# Patient Record
Sex: FEMALE | Race: BLACK OR AFRICAN AMERICAN | NOT HISPANIC OR LATINO | Employment: OTHER | URBAN - METROPOLITAN AREA
[De-identification: names, ages, dates, MRNs, and addresses within clinical notes are randomized per-mention and may not be internally consistent; named-entity substitution may affect disease eponyms.]

---

## 2017-01-10 ENCOUNTER — HOSPITAL ENCOUNTER (EMERGENCY)
Facility: OTHER | Age: 47
Discharge: HOME | End: 2017-01-10
Attending: EMERGENCY MEDICINE
Payer: MEDICAID

## 2017-01-10 VITALS
HEART RATE: 80 BPM | BODY MASS INDEX: 48.82 KG/M2 | HEIGHT: 65 IN | DIASTOLIC BLOOD PRESSURE: 65 MMHG | TEMPERATURE: 98 F | SYSTOLIC BLOOD PRESSURE: 108 MMHG | OXYGEN SATURATION: 100 % | WEIGHT: 293 LBS | RESPIRATION RATE: 18 BRPM

## 2017-01-10 DIAGNOSIS — J02.9 PHARYNGITIS, UNSPECIFIED ETIOLOGY: Primary | ICD-10-CM

## 2017-01-10 LAB
CTP QC/QA: YES
POCT GLUCOSE: 90 MG/DL (ref 70–110)
S PYO RRNA THROAT QL PROBE: NEGATIVE

## 2017-01-10 PROCEDURE — 99283 EMERGENCY DEPT VISIT LOW MDM: CPT | Mod: 25

## 2017-01-10 PROCEDURE — 82947 ASSAY GLUCOSE BLOOD QUANT: CPT

## 2017-01-10 PROCEDURE — 87880 STREP A ASSAY W/OPTIC: CPT

## 2017-01-10 PROCEDURE — 63600175 PHARM REV CODE 636 W HCPCS: Performed by: EMERGENCY MEDICINE

## 2017-01-10 PROCEDURE — 96372 THER/PROPH/DIAG INJ SC/IM: CPT

## 2017-01-10 RX ORDER — DEXAMETHASONE SODIUM PHOSPHATE 4 MG/ML
4 INJECTION, SOLUTION INTRA-ARTICULAR; INTRALESIONAL; INTRAMUSCULAR; INTRAVENOUS; SOFT TISSUE
Status: COMPLETED | OUTPATIENT
Start: 2017-01-10 | End: 2017-01-10

## 2017-01-10 RX ADMIN — DEXAMETHASONE SODIUM PHOSPHATE 4 MG: 4 INJECTION, SOLUTION INTRAMUSCULAR; INTRAVENOUS at 04:01

## 2017-01-10 NOTE — ED PROVIDER NOTES
Encounter Date: 1/10/2017       History     Chief Complaint   Patient presents with    Sore Throat     sore throat x 2 day, no fever/cough/congestion reported.      Review of patient's allergies indicates:  No Known Allergies  The history is provided by the patient.    46-year-old who complains of a sore throat for 2 days.  She says the pain worsens when she swallows.  She does not take anything for the symptoms.  She also has pain to her left ear.  No fever.  No other cold symptoms.  Patient was seen and treated here in December for an upper respiratory tract infection.  She said that she completed the antibiotics.  She denies chest pain or shortness of breath.  No cough  Past Medical History   Diagnosis Date    Anticoagulant long-term use     Asthma     Asthma     CHF (congestive heart failure)     CHF (congestive heart failure)     H/O tubal ligation     Hypertension     Obesity     Type 2 diabetes mellitus with hyperglycemia     Type 2 diabetes mellitus with polyneuropathy      No past medical history pertinent negatives.  Past Surgical History   Procedure Laterality Date    Gallbladder surgery      Iabp  4/2016    Colonoscopy N/A 5/2/2016     Procedure: COLONOSCOPY;  Surgeon: Eugene Soto MD;  Location: [address];  Service: Endoscopy;  Laterality: N/A;    Tubal ligation       History reviewed. No pertinent family history.  Social History   Substance Use Topics    Smoking status: Never Smoker    Smokeless tobacco: None    Alcohol use No     Review of Systems   Constitutional: Negative for fever.   HENT: Positive for sore throat. Negative for congestion.    Respiratory: Negative for cough.    Cardiovascular: Negative for chest pain.   Gastrointestinal: Negative for vomiting.   Genitourinary: Negative for dysuria.   Neurological: Negative for headaches.       Physical Exam   Initial Vitals   BP Pulse Resp Temp SpO2   01/10/17 0336 01/10/17 0336 01/10/17 0336 01/10/17 0336 01/10/17  0336   110/70 82 18 98.1 °F (36.7 °C) 100 %     Physical Exam    Nursing note and vitals reviewed.  Constitutional: She appears well-developed and well-nourished.   HENT:   Head: Normocephalic and atraumatic.   Mouth/Throat: No oropharyngeal exudate.   Mild erythematous posterior pharynx, normal tympanic membranes bilaterally   Eyes: Conjunctivae and EOM are normal. Pupils are equal, round, and reactive to light.   Neck: Normal range of motion. Neck supple.   Cardiovascular: Normal rate and regular rhythm.   Pulmonary/Chest: Breath sounds normal.   Abdominal: Soft. There is no tenderness. There is no rebound and no guarding.   Musculoskeletal: Normal range of motion.   Neurological: She is alert and oriented to person, place, and time. She has normal strength.   Skin: Skin is warm and dry.   Psychiatric: She has a normal mood and affect.         ED Course   Procedures  Labs Reviewed   POCT RAPID STREP A   POCT GLUCOSE MONITORING CONTINUOUS             Medical Decision Making:   Initial Assessment:   46-year-old who complains of a sore throat for 2 days.  Patient has mild erythema to the posterior pharynx but no exudate or evidence of an abscess.  ED Management:  Strep test will be done as well as an Accu-Chek.  Accu-Chek was 90.  Strep test was negative.  I do not  Feel that  the patient needs antibiotics at this point.  She was treated with 4 mg of Decadron.  I do feel comfortable doing this since the patient's Accu-Chek was normal.  She was instructed that her blood sugar may go up over the next couple of days.                   ED Course     Clinical Impression:   The encounter diagnosis was Pharyngitis, unspecified etiology.          Ai Monahan MD  01/10/17 0428

## 2017-01-10 NOTE — ED AVS SNAPSHOT
[facility] EMERGENCY DEPARTMENT  [address] [facility] [address]               Laure Ross   1/10/2017  3:33 AM   ED    Description:  Female : 1970   Department:  [facility] Emergency Department           Your Care was Coordinated By:     Provider Role From To    Ai Monahan MD Attending Provider 01/10/17 0336 --      Reason for Visit     Sore Throat           Diagnoses this Visit        Comments    Pharyngitis, unspecified etiology    -  Primary       ED Disposition     None           To Do List           Follow-up Information     Follow up with Ayan Olmstead MD. Schedule an appointment as soon as possible for a visit in 1 day.    Specialty:  Cardiology    Contact information:    [address] [place] [address]  [phone]          Follow up with [facility] Emergency Department.    Specialty:  Emergency Medicine    Why:  If symptoms worsen    Contact information:    [address] [facility] [address]  [phone]      [facility] On Call     [facility] On Call Nurse [facility] Line -  Assistance  Registered nurses in the [facility] Center provide clinical advisement, health education, appointment booking, and other advisory services.  Call for this free service at [phone].             Medications           Message regarding Medications     Verify the changes and/or additions to your medication regime listed below are the same as discussed with your clinician today.  If any of these changes or additions are incorrect, please notify your healthcare provider.        These medications were administered today        Dose Freq    dexamethasone injection 4 mg 4 mg ED 1 Time    Sig: Inject 1 mL (4 mg total) into the muscle ED 1 Time.    Class: Normal    Route: Intramuscular           Verify that the below list of medications is an accurate representation of the medications you are currently taking.  If none reported, the list may be blank. If incorrect, please  "contact your healthcare provider. Carry this list with you in case of emergency.           Current Medications     amiodarone (PACERONE) 200 MG Tab TAKE TWO TABLETS BY MOUTH EVERY DAY.    amiodarone (PACERONE) 200 MG Tab Take 2 tablets (400 mg total) by mouth once daily.    BD ULTRA-FINE ESSENCE PEN NEEDLES 32 gauge x 5/32" Ndle Takes 5 times  day    bisoprolol (ZEBETA) 5 MG tablet Take 0.5 tablets (2.5 mg total) by mouth once daily.    blood sugar diagnostic Strp 240 strips by Misc.(Non-Drug; Combo Route) route 4 (four) times daily with meals and nightly.    BREO ELLIPTA 100-25 mcg/dose diskus inhaler 1 puff once daily.     cetirizine (ZYRTEC) 5 MG chewable tablet Take 1 tablet (5 mg total) by mouth once daily.    dexamethasone injection 4 mg Inject 1 mL (4 mg total) into the muscle ED 1 Time.    digoxin (LANOXIN) 125 mcg tablet TAKE 1 TABLET BY MOUTH ONCE DAILY    furosemide (LASIX) 40 MG tablet Take 2 tablets (80 mg total) by mouth 2 (two) times daily.    insulin aspart (NOVOLOG) 100 unit/mL injection Inject into the skin 3 (three) times daily before meals.    insulin glargine (LANTUS SOLOSTAR) 100 unit/mL (3 mL) InPn pen Inject 36 Units into the skin every evening.    lancets (ACCU-CHEK MULTICLIX LANCET) Misc 1 each by Misc.(Non-Drug; Combo Route) route 4 (four) times daily with meals and nightly.    lancets Misc Checks 4 times a day    levalbuterol (XOPENEX HFA) 45 mcg/actuation inhaler Inhale 2 puffs into the lungs every 4 (four) hours as needed for Wheezing.    levalbuterol (XOPENEX) 1.25 mg/0.5 mL nebulizer solution Take 0.5 mLs (1.25 mg total) by nebulization every 4 (four) hours as needed for Wheezing.    lisinopril (PRINIVIL,ZESTRIL) 5 MG tablet Take 1 tablet (5 mg total) by mouth 2 (two) times daily.    magnesium oxide (MAG-OX) 400 mg tablet Take 1 tablet (400 mg total) by mouth 2 (two) times daily.    montelukast (SINGULAIR) 10 mg tablet 10 mg once daily.     omeprazole (PRILOSEC) 40 MG capsule Take 1 " "capsule (40 mg total) by mouth every morning.    potassium chloride (MICRO-K) 10 MEQ CpSR TAKE 2 CAPSULES BY MOUTH ONCE DAILY    potassium chloride (MICRO-K) 10 MEQ CpSR Take 2 capsules (20 mEq total) by mouth once daily.    SPIRIVA WITH HANDIHALER 18 mcg inhalation capsule Inhale 18 mcg into the lungs.     spironolactone (ALDACTONE) 25 MG tablet Take 1 tablet (25 mg total) by mouth once daily.    TRUEPLUS LANCETS 30 gauge Misc     TRUEPLUS LANCETS 30 gauge Misc USE 2 HOURS AFTER MEALS AND AT BEDTIME    vitamin D 1000 units Tab Take 2 tablets (2,000 Units total) by mouth once daily.    warfarin (COUMADIN) 7.5 MG tablet Take 1 tablet (7.5 mg total) by mouth Daily. Take 7.5 mg on Tues, Thurs, Sat. Take 3.75 mg all other days           Clinical Reference Information           Your Vitals Were     BP Pulse Temp Resp Height Weight    110/70 (BP Location: Left arm) 82 98.1 °F (36.7 °C) (Oral) 18 5' 5" (1.651 m) 216.9 kg (478 lb 2.8 oz)    Last Period SpO2 BMI          01/01/2017 100% 79.57 kg/m2        Allergies as of 1/10/2017     No Known Allergies      Immunizations Administered on Date of Encounter - 1/10/2017     None      ED Micro, Lab, POCT     Start Ordered       Status Ordering Provider    01/10/17 0351 01/10/17 0351  POCT glucose  Once      Final result     01/10/17 0348 01/10/17 0347  POCT rapid strep A  Once      Final result     01/10/17 0339 01/10/17 0338  POCT glucose  Once      Acknowledged       ED Imaging Orders     None        Discharge Instructions         Self-Care for Sore Throats  Sore throats occur for many reasons, such as colds, allergies, and infections caused by viruses or bacteria. In any case, your throat becomes red and sore. Your goal for self-care is to reduce your discomfort while giving your throat a chance to heal.    Moisten and Soothe Your Throat  · Try a sip of water first thing after waking up.  · Keep your throat moist by drinking 6 or more glasses of clear liquids every " day.  · Run a cool-air humidifier in your room overnight.  · Avoid cigarette smoke.   · Suck on throat lozenges, cough drops, hard candy, ice chips, or frozen fruit-juice bars. Use the sugar-free versions if your diet or medical condition require them.  Gargle to Ease Irritation  Gargling every hour or 2 can ease irritation. Try gargling with 1 of these solutions:  · 1/4 teaspoon of salt in 1/2 cup of warm water  · An over-the-counter anesthetic gargle  Use Medication for More Relief  Over-the-counter medication can reduce sore throat symptoms. Ask your pharmacist if you have questions about which medication to use:  · Ease pain with anesthetic sprays. Aspirin or an aspirin substitute also helps. Remember, never give aspirin to anyone 18 or younger, or if you are already taking blood thinners.   · For sore throats caused by allergies, try antihistamines to block the allergic reaction.  · Remember: unless a sore throat is caused by a bacterial infection, antibiotics wont help you.  Prevent Future Sore Throats  · Stop smoking or reduce contact with secondhand smoke. Smoke irritates the tender throat lining.  · Limit contact with pets and with allergy-causing substances, such as pollen and mold.  · When youre around someone with a sore throat or cold, wash your hands frequently to keep viruses or bacteria from spreading.  · Dont strain your vocal cords.  Call Your Health Care Provider  Contact your doctor if you have:  · A temperature over 101°F (38.3°C)  · White spots on the throat  · Great difficulty swallowing  · Trouble breathing  · A skin rash  · Recent exposure to someone else with strep bacteria  · Severe hoarseness and swollen glands in the neck or jaw   © 8849-0156 "[employer]. [address] [place] [address], [place] [address]. All rights reserved. This information is not intended as a substitute for professional medical care. Always follow your healthcare professional's instructions.          Your  Scheduled Appointments     Jan 12, 2017 10:45 AM CST   Anticoagulation with Wen Nolen, Aristides   [facility] ([facility])    [address]  [place] [address] [phone]            Jan 17, 2017  8:45 AM CST   Established Patient Visit with LOR High [facility]arlene - Podiatry ([facility] )    [address]  [place] [address] [phone]            Mar 22, 2017  7:10 AM CDT   Non-Fasting Lab with LAB, APPOINTMENT [place] [facility] ([facility])    [address] [facility] [address] [phone]            Mar 29, 2017 10:30 AM CDT   Established Patient with THANIA Giffrod, KIRSTENP   [facility] - Endocrinology ([facility] )    [address] [facility] [facility] [address] [phone]               [facility] complies with applicable Federal civil rights laws and does not discriminate on the basis of race, color, national origin, age, disability, or sex.        Language Assistance Services     ATTENTION: Language assistance services are available, free of charge. Please call [phone].      ATENCIÓN: Si habla español, tiene a dougherty disposición servicios gratuitos de asistencia lingüística. Llame al [phone].     CHÚ Ý: N?u b?n nói Ti?ng Vi?t, có các d?ch v? h? tr? ngôn ng? mi?n phí dành cho b?n. G?i s? [phone].

## 2017-01-10 NOTE — DISCHARGE INSTRUCTIONS
Self-Care for Sore Throats  Sore throats occur for many reasons, such as colds, allergies, and infections caused by viruses or bacteria. In any case, your throat becomes red and sore. Your goal for self-care is to reduce your discomfort while giving your throat a chance to heal.    Moisten and Soothe Your Throat  · Try a sip of water first thing after waking up.  · Keep your throat moist by drinking 6 or more glasses of clear liquids every day.  · Run a cool-air humidifier in your room overnight.  · Avoid cigarette smoke.   · Suck on throat lozenges, cough drops, hard candy, ice chips, or frozen fruit-juice bars. Use the sugar-free versions if your diet or medical condition require them.  Gargle to Ease Irritation  Gargling every hour or 2 can ease irritation. Try gargling with 1 of these solutions:  · 1/4 teaspoon of salt in 1/2 cup of warm water  · An over-the-counter anesthetic gargle  Use Medication for More Relief  Over-the-counter medication can reduce sore throat symptoms. Ask your pharmacist if you have questions about which medication to use:  · Ease pain with anesthetic sprays. Aspirin or an aspirin substitute also helps. Remember, never give aspirin to anyone 18 or younger, or if you are already taking blood thinners.   · For sore throats caused by allergies, try antihistamines to block the allergic reaction.  · Remember: unless a sore throat is caused by a bacterial infection, antibiotics wont help you.  Prevent Future Sore Throats  · Stop smoking or reduce contact with secondhand smoke. Smoke irritates the tender throat lining.  · Limit contact with pets and with allergy-causing substances, such as pollen and mold.  · When youre around someone with a sore throat or cold, wash your hands frequently to keep viruses or bacteria from spreading.  · Dont strain your vocal cords.  Call Your Health Care Provider  Contact your doctor if you have:  · A temperature over 101°F (38.3°C)  · White spots on the  throat  · Great difficulty swallowing  · Trouble breathing  · A skin rash  · Recent exposure to someone else with strep bacteria  · Severe hoarseness and swollen glands in the neck or jaw   © 0630-5026 [employer]. [address] [place] [address], [place] [address]. All rights reserved. This information is not intended as a substitute for professional medical care. Always follow your healthcare professional's instructions.

## 2017-01-12 ENCOUNTER — ANTI-COAG VISIT (OUTPATIENT)
Dept: CARDIOLOGY | Facility: CLINIC | Age: 47
End: 2017-01-12
Payer: MEDICAID

## 2017-01-12 DIAGNOSIS — Z79.01 CHRONIC ANTICOAGULATION: Primary | ICD-10-CM

## 2017-01-12 DIAGNOSIS — I48.0 PAROXYSMAL ATRIAL FIBRILLATION: ICD-10-CM

## 2017-01-12 DIAGNOSIS — I42.0 CARDIOMYOPATHY, DILATED, NONISCHEMIC: ICD-10-CM

## 2017-01-12 LAB
CTP QC/QA: NORMAL
INR PPP: 2.8 (ref 2–3)

## 2017-01-12 PROCEDURE — 85610 PROTHROMBIN TIME: CPT | Mod: PBBFAC,PO

## 2017-01-12 PROCEDURE — 99211 OFF/OP EST MAY X REQ PHY/QHP: CPT | Mod: S$PBB,25,,

## 2017-01-12 RX ORDER — FUROSEMIDE 40 MG/1
TABLET ORAL
Qty: 120 TABLET | Refills: 3 | Status: SHIPPED | OUTPATIENT
Start: 2017-01-12 | End: 2017-05-29 | Stop reason: SDUPTHER

## 2017-01-12 NOTE — PROGRESS NOTES
INR good and on upper end despite missed dose 2 days ago. Pt reports no boosts drinks last week then started 1 daily on 1/7 or 1/8. She previously was drinking 2/day. We will decrease dose based on INR today and diet change. Repeat INR in 2 weeks.

## 2017-01-17 ENCOUNTER — OFFICE VISIT (OUTPATIENT)
Dept: PODIATRY | Facility: CLINIC | Age: 47
End: 2017-01-17
Payer: MEDICAID

## 2017-01-17 ENCOUNTER — TELEPHONE (OUTPATIENT)
Dept: PODIATRY | Facility: CLINIC | Age: 47
End: 2017-01-17

## 2017-01-17 VITALS
DIASTOLIC BLOOD PRESSURE: 65 MMHG | WEIGHT: 221.31 LBS | HEIGHT: 65 IN | BODY MASS INDEX: 36.87 KG/M2 | HEART RATE: 73 BPM | SYSTOLIC BLOOD PRESSURE: 99 MMHG

## 2017-01-17 DIAGNOSIS — M62.469 GASTROCNEMIUS EQUINUS, UNSPECIFIED LATERALITY: ICD-10-CM

## 2017-01-17 DIAGNOSIS — Z79.01 LONG TERM (CURRENT) USE OF ANTICOAGULANTS: ICD-10-CM

## 2017-01-17 DIAGNOSIS — L84 PRE-ULCERATIVE CALLUSES: ICD-10-CM

## 2017-01-17 DIAGNOSIS — Z79.4 TYPE 2 DIABETES MELLITUS WITH DIABETIC POLYNEUROPATHY, WITH LONG-TERM CURRENT USE OF INSULIN: Primary | ICD-10-CM

## 2017-01-17 DIAGNOSIS — E11.42 TYPE 2 DIABETES MELLITUS WITH DIABETIC POLYNEUROPATHY, WITH LONG-TERM CURRENT USE OF INSULIN: Primary | ICD-10-CM

## 2017-01-17 PROCEDURE — 99213 OFFICE O/P EST LOW 20 MIN: CPT | Mod: PBBFAC | Performed by: PODIATRIST

## 2017-01-17 PROCEDURE — 99213 OFFICE O/P EST LOW 20 MIN: CPT | Mod: S$PBB,,, | Performed by: PODIATRIST

## 2017-01-17 PROCEDURE — 99999 PR PBB SHADOW E&M-EST. PATIENT-LVL III: CPT | Mod: PBBFAC,,, | Performed by: PODIATRIST

## 2017-01-17 NOTE — PROGRESS NOTES
Subjective:      Patient ID: Laure Ross is a 46 y.o. female.    Chief Complaint: Diabetes Mellitus (PCP Dr. Olmstead); Diabetic Foot Exam; and Routine Foot Care    Laure is a 46 y.o. female who presents to the clinic for evaluation and treatment of high risk feet. Laure has a past medical history of Anticoagulant long-term use; Asthma; Asthma; CHF (congestive heart failure); CHF (congestive heart failure); H/O tubal ligation; Hypertension; Obesity; Type 2 diabetes mellitus with hyperglycemia; and Type 2 diabetes mellitus with polyneuropathy. The patient's chief complaint is large plantar calluses. History of forefoot ulcer several years ago treated at [facility]. Denies recent wound or infection. Notes decreased pain and severity of calluses with new orthotics. This patient has documented high risk feet requiring routine maintenance secondary to peripheral neuropathy.    PCP: Ayan Olmstead MD    Date Last Seen by PCP: 11/30/16, endocgeorge    Current shoe gear:  Affected Foot: Flip-flops     Unaffected Foot: Flip-flops    Hemoglobin A1C   Date Value Ref Range Status   11/23/2016 6.1 4.5 - 6.2 % Final     Comment:     According to ADA guidelines, hemoglobin A1C <7.0% represents  optimal control in non-pregnant diabetic patients.  Different  metrics may apply to specific populations.   Standards of Medical Care in Diabetes - 2016.  For the purpose of screening for the presence of diabetes:  <5.7%     Consistent with the absence of diabetes  5.7-6.4%  Consistent with increasing risk for diabetes   (prediabetes)  >or=6.5%  Consistent with diabetes  Currently no consensus exists for use of hemoglobin A1C  for diagnosis of diabetes for children.     08/23/2016 7.2 (H) 4.5 - 6.2 % Final     Comment:     According to ADA guidelines, hemoglobin A1C <7.0% represents  optimal control in non-pregnant diabetic patients.  Different  metrics may apply to specific populations.   Standards of Medical Care in Diabetes -  2016.  For the purpose of screening for the presence of diabetes:  <5.7%     Consistent with the absence of diabetes  5.7-6.4%  Consistent with increasing risk for diabetes   (prediabetes)  >or=6.5%  Consistent with diabetes  Currently no consensus exists for use of hemoglobin A1C  for diagnosis of diabetes for children.     04/28/2016 9.8 (H) 4.5 - 6.2 % Final       Review of Systems   Constitution: Negative for chills, fever and malaise/fatigue.   Cardiovascular: Negative for chest pain, leg swelling, orthopnea and palpitations.   Respiratory: Negative for cough, shortness of breath and wheezing.    Skin: Positive for color change, dry skin and nail changes. Negative for itching, poor wound healing and rash.   Musculoskeletal: Negative for arthritis, gout, joint pain, joint swelling, muscle weakness and myalgias.   Neurological: Positive for numbness, paresthesias and sensory change. Negative for disturbances in coordination, dizziness, focal weakness and tremors.           Objective:      Physical Exam   Cardiovascular:   Dorsalis pedis and posterior tibial pulses are diminished Bilaterally. Toes are cool to touch. Feet are warm proximally.There is decreased digital hair. Skin is atrophic, slightly hyperpigmented, and mildly edematous       Musculoskeletal:   Musculoskeletal:  Muscle strength is 5/5 in all groups bilaterally.  No pain with ankle, STJ or MTPJ ROM, bilat. Ankle dorsiflexion is limited with knees extended and flexed, bilat.     Neurological:   [place] 5.07 monofilamant testing is diminished both feet. Sharp/dull sensation diminished Bilaterally. Light touch absent Bilaterally.       Skin: Lesion noted.   Pre-ulcerative lesion at plantar 2nd MTPJ, bilat. Dermal bruising and callus. No erythema, fluctuance or drainage.    Toenails 1-5 bilaterally are elongated by 2-3 mm, thickened by 2-3 mm, discolored/yellowed, dystrophic, brittle with subungual debris. No incurvation. Mild xerosis noted,  bilat.                   Assessment:       Encounter Diagnoses   Name Primary?    Type 2 diabetes mellitus with diabetic polyneuropathy, with long-term current use of insulin Yes    Pre-ulcerative calluses     Gastrocnemius equinus, unspecified laterality     Long term (current) use of anticoagulants          Plan:       Laure was seen today for diabetes mellitus, diabetic foot exam and routine foot care.    Diagnoses and all orders for this visit:    Type 2 diabetes mellitus with diabetic polyneuropathy, with long-term current use of insulin    Pre-ulcerative calluses    Gastrocnemius equinus, unspecified laterality    Long term (current) use of anticoagulants      I counseled the patient on her conditions, their implications and medical management.        - Shoe inspection. Diabetic Foot Education. Patient reminded of the importance of good nutrition and blood sugar control to help prevent podiatric complications of diabetes. Patient instructed on proper foot hygeine. We discussed wearing proper shoe gear, daily foot inspections, never walking without protective shoe gear, never putting sharp instruments to feet, routine podiatric nail visits every 2-3 months.      - With patient's permission, nails were aggressively reduced and debrided x 10 to their soft tissue attachment mechanically and with electric , removing all offending nail and debris. Patient relates relief following the procedure. She will continue to monitor the areas daily, inspect her feet, wear protective shoe gear when ambulatory, moisturizer to maintain skin integrity and follow in this office in approximately 2-3 months, sooner p.r.n.    - After cleansing the  area w/ alcohol prep pad the above mentioned hyperkeratosis/pre ulcer lesions was trimmed utilizing No 15 scapel, to a smooth base with out incident. Patient tolerated this  well and reported comfort to the area of plantar 2nd MTP joint, bilat. No drainage or abscess noted.

## 2017-01-17 NOTE — TELEPHONE ENCOUNTER
----- Message from Aziza Bolaños sent at 1/17/2017  8:41 AM CST -----  Contact: self@ home   Pt will be 15 min late to her appt.

## 2017-01-17 NOTE — LETTER
January 17, 2017      Dandre Jules MD  [address] [facility] [address]           [facility]  [address]  [facility] [address]  Phone: [phone]          Patient: Laure Ross   MR Number: [id number]   YOB: 1970   Date of Visit: 1/17/2017       Dear Dr. Dandre Jules:    Thank you for referring Laure Ross to me for evaluation. Attached you will find relevant portions of my assessment and plan of care.    If you have questions, please do not hesitate to call me. I look forward to following Laure Ross along with you.    Sincerely,    Freddie Hampton, DPWILMAN    Enclosure  CC:  No Recipients    If you would like to receive this communication electronically, please contact [email or web address] or [phone] to request more information on [employer] access.    For providers and/or their staff who would like to refer a patient to [facility], please contact us through our one-stop-shop provider referral line, [facility], at [phone].    If you feel you have received this communication in error or would no longer like to receive these types of communications, please e-mail [email or web address]

## 2017-01-17 NOTE — TELEPHONE ENCOUNTER
I called Funmilayo to discuss patient stated she will be late also made 1 attempt to call patient her voicemail said its not set up yet and can not receive messages . Patient will have to reschedule or wait until there is a availability some time today or this evening i was advised.

## 2017-01-17 NOTE — MR AVS SNAPSHOT
[facility]  [address] [facility] [address]  Phone: [phone]                  Laure Ross   2017 8:45 AM   Office Visit    Description:  Female : 1970   Provider:  Freddie Hampton DPM   Department:  [facility]           Reason for Visit     Diabetes Mellitus     Diabetic Foot Exam     Routine Foot Care           Diagnoses this Visit        Comments    Type 2 diabetes mellitus with diabetic polyneuropathy, with long-term current use of insulin    -  Primary     Pre-ulcerative calluses         Gastrocnemius equinus, unspecified laterality         Long term (current) use of anticoagulants                To Do List           Future Appointments        Provider Department Dept Phone    2017 10:45 AM Wen Nolen, PharmD [facility] - Coumadin [phone]    3/22/2017 7:10 AM LAB, APPOINTMENT [place] [facility] [phone]    3/29/2017 10:30 AM Loretta Trevino APRN, FNP [facility] - Endocrinology [phone]    2017 10:00 AM Freddie Hampton DPM [facility] [phone]      Goals (5 Years of Data)     None      [facility] On [facility]     [facility] Nurse Care Line -  Assistance  Registered nurses in the [facility] provide clinical advisement, health education, appointment booking, and other advisory services.  Call for this free service at [phone].             Medications           Message regarding Medications     Verify the changes and/or additions to your medication regime listed below are the same as discussed with your clinician today.  If any of these changes or additions are incorrect, please notify your healthcare provider.             Verify that the below list of medications is an accurate representation of the medications you are currently taking.  If none reported, the list may be blank. If incorrect, please contact your healthcare provider. Carry this list with you in case of emergency.  "          Current Medications     amiodarone (PACERONE) 200 MG Tab TAKE TWO TABLETS BY MOUTH EVERY DAY.    amiodarone (PACERONE) 200 MG Tab Take 2 tablets (400 mg total) by mouth once daily.    BD ULTRA-FINE ESSENCE PEN NEEDLES 32 gauge x 5/32" Ndle Takes 5 times  day    bisoprolol (ZEBETA) 5 MG tablet Take 0.5 tablets (2.5 mg total) by mouth once daily.    blood sugar diagnostic Strp 240 strips by Misc.(Non-Drug; Combo Route) route 4 (four) times daily with meals and nightly.    BREO ELLIPTA 100-25 mcg/dose diskus inhaler 1 puff once daily.     cetirizine (ZYRTEC) 5 MG chewable tablet Take 1 tablet (5 mg total) by mouth once daily.    digoxin (LANOXIN) 125 mcg tablet TAKE 1 TABLET BY MOUTH ONCE DAILY    furosemide (LASIX) 40 MG tablet TAKE 2 TABLETS BY MOUTH TWO TIMES A DAY .    insulin aspart (NOVOLOG) 100 unit/mL injection Inject into the skin 3 (three) times daily before meals.    insulin glargine (LANTUS SOLOSTAR) 100 unit/mL (3 mL) InPn pen Inject 36 Units into the skin every evening.    lancets (ACCU-CHEK MULTICLIX LANCET) Misc 1 each by Misc.(Non-Drug; Combo Route) route 4 (four) times daily with meals and nightly.    lancets Misc Checks 4 times a day    levalbuterol (XOPENEX HFA) 45 mcg/actuation inhaler Inhale 2 puffs into the lungs every 4 (four) hours as needed for Wheezing.    levalbuterol (XOPENEX) 1.25 mg/0.5 mL nebulizer solution Take 0.5 mLs (1.25 mg total) by nebulization every 4 (four) hours as needed for Wheezing.    lisinopril (PRINIVIL,ZESTRIL) 5 MG tablet Take 1 tablet (5 mg total) by mouth 2 (two) times daily.    magnesium oxide (MAG-OX) 400 mg tablet Take 1 tablet (400 mg total) by mouth 2 (two) times daily.    montelukast (SINGULAIR) 10 mg tablet 10 mg once daily.     omeprazole (PRILOSEC) 40 MG capsule Take 1 capsule (40 mg total) by mouth every morning.    potassium chloride (MICRO-K) 10 MEQ CpSR TAKE 2 CAPSULES BY MOUTH ONCE DAILY    potassium chloride (MICRO-K) 10 MEQ CpSR Take 2 capsules " "(20 mEq total) by mouth once daily.    SPIRIVA WITH HANDIHALER 18 mcg inhalation capsule Inhale 18 mcg into the lungs.     spironolactone (ALDACTONE) 25 MG tablet Take 1 tablet (25 mg total) by mouth once daily.    TRUEPLUS LANCETS 30 gauge Misc     TRUEPLUS LANCETS 30 gauge Misc USE 2 HOURS AFTER MEALS AND AT BEDTIME    vitamin D 1000 units Tab Take 2 tablets (2,000 Units total) by mouth once daily.    warfarin (COUMADIN) 7.5 MG tablet Take 1 tablet (7.5 mg total) by mouth Daily. Take 7.5 mg on Tues, Thurs, Sat. Take 3.75 mg all other days           Clinical Reference Information           Vital Signs - Last Recorded  Most recent update: 1/17/2017  9:40 AM by Brit Nam MA    BP Pulse Ht Wt LMP BMI    99/65 73 5' 5" (1.651 m) 100.4 kg (221 lb 4.8 oz) 01/01/2017 36.83 kg/m2      Blood Pressure          Most Recent Value    BP  99/65      Allergies as of 1/17/2017     No Known Allergies      Immunizations Administered on Date of Encounter - 1/17/2017     None      Maintenance Dialysis History     Patient has no recorded history of maintenance dialysis.      "

## 2017-01-26 ENCOUNTER — ANTI-COAG VISIT (OUTPATIENT)
Dept: CARDIOLOGY | Facility: CLINIC | Age: 47
End: 2017-01-26
Payer: MEDICAID

## 2017-01-26 DIAGNOSIS — Z79.01 CHRONIC ANTICOAGULATION: Primary | ICD-10-CM

## 2017-01-26 DIAGNOSIS — I48.0 PAROXYSMAL ATRIAL FIBRILLATION: ICD-10-CM

## 2017-01-26 LAB
CTP QC/QA: YES
INR PPP: 3.3 (ref 2–3)

## 2017-01-26 PROCEDURE — 99211 OFF/OP EST MAY X REQ PHY/QHP: CPT | Mod: S$PBB,25,,

## 2017-01-26 PROCEDURE — 85610 PROTHROMBIN TIME: CPT | Mod: PBBFAC,PO

## 2017-01-26 NOTE — PROGRESS NOTES
INR a little high today. Pt took a bisoprolol yesterday. She reports it caused her asthma to flare and had a bad night. She is not going to continue it. She also missed a dose in the past week. No other changes. We will decrease dose and repeat INR next week. Pt advised ok to eat a little greens today.

## 2017-02-02 ENCOUNTER — ANTI-COAG VISIT (OUTPATIENT)
Dept: CARDIOLOGY | Facility: CLINIC | Age: 47
End: 2017-02-02
Payer: MEDICAID

## 2017-02-02 DIAGNOSIS — I48.0 PAROXYSMAL ATRIAL FIBRILLATION: ICD-10-CM

## 2017-02-02 DIAGNOSIS — Z79.01 CHRONIC ANTICOAGULATION: Primary | ICD-10-CM

## 2017-02-02 LAB — INR PPP: 1.9 (ref 2–3)

## 2017-02-02 PROCEDURE — 99211 OFF/OP EST MAY X REQ PHY/QHP: CPT | Mod: S$PBB,25,,

## 2017-02-02 PROCEDURE — 85610 PROTHROMBIN TIME: CPT | Mod: PBBFAC,PO

## 2017-02-02 NOTE — PROGRESS NOTES
INR a tad low. Pt denies missed doses or high vit K foods. She denies any health changes. She lost her dosing card from last week and went back to her old dose. Pt advised to call CC for instructions when losing instructions. We will continue on her reported dose. Repeat INR 2/13.

## 2017-02-04 ENCOUNTER — NURSE TRIAGE (OUTPATIENT)
Dept: ADMINISTRATIVE | Facility: CLINIC | Age: 47
End: 2017-02-04

## 2017-02-04 NOTE — TELEPHONE ENCOUNTER
"    Reason for Disposition   [1] SEVERE pain AND [2] not improved 2 hours after pain medicine    Answer Assessment - Initial Assessment Questions  1. ONSET: "When did the pain start?"      C/o left top shoulder pain this am , denies radiation to jaw or chest  2. LOCATION: "Where is the pain located?"      This am   3. PAIN: "How bad is the pain?" (Scale 1-10; or mild, moderate, severe)    - MILD (1-3): doesn't interfere with normal activities    - MODERATE (4-7): interferes with normal activities (e.g., work or school) or awakens from sleep    - SEVERE (8-10): excruciating pain, unable to do any normal activities, unable to move arm at all due to pain      20, unable to move arm.   4. WORK OR EXERCISE: "Has there been any recent work or exercise that involved this part of the body?"      No   5. CAUSE: "What do you think is causing the shoulder pain?"      Unsure - laid on it wrong   6. OTHER SYMPTOMS: "Do you have any other symptoms?" (e.g., neck pain, swelling, rash, fever, numbness, weakness)      Hx bursitis no other sx    Protocols used: ST SHOULDER PAIN-A-AH    No CP, no SOB. Took tramadol 50mg. rec to take tylenol and tramadol at least 4 ours apart. Pt states that she is currently in [place] and is three hours from hospital. Will be in town later this evening and will go to ED then if still in pain. Call back with questions.    "

## 2017-02-05 ENCOUNTER — HOSPITAL ENCOUNTER (EMERGENCY)
Facility: OTHER | Age: 47
Discharge: HOME | End: 2017-02-05
Attending: EMERGENCY MEDICINE
Payer: MEDICAID

## 2017-02-05 VITALS
SYSTOLIC BLOOD PRESSURE: 119 MMHG | DIASTOLIC BLOOD PRESSURE: 69 MMHG | OXYGEN SATURATION: 99 % | TEMPERATURE: 97 F | RESPIRATION RATE: 16 BRPM | HEIGHT: 60 IN | WEIGHT: 219 LBS | BODY MASS INDEX: 43 KG/M2 | HEART RATE: 72 BPM

## 2017-02-05 DIAGNOSIS — M62.838 TRAPEZIUS MUSCLE SPASM: Primary | ICD-10-CM

## 2017-02-05 PROCEDURE — 96372 THER/PROPH/DIAG INJ SC/IM: CPT

## 2017-02-05 PROCEDURE — 99283 EMERGENCY DEPT VISIT LOW MDM: CPT | Mod: 25

## 2017-02-05 PROCEDURE — 63600175 PHARM REV CODE 636 W HCPCS: Performed by: EMERGENCY MEDICINE

## 2017-02-05 RX ORDER — PREDNISONE 20 MG/1
40 TABLET ORAL DAILY
Qty: 10 TABLET | Refills: 0 | Status: SHIPPED | OUTPATIENT
Start: 2017-02-05 | End: 2017-02-10

## 2017-02-05 RX ORDER — METHOCARBAMOL 500 MG/1
1000 TABLET, FILM COATED ORAL 3 TIMES DAILY
Qty: 18 TABLET | Refills: 0 | Status: SHIPPED | OUTPATIENT
Start: 2017-02-05 | End: 2017-02-08

## 2017-02-05 RX ORDER — DEXAMETHASONE SODIUM PHOSPHATE 4 MG/ML
8 INJECTION, SOLUTION INTRA-ARTICULAR; INTRALESIONAL; INTRAMUSCULAR; INTRAVENOUS; SOFT TISSUE
Status: COMPLETED | OUTPATIENT
Start: 2017-02-05 | End: 2017-02-05

## 2017-02-05 RX ADMIN — DEXAMETHASONE SODIUM PHOSPHATE 8 MG: 4 INJECTION, SOLUTION INTRAMUSCULAR; INTRAVENOUS at 05:02

## 2017-02-05 NOTE — ED AVS SNAPSHOT
[facility] EMERGENCY DEPARTMENT  [address] [facility] [address]               Laure Ross   2017  5:08 AM   ED    Description:  Female : 1970   Department:  [facility] Department           Your Care was Coordinated By:     Provider Role From To    Alberto Burt MD Attending Provider 17 0514 --      Reason for Visit     Shoulder Pain           Diagnoses this Visit        Comments    Trapezius muscle spasm    -  Primary       ED Disposition     ED Disposition Condition Comment    Discharge  Patient discharged to home in stable condition.              To Do List           Follow-up Information     Follow up with Ayan Olmstead MD In 2 days.    Specialty:  Cardiology    Why:  for re-evaluation of today's complaint, and ongoing care    Contact information:    [address] [place] [id number] [phone]         These Medications        Disp Refills Start End    methocarbamol (ROBAXIN) 500 MG Tab 18 tablet 0 2017    Take 2 tablets (1,000 mg total) by mouth 3 (three) times daily. - Oral    Pharmacy: [facility] [address] [place] [address] [address] [facility] Ph #: [phone]       predniSONE (DELTASONE) 20 MG tablet 10 tablet 0 2017 2/10/2017    Take 2 tablets (40 mg total) by mouth once daily. - Oral    Pharmacy: [facility] [address] [place] [address] [address] [facility] Ph #: [phone]         [facility]     [facility] On Call Nurse Care Line -  Assistance  Registered nurses in the [facility] provide clinical advisement, health education, appointment booking, and other advisory services.  Call for this free service at [phone].             Medications           Message regarding Medications     Verify the changes and/or additions to your medication regime listed below are the same as discussed with your clinician today.  If any of these changes or additions are incorrect, please notify your  "healthcare provider.        START taking these NEW medications        Refills    methocarbamol (ROBAXIN) 500 MG Tab 0    Sig: Take 2 tablets (1,000 mg total) by mouth 3 (three) times daily.    Class: Normal    Route: Oral    predniSONE (DELTASONE) 20 MG tablet 0    Sig: Take 2 tablets (40 mg total) by mouth once daily.    Class: Normal    Route: Oral      These medications were administered today        Dose Freq    dexamethasone injection 8 mg 8 mg ED 1 Time    Sig: Inject 2 mLs (8 mg total) into the muscle ED 1 Time.    Class: Normal    Route: Intramuscular           Verify that the below list of medications is an accurate representation of the medications you are currently taking.  If none reported, the list may be blank. If incorrect, please contact your healthcare provider. Carry this list with you in case of emergency.           Current Medications     amiodarone (PACERONE) 200 MG Tab TAKE TWO TABLETS BY MOUTH EVERY DAY.    amiodarone (PACERONE) 200 MG Tab Take 2 tablets (400 mg total) by mouth once daily.    BD ULTRA-FINE ESSENCE PEN NEEDLES 32 gauge x 5/32" Ndle Takes 5 times  day    bisoprolol (ZEBETA) 5 MG tablet Take 0.5 tablets (2.5 mg total) by mouth once daily.    blood sugar diagnostic Strp 240 strips by Misc.(Non-Drug; Combo Route) route 4 (four) times daily with meals and nightly.    BREO ELLIPTA 100-25 mcg/dose diskus inhaler 1 puff once daily.     cetirizine (ZYRTEC) 5 MG chewable tablet Take 1 tablet (5 mg total) by mouth once daily.    digoxin (LANOXIN) 125 mcg tablet TAKE 1 TABLET BY MOUTH ONCE DAILY    furosemide (LASIX) 40 MG tablet TAKE 2 TABLETS BY MOUTH TWO TIMES A DAY .    insulin aspart (NOVOLOG) 100 unit/mL injection Inject into the skin 3 (three) times daily before meals.    insulin glargine (LANTUS SOLOSTAR) 100 unit/mL (3 mL) InPn pen Inject 36 Units into the skin every evening.    lancets (ACCU-CHEK MULTICLIX LANCET) Misc 1 each by Misc.(Non-Drug; Combo Route) route 4 (four) times " daily with meals and nightly.    lancets Misc Checks 4 times a day    levalbuterol (XOPENEX HFA) 45 mcg/actuation inhaler Inhale 2 puffs into the lungs every 4 (four) hours as needed for Wheezing.    levalbuterol (XOPENEX) 1.25 mg/0.5 mL nebulizer solution Take 0.5 mLs (1.25 mg total) by nebulization every 4 (four) hours as needed for Wheezing.    lisinopril (PRINIVIL,ZESTRIL) 5 MG tablet Take 1 tablet (5 mg total) by mouth 2 (two) times daily.    magnesium oxide (MAG-OX) 400 mg tablet Take 1 tablet (400 mg total) by mouth 2 (two) times daily.    methocarbamol (ROBAXIN) 500 MG Tab Take 2 tablets (1,000 mg total) by mouth 3 (three) times daily.    montelukast (SINGULAIR) 10 mg tablet 10 mg once daily.     omeprazole (PRILOSEC) 40 MG capsule Take 1 capsule (40 mg total) by mouth every morning.    potassium chloride (MICRO-K) 10 MEQ CpSR TAKE 2 CAPSULES BY MOUTH ONCE DAILY    potassium chloride (MICRO-K) 10 MEQ CpSR Take 2 capsules (20 mEq total) by mouth once daily.    predniSONE (DELTASONE) 20 MG tablet Take 2 tablets (40 mg total) by mouth once daily.    SPIRIVA WITH HANDIHALER 18 mcg inhalation capsule Inhale 18 mcg into the lungs.     spironolactone (ALDACTONE) 25 MG tablet Take 1 tablet (25 mg total) by mouth once daily.    TRUEPLUS LANCETS 30 gauge Misc     TRUEPLUS LANCETS 30 gauge Misc USE 2 HOURS AFTER MEALS AND AT BEDTIME    vitamin D 1000 units Tab Take 2 tablets (2,000 Units total) by mouth once daily.    warfarin (COUMADIN) 7.5 MG tablet Take 1 tablet (7.5 mg total) by mouth Daily. Take 7.5 mg on Tues, Thurs, Sat. Take 3.75 mg all other days           Clinical Reference Information           Your Vitals Were     BP Pulse Temp Resp Height Weight    107/68 78 97.2 °F (36.2 °C) (Temporal) 16 5' (1.524 m) 99.3 kg (219 lb)    Last Period SpO2 BMI          01/01/2017 99% 42.77 kg/m2        Allergies as of 2/5/2017     No Known Allergies      Immunizations Administered on Date of Encounter - 2/5/2017     None       ED Micro, Lab, POCT     None      ED Imaging Orders     None        Discharge Instructions         Muscle Spasm  A muscle spasm (also called a cramp) is an involuntary muscle contraction. The muscle tightens quickly and strongly. A hard lump may form in the muscle. Muscle spasms are very painful. Read on to learn more about muscle spasms and how to treat and prevent them.    What causes muscles to spasm?  Often, the cause of a muscle spasm is not known. Muscle spasm is due to irritation of muscle fibers. Some things can make a muscle spasm more likely. These include:  · Injury  · Heavy exercise  · Overtired muscles  · A muscle held in one position for a long time  · Dehydration  · Low levels of certain minerals in the body  · Taking certain medications, such as diuretics or water pills  · Certain medical conditions, such as kidney failure or diabetes  · Being pregnant  Stopping a muscle spasm  Muscle spasms often come and go quickly. When a muscle goes into spasm, very gently stretch and massage the muscle. This may help calm the muscle fibers. Then rest the muscle.  Preventing muscle spasms  Although there is little or no evidence that staying hydrated, taking certain vitamins or minerals or stretching works to prevent cramps, these measures may help and have other benefits. Talk to your health care provider about steps to take to avoid muscle spasms. These may include:  · Drinking enough fluids to avoid dehydration, especially when you exercise.  · Taking vitamin or mineral supplements.  · Getting regular exercise.  · Stretching regularly, especially before exercise.  · Limit caffeine and smoking.  · Taking a prescription muscle relaxant.  When to call your doctor  Call your doctor if you have any of the following:  · Severe cramping  · Cramping that lasts a long time, does not go away with stretching, or keeps coming back  · Pain, tingling, or weakness in the arms or legs  · Pain that wakes you up at night    Date Last Reviewed: 9/1/2015  © 3322-7785 [employer] [address] [place] [address], [place] [address]. All rights reserved. This information is not intended as a substitute for professional medical care. Always follow your healthcare professional's instructions.          Your Scheduled Appointments     Feb 13, 2017 10:00 AM CST   Non-Fasting Lab with LAB, APPOINT[place] [facility]    [address] [facility] [address]   [phone]            Feb 13, 2017 11:30 AM CST   Established Patient Visit with Dandre Jules MD   [facility]    [address]  [place] [address]   [phone]            Mar 22, 2017  7:10 AM CDT   Non-Fasting Lab with LAB, APPOINT[place] [facility]    [address] [facility] [address]   [phone]            Mar 29, 2017 10:30 AM CDT   Established Patient with Loretta Trevino, APRN, FNP   [facility] - Endocrinology ([facility] )    [address] [facility] [address]   [phone]            Apr 25, 2017 10:00 AM CDT   Established Patient Visit with Freddie Hampton DPM   [facility] - Podiatry ([facility] )    [address]  [place] [address]   [phone]              [facility] Sign-Up     Activating your [facility] account is as easy as 1-2-3!     1) Visit [email or web address], select Sign Up Now, enter this activation code and your date of birth, then select Next.  [email or web address]  Expires: 3/10/2017  9:16 AM      2) Create a username and password to use when you visit [facility] in the future and select a security question in case you lose your password and select Next.    3) Enter your e-mail address and click Sign Up!    Additional Information  If you have questions, please e-mail [email or web address] or call [phone] to talk to our [facility] staff. Remember, [email or web address] is NOT  to be used for urgent needs. For medical emergencies, dial 911.          [facility] Emergency Department complies with applicable Federal civil rights laws and does not discriminate on the basis of race, color, national origin, age, disability, or sex.        Language Assistance Services     ATTENTION: Language assistance services are available, free of charge. Please call [phone].      ATENCIÓN: Si habla español, tiene a dougherty disposición servicios gratuitos de asistencia lingüística. Llame al [phone].     CHÚ Ý: N?u b?n nói Ti?ng Vi?t, có các d?ch v? h? tr? ngôn ng? mi?n phí dành cho b?n. G?i s? [phone].

## 2017-02-05 NOTE — ED PROVIDER NOTES
Encounter Date: 2/5/2017       History     Chief Complaint   Patient presents with    Shoulder Pain     shoulder pain to left shoulder since yesterday, denies trauma     Review of patient's allergies indicates:  No Known Allergies  Patient is a 46 y.o. female presenting with the following complaint: arm injury.   Arm Injury    The incident occurred two days ago. The incident occurred at home. The injury mechanism is unknown. The context of the injury is unknown (noticed upon waking two days ago). There is an injury to the left shoulder. There have been no prior injuries to these areas. She is left-handed. Her tetanus status is UTD. Pertinent negatives include no chest pain, no numbness, no bowel incontinence, no nausea, no vomiting, no bladder incontinence, no headaches, no neck pain, no pain when bearing weight, no focal weakness, no tingling, no weakness and no cough.     Past Medical History   Diagnosis Date    Anticoagulant long-term use     Asthma     Asthma     CHF (congestive heart failure)     CHF (congestive heart failure)     H/O tubal ligation     Hypertension     Obesity     Type 2 diabetes mellitus with hyperglycemia     Type 2 diabetes mellitus with polyneuropathy      No past medical history pertinent negatives.  Past Surgical History   Procedure Laterality Date    Gallbladder surgery      Iabp  4/2016    Colonoscopy N/A 5/2/2016     Procedure: COLONOSCOPY;  Surgeon: Eugene Soto MD;  Location: [address]);  Service: Endoscopy;  Laterality: N/A;    Tubal ligation       No family history on file.  Social History   Substance Use Topics    Smoking status: Never Smoker    Smokeless tobacco: Not on file    Alcohol use No     Review of Systems   Constitutional: Negative for chills and fever.   HENT: Negative.    Eyes: Negative.    Respiratory: Negative for cough, shortness of breath and stridor.    Cardiovascular: Negative for chest pain and palpitations.   Gastrointestinal:  Negative for bowel incontinence, nausea and vomiting.   Genitourinary: Negative for bladder incontinence, dysuria and hematuria.   Musculoskeletal: Positive for myalgias. Negative for back pain, neck pain and neck stiffness.   Skin: Negative.    Neurological: Negative for dizziness, tingling, focal weakness, weakness, numbness and headaches.   Psychiatric/Behavioral: Negative.    All other systems reviewed and are negative.      Physical Exam   Initial Vitals   BP Pulse Resp Temp SpO2   02/05/17 0508 02/05/17 0508 02/05/17 0508 02/05/17 0508 02/05/17 0508   107/68 78 16 97.2 °F (36.2 °C) 99 %     Physical Exam    Nursing note and vitals reviewed.  Constitutional: She appears well-developed and well-nourished. She is not diaphoretic. No distress.   HENT:   Head: Normocephalic and atraumatic.   Mouth/Throat: Oropharynx is clear and moist. No oropharyngeal exudate.   Eyes: Conjunctivae and EOM are normal. Pupils are equal, round, and reactive to light.   Neck: Normal range of motion. Neck supple.   Cardiovascular: Normal rate, regular rhythm and intact distal pulses.   No murmur heard.  Pulmonary/Chest: Breath sounds normal. No stridor. No respiratory distress.   Abdominal: Soft. Bowel sounds are normal. There is no tenderness.   Musculoskeletal: Normal range of motion. She exhibits no edema.        Left shoulder: She exhibits tenderness and spasm. She exhibits normal range of motion, no swelling, no effusion, normal pulse and normal strength.        Cervical back: Normal.        Thoracic back: Normal.        Lumbar back: Normal.        Arms:  Neurological: She is alert and oriented to person, place, and time. She has normal strength. No cranial nerve deficit.   Skin: Skin is warm and dry. No erythema. No pallor.   Psychiatric: She has a normal mood and affect.         ED Course   Procedures  Labs Reviewed - No data to display  EKG Readings: (Independently Interpreted)   Initial Reading: No STEMI. Previous EKG:  "Compared with most recent EKG Previous EKG Date: 12/6/2016. Heart Rate: 75. Axis: Left Axis Deviation. Clinical Impression: with LBBB Other Impression: unchanged from previous                            ED Course     Imaging Reviewed    Imaging Results     None          Medications given in ED    Medications   dexamethasone injection 8 mg (8 mg Intramuscular Given 2/5/17 0537)       Discharge Medications     Medication List with Changes/Refills   New Medications    METHOCARBAMOL (ROBAXIN) 500 MG TAB    Take 2 tablets (1,000 mg total) by mouth 3 (three) times daily.    PREDNISONE (DELTASONE) 20 MG TABLET    Take 2 tablets (40 mg total) by mouth once daily.   Current Medications    AMIODARONE (PACERONE) 200 MG TAB    TAKE TWO TABLETS BY MOUTH EVERY DAY.    AMIODARONE (PACERONE) 200 MG TAB    Take 2 tablets (400 mg total) by mouth once daily.    BD ULTRA-FINE ESSENCE PEN NEEDLES 32 GAUGE X 5/32" NDLE    Takes 5 times  day    BISOPROLOL (ZEBETA) 5 MG TABLET    Take 0.5 tablets (2.5 mg total) by mouth once daily.    BLOOD SUGAR DIAGNOSTIC STRP    240 strips by Misc.(Non-Drug; Combo Route) route 4 (four) times daily with meals and nightly.    BREO ELLIPTA 100-25 MCG/DOSE DISKUS INHALER    1 puff once daily.     CETIRIZINE (ZYRTEC) 5 MG CHEWABLE TABLET    Take 1 tablet (5 mg total) by mouth once daily.    DIGOXIN (LANOXIN) 125 MCG TABLET    TAKE 1 TABLET BY MOUTH ONCE DAILY    FUROSEMIDE (LASIX) 40 MG TABLET    TAKE 2 TABLETS BY MOUTH TWO TIMES A DAY .    INSULIN ASPART (NOVOLOG) 100 UNIT/ML INJECTION    Inject into the skin 3 (three) times daily before meals.    INSULIN GLARGINE (LANTUS SOLOSTAR) 100 UNIT/ML (3 ML) INPN PEN    Inject 36 Units into the skin every evening.    LANCETS (ACCU-CHEK MULTICLIX LANCET) MISC    1 each by Misc.(Non-Drug; Combo Route) route 4 (four) times daily with meals and nightly.    LANCETS MISC    Checks 4 times a day    LEVALBUTEROL (XOPENEX HFA) 45 MCG/ACTUATION INHALER    Inhale 2 puffs into " the lungs every 4 (four) hours as needed for Wheezing.    LEVALBUTEROL (XOPENEX) 1.25 MG/0.5 ML NEBULIZER SOLUTION    Take 0.5 mLs (1.25 mg total) by nebulization every 4 (four) hours as needed for Wheezing.    LISINOPRIL (PRINIVIL,ZESTRIL) 5 MG TABLET    Take 1 tablet (5 mg total) by mouth 2 (two) times daily.    MAGNESIUM OXIDE (MAG-OX) 400 MG TABLET    Take 1 tablet (400 mg total) by mouth 2 (two) times daily.    MONTELUKAST (SINGULAIR) 10 MG TABLET    10 mg once daily.     OMEPRAZOLE (PRILOSEC) 40 MG CAPSULE    Take 1 capsule (40 mg total) by mouth every morning.    POTASSIUM CHLORIDE (MICRO-K) 10 MEQ CPSR    TAKE 2 CAPSULES BY MOUTH ONCE DAILY    POTASSIUM CHLORIDE (MICRO-K) 10 MEQ CPSR    Take 2 capsules (20 mEq total) by mouth once daily.    SPIRIVA WITH HANDIHALER 18 MCG INHALATION CAPSULE    Inhale 18 mcg into the lungs.     SPIRONOLACTONE (ALDACTONE) 25 MG TABLET    Take 1 tablet (25 mg total) by mouth once daily.    TRUEPLUS LANCETS 30 GAUGE MISC        TRUEPLUS LANCETS 30 GAUGE MISC    USE 2 HOURS AFTER MEALS AND AT BEDTIME    VITAMIN D 1000 UNITS TAB    Take 2 tablets (2,000 Units total) by mouth once daily.    WARFARIN (COUMADIN) 7.5 MG TABLET    Take 1 tablet (7.5 mg total) by mouth Daily. Take 7.5 mg on Tues, Thurs, Sat. Take 3.75 mg all other days             Patient discharged to home in stable condition with instructions to:   1. Please take all meds as prescribed.  2. Follow-up with your primary care doctor   3. Return precautions discussed and patient and/or family/caretaker understands to return to the emergency room for any concerns including worsening of your current symptoms, fever, chills, night sweats, worsening pain, chest pain, shortness of breath, nausea, vomiting, diarrhea, bleeding, headache, difficulty talking, visual disturbances, weakness, numbness or any other acute concerns    Clinical Impression:   The encounter diagnosis was Trapezius muscle spasm.          Alberto Burt,  MD  02/05/17 0600

## 2017-02-05 NOTE — DISCHARGE INSTRUCTIONS
Muscle Spasm  A muscle spasm (also called a cramp) is an involuntary muscle contraction. The muscle tightens quickly and strongly. A hard lump may form in the muscle. Muscle spasms are very painful. Read on to learn more about muscle spasms and how to treat and prevent them.    What causes muscles to spasm?  Often, the cause of a muscle spasm is not known. Muscle spasm is due to irritation of muscle fibers. Some things can make a muscle spasm more likely. These include:  · Injury  · Heavy exercise  · Overtired muscles  · A muscle held in one position for a long time  · Dehydration  · Low levels of certain minerals in the body  · Taking certain medications, such as diuretics or water pills  · Certain medical conditions, such as kidney failure or diabetes  · Being pregnant  Stopping a muscle spasm  Muscle spasms often come and go quickly. When a muscle goes into spasm, very gently stretch and massage the muscle. This may help calm the muscle fibers. Then rest the muscle.  Preventing muscle spasms  Although there is little or no evidence that staying hydrated, taking certain vitamins or minerals or stretching works to prevent cramps, these measures may help and have other benefits. Talk to your health care provider about steps to take to avoid muscle spasms. These may include:  · Drinking enough fluids to avoid dehydration, especially when you exercise.  · Taking vitamin or mineral supplements.  · Getting regular exercise.  · Stretching regularly, especially before exercise.  · Limit caffeine and smoking.  · Taking a prescription muscle relaxant.  When to call your doctor  Call your doctor if you have any of the following:  · Severe cramping  · Cramping that lasts a long time, does not go away with stretching, or keeps coming back  · Pain, tingling, or weakness in the arms or legs  · Pain that wakes you up at night   Date Last Reviewed: 9/1/2015  © 3969-8440 [employer]. [address] [place] [address], [facility]  [place] [address]. All rights reserved. This information is not intended as a substitute for professional medical care. Always follow your healthcare professional's instructions.

## 2017-02-05 NOTE — ED NOTES
Pt reports pain to shoulder, denies trauma, pt able to lift left arm to a 90 degree angle, weak bilat.

## 2017-02-08 RX ORDER — LANOLIN ALCOHOL/MO/W.PET/CERES
400 CREAM (GRAM) TOPICAL 2 TIMES DAILY
Qty: 60 TABLET | Refills: 6 | Status: SHIPPED | OUTPATIENT
Start: 2017-02-08 | End: 2017-10-13 | Stop reason: SDUPTHER

## 2017-02-13 ENCOUNTER — ANTI-COAG VISIT (OUTPATIENT)
Dept: CARDIOLOGY | Facility: CLINIC | Age: 47
End: 2017-02-13

## 2017-02-13 ENCOUNTER — LAB VISIT (OUTPATIENT)
Dept: LAB | Facility: HOSPITAL | Age: 47
End: 2017-02-13
Attending: INTERNAL MEDICINE
Payer: MEDICAID

## 2017-02-13 ENCOUNTER — OFFICE VISIT (OUTPATIENT)
Dept: TRANSPLANT | Facility: CLINIC | Age: 47
End: 2017-02-13
Payer: MEDICAID

## 2017-02-13 VITALS
HEIGHT: 65 IN | SYSTOLIC BLOOD PRESSURE: 117 MMHG | HEART RATE: 82 BPM | BODY MASS INDEX: 37.61 KG/M2 | WEIGHT: 225.75 LBS | DIASTOLIC BLOOD PRESSURE: 73 MMHG

## 2017-02-13 DIAGNOSIS — Z79.01 CHRONIC ANTICOAGULATION: ICD-10-CM

## 2017-02-13 DIAGNOSIS — I48.0 PAROXYSMAL ATRIAL FIBRILLATION: ICD-10-CM

## 2017-02-13 DIAGNOSIS — Z79.01 LONG-TERM (CURRENT) USE OF ANTICOAGULANTS: ICD-10-CM

## 2017-02-13 DIAGNOSIS — I50.22 CHRONIC SYSTOLIC CONGESTIVE HEART FAILURE: Primary | ICD-10-CM

## 2017-02-13 DIAGNOSIS — I11.0 HYPERTENSIVE HEART DISEASE WITH HEART FAILURE: ICD-10-CM

## 2017-02-13 DIAGNOSIS — I50.22 CHRONIC SYSTOLIC CONGESTIVE HEART FAILURE: ICD-10-CM

## 2017-02-13 DIAGNOSIS — G47.33 OSA (OBSTRUCTIVE SLEEP APNEA): ICD-10-CM

## 2017-02-13 DIAGNOSIS — E11.42 TYPE 2 DIABETES MELLITUS WITH DIABETIC POLYNEUROPATHY, WITH LONG-TERM CURRENT USE OF INSULIN: Chronic | ICD-10-CM

## 2017-02-13 DIAGNOSIS — I42.0 DILATED CARDIOMYOPATHY: ICD-10-CM

## 2017-02-13 DIAGNOSIS — I50.9 ACUTE DECOMPENSATED HEART FAILURE: ICD-10-CM

## 2017-02-13 DIAGNOSIS — E66.09 NON MORBID OBESITY DUE TO EXCESS CALORIES: ICD-10-CM

## 2017-02-13 DIAGNOSIS — Z79.4 TYPE 2 DIABETES MELLITUS WITH DIABETIC POLYNEUROPATHY, WITH LONG-TERM CURRENT USE OF INSULIN: Chronic | ICD-10-CM

## 2017-02-13 LAB
ALBUMIN SERPL BCP-MCNC: 3.4 G/DL
ALP SERPL-CCNC: 69 U/L
ALT SERPL W/O P-5'-P-CCNC: 22 U/L
ANION GAP SERPL CALC-SCNC: 9 MMOL/L
AST SERPL-CCNC: 18 U/L
BILIRUB SERPL-MCNC: 0.4 MG/DL
BNP SERPL-MCNC: 146 PG/ML
BUN SERPL-MCNC: 17 MG/DL
CALCIUM SERPL-MCNC: 8.7 MG/DL
CHLORIDE SERPL-SCNC: 104 MMOL/L
CO2 SERPL-SCNC: 27 MMOL/L
CREAT SERPL-MCNC: 1.2 MG/DL
EST. GFR  (AFRICAN AMERICAN): >60 ML/MIN/1.73 M^2
EST. GFR  (NON AFRICAN AMERICAN): 53.9 ML/MIN/1.73 M^2
GLUCOSE SERPL-MCNC: 114 MG/DL
INR PPP: 2
POTASSIUM SERPL-SCNC: 3.9 MMOL/L
PROT SERPL-MCNC: 6.9 G/DL
PROTHROMBIN TIME: 19.9 SEC
SODIUM SERPL-SCNC: 140 MMOL/L

## 2017-02-13 PROCEDURE — 99212 OFFICE O/P EST SF 10 MIN: CPT | Mod: PBBFAC | Performed by: INTERNAL MEDICINE

## 2017-02-13 PROCEDURE — 99214 OFFICE O/P EST MOD 30 MIN: CPT | Mod: S$PBB,,, | Performed by: INTERNAL MEDICINE

## 2017-02-13 PROCEDURE — 99999 PR PBB SHADOW E&M-EST. PATIENT-LVL II: CPT | Mod: PBBFAC,,, | Performed by: INTERNAL MEDICINE

## 2017-02-13 NOTE — MR AVS SNAPSHOT
[facility]  [address]  [facility] [address]  Phone: [phone]                  Laure Ross   2017 11:30 AM   Office Visit    Description:  Female : 1970   Provider:  Dandre Jules MD   Department:  [facility]           Reason for Visit     Congestive Heart Failure           Diagnoses this Visit        Comments    Chronic systolic congestive heart failure    -  Primary     Dilated cardiomyopathy         Chronic anticoagulation         Non morbid obesity due to excess calories         CLARENCE (obstructive sleep apnea)         Paroxysmal atrial fibrillation         Hypertensive heart disease with heart failure         Type 2 diabetes mellitus with diabetic polyneuropathy, with long-term current use of insulin                To Do List           Future Appointments        Provider Department Dept Phone    2017 11:30 AM Dandre Jules MD [facility] [phone]    3/22/2017 7:10 AM LAB, APPOINTMENT [place] [facility] [phone]    3/29/2017 10:30 AM THANIA Gifford, FNP [facility] - Endocrinology [phone]    2017 10:00 AM Freddie Hampton DPM [facility] [phone]      Goals (5 Years of Data)     None      Follow-Up and Disposition     Return in about 6 months (around 2017).    Follow-up and Disposition History      [facility]     [facility] -  Assistance  Registered nurses in the [facility] provide clinical advisement, health education, appointment booking, and other advisory services.  Call for this free service at [phone].             Medications           Message regarding Medications     Verify the changes and/or additions to your medication regime listed below are the same as discussed with your clinician today.  If any of these changes or additions are incorrect, please notify your healthcare provider.        STOP taking these  "medications     BREO ELLIPTA 100-25 mcg/dose diskus inhaler 1 puff once daily.     bisoprolol (ZEBETA) 5 MG tablet Take 0.5 tablets (2.5 mg total) by mouth once daily.           Verify that the below list of medications is an accurate representation of the medications you are currently taking.  If none reported, the list may be blank. If incorrect, please contact your healthcare provider. Carry this list with you in case of emergency.           Current Medications     amiodarone (PACERONE) 200 MG Tab TAKE TWO TABLETS BY MOUTH EVERY DAY.    BD ULTRA-FINE ESSENCE PEN NEEDLES 32 gauge x 5/32" Ndle Takes 5 times  day    blood sugar diagnostic Strp 240 strips by Misc.(Non-Drug; Combo Route) route 4 (four) times daily with meals and nightly.    cetirizine (ZYRTEC) 5 MG chewable tablet Take 1 tablet (5 mg total) by mouth once daily.    digoxin (LANOXIN) 125 mcg tablet TAKE 1 TABLET BY MOUTH ONCE DAILY    furosemide (LASIX) 40 MG tablet TAKE 2 TABLETS BY MOUTH TWO TIMES A DAY .    insulin aspart (NOVOLOG) 100 unit/mL injection Inject into the skin 3 (three) times daily before meals.    insulin glargine (LANTUS SOLOSTAR) 100 unit/mL (3 mL) InPn pen Inject 36 Units into the skin every evening.    lancets (ACCU-CHEK MULTICLIX LANCET) Misc 1 each by Misc.(Non-Drug; Combo Route) route 4 (four) times daily with meals and nightly.    lancets Misc Checks 4 times a day    levalbuterol (XOPENEX HFA) 45 mcg/actuation inhaler Inhale 2 puffs into the lungs every 4 (four) hours as needed for Wheezing.    levalbuterol (XOPENEX) 1.25 mg/0.5 mL nebulizer solution Take 0.5 mLs (1.25 mg total) by nebulization every 4 (four) hours as needed for Wheezing.    lisinopril (PRINIVIL,ZESTRIL) 5 MG tablet Take 1 tablet (5 mg total) by mouth 2 (two) times daily.    magnesium oxide (MAG-OX) 400 mg tablet Take 1 tablet (400 mg total) by mouth 2 (two) times daily.    montelukast (SINGULAIR) 10 mg tablet 10 mg once daily.     omeprazole (PRILOSEC) 40 MG " "capsule Take 1 capsule (40 mg total) by mouth every morning.    potassium chloride (MICRO-K) 10 MEQ CpSR Take 2 capsules (20 mEq total) by mouth once daily.    SPIRIVA WITH HANDIHALER 18 mcg inhalation capsule Inhale 18 mcg into the lungs.     spironolactone (ALDACTONE) 25 MG tablet Take 1 tablet (25 mg total) by mouth once daily.    TRUEPLUS LANCETS 30 gauge Misc     TRUEPLUS LANCETS 30 gauge Misc USE 2 HOURS AFTER MEALS AND AT BEDTIME    vitamin D 1000 units Tab Take 2 tablets (2,000 Units total) by mouth once daily.    warfarin (COUMADIN) 7.5 MG tablet Take 1 tablet (7.5 mg total) by mouth Daily. Take 7.5 mg on Tues, Thurs, Sat. Take 3.75 mg all other days           Clinical Reference Information           Your Vitals Were     BP Pulse Height Weight BMI    117/73 82 5' 5" (1.651 m) 102.4 kg (225 lb 12 oz) 37.57 kg/m2      Blood Pressure          Most Recent Value    BP  117/73      Allergies as of 2/13/2017     No Known Allergies      Immunizations Administered on Date of Encounter - 2/13/2017     None      Orders Placed During Today's Visit     Future Labs/Procedures Expected by Expires    Comprehensive metabolic panel  As directed 2/13/2018    Recurring Lab Work Interval Expires    Brain natriuretic peptide  Every 4 Weeks until 2/13/2018 2/13/2018      Maintenance Dialysis History     Patient has no recorded history of maintenance dialysis.      [facility] Sign-Up     Activating your [facility] account is as easy as 1-2-3!     1) Visit [email or web address], select Sign Up Now, enter this activation code and your date of birth, then select Next.  [email or web address]  Expires: 3/10/2017  9:16 AM      2) Create a username and password to use when you visit [facility] in the future and select a security question in case you lose your password and select Next.    3) Enter your e-mail address and click Sign Up!    Additional Information  If you have questions, please e-mail [email or web address] or call [phone] to talk to " our [facility] staff. Remember, [facility] is NOT to be used for urgent needs. For medical emergencies, dial 911.         Language Assistance Services     ATTENTION: Language assistance services are available, free of charge. Please call [phone].      ATENCIÓN: Si habla phyllis, tiene a dougherty disposición servicios gratuitos de asistencia lingüística. Llame al [phone].     CHÚ Ý: N?u b?n nói Ti?ng Vi?t, có các d?ch v? h? tr? ngôn ng? mi?n phí dành cho b?n. G?i s? [phone].         [facility] complies with applicable Federal civil rights laws and does not discriminate on the basis of race, color, national origin, age, disability, or sex.

## 2017-02-13 NOTE — PROGRESS NOTES
Subjective:     HPI:  Ms. Ross is a very pleasant 46 yo F with a history of NICM (EF 10-20%), paroxysmal atrial fibrillation, HTN, DM, asthma, HLD and CLARENCE who originally presented as a transfer from [facility] for ADHF and AF with RVR. Despite diuresis, she decompensated further with worsening hemodynamics, progressive AL and hepatic congestion, requiring inotropes and eventually an IABP, which was in from 4/22/16 to 4/25/16. She was aggressively diuresed with significant clinical improvement. A PICC line was placed in anticipation for home  but after further diuresis and afterload reduction, a repeat echo showed improved LV and RV size and function, so  was weaned from 5 to 2.5 mcg/jg/min and a RHC was obtained with a CI of 2.8 on  2.5.  was stopped on 5/6/16 which she clinically tolerated, feeling well with normal BP and normal/stable Cr and Tbili. For AF, she was loaded with IV Amiodarone and transitioned to PO. She underwent BRITTANEY which was negative for EDUARDO thrombus followed by DCCV which was initially successful but she had recurrent short paroxysms of AF which were rate controlled. She continues to do well. NYHA class II symptoms. No PND or orthopnea. Very active. Labs today reviewed.     Past Medical History   Diagnosis Date    Anticoagulant long-term use     Asthma     Asthma     CHF (congestive heart failure)     CHF (congestive heart failure)     H/O tubal ligation     Hypertension     Obesity     Type 2 diabetes mellitus with hyperglycemia     Type 2 diabetes mellitus with polyneuropathy      Past Surgical History   Procedure Laterality Date    Gallbladder surgery      Iabp  4/2016    Colonoscopy N/A 5/2/2016     Procedure: COLONOSCOPY;  Surgeon: Eugene Soto MD;  Location: [facility] ([address] [place] [address]);  Service: Endoscopy;  Laterality: N/A;    Tubal ligation       OB History     No data available        Review of Systems   Constitution: Negative. Negative for  "chills, decreased appetite, diaphoresis, fever, weakness, malaise/fatigue, night sweats, weight gain and weight loss.   Eyes: Negative.    Cardiovascular: Negative for chest pain, claudication, cyanosis, dyspnea on exertion, irregular heartbeat, leg swelling, near-syncope, orthopnea, palpitations, paroxysmal nocturnal dyspnea and syncope.   Respiratory: Negative for cough, hemoptysis and shortness of breath.    Endocrine: Negative.    Hematologic/Lymphatic: Negative.    Skin: Negative for color change, dry skin and nail changes.   Musculoskeletal: Negative.    Gastrointestinal: Negative.    Genitourinary: Negative.        Objective:   Blood pressure 117/73, pulse 82, height 5' 5" (1.651 m), weight 102.4 kg (225 lb 12 oz).body mass index is 37.57 kg/(m^2).     Physical Exam   Constitutional: She is oriented to person, place, and time. Vital signs are normal. She appears well-developed and well-nourished.   HENT:   Head: Normocephalic.   Eyes: Pupils are equal, round, and reactive to light.   Neck: Neck supple. No JVD present.   Cardiovascular: Normal rate, regular rhythm, normal heart sounds and intact distal pulses.  PMI is displaced.  Exam reveals no gallop, no S3 and no friction rub.    No murmur heard.  Pulmonary/Chest: Effort normal and breath sounds normal. No respiratory distress. She has no wheezes. She has no rales.   Abdominal: Soft. Bowel sounds are normal. She exhibits no distension. There is no tenderness. There is no rebound.   Musculoskeletal: She exhibits no edema.   Neurological: She is alert and oriented to person, place, and time.   Nursing note and vitals reviewed.      Labs:    Chemistry        Component Value Date/Time     11/23/2016 0736    K 3.7 11/23/2016 0736     11/23/2016 0736    CO2 26 11/23/2016 0736    BUN 24 (H) 11/23/2016 0736    CREATININE 1.2 11/23/2016 0736    GLU 79 11/23/2016 0736        Component Value Date/Time    CALCIUM 9.0 11/23/2016 0736    ALKPHOS 59 11/23/2016 " 0736    AST 20 11/23/2016 0736    ALT 22 11/23/2016 0736    BILITOT 0.6 11/23/2016 0736          Magnesium   Date Value Ref Range Status   06/07/2016 2.0 1.6 - 2.6 mg/dL Final     Lab Results   Component Value Date    WBC 7.62 06/07/2016    HGB 10.0 (L) 06/07/2016    HCT 34.6 (L) 06/07/2016     (H) 06/07/2016     Lab Results   Component Value Date    INR 2.0 (H) 02/13/2017    INR 1.9 02/02/2017    INR 3.3 01/26/2017     BNP   Date Value Ref Range Status   09/28/2016 230 (H) 0 - 99 pg/mL Final     Comment:     Values of less than 100 pg/ml are consistent with non-CHF populations.   07/26/2016 346 (H) 0 - 99 pg/mL Final     Comment:     Values of less than 100 pg/ml are consistent with non-CHF populations.   06/27/2016 190 (H) 0 - 99 pg/mL Final     Comment:     Values of less than 100 pg/ml are consistent with non-CHF populations.     LD   Date Value Ref Range Status   04/14/2016 827 (H) 110 - 260 U/L Final     Comment:     Results are increased in hemolyzed samples.     No results found for this or any previous visit.  No results found for this or any previous visit.    Assessment:      1. Chronic systolic congestive heart failure    2. Dilated cardiomyopathy    3. Chronic anticoagulation    4. Non morbid obesity due to excess calories    5. CLARENCE (obstructive sleep apnea)    6. Paroxysmal atrial fibrillation    7. Hypertensive heart disease with heart failure    8. Type 2 diabetes mellitus with diabetic polyneuropathy, with long-term current use of insulin        Plan:   Clinically continues to do well. Euvolemic on exam. Off inotropes.  So far she remains stable on Lisinopril and Aldactone.  Owing to her asthma Did not tolerate Bisoprolol.   Recommend 2 gram sodium restriction and 1500cc fluid restriction.  Encourage physical activity with graded exercise program.  Requested patient to weigh themselves daily, and to notify us if their weight increases by more than 3 lbs in 1 day or 5 lbs in 1 week.        Listed for transplant: Denied due to PA pressures.      RTC in 6 months with labs  with melinda Jules MD

## 2017-02-13 NOTE — LETTER
February 13, 2017        Ayan Olmstead  [address] [place] [address]  Phone: [phone]  Fax: [phone]             [facility] [address] [place] [address]  Phone: [phone]   Patient: Laure Ross   MR Number: [id number]   YOB: 1970   Date of Visit: 2/13/2017       Dear Dr. Ayan Olmstead    Thank you for referring Laure Ross to me for evaluation. Attached you will find relevant portions of my assessment and plan of care.    If you have questions, please do not hesitate to call me. I look forward to following Laure Ross along with you.    Sincerely,    Dandre Jules MD    Enclosure    If you would like to receive this communication electronically, please contact [email or web address] or [phone] to request [employer] [facility] access.    [employer] is a tool which provides read-only access to select patient information with whom you have a relationship. Its easy to use and provides real time access to review your patients record including encounter summaries, notes, results, and demographic information.    If you feel you have received this communication in error or would no longer like to receive these types of communications, please e-mail [email or web address]

## 2017-02-16 DIAGNOSIS — I42.0 CARDIOMYOPATHY, DILATED: ICD-10-CM

## 2017-02-16 DIAGNOSIS — Z95.810 ICD (IMPLANTABLE CARDIOVERTER-DEFIBRILLATOR) IN PLACE: Primary | ICD-10-CM

## 2017-02-27 ENCOUNTER — TELEPHONE (OUTPATIENT)
Dept: NEPHROLOGY | Facility: CLINIC | Age: 47
End: 2017-02-27

## 2017-02-27 NOTE — TELEPHONE ENCOUNTER
----- Message from Beba Do sent at 2/27/2017  3:33 PM CST -----  Contact: pt  NP  appt  3/17         Pt will be here  3/7       Was in hospital 2016    For heart  -    While in hosp was dialyzed     When released said kidneys OK          Pt would like them checked   -     Urinating    But feels like not as much sometimes -  But on restricted fluids              Having problems with swelling       Does he want any labs ??    Please herminio 3/7      Call [phone]    thanks      Pt already have labs

## 2017-03-02 ENCOUNTER — TELEPHONE (OUTPATIENT)
Dept: ELECTROPHYSIOLOGY | Facility: CLINIC | Age: 47
End: 2017-03-02

## 2017-03-02 NOTE — TELEPHONE ENCOUNTER
Spoke with pt about incorrectly scheduled appointment with Dr. Munoz. Pt will come in on 3/7/17 to complete her ICD check and will see Dr. Jolly later on in the month.

## 2017-03-07 ENCOUNTER — CLINICAL SUPPORT (OUTPATIENT)
Dept: ELECTROPHYSIOLOGY | Facility: CLINIC | Age: 47
End: 2017-03-07
Payer: MEDICAID

## 2017-03-07 ENCOUNTER — ANTI-COAG VISIT (OUTPATIENT)
Dept: CARDIOLOGY | Facility: CLINIC | Age: 47
End: 2017-03-07
Payer: MEDICAID

## 2017-03-07 DIAGNOSIS — I48.0 PAROXYSMAL ATRIAL FIBRILLATION: ICD-10-CM

## 2017-03-07 DIAGNOSIS — Z79.01 CHRONIC ANTICOAGULATION: Primary | ICD-10-CM

## 2017-03-07 DIAGNOSIS — I50.22 CHRONIC SYSTOLIC CONGESTIVE HEART FAILURE: ICD-10-CM

## 2017-03-07 LAB — INR PPP: 2.8 (ref 2–3)

## 2017-03-07 PROCEDURE — 85610 PROTHROMBIN TIME: CPT | Mod: PBBFAC | Performed by: PHARMACIST

## 2017-03-07 PROCEDURE — 93283 PRGRMG EVAL IMPLANTABLE DFB: CPT | Mod: PBBFAC | Performed by: INTERNAL MEDICINE

## 2017-03-07 PROCEDURE — 99211 OFF/OP EST MAY X REQ PHY/QHP: CPT | Mod: PBBFAC,25 | Performed by: PHARMACIST

## 2017-03-07 PROCEDURE — 99999 PR PBB SHADOW E&M-EST. PATIENT-LVL I: CPT | Mod: PBBFAC,,, | Performed by: PHARMACIST

## 2017-03-16 ENCOUNTER — HOSPITAL ENCOUNTER (OUTPATIENT)
Dept: PULMONOLOGY | Facility: CLINIC | Age: 47
Discharge: HOME | End: 2017-03-16
Payer: MEDICAID

## 2017-03-16 ENCOUNTER — TELEPHONE (OUTPATIENT)
Dept: ELECTROPHYSIOLOGY | Facility: CLINIC | Age: 47
End: 2017-03-16

## 2017-03-16 VITALS — BODY MASS INDEX: 36.07 KG/M2 | HEIGHT: 66 IN | WEIGHT: 224.44 LBS

## 2017-03-16 DIAGNOSIS — Z94.9 TRANSPLANT: ICD-10-CM

## 2017-03-16 DIAGNOSIS — Z94.9 TRANSPLANT: Primary | ICD-10-CM

## 2017-03-16 PROCEDURE — 94620 PR PULMONARY STRESS TESTING,SIMPLE: CPT | Mod: PBBFAC | Performed by: INTERNAL MEDICINE

## 2017-03-16 PROCEDURE — 94620 PR PULMONARY STRESS TESTING,SIMPLE: CPT | Mod: 26,S$PBB,, | Performed by: INTERNAL MEDICINE

## 2017-03-17 ENCOUNTER — HOSPITAL ENCOUNTER (OUTPATIENT)
Dept: PULMONOLOGY | Facility: CLINIC | Age: 47
Discharge: HOME | End: 2017-03-17
Payer: MEDICAID

## 2017-03-17 ENCOUNTER — OFFICE VISIT (OUTPATIENT)
Dept: NEPHROLOGY | Facility: CLINIC | Age: 47
End: 2017-03-17
Payer: MEDICAID

## 2017-03-17 ENCOUNTER — TELEPHONE (OUTPATIENT)
Dept: TRANSPLANT | Facility: CLINIC | Age: 47
End: 2017-03-17

## 2017-03-17 ENCOUNTER — OFFICE VISIT (OUTPATIENT)
Dept: PULMONOLOGY | Facility: CLINIC | Age: 47
End: 2017-03-17
Payer: MEDICAID

## 2017-03-17 VITALS
SYSTOLIC BLOOD PRESSURE: 96 MMHG | HEIGHT: 66 IN | OXYGEN SATURATION: 95 % | HEART RATE: 84 BPM | WEIGHT: 224.19 LBS | DIASTOLIC BLOOD PRESSURE: 64 MMHG | BODY MASS INDEX: 36.03 KG/M2

## 2017-03-17 VITALS
OXYGEN SATURATION: 98 % | DIASTOLIC BLOOD PRESSURE: 80 MMHG | HEIGHT: 66 IN | BODY MASS INDEX: 35.93 KG/M2 | SYSTOLIC BLOOD PRESSURE: 110 MMHG | WEIGHT: 223.56 LBS | HEART RATE: 80 BPM

## 2017-03-17 DIAGNOSIS — J45.909 UNCOMPLICATED ASTHMA, UNSPECIFIED ASTHMA SEVERITY: ICD-10-CM

## 2017-03-17 DIAGNOSIS — I11.0 HYPERTENSIVE HEART DISEASE WITH HEART FAILURE: ICD-10-CM

## 2017-03-17 DIAGNOSIS — I50.43 ACUTE ON CHRONIC COMBINED SYSTOLIC AND DIASTOLIC HEART FAILURE: ICD-10-CM

## 2017-03-17 DIAGNOSIS — G47.33 OSA (OBSTRUCTIVE SLEEP APNEA): Primary | ICD-10-CM

## 2017-03-17 DIAGNOSIS — E55.9 VITAMIN D DEFICIENCY DISEASE: ICD-10-CM

## 2017-03-17 DIAGNOSIS — N18.30 CKD (CHRONIC KIDNEY DISEASE), STAGE III: Primary | ICD-10-CM

## 2017-03-17 DIAGNOSIS — I48.0 PAROXYSMAL ATRIAL FIBRILLATION: ICD-10-CM

## 2017-03-17 DIAGNOSIS — E66.09 NON MORBID OBESITY DUE TO EXCESS CALORIES: ICD-10-CM

## 2017-03-17 DIAGNOSIS — J31.0 CHRONIC RHINITIS: ICD-10-CM

## 2017-03-17 DIAGNOSIS — Z79.4 TYPE 2 DIABETES MELLITUS WITH DIABETIC POLYNEUROPATHY, WITH LONG-TERM CURRENT USE OF INSULIN: Chronic | ICD-10-CM

## 2017-03-17 DIAGNOSIS — E11.42 TYPE 2 DIABETES MELLITUS WITH DIABETIC POLYNEUROPATHY, WITH LONG-TERM CURRENT USE OF INSULIN: Chronic | ICD-10-CM

## 2017-03-17 DIAGNOSIS — K21.9 LARYNGOPHARYNGEAL REFLUX: ICD-10-CM

## 2017-03-17 DIAGNOSIS — J45.909 UNCOMPLICATED ASTHMA, UNSPECIFIED ASTHMA SEVERITY: Primary | ICD-10-CM

## 2017-03-17 PROCEDURE — 99214 OFFICE O/P EST MOD 30 MIN: CPT | Mod: S$PBB,,, | Performed by: INTERNAL MEDICINE

## 2017-03-17 PROCEDURE — 99999 PR PBB SHADOW E&M-EST. PATIENT-LVL IV: CPT | Mod: PBBFAC,,, | Performed by: INTERNAL MEDICINE

## 2017-03-17 PROCEDURE — 99204 OFFICE O/P NEW MOD 45 MIN: CPT | Mod: 25,S$PBB,, | Performed by: INTERNAL MEDICINE

## 2017-03-17 RX ORDER — CETIRIZINE HYDROCHLORIDE 10 MG/1
10 TABLET ORAL DAILY
Refills: 0 | COMMUNITY
Start: 2017-03-17 | End: 2017-11-17 | Stop reason: SDUPTHER

## 2017-03-17 RX ORDER — FLUTICASONE FUROATE AND VILANTEROL 100; 25 UG/1; UG/1
1 POWDER RESPIRATORY (INHALATION) DAILY
Qty: 60 EACH | Refills: 12 | Status: SHIPPED | OUTPATIENT
Start: 2017-03-17 | End: 2017-08-14

## 2017-03-17 NOTE — MR AVS SNAPSHOT
[facility]  [address]  [facility] [address]  Phone: [phone]  Fax: [phone]                  Laure Ross   3/17/2017 10:30 AM   Office Visit    Description:  Female : 1970   Provider:  Michael Garcia MD   Department:  [facility]           Diagnoses this Visit        Comments    CKD (chronic kidney disease), stage III    -  Primary     Vitamin D deficiency disease         Hypertensive heart disease with heart failure         Type 2 diabetes mellitus with diabetic polyneuropathy, with long-term current use of insulin         Non morbid obesity due to excess calories                To Do List           Future Appointments        Provider Department Dept Phone    3/20/2017 10:45 AM EKG, APPT [facility] [phone]    3/20/2017 11:40 AM Navi Jolly MD [facility] - Arrhythmia [phone]    3/29/2017 10:00 AM Dolly Manley, PharmD [facility] Coumadin [phone]    3/29/2017 10:30 AM Loretta Trevino APRN, FNP [facility] - Endocrinology [phone]    2017 10:00 AM Freddie Hampton DPM [facility] Podiatry [phone]      Goals (5 Years of Data)     None      [facility]     [facility] On Call Nurse Care Line -  Assistance  Registered nurses in the [facility] Center provide clinical advisement, health education, appointment booking, and other advisory services.  Call for this free service at [phone].             Medications           Message regarding Medications     Verify the changes and/or additions to your medication regime listed below are the same as discussed with your clinician today.  If any of these changes or additions are incorrect, please notify your healthcare provider.             Verify that the below list of medications is an accurate representation of the medications you are currently taking.  If none reported, the list may be blank. If incorrect, please contact your healthcare provider. Carry this list with you  "in case of emergency.           Current Medications     amiodarone (PACERONE) 200 MG Tab TAKE TWO TABLETS BY MOUTH EVERY DAY.    BD ULTRA-FINE ESSENCE PEN NEEDLES 32 gauge x 5/32" Ndle Takes 5 times  day    blood sugar diagnostic Strp 240 strips by Misc.(Non-Drug; Combo Route) route 4 (four) times daily with meals and nightly.    cetirizine (ZYRTEC) 10 MG tablet Take 1 tablet (10 mg total) by mouth once daily.    cetirizine (ZYRTEC) 5 MG chewable tablet Take 1 tablet (5 mg total) by mouth once daily.    digoxin (LANOXIN) 125 mcg tablet TAKE 1 TABLET BY MOUTH ONCE DAILY    fluticasone-vilanterol (BREO) 100-25 mcg/dose diskus inhaler Inhale 1 puff into the lungs once daily. Controller    furosemide (LASIX) 40 MG tablet TAKE 2 TABLETS BY MOUTH TWO TIMES A DAY .    insulin aspart (NOVOLOG) 100 unit/mL injection Inject into the skin 3 (three) times daily before meals.    insulin glargine (LANTUS SOLOSTAR) 100 unit/mL (3 mL) InPn pen Inject 36 Units into the skin every evening.    lancets (ACCU-CHEK MULTICLIX LANCET) Misc 1 each by Misc.(Non-Drug; Combo Route) route 4 (four) times daily with meals and nightly.    lancets Misc Checks 4 times a day    levalbuterol (XOPENEX HFA) 45 mcg/actuation inhaler Inhale 2 puffs into the lungs every 4 (four) hours as needed for Wheezing.    levalbuterol (XOPENEX) 1.25 mg/0.5 mL nebulizer solution Take 0.5 mLs (1.25 mg total) by nebulization every 4 (four) hours as needed for Wheezing.    lisinopril (PRINIVIL,ZESTRIL) 5 MG tablet Take 1 tablet (5 mg total) by mouth 2 (two) times daily.    magnesium oxide (MAG-OX) 400 mg tablet Take 1 tablet (400 mg total) by mouth 2 (two) times daily.    montelukast (SINGULAIR) 10 mg tablet 10 mg once daily.     omeprazole (PRILOSEC) 40 MG capsule Take 1 capsule (40 mg total) by mouth every morning.    potassium chloride (MICRO-K) 10 MEQ CpSR Take 2 capsules (20 mEq total) by mouth once daily.    SPIRIVA WITH HANDIHALER 18 mcg inhalation capsule Inhale 18 " "mcg into the lungs.     spironolactone (ALDACTONE) 25 MG tablet Take 1 tablet (25 mg total) by mouth once daily.    TRUEPLUS LANCETS 30 gauge Misc     TRUEPLUS LANCETS 30 gauge Misc USE 2 HOURS AFTER MEALS AND AT BEDTIME    vitamin D 1000 units Tab Take 2 tablets (2,000 Units total) by mouth once daily.    warfarin (COUMADIN) 7.5 MG tablet Take 1 tablet (7.5 mg total) by mouth Daily. Take 7.5 mg on Tues, Thurs, Sat. Take 3.75 mg all other days           Clinical Reference Information           Your Vitals Were     BP Pulse Height Weight SpO2 BMI    110/80 80 5' 6" (1.676 m) 101.4 kg (223 lb 8.7 oz) 98% 36.08 kg/m2      Blood Pressure          Most Recent Value    BP  110/80      Allergies as of 3/17/2017     No Known Allergies      Immunizations Administered on Date of Encounter - 3/17/2017     None      Orders Placed During Today's Visit     Future Labs/Procedures Expected by Expires    CBC auto differential  3/17/2017 5/16/2018    CBC auto differential  3/17/2017 5/16/2018    Immunofixation electrophoresis  3/17/2017 5/16/2018    Immunoglobulin free LT chains blood  3/17/2017 5/16/2018    Magnesium  3/17/2017 5/16/2018    Protein / creatinine ratio, urine  3/17/2017 3/17/2018    PTH, intact  3/17/2017 5/16/2018    Renal function panel  3/17/2017 5/16/2018    Renal function panel  3/17/2017 5/16/2018    Uric acid  3/17/2017 5/16/2018    Urinalysis  3/17/2017 3/17/2018      Maintenance Dialysis History     Patient has no recorded history of maintenance dialysis.      [facility] Sign-Up     Activating your [facility] account is as easy as 1-2-3!     1) Visit [email or web address], select Sign Up Now, enter this activation code and your date of birth, then select Next.  [email or web address]  Expires: 5/1/2017 11:11 AM      2) Create a username and password to use when you visit [facility] in the future and select a security question in case you lose your password and select Next.    3) Enter your e-mail address and click Sign " Up!    Additional Information  If you have questions, please e-mail [email or web address] or call [phone] to talk to our [facility] staff. Remember, [facility] is NOT to be used for urgent needs. For medical emergencies, dial 911.         Instructions    Please see me back in my clinic in 3 month with blood work.          Language Assistance Services     ATTENTION: Language assistance services are available, free of charge. Please call [phone].      ATENCIÓN: Si habla phyllis, tiene a dougherty disposición servicios gratuitos de asistencia lingüística. Llame al [phone].     [facility] Ý: N?u b?n nói Ti?ng Vi?t, có các d?ch v? h? tr? ngôn ng? mi?n phí dành cho b?n. G?i s? [phone].         [facility] complies with applicable Federal civil rights laws and does not discriminate on the basis of race, color, national origin, age, disability, or sex.

## 2017-03-17 NOTE — PROCEDURES
Laure Ross is a 46 y.o.  female patient, who presents for a 6 minute walk test ordered by Day Martinez MD.  The diagnosis is Asthma; Congestive Heart Failure.  The patient's BMI is 36.3 kg/m2.  Predicted distance (lower limit of normal) is 377.48 meters.      Test Results:    The test was completed without stopping.  The total time walked was 360 seconds.  During walking, the patient reported:  No complaints. The patient used no assistive devices during testing.     03/16/2017---------Distance: 388.62 meters (1275 feet)     O2 Sat % Supplemental Oxygen Heart Rate Blood Pressure Payal Scale   Pre-exercise  (Resting) 97 % Room Air 91 bpm 128/74 mmHg 0   During Exercise 98 % Room Air 110 bpm 133/74 mmHg 0   Post-exercise  (Recovery) 98 % Room Air  100 bpm       Recovery Time: 98 seconds    Performing nurse/tech: YEISON Garcia.      PREVIOUS STUDY:   The patient has not had a previous study.      CLINICAL INTERPRETATION:  Six minute walk distance is 388.62 meters (1275 feet) with no dyspnea.  During exercise, there was no desaturation while breathing room air.  Both blood pressure and heart rate remained stable with walking.  The patient did not report non-pulmonary symptoms during exercise.  No previous study performed.  Based upon age and body mass index, exercise capacity is less than predicted.

## 2017-03-17 NOTE — LETTER
March 17, 2017      Dandre Jules MD  [address] [facility] [address]           [facility]  [address]  [place] [address]  Phone: [phone]          Patient: Laure Ross   MR Number: [id number]   YOB: 1970   Date of Visit: 3/17/2017       Dear Dr. Dandre Jules:    Thank you for referring Laure Ross to me for evaluation. Attached you will find relevant portions of my assessment and plan of care.    If you have questions, please do not hesitate to call me. I look forward to following Laure Ross along with you.    Sincerely,    Jack Martinez MD    Enclosure  CC:  No Recipients    If you would like to receive this communication electronically, please contact [email or web address] or [phone] to request more information on [employer] [facility] access.    For providers and/or their staff who would like to refer a patient to [facility], please contact us through our one-stop-shop provider referral line, [facility] , at [phone].    If you feel you have received this communication in error or would no longer like to receive these types of communications, please e-mail [email or web address]

## 2017-03-17 NOTE — PROGRESS NOTES
Subjective:       Patient ID: Laure Ross is a 46 y.o. female.    Chief Complaint: Asthma    HPI     Laure Ross 46 y.o. female    has a past medical history of Anticoagulant long-term use; Asthma; Asthma; CHF (congestive heart failure); CHF (congestive heart failure); H/O tubal ligation; Hypertension; Obesity; Type 2 diabetes mellitus with hyperglycemia; and Type 2 diabetes mellitus with polyneuropathy.    has a past surgical history that includes Gallbladder surgery; iabp (4/2016); Colonoscopy (N/A, 5/2/2016); and Tubal ligation.   reports that she has never smoked. She does not have any smokeless tobacco history on file. She reports that she does not drink alcohol or use illicit drugs.  Referred by: Dr. Dandre Jules  Who had concerns including Asthma.  The patient's last visit with me was on Visit date not found.      Patient has asthma- since childhood- hospitalized as a child, last once prior to age 19.   Hospital schooling- spent so much time   Spiriva, singulair, xopenex   - takes xopenex 3x per week  Prior breo- insurance issues  Proventil, advair not improvement    + wheezing,   Issues at night - 1x per week  Has adilia with cpap  No significant mucus, No chest tightness  spiriva improvement  No fever chills, ns, wt changes, nausea, vomiting, diarrhea, constipation, chest pain, tightness, pressure  Son with asthma as a child  Granddaughter with asthma  Exercise- no formal  abx- none recently  Review of Systems   All other systems reviewed and are negative.      Objective:      Physical Exam   Constitutional: She is oriented to person, place, and time. She appears well-developed and well-nourished. She appears not cachectic. No distress. She is obese.   HENT:   Head: Normocephalic.   Nose: Nose normal. No mucosal edema.   Mouth/Throat: Normal dentition. No oropharyngeal exudate.   Neck: Normal range of motion. Neck supple. No tracheal deviation present.   Cardiovascular: Normal rate, regular  "rhythm, normal heart sounds and intact distal pulses.  Exam reveals no gallop and no friction rub.    No murmur heard.  Pulmonary/Chest: Normal expansion, symmetric chest wall expansion, effort normal and breath sounds normal. No stridor.   Abdominal: Soft. Bowel sounds are normal.   Musculoskeletal: Normal range of motion. She exhibits no edema, tenderness or deformity.   Lymphadenopathy: No supraclavicular adenopathy is present.     She has no cervical adenopathy.   Neurological: She is alert and oriented to person, place, and time. No cranial nerve deficit. Gait normal.   Skin: Skin is warm and dry. No rash noted. She is not diaphoretic. No cyanosis or erythema. No pallor. Nails show no clubbing.   Psychiatric: She has a normal mood and affect. Her behavior is normal. Judgment and thought content normal.     Personal Diagnostic Review    No flowsheet data found.      Assessment:       1. CLARENCE (obstructive sleep apnea)    2. Acute on chronic combined systolic and diastolic heart failure    3. Paroxysmal atrial fibrillation    4. Laryngopharyngeal reflux    5. Chronic rhinitis        Outpatient Encounter Prescriptions as of 3/17/2017   Medication Sig Dispense Refill    amiodarone (PACERONE) 200 MG Tab TAKE TWO TABLETS BY MOUTH EVERY DAY. 60 tablet 3    BD ULTRA-FINE ESSENCE PEN NEEDLES 32 gauge x 5/32" Ndle Takes 5 times  day 200 each 6    blood sugar diagnostic Strp 240 strips by Misc.(Non-Drug; Combo Route) route 4 (four) times daily with meals and nightly. 240 strip 11    cetirizine (ZYRTEC) 5 MG chewable tablet Take 1 tablet (5 mg total) by mouth once daily. 30 tablet 1    digoxin (LANOXIN) 125 mcg tablet TAKE 1 TABLET BY MOUTH ONCE DAILY 30 tablet 5    furosemide (LASIX) 40 MG tablet TAKE 2 TABLETS BY MOUTH TWO TIMES A DAY . 120 tablet 3    insulin aspart (NOVOLOG) 100 unit/mL injection Inject into the skin 3 (three) times daily before meals.      insulin glargine (LANTUS SOLOSTAR) 100 unit/mL (3 mL) InPn " pen Inject 36 Units into the skin every evening. 1 Box 6    lancets (ACCU-CHEK MULTICLIX LANCET) Misc 1 each by Misc.(Non-Drug; Combo Route) route 4 (four) times daily with meals and nightly. 240 each 011    lancets Misc Checks 4 times a day 150 each 11    levalbuterol (XOPENEX HFA) 45 mcg/actuation inhaler Inhale 2 puffs into the lungs every 4 (four) hours as needed for Wheezing. 1 Inhaler 6    levalbuterol (XOPENEX) 1.25 mg/0.5 mL nebulizer solution Take 0.5 mLs (1.25 mg total) by nebulization every 4 (four) hours as needed for Wheezing. 30 each 0    lisinopril (PRINIVIL,ZESTRIL) 5 MG tablet Take 1 tablet (5 mg total) by mouth 2 (two) times daily. 60 tablet 8    magnesium oxide (MAG-OX) 400 mg tablet Take 1 tablet (400 mg total) by mouth 2 (two) times daily. 60 tablet 6    montelukast (SINGULAIR) 10 mg tablet 10 mg once daily.       omeprazole (PRILOSEC) 40 MG capsule Take 1 capsule (40 mg total) by mouth every morning. 30 capsule 6    potassium chloride (MICRO-K) 10 MEQ CpSR Take 2 capsules (20 mEq total) by mouth once daily. 60 capsule 3    SPIRIVA WITH HANDIHALER 18 mcg inhalation capsule Inhale 18 mcg into the lungs.       spironolactone (ALDACTONE) 25 MG tablet Take 1 tablet (25 mg total) by mouth once daily. 30 tablet 11    TRUEPLUS LANCETS 30 gauge Misc       TRUEPLUS LANCETS 30 gauge Misc USE 2 HOURS AFTER MEALS AND AT BEDTIME 100 each 0    vitamin D 1000 units Tab Take 2 tablets (2,000 Units total) by mouth once daily.      warfarin (COUMADIN) 7.5 MG tablet Take 1 tablet (7.5 mg total) by mouth Daily. Take 7.5 mg on Tues, Thurs, Sat. Take 3.75 mg all other days 30 tablet 11     No facility-administered encounter medications on file as of 3/17/2017.      No orders of the defined types were placed in this encounter.    Plan:            Laure was seen today for asthma.    Diagnoses and all orders for this visit:    CLARENCE (obstructive sleep apnea)  -     Spirometry without Bronchodilator;  Future    Acute on chronic combined systolic and diastolic heart failure  -     Spirometry without Bronchodilator; Future    Paroxysmal atrial fibrillation  -     Spirometry without Bronchodilator; Future    Laryngopharyngeal reflux  -     Spirometry without Bronchodilator; Future    Chronic rhinitis  -     Spirometry without Bronchodilator; Future    Other orders  -     fluticasone-vilanterol (BREO) 100-25 mcg/dose diskus inhaler; Inhale 1 puff into the lungs once daily. Controller  -     cetirizine (ZYRTEC) 10 MG tablet; Take 1 tablet (10 mg total) by mouth once daily.       I personally reviewed the      1. PFT 2016- no obstruction (mild at worst), mild restriction, mild diffusion impairment  2. 6MWD pending  3. CXR   4. CXR report   5. CT chest   6. CT chest report     Assessment:  Pfts done during lvad assessment-will repeat spirometry today  asthma    Plan:  Continue breo, zyrtec, prn albuterol  Call patient with spirometry results    No Follow-up on file.    There are no Patient Instructions on file for this visit.    Immunization History   Administered Date(s) Administered    Pneumococcal Conjugate - 13 Valent 05/03/2016    Tdap 05/03/2016

## 2017-03-17 NOTE — PROGRESS NOTES
Subjective:       Patient ID: Laure Ross is a 46 y.o. Black or  female who presents for new evaluation of CKD  HPI   A 46 yo F with a history of NICM (EF 20%), paroxysmal atrial fibrillation, HTN, DM, asthma, HLD and CLARENCE. The patient had AL 4/2016 requiring RRT in on outside hospital due to hyperkalemia. Etiology at that time was thought to be most likely multifactorial due to ATN on top of cardiorenal pathophysiology. She now comes to be evaluated for possible CKD.  The patient has no family history of kidney disease, no history of kidney stones. The patient does not freuqently use NSAIDS or herbal supplements.     Review of Systems   Constitutional: Negative for activity change, appetite change, chills, fatigue, fever and unexpected weight change.   HENT: Negative.  Negative for nosebleeds.    Eyes: Negative.    Respiratory: Positive for shortness of breath (but just past the 6 min walk). Negative for cough and chest tightness.    Cardiovascular: Negative.  Negative for chest pain and leg swelling.   Gastrointestinal: Negative for abdominal pain, anal bleeding, diarrhea, nausea and vomiting.   Genitourinary: Negative for decreased urine volume, difficulty urinating, dysuria, flank pain, frequency, hematuria, pelvic pain, urgency and vaginal bleeding.   Musculoskeletal: Negative.  Negative for joint swelling and myalgias.   Skin: Negative.  Negative for rash.   Neurological: Negative.    Psychiatric/Behavioral: Negative.    All other systems reviewed and are negative.      Objective:      Physical Exam   Constitutional: She is oriented to person, place, and time. She appears well-developed and well-nourished. No distress.   HENT:   Head: Normocephalic and atraumatic.   Right Ear: External ear normal.   Left Ear: External ear normal.   Nose: Nose normal.   Mouth/Throat: Oropharynx is clear and moist.   Eyes: Conjunctivae and EOM are normal. Pupils are equal, round, and reactive to light.    Neck: Normal range of motion. Neck supple. No thyromegaly present.   Cardiovascular: Normal rate, regular rhythm and intact distal pulses.  Gallop: third heart sound.    Murmur (distant) heard.  Pulmonary/Chest: Effort normal and breath sounds normal. No respiratory distress. She has no wheezes. She has no rales. She exhibits no tenderness.   Abdominal: Soft. Normal appearance and bowel sounds are normal. She exhibits no distension. There is no tenderness. There is no rebound and no guarding.   Musculoskeletal: Normal range of motion. She exhibits no edema or tenderness.   Neurological: She is alert and oriented to person, place, and time. She has normal reflexes.   Skin: Skin is warm, dry and intact. She is not diaphoretic.   Psychiatric: She has a normal mood and affect. Her behavior is normal. Judgment and thought content normal.   Nursing note and vitals reviewed.      Assessment:       1. CKD (chronic kidney disease), stage III    2. Vitamin D deficiency disease    3. Hypertensive heart disease with heart failure    4. Type 2 diabetes mellitus with diabetic polyneuropathy, with long-term current use of insulin    5. Non morbid obesity due to excess calories        Plan:       1. CKD3: Likely multifactorial from long standing HTN and DM as well of episodes of AL in the past from cardiorenal pathophysisology. We will send the patient for SPEP/EMMANUEL, UA and protein/creatinine ratio and uric acid level.  Her ultrasound in the past demonstrated sign for chronic kidney disease.   - on lowdose ACE   - not on a statin?     Lab Results   Component Value Date    CREATININE 1.2 02/13/2017     Protein Creatinine Ratios: will repeat.    Prot/Creat Ratio, Ur   Date Value Ref Range Status   04/15/2016 0.43 (H) 0.00 - 0.20 Final     ·   ·   Acid-Base:   Lab Results   Component Value Date     02/13/2017    K 3.9 02/13/2017    CO2 27 02/13/2017     2. HTN: Blood pressures are on the low side (low EF)    3. Renal  osteodystrophy: will check PTH   Lab Results   Component Value Date    CALCIUM 8.7 02/13/2017    PHOS 3.4 06/04/2016       4. Anemia: will re-check  Lab Results   Component Value Date    HGB 10.0 (L) 06/07/2016        5. DM:  Last HbA1C: well controlled with insulin.   Lab Results   Component Value Date    HGBA1C 6.1 11/23/2016       6. Lipid management: not within target.   Lab Results   Component Value Date    LDLCALC 122.6 04/27/2016        Follow up in 3 month with labs.

## 2017-03-18 NOTE — TELEPHONE ENCOUNTER
3/17/17 1640  Left a message on recorder for [facility] that Dr. Dandre Jules is out of the office today to address the Clearance for Medical Clearance dental procedure. He will be back in the office on Monday to address.   ---- Message from Carlos Aranda MA sent at 3/17/2017  1:00 PM CDT -----  Contact: [facility]( [facility])  The forms in question were put in your box this morning.  ----- Message -----     From: Marlen Rivera     Sent: 3/17/2017  11:15 AM       To: [facility]    [facility] called, states she has refaxed over a clearance form for pt. Ph [phone]. Jovanny cartagena

## 2017-03-20 ENCOUNTER — HOSPITAL ENCOUNTER (OUTPATIENT)
Dept: PULMONOLOGY | Facility: CLINIC | Age: 47
Discharge: HOME | End: 2017-03-20
Payer: MEDICAID

## 2017-03-20 ENCOUNTER — HOSPITAL ENCOUNTER (OUTPATIENT)
Dept: CARDIOLOGY | Facility: CLINIC | Age: 47
Discharge: HOME | End: 2017-03-20
Payer: MEDICAID

## 2017-03-20 ENCOUNTER — OFFICE VISIT (OUTPATIENT)
Dept: ELECTROPHYSIOLOGY | Facility: CLINIC | Age: 47
End: 2017-03-20
Payer: MEDICAID

## 2017-03-20 ENCOUNTER — TELEPHONE (OUTPATIENT)
Dept: TRANSPLANT | Facility: CLINIC | Age: 47
End: 2017-03-20

## 2017-03-20 VITALS
DIASTOLIC BLOOD PRESSURE: 69 MMHG | HEART RATE: 78 BPM | BODY MASS INDEX: 36.48 KG/M2 | SYSTOLIC BLOOD PRESSURE: 109 MMHG | WEIGHT: 227 LBS | HEIGHT: 66 IN

## 2017-03-20 DIAGNOSIS — G47.33 OSA (OBSTRUCTIVE SLEEP APNEA): ICD-10-CM

## 2017-03-20 DIAGNOSIS — Z79.899 HIGH RISK MEDICATIONS (NOT ANTICOAGULANTS) LONG-TERM USE: ICD-10-CM

## 2017-03-20 DIAGNOSIS — Z79.4 TYPE 2 DIABETES MELLITUS WITH DIABETIC POLYNEUROPATHY, WITH LONG-TERM CURRENT USE OF INSULIN: Chronic | ICD-10-CM

## 2017-03-20 DIAGNOSIS — I50.43 ACUTE ON CHRONIC COMBINED SYSTOLIC AND DIASTOLIC HEART FAILURE: ICD-10-CM

## 2017-03-20 DIAGNOSIS — I48.0 PAROXYSMAL ATRIAL FIBRILLATION: ICD-10-CM

## 2017-03-20 DIAGNOSIS — E11.42 TYPE 2 DIABETES MELLITUS WITH DIABETIC POLYNEUROPATHY, WITH LONG-TERM CURRENT USE OF INSULIN: Chronic | ICD-10-CM

## 2017-03-20 DIAGNOSIS — I44.7 LBBB (LEFT BUNDLE BRANCH BLOCK): ICD-10-CM

## 2017-03-20 DIAGNOSIS — I42.0 CARDIOMYOPATHY, DILATED: ICD-10-CM

## 2017-03-20 DIAGNOSIS — I42.8 CARDIOMYOPATHY, NONISCHEMIC: Primary | ICD-10-CM

## 2017-03-20 DIAGNOSIS — Z79.01 CHRONIC ANTICOAGULATION: ICD-10-CM

## 2017-03-20 DIAGNOSIS — I42.0 COCM (CONGESTIVE CARDIOMYOPATHY): ICD-10-CM

## 2017-03-20 LAB
PRE FEV1 FVC: 70
PRE FEV1: 1.69
PRE FVC: 2.42
PREDICTED FEV1 FVC: 83
PREDICTED FEV1: 2.68
PREDICTED FVC: 3.24

## 2017-03-20 PROCEDURE — 94727 GAS DIL/WSHOT DETER LNG VOL: CPT | Mod: 26,S$PBB,, | Performed by: INTERNAL MEDICINE

## 2017-03-20 PROCEDURE — 99999 PR PBB SHADOW E&M-EST. PATIENT-LVL III: CPT | Mod: PBBFAC,,, | Performed by: INTERNAL MEDICINE

## 2017-03-20 PROCEDURE — 94729 DIFFUSING CAPACITY: CPT | Mod: 26,S$PBB,, | Performed by: INTERNAL MEDICINE

## 2017-03-20 PROCEDURE — 93010 ELECTROCARDIOGRAM REPORT: CPT | Mod: S$PBB,,, | Performed by: INTERNAL MEDICINE

## 2017-03-20 PROCEDURE — 99205 OFFICE O/P NEW HI 60 MIN: CPT | Mod: S$PBB,,, | Performed by: INTERNAL MEDICINE

## 2017-03-20 PROCEDURE — 94060 EVALUATION OF WHEEZING: CPT | Mod: 26,S$PBB,, | Performed by: INTERNAL MEDICINE

## 2017-03-20 NOTE — PROGRESS NOTES
Subjective:    Patient ID:  Laure Ross is a 46 y.o. female who presents for evaluation of Atrial Fibrillation      HPI   46 y.o. F  NICM 10-20%. R sided dual-chamber ICD.  pHTN  PAF, on amio since BRITTANEY/DCCV 4/16  HTN DM HL CLARENCE asthma    ADHF 2/16, requiring IABP. Had home  for a while; now off.   OHT noncandidate per Dr Jules.  Continues to have NYHA II sx, but much better since hospital d/c.    5/16 20%, PASP 63  LFTs recently OK    My interpretation of today's ECG is NSR with 1st deg AVB, 78 bpm. LBBB.    Review of Systems   Constitution: Negative. Negative for weakness and malaise/fatigue.   HENT: Negative.  Negative for ear pain and tinnitus.    Eyes: Negative for blurred vision.   Cardiovascular: Negative.  Negative for chest pain, dyspnea on exertion, near-syncope, palpitations and syncope.   Respiratory: Negative.  Negative for shortness of breath.    Endocrine: Negative.  Negative for polyuria.   Hematologic/Lymphatic: Does not bruise/bleed easily.   Skin: Negative.  Negative for rash.   Musculoskeletal: Negative.  Negative for joint pain and muscle weakness.   Gastrointestinal: Negative.  Negative for abdominal pain and change in bowel habit.   Genitourinary: Negative for frequency.   Neurological: Negative.  Negative for dizziness.   Psychiatric/Behavioral: Negative.  Negative for depression. The patient is not nervous/anxious.    Allergic/Immunologic: Negative for environmental allergies.        Objective:    Physical Exam   Constitutional: She is oriented to person, place, and time. Vital signs are normal. She appears well-developed and well-nourished. She is active and cooperative.   HENT:   Head: Normocephalic and atraumatic.   Eyes: Conjunctivae and EOM are normal.   Neck: Normal range of motion. Carotid bruit is not present. No tracheal deviation and no edema present. No thyroid mass and no thyromegaly present.   Cardiovascular: Normal rate, regular rhythm, intact distal pulses and normal  pulses.   No extrasystoles are present. PMI is not displaced.  Exam reveals no gallop and no friction rub.    Murmur heard.  High-pitched blowing holosystolic murmur is present with a grade of 2/6  at the apex  Pulmonary/Chest: Effort normal and breath sounds normal. No respiratory distress. She has no wheezes. She has no rales.   Abdominal: Soft. Normal appearance. She exhibits no distension. There is no hepatosplenomegaly.   Musculoskeletal: Normal range of motion.   Neurological: She is alert and oriented to person, place, and time. Coordination normal.   Skin: Skin is warm and dry. No rash noted.   Psychiatric: She has a normal mood and affect. Her speech is normal and behavior is normal. Thought content normal. Cognition and memory are normal.   Nursing note and vitals reviewed.        Assessment:       1. Cardiomyopathy, nonischemic    2. COCM (congestive cardiomyopathy)    3. LBBB (left bundle branch block)    4. Paroxysmal atrial fibrillation    5. Acute on chronic combined systolic and diastolic heart failure    6. CLARENEC (obstructive sleep apnea)    7. Type 2 diabetes mellitus with diabetic polyneuropathy, with long-term current use of insulin    8. Chronic anticoagulation    9. High risk medications (not anticoagulants) long-term use         Plan:       Given NICM with LBBB and low LVEF, makes criteria for biV upgrade.  I spent about a half hour discussing the risks and benefits of ICD/CRT-D placement and testing. Our discussion of risks included (but was not limited to) the possibility of infection, death, stroke, MI, pneumothorax, embolism, cardiac perforation, cardiac tamponade, renal injury, and bleeding.  I discussed with patient risks, indications, benefits, and alternatives of the planned procedure. All questions were answered. Patient understands and wishes to proceed.    AF:  Maintaining NSR at present. Continue amio for now.  Will consider changing AAD in future, given potential longterm tox of  brett

## 2017-03-20 NOTE — MR AVS SNAPSHOT
[facility] - Arrhythmia  [address]  [facility] [address]  Phone: [phone]  Fax: [phone]                  Laure Ross   3/20/2017 11:40 AM   Office Visit    Description:  Female : 1970   Provider:  Navi Jolly MD   [facility]  [facility] - [facility]           Reason for Visit     Atrial Fibrillation           Diagnoses this Visit        Comments    Cardiomyopathy, nonischemic    -  Primary     COCM (congestive cardiomyopathy)         LBBB (left bundle branch block)         Paroxysmal atrial fibrillation         Acute on chronic combined systolic and diastolic heart failure         CLARENCE (obstructive sleep apnea)         Type 2 diabetes mellitus with diabetic polyneuropathy, with long-term current use of insulin         Chronic anticoagulation         High risk medications (not anticoagulants) long-term use                To Do List           Future Appointments        Provider Department Dept Phone    3/20/2017 12:35 PM LAB, APPOINTMENT [place] [facility] [phone]    3/20/2017 1:30 PM PULMONARY FUNCTION [facility] Pulmonary Lab [phone]    3/29/2017 10:00 AM Dolly Manley, PharmD [facility] Coumadin [phone]    3/29/2017 10:30 AM THANIA Gifford, FNP [facility] - Endocrinology [phone]    2017 10:00 AM Freddie Hampton DPM [facility] - Podiatry [phone]      Goals (5 Years of Data)     None      Follow-Up and Disposition     Return in about 1 year (around 3/20/2018).    Follow-up and Disposition History      [facility]     [facility] -  Assistance  Registered nurses in the [facility] provide clinical advisement, health education, appointment booking, and other advisory services.  Call for this free service at [phone].             Medications           Message regarding Medications     Verify the changes and/or additions to your medication regime listed below are the same as  "discussed with your clinician today.  If any of these changes or additions are incorrect, please notify your healthcare provider.             Verify that the below list of medications is an accurate representation of the medications you are currently taking.  If none reported, the list may be blank. If incorrect, please contact your healthcare provider. Carry this list with you in case of emergency.           Current Medications     amiodarone (PACERONE) 200 MG Tab TAKE TWO TABLETS BY MOUTH EVERY DAY.    BD ULTRA-FINE ESSENCE PEN NEEDLES 32 gauge x 5/32" Ndle Takes 5 times  day    blood sugar diagnostic Strp 240 strips by Misc.(Non-Drug; Combo Route) route 4 (four) times daily with meals and nightly.    cetirizine (ZYRTEC) 10 MG tablet Take 1 tablet (10 mg total) by mouth once daily.    cetirizine (ZYRTEC) 5 MG chewable tablet Take 1 tablet (5 mg total) by mouth once daily.    digoxin (LANOXIN) 125 mcg tablet TAKE 1 TABLET BY MOUTH ONCE DAILY    fluticasone-vilanterol (BREO) 100-25 mcg/dose diskus inhaler Inhale 1 puff into the lungs once daily. Controller    furosemide (LASIX) 40 MG tablet TAKE 2 TABLETS BY MOUTH TWO TIMES A DAY .    insulin aspart (NOVOLOG) 100 unit/mL injection Inject into the skin 3 (three) times daily before meals.    insulin glargine (LANTUS SOLOSTAR) 100 unit/mL (3 mL) InPn pen Inject 36 Units into the skin every evening.    lancets (ACCU-CHEK MULTICLIX LANCET) Misc 1 each by Misc.(Non-Drug; Combo Route) route 4 (four) times daily with meals and nightly.    lancets Misc Checks 4 times a day    levalbuterol (XOPENEX HFA) 45 mcg/actuation inhaler Inhale 2 puffs into the lungs every 4 (four) hours as needed for Wheezing.    levalbuterol (XOPENEX) 1.25 mg/0.5 mL nebulizer solution Take 0.5 mLs (1.25 mg total) by nebulization every 4 (four) hours as needed for Wheezing.    lisinopril (PRINIVIL,ZESTRIL) 5 MG tablet Take 1 tablet (5 mg total) by mouth 2 (two) times daily.    magnesium oxide (MAG-OX) " "400 mg tablet Take 1 tablet (400 mg total) by mouth 2 (two) times daily.    montelukast (SINGULAIR) 10 mg tablet 10 mg once daily.     omeprazole (PRILOSEC) 40 MG capsule Take 1 capsule (40 mg total) by mouth every morning.    potassium chloride (MICRO-K) 10 MEQ CpSR Take 2 capsules (20 mEq total) by mouth once daily.    SPIRIVA WITH HANDIHALER 18 mcg inhalation capsule Inhale 18 mcg into the lungs.     spironolactone (ALDACTONE) 25 MG tablet Take 1 tablet (25 mg total) by mouth once daily.    TRUEPLUS LANCETS 30 gauge Misc     TRUEPLUS LANCETS 30 gauge Misc USE 2 HOURS AFTER MEALS AND AT BEDTIME    vitamin D 1000 units Tab Take 2 tablets (2,000 Units total) by mouth once daily.    warfarin (COUMADIN) 7.5 MG tablet Take 1 tablet (7.5 mg total) by mouth Daily. Take 7.5 mg on Tues, Thurs, Sat. Take 3.75 mg all other days           Clinical Reference Information           Your Vitals Were     BP Pulse Height Weight BMI    109/69 78 5' 6" (1.676 m) 103 kg (227 lb) 36.64 kg/m2      Blood Pressure          Most Recent Value    BP  109/69      Allergies as of 3/20/2017     No Known Allergies      Immunizations Administered on Date of Encounter - 3/20/2017     None      Orders Placed During Today's Visit     Future Labs/Procedures Expected by Expires    TSH  3/20/2017 5/19/2018      Maintenance Dialysis History     Patient has no recorded history of maintenance dialysis.      [facility] Sign-Up     Activating your [facility] account is as easy as 1-2-3!     1) Visit [email or web address], select Sign Up Now, enter this activation code and your date of birth, then select Next.  [email or web address]  Expires: 5/1/2017 11:11 AM      2) Create a username and password to use when you visit [facility] in the future and select a security question in case you lose your password and select Next.    3) Enter your e-mail address and click Sign Up!    Additional Information  If you have questions, please e-mail [email or web address] or call " [phone] to talk to our [facility] staff. Remember, [facility] is NOT to be used for urgent needs. For medical emergencies, dial 911.         Language Assistance Services     ATTENTION: Language assistance services are available, free of charge. Please call [phone].      ATENCIÓN: Si habla christinaañol, tiene a dougherty disposición servicios gratuitos de asistencia lingüística. Llame al [phone].     CHRIS Ý: N?u b?n nói Ti?ng Vi?t, có các d?ch v? h? tr? ngôn ng? mi?n phí dành cho b?n. G?i s? [phone].         [facility] complies with applicable Federal civil rights laws and does not discriminate on the basis of race, color, national origin, age, disability, or sex.

## 2017-03-20 NOTE — PROGRESS NOTES
The pt will be scheduling the extraction of multiple teeth in the near future.  She has already been approved in the past to hold coumadin without lovenox, per Dr. Jules, due to her low CHADS 2 score = 3 (HTN, CHF, DM).  Therefore, I am providing the same clearance for this procedure.  We will provide detailed procedure instructions once a date is known.  Clearance form faxed back to [facility]; P: [phone]; F: [phone].

## 2017-03-20 NOTE — PROGRESS NOTES
AS of 3/20/17 pts said she will be getting an (Anti-Biotic) we were told by (Heart Failure/Karina) that it will be (Clindamycin 150mg). Pt is not sure yet she will call us to inform.

## 2017-03-21 ENCOUNTER — TELEPHONE (OUTPATIENT)
Dept: PULMONOLOGY | Facility: CLINIC | Age: 47
End: 2017-03-21

## 2017-03-21 RX ORDER — BUDESONIDE AND FORMOTEROL FUMARATE DIHYDRATE 160; 4.5 UG/1; UG/1
2 AEROSOL RESPIRATORY (INHALATION) EVERY 12 HOURS
Qty: 10.2 G | Refills: 12 | Status: SHIPPED | OUTPATIENT
Start: 2017-03-21 | End: 2018-03-28

## 2017-03-29 ENCOUNTER — OFFICE VISIT (OUTPATIENT)
Dept: ENDOCRINOLOGY | Facility: CLINIC | Age: 47
End: 2017-03-29
Payer: MEDICAID

## 2017-03-29 ENCOUNTER — ANTI-COAG VISIT (OUTPATIENT)
Dept: CARDIOLOGY | Facility: CLINIC | Age: 47
End: 2017-03-29
Payer: MEDICAID

## 2017-03-29 VITALS
DIASTOLIC BLOOD PRESSURE: 60 MMHG | WEIGHT: 225 LBS | HEART RATE: 64 BPM | BODY MASS INDEX: 39.87 KG/M2 | SYSTOLIC BLOOD PRESSURE: 102 MMHG | HEIGHT: 63 IN

## 2017-03-29 DIAGNOSIS — Z79.4 TYPE 2 DIABETES MELLITUS WITH DIABETIC POLYNEUROPATHY, WITH LONG-TERM CURRENT USE OF INSULIN: Primary | Chronic | ICD-10-CM

## 2017-03-29 DIAGNOSIS — I50.43 ACUTE ON CHRONIC COMBINED SYSTOLIC AND DIASTOLIC HEART FAILURE: ICD-10-CM

## 2017-03-29 DIAGNOSIS — N18.30 CKD (CHRONIC KIDNEY DISEASE), STAGE III: ICD-10-CM

## 2017-03-29 DIAGNOSIS — I48.0 PAROXYSMAL ATRIAL FIBRILLATION: ICD-10-CM

## 2017-03-29 DIAGNOSIS — I42.0 COCM (CONGESTIVE CARDIOMYOPATHY): ICD-10-CM

## 2017-03-29 DIAGNOSIS — G47.33 OSA (OBSTRUCTIVE SLEEP APNEA): ICD-10-CM

## 2017-03-29 DIAGNOSIS — G56.03 CARPAL TUNNEL SYNDROME, BILATERAL: ICD-10-CM

## 2017-03-29 DIAGNOSIS — E11.42 TYPE 2 DIABETES MELLITUS WITH DIABETIC POLYNEUROPATHY, WITH LONG-TERM CURRENT USE OF INSULIN: Primary | Chronic | ICD-10-CM

## 2017-03-29 DIAGNOSIS — Z79.01 CHRONIC ANTICOAGULATION: Primary | ICD-10-CM

## 2017-03-29 DIAGNOSIS — E66.9 OBESITY (BMI 30-39.9): ICD-10-CM

## 2017-03-29 LAB — INR PPP: 2.5 (ref 2–3)

## 2017-03-29 PROCEDURE — 99999 PR PBB SHADOW E&M-EST. PATIENT-LVL III: CPT | Mod: PBBFAC,,, | Performed by: NURSE PRACTITIONER

## 2017-03-29 PROCEDURE — 99999 PR PBB SHADOW E&M-EST. PATIENT-LVL I: CPT | Mod: PBBFAC,,,

## 2017-03-29 PROCEDURE — 99213 OFFICE O/P EST LOW 20 MIN: CPT | Mod: PBBFAC | Performed by: NURSE PRACTITIONER

## 2017-03-29 PROCEDURE — 99214 OFFICE O/P EST MOD 30 MIN: CPT | Mod: S$PBB,,, | Performed by: NURSE PRACTITIONER

## 2017-03-29 RX ORDER — INSULIN GLARGINE 100 [IU]/ML
34 INJECTION, SOLUTION SUBCUTANEOUS 2 TIMES DAILY
Qty: 1 BOX | Refills: 6
Start: 2017-03-29 | End: 2017-03-29 | Stop reason: SDUPTHER

## 2017-03-29 RX ORDER — PEN NEEDLE, DIABETIC 31 GX5/16"
NEEDLE, DISPOSABLE MISCELLANEOUS
Qty: 200 EACH | Refills: 11 | Status: SHIPPED | OUTPATIENT
Start: 2017-03-29 | End: 2019-08-12 | Stop reason: SDUPTHER

## 2017-03-29 RX ORDER — INSULIN GLARGINE 100 [IU]/ML
34 INJECTION, SOLUTION SUBCUTANEOUS 2 TIMES DAILY
Qty: 2 BOX | Refills: 11 | Status: SHIPPED | OUTPATIENT
Start: 2017-03-29 | End: 2018-01-04

## 2017-03-29 RX ORDER — INSULIN ASPART 100 [IU]/ML
INJECTION, SOLUTION INTRAVENOUS; SUBCUTANEOUS
Qty: 2 BOX | Refills: 11 | Status: SHIPPED | OUTPATIENT
Start: 2017-03-29 | End: 2017-06-27 | Stop reason: SDUPTHER

## 2017-03-29 RX ORDER — LANCETS
EACH MISCELLANEOUS
Qty: 150 EACH | Refills: 11 | Status: SHIPPED | OUTPATIENT
Start: 2017-03-29 | End: 2018-06-04 | Stop reason: SDUPTHER

## 2017-03-29 NOTE — PROGRESS NOTES
"CC: There were no encounter diagnoses.      HPI: Mrs. Laure Ross is a 46 y.o. Black or  female who was diagnosed with Type 2 DM.   Pt was last seen by Dr. Driver in August 2016.  Pt is being seen by me for the second time.  a1c remains at goal.   Lab Results   Component Value Date    HGBA1C 6.1 03/17/2017         Pt is from outside of [place], (from [place], [place]), lives in [place] now.    BG readings are checked 4x-5x/day and readings are:  fbg 83-120s, 253 (muscle spasm/steroid injection)  Pre lunch , closer to 110s  Pre dinner     Hypoglycemia: No, seldom, lowest 80s (glucose tabs)    Skipped/missed glycemic medications: No    Prandial injections taken with meals: No    Physical Activity: No    Skipping meals and/or snacking: The patient eats a regular, healthy diet. semi    CURRENT DIABETIC MEDS: lantus 34 units twice daily, novolog 16 units ac w/ mod dose correction scale 150-200 +2, etc.   Uses Vials or Pens: pens   Type of Glucose Meter: true metrix    Last Eye Exam: May 2016, oct 2016 (eye infection, treated w/ gtts (ab), appt next Wednesday   Last Podiatry Exam: 1/2017    REVIEW OF SYSTEMS  General: no weakness, + fatigue, or +weight changes #15 from dec 2016.   Eyes: no blurry vision, eye pain, or discharge.   Cardiac: no chest pain or palpitations.   Respiratory: denies cough or dyspnea.   GI: no abdominal pain, nausea, diarrhea, or + constipation (mag citrate prn).   Skin: no rashes, wounds, or reactions at insulin injection sites.   Neuro: + numbness, tingling, or dizziness. +carpal tunnel  Endocrine: no polyuria, polydipsia, polyphagia, heat or cold intolerance.     Vital Signs  /60  Pulse 64  Ht 5' 3" (1.6 m)  Wt 102.1 kg (225 lb)  BMI 39.86 kg/m2    Hemoglobin A1C   Date Value Ref Range Status   03/17/2017 6.1 4.5 - 6.2 % Final     Comment:     According to ADA guidelines, hemoglobin A1C <7.0% represents  optimal control in non-pregnant diabetic patients.  " Different  metrics may apply to specific populations.   Standards of Medical Care in Diabetes - 2016.  For the purpose of screening for the presence of diabetes:  <5.7%     Consistent with the absence of diabetes  5.7-6.4%  Consistent with increasing risk for diabetes   (prediabetes)  >or=6.5%  Consistent with diabetes  Currently no consensus exists for use of hemoglobin A1C  for diagnosis of diabetes for children.     11/23/2016 6.1 4.5 - 6.2 % Final     Comment:     According to ADA guidelines, hemoglobin A1C <7.0% represents  optimal control in non-pregnant diabetic patients.  Different  metrics may apply to specific populations.   Standards of Medical Care in Diabetes - 2016.  For the purpose of screening for the presence of diabetes:  <5.7%     Consistent with the absence of diabetes  5.7-6.4%  Consistent with increasing risk for diabetes   (prediabetes)  >or=6.5%  Consistent with diabetes  Currently no consensus exists for use of hemoglobin A1C  for diagnosis of diabetes for children.     08/23/2016 7.2 (H) 4.5 - 6.2 % Final     Comment:     According to ADA guidelines, hemoglobin A1C <7.0% represents  optimal control in non-pregnant diabetic patients.  Different  metrics may apply to specific populations.   Standards of Medical Care in Diabetes - 2016.  For the purpose of screening for the presence of diabetes:  <5.7%     Consistent with the absence of diabetes  5.7-6.4%  Consistent with increasing risk for diabetes   (prediabetes)  >or=6.5%  Consistent with diabetes  Currently no consensus exists for use of hemoglobin A1C  for diagnosis of diabetes for children.            Chemistry        Component Value Date/Time     03/20/2017 1245     03/20/2017 1245    K 4.0 03/20/2017 1245    K 4.0 03/20/2017 1245     03/20/2017 1245     03/20/2017 1245    CO2 30 (H) 03/20/2017 1245    CO2 30 (H) 03/20/2017 1245    BUN 22 (H) 03/20/2017 1245    BUN 22 (H) 03/20/2017 1245    CREATININE 1.3  03/20/2017 1245    CREATININE 1.3 03/20/2017 1245    GLU 56 (L) 03/20/2017 1245    GLU 56 (L) 03/20/2017 1245        Component Value Date/Time    CALCIUM 9.2 03/20/2017 1245    CALCIUM 9.2 03/20/2017 1245    ALKPHOS 69 02/13/2017 1023    AST 18 02/13/2017 1023    ALT 22 02/13/2017 1023    BILITOT 0.4 02/13/2017 1023          Lab Results   Component Value Date    CHOL 202 (H) 04/27/2016     Lab Results   Component Value Date    HDL 39 (L) 04/27/2016     Lab Results   Component Value Date    LDLCALC 122.6 04/27/2016     Lab Results   Component Value Date    TRIG 202 (H) 04/27/2016     Lab Results   Component Value Date    CHOLHDL 19.3 (L) 04/27/2016       Lab Results   Component Value Date    TSH 1.697 03/20/2017       No results found for: MICALBCREAT    Vit D, 25-Hydroxy   Date Value Ref Range Status   11/23/2016 32 30 - 96 ng/mL Final     Comment:     Vitamin D deficiency.........<10 ng/mL                              Vitamin D insufficiency......10-29 ng/mL       Vitamin D sufficiency........> or equal to 30 ng/mL  Vitamin D toxicity............>100 ng/mL         PHYSICAL EXAMINATION  Constitutional: Appears well, no distress  Neck: Supple, trachea midline.   Respiratory: no wheezes, decreased at bases, even and unlabored.  Cardiovascular: RRR; no carotid bruits or murmurs.   Lymph: DP pulses  2+ bilaterally; no edema.   Skin: warm and dry; no injection site reactions, no acanthosis nigracans observed.  Neuro:patient alert and cooperative, normal affect.    Diabetes Foot Exam:   Done on 11/1/16    Assessment/Plan    1. Type 2 diabetes mellitus with diabetic polyneuropathy, with long-term current use of insulin  Defer to [place] or [facility] [place] [facility] or [facility]  a1c at goal.  Continue regimen     2. CKD (chronic kidney disease), stage III  Avoid hypoglycemia and insulin stacking, stable   3. Acute on chronic combined systolic and diastolic heart failure  F/u w/ cards   4. Obesity (BMI 30-39.9)   Body mass index is 39.86 kg/(m^2). may increase insulin resistance. Encourage exercise 3-4 times a day at least 30 mins a day.   5. COCM (congestive cardiomyopathy)  See above, f/u with cards    6. Paroxysmal atrial fibrillation  Continue med, stable   7. CLARENCE (obstructive sleep apnea)  Continue cpap, stable    8. Carpal tunnel syndrome F/u [facility]      Current DM Educational Needs:  1. Educational Need Identified: nutrition/carbs   2. Type of therapy and dose: insulin   3. Last DM Education: 2016     FOLLOW UP  Return in about 3 months (around 6/29/2017).     No orders of the defined types were placed in this encounter.

## 2017-03-29 NOTE — PROGRESS NOTES
INR remains therapeutic.  Dental work not yet scheduled, she needs to find a dentist.  She reports likely pacemaker procedure in May but date not yet scheduled.  She may take clindamycin; would not expect DDI.  She reports medication change of steroid injection in shoulder about 1 week ago which should not affect INR. She reports a diet change of running out of Boost for a few days, she started back 3/26.  Continue stable warfarin dose and repeat INR next month.  Patient advised to contact clinic with any changes, questions, or concerns.

## 2017-03-29 NOTE — PATIENT INSTRUCTIONS
Snacks can be an important part of a balanced, healthy meal plan. They allow you to eat more frequently, feeling full and satisfied throughout the day. Also, they allow you to spread carbohydrates evenly, which may stabilize blood sugars.  Plus, snacks are enjoyable!     The amount of carbohydrate needed at snacks varies. Generally, about 15-30 grams of carbohydrate per snack is recommended.  Below you will find some tasty treats.       0-5 gm carb   Crystal Light   Vitamin Water Zero   Herbal tea, unsweetened   2 tsp peanut butter on celery   1./2 cup sugar-free jell-o   1 sugar-free popsicle   ¼ cup blueberries   8oz Blue Pat unsweetened almond milk   5 baby carrots & celery sticks, cucumbers, bell peppers dipped in ¼ cup salsa, 2Tbsp light ranch dressing or 2Tbsp plain Greek yogurt   10 Goldfish crackers   ½ oz low-fat cheese or string cheese   1 closed handful of nuts, unsalted   1 Tbsp of sunflower seeds, unsalted   1 cup Smart Pop popcorn   1 whole grain brown rice cake        15 gm carb   1 small piece of fruit or ½ banana or 1/2 cup lite canned fruit   3 kim cracker squares   3 cups Smart Pop popcorn, top spray butter, Brandt lite salt or cinnamon and Truvia   5 Vanilla Wafers   ½ cup low fat, no added sugar ice cream or frozen yogurt (Blue bell, Blue Bunny, Weight Watchers, Skinny Cow)   ½ turkey, ham, or chicken sandwich   ½ c fruit with ½ c Cottage cheese   4-6 unsalted wheat crackers with 1 oz low fat cheese or 1 tbsp peanut butter    30-45 goldfish crackers (depending on flavor)    7-8 Latter day mini brown rice cakes (caramel, apple cinnamon, chocolate)    12 Latter day mini brown rice cakes (cheddar, bbq, ranch)    1/3 cup hummus dip with raw veg   1/2 whole wheat ana, 1Tbsp hummus   Mini Pizza (1/2 whole wheat English muffin, low-fat  cheese, tomato sauce)   100 calorie snack pack (Oreo, Chips Ahoy, Ritz Mix, Baked Cheetos)   4-6 oz. light or Greek Style yogurt  (Ezequiel Esquivel, Dharmesh, [facility])   ½ cup sugar-free pudding     6 in. wheat tortilla or ana oven toasted chips (topped with spray butter flavoring, cinnamon, Truvia OR spray butter, garlic powder, chili powder)    18 BBQ Popchips (available at [employer], [employer] Foods, Fresh Market)                   Diabetes Support Group Meetings    Date Topics   February 9 Health Promotion/Nutrition   March 9 Taking Care of Your Kidneys   April 13 Taking Care of Your Feet   May 11 Ease Your Mind with Diabetes   June 8 Hurricane and Emergency Preparedness   July 13 Family & Caregiver Support for Diabetes   *August 17  Taking Care of Your Eyes   September 14 Devices & Technology   October 12 Recipes & Treats   November 9 Getting Pumped Up for Diabetes   December 14 Year-End Close Out            Meetings are held in the [facility] (A) of the [facility] located at [address] [place] [address]. Please call [phone] for additional information.    Free service, offered every 2nd Thursday of every month, except in August! Family members and/or friends are welcome as well!  Support group is for patients with type 1 or type 2 diabetes.    From 3:30p to 4:30p

## 2017-04-10 RX ORDER — POTASSIUM CHLORIDE 750 MG/1
CAPSULE, EXTENDED RELEASE ORAL
Qty: 60 CAPSULE | Refills: 3 | Status: SHIPPED | OUTPATIENT
Start: 2017-04-10 | End: 2017-08-14 | Stop reason: SDUPTHER

## 2017-04-11 ENCOUNTER — TELEPHONE (OUTPATIENT)
Dept: ELECTROPHYSIOLOGY | Facility: CLINIC | Age: 47
End: 2017-04-11

## 2017-04-11 DIAGNOSIS — I44.7 LBBB (LEFT BUNDLE BRANCH BLOCK): Primary | ICD-10-CM

## 2017-04-11 NOTE — TELEPHONE ENCOUNTER
Post-Procedure Patient Discharge Instructions  Pacemaker/Defibrillator  Wound Care   If you are discharged with a standard dressing over the incision, you may remove the dressing after 24 hours.    If you are discharged with an AQUACEL dressing, you should keep it on until your follow-up appointment in 1-2 weeks.   If there are Steri-strips (strips of tape) over your incision, leave them on until your follow-up appointment. They may begin to fall off on their own, which is normal. If there is Dermabond (clear glue) over your incision, do not scrub it off. It acts as a barrier and will eventually disappear.   You will be discharged with 5 days of oral antibiotics. Please take the full prescription until it is gone.   Do not get the incision wet for 48 hours following the procedure. You may sponge bath during this period, working around the incision. After 48 hours, you may shower, but you should still try to keep this area as dry as possible, and avoid direct water contact to the incision (allow the water to hit back of your shoulder rather than directly on the incision). Gently pat the incision dry if it does get wet.   You may take regular showers after 2 weeks, unless otherwise indicated at your follow-up visit.   Do not submerge the incision in a tub, pool, or body of water for 6 weeks.   Avoid using deodorants, powders, creams, lotions, etc. on your incision for 6 weeks.   If you notice unusual swelling, redness, drainage, have more device site pain, chest pain, shortness of breath, or have a fever greater than 100 degrees, call our device clinic immediately: [phone] or [phone] during normal office hours. You may call [phone] after-hours or on weekends and ask for the electrophysiologist on call.  Activity    If you only had a battery/generator change performed, there are no postoperative activity restrictions.    If this is your first device or if you had new wires added to your  existing device, then you will be discharged in a sling which you should wear continuously for 48 hours. After that, the sling should remain off during the day but should be worn at night for another 2-4 weeks (depending on how active a sleeper you are).   Do not raise your device-side arm above your shoulder for 6 weeks. Do not lift more than 5-10 lbs with your device-side arm for 6 weeks.    If you were driving prior to the procedure, you may resume driving after your first follow-up appointment (1-2 weeks). If you have a history of passing out or a history of certain arrhythmias, there may be driving restrictions unrelated to the procedure. Please clarify with your physician if this is the case.   No heavy activity with the affected arm for 6 weeks (eg. tennis, golf, bowling, aerobics, mowing the lawn, etc.).   Avoid rough contact at the device site for 6 weeks.   You may participate in sexual activity unless otherwise instructed.   You may return to work within 3-5 days unless told otherwise, provided you adhere to the above activity restrictions.  Long-Term Instructions   Keep your pacemaker or defibrillator identification card with you at all times.   If you have a defibrillator and you get shocked by the device: If you receive one shock and you feel ok, you may call [phone] or [phone] during normal office hours. You may call [phone] after-hours. If you receive one shock and you do not feel well, call Emergency Medical Services. They will take you to the nearest emergency room.   If you have a defibrillator and you get more than one shock from the device or multiple shocks in a short period of time: Call Emergency Medical Services. They will take you to the nearest emergency room.   Appliances: You may operate any electrical device in your home, including microwaves.   Security Systems: Electromagnetic security systems can be located in the workplace, courthouses, or other  high-security areas. Exposure to this type of security system has been shown to cause interference in some cases. Interference may be related to the duration of exposure and/or the distance between the device and the security device. You should be aware of the location of security systems, move through them at a normal pace, and avoid leaning or standing too close.   Metal Detectors at Airports: Metal detectors at airports can potentially interfere with pacemakers or defibrillators, although this is unlikely. Metal detectors will likely be triggered by your device and therefore at places such as airports  it will be important for you to carry your identification card for the pacemaker/defibrillator. Airport personnel will likely prefer to do a manual search.   Cellular Phones: It is unlikely that using a cellular phone will interfere with your device. It should be used with the hand opposite to the side where your device was implanted. The phone should not be carried in the shirt pocket on the same side as the device.   Specific Work Conditions: Patients who work near high-voltage lines, transmitting towers, large motors, welding equipment, or powerful magnets should discuss their specific situation with their physician. In general, remain at least two feet from external electrical equipment, verify that the equipment is properly grounded, and wear insulated gloves when using electrical devices. Leave the immediate location if lightheadedness or other symptoms develop.   MRI: Some pacemakers and defibrillators are safe in MRI scanners, while others are not. Please consult with your physician to see if you have an MRI-compatible device.   Surgery: Should you require surgery in the future, some electrosurgical devices can interfere with your device function. You should discuss this with your surgeon before any operation.   Radiation Therapy: If you ever require radiation therapy in the future, care must be taken  to avoid irradiating the device.  Long-Term Follow-Up   Your device has the ability to transmit device information from home to the doctors office using a home monitoring system.   This remote system takes the place of a doctors visit. Your device will be checked from home every 3-6 months. Every 6-12 months, you will be asked to come into the office for an in-office check.   Your device should last in the range of 6-12 years. This depends on many factors including how often it paces the heart.   When the battery is low, a generator change will be performed. This is a same-day procedure with no post-op activity restrictions, unless one of the pacemaker or defibrillator leads needs to be replaced at that time, or a new lead is added to your existing system.

## 2017-04-11 NOTE — TELEPHONE ENCOUNTER
IMPLANTABLE DEVICE EDUCATION CHECKLIST    5/01/17 @ 1030 AM - Lab Work   PRE - PROCEDURE LABS HAVE BEEN ORDERED FOR YOU @ [facility]  BE SURE TO ARRIVE AT YOUR SCHEDULED TIME FOR THIS LAB WORK!  (YOU DO NOT HAVE TO FAST FOR THIS LABWORK!!!!)    5/04/17 @ 6 AM - ICD Upgrade   REPORT TO CARDIOLOGY WAITING ROOM ON 3RD FLOOR OF HOSPITAL (DO NOT REPORT TO CLINIC)  Directions for Reporting to Cardiology Waiting Area in the Hospital  If you park in the Parking Garage:  Take elevators to the 2nd floor  Walk up ramp and turn right by Gold Elevators  Take elevator to the 3rd floor  Upon exiting the elevator, turn away from the clinic areas  Walk long amaya around to front of hospital to area with windows overlooking [facility]  Check in at Reception Desk  OR  If family is dropping you off:  Have them drop you off at the front of the Hospital  (Near the ER, where all the flags are hung).  Take the E elevators to the 3rd floor.  Check in at the Reception Desk in the waiting room.    WASH YOUR CHEST WITH HIBICLENS OR AN ANTIBACTERIAL SOAP (SUCH AS DIAL) ON THE NIGHT BEFORE AND THE MORNING OF YOUR PROCEDURE.    DO NOT EAT OR DRINK ANYTHING AFTER: 12 MN ON THE NIGHT BEFORE YOUR PROCEDURE    MEDICATIONS  HOLD all insulins the morning of your procedure - novolog (Aspart) AND glargine (Lantus).   TAKE half a dose of insulin glargine (Lantus) the night before your procedure - 17 units.   HOLD your fluid pills the morning of your procedure - furosemide (Lasix) AND spironolactone (Aldactone).   YOU MAY TAKE OTHER USUAL MORNING MEDICATIONS WITH A SIP OF WATER    YOU WILL BE SPENDING THE NIGHT AFTER YOUR PROCEDURE  YOU WILL NEED SOMEONE TO DRIVE YOU HOME THE DAY AFTER YOUR PROCEDURE    YOUR PAIN DURING YOUR PROCEDURE WILL BE MANAGED BY THE ANESTHESIA TEAM    THE ABOVE INSTRUCTIONS WERE GIVEN TO THE PATIENT VERBALLY AND THEY VERBALIZED UNDERSTANDING. THEY DO NOT REQUIRE ANY SPECIAL NEEDS AND DO NOT HAVE ANY LEARNING  BARRIERS.     Any need to reschedule or cancel procedures, or any questions regarding your procedures should be addressed directly with the Arrhythmia Department Nurses at the following phone number: [phone]

## 2017-04-12 NOTE — PROGRESS NOTES
"Per RICARDO Chi "ICD Bi-V upgrade on 5/4 at 8 Am with Dr. Jolly. Please keep INR between 2.0-2.5. "  "

## 2017-04-25 ENCOUNTER — ANTI-COAG VISIT (OUTPATIENT)
Dept: CARDIOLOGY | Facility: CLINIC | Age: 47
End: 2017-04-25
Payer: MEDICAID

## 2017-04-25 ENCOUNTER — OFFICE VISIT (OUTPATIENT)
Dept: PODIATRY | Facility: CLINIC | Age: 47
End: 2017-04-25
Payer: MEDICAID

## 2017-04-25 VITALS
BODY MASS INDEX: 40.16 KG/M2 | DIASTOLIC BLOOD PRESSURE: 68 MMHG | HEART RATE: 72 BPM | SYSTOLIC BLOOD PRESSURE: 102 MMHG | WEIGHT: 226.63 LBS | HEIGHT: 63 IN

## 2017-04-25 DIAGNOSIS — B35.3 TINEA PEDIS OF BOTH FEET: ICD-10-CM

## 2017-04-25 DIAGNOSIS — E11.42 TYPE 2 DIABETES MELLITUS WITH DIABETIC POLYNEUROPATHY, WITH LONG-TERM CURRENT USE OF INSULIN: Primary | ICD-10-CM

## 2017-04-25 DIAGNOSIS — L84 PRE-ULCERATIVE CALLUSES: ICD-10-CM

## 2017-04-25 DIAGNOSIS — I48.0 PAROXYSMAL ATRIAL FIBRILLATION: ICD-10-CM

## 2017-04-25 DIAGNOSIS — Z79.4 TYPE 2 DIABETES MELLITUS WITH DIABETIC POLYNEUROPATHY, WITH LONG-TERM CURRENT USE OF INSULIN: Primary | ICD-10-CM

## 2017-04-25 DIAGNOSIS — Z79.01 CHRONIC ANTICOAGULATION: Primary | ICD-10-CM

## 2017-04-25 DIAGNOSIS — M62.469 GASTROCNEMIUS EQUINUS, UNSPECIFIED LATERALITY: ICD-10-CM

## 2017-04-25 DIAGNOSIS — M25.376 INSTABILITY OF FOOT JOINT, UNSPECIFIED LATERALITY: ICD-10-CM

## 2017-04-25 LAB — INR PPP: 4.7 (ref 2–3)

## 2017-04-25 PROCEDURE — 99999 PR PBB SHADOW E&M-EST. PATIENT-LVL III: CPT | Mod: PBBFAC,,, | Performed by: PODIATRIST

## 2017-04-25 PROCEDURE — 99211 OFF/OP EST MAY X REQ PHY/QHP: CPT | Mod: PBBFAC,27

## 2017-04-25 PROCEDURE — 85610 PROTHROMBIN TIME: CPT | Mod: PBBFAC

## 2017-04-25 PROCEDURE — 11721 DEBRIDE NAIL 6 OR MORE: CPT | Mod: S$PBB,59,, | Performed by: PODIATRIST

## 2017-04-25 PROCEDURE — 99999 PR PBB SHADOW E&M-EST. PATIENT-LVL I: CPT | Mod: PBBFAC,,,

## 2017-04-25 PROCEDURE — 99212 OFFICE O/P EST SF 10 MIN: CPT | Mod: S$PBB,25,, | Performed by: PODIATRIST

## 2017-04-25 PROCEDURE — 11056 PARNG/CUTG B9 HYPRKR LES 2-4: CPT | Mod: S$PBB,,, | Performed by: PODIATRIST

## 2017-04-25 RX ORDER — KETOCONAZOLE 20 MG/G
CREAM TOPICAL DAILY
Qty: 30 G | Refills: 1 | Status: SHIPPED | OUTPATIENT
Start: 2017-04-25 | End: 2018-10-01 | Stop reason: SDUPTHER

## 2017-04-25 NOTE — PROGRESS NOTES
INR high. Pt confirmed dose and compliance. She reports diet change from Boost drinks daily to Boost every other day. She also reports she feels like she is getting a cold. She reports a recent death in family and increased stress. No other changes. No s/sx of bleeding. We will hold a dose today and decrease for now. Preop labs are scheduled Monday 5/1 and ICD 5/4. We will need to target INR of 2.0-2.5 for the procedure. F/u with INR on Monday. Pt initially seen by student, Yves. I have reviewed the student's initial findings and agree with their assessment.  I have personally spoken with and assessed the patient in clinic to devise care plan.

## 2017-04-25 NOTE — PROGRESS NOTES
Subjective:      Patient ID: Laure Ross is a 46 y.o. female.    Chief Complaint: Diabetes Mellitus (PCP Dr. Olmstead); Diabetic Foot Exam; and Routine Foot Care    Laure is a 46 y.o. female who presents to the clinic for evaluation and treatment of high risk feet. Laure has a past medical history of Anticoagulant long-term use; Asthma; Asthma; Cardiomyopathy; CHF (congestive heart failure); CHF (congestive heart failure); H/O tubal ligation; Hypertension; Obesity; Type 2 diabetes mellitus with hyperglycemia; and Type 2 diabetes mellitus with polyneuropathy. The patient's chief complaint is large plantar calluses. History of forefoot ulcer several years ago treated at [facility]. Denies recent wound or infection. Notes decreased pain and severity of calluses with new orthotics. This patient has documented high risk feet requiring routine maintenance secondary to peripheral neuropathy.    PCP: Ayan Olmstead MD    Date Last Seen by PCP: 3/29/17, irina    Current shoe gear:  Affected Foot: Flip-flops     Unaffected Foot: Flip-flops    Hemoglobin A1C   Date Value Ref Range Status   03/17/2017 6.1 4.5 - 6.2 % Final     Comment:     According to ADA guidelines, hemoglobin A1C <7.0% represents  optimal control in non-pregnant diabetic patients.  Different  metrics may apply to specific populations.   Standards of Medical Care in Diabetes - 2016.  For the purpose of screening for the presence of diabetes:  <5.7%     Consistent with the absence of diabetes  5.7-6.4%  Consistent with increasing risk for diabetes   (prediabetes)  >or=6.5%  Consistent with diabetes  Currently no consensus exists for use of hemoglobin A1C  for diagnosis of diabetes for children.     11/23/2016 6.1 4.5 - 6.2 % Final     Comment:     According to ADA guidelines, hemoglobin A1C <7.0% represents  optimal control in non-pregnant diabetic patients.  Different  metrics may apply to specific populations.   Standards of Medical Care in  Diabetes - 2016.  For the purpose of screening for the presence of diabetes:  <5.7%     Consistent with the absence of diabetes  5.7-6.4%  Consistent with increasing risk for diabetes   (prediabetes)  >or=6.5%  Consistent with diabetes  Currently no consensus exists for use of hemoglobin A1C  for diagnosis of diabetes for children.     08/23/2016 7.2 (H) 4.5 - 6.2 % Final     Comment:     According to ADA guidelines, hemoglobin A1C <7.0% represents  optimal control in non-pregnant diabetic patients.  Different  metrics may apply to specific populations.   Standards of Medical Care in Diabetes - 2016.  For the purpose of screening for the presence of diabetes:  <5.7%     Consistent with the absence of diabetes  5.7-6.4%  Consistent with increasing risk for diabetes   (prediabetes)  >or=6.5%  Consistent with diabetes  Currently no consensus exists for use of hemoglobin A1C  for diagnosis of diabetes for children.         Review of Systems   Constitution: Negative for chills, fever and malaise/fatigue.   Cardiovascular: Negative for chest pain, leg swelling, orthopnea and palpitations.   Respiratory: Negative for cough, shortness of breath and wheezing.    Skin: Positive for color change, dry skin and nail changes. Negative for itching, poor wound healing and rash.   Musculoskeletal: Negative for arthritis, gout, joint pain, joint swelling, muscle weakness and myalgias.   Neurological: Positive for numbness, paresthesias and sensory change. Negative for disturbances in coordination, dizziness, focal weakness and tremors.           Objective:      Physical Exam   Cardiovascular:   Dorsalis pedis and posterior tibial pulses are diminished Bilaterally. Toes are cool to touch. Feet are warm proximally.There is decreased digital hair. Skin is atrophic, slightly hyperpigmented, and mildly edematous       Musculoskeletal:   Musculoskeletal:  Muscle strength is 5/5 in all groups bilaterally.  No pain with ankle, STJ or MTPJ  ROM, bilat. Ankle dorsiflexion is limited with knees extended and flexed, bilat.     Neurological:   Sheldon-Anya 5.07 monofilamant testing is diminished both feet. Sharp/dull sensation diminished Bilaterally. Light touch absent Bilaterally.       Skin: Lesion noted.   Pre-ulcerative lesion at plantar 2nd MTPJ, bilat. Dermal bruising and callus. No erythema, fluctuance or drainage.    Toenails 1-5 bilaterally are elongated by 2-3 mm, thickened by 2-3 mm, discolored/yellowed, dystrophic, brittle with subungual debris. No incurvation. Mild xerosis noted, bilat.                   Assessment:       Encounter Diagnoses   Name Primary?    Type 2 diabetes mellitus with diabetic polyneuropathy, with long-term current use of insulin Yes    Pre-ulcerative calluses     Gastrocnemius equinus, unspecified laterality     Instability of foot joint, unspecified laterality     Tinea pedis of both feet          Plan:       Laure was seen today for diabetes mellitus, diabetic foot exam and routine foot care.    Diagnoses and all orders for this visit:    Type 2 diabetes mellitus with diabetic polyneuropathy, with long-term current use of insulin    Pre-ulcerative calluses    Gastrocnemius equinus, unspecified laterality    Instability of foot joint, unspecified laterality    Tinea pedis of both feet  -     ketoconazole (NIZORAL) 2 % cream; Apply topically once daily.      I counseled the patient on her conditions, their implications and medical management.        - Shoe inspection. Diabetic Foot Education. Patient reminded of the importance of good nutrition and blood sugar control to help prevent podiatric complications of diabetes. Patient instructed on proper foot hygeine. We discussed wearing proper shoe gear, daily foot inspections, never walking without protective shoe gear, never putting sharp instruments to feet, routine podiatric nail visits every 2-3 months.      - With patient's permission, nails were aggressively  reduced and debrided x 10 to their soft tissue attachment mechanically and with electric , removing all offending nail and debris. Patient relates relief following the procedure. She will continue to monitor the areas daily, inspect her feet, wear protective shoe gear when ambulatory, moisturizer to maintain skin integrity and follow in this office in approximately 2-3 months, sooner p.r.n.    - After cleansing the  area w/ alcohol prep pad the above mentioned hyperkeratosis/pre ulcer lesions was trimmed utilizing No 15 scapel, to a smooth base with out incident. Patient tolerated this  well and reported comfort to the area of plantar 2nd MTP joint, bilat. No drainage or abscess noted.

## 2017-04-25 NOTE — PATIENT INSTRUCTIONS
Athletes Foot    Athletes foot (tinea pedis) is caused by a fungal infection in the skin. It affects the skin between the toes, causing cracks in the skin called fissures. It can also affect the bottom of the foot where it causes dry white scales and peeling of the skin. This infection is more likely to occur when the foot is in hot, sweaty socks and shoes for long periods of time. You may feel itching and burning between your toes. This infection is treated with skin creams or medicine taken by mouth.  Home care  The following are general care guidelines:  · It is important to keep the feet dry. Use absorbent cotton socks and change them if they become sweaty. Or wear an open-toe shoe or sandal. Wash the feet at least once a day with soap and water.  · Apply the antifungal cream as prescribed. Some antifungal creams are available without a prescription.  · It may take a week before the rash starts to improve. It can take about 3 to 4 weeks to completely clear. Continue the medicine until the rash is all gone.  · Use over-the-counter antifungal powders or sprays on your feet after exposure to high-risk environments, such as public showers, gyms, and locker rooms. This can help prevent future infections. Wearing appropriate shoes in these situations can help.  Prevention  The following tips may help prevent athletes foot:  · Don't share shoes or socks with someone who has athlete's foot.  · Don't walk barefoot in places where a fungal infection can spread quickly such as locker rooms, showers, and swimming pools.  · Change socks regularly.  · Alternate shoes to assist in drying.  Follow-up care  Follow up with your healthcare provider as recommended if the rash does not improve after 10 days of treatment, or if the rash continues to spread.  When to seek medical care  Get medical attention right away if any of the following occur:  · Fever of 100.4°F (38°C) or higher, or as directed  · Increasing redness or  swelling of the foot  · Infection comes back soon after treatment  · Pus draining from cracks in the skin  Date Last Reviewed: 8/1/2016  © 9496-1597 [employer]. [address] [place] [address], [place] [address]. All rights reserved. This information is not intended as a substitute for professional medical care. Always follow your healthcare professional's instructions.

## 2017-04-25 NOTE — LETTER
April 25, 2017      Dandre Jules MD  [address] [facility] [address]           [facility]  [address] [facility]  [facility] [address]  Phone: [phone]          Patient: Laure Ross   MR Number: [id number]   YOB: 1970   Date of Visit: 4/25/2017       Dear Dr. Dandre Jules:    Thank you for referring Laure Ross to me for evaluation. Attached you will find relevant portions of my assessment and plan of care.    If you have questions, please do not hesitate to call me. I look forward to following Laure Ross along with you.    Sincerely,    Freddie Hampton, DPWILMAN    Enclosure  CC:  No Recipients    If you would like to receive this communication electronically, please contact [email or web address] or [phone] to request more information on [employer] Link access.    For providers and/or their staff who would like to refer a patient to [facility], please contact us through our one-stop-shop provider referral line, [facility], at [phone].    If you feel you have received this communication in error or would no longer like to receive these types of communications, please e-mail [email or web address]

## 2017-05-01 ENCOUNTER — LAB VISIT (OUTPATIENT)
Dept: LAB | Facility: HOSPITAL | Age: 47
End: 2017-05-01
Attending: INTERNAL MEDICINE
Payer: MEDICAID

## 2017-05-01 ENCOUNTER — ANTI-COAG VISIT (OUTPATIENT)
Dept: CARDIOLOGY | Facility: CLINIC | Age: 47
End: 2017-05-01

## 2017-05-01 DIAGNOSIS — Z79.01 CHRONIC ANTICOAGULATION: ICD-10-CM

## 2017-05-01 DIAGNOSIS — I44.7 LBBB (LEFT BUNDLE BRANCH BLOCK): ICD-10-CM

## 2017-05-01 DIAGNOSIS — Z79.01 LONG-TERM (CURRENT) USE OF ANTICOAGULANTS: ICD-10-CM

## 2017-05-01 DIAGNOSIS — I48.0 PAROXYSMAL ATRIAL FIBRILLATION: ICD-10-CM

## 2017-05-01 DIAGNOSIS — I50.9 ACUTE DECOMPENSATED HEART FAILURE: ICD-10-CM

## 2017-05-01 LAB
ANION GAP SERPL CALC-SCNC: 7 MMOL/L
APTT BLDCRRT: 27.9 SEC
BASOPHILS # BLD AUTO: 0.04 K/UL
BASOPHILS NFR BLD: 0.5 %
BUN SERPL-MCNC: 21 MG/DL
CALCIUM SERPL-MCNC: 9.7 MG/DL
CHLORIDE SERPL-SCNC: 103 MMOL/L
CO2 SERPL-SCNC: 30 MMOL/L
CREAT SERPL-MCNC: 1.4 MG/DL
DIFFERENTIAL METHOD: ABNORMAL
EOSINOPHIL # BLD AUTO: 0.2 K/UL
EOSINOPHIL NFR BLD: 2.6 %
ERYTHROCYTE [DISTWIDTH] IN BLOOD BY AUTOMATED COUNT: 15.4 %
EST. GFR  (AFRICAN AMERICAN): 51.6 ML/MIN/1.73 M^2
EST. GFR  (NON AFRICAN AMERICAN): 44.8 ML/MIN/1.73 M^2
GLUCOSE SERPL-MCNC: 126 MG/DL
HCT VFR BLD AUTO: 36.7 %
HGB BLD-MCNC: 12 G/DL
INR PPP: 2.2
LYMPHOCYTES # BLD AUTO: 2.2 K/UL
LYMPHOCYTES NFR BLD: 25.7 %
MCH RBC QN AUTO: 26.5 PG
MCHC RBC AUTO-ENTMCNC: 32.7 %
MCV RBC AUTO: 81 FL
MONOCYTES # BLD AUTO: 0.7 K/UL
MONOCYTES NFR BLD: 8.7 %
NEUTROPHILS # BLD AUTO: 5.3 K/UL
NEUTROPHILS NFR BLD: 62 %
PLATELET # BLD AUTO: 330 K/UL
PMV BLD AUTO: 9.1 FL
POTASSIUM SERPL-SCNC: 4.1 MMOL/L
PROTHROMBIN TIME: 21.6 SEC
RBC # BLD AUTO: 4.53 M/UL
SODIUM SERPL-SCNC: 140 MMOL/L
WBC # BLD AUTO: 8.46 K/UL

## 2017-05-01 PROCEDURE — 36415 COLL VENOUS BLD VENIPUNCTURE: CPT

## 2017-05-01 PROCEDURE — 85025 COMPLETE CBC W/AUTO DIFF WBC: CPT

## 2017-05-01 PROCEDURE — 85730 THROMBOPLASTIN TIME PARTIAL: CPT

## 2017-05-01 PROCEDURE — 80048 BASIC METABOLIC PNL TOTAL CA: CPT

## 2017-05-01 PROCEDURE — 85610 PROTHROMBIN TIME: CPT

## 2017-05-04 ENCOUNTER — ANTI-COAG VISIT (OUTPATIENT)
Dept: CARDIOLOGY | Facility: CLINIC | Age: 47
End: 2017-05-04

## 2017-05-04 ENCOUNTER — SURGERY (OUTPATIENT)
Age: 47
End: 2017-05-04

## 2017-05-04 ENCOUNTER — ANESTHESIA (OUTPATIENT)
Dept: MEDSURG UNIT | Facility: HOSPITAL | Age: 47
End: 2017-05-04
Payer: MEDICAID

## 2017-05-04 ENCOUNTER — ANESTHESIA EVENT (OUTPATIENT)
Dept: MEDSURG UNIT | Facility: HOSPITAL | Age: 47
End: 2017-05-04
Payer: MEDICAID

## 2017-05-04 ENCOUNTER — HOSPITAL ENCOUNTER (OUTPATIENT)
Facility: HOSPITAL | Age: 47
Discharge: HOME | End: 2017-05-05
Attending: INTERNAL MEDICINE | Admitting: INTERNAL MEDICINE
Payer: MEDICAID

## 2017-05-04 DIAGNOSIS — I44.7 LEFT BUNDLE-BRANCH BLOCK: ICD-10-CM

## 2017-05-04 DIAGNOSIS — I48.0 PAROXYSMAL ATRIAL FIBRILLATION: ICD-10-CM

## 2017-05-04 DIAGNOSIS — Z79.01 CHRONIC ANTICOAGULATION: ICD-10-CM

## 2017-05-04 DIAGNOSIS — I50.31 ACUTE DIASTOLIC CHF (CONGESTIVE HEART FAILURE), NYHA CLASS 3: ICD-10-CM

## 2017-05-04 DIAGNOSIS — I42.8 NICM (NONISCHEMIC CARDIOMYOPATHY): Primary | ICD-10-CM

## 2017-05-04 LAB
INR PPP: 1.7
POCT GLUCOSE: 105 MG/DL (ref 70–110)
POCT GLUCOSE: 121 MG/DL (ref 70–110)
POCT GLUCOSE: 123 MG/DL (ref 70–110)
POCT GLUCOSE: 151 MG/DL (ref 70–110)
POCT GLUCOSE: 165 MG/DL (ref 70–110)
PROTHROMBIN TIME: 17.3 SEC

## 2017-05-04 PROCEDURE — D9220A PRA ANESTHESIA: Mod: ANES,,, | Performed by: ANESTHESIOLOGY

## 2017-05-04 PROCEDURE — 85610 PROTHROMBIN TIME: CPT

## 2017-05-04 PROCEDURE — C1883 ADAPT/EXT, PACING/NEURO LEAD: HCPCS

## 2017-05-04 PROCEDURE — 33225 L VENTRIC PACING LEAD ADD-ON: CPT | Mod: ,,, | Performed by: INTERNAL MEDICINE

## 2017-05-04 PROCEDURE — 63600175 PHARM REV CODE 636 W HCPCS: Performed by: NURSE ANESTHETIST, CERTIFIED REGISTERED

## 2017-05-04 PROCEDURE — 63600175 PHARM REV CODE 636 W HCPCS: Performed by: NURSE PRACTITIONER

## 2017-05-04 PROCEDURE — 25000003 PHARM REV CODE 250: Performed by: NURSE ANESTHETIST, CERTIFIED REGISTERED

## 2017-05-04 PROCEDURE — 25000003 PHARM REV CODE 250

## 2017-05-04 PROCEDURE — 37000009 HC ANESTHESIA EA ADD 15 MINS: Performed by: INTERNAL MEDICINE

## 2017-05-04 PROCEDURE — 63600175 PHARM REV CODE 636 W HCPCS: Performed by: INTERNAL MEDICINE

## 2017-05-04 PROCEDURE — 25000003 PHARM REV CODE 250: Performed by: INTERNAL MEDICINE

## 2017-05-04 PROCEDURE — 37000008 HC ANESTHESIA 1ST 15 MINUTES: Performed by: INTERNAL MEDICINE

## 2017-05-04 PROCEDURE — C1892 INTRO/SHEATH,FIXED,PEEL-AWAY: HCPCS

## 2017-05-04 PROCEDURE — 82962 GLUCOSE BLOOD TEST: CPT

## 2017-05-04 PROCEDURE — 99220 PR INITIAL OBSERVATION CARE,LEVL III: CPT | Mod: 57,,, | Performed by: INTERNAL MEDICINE

## 2017-05-04 PROCEDURE — 36415 COLL VENOUS BLD VENIPUNCTURE: CPT

## 2017-05-04 PROCEDURE — 93005 ELECTROCARDIOGRAM TRACING: CPT | Mod: 59

## 2017-05-04 PROCEDURE — 93010 ELECTROCARDIOGRAM REPORT: CPT | Mod: ,,, | Performed by: INTERNAL MEDICINE

## 2017-05-04 PROCEDURE — D9220A PRA ANESTHESIA: Mod: CRNA,,, | Performed by: NURSE ANESTHETIST, CERTIFIED REGISTERED

## 2017-05-04 PROCEDURE — 93010 ELECTROCARDIOGRAM REPORT: CPT | Mod: 76,,, | Performed by: INTERNAL MEDICINE

## 2017-05-04 PROCEDURE — 33264 RMVL & RPLCMT DFB GEN MLT LD: CPT | Mod: 22,,, | Performed by: INTERNAL MEDICINE

## 2017-05-04 PROCEDURE — 63600175 PHARM REV CODE 636 W HCPCS

## 2017-05-04 PROCEDURE — 25000242 PHARM REV CODE 250 ALT 637 W/ HCPCS: Performed by: INTERNAL MEDICINE

## 2017-05-04 PROCEDURE — 25500020 PHARM REV CODE 255

## 2017-05-04 RX ORDER — CEPHALEXIN 500 MG/1
500 CAPSULE ORAL EVERY 6 HOURS
Status: DISCONTINUED | OUTPATIENT
Start: 2017-05-05 | End: 2017-05-04

## 2017-05-04 RX ORDER — LANOLIN ALCOHOL/MO/W.PET/CERES
400 CREAM (GRAM) TOPICAL 2 TIMES DAILY
Status: DISCONTINUED | OUTPATIENT
Start: 2017-05-04 | End: 2017-05-05 | Stop reason: HOSPADM

## 2017-05-04 RX ORDER — MIDAZOLAM HYDROCHLORIDE 1 MG/ML
INJECTION, SOLUTION INTRAMUSCULAR; INTRAVENOUS
Status: DISCONTINUED | OUTPATIENT
Start: 2017-05-04 | End: 2017-05-04

## 2017-05-04 RX ORDER — CEPHALEXIN 500 MG/1
500 CAPSULE ORAL EVERY 12 HOURS
Qty: 10 CAPSULE | Refills: 0 | Status: SHIPPED | OUTPATIENT
Start: 2017-05-04 | End: 2017-05-09

## 2017-05-04 RX ORDER — SPIRONOLACTONE 25 MG/1
25 TABLET ORAL DAILY
Status: DISCONTINUED | OUTPATIENT
Start: 2017-05-04 | End: 2017-05-05 | Stop reason: HOSPADM

## 2017-05-04 RX ORDER — DEXMEDETOMIDINE HYDROCHLORIDE 100 UG/ML
INJECTION, SOLUTION INTRAVENOUS
Status: DISCONTINUED | OUTPATIENT
Start: 2017-05-04 | End: 2017-05-04

## 2017-05-04 RX ORDER — FUROSEMIDE 80 MG/1
80 TABLET ORAL 2 TIMES DAILY
Status: DISCONTINUED | OUTPATIENT
Start: 2017-05-04 | End: 2017-05-05 | Stop reason: HOSPADM

## 2017-05-04 RX ORDER — LEVALBUTEROL 1.25 MG/.5ML
1 SOLUTION, CONCENTRATE RESPIRATORY (INHALATION) EVERY 4 HOURS PRN
Status: DISCONTINUED | OUTPATIENT
Start: 2017-05-04 | End: 2017-05-05 | Stop reason: HOSPADM

## 2017-05-04 RX ORDER — TIOTROPIUM BROMIDE 18 UG/1
18 CAPSULE ORAL; RESPIRATORY (INHALATION) DAILY
Status: DISCONTINUED | OUTPATIENT
Start: 2017-05-05 | End: 2017-05-05 | Stop reason: HOSPADM

## 2017-05-04 RX ORDER — DIGOXIN 125 MCG
0.12 TABLET ORAL DAILY
Status: DISCONTINUED | OUTPATIENT
Start: 2017-05-05 | End: 2017-05-05 | Stop reason: HOSPADM

## 2017-05-04 RX ORDER — CEFAZOLIN SODIUM 2 G/50ML
2 SOLUTION INTRAVENOUS
Status: COMPLETED | OUTPATIENT
Start: 2017-05-04 | End: 2017-05-05

## 2017-05-04 RX ORDER — POTASSIUM CHLORIDE 750 MG/1
20 CAPSULE, EXTENDED RELEASE ORAL DAILY
Status: DISCONTINUED | OUTPATIENT
Start: 2017-05-04 | End: 2017-05-05 | Stop reason: HOSPADM

## 2017-05-04 RX ORDER — FENTANYL CITRATE 50 UG/ML
INJECTION, SOLUTION INTRAMUSCULAR; INTRAVENOUS
Status: DISCONTINUED | OUTPATIENT
Start: 2017-05-04 | End: 2017-05-04

## 2017-05-04 RX ORDER — CETIRIZINE HYDROCHLORIDE 10 MG/1
10 TABLET ORAL DAILY
Status: DISCONTINUED | OUTPATIENT
Start: 2017-05-04 | End: 2017-05-05 | Stop reason: HOSPADM

## 2017-05-04 RX ORDER — SODIUM CHLORIDE 9 MG/ML
INJECTION, SOLUTION INTRAVENOUS CONTINUOUS
Status: DISCONTINUED | OUTPATIENT
Start: 2017-05-04 | End: 2017-05-04

## 2017-05-04 RX ORDER — ACETAMINOPHEN 325 MG/1
650 TABLET ORAL EVERY 4 HOURS PRN
Status: DISCONTINUED | OUTPATIENT
Start: 2017-05-04 | End: 2017-05-05 | Stop reason: HOSPADM

## 2017-05-04 RX ORDER — CHOLECALCIFEROL (VITAMIN D3) 25 MCG
2000 TABLET ORAL DAILY
Status: DISCONTINUED | OUTPATIENT
Start: 2017-05-04 | End: 2017-05-05 | Stop reason: HOSPADM

## 2017-05-04 RX ORDER — LISINOPRIL 5 MG/1
5 TABLET ORAL 2 TIMES DAILY
Status: DISCONTINUED | OUTPATIENT
Start: 2017-05-04 | End: 2017-05-05 | Stop reason: HOSPADM

## 2017-05-04 RX ORDER — FLUTICASONE FUROATE AND VILANTEROL 100; 25 UG/1; UG/1
1 POWDER RESPIRATORY (INHALATION) DAILY
Status: DISCONTINUED | OUTPATIENT
Start: 2017-05-04 | End: 2017-05-04

## 2017-05-04 RX ORDER — ONDANSETRON 2 MG/ML
INJECTION INTRAMUSCULAR; INTRAVENOUS
Status: DISCONTINUED | OUTPATIENT
Start: 2017-05-04 | End: 2017-05-04

## 2017-05-04 RX ORDER — GLYCOPYRROLATE 0.2 MG/ML
INJECTION INTRAMUSCULAR; INTRAVENOUS
Status: DISCONTINUED | OUTPATIENT
Start: 2017-05-04 | End: 2017-05-04

## 2017-05-04 RX ORDER — CEPHALEXIN 500 MG/1
500 CAPSULE ORAL EVERY 12 HOURS
Status: DISCONTINUED | OUTPATIENT
Start: 2017-05-05 | End: 2017-05-05 | Stop reason: HOSPADM

## 2017-05-04 RX ORDER — AMIODARONE HYDROCHLORIDE 200 MG/1
400 TABLET ORAL DAILY
Status: DISCONTINUED | OUTPATIENT
Start: 2017-05-05 | End: 2017-05-05 | Stop reason: HOSPADM

## 2017-05-04 RX ORDER — WARFARIN 7.5 MG/1
7.5 TABLET ORAL DAILY
Status: DISCONTINUED | OUTPATIENT
Start: 2017-05-04 | End: 2017-05-05 | Stop reason: HOSPADM

## 2017-05-04 RX ORDER — CEPHALEXIN 500 MG/1
500 CAPSULE ORAL EVERY 12 HOURS
Status: DISCONTINUED | OUTPATIENT
Start: 2017-05-04 | End: 2017-05-04

## 2017-05-04 RX ORDER — KETAMINE HYDROCHLORIDE 100 MG/ML
INJECTION, SOLUTION INTRAMUSCULAR; INTRAVENOUS
Status: DISCONTINUED | OUTPATIENT
Start: 2017-05-04 | End: 2017-05-04

## 2017-05-04 RX ORDER — CEFAZOLIN SODIUM 2 G/50ML
2 SOLUTION INTRAVENOUS
Status: COMPLETED | OUTPATIENT
Start: 2017-05-04 | End: 2017-05-04

## 2017-05-04 RX ORDER — MONTELUKAST SODIUM 10 MG/1
10 TABLET ORAL DAILY
Status: DISCONTINUED | OUTPATIENT
Start: 2017-05-04 | End: 2017-05-05 | Stop reason: HOSPADM

## 2017-05-04 RX ORDER — PANTOPRAZOLE SODIUM 40 MG/1
40 TABLET, DELAYED RELEASE ORAL DAILY
Status: DISCONTINUED | OUTPATIENT
Start: 2017-05-04 | End: 2017-05-05 | Stop reason: HOSPADM

## 2017-05-04 RX ORDER — FLUTICASONE FUROATE AND VILANTEROL 100; 25 UG/1; UG/1
1 POWDER RESPIRATORY (INHALATION) DAILY
Status: DISCONTINUED | OUTPATIENT
Start: 2017-05-04 | End: 2017-05-05 | Stop reason: HOSPADM

## 2017-05-04 RX ADMIN — GLYCOPYRROLATE 0.2 MG: 0.2 INJECTION, SOLUTION INTRAMUSCULAR; INTRAVENOUS at 08:05

## 2017-05-04 RX ADMIN — Medication 400 MG: at 08:05

## 2017-05-04 RX ADMIN — MIDAZOLAM HYDROCHLORIDE 2 MG: 1 INJECTION INTRAMUSCULAR; INTRAVENOUS at 07:05

## 2017-05-04 RX ADMIN — DEXMEDETOMIDINE HYDROCHLORIDE 0.5 MCG/KG/HR: 100 INJECTION, SOLUTION, CONCENTRATE INTRAVENOUS at 07:05

## 2017-05-04 RX ADMIN — ACETAMINOPHEN 650 MG: 325 TABLET ORAL at 10:05

## 2017-05-04 RX ADMIN — FENTANYL CITRATE 25 MCG: 50 INJECTION, SOLUTION INTRAMUSCULAR; INTRAVENOUS at 09:05

## 2017-05-04 RX ADMIN — MIDAZOLAM HYDROCHLORIDE 1 MG: 1 INJECTION INTRAMUSCULAR; INTRAVENOUS at 07:05

## 2017-05-04 RX ADMIN — FENTANYL CITRATE 25 MCG: 50 INJECTION, SOLUTION INTRAMUSCULAR; INTRAVENOUS at 10:05

## 2017-05-04 RX ADMIN — MIDAZOLAM HYDROCHLORIDE 1 MG: 1 INJECTION INTRAMUSCULAR; INTRAVENOUS at 08:05

## 2017-05-04 RX ADMIN — SPIRONOLACTONE 25 MG: 25 TABLET, FILM COATED ORAL at 04:05

## 2017-05-04 RX ADMIN — ACETAMINOPHEN 650 MG: 325 TABLET ORAL at 04:05

## 2017-05-04 RX ADMIN — FLUTICASONE FUROATE AND VILANTEROL TRIFENATATE 1 PUFF: 100; 25 POWDER RESPIRATORY (INHALATION) at 08:05

## 2017-05-04 RX ADMIN — CEFAZOLIN SODIUM 2 G: 2 SOLUTION INTRAVENOUS at 06:05

## 2017-05-04 RX ADMIN — KETAMINE HYDROCHLORIDE 10 MG: 100 INJECTION, SOLUTION, CONCENTRATE INTRAMUSCULAR; INTRAVENOUS at 10:05

## 2017-05-04 RX ADMIN — SODIUM CHLORIDE, SODIUM GLUCONATE, SODIUM ACETATE, POTASSIUM CHLORIDE, MAGNESIUM CHLORIDE, SODIUM PHOSPHATE, DIBASIC, AND POTASSIUM PHOSPHATE: .53; .5; .37; .037; .03; .012; .00082 INJECTION, SOLUTION INTRAVENOUS at 07:05

## 2017-05-04 RX ADMIN — KETAMINE HYDROCHLORIDE 10 MG: 100 INJECTION, SOLUTION, CONCENTRATE INTRAMUSCULAR; INTRAVENOUS at 08:05

## 2017-05-04 RX ADMIN — FENTANYL CITRATE 25 MCG: 50 INJECTION, SOLUTION INTRAMUSCULAR; INTRAVENOUS at 11:05

## 2017-05-04 RX ADMIN — KETAMINE HYDROCHLORIDE 10 MG: 100 INJECTION, SOLUTION, CONCENTRATE INTRAMUSCULAR; INTRAVENOUS at 09:05

## 2017-05-04 RX ADMIN — CETIRIZINE HYDROCHLORIDE 10 MG: 10 TABLET ORAL at 04:05

## 2017-05-04 RX ADMIN — CEFAZOLIN SODIUM 2 G: 2 SOLUTION INTRAVENOUS at 07:05

## 2017-05-04 RX ADMIN — KETAMINE HYDROCHLORIDE 20 MG: 100 INJECTION, SOLUTION, CONCENTRATE INTRAMUSCULAR; INTRAVENOUS at 08:05

## 2017-05-04 RX ADMIN — LISINOPRIL 5 MG: 5 TABLET ORAL at 08:05

## 2017-05-04 RX ADMIN — DEXMEDETOMIDINE HYDROCHLORIDE 102 MCG: 100 INJECTION, SOLUTION, CONCENTRATE INTRAVENOUS at 07:05

## 2017-05-04 RX ADMIN — ONDANSETRON 4 MG: 2 INJECTION INTRAMUSCULAR; INTRAVENOUS at 07:05

## 2017-05-04 RX ADMIN — FUROSEMIDE 80 MG: 80 TABLET ORAL at 05:05

## 2017-05-04 RX ADMIN — WARFARIN SODIUM 7.5 MG: 7.5 TABLET ORAL at 04:05

## 2017-05-04 RX ADMIN — FENTANYL CITRATE 25 MCG: 50 INJECTION, SOLUTION INTRAMUSCULAR; INTRAVENOUS at 08:05

## 2017-05-04 NOTE — ANESTHESIA POSTPROCEDURE EVALUATION
"Anesthesia Post Evaluation    Patient: Laure Ross    Procedure(s) Performed: Procedure(s) (LRB):  INSERTION-ICD-BIVENTRICULAR (Right)    Final Anesthesia Type: general  Patient location during evaluation: PACU  Patient participation: Yes- Able to Participate  Level of consciousness: awake and alert  Post-procedure vital signs: reviewed and stable  Pain management: adequate  Airway patency: patent  PONV status at discharge: No PONV  Anesthetic complications: no      Cardiovascular status: blood pressure returned to baseline  Respiratory status: unassisted, spontaneous ventilation and room air  Hydration status: euvolemic  Follow-up not needed.        Visit Vitals    /68    Pulse 60    Temp 36.4 °C (97.5 °F) (Oral)    Resp 17    Ht 5' 3" (1.6 m)    Wt 102.1 kg (225 lb)    SpO2 96%    Breastfeeding No    BMI 39.86 kg/m2       Pain/Jannet Score: Pain Assessment Performed: Yes (5/4/2017 11:19 AM)  Presence of Pain: denies (5/4/2017 11:19 AM)  Jannet Score: 9 (5/4/2017 11:19 AM)      "

## 2017-05-04 NOTE — PLAN OF CARE
Problem: Patient Care Overview  Goal: Plan of Care Review  Outcome: Ongoing (interventions implemented as appropriate)  Pt transferred from recovery via stretcher.  Vss. Oriented pt and family to rm and unit.  Post op orders and assessment initiated.  r cw pressure dressing remains cdi.  Ice pack applied to site with sling/swaddle.  Pt reports no distress or discomfort.  Will continue to monitor.  Call bell within reach

## 2017-05-04 NOTE — ANESTHESIA RELEASE NOTE
"Anesthesia Release from PACU Note    Patient: Laure Ross    Procedure(s) Performed: Procedure(s) (LRB):  INSERTION-ICD-BIVENTRICULAR (Right)    Anesthesia type: general    Post pain: Adequate analgesia    Post assessment: no apparent anesthetic complications and tolerated procedure well    Last Vitals:   Visit Vitals    /68    Pulse 60    Temp 36.4 °C (97.5 °F) (Oral)    Resp 17    Ht 5' 3" (1.6 m)    Wt 102.1 kg (225 lb)    SpO2 96%    Breastfeeding No    BMI 39.86 kg/m2       Post vital signs: stable    Level of consciousness: awake    Nausea/Vomiting: no nausea/no vomiting    Complications: none    Airway Patency: patent    Respiratory: unassisted    Cardiovascular: stable and blood pressure at baseline    Hydration: euvolemic  "

## 2017-05-04 NOTE — IP AVS SNAPSHOT
[facility]  [address]  [facility] [address]  Phone: [phone]           Patient Discharge Instructions   Our goal is to set you up for success. This packet includes information on your condition, medications, and your home care.  It will help you care for yourself to prevent having to return to the hospital.     Please ask your nurse if you have any questions.      There are many details to remember when preparing to leave the hospital. Here is what you will need to do:    1. Take your medicine. If you are prescribed medications, review your Medication List on the following pages. You may have new medications to  at the pharmacy and others that you'll need to stop taking. Review the instructions for how and when to take your medications. Talk with your doctor or nurses if you are unsure of what to do.     2. Go to your follow-up appointments. Specific follow-up information is listed in the following pages. Your may be contacted by a nurse or clinical provider about future appointments. Be sure we have all of the phone numbers to reach you. Please contact your provider's office if you are unable to make an appointment.     3. Watch for warning signs. Your doctor or nurse will give you detailed warning signs to watch for and when to call for assistance. These instructions may also include educational information about your condition. If you experience any of warning signs to your health, call your doctor.           [facility]  Unless otherwise directed by your provider, please   contact [facility], our nurse care line   that is available for 24/7 assistance.     [phone] (toll-free)     Registered nurses in the [facility]   provide: appointment scheduling, clinical advisement, health education, and other advisory services.                  ** Verify the list of medication(s) below is accurate and up to date. Carry this with you in case of  "emergency. If your medications have changed, please notify your healthcare provider.             Medication List      START taking these medications        Additional Info                      cephALEXin 500 MG capsule   Commonly known as:  KEFLEX   Quantity:  10 capsule   Refills:  0   Dose:  500 mg   Indications:  Surgical prophylaxis    Instructions:  Take 1 capsule (500 mg total) by mouth every 12 (twelve) hours.     Begin Date    AM    Noon    PM    Bedtime         CONTINUE taking these medications        Additional Info                      amiodarone 200 MG Tab   Commonly known as:  PACERONE   Quantity:  60 tablet   Refills:  3    Instructions:  TAKE TWO TABLETS BY MOUTH EVERY DAY.     Begin Date    AM    Noon    PM    Bedtime       BD ULTRA-FINE ESSENCE PEN NEEDLES 32 gauge x 5/32" Ndle   Quantity:  200 each   Refills:  11   Generic drug:  pen needle, diabetic    Instructions:  Takes 5 times  day     Begin Date    AM    Noon    PM    Bedtime       blood sugar diagnostic Strp   Quantity:  150 each   Refills:  11    Instructions:  Uses 4 times a day, true metrix meter.     Begin Date    AM    Noon    PM    Bedtime       budesonide-formoterol 160-4.5 mcg 160-4.5 mcg/actuation Hfaa   Commonly known as:  SYMBICORT   Quantity:  10.2 g   Refills:  12   Dose:  2 puff    Instructions:  Inhale 2 puffs into the lungs every 12 (twelve) hours.     Begin Date    AM    Noon    PM    Bedtime       cetirizine 10 MG tablet   Commonly known as:  ZYRTEC   Refills:  0   Dose:  10 mg    Last time this was given:  10 mg on 5/4/2017  4:40 PM   Instructions:  Take 1 tablet (10 mg total) by mouth once daily.     Begin Date    AM    Noon    PM    Bedtime       digoxin 125 mcg tablet   Commonly known as:  LANOXIN   Quantity:  30 tablet   Refills:  5    Instructions:  TAKE 1 TABLET BY MOUTH ONCE DAILY     Begin Date    AM    Noon    PM    Bedtime       fluticasone-vilanterol 100-25 mcg/dose diskus inhaler   Commonly known as:  BREO "   Quantity:  60 each   Refills:  12   Dose:  1 puff    Last time this was given:  1 puff on 5/4/2017  8:27 PM   Instructions:  Inhale 1 puff into the lungs once daily. Controller     Begin Date    AM    Noon    PM    Bedtime       furosemide 40 MG tablet   Commonly known as:  LASIX   Quantity:  120 tablet   Refills:  3    Last time this was given:  80 mg on 5/4/2017  5:07 PM   Instructions:  TAKE 2 TABLETS BY MOUTH TWO TIMES A DAY .     Begin Date    AM    Noon    PM    Bedtime       insulin aspart 100 unit/mL Inpn pen   Commonly known as:  NOVOLOG FLEXPEN   Quantity:  2 Box   Refills:  11    Instructions:  Inject 16 units w/ meals plus scale 150-200 +2, 201-250 +4, 251-300 +6, 301-350 +8.     Begin Date    AM    Noon    PM    Bedtime       insulin glargine 100 unit/mL (3 mL) Inpn pen   Commonly known as:  LANTUS SOLOSTAR   Quantity:  2 Box   Refills:  11   Dose:  34 Units    Instructions:  Inject 34 Units into the skin 2 (two) times daily.     Begin Date    AM    Noon    PM    Bedtime       ketoconazole 2 % cream   Commonly known as:  NIZORAL   Quantity:  30 g   Refills:  1    Instructions:  Apply topically once daily.     Begin Date    AM    Noon    PM    Bedtime       lancets Misc   Quantity:  150 each   Refills:  11    Instructions:  Uses true metrix meter, 4 times a day.     Begin Date    AM    Noon    PM    Bedtime       levalbuterol 1.25 mg/0.5 mL nebulizer solution   Commonly known as:  XOPENEX   Quantity:  30 each   Refills:  0   Dose:  1 ampule    Instructions:  Take 0.5 mLs (1.25 mg total) by nebulization every 4 (four) hours as needed for Wheezing.     Begin Date    AM    Noon    PM    Bedtime       levalbuterol 45 mcg/actuation inhaler   Commonly known as:  XOPENEX HFA   Quantity:  1 Inhaler   Refills:  6   Dose:  2 puff    Instructions:  Inhale 2 puffs into the lungs every 4 (four) hours as needed for Wheezing.     Begin Date    AM    Noon    PM    Bedtime       lisinopril 5 MG tablet   Commonly known  as:  PRINIVIL,ZESTRIL   Quantity:  60 tablet   Refills:  8   Dose:  5 mg    Last time this was given:  5 mg on 5/4/2017  8:30 PM   Instructions:  Take 1 tablet (5 mg total) by mouth 2 (two) times daily.     Begin Date    AM    Noon    PM    Bedtime       magnesium oxide 400 mg tablet   Commonly known as:  MAG-OX   Quantity:  60 tablet   Refills:  6   Dose:  400 mg    Last time this was given:  400 mg on 5/4/2017  8:30 PM   Instructions:  Take 1 tablet (400 mg total) by mouth 2 (two) times daily.     Begin Date    AM    Noon    PM    Bedtime       montelukast 10 mg tablet   Commonly known as:  SINGULAIR   Refills:  0   Dose:  10 mg    Instructions:  10 mg once daily.     Begin Date    AM    Noon    PM    Bedtime       omeprazole 40 MG capsule   Commonly known as:  PRILOSEC   Quantity:  30 capsule   Refills:  6   Dose:  40 mg    Instructions:  Take 1 capsule (40 mg total) by mouth every morning.     Begin Date    AM    Noon    PM    Bedtime       potassium chloride 10 MEQ Cpsr   Commonly known as:  MICRO-K   Quantity:  60 capsule   Refills:  3    Instructions:  TAKE 2 CAPSULES (20MEQ TOTAL) BY MOUTH ONCE DAILY     Begin Date    AM    Noon    PM    Bedtime       SPIRIVA WITH HANDIHALER 18 mcg inhalation capsule   Refills:  0   Dose:  18 mcg   Generic drug:  tiotropium    Instructions:  Inhale 18 mcg into the lungs.     Begin Date    AM    Noon    PM    Bedtime       spironolactone 25 MG tablet   Commonly known as:  ALDACTONE   Quantity:  30 tablet   Refills:  11   Dose:  25 mg    Last time this was given:  25 mg on 5/4/2017  4:40 PM   Instructions:  Take 1 tablet (25 mg total) by mouth once daily.     Begin Date    AM    Noon    PM    Bedtime       vitamin D 1000 units Tab   Refills:  0   Dose:  2000 Units    Instructions:  Take 2 tablets (2,000 Units total) by mouth once daily.     Begin Date    AM    Noon    PM    Bedtime       warfarin 7.5 MG tablet   Commonly known as:  COUMADIN   Quantity:  30 tablet   Refills:   11   Dose:  7.5 mg    Last time this was given:  7.5 mg on 5/4/2017  4:40 PM   Instructions:  Take 1 tablet (7.5 mg total) by mouth Daily. Take 7.5 mg on Tues, Thurs, Sat. Take 3.75 mg all other days     Begin Date    AM    Noon    PM    Bedtime            Where to Get Your Medications      These medications were sent to [facility] - [place], [place] - [address], [place] [address]     Phone:  [phone]     cephALEXin 500 MG capsule                  Please bring to all follow up appointments:    1. A copy of your discharge instructions.  2. All medicines you are currently taking in their original bottles.  3. Identification and insurance card.    Please arrive 15 minutes ahead of scheduled appointment time.    Please call 24 hours in advance if you must reschedule your appointment and/or time.        Your Scheduled Appointments     May 15, 2017  9:00 AM CDT   Wound Check with PACEMAKER, ICD   [facility]    [address] [place] [address] [phone]            Jun 05, 2017  9:30 AM CDT   Established Patient Visit with MD Jimmy Muniz - Nephrology [facility]    [facility]  [place] [address] [phone]            Aug 01, 2017 10:30 AM CDT   Established Patient Visit with LOR High - Podiatry ([facility] )    [facility]  [place] [address] [phone]              Follow-up Information     Follow up with Jimmy Londono Arrhythmia In 1 week.    Specialty:  Cardiology    Why:  For wound re-check    Contact information:    [address] [place] [address] [phone]    Additional information:    [facility] [place] - 3rd Floor        Follow up with REMEDIOS Rose In 3 months.    Specialty:  Cardiology    Contact information:    [address] [place] [id number] [phone]       "    Discharge Instructions     Future Orders    Call MD for:  difficulty breathing or increased cough     Call MD for:  increased confusion or weakness     Call MD for:  persistent dizziness, light-headedness, or visual disturbances     Call MD for:  persistent nausea and vomiting or diarrhea     Call MD for:  redness, tenderness, or signs of infection (pain, swelling, redness, odor or green/yellow discharge around incision site)     Call MD for:  severe persistent headache     Call MD for:  severe uncontrolled pain     Call MD for:  temperature >100.4     Call MD for:  worsening rash     Call MD for:     Scheduling Instructions:    Any concerning medical symptoms    Diet general     Questions:    Total calories:      Fat restriction, if any:      Protein restriction, if any:      Na restriction, if any:      Fluid restriction:      Additional restrictions:  Cardiac (Low Na/Chol)    Diabetic 1800    Leave dressing on - Keep it clean, dry, and intact until clinic visit     Other restrictions (specify):     Scheduling Instructions:    No lifting more than 5-10 pound in 6 weeks.   Please refer to discharge instructions via written handout        Primary Diagnosis     Your primary diagnosis was:  Heart Muscle Disorder      Admission Information     Date & Time Provider Department [facility]    5/4/2017  6:18 AM Navi Jolly MD [facility] [id number]      Care Providers     Provider Role Specialty Primary office phone    Navi Jolly MD Attending Provider Cardiology [phone]    Navi Jolly MD Surgeon  Cardiology [phone]      Your Vitals Were     BP Pulse Temp Resp Height Weight    119/66 (BP Location: Left arm, Patient Position: Lying, BP Method: Automatic) 69 97.6 °F (36.4 °C) (Oral) 20 5' 3" (1.6 m) 102.1 kg (225 lb)    SpO2 BMI             99% 39.86 kg/m2         Recent Lab Values        4/14/2016 4/21/2016 4/28/2016 8/23/2016 11/23/2016 3/17/2017            3:29 PM 12:10 PM  2:40 AM " 11:20 AM  7:36 AM  9:28 AM      A1C 10.3 (H) 10.1 (H) 9.8 (H) 7.2 (H) 6.1 6.1      Comment for A1C at 11:20 AM on 8/23/2016:  According to ADA guidelines, hemoglobin A1C <7.0% represents  optimal control in non-pregnant diabetic patients.  Different  metrics may apply to specific populations.   Standards of Medical Care in Diabetes - 2016.  For the purpose of screening for the presence of diabetes:  <5.7%     Consistent with the absence of diabetes  5.7-6.4%  Consistent with increasing risk for diabetes   (prediabetes)  >or=6.5%  Consistent with diabetes  Currently no consensus exists for use of hemoglobin A1C  for diagnosis of diabetes for children.      Comment for A1C at  7:36 AM on 11/23/2016:  According to ADA guidelines, hemoglobin A1C <7.0% represents  optimal control in non-pregnant diabetic patients.  Different  metrics may apply to specific populations.   Standards of Medical Care in Diabetes - 2016.  For the purpose of screening for the presence of diabetes:  <5.7%     Consistent with the absence of diabetes  5.7-6.4%  Consistent with increasing risk for diabetes   (prediabetes)  >or=6.5%  Consistent with diabetes  Currently no consensus exists for use of hemoglobin A1C  for diagnosis of diabetes for children.      Comment for A1C at  9:28 AM on 3/17/2017:  According to ADA guidelines, hemoglobin A1C <7.0% represents  optimal control in non-pregnant diabetic patients.  Different  metrics may apply to specific populations.   Standards of Medical Care in Diabetes - 2016.  For the purpose of screening for the presence of diabetes:  <5.7%     Consistent with the absence of diabetes  5.7-6.4%  Consistent with increasing risk for diabetes   (prediabetes)  >or=6.5%  Consistent with diabetes  Currently no consensus exists for use of hemoglobin A1C  for diagnosis of diabetes for children.        Allergies as of 5/5/2017     No Known Allergies      Advance Directives     An advance directive is a document which, in  the event you are no longer able to make decisions for yourself, tells your healthcare team what kind of treatment you do or do not want to receive, or who you would like to make those decisions for you.  If you do not currently have an advance directive, [facility] encourages you to create one.  For more information call:  [phone]), [phone] ([phone]),  or log on to [email or web address].        Language Assistance Services     ATTENTION: Language assistance services are available, free of charge. Please call [phone].      ATENCIÓN: Si habla español, tiene a dougherty disposición servicios gratuitos de asistencia lingüística. Llame al [phone].     CHÚ Ý: N?u b?n nói Ti?ng Vi?t, có các d?ch v? h? tr? ngôn ng? mi?n phí dành cho b?n. G?i s? [phone].        Heart Failure Education       Heart Failure: Being Active  You have a condition called heart failure. Being active doesnt mean that you have to wear yourself out. Even a little movement each day helps to strengthen your heart. If you cant get out to exercise, you can do simple stretching and strengthening exercises at home. These are good ways to keep you well-conditioned and prevent you and your heart from becoming excessively weak.    Ideas to get you started  · Add a little movement to things you do now. Walk to mail letters. Park your car at the far end of the parking lot and walk to the store. Walk up a flight of stairs instead of taking the elevator.  · Choose activities you enjoy. You might walk, swim, or ride an exercise bike. Things like gardening and washing the car count, too. Other possibilities include: washing dishes, walking the dog, walking around the mall, and doing aerobic activities with friends.  · Join a group exercise program at a [facility] or [facility], a senior center, or a community center. Or look into a hospital cardiac rehabilitation program. Ask your doctor if you qualify.  Tips to keep you going  · Get  up and get dressed each day. Go to a coffee shop and read a newspaper or go somewhere that you'll be in the presence of other active people. Youll feel more like being active.  · Make a plan. Choose one or more activities that you enjoy and that you can easily do. Then plan to do at least one each day. You might write your plan on a calendar.  · Go with a friend or a group if you like company. This can help you feel supported and stay motivated, too.  · Plan social events that you enjoy. This will keep you mentally engaged as well as physically motivated to do things you find pleasure in.  For your safety  · Talk with your healthcare provider before starting an exercise program.  · Exercise indoors when its too hot or too cold outside, or when the air quality is poor. Try walking at a shopping mall.  · Wear socks and sturdy shoes to maintain your balance and prevent falls.  · Start slowly. Do a few minutes several times a day at first. Increase your time and speed little by little.  · Stop and rest whenever you feel tired or get short of breath.  · Dont push yourself on days when you dont feel well.  Date Last Reviewed: 3/20/2016  © 4812-4958 [employer]. [address] [place] [address], [place] [address]. All rights reserved. This information is not intended as a substitute for professional medical care. Always follow your healthcare professional's instructions.              Heart Failure: Evaluating Your Heart  You have a condition called heart failure. To evaluate your condition, your doctor will examine you, ask questions, and do some tests. Along with looking for signs of heart failure, the doctor looks for any other health problems that may have led to heart failure. The results of your evaluation will help your doctor form a treatment plan.  Health history and physical exam  Your visit will start with a health history. Tell the doctor about any symptoms youve noticed and about all medicines you  take. Then youll have a physical exam. This includes listening to your heartbeat and breathing. Youll also be checked for swelling (edema) in your legs and neck. When you have fluid buildup or fluid in the lungs, it may be called congestive heart failure.  Diagnosing heart failure     During an echocardiogram, sound waves bounce off the heart. These are converted into a picture on the screen.   The following may be done to help your doctor form a diagnosis:  · X-rays show the size and shape of your heart. These pictures can also show fluid in your lungs.  · An electrocardiogram (ECG or EKG) shows the pattern of your heartbeat. Small pads (electrodes) are placed on your chest, arms, and legs. Wires connect the pads to the ECG machine, which records your hearts electrical signals. This can give the doctor information about heart function.  · An echocardiogram uses ultrasound waves to show the structure and movement of your heart muscle. This shows how well the heart pumps. It also shows the thickness of the heart walls, and if the heart is enlarged. It is one of the most useful, non-invasive tests as it provides information about the heart's general function. This helps your doctor make treatment decisions.  · Lab tests evaluate small amounts of blood or urine for signs of problems. A BNP lab test can help diagnose and evaluate heart failure. BNP stands for B-type natriuretic peptide. The ventricles secrete more BNP when heart failure worsens. Lab tests can also provide information about metabolic dysfunction or heart dysfunction.  Your treatment plan  Based on the results of your evaluation and tests, your doctor will develop a treatment plan. This plan is designed to relieve some of your heart failure symptoms and help make you more comfortable. Your treatment plan may include:  · Medicine to help your heart work better and improve your quality of life  · Changes in what you eat and drink to help prevent fluid  from backing up in your body  · Daily monitoring of your weight and heart failure symptoms to see how well your treatment plan is working  · Exercise to help you stay healthy  · Help with quitting smoking  · Emotional and psychological support to help adjust to the changes  · Referrals to other specialists to make sure you are being treated comprehensively  Date Last Reviewed: 3/21/2016  © 8686-1064 [employer]. [address] [place] [address], [place] [address]. All rights reserved. This information is not intended as a substitute for professional medical care. Always follow your healthcare professional's instructions.              Heart Failure: Making Changes to Your Diet  You have a condition called heart failure. When you have heart failure, excess fluid is more likely to build up in your body because your heart isn't working well. This makes the heart work harder to pump blood. Fluid buildup causes symptoms such as shortness of breath and swelling (edema). This is often referred to as congestive heart failure or CHF. Controlling the amount of salt (sodium) you eat may help stop fluid from building up. Your doctor may also tell you to reduce the amount of fluid you drink.  Reading food labels    Your healthcare provider will tell you how much sodium you can eat each day. Read food labels to keep track. Keep in mind that certain foods are high in salt. These include canned, frozen, and processed foods. Check the amount of sodium in each serving. Watch out for high-sodium ingredients. These include MSG (monosodium glutamate), baking soda, and sodium phosphate.   Eating less salt  Give yourself time to get used to eating less salt. It may take a little while. Here are some tips to help:  · Take the saltshaker off the table. Replace it with salt-free herb mixes and spices.  · Eat fresh or plain frozen vegetables. These have much less salt than canned vegetables.  · Choose low-sodium snacks like sodium-free  pretzels, crackers, or air-popped popcorn.  · Dont add salt to your food when youre cooking. Instead, season your foods with pepper, lemon, garlic, or onion.  · When you eat out, ask that your food be cooked without added salt.  · Avoid eating fried foods as these often have a great deal of salt.  If youre told to limit fluids  You may need to limit how much fluid you have to help prevent swelling. This includes anything that is liquid at room temperature, such as ice cream and soup. If your doctor tells you to limit fluid, try these tips:  · Measure drinks in a measuring cup before you drink them. This will help you meet daily goals.  · Chill drinks to make them more refreshing.  · Suck on frozen lemon wedges to quench thirst.  · Only drink when youre thirsty.  · Chew sugarless gum or suck on hard candy to keep your mouth moist.  · Weigh yourself daily to know if your body's fluid content is rising.  My sodium goal  Your healthcare provider may give you a sodium goal to meet each day. This includes sodium found in food as well as salt that you add. My goal is to eat no more than ___________ mg of sodium per day.     When to call your doctor  Call your doctor right away if you have any symptoms of worsening heart failure. These can include:  · Sudden weight gain  · Increased swelling of your legs or ankles  · Trouble breathing when youre resting or at night  · Increase in the number of pillows you have to sleep on  · Chest pain, pressure, discomfort, or pain in the jaw, neck, or back   Date Last Reviewed: 3/21/2016  © 3924-4501 [employer]. [address] [place] [address], [place] [address]. All rights reserved. This information is not intended as a substitute for professional medical care. Always follow your healthcare professional's instructions.              Heart Failure: Medicines to Help Your Heart    You have a condition called heart failure (also known as congestive heart failure, or CHF). Your  doctor will likely prescribe medicines for heart failure and any underlying health problems you have. Most heart failure patients take one or more types of medicinen. Your healthcare provider will work to find the combination of medicines that works best for you.  Heart failure medicines  Here are the most common heart failure medicines:  · ACE inhibitors lower blood pressure and decrease strain on the heart. This makes it easier for the heart to pump. Angiotensin receptor blockers have similar effects. These are prescribed for some patients instead of ACE inhibitors.  · Beta-blockers relieve stress on the heart. They also improve symptoms. They may also improve the heart's pumping action over time.  · Diuretics (also called water pills) help rid your body of excess water. This can help rid your body of swelling (edema). Having less fluid to pump means your heart doesnt have to work as hard. Some diuretics make your body lose a mineral called potassium. Your doctor will tell you if you need to take supplements or eat more foods high in potassium.  · Digoxin helps your heart pump with more strength. This helps your heart pump more blood with each beat. So, more oxygen-rich blood travels to the rest of the body.  · Aldosterone antagonists help alter hormones and decrease strain on the heart.  · Hydralazine and nitrates are two separate medicines used together to treat heart failure. They may come in one combination pill. They lower blood pressure and decrease how hard the heart has to pump.  Medicines for related conditions  Controlling other heart problems helps keep heart failure under control, too. Depending on other heart problems you have, medicines may be prescribed to:  · Lower blood pressure (antihypertensives).  · Lower cholesterol levels (statins).  · Prevent blood clots (anticoagulants or aspirin).  · Keep the heartbeat steady (antiarrhythmics).  Date Last Reviewed: 3/5/2016  © 0063-8579 [employer]. [address] [place] [address]. All rights reserved. This information is not intended as a substitute for professional medical care. Always follow your healthcare professional's instructions.              Heart Failure: Procedures That May Help    The heart is a muscle that pumps oxygen-rich blood to all parts of the body. When you have heart failure, the heart is not able to pump as well as it should. Blood and fluid may back up into the lungs (congestive heart failure), and some parts of the body dont get enough oxygen-rich blood to work normally. These problems lead to the symptoms of heart failure.     Certain procedures may help the heart pump better in some cases of heart failure. Some procedures are done to treat health problems that may have caused the heart failure such as coronary artery disease or heart rhythm problems. For more serious heart failure, other options are available.  Treating artery and valve problems  If you have coronary artery disease or valve disease, procedures may be done to improve blood flow. This helps the heart pump better, which can improve heart failure symptoms. First, your doctor may do a cardiac catheterization to help detect clogged blood vessels or valve damage. During this procedure, a  thin tube (catheter) in inserted into a blood vessel and guided to the heart. There a dye is injected and a special type of X-ray (angiogram) is taken of the blood vessels. Procedures to open a blocked artery or fix damaged valves can also be done using catheterization.  · Angioplasty uses a balloon-tipped instrument at the end of the catheter. The balloon is inflated to widen the narrowed artery. In many cases, a stent is expanded to further support the narrowed artery. A stent is a metal mesh tube.  · Valve surgery repairs or replacement of faulty valves can also be done during catheterization so blood can flow properly through the chambers of the heart.  Bypass  surgery is another option to help treat blocked arteries. It uses a healthy blood vessel from elsewhere in the body. The healthy blood vessel is attached above and below the blocked area so that blood can flow around the blocked artery.  Treating heart rhythm problems  A device may be placed in the chest to help a weak heart maintain a healthy, heartbeat so the heart can pump more effectively:  · Pacemaker. A pacemaker is an implanted device that regulates your heartbeat electronically. It monitors your heart's rhythm and generates a painless electric impulse that helps the heart beat in a regular rhythm. A pacemaker is programmed to meet your specific heart rhythm needs.  · Biventricular pacing/cardiac resynchronization therapy. A type of pacemaker that paces both pumping chambers of the heart at the same time to coordinate contractions and to improve the heart's function. Some people with heart failure are candidates for this therapy.  · Implantable cardioverter defibrillator. A device similar to a pacemaker that senses when the heart is beating too fast and delivers an electrical shock to convert the fast rhythm to a normal rhythm. This can be a life saving device.  In severe cases  In more serious cases of heart failure when other treatments no longer work, other options may include:  · Ventricular assist devices (VADs). These are mechanical devices used to take over the pumping function for one or both of the heart's ventricles, or pumping chambers. A VAD may be necessary when heart failure progresses to the point that medicines and other treatments no longer help. In some cases, a VAD may be used as a bridge to transplant.  · Heart transplant. This is replacing the diseased heart with a healthy one from a donor. This is an option for a few people who are very sick. A heart transplant is very serious and not an option for all patients. Your doctor can tell you more.  Date Last Reviewed: 3/20/2016  © 8798-9938  [employer]. [address] [place] [address], [place] [address]. All rights reserved. This information is not intended as a substitute for professional medical care. Always follow your healthcare professional's instructions.              Heart Failure: Tracking Your Weight  You have a condition called heart failure. When you have heart failure, a sudden weight gain or a steady rise in weight is a warning sign that your body is retaining too much water and salt. This could mean your heart failure is getting worse. If left untreated, it can cause problems for your lungs and result in shortness of breath. Weighing yourself each day is the best way to know if youre retaining water. If your weight goes up quickly, call your doctor. You will be given instructions on how to get rid of the excess water. You will likely need medicines and to avoid salt. This will help your heart work better.  Call your doctor if you gain more than 2 pounds in 1 day, more than 5 pounds in 1 week, or whatever weight gain you were told to report by your doctor. This is often a sign of worsening heart failure and needs to be evaluated and treated. Your doctor will tell you what to do next.   Tips for weighing yourself    · Weigh yourself at the same time each morning, wearing the same clothes. Weigh yourself after urinating and before eating.  · Use the same scale each day. Make sure the numbers are easy to read. Put the scale on a flat, hard surface -- not on a rug or carpet.  · Do not stop weighing yourself. If you forget one day, weigh again the next morning.  How to use your weight chart  · Keep your weight chart near the scale. Write your weight on the chart as soon as you get off the scale.  · Fill in the month and the start date on the chart. Then write down your weight each day. Your chart will look like this:    · If you miss a day, leave the space blank. Weigh yourself the next day and write your weight in the next space.  · Take  your weight chart with you when you go to see your doctor.  Date Last Reviewed: 3/20/2016  © 7991-3132 [employer]. [address] [place] [address], [place] [address]. All rights reserved. This information is not intended as a substitute for professional medical care. Always follow your healthcare professional's instructions.              Heart Failure: Warning Signs of a Flare-Up  You have a condition called heart failure. Once you have heart failure, flare-ups can happen. Below are signs that can mean your heart failure is getting worse. If you notice any of these warning signs, call your healthcare provider.  Swelling    · Your feet, ankles, or lower legs get puffier.  · You notice skin changes on your lower legs.  · Your shoes feel too tight.  · Your clothes are tighter in the waist.  · You have trouble getting rings on or off your fingers.  Shortness of breath  · You have to breathe harder even when youre doing your normal activities or when youre resting.  · You are short of breath walking up stairs or even short distances.  · You wake up at night short of breath or coughing.  · You need to use more pillows or sit up to sleep.  · You wake up tired or restless.  Other warning signs  · You feel weaker, dizzy, or more tired.  · You have chest pain or changes in your heartbeat.  · You have a cough that wont go away.  · You cant remember things or dont feel like eating.  Tracking your weight  Gaining weight is often the first warning sign that heart failure is getting worse. Gaining even a few pounds can be a sign that your body is retaining excess water and salt. Weighing yourself each day in the morning after you urinate and before you eat, is the best way to know if you're retaining water. Get a scale that is easy to read and make sure you wear the same clothes and use the same scale every time you weigh. Your healthcare provider will show you how to track your weight. Call your doctor if you gain more  than 2 pounds in 1 day, 5 pounds in 1 week, or whatever weight gain you were told to report by your doctor. This is often a sign of worsening heart failure and needs to be evaluated and treated before it compromises your breathing. Your doctor will tell you what to do next.    Date Last Reviewed: 3/15/2016  © 2557-5317 [employer]. [address] [place] [address], [place] [address]. All rights reserved. This information is not intended as a substitute for professional medical care. Always follow your healthcare professional's instructions.              Coumadin Discharge Instructions                         Chronic Kindey Disease Education             Diabetes Discharge Instructions                                   [facility] Sign-Up     Activating your [facility] account is as easy as 1-2-3!     1) Visit [email or web address], select Sign Up Now, enter this activation code and your date of birth, then select Next.  [email or web address]  Expires: 6/19/2017  8:03 AM      2) Create a username and password to use when you visit [facility] in the future and select a security question in case you lose your password and select Next.    3) Enter your e-mail address and click Sign Up!    Additional Information  If you have questions, please e-mail [email or web address] or call [phone] to talk to our [facility] staff. Remember, [facility] is NOT to be used for urgent needs. For medical emergencies, dial 911.          [facility] complies with applicable Federal civil rights laws and does not discriminate on the basis of race, color, national origin, age, disability, or sex.

## 2017-05-04 NOTE — TRANSFER OF CARE
"Anesthesia Transfer of Care Note    Patient: Laure Ross    Procedure(s) Performed: Procedure(s) (LRB):  INSERTION-ICD-BIVENTRICULAR (Right)    Patient location: Other: cath lab PACU    Anesthesia Type: general    Transport from OR: Transported from OR on room air with adequate spontaneous ventilation    Post pain: adequate analgesia    Post assessment: no apparent anesthetic complications and tolerated procedure well    Post vital signs: stable    Level of consciousness: awake, alert and oriented    Nausea/Vomiting: no nausea/vomiting    Complications: none    Transfer of care protocol was followed      Last vitals:   Visit Vitals    /76    Pulse (!) 59    Temp 36.4 °C (97.5 °F) (Oral)    Resp 16    Ht 5' 3" (1.6 m)    Wt 102.1 kg (225 lb)    SpO2 100%    Breastfeeding No    BMI 39.86 kg/m2     "

## 2017-05-04 NOTE — H&P
"      Electrophysiology H&P  Attending Physician: Navi Jolly MD  Procedure: INSERTION-ICD-BIVENTRICULAR (Right)    HPI:   46 y.o. woman with history of NICM (EF 10-20%) s/p R sided ICD, pHTN, pAF on amiodarone and warfarin, HTN, DM, HLD, CLARENCE, and asthma who presents to the cardiac short stay unit for upgrade to BiV CRT-D. She reports that she feels well and has no specific complaints.     ROS:    Constitution: negative for - fever, chills, weight loss or weight gain  HENT: negative for - sore throat, rhinorrhea, or headache  Eyes: negative for - blurred or double vision  Cardiovascular: no chest pain or dyspnea on exertion  Pulmonary: no cough, shortness of breath, or wheezing  Gastrointestinal: negative for - abdominal pain, nausea, vomiting, or diarrhea  : negative for - dysuria  Neurological: negative for - focal weakness or sensory changes  PMH:     Past Medical History:   Diagnosis Date    Anticoagulant long-term use     Asthma     Asthma     Cardiomyopathy     CHF (congestive heart failure)     CHF (congestive heart failure)     H/O tubal ligation     Hypertension     Obesity     Type 2 diabetes mellitus with hyperglycemia     Type 2 diabetes mellitus with polyneuropathy      Past Surgical History:   Procedure Laterality Date    COLONOSCOPY N/A 5/2/2016    Procedure: COLONOSCOPY;  Surgeon: Eugene Soto MD;  Location: [address]);  Service: Endoscopy;  Laterality: N/A;    GALLBLADDER SURGERY      iabp  4/2016    TUBAL LIGATION       Allergies:   Review of patient's allergies indicates:  No Known Allergies  Medications:     No current facility-administered medications on file prior to encounter.      Current Outpatient Prescriptions on File Prior to Encounter   Medication Sig Dispense Refill    amiodarone (PACERONE) 200 MG Tab TAKE TWO TABLETS BY MOUTH EVERY DAY. 60 tablet 3    BD ULTRA-FINE ESSENCE PEN NEEDLES 32 gauge x 5/32" Ndle Takes 5 times  day 200 each 11    blood sugar " diagnostic Strp Uses 4 times a day, true metrix meter. 150 each 11    budesonide-formoterol 160-4.5 mcg (SYMBICORT) 160-4.5 mcg/actuation HFAA Inhale 2 puffs into the lungs every 12 (twelve) hours. 10.2 g 12    cetirizine (ZYRTEC) 10 MG tablet Take 1 tablet (10 mg total) by mouth once daily.  0    digoxin (LANOXIN) 125 mcg tablet TAKE 1 TABLET BY MOUTH ONCE DAILY 30 tablet 5    fluticasone-vilanterol (BREO) 100-25 mcg/dose diskus inhaler Inhale 1 puff into the lungs once daily. Controller 60 each 12    furosemide (LASIX) 40 MG tablet TAKE 2 TABLETS BY MOUTH TWO TIMES A DAY . 120 tablet 3    insulin aspart (NOVOLOG FLEXPEN) 100 unit/mL InPn pen Inject 16 units w/ meals plus scale 150-200 +2, 201-250 +4, 251-300 +6, 301-350 +8. 2 Box 11    insulin glargine (LANTUS SOLOSTAR) 100 unit/mL (3 mL) InPn pen Inject 34 Units into the skin 2 (two) times daily. 2 Box 11    lancets Misc Uses true metrix meter, 4 times a day. 150 each 11    levalbuterol (XOPENEX HFA) 45 mcg/actuation inhaler Inhale 2 puffs into the lungs every 4 (four) hours as needed for Wheezing. 1 Inhaler 6    levalbuterol (XOPENEX) 1.25 mg/0.5 mL nebulizer solution Take 0.5 mLs (1.25 mg total) by nebulization every 4 (four) hours as needed for Wheezing. 30 each 0    lisinopril (PRINIVIL,ZESTRIL) 5 MG tablet Take 1 tablet (5 mg total) by mouth 2 (two) times daily. 60 tablet 8    magnesium oxide (MAG-OX) 400 mg tablet Take 1 tablet (400 mg total) by mouth 2 (two) times daily. 60 tablet 6    montelukast (SINGULAIR) 10 mg tablet 10 mg once daily.       omeprazole (PRILOSEC) 40 MG capsule Take 1 capsule (40 mg total) by mouth every morning. 30 capsule 6    potassium chloride (MICRO-K) 10 MEQ CpSR TAKE 2 CAPSULES (20MEQ TOTAL) BY MOUTH ONCE DAILY 60 capsule 3    SPIRIVA WITH HANDIHALER 18 mcg inhalation capsule Inhale 18 mcg into the lungs.       spironolactone (ALDACTONE) 25 MG tablet Take 1 tablet (25 mg total) by mouth once daily. 30 tablet 11     vitamin D 1000 units Tab Take 2 tablets (2,000 Units total) by mouth once daily.      warfarin (COUMADIN) 7.5 MG tablet Take 1 tablet (7.5 mg total) by mouth Daily. Take 7.5 mg on Tues, Thurs, Sat. Take 3.75 mg all other days 30 tablet 11       Inpatient Medications   Continuous Infusions:  Scheduled Meds:  PRN Meds:     Social History:     Social History   Substance Use Topics    Smoking status: Never Smoker    Smokeless tobacco: Not on file    Alcohol use No     Family History:   No family history on file.  Physical Exam:     Vitals:  Vitals pending I/O's:  No intake or output data in the 24 hours ending 05/04/17 0635     Constitutional: NAD, conversant  HEENT: Sclera anicteric, PERRLA, EOMI  Neck: No JVD, no carotid bruits  CV: RRR, no murmur, normal S1/S2  Pulm: CTAB, no wheezes, rales, or ronchi  GI: Abdomen soft, NTND, +BS  Extremities: No LE edema, warm and well perfused  Skin: No ecchymosis, erythema, or ulcers  Psych: AOx3, appropriate affect  Neuro: CNII-XII intact, no focal deficits    Labs:       Recent Labs  Lab 05/01/17  1040      K 4.1      CO2 30*   BUN 21*   CREATININE 1.4   *   ANIONGAP 7*     No results for input(s): TROPONINI, BNP in the last 168 hours.   Recent Labs  Lab 05/01/17  1040   WBC 8.46   HGB 12.0   HCT 36.7*        No results for input(s): AST, ALT, ALKPHOS, BILITOT, ALBUMIN in the last 168 hours.     Imaging:     EF   Date Value Ref Range Status   05/03/2016 20 (A) 55 - 65    04/15/2016 10 (A) 55 - 65          EKG: pending      Assessment:   46 y.o. woman with history of NICM (EF 10-20%) s/p R sided ICD, pHTN, pAF on amiodarone and warfarin, HTN, DM, HLD, CLARENCE, and asthma who presents to the cardiac short stay unit for upgrade to BiV CRT-D.    Plan:   - We discussed the alternatives, benefits and risks of the procedure including pain, infection, bleeding, injury to lung causing pneumothorax requiring tube placement, injury to heart valves, puncture of  the heart leading to pericardial effusion or tamponade requiring tube drainage, heart attack, stroke and death.  - will proceed with upgrade to BiV CRT-D      Signed:  Chino Baker MD  Cardiology Fellow, PGY-6  Pager: [phone]  5/4/2017 6:35 AM

## 2017-05-04 NOTE — ANESTHESIA PREPROCEDURE EVALUATION
05/04/2017  Laure Ross is a 46 y.o., female.    Anesthesia Evaluation    I have reviewed the Patient Summary Reports.    I have reviewed the Nursing Notes.   I have reviewed the Medications.     Review of Systems  Anesthesia Hx:  History of prior surgery of interest to airway management or planning: Denies Family Hx of Anesthesia complications.   Denies Personal Hx of Anesthesia complications.   Hematology/Oncology:  Hematology Normal   Oncology Normal     EENT/Dental:EENT/Dental Normal   Cardiovascular:   Hypertension Dysrhythmias atrial fibrillation CHF pulm htn, ef 20%   Pulmonary:   Asthma Sleep Apnea    Renal/:   Chronic Renal Disease    Hepatic/GI:   GERD    Endocrine:   Diabetes        Physical Exam  General:  Well nourished    Airway/Jaw/Neck:  Airway Findings: Mouth Opening: Normal Tongue: Normal  Mallampati: I  TM Distance: Normal, at least 6 cm  Jaw/Neck Findings:  Neck ROM: Normal ROM      Dental:  Dental Findings: In tact   Chest/Lungs:  Chest/Lungs Findings: Clear to auscultation, Normal Respiratory Rate     Heart/Vascular:  Heart Findings: Rate: Normal  Rhythm: Irregularly Irregular        Mental Status:  Mental Status Findings:  Cooperative, Alert and Oriented         Anesthesia Plan  Type of Anesthesia, risks & benefits discussed:  Anesthesia Type:  general, MAC  Patient's Preference:   Intra-op Monitoring Plan:   Intra-op Monitoring Plan Comments:   Post Op Pain Control Plan:   Post Op Pain Control Plan Comments:   Induction:   IV  Beta Blocker:  Patient is not currently on a Beta-Blocker (No further documentation required).       Informed Consent: Patient understands risks and agrees with Anesthesia plan.  Questions answered. Anesthesia consent signed with patient.  ASA Score: 4     Day of Surgery Review of History & Physical:    H&P update referred to the provider.         Ready  For Surgery From Anesthesia Perspective.

## 2017-05-04 NOTE — NURSING TRANSFER
Nursing Transfer Note      5/4/2017     Transfer To: 315    Transfer via stretcher    Transfer with cardiac monitoring    Transported by RN    Medicines sent: no    Chart send with patient: Yes    Notified: daughter    Patient reassessed at: 5/4/17@1521hrs

## 2017-05-05 VITALS
HEIGHT: 63 IN | DIASTOLIC BLOOD PRESSURE: 66 MMHG | TEMPERATURE: 98 F | HEART RATE: 69 BPM | BODY MASS INDEX: 39.87 KG/M2 | OXYGEN SATURATION: 99 % | WEIGHT: 225 LBS | SYSTOLIC BLOOD PRESSURE: 119 MMHG | RESPIRATION RATE: 20 BRPM

## 2017-05-05 LAB — POCT GLUCOSE: 108 MG/DL (ref 70–110)

## 2017-05-05 PROCEDURE — 63600175 PHARM REV CODE 636 W HCPCS: Performed by: INTERNAL MEDICINE

## 2017-05-05 PROCEDURE — 82962 GLUCOSE BLOOD TEST: CPT

## 2017-05-05 RX ADMIN — CEFAZOLIN SODIUM 2 G: 2 SOLUTION INTRAVENOUS at 03:05

## 2017-05-05 NOTE — PLAN OF CARE
Instructed patient and daughter on discharge instructions.  Post device discharge instructions provided and reviewed.  Aquacel dressing to remain until follow up visit in a week for a wound check.  Site should remain dry for 48 hours.  When showering is allowed, do not allow shower to beam directly onto incision.  Have water beam to back side.  Do not scrub the area.  Pat dry.  Sling should remain on for 48 hours and nightly for 2-4 weeks if patient is an active sleeper.  Right arm should not be raised above shoulder height, outward, or back.  A prescription of oral antibiotics was sent to preferred pharmacy and to be taken for 5 days.  Call phone numbers provided if the dressing becomes discolored or have any concerning symptoms.  Pt and daughter verbalized understanding.  Pt will leave unit via wheel chair when son arrives for .

## 2017-05-05 NOTE — DISCHARGE SUMMARY
[facility]  Cardiology  Discharge Summary      Patient Name: Laure Ross  MRN: [id number]  Admission Date: 5/4/2017  Hospital Length of Stay: 0 days  Discharge Date and Time:  05/05/2017 8:08 AM  Attending Physician: Navi Jolly MD  Discharging Provider: He Wang NP  Primary Care Physician: Ayan Olmstead MD    HPI:46 y.o. woman with history of NICM (EF 10-20%) s/p R sided ICD, pHTN, pAF on amiodarone and warfarin, HTN, DM, HLD, CLARENCE, and asthma admitted for outpatient  upgrade to BiV CRT-D. Pt is on lisinopril, patient  states she is not on a beta blocker due to recurrent asthma flares.     Procedure(s) (LRB):  INSERTION-ICD-BIVENTRICULAR (Right)      Hospital Course:  Pt underwent BIV upgrade  (see procedure note for details) , tolerated procedure, no acute complications noted. Right  pectoral site with aquacel dressing CDI, no bleeding or hematoma noted. Pt with ambulating with no issues.   Pt to continue antibiotics keflex for 5 days.   Pt with aquacel dressing to remain intact until wound clinic visit. Pt to follow up with device clinic in 1 week, and 3 months with DAVIN Guaman   Discharge plans/instructions discussed with patient and daughter  who verbalized understanding and agreement of plans of care.     Consults: anesthesia       Final Active Diagnoses:    Diagnosis Date Noted POA    PRINCIPAL PROBLEM:  NICM (nonischemic cardiomyopathy) [I42.8] 05/04/2017 Yes    Acute diastolic CHF (congestive heart failure), NYHA class 3 [I50.31] 05/04/2017 Yes      Problems Resolved During this Admission:    Diagnosis Date Noted Date Resolved POA       Discharged Condition: good    Follow Up:  Follow-up Information     Follow up with Jimmy Moore - Arrhythmia In 1 week.    Specialty:  Cardiology    Why:  For wound re-check    Contact information:    [address]  [facility] [address] [phone]    Additional information:    Cl[facility] [place] - 3rd Floor        Follow up  with REMEDIOS Rose In 3 months.    Specialty:  Cardiology    Contact information:    [address]  [facility] [address]  [phone]          Patient Instructions:     Diet general   Order Specific Question Answer Comments   Additional restrictions: Cardiac (Low Na/Chol)    Additional restrictions: Diabetic 1800      Other restrictions (specify):   Scheduling Instructions: No lifting more than 5-10 pound in 6 weeks.   Please refer to discharge instructions via written handout     Call MD for:  temperature >100.4     Call MD for:  persistent nausea and vomiting or diarrhea     Call MD for:  severe uncontrolled pain     Call MD for:  redness, tenderness, or signs of infection (pain, swelling, redness, odor or green/yellow discharge around incision site)     Call MD for:  difficulty breathing or increased cough     Call MD for:  severe persistent headache     Call MD for:  worsening rash     Call MD for:  persistent dizziness, light-headedness, or visual disturbances     Call MD for:  increased confusion or weakness     Call MD for:   Scheduling Instructions: Any concerning medical symptoms     Leave dressing on - Keep it clean, dry, and intact until clinic visit       Medications:  Reconciled Home Medications:   Current Discharge Medication List      START taking these medications    Details   cephALEXin (KEFLEX) 500 MG capsule Take 1 capsule (500 mg total) by mouth every 12 (twelve) hours.  Qty: 10 capsule, Refills: 0         CONTINUE these medications which have NOT CHANGED    Details   amiodarone (PACERONE) 200 MG Tab TAKE TWO TABLETS BY MOUTH EVERY DAY.  Qty: 60 tablet, Refills: 3      cetirizine (ZYRTEC) 10 MG tablet Take 1 tablet (10 mg total) by mouth once daily.  Refills: 0      digoxin (LANOXIN) 125 mcg tablet TAKE 1 TABLET BY MOUTH ONCE DAILY  Qty: 30 tablet, Refills: 5      fluticasone-vilanterol (BREO) 100-25 mcg/dose diskus inhaler Inhale 1 puff into the lungs once daily. Controller  Qty:  60 each, Refills: 12      furosemide (LASIX) 40 MG tablet TAKE 2 TABLETS BY MOUTH TWO TIMES A DAY .  Qty: 120 tablet, Refills: 3    Associated Diagnoses: Cardiomyopathy, dilated, nonischemic      insulin aspart (NOVOLOG FLEXPEN) 100 unit/mL InPn pen Inject 16 units w/ meals plus scale 150-200 +2, 201-250 +4, 251-300 +6, 301-350 +8.  Qty: 2 Box, Refills: 11      insulin glargine (LANTUS SOLOSTAR) 100 unit/mL (3 mL) InPn pen Inject 34 Units into the skin 2 (two) times daily.  Qty: 2 Box, Refills: 11    Associated Diagnoses: Type II or unspecified type diabetes mellitus without mention of complication, uncontrolled      levalbuterol (XOPENEX HFA) 45 mcg/actuation inhaler Inhale 2 puffs into the lungs every 4 (four) hours as needed for Wheezing.  Qty: 1 Inhaler, Refills: 6      levalbuterol (XOPENEX) 1.25 mg/0.5 mL nebulizer solution Take 0.5 mLs (1.25 mg total) by nebulization every 4 (four) hours as needed for Wheezing.  Qty: 30 each, Refills: 0      lisinopril (PRINIVIL,ZESTRIL) 5 MG tablet Take 1 tablet (5 mg total) by mouth 2 (two) times daily.  Qty: 60 tablet, Refills: 8    Associated Diagnoses: Acute on chronic congestive heart failure, unspecified congestive heart failure type      magnesium oxide (MAG-OX) 400 mg tablet Take 1 tablet (400 mg total) by mouth 2 (two) times daily.  Qty: 60 tablet, Refills: 6      montelukast (SINGULAIR) 10 mg tablet 10 mg once daily.       omeprazole (PRILOSEC) 40 MG capsule Take 1 capsule (40 mg total) by mouth every morning.  Qty: 30 capsule, Refills: 6    Associated Diagnoses: Gastroesophageal reflux disease, esophagitis presence not specified      potassium chloride (MICRO-K) 10 MEQ CpSR TAKE 2 CAPSULES (20MEQ TOTAL) BY MOUTH ONCE DAILY  Qty: 60 capsule, Refills: 3      SPIRIVA WITH HANDIHALER 18 mcg inhalation capsule Inhale 18 mcg into the lungs.       spironolactone (ALDACTONE) 25 MG tablet Take 1 tablet (25 mg total) by mouth once daily.  Qty: 30 tablet, Refills: 11     "  vitamin D 1000 units Tab Take 2 tablets (2,000 Units total) by mouth once daily.      warfarin (COUMADIN) 7.5 MG tablet Take 1 tablet (7.5 mg total) by mouth Daily. Take 7.5 mg on Tues, Thurs, Sat. Take 3.75 mg all other days  Qty: 30 tablet, Refills: 11      BD ULTRA-FINE ESSENCE PEN NEEDLES 32 gauge x 5/32" Ndle Takes 5 times  day  Qty: 200 each, Refills: 11      blood sugar diagnostic Strp Uses 4 times a day, true metrix meter.  Qty: 150 each, Refills: 11      budesonide-formoterol 160-4.5 mcg (SYMBICORT) 160-4.5 mcg/actuation HFAA Inhale 2 puffs into the lungs every 12 (twelve) hours.  Qty: 10.2 g, Refills: 12      ketoconazole (NIZORAL) 2 % cream Apply topically once daily.  Qty: 30 g, Refills: 1    Associated Diagnoses: Tinea pedis of both feet      lancets Misc Uses true metrix meter, 4 times a day.  Qty: 150 each, Refills: 11               THAIS Aparicio-BC  Cardiology Electrophysiology  NP   [facility]    Attending: MD Shanae Mcelroy         "

## 2017-05-05 NOTE — PROGRESS NOTES
Patient discharged with keflex x 5 days; do not expect DDI.  Patient underwent Bi-V ICD upgrade.  Warfarin dose updated in calendar.

## 2017-05-06 PROCEDURE — 99283 EMERGENCY DEPT VISIT LOW MDM: CPT | Mod: 25

## 2017-05-06 PROCEDURE — 20610 DRAIN/INJ JOINT/BURSA W/O US: CPT | Mod: RT

## 2017-05-06 PROCEDURE — 20610 DRAIN/INJ JOINT/BURSA W/O US: CPT | Mod: ,,, | Performed by: EMERGENCY MEDICINE

## 2017-05-06 PROCEDURE — 99283 EMERGENCY DEPT VISIT LOW MDM: CPT | Mod: 25,,, | Performed by: EMERGENCY MEDICINE

## 2017-05-07 ENCOUNTER — HOSPITAL ENCOUNTER (EMERGENCY)
Facility: HOSPITAL | Age: 47
Discharge: HOME | End: 2017-05-07
Attending: EMERGENCY MEDICINE
Payer: MEDICAID

## 2017-05-07 VITALS
OXYGEN SATURATION: 98 % | SYSTOLIC BLOOD PRESSURE: 117 MMHG | WEIGHT: 225 LBS | DIASTOLIC BLOOD PRESSURE: 67 MMHG | TEMPERATURE: 98 F | BODY MASS INDEX: 39.87 KG/M2 | HEIGHT: 63 IN | RESPIRATION RATE: 17 BRPM | HEART RATE: 68 BPM

## 2017-05-07 DIAGNOSIS — M75.01 ADHESIVE CAPSULITIS OF RIGHT SHOULDER: Primary | ICD-10-CM

## 2017-05-07 PROCEDURE — 63600175 PHARM REV CODE 636 W HCPCS: Performed by: EMERGENCY MEDICINE

## 2017-05-07 PROCEDURE — 20610 DRAIN/INJ JOINT/BURSA W/O US: CPT | Mod: RT

## 2017-05-07 PROCEDURE — 25000003 PHARM REV CODE 250: Performed by: EMERGENCY MEDICINE

## 2017-05-07 RX ORDER — BUPIVACAINE HYDROCHLORIDE 2.5 MG/ML
5 INJECTION, SOLUTION EPIDURAL; INFILTRATION; INTRACAUDAL
Status: COMPLETED | OUTPATIENT
Start: 2017-05-07 | End: 2017-05-07

## 2017-05-07 RX ORDER — HYDROCODONE BITARTRATE AND ACETAMINOPHEN 5; 325 MG/1; MG/1
1 TABLET ORAL EVERY 6 HOURS PRN
Qty: 18 TABLET | Refills: 0 | Status: SHIPPED | OUTPATIENT
Start: 2017-05-07 | End: 2017-07-24 | Stop reason: SDUPTHER

## 2017-05-07 RX ORDER — TRIAMCINOLONE ACETONIDE 40 MG/ML
40 INJECTION, SUSPENSION INTRA-ARTICULAR; INTRAMUSCULAR
Status: COMPLETED | OUTPATIENT
Start: 2017-05-07 | End: 2017-05-07

## 2017-05-07 RX ADMIN — TRIAMCINOLONE ACETONIDE 40 MG: 40 INJECTION, SUSPENSION INTRA-ARTICULAR; INTRAMUSCULAR at 02:05

## 2017-05-07 RX ADMIN — BUPIVACAINE HYDROCHLORIDE 12.5 MG: 2.5 INJECTION, SOLUTION EPIDURAL; INFILTRATION; INTRACAUDAL; PERINEURAL at 02:05

## 2017-05-07 NOTE — ED AVS SNAPSHOT
[facility]  [address] [facility] [address]               Laure Ross   2017  1:24 AM   ED    Description:  Female : 1970   Department:  [facility]           Your Care was Coordinated By:     Provider Role From To    Freddie Plasencia MD Attending Provider 17 0133 --      Reason for Visit     Post-op Problem           Diagnoses this Visit        Comments    Adhesive capsulitis of right shoulder    -  Primary       ED Disposition     None           To Do List           Follow-up Information     Follow up with Jimmy Moore - Orthopedics. Call in 1 day.    Specialty:  Orthopedics    Contact information:    [facility] [address] [phone]    Additional information:    [facility] - [facility]th Floor       These Medications        Disp Refills Start End    hydrocodone-acetaminophen 5-325mg (NORCO) 5-325 mg per tablet 18 tablet 0 2017     Take 1 tablet by mouth every 6 (six) hours as needed for Pain. - Oral    Pharmacy: [facility] - [place], [place] - [address] Ph #: [phone]         [facility]     [facility] Care Line -  Assistance  Unless otherwise directed by your provider, please contact [facility], our nurse care line that is available for  assistance.     Registered nurses in the [facility] provide: appointment scheduling, clinical advisement, health education, and other advisory services.  Call: [phone] (toll free)               Medications           Message regarding Medications     Verify the changes and/or additions to your medication regime listed below are the same as discussed with your clinician today.  If any of these changes or additions are incorrect, please notify your healthcare provider.        START taking these NEW medications        Refills    hydrocodone-acetaminophen 5-325mg (NORCO) 5-325 mg per  "tablet 0    Sig: Take 1 tablet by mouth every 6 (six) hours as needed for Pain.    Class: Print    Route: Oral      These medications were administered today        Dose Freq    triamcinolone acetonide injection 40 mg 40 mg ED 1 Time    Sig: Inject 1 mL (40 mg total) into the muscle ED 1 Time.    Class: Normal    Route: Intramuscular    bupivacaine (PF) 0.25% (2.5 mg/ml) injection 12.5 mg 5 mL ED 1 Time    Sig: Inject 5 mLs (12.5 mg total) into the skin ED 1 Time.    Class: Normal    Route: Subcutaneous           Verify that the below list of medications is an accurate representation of the medications you are currently taking.  If none reported, the list may be blank. If incorrect, please contact your healthcare provider. Carry this list with you in case of emergency.           Current Medications     amiodarone (PACERONE) 200 MG Tab TAKE TWO TABLETS BY MOUTH EVERY DAY.    BD ULTRA-FINE ESSENCE PEN NEEDLES 32 gauge x 5/32" Ndle Takes 5 times  day    blood sugar diagnostic Strp Uses 4 times a day, true metrix meter.    budesonide-formoterol 160-4.5 mcg (SYMBICORT) 160-4.5 mcg/actuation HFAA Inhale 2 puffs into the lungs every 12 (twelve) hours.    bupivacaine (PF) 0.25% (2.5 mg/ml) injection 12.5 mg Inject 5 mLs (12.5 mg total) into the skin ED 1 Time.    cephALEXin (KEFLEX) 500 MG capsule Take 1 capsule (500 mg total) by mouth every 12 (twelve) hours.    cetirizine (ZYRTEC) 10 MG tablet Take 1 tablet (10 mg total) by mouth once daily.    digoxin (LANOXIN) 125 mcg tablet TAKE 1 TABLET BY MOUTH ONCE DAILY    fluticasone-vilanterol (BREO) 100-25 mcg/dose diskus inhaler Inhale 1 puff into the lungs once daily. Controller    furosemide (LASIX) 40 MG tablet TAKE 2 TABLETS BY MOUTH TWO TIMES A DAY .    hydrocodone-acetaminophen 5-325mg (NORCO) 5-325 mg per tablet Take 1 tablet by mouth every 6 (six) hours as needed for Pain.    insulin aspart (NOVOLOG FLEXPEN) 100 unit/mL InPn pen Inject 16 units w/ meals plus scale 150-200 " "+2, 201-250 +4, 251-300 +6, 301-350 +8.    insulin glargine (LANTUS SOLOSTAR) 100 unit/mL (3 mL) InPn pen Inject 34 Units into the skin 2 (two) times daily.    ketoconazole (NIZORAL) 2 % cream Apply topically once daily.    lancets Misc Uses true metrix meter, 4 times a day.    levalbuterol (XOPENEX HFA) 45 mcg/actuation inhaler Inhale 2 puffs into the lungs every 4 (four) hours as needed for Wheezing.    levalbuterol (XOPENEX) 1.25 mg/0.5 mL nebulizer solution Take 0.5 mLs (1.25 mg total) by nebulization every 4 (four) hours as needed for Wheezing.    lisinopril (PRINIVIL,ZESTRIL) 5 MG tablet Take 1 tablet (5 mg total) by mouth 2 (two) times daily.    magnesium oxide (MAG-OX) 400 mg tablet Take 1 tablet (400 mg total) by mouth 2 (two) times daily.    montelukast (SINGULAIR) 10 mg tablet 10 mg once daily.     omeprazole (PRILOSEC) 40 MG capsule Take 1 capsule (40 mg total) by mouth every morning.    potassium chloride (MICRO-K) 10 MEQ CpSR TAKE 2 CAPSULES (20MEQ TOTAL) BY MOUTH ONCE DAILY    SPIRIVA WITH HANDIHALER 18 mcg inhalation capsule Inhale 18 mcg into the lungs.     spironolactone (ALDACTONE) 25 MG tablet Take 1 tablet (25 mg total) by mouth once daily.    vitamin D 1000 units Tab Take 2 tablets (2,000 Units total) by mouth once daily.    warfarin (COUMADIN) 7.5 MG tablet Take 1 tablet (7.5 mg total) by mouth Daily. Take 7.5 mg on Tues, Thurs, Sat. Take 3.75 mg all other days           Clinical Reference Information           Your Vitals Were     BP Pulse Temp Resp Height Weight    117/67 70 97.8 °F (36.6 °C) (Oral) 18 5' 3" (1.6 m) 102.1 kg (225 lb)    SpO2 BMI             98% 39.86 kg/m2         Allergies as of 5/7/2017     No Known Allergies      Immunizations Administered on Date of Encounter - 5/7/2017     None      ED Micro, Lab, POCT     None      ED Imaging Orders     None        Discharge Instructions         Frozen Shoulder (Adhesive Capsulitis)    Frozen shoulder is when pain and stiffness make it " difficult to move your shoulder normally. This condition is also called adhesive capsulitis.  The shoulder is a ball-and-socket joint. The ball on the upper arm bone fits into a socket on the shoulder blade. The joint is covered by strong connective tissue called the shoulder capsule.  If the shoulder capsule becomes inflamed and swollen, movement is painful. Bands of scar tissue form on the joints surface. This limits your shoulder's range of motion.  In most cases, only one shoulder at a time is affected. Some people may get frozen shoulder in the other shoulder, at a later time.  Frozen shoulder develops slowly in 3 stages. Each stage can last a few months or longer:  1. Painful stage. Pain occurs when raising your arm up or to the side, or when reaching behind your back. Your range of motion decreases. The pain may be worse at night and keep you from sleeping.  2. Frozen stage. Shoulder may be less painful, but it is stiffer. Range of motion is very limited.  3. Thawing stage. Range of motion starts to improve with treatment.  Experts dont know what causes frozen shoulder. It may occur after an injury such as a fracture. Or it may happen if the shoulder is immobile for a long time, such as after surgery. It may also be an autoimmune response. Risk factors include being over age 40, or having diabetes, heart disease, lung disease, or an overactive thyroid.  The diagnosis is made by physical exam and an X-ray of the shoulder joint. Sometimes an MRI is done to look for other causes.  Mild cases may be treated with a home exercise program and anti-inflammatory medicines. More severe cases require physical therapy. In some cases a steroid is shot, or injected, into the shoulder area. In hard to treat cases, forced movement of the shoulder is done while you are asleep under general anesthesia. This will break up scar tissue and increase your range of motion. In rare cases, surgery may be needed to remove the scar  tissue. Over time, most people with frozen shoulder get back nearly all range of motion without pain. Recovery may take a few months up to 1 year. You may still feel some stiffness.  Home care  · If physical therapy was prescribed, keep up with any home exercise program you were given.  · Keep using the affected shoulder and arm as much as possible.  · You may use over-the-counter pain medicine to control pain, unless another pain medicine was prescribed. Anti-inflammatory pain medicines may work better than acetaminophen. If you have chronic liver or kidney disease or ever had a stomach ulcer or GI bleeding, talk with your healthcare provider before using these medicines.  Follow-up care  Follow up with your healthcare provider, or as advised. Or follow up sooner if pain increases or your shoulder motion decreases.  If X-rays or an MRI were done, you will be told of any new findings that may affect your care.  When to seek medical advice  Call your healthcare provider right away if any of these occur:  · Your shoulder is red or swollen  · Your arm feels weak or numb  · Your shoulder movement decreases  · Your shoulder pain increases even after using pain medicines  Date Last Reviewed: 11/24/2015  © 4907-7419 [employer]. [address] [place] [address]. All rights reserved. This information is not intended as a substitute for professional medical care. Always follow your healthcare professional's instructions.          Your Scheduled Appointments     May 11, 2017 11:00 AM CDT   Non-Fasting Lab with LAB, APPOINTMENT [place] [facility] ([facility])    [address] [facility] [address]   [phone]            May 15, 2017  9:00 AM CDT   Wound Check with PACEMAKER, ICD   [facility] - Arrhythmia ([facility] )    [address]  [place] [address]   [phone]            Jun 05, 2017  9:30 AM CDT   Established  Patient Visit with MD Jimmy Muniz - Nephrology ([facility] )    [address]  [facility] [id number] [phone]            Aug 01, 2017 10:30 AM CDT   Established Patient Visit with LOR High - Podiatry ([facility] )    [address]  [facility] [address] [phone]              [facility] Sign-Up     Activating your [facility] account is as easy as 1-2-3!     1) Visit [email or web address], select Sign Up Now, enter this activation code and your date of birth, then select Next.  [email or web address]  Expires: 6/19/2017  8:03 AM      2) Create a username and password to use when you visit [facility] in the future and select a security question in case you lose your password and select Next.    3) Enter your e-mail address and click Sign Up!    Additional Information  If you have questions, please e-mail [email or web address] or call [phone] to talk to our [facility] staff. Remember, [facility] is NOT to be used for urgent needs. For medical emergencies, dial 911.          [facility] complies with applicable Federal civil rights laws and does not discriminate on the basis of race, color, national origin, age, disability, or sex.        Language Assistance Services     ATTENTION: Language assistance services are available, free of charge. Please call [phone].      ATENCIÓN: Si habla español, tiene a dougherty disposición servicios gratuitos de asistencia lingüística. Llame al [phone].     CHÚ Ý: N?u b?n nói Ti?ng Vi?t, có các d?ch v? h? tr? ngôn ng? mi?n phí dành cho b?n. G?i s? [phone].

## 2017-05-07 NOTE — ED TRIAGE NOTES
"Laure Ross, a 46 y.o. female presents to the ED complaining of "frozen shoulder" and a lot of pain to right shoulder. Pt had a defibulator placed Thursday. Pt denies chest pain, SOB, n/v/d, fever      Chief Complaint   Patient presents with    Post-op Problem     Pt had surgery on defibulator on thursday. Pt is having pain around surgery site. Pt denies any other symptoms.      Review of patient's allergies indicates:  No Known Allergies  Past Medical History:   Diagnosis Date    Anticoagulant long-term use     Asthma     Asthma     Cardiomyopathy     CHF (congestive heart failure)     CHF (congestive heart failure)     H/O tubal ligation     Hypertension     Obesity     Type 2 diabetes mellitus with hyperglycemia     Type 2 diabetes mellitus with polyneuropathy      "

## 2017-05-07 NOTE — ED NOTES
Patient identifiers verified and correct for Laure Ross.    LOC: The patient is awake, alert and aware of environment with an appropriate affect, the patient is oriented x 3 and speaking appropriately.  APPEARANCE: Patient resting comfortably and in no acute distress, patient is clean and well groomed, patient's clothing is properly fastened.  SKIN: The skin is warm and dry, color consistent with ethnicity, patient has normal skin turgor and moist mucus membranes, skin intact, no breakdown or bruising noted.  MUSCULOSKELETAL: Patient moving all extremities spontaneously with limited mobility in the right shoulder, no obvious swelling or deformities noted.  RESPIRATORY: Airway is open and patent, respirations are spontaneous, patient has a normal effort and rate, no accessory muscle use noted, bilateral breath sounds even and clear.  CARDIAC: Patient has a normal rate and regular rhythm, no periphreal edema noted, capillary refill < 3 seconds.  ABDOMEN: Soft and non tender to palpation, no distention noted, normoactive bowel sounds present in all four quadrants.  NEUROLOGIC: Pupils equal bilaterally, eyes open spontaneously, behavior appropriate to situation, follows commands, facial expression symmetrical, bilateral hand grasp equal and even, purposeful motor response noted, normal sensation in all extremities when touched with a finger.

## 2017-05-07 NOTE — DISCHARGE INSTRUCTIONS
Frozen Shoulder (Adhesive Capsulitis)    Frozen shoulder is when pain and stiffness make it difficult to move your shoulder normally. This condition is also called adhesive capsulitis.  The shoulder is a ball-and-socket joint. The ball on the upper arm bone fits into a socket on the shoulder blade. The joint is covered by strong connective tissue called the shoulder capsule.  If the shoulder capsule becomes inflamed and swollen, movement is painful. Bands of scar tissue form on the joints surface. This limits your shoulder's range of motion.  In most cases, only one shoulder at a time is affected. Some people may get frozen shoulder in the other shoulder, at a later time.  Frozen shoulder develops slowly in 3 stages. Each stage can last a few months or longer:  1. Painful stage. Pain occurs when raising your arm up or to the side, or when reaching behind your back. Your range of motion decreases. The pain may be worse at night and keep you from sleeping.  2. Frozen stage. Shoulder may be less painful, but it is stiffer. Range of motion is very limited.  3. Thawing stage. Range of motion starts to improve with treatment.  Experts dont know what causes frozen shoulder. It may occur after an injury such as a fracture. Or it may happen if the shoulder is immobile for a long time, such as after surgery. It may also be an autoimmune response. Risk factors include being over age 40, or having diabetes, heart disease, lung disease, or an overactive thyroid.  The diagnosis is made by physical exam and an X-ray of the shoulder joint. Sometimes an MRI is done to look for other causes.  Mild cases may be treated with a home exercise program and anti-inflammatory medicines. More severe cases require physical therapy. In some cases a steroid is shot, or injected, into the shoulder area. In hard to treat cases, forced movement of the shoulder is done while you are asleep under general anesthesia. This will break up scar tissue  and increase your range of motion. In rare cases, surgery may be needed to remove the scar tissue. Over time, most people with frozen shoulder get back nearly all range of motion without pain. Recovery may take a few months up to 1 year. You may still feel some stiffness.  Home care  · If physical therapy was prescribed, keep up with any home exercise program you were given.  · Keep using the affected shoulder and arm as much as possible.  · You may use over-the-counter pain medicine to control pain, unless another pain medicine was prescribed. Anti-inflammatory pain medicines may work better than acetaminophen. If you have chronic liver or kidney disease or ever had a stomach ulcer or GI bleeding, talk with your healthcare provider before using these medicines.  Follow-up care  Follow up with your healthcare provider, or as advised. Or follow up sooner if pain increases or your shoulder motion decreases.  If X-rays or an MRI were done, you will be told of any new findings that may affect your care.  When to seek medical advice  Call your healthcare provider right away if any of these occur:  · Your shoulder is red or swollen  · Your arm feels weak or numb  · Your shoulder movement decreases  · Your shoulder pain increases even after using pain medicines  Date Last Reviewed: 11/24/2015  © 0759-5007 [employer]. [address] [place] [address], [place] [address]. All rights reserved. This information is not intended as a substitute for professional medical care. Always follow your healthcare professional's instructions.

## 2017-05-08 ENCOUNTER — TELEPHONE (OUTPATIENT)
Dept: TRANSPLANT | Facility: CLINIC | Age: 47
End: 2017-05-08

## 2017-05-08 ENCOUNTER — TELEPHONE (OUTPATIENT)
Dept: ELECTROPHYSIOLOGY | Facility: CLINIC | Age: 47
End: 2017-05-08

## 2017-05-08 NOTE — TELEPHONE ENCOUNTER
"Telephone message received from Kamila COURTNEY in Transplant clinic in relation to right shoulder pain.  Patient had a Right sided CRT-D ICD upgrade done on 5/4/17.  Patient stated she kept her arm in the sling for at least 36 hours as instructed (per pt).  Patient went to the ED on 5/7/17 for what she reported as "frozen shoulder" and severe pain.  As per patient's EMR she was given an injection in Right shoulder of bupivacaine 12.5 mg subcutaneous as well as a prescription for Norco 5 - 325 # 18, take one tablet q 6 hours as needed, for adhesive capsulitis.  Patient also stated she can not move her right arm at all so her family members have been assisting with rotating her arm and shoulder.   Patient informed she should NOT nor should she allow anyone else perform any type of PT with this arm.  Patient stated she is aware of the arm restrictions given to her by Dr. Jolly and other medical staff following the procedure.  Patient then stated she had been calling ALL day to find an Orthopedic doctor that can see her but was unsuccessful due to they do NOT accept her insurance, so she scheduled an appointment with a Primary care doctor.  Informed the patient she should not allow anyone to manipulate her shoulder due to the risk of lead dislodgement.  Patient then c/o the Norco 5 mg is not helping her at all.  Confirmed with the patient there was NO swelling and/or discoloration in the shoulder and/or right arm.  Patient instructed to contact the [facility] should this change.  Patient verbalized understanding to all of the above.    "

## 2017-05-08 NOTE — TELEPHONE ENCOUNTER
Patient called w/request to speak with Dr. Jules for referral to Ortho.  She advised that she had defibrillator implant on Thurs and still cannot move arm.  Of note, patient was seen in ED and had injection.  Discussed with Kayce Saba RN in EP.  She advised that she will review with Dr. Jolly.

## 2017-05-10 ENCOUNTER — TELEPHONE (OUTPATIENT)
Dept: NEPHROLOGY | Facility: CLINIC | Age: 47
End: 2017-05-10

## 2017-05-10 ENCOUNTER — TELEPHONE (OUTPATIENT)
Dept: ELECTROPHYSIOLOGY | Facility: CLINIC | Age: 47
End: 2017-05-10

## 2017-05-10 NOTE — TELEPHONE ENCOUNTER
----- Message from Kayce Saba RN sent at 5/10/2017  3:47 PM CDT -----  Contact: pt   Please call pt  ----- Message -----     From: Olimpia Koenig     Sent: 5/10/2017   8:37 AM       To: Kayce Saba RN    PT called because she is having a lot of swelling around the pacemaker.  No other symptoms.  She has a wound check Monday but wants to speak with you today.  She can be reached @ [phone].  Thanks!!

## 2017-05-10 NOTE — TELEPHONE ENCOUNTER
Returned patient's call on this afternoon. Patient stated she thinks there may be some swelling at the ICD implant site.  Not warm to the touch, no fever, no open skin and is without any pain.  Patient then stated she has never looked at the implant site since it was put in to know whether or not it is different from when she was discharged from the hospital.  Instructed patient to look at her implant site on tomorrow now that she is aware of how it looks and contact the [facility] on tomorrow.  Patient verbalized understanding.

## 2017-05-10 NOTE — TELEPHONE ENCOUNTER
----- Message from Beba Do sent at 5/10/2017  8:44 AM CDT -----  Contact: pt  Urgent Appt   Requested  For today  -  Abdomen swelling  But she is urinating      -  Had surgery thurs 5/4   - defibrillator implanted       Thurs and Friday last week -   Wasn't urinating that much  -    She is swollen upper chest where the defibrillator was implanted     Please call [phone]    Thanks   (Betty at [facility])    spoke to pt and informed her to get in touch with the doctor who implanted the defibrillator so they can evaluate her for this matter, pt verbalized understanding and stated that she had put in a call to them already

## 2017-05-11 ENCOUNTER — LAB VISIT (OUTPATIENT)
Dept: LAB | Facility: HOSPITAL | Age: 47
End: 2017-05-11
Payer: MEDICAID

## 2017-05-11 ENCOUNTER — ANTI-COAG VISIT (OUTPATIENT)
Dept: CARDIOLOGY | Facility: CLINIC | Age: 47
End: 2017-05-11

## 2017-05-11 DIAGNOSIS — I50.9 ACUTE DECOMPENSATED HEART FAILURE: ICD-10-CM

## 2017-05-11 DIAGNOSIS — Z79.01 CHRONIC ANTICOAGULATION: ICD-10-CM

## 2017-05-11 DIAGNOSIS — I48.0 PAROXYSMAL ATRIAL FIBRILLATION: ICD-10-CM

## 2017-05-11 DIAGNOSIS — Z79.01 LONG-TERM (CURRENT) USE OF ANTICOAGULANTS: ICD-10-CM

## 2017-05-11 LAB
INR PPP: 2
PROTHROMBIN TIME: 19.7 SEC

## 2017-05-11 PROCEDURE — 36415 COLL VENOUS BLD VENIPUNCTURE: CPT

## 2017-05-11 PROCEDURE — 85610 PROTHROMBIN TIME: CPT

## 2017-05-11 NOTE — ED PROVIDER NOTES
Encounter Date: 5/6/2017       History     Chief Complaint   Patient presents with    Post-op Problem     Pt had surgery on defibulator on thursday. Pt is having pain around surgery site. Pt denies any other symptoms.      Review of patient's allergies indicates:  No Known Allergies  HPI   Laure Ross is a 46 y.o. female who has a past medical history of Anticoagulant long-term use; Asthma; Asthma; Cardiomyopathy; CHF (congestive heart failure); CHF (congestive heart failure); H/O tubal ligation; Hypertension; Obesity; Type 2 diabetes mellitus with hyperglycemia; and Type 2 diabetes mellitus with polyneuropathy who presents to the Emergency Department with left shoulder pain.  Symptoms began a few days ago after having a defibrillator placed to the right chest.  As opposed to triage note, patient does not complain of any significant pain to the surgical site, nor does she complain of any fever, chills, drainage.  Pain is worsened at the shoulder secondary to movement and relieved by still. She denies numbness.   Pt has a past surgical history that includes Gallbladder surgery; iabp (4/2016); Colonoscopy (N/A, 5/2/2016); and Tubal ligation.        Past Medical History:   Diagnosis Date    Anticoagulant long-term use     Asthma     Asthma     Cardiomyopathy     CHF (congestive heart failure)     CHF (congestive heart failure)     H/O tubal ligation     Hypertension     Obesity     Type 2 diabetes mellitus with hyperglycemia     Type 2 diabetes mellitus with polyneuropathy      Past Surgical History:   Procedure Laterality Date    COLONOSCOPY N/A 5/2/2016    Procedure: COLONOSCOPY;  Surgeon: Eugene Soto MD;  Location: [address];  Service: Endoscopy;  Laterality: N/A;    GALLBLADDER SURGERY      iabp  4/2016    TUBAL LIGATION       No family history on file.  Social History   Substance Use Topics    Smoking status: Never Smoker    Smokeless tobacco: None    Alcohol use No      Review of Systems   Constitutional: Positive for activity change. Negative for fever.   HENT: Negative for sore throat.    Respiratory: Negative for shortness of breath.    Cardiovascular: Positive for chest pain (from surgery, localized to right upper chest).   Gastrointestinal: Negative for nausea.   Genitourinary: Negative for dysuria.   Musculoskeletal: Negative for back pain.   Skin: Positive for wound. Negative for rash.   Neurological: Negative for weakness.   Hematological: Does not bruise/bleed easily.   Psychiatric/Behavioral: The patient is not nervous/anxious.        Physical Exam   Initial Vitals   BP Pulse Resp Temp SpO2   05/06/17 2358 05/06/17 2358 05/06/17 2358 05/06/17 2358 05/06/17 2358   145/68 75 20 97.8 °F (36.6 °C) 99 %     Physical Exam    Nursing note and vitals reviewed.  Constitutional: She appears well-developed and well-nourished. She is not diaphoretic. No distress.   HENT:   Head: Normocephalic and atraumatic.   Mouth/Throat: Oropharynx is clear and moist.   Eyes: Conjunctivae are normal. Pupils are equal, round, and reactive to light.   Neck: Neck supple.   Cardiovascular: Normal rate, regular rhythm and intact distal pulses.   No murmur heard.  Pulmonary/Chest: Breath sounds normal. No respiratory distress. She has no wheezes. She has no rhonchi. She has no rales.   Musculoskeletal: She exhibits tenderness (anterior right shoulder). She exhibits no edema.   Decreased range of motion to the right shoulder secondary to pain, notably to flexion and ABduction to 90 degrees.   Limited external and internal rotation secondary to pain.   No pain with forearm supination.   No AC joint TTP.   Neurological: She is alert and oriented to person, place, and time.   Decreased strength in the right shoulder secondary to pain.  No limitation in right elbow, wrist, hand.  Right AIN/PIN/ulnar nerves intact.  No sensory deficit to the right upper extremity.   Skin: Skin is warm and dry. No rash  noted.   Psychiatric: She has a normal mood and affect.         ED Course   Arthrocentesis  Date/Time: 5/7/2017 2:10 AM  Performed by: FREDDIE PLASENCIA  Authorized by: FREDDIE PLASENCIA   Pre-op diagnosis: right shoulder pain  Post-op diagnosis: same  Consent Done: Yes  Consent: Verbal consent obtained.  Risks and benefits: risks, benefits and alternatives were discussed  Consent given by: patient  Patient understanding: patient states understanding of the procedure being performed  Indications: pain   Body area: shoulder  Joint: right shoulder  Local anesthesia used: no    Anesthesia:  Local anesthesia used: no  Patient sedated: no  Needle size: 22 G  Approach: anterior  Aspirate amount: 0 mL  Triamcinolone amount: 40 mg  Bupivacaine 0.25% amount: 4 mL  Patient tolerance: Patient tolerated the procedure well with no immediate complications  Complications: No  Specimens: No  Implants: No        Labs Reviewed - No data to display                       Attending Attestation:             Attending ED Notes:   This is a 46-year-old female presents status post pacemaker placement in the right upper chest now complaining of right shoulder pain.  The surgical site appears normal without evidence of acute infection.  Pain is localized the shoulder mainly with movement and no pain while at rest.  I believe the patient may have adhesive capsulitis versus  rotator cuff injury versus AC impingement.  Patient improved after local injection with Kenalog and bupivacaine.  Recommend follow-up with orthopedist within 1 week or return for any concerns.            ED Course     Clinical Impression:   The encounter diagnosis was Adhesive capsulitis of right shoulder.    Disposition:   Disposition: Discharged  Condition: Stable       Freddie Plasencia MD  05/10/17 1837

## 2017-05-15 ENCOUNTER — CLINICAL SUPPORT (OUTPATIENT)
Dept: ELECTROPHYSIOLOGY | Facility: CLINIC | Age: 47
End: 2017-05-15
Payer: MEDICAID

## 2017-05-15 DIAGNOSIS — I44.7 LBBB (LEFT BUNDLE BRANCH BLOCK): ICD-10-CM

## 2017-05-15 PROCEDURE — 93284 PRGRMG EVAL IMPLANTABLE DFB: CPT | Mod: PBBFAC | Performed by: INTERNAL MEDICINE

## 2017-05-17 RX ORDER — TIOTROPIUM BROMIDE 18 UG/1
18 CAPSULE ORAL; RESPIRATORY (INHALATION) 2 TIMES DAILY
Qty: 90 CAPSULE | Refills: 3 | Status: SHIPPED | OUTPATIENT
Start: 2017-05-17 | End: 2018-03-12 | Stop reason: SDUPTHER

## 2017-05-17 RX ORDER — SPIRONOLACTONE 25 MG/1
25 TABLET ORAL DAILY
Qty: 30 TABLET | Refills: 11 | Status: SHIPPED | OUTPATIENT
Start: 2017-05-17 | End: 2017-05-18 | Stop reason: SDUPTHER

## 2017-05-17 RX ORDER — MONTELUKAST SODIUM 10 MG/1
10 TABLET ORAL DAILY
Qty: 30 TABLET | Refills: 5 | Status: SHIPPED | OUTPATIENT
Start: 2017-05-17 | End: 2017-10-02 | Stop reason: SDUPTHER

## 2017-05-17 NOTE — TELEPHONE ENCOUNTER
Patient notified that requested medication refills were authorized by Dr. Jules.  Understanding verbalized.

## 2017-05-18 ENCOUNTER — ANTI-COAG VISIT (OUTPATIENT)
Dept: CARDIOLOGY | Facility: CLINIC | Age: 47
End: 2017-05-18
Payer: MEDICAID

## 2017-05-18 DIAGNOSIS — I48.0 PAROXYSMAL ATRIAL FIBRILLATION: ICD-10-CM

## 2017-05-18 DIAGNOSIS — Z79.01 CHRONIC ANTICOAGULATION: Primary | ICD-10-CM

## 2017-05-18 LAB — INR PPP: 1.7 (ref 2–3)

## 2017-05-18 PROCEDURE — 99211 OFF/OP EST MAY X REQ PHY/QHP: CPT | Mod: S$PBB,25,,

## 2017-05-18 PROCEDURE — 85610 PROTHROMBIN TIME: CPT | Mod: PBBFAC,PO

## 2017-05-18 RX ORDER — SPIRONOLACTONE 25 MG/1
25 TABLET ORAL DAILY
Qty: 90 TABLET | Refills: 3 | Status: SHIPPED | OUTPATIENT
Start: 2017-05-18 | End: 2017-05-22 | Stop reason: SDUPTHER

## 2017-05-18 RX ORDER — WARFARIN 7.5 MG/1
3.75-7.5 TABLET ORAL DAILY
Qty: 30 TABLET | Refills: 11 | Status: SHIPPED | OUTPATIENT
Start: 2017-05-18 | End: 2018-05-21

## 2017-05-18 NOTE — PROGRESS NOTES
INR low due to missed dose. No other changes. We will boost dose and resume current dose plan. Repeat INR 5/29

## 2017-05-22 RX ORDER — SPIRONOLACTONE 25 MG/1
25 TABLET ORAL DAILY
Qty: 90 TABLET | Refills: 3 | Status: SHIPPED | OUTPATIENT
Start: 2017-05-22 | End: 2018-06-09

## 2017-05-24 DIAGNOSIS — I50.9 ACUTE ON CHRONIC CONGESTIVE HEART FAILURE, UNSPECIFIED CONGESTIVE HEART FAILURE TYPE: ICD-10-CM

## 2017-05-24 RX ORDER — LISINOPRIL 5 MG/1
5 TABLET ORAL 2 TIMES DAILY
Qty: 60 TABLET | Refills: 8 | Status: SHIPPED | OUTPATIENT
Start: 2017-05-24 | End: 2018-04-20 | Stop reason: SDUPTHER

## 2017-05-29 ENCOUNTER — ANTI-COAG VISIT (OUTPATIENT)
Dept: CARDIOLOGY | Facility: CLINIC | Age: 47
End: 2017-05-29

## 2017-05-29 ENCOUNTER — LAB VISIT (OUTPATIENT)
Dept: LAB | Facility: HOSPITAL | Age: 47
End: 2017-05-29
Payer: MEDICAID

## 2017-05-29 DIAGNOSIS — I48.0 PAROXYSMAL ATRIAL FIBRILLATION: ICD-10-CM

## 2017-05-29 DIAGNOSIS — Z79.01 LONG-TERM (CURRENT) USE OF ANTICOAGULANTS: ICD-10-CM

## 2017-05-29 DIAGNOSIS — I42.0 CARDIOMYOPATHY, DILATED, NONISCHEMIC: ICD-10-CM

## 2017-05-29 DIAGNOSIS — I50.9 ACUTE DECOMPENSATED HEART FAILURE: ICD-10-CM

## 2017-05-29 DIAGNOSIS — Z79.01 CHRONIC ANTICOAGULATION: ICD-10-CM

## 2017-05-29 LAB
INR PPP: 1.4
PROTHROMBIN TIME: 13.9 SEC

## 2017-05-29 RX ORDER — FUROSEMIDE 40 MG/1
TABLET ORAL
Qty: 120 TABLET | Refills: 3 | Status: SHIPPED | OUTPATIENT
Start: 2017-05-29 | End: 2017-10-21 | Stop reason: SDUPTHER

## 2017-05-30 NOTE — PROGRESS NOTES
Confirmed 11.25mg 5/18 and correct dose   Reports 1 missed dose 2 to 3 days ago    Reports diarrhea 2 days ago x1 day.   No new meds or diet change reported

## 2017-06-05 ENCOUNTER — OFFICE VISIT (OUTPATIENT)
Dept: NEPHROLOGY | Facility: CLINIC | Age: 47
End: 2017-06-05
Payer: MEDICAID

## 2017-06-05 VITALS
WEIGHT: 226.19 LBS | HEIGHT: 63 IN | BODY MASS INDEX: 40.08 KG/M2 | SYSTOLIC BLOOD PRESSURE: 130 MMHG | OXYGEN SATURATION: 97 % | HEART RATE: 81 BPM | DIASTOLIC BLOOD PRESSURE: 88 MMHG

## 2017-06-05 DIAGNOSIS — K21.9 GASTROESOPHAGEAL REFLUX DISEASE, ESOPHAGITIS PRESENCE NOT SPECIFIED: ICD-10-CM

## 2017-06-05 DIAGNOSIS — N18.30 CKD (CHRONIC KIDNEY DISEASE), STAGE III: Primary | ICD-10-CM

## 2017-06-05 DIAGNOSIS — I42.0 COCM (CONGESTIVE CARDIOMYOPATHY): ICD-10-CM

## 2017-06-05 DIAGNOSIS — I11.0 HYPERTENSIVE HEART DISEASE WITH HEART FAILURE: ICD-10-CM

## 2017-06-05 PROCEDURE — 99214 OFFICE O/P EST MOD 30 MIN: CPT | Mod: PBBFAC | Performed by: INTERNAL MEDICINE

## 2017-06-05 PROCEDURE — 99999 PR PBB SHADOW E&M-EST. PATIENT-LVL IV: CPT | Mod: PBBFAC,,, | Performed by: INTERNAL MEDICINE

## 2017-06-05 PROCEDURE — 99213 OFFICE O/P EST LOW 20 MIN: CPT | Mod: S$PBB,,, | Performed by: INTERNAL MEDICINE

## 2017-06-05 NOTE — PROGRESS NOTES
"Subjective:       Patient ID: Laure Ross is a 46 y.o. Black or  female who presents for a follow up evaluation of CKD  HPI   A 44 yo F with a history of NICM (EF 20%), paroxysmal atrial fibrillation, HTN, DM, asthma, HLD and CLARENCE. AICD replacement on 5/4/17. The patient had AL 4/2016 requiring RRT in on outside hospital due to hyperkalemia. Etiology at that time was thought to be most likely multifactorial due to ATN on top of cardiorenal pathophysiology. She now comes to be evaluated for possible CKD. Her most recent creatinine is mildly elevated at 1.4 mg/dl  The patient has no family history of kidney disease, no history of kidney stones. The patient does not freuqently use NSAIDS or herbal supplements.   Complains about a "runny nose" , no fever or chills.    Review of Systems   Constitutional: Negative for activity change, appetite change, chills, fatigue, fever and unexpected weight change.   HENT: Negative.  Negative for nosebleeds.    Eyes: Negative.    Respiratory: Positive for shortness of breath (improved). Negative for cough and chest tightness.    Cardiovascular: Negative.  Negative for chest pain and leg swelling.   Gastrointestinal: Negative for abdominal pain, anal bleeding, diarrhea, nausea and vomiting.   Genitourinary: Negative for decreased urine volume, difficulty urinating, dysuria, flank pain, frequency, hematuria, pelvic pain, urgency and vaginal bleeding.   Musculoskeletal: Negative.  Negative for joint swelling and myalgias.   Skin: Negative.  Negative for rash.   Neurological: Negative.    Psychiatric/Behavioral: Negative.    All other systems reviewed and are negative.      Objective:      Physical Exam   Constitutional: She is oriented to person, place, and time. She appears well-developed and well-nourished. No distress.   HENT:   Head: Normocephalic and atraumatic.   Right Ear: External ear normal.   Left Ear: External ear normal.   Nose: Nose normal. "   Mouth/Throat: Oropharynx is clear and moist.   Eyes: Conjunctivae and EOM are normal. Pupils are equal, round, and reactive to light.   Neck: Normal range of motion. Neck supple. No thyromegaly present.   Cardiovascular: Normal rate, regular rhythm and intact distal pulses.  Gallop: third heart sound.    Murmur (distant) heard.  Pulmonary/Chest: Effort normal and breath sounds normal. No respiratory distress. She has no wheezes. She has no rales. She exhibits no tenderness.   Abdominal: Soft. Normal appearance and bowel sounds are normal. She exhibits no distension. There is no tenderness. There is no rebound and no guarding.   Musculoskeletal: Normal range of motion. She exhibits no edema or tenderness.   Neurological: She is alert and oriented to person, place, and time. She has normal reflexes.   Skin: Skin is warm, dry and intact. She is not diaphoretic.   Psychiatric: She has a normal mood and affect. Her behavior is normal. Judgment and thought content normal.   Nursing note and vitals reviewed.      Assessment:       1. CKD (chronic kidney disease), stage III    2. COCM (congestive cardiomyopathy)    3. Hypertensive heart disease with heart failure        Plan:       1. CKD3: Likely multifactorial from long standing HTN and DM as well of episodes of AL in the past from cardiorenal pathophysisology. Her most recent creatinine was mildly increased We will send the patient for SPEP/EMMANUEL, UA and protein/creatinine ratio and uric acid level.  - continue to monitor  - the patient is still taking her omeprazole - will change to ranitidine - follow up with GI    Her ultrasound in the past demonstrated sign for chronic kidney disease.   - on lowdose ACE   - not on a statin?     Lab Results   Component Value Date    CREATININE 1.4 05/01/2017     Protein Creatinine Ratios: will repeat.    Prot/Creat Ratio, Ur   Date Value Ref Range Status   03/17/2017 0.18 0.00 - 0.20 Final   04/15/2016 0.43 (H) 0.00 - 0.20 Final      ·   ·   Acid-Base:   Lab Results   Component Value Date     05/01/2017    K 4.1 05/01/2017    CO2 30 (H) 05/01/2017     2. HTN: Blood pressures are on the low side (low EF)    3. Renal osteodystrophy: will check PTH   Lab Results   Component Value Date    .0 (H) 03/20/2017    CALCIUM 9.7 05/01/2017    PHOS 2.5 (L) 03/20/2017    PHOS 2.5 (L) 03/20/2017       4. Anemia: within target  Lab Results   Component Value Date    HGB 12.0 05/01/2017        5. DM:  Last HbA1C: well controlled with insulin.   Lab Results   Component Value Date    HGBA1C 6.1 03/17/2017       6. Lipid management: not within target.   Lab Results   Component Value Date    LDLCALC 122.6 04/27/2016        Follow up in 3 month with labs.

## 2017-06-08 ENCOUNTER — ANTI-COAG VISIT (OUTPATIENT)
Dept: CARDIOLOGY | Facility: CLINIC | Age: 47
End: 2017-06-08
Payer: MEDICAID

## 2017-06-08 DIAGNOSIS — Z79.01 CHRONIC ANTICOAGULATION: ICD-10-CM

## 2017-06-08 DIAGNOSIS — Z79.01 LONG TERM (CURRENT) USE OF ANTICOAGULANTS: Primary | ICD-10-CM

## 2017-06-08 DIAGNOSIS — I48.0 PAROXYSMAL ATRIAL FIBRILLATION: ICD-10-CM

## 2017-06-08 LAB — INR PPP: 2.9 (ref 2–3)

## 2017-06-08 PROCEDURE — 99211 OFF/OP EST MAY X REQ PHY/QHP: CPT | Mod: PBBFAC

## 2017-06-08 PROCEDURE — 99999 PR PBB SHADOW E&M-EST. PATIENT-LVL I: CPT | Mod: PBBFAC,,,

## 2017-06-08 PROCEDURE — 85610 PROTHROMBIN TIME: CPT | Mod: PBBFAC

## 2017-06-08 NOTE — PROGRESS NOTES
Patient came today for a quick follow-up appointment from her sub-therapeutic INR 5/29. Her INR improved nicely. She denies any bleeding or bruising complications today. She will continue this dose until follow-up in one week. This is her first INR within range and I want to ensure it continues. She mentioned starting antibiotics today however I do not see any changes in medications regarding antibiotics on her medication list. I advised her to contact us with the names of the antibiotics or if any changes or problems occur.

## 2017-06-14 DIAGNOSIS — K21.9 GASTROESOPHAGEAL REFLUX DISEASE, ESOPHAGITIS PRESENCE NOT SPECIFIED: ICD-10-CM

## 2017-06-14 RX ORDER — OMEPRAZOLE 40 MG/1
CAPSULE, DELAYED RELEASE ORAL
Qty: 30 CAPSULE | Refills: 6 | Status: SHIPPED | OUTPATIENT
Start: 2017-06-14 | End: 2018-01-10 | Stop reason: SDUPTHER

## 2017-06-15 ENCOUNTER — ANTI-COAG VISIT (OUTPATIENT)
Dept: CARDIOLOGY | Facility: CLINIC | Age: 47
End: 2017-06-15
Payer: MEDICAID

## 2017-06-15 DIAGNOSIS — Z79.01 LONG TERM (CURRENT) USE OF ANTICOAGULANTS: Primary | ICD-10-CM

## 2017-06-15 DIAGNOSIS — Z79.01 CHRONIC ANTICOAGULATION: ICD-10-CM

## 2017-06-15 DIAGNOSIS — I48.0 PAROXYSMAL ATRIAL FIBRILLATION: ICD-10-CM

## 2017-06-15 LAB — INR PPP: 1.8 (ref 2–3)

## 2017-06-15 PROCEDURE — 99999 PR PBB SHADOW E&M-EST. PATIENT-LVL I: CPT | Mod: PBBFAC,,,

## 2017-06-15 PROCEDURE — 99211 OFF/OP EST MAY X REQ PHY/QHP: CPT | Mod: PBBFAC

## 2017-06-15 PROCEDURE — 85610 PROTHROMBIN TIME: CPT | Mod: PBBFAC

## 2017-06-15 RX ORDER — DIGOXIN 125 MCG
0.12 TABLET ORAL DAILY
Qty: 30 TABLET | Refills: 5 | Status: SHIPPED | OUTPATIENT
Start: 2017-06-15 | End: 2017-12-29 | Stop reason: SDUPTHER

## 2017-06-15 NOTE — PROGRESS NOTES
Patient is on clindamycin for the past week. She increased her vitamin K foods causing her sub-therapeutic INR. She will take a boosted dose today and resume this dose until follow-up. If her INR remains low without any changes I would consider increasing her weekly dose. I advised her to contact us with any changes or problems.

## 2017-06-27 ENCOUNTER — ANTI-COAG VISIT (OUTPATIENT)
Dept: CARDIOLOGY | Facility: CLINIC | Age: 47
End: 2017-06-27
Payer: MEDICAID

## 2017-06-27 DIAGNOSIS — Z79.01 CHRONIC ANTICOAGULATION: Primary | ICD-10-CM

## 2017-06-27 DIAGNOSIS — I48.0 PAROXYSMAL ATRIAL FIBRILLATION: ICD-10-CM

## 2017-06-27 LAB — INR PPP: 3 (ref 2–3)

## 2017-06-27 PROCEDURE — 99999 PR PBB SHADOW E&M-EST. PATIENT-LVL I: CPT | Mod: PBBFAC,,,

## 2017-06-27 PROCEDURE — 85610 PROTHROMBIN TIME: CPT | Mod: PBBFAC

## 2017-06-27 PROCEDURE — 99211 OFF/OP EST MAY X REQ PHY/QHP: CPT | Mod: PBBFAC

## 2017-06-27 RX ORDER — INSULIN ASPART 100 [IU]/ML
INJECTION, SOLUTION INTRAVENOUS; SUBCUTANEOUS
Qty: 30 ML | Refills: 6 | Status: SHIPPED | OUTPATIENT
Start: 2017-06-27 | End: 2018-10-05 | Stop reason: SDUPTHER

## 2017-06-27 NOTE — PROGRESS NOTES
INR on upper end of range. Patient hasn't had any Boost drinks in the past week. Patient counseled that she has to be consistent with Boost drinks. She normally has them 3-4 times per week. She will resume them by this weekend (7/1). Patient advised to decrease dose if unable to resume them sooner than the weekend. Patient advised that she can also have glucerna or Ensure or a store brand if its cheaper. No other changes. No signs or symptoms of bleeding. Reevaluate in 2 weeks.

## 2017-07-03 ENCOUNTER — TELEPHONE (OUTPATIENT)
Dept: ELECTROPHYSIOLOGY | Facility: CLINIC | Age: 47
End: 2017-07-03

## 2017-07-03 NOTE — TELEPHONE ENCOUNTER
"I called and spoke to Mrs. Ross today.  Patient aware tht  wont be here 8-9-17  'Booked  Out".  I told patient to keep  Device  Appointment and we rescheduled  appointment with an ekg for 9-6-17. Patient verbalized understanding and confirmed appointment.  "

## 2017-07-06 ENCOUNTER — TELEPHONE (OUTPATIENT)
Dept: ENDOCRINOLOGY | Facility: CLINIC | Age: 47
End: 2017-07-06

## 2017-07-06 ENCOUNTER — HOSPITAL ENCOUNTER (EMERGENCY)
Facility: HOSPITAL | Age: 47
Discharge: HOME | End: 2017-07-06
Attending: FAMILY MEDICINE | Admitting: EMERGENCY MEDICINE
Payer: MEDICAID

## 2017-07-06 VITALS
RESPIRATION RATE: 20 BRPM | WEIGHT: 227 LBS | BODY MASS INDEX: 40.22 KG/M2 | HEIGHT: 63 IN | TEMPERATURE: 99 F | SYSTOLIC BLOOD PRESSURE: 120 MMHG | OXYGEN SATURATION: 100 % | DIASTOLIC BLOOD PRESSURE: 87 MMHG | HEART RATE: 85 BPM

## 2017-07-06 DIAGNOSIS — R53.83 FATIGUE: Primary | ICD-10-CM

## 2017-07-06 DIAGNOSIS — R06.00 DYSPNEA: ICD-10-CM

## 2017-07-06 DIAGNOSIS — N18.30 CKD (CHRONIC KIDNEY DISEASE), STAGE III: ICD-10-CM

## 2017-07-06 DIAGNOSIS — I42.8 NICM (NONISCHEMIC CARDIOMYOPATHY): ICD-10-CM

## 2017-07-06 LAB
ALBUMIN SERPL BCP-MCNC: 3.5 G/DL
ALP SERPL-CCNC: 68 U/L
ALT SERPL W/O P-5'-P-CCNC: 23 U/L
ANION GAP SERPL CALC-SCNC: 10 MMOL/L
AST SERPL-CCNC: 20 U/L
BASOPHILS # BLD AUTO: 0.03 K/UL
BASOPHILS NFR BLD: 0.3 %
BILIRUB SERPL-MCNC: 0.5 MG/DL
BNP SERPL-MCNC: 118 PG/ML
BUN SERPL-MCNC: 23 MG/DL
CALCIUM SERPL-MCNC: 9 MG/DL
CHLORIDE SERPL-SCNC: 103 MMOL/L
CO2 SERPL-SCNC: 27 MMOL/L
CREAT SERPL-MCNC: 1.5 MG/DL
DIFFERENTIAL METHOD: ABNORMAL
EOSINOPHIL # BLD AUTO: 0.1 K/UL
EOSINOPHIL NFR BLD: 1.1 %
ERYTHROCYTE [DISTWIDTH] IN BLOOD BY AUTOMATED COUNT: 15.5 %
EST. GFR  (AFRICAN AMERICAN): 47.5 ML/MIN/1.73 M^2
EST. GFR  (NON AFRICAN AMERICAN): 41.2 ML/MIN/1.73 M^2
GLUCOSE SERPL-MCNC: 93 MG/DL
HCT VFR BLD AUTO: 37.4 %
HGB BLD-MCNC: 12.4 G/DL
LYMPHOCYTES # BLD AUTO: 2.2 K/UL
LYMPHOCYTES NFR BLD: 23.4 %
MCH RBC QN AUTO: 27.2 PG
MCHC RBC AUTO-ENTMCNC: 33.2 %
MCV RBC AUTO: 82 FL
MONOCYTES # BLD AUTO: 0.9 K/UL
MONOCYTES NFR BLD: 9.9 %
NEUTROPHILS # BLD AUTO: 6 K/UL
NEUTROPHILS NFR BLD: 65 %
PLATELET # BLD AUTO: 290 K/UL
PMV BLD AUTO: 9.4 FL
POTASSIUM SERPL-SCNC: 4.1 MMOL/L
PROT SERPL-MCNC: 7 G/DL
RBC # BLD AUTO: 4.56 M/UL
SODIUM SERPL-SCNC: 140 MMOL/L
TROPONIN I SERPL DL<=0.01 NG/ML-MCNC: 0.03 NG/ML
WBC # BLD AUTO: 9.17 K/UL

## 2017-07-06 PROCEDURE — 93010 ELECTROCARDIOGRAM REPORT: CPT | Mod: ,,, | Performed by: INTERNAL MEDICINE

## 2017-07-06 PROCEDURE — 96374 THER/PROPH/DIAG INJ IV PUSH: CPT

## 2017-07-06 PROCEDURE — 99285 EMERGENCY DEPT VISIT HI MDM: CPT | Mod: ,,, | Performed by: EMERGENCY MEDICINE

## 2017-07-06 PROCEDURE — 85025 COMPLETE CBC W/AUTO DIFF WBC: CPT

## 2017-07-06 PROCEDURE — 99284 EMERGENCY DEPT VISIT MOD MDM: CPT | Mod: 25

## 2017-07-06 PROCEDURE — 63600175 PHARM REV CODE 636 W HCPCS: Performed by: FAMILY MEDICINE

## 2017-07-06 PROCEDURE — 84484 ASSAY OF TROPONIN QUANT: CPT

## 2017-07-06 PROCEDURE — 93005 ELECTROCARDIOGRAM TRACING: CPT

## 2017-07-06 PROCEDURE — 83880 ASSAY OF NATRIURETIC PEPTIDE: CPT

## 2017-07-06 PROCEDURE — 80053 COMPREHEN METABOLIC PANEL: CPT

## 2017-07-06 RX ORDER — FUROSEMIDE 10 MG/ML
40 INJECTION INTRAMUSCULAR; INTRAVENOUS
Status: COMPLETED | OUTPATIENT
Start: 2017-07-06 | End: 2017-07-06

## 2017-07-06 RX ADMIN — FUROSEMIDE 40 MG: 10 INJECTION, SOLUTION INTRAVENOUS at 09:07

## 2017-07-06 NOTE — TELEPHONE ENCOUNTER
Called a pt genie alcantar notified that she need to consult her PCP for Ventolin that can switch or no. Pt verbally understand.

## 2017-07-06 NOTE — TELEPHONE ENCOUNTER
----- Message from Inna Wylie sent at 7/6/2017  9:37 AM CDT -----  Contact: Pharmacy : ext [phone]  Pharmacy called and stated that the Xopenex is not covered by the pts insurance and is asking to change it to Ventolin.  Pharmacy can be reached at ext. [phone].  Please call.    Thanks

## 2017-07-07 NOTE — ED PROVIDER NOTES
"Encounter Date: 7/6/2017       History     Chief Complaint   Patient presents with    Fatigue     Feels tired and feels like she has fluid in her chest.     47yo female stating that she is "worried I got fluid on my lungs again".  States she thinks this because since being changed to generic omeprazole last week, she occasionally feels the need to belch.  No chest pain.  No edema.  No orthopnea.  She has been compliant with her meds and diet.  She cannot clarify specifically why she feels she has fluid on her lungs, only stating "well, I had it before so I figured I should make sure I don't have it again".            Review of patient's allergies indicates:  No Known Allergies  Past Medical History:   Diagnosis Date    Anticoagulant long-term use     Arthritis     Asthma     Asthma     Cardiomyopathy     CHF (congestive heart failure)     CHF (congestive heart failure)     GERD (gastroesophageal reflux disease)     H/O tubal ligation     Hypertension     Obesity     Renal disorder     Type 2 diabetes mellitus with hyperglycemia     Type 2 diabetes mellitus with polyneuropathy      Past Surgical History:   Procedure Laterality Date    CARDIAC DEFIBRILLATOR PLACEMENT      CHOLECYSTECTOMY      COLONOSCOPY N/A 5/2/2016    Procedure: COLONOSCOPY;  Surgeon: Eugene Soto MD;  Location: [address];  Service: Endoscopy;  Laterality: N/A;    GALLBLADDER SURGERY      iabp  4/2016    TUBAL LIGATION       History reviewed. No pertinent family history.  Social History   Substance Use Topics    Smoking status: Never Smoker    Smokeless tobacco: Never Used    Alcohol use No     Review of Systems   Constitutional: Negative for fever.   HENT: Negative for sore throat.    Respiratory: Negative for cough and shortness of breath.    Cardiovascular: Negative for chest pain.   Gastrointestinal: Negative for abdominal pain, diarrhea, nausea and vomiting.   Genitourinary: Negative for dysuria. "   Musculoskeletal: Negative for back pain and neck pain.   Skin: Negative for rash.   Neurological: Negative for syncope.       Physical Exam     Initial Vitals [07/06/17 1942]   BP Pulse Resp Temp SpO2   112/60 90 18 98.6 °F (37 °C) 96 %      MAP       77.33         Physical Exam    Constitutional: She appears well-developed and well-nourished. She is not diaphoretic. No distress.   HENT:   Head: Normocephalic and atraumatic.   Right Ear: External ear normal.   Left Ear: External ear normal.   Mouth/Throat: Oropharynx is clear and moist.   Eyes: Conjunctivae are normal. Pupils are equal, round, and reactive to light. Right eye exhibits no discharge. Left eye exhibits no discharge. No scleral icterus.   Neck: Normal range of motion. Neck supple. No JVD present.   Cardiovascular: Normal rate, regular rhythm and intact distal pulses. Exam reveals no gallop.    Pulmonary/Chest: Breath sounds normal. No stridor. No respiratory distress. She has no wheezes. She has no rhonchi. She has no rales.   Abdominal: Soft. Bowel sounds are normal. She exhibits no distension. There is no tenderness. There is no rebound.   Musculoskeletal: Normal range of motion. She exhibits no edema or tenderness.   Neurological: She is alert and oriented to person, place, and time.   Skin: Skin is warm and dry. No rash noted. No erythema.   Psychiatric: She has a normal mood and affect.         ED Course   Procedures  Labs Reviewed   CBC W/ AUTO DIFFERENTIAL - Abnormal; Notable for the following:        Result Value    RDW 15.5 (*)     All other components within normal limits   COMPREHENSIVE METABOLIC PANEL - Abnormal; Notable for the following:     BUN, Bld 23 (*)     Creatinine 1.5 (*)     eGFR if  47.5 (*)     eGFR if non  41.2 (*)     All other components within normal limits   B-TYPE NATRIURETIC PEPTIDE - Abnormal; Notable for the following:      (*)     All other components within normal limits    TROPONIN I     EKG Readings: (Independently Interpreted)   Paced rhythm       X-Rays:   Independently Interpreted Readings:   Other Readings:  Cxr:  No acute infiltrate, consolidation or effusion.      Medical Decision Making:   Initial Assessment:   Intermittent belching since change in her long-time ppi.  No cp/sob.  No change in edema.  Pt in no distress on exam.  Low suspicion for acute decompensated chf, acs, pe, ptx, pna, or other serious cause of sxs.  Screening studies ordered from intake show no acute findings and her bnp is lower than it has been in the past.  Reassured that she does not appear to be in chf at this time.  She is asymptomatic at the time of my eval.  She expresses relief to know this, as well as a desire to go home.  Advised to continue all meds as prescribed and follow up closely in clinic for any recurrent/worsening sxs.                     ED Course     Clinical Impression:   The primary encounter diagnosis was Fatigue. Diagnoses of Dyspnea, CKD (chronic kidney disease), stage III, and NICM (nonischemic cardiomyopathy) were also pertinent to this visit.                           Pasha Go MD  07/07/17 [id number]

## 2017-07-07 NOTE — ED PROVIDER NOTES
Encounter Date: 7/6/2017    SCRIBE #1 NOTE: I, Jagdish Salazar, am scribing for, and in the presence of, Dr. Crandall.       History     Chief Complaint   Patient presents with    Fatigue     Feels tired and feels like she has fluid in her chest.     Time seen by provider: 8:55 PM    This is a 46 y.o. female with history of asthma, CHF, DM2, HTN, GERD who presents with complaint of fatigue. She states that her chest feels full of fluid with associated nonproductive cough  She reports history of similar symptoms.  She reports some abdominal distension. She denies fever. She states taking omeprazole and Zantec yesterday but is not feeling any better. She states that yesterday she weighed herself in the morning to be 237 lbs and again 2 days  237 yesterday and 231 2 days ago.           Review of patient's allergies indicates:  No Known Allergies  Past Medical History:   Diagnosis Date    Anticoagulant long-term use     Arthritis     Asthma     Asthma     Cardiomyopathy     CHF (congestive heart failure)     CHF (congestive heart failure)     GERD (gastroesophageal reflux disease)     H/O tubal ligation     Hypertension     Obesity     Renal disorder     Type 2 diabetes mellitus with hyperglycemia     Type 2 diabetes mellitus with polyneuropathy      Past Surgical History:   Procedure Laterality Date    CARDIAC DEFIBRILLATOR PLACEMENT      CHOLECYSTECTOMY      COLONOSCOPY N/A 5/2/2016    Procedure: COLONOSCOPY;  Surgeon: Eugene Soto MD;  Location: [address]);  Service: Endoscopy;  Laterality: N/A;    GALLBLADDER SURGERY      iabp  4/2016    TUBAL LIGATION       History reviewed. No pertinent family history.  Social History   Substance Use Topics    Smoking status: Never Smoker    Smokeless tobacco: Never Used    Alcohol use No     Review of Systems    Physical Exam     Initial Vitals [07/06/17 1942]   BP Pulse Resp Temp SpO2   112/60 90 18 98.6 °F (37 °C) 96 %      MAP       77.33          Physical Exam    ED Course   Procedures  Labs Reviewed - No data to display                     Scribe Attestation:   Scribe #1: I performed the above scribed service and the documentation accurately describes the services I performed. I attest to the accuracy of the note.    Attending Attestation:           Physician Attestation for Scribe:  Physician Attestation Statement for Scribe #1: I, Dr. Crandall, reviewed documentation, as scribed by Jagdish Salazar in my presence, and it is both accurate and complete.                 ED Course     Clinical Impression:   The encounter diagnosis was Fatigue.

## 2017-07-07 NOTE — PROVIDER PROGRESS NOTES - EMERGENCY DEPT.
Encounter Date: 7/6/2017    ED Physician Progress Notes       SCRIBE NOTE: I, Jagdish Salazar, am scribing for, and in the presence of,  Dr. Crnadall.  Physician Statement: I, Dr. Crandall, personally performed the services described in this documentation as scribed by Jagdish Salazar in my presence, and it is both accurate and complete.     Physician Note:   Time seen by provider: 9:02 PM    This is a 46 y.o. female with multiple medical issues including hypertensive cardiomyopathy. She feels that she has gained over 4 lbs over the last 48 hours. She feels like she is starting to retain fluid. She denies fever chills. She has a nonproductive cough. No vomiting or diarrhea. She recently discontinued Omeprazole in favor of Zantac at her nephrologist's recommendation; however, the  Zantac has not been effective and as of tonight she had resumed the Omeprazole on her own. Her heart sounds are normal. I did not hear a gallop. Nevertheless, we will get appropriate cardiac labs, chest x-ray, EKG, and defer to main ED pod for disposition.   I initially evaluated this patient and ordered workup while in intake. The patient will receive a full evaluation in an ED pod when space is available. Results will be followed by the ED pod team.

## 2017-07-07 NOTE — ED TRIAGE NOTES
"Pt presents to the ED  C/o chest fullness ("water- build up"). Pt denies CP/SOA/N/V/D/chills/fever. Pt denies utilizing more pillows at night, sleeping supine, and is able to walk the same distance without increased WOB.     Pt states that "I just feel fatigued".     Pt c/o pain to her R side that began approx x2 days ago and pt rates pain as a 6/10 that is sharp intermittent pain.   "

## 2017-07-11 ENCOUNTER — ANTI-COAG VISIT (OUTPATIENT)
Dept: CARDIOLOGY | Facility: CLINIC | Age: 47
End: 2017-07-11
Payer: MEDICAID

## 2017-07-11 DIAGNOSIS — I48.0 PAROXYSMAL ATRIAL FIBRILLATION: ICD-10-CM

## 2017-07-11 DIAGNOSIS — Z79.01 CHRONIC ANTICOAGULATION: Primary | ICD-10-CM

## 2017-07-11 LAB — INR PPP: 2.2 (ref 2–3)

## 2017-07-11 PROCEDURE — 99999 PR PBB SHADOW E&M-EST. PATIENT-LVL I: CPT | Mod: PBBFAC,,,

## 2017-07-11 PROCEDURE — 85610 PROTHROMBIN TIME: CPT | Mod: PBBFAC

## 2017-07-11 PROCEDURE — 99211 OFF/OP EST MAY X REQ PHY/QHP: CPT | Mod: PBBFAC

## 2017-07-11 NOTE — PROGRESS NOTES
INR good. Patient confirmed resuming store brand Boost drinks about 4-5 days per week. No other changes. Maintain current dose and repeat INR in 3 weeks. Pt initially seen by student, Yumiko. I have reviewed the student's initial findings and agree with their assessment.  I have personally spoken with and assessed the patient in clinic to devise care plan.

## 2017-07-17 ENCOUNTER — TELEPHONE (OUTPATIENT)
Dept: PODIATRY | Facility: CLINIC | Age: 47
End: 2017-07-17

## 2017-07-17 NOTE — TELEPHONE ENCOUNTER
"Attempted to contact patient re request for appt today.  No answer and "voice mailbox has not been set up."  "

## 2017-07-20 RX ORDER — AMIODARONE HYDROCHLORIDE 200 MG/1
400 TABLET ORAL DAILY
Qty: 60 TABLET | Refills: 2 | Status: SHIPPED | OUTPATIENT
Start: 2017-07-20 | End: 2017-11-18 | Stop reason: SDUPTHER

## 2017-07-24 ENCOUNTER — TELEPHONE (OUTPATIENT)
Dept: PODIATRY | Facility: CLINIC | Age: 47
End: 2017-07-24

## 2017-07-24 ENCOUNTER — HOSPITAL ENCOUNTER (EMERGENCY)
Facility: OTHER | Age: 47
Discharge: HOME | End: 2017-07-24
Attending: EMERGENCY MEDICINE
Payer: MEDICAID

## 2017-07-24 VITALS
SYSTOLIC BLOOD PRESSURE: 142 MMHG | DIASTOLIC BLOOD PRESSURE: 87 MMHG | HEART RATE: 74 BPM | TEMPERATURE: 98 F | WEIGHT: 230 LBS | BODY MASS INDEX: 40.75 KG/M2 | OXYGEN SATURATION: 100 % | RESPIRATION RATE: 18 BRPM | HEIGHT: 63 IN

## 2017-07-24 DIAGNOSIS — R52 PAIN: ICD-10-CM

## 2017-07-24 DIAGNOSIS — N39.0 URINARY TRACT INFECTION WITHOUT HEMATURIA, SITE UNSPECIFIED: Primary | ICD-10-CM

## 2017-07-24 LAB
BILIRUBIN, POC UA: NEGATIVE
BLOOD, POC UA: ABNORMAL
CLARITY, POC UA: ABNORMAL
COLOR, POC UA: YELLOW
GLUCOSE, POC UA: NEGATIVE
KETONES, POC UA: NEGATIVE
LEUKOCYTE EST, POC UA: ABNORMAL
NITRITE, POC UA: POSITIVE
PH UR STRIP: 7 [PH]
PROTEIN, POC UA: NEGATIVE
SPECIFIC GRAVITY, POC UA: 1.01
UROBILINOGEN, POC UA: 1 E.U./DL

## 2017-07-24 PROCEDURE — 81003 URINALYSIS AUTO W/O SCOPE: CPT

## 2017-07-24 PROCEDURE — 99284 EMERGENCY DEPT VISIT MOD MDM: CPT

## 2017-07-24 PROCEDURE — 87086 URINE CULTURE/COLONY COUNT: CPT

## 2017-07-24 PROCEDURE — 87077 CULTURE AEROBIC IDENTIFY: CPT

## 2017-07-24 PROCEDURE — 87186 SC STD MICRODIL/AGAR DIL: CPT

## 2017-07-24 PROCEDURE — 87088 URINE BACTERIA CULTURE: CPT

## 2017-07-24 RX ORDER — HYDROCODONE BITARTRATE AND ACETAMINOPHEN 5; 325 MG/1; MG/1
1 TABLET ORAL EVERY 4 HOURS PRN
Qty: 18 TABLET | Refills: 0 | Status: SHIPPED | OUTPATIENT
Start: 2017-07-24 | End: 2017-09-06

## 2017-07-24 RX ORDER — NITROFURANTOIN 25; 75 MG/1; MG/1
100 CAPSULE ORAL 2 TIMES DAILY
Qty: 14 CAPSULE | Refills: 0 | Status: SHIPPED | OUTPATIENT
Start: 2017-07-24 | End: 2017-07-31

## 2017-07-24 NOTE — TELEPHONE ENCOUNTER
----- Message from Komal Green sent at 7/24/2017 12:52 PM CDT -----  Contact: [email or web address], [phone] or [phone]  Pt called in stating that a sore on her right foot was draining and she is not sure why. She said that she was contacted by a nurse last week, but did not get a chance to speak with her. Please return call as soon as possible to advise    Thank you

## 2017-07-25 NOTE — TELEPHONE ENCOUNTER
Called pt made aware  Next open day is 8/1/2017 with dr. Hampton we have no sooner appt  She can try lapalco  Or she can try [place]

## 2017-07-25 NOTE — ED PROVIDER NOTES
Encounter Date: 7/24/2017       History     Chief Complaint   Patient presents with    Flank Pain     right flank pain worse with movement x a few days, denies any problems urinating, also reports diabetic nerve pain in left hand and calus to bottom of right foot     This patient presents with a myriad of chronic medical complaints.  Her main complaint is right flank or costophrenic angle pain.  Been going on for months.  She denies any urinary symptomatology the state that she has an intermittent cough.  Additionally the patient has known diabetic neuropathy and is out of her pain medication for that condition.  Additionally the patient has a callus on the bottom of her foot that she like me to take care of.      The history is provided by the patient.   General Illness    The current episode started several weeks ago. Episode frequency: See above. Progression since onset: See above. The pain is at a severity of 4/10. Nothing relieves the symptoms. Nothing aggravates the symptoms. Associated symptoms include muscle aches and cough. Pertinent negatives include no fever, no abdominal pain (right CVA pain), no shortness of breath, no URI and no wheezing.     Review of patient's allergies indicates:  No Known Allergies  Past Medical History:   Diagnosis Date    Anticoagulant long-term use     Arthritis     Asthma     Asthma     Cardiomyopathy     CHF (congestive heart failure)     CHF (congestive heart failure)     GERD (gastroesophageal reflux disease)     H/O tubal ligation     Hypertension     Obesity     Renal disorder     Type 2 diabetes mellitus with hyperglycemia     Type 2 diabetes mellitus with polyneuropathy      Past Surgical History:   Procedure Laterality Date    CARDIAC DEFIBRILLATOR PLACEMENT      CHOLECYSTECTOMY      COLONOSCOPY N/A 5/2/2016    Procedure: COLONOSCOPY;  Surgeon: Eugene Soto MD;  Location: [facility] ([address] [place] [address]);  Service: Endoscopy;  Laterality: N/A;    GALLBLADDER  SURGERY      iabp  4/2016    TUBAL LIGATION       History reviewed. No pertinent family history.  Social History   Substance Use Topics    Smoking status: Never Smoker    Smokeless tobacco: Never Used    Alcohol use No     Review of Systems   Constitutional: Negative.  Negative for fever.   HENT: Negative.    Eyes: Negative.    Respiratory: Positive for cough. Negative for shortness of breath and wheezing.    Cardiovascular: Negative.    Gastrointestinal: Negative.  Negative for abdominal pain (right CVA pain).   Endocrine: Negative.    Genitourinary: Negative.    Musculoskeletal: Negative.    Skin: Negative.         Plantar wart   Allergic/Immunologic: Negative.    Neurological: Positive for numbness (known diabetic neuropathy to the lef).   Hematological: Negative.    Psychiatric/Behavioral: Negative.    All other systems reviewed and are negative.      Physical Exam     Initial Vitals [07/24/17 1839]   BP Pulse Resp Temp SpO2   (!) 140/85 73 18 98 °F (36.7 °C) 100 %      MAP       103.33         Physical Exam    Nursing note and vitals reviewed.  Constitutional: Vital signs are normal. She appears well-developed. She is active and cooperative.   HENT:   Head: Normocephalic and atraumatic.   Eyes: Conjunctivae, EOM and lids are normal. Pupils are equal, round, and reactive to light.   Neck: Trachea normal and full passive range of motion without pain. Neck supple. No thyroid mass present.   Cardiovascular: Normal rate, regular rhythm, S1 normal, S2 normal, normal heart sounds, intact distal pulses and normal pulses.   Pulmonary/Chest: Effort normal and breath sounds normal.   Abdominal: Soft. Normal appearance, normal aorta and bowel sounds are normal.   Musculoskeletal: Normal range of motion.        Thoracic back: She exhibits tenderness.        Back:         Arms:       Feet:    Lymphadenopathy:     She has no axillary adenopathy.   Neurological: She is alert and oriented to person, place, and time.    Skin: Skin is warm, dry and intact.   Psychiatric: She has a normal mood and affect. Her speech is normal and behavior is normal. Judgment and thought content normal. Cognition and memory are normal.         ED Course   Procedures  Labs Reviewed - No data to display          Medical Decision Making:   ED Management:  The patient denies any urinary symptoms and denies any fever.  Fishy be safely discharged and treated as an outpatient her urine will be sent for culture.     Imaging Results          X-Ray Chest PA And Lateral (Final result)  Result time 07/24/17 19:35:25    Final result by German Burks MD (07/24/17 19:35:25)                 Impression:        No evidence of acute intrathoracic disease. Unchanged densities seen on 2016 CT in the right upper lung.      Electronically signed by: GERMAN BURKS MD  Date:     07/24/17  Time:    19:35              Narrative:    Technique:  Chest PA and lateral radiographs    Comparison: 7/6/17    Findings:     Lung volumes are normal and symmetric. No new or concerning consolidations. Unchanged vague lobular density overlying the right upper lung zone. No pleural fluid or pneumothorax. The mediastinal structures are midline. The cardiac silhouette is normal in size. The osseous structures demonstrate no acute abnormality.                                     Results for orders placed or performed during the hospital encounter of 07/24/17   POCT URINALYSIS W/O SCOPE   Result Value Ref Range    Glucose, UA Negative (NG)     Bilirubin, UA Negative (NG)     Ketones, UA Negative (NG)     Spec Grav UA 1.015     Blood, UA 2+ (A)     PH, UA 7.0     Protein, UA Negative (NG)     Urobilinogen, UA 1.0 E.U./dL    Nitrite, UA Positive (P)     Leukocytes, UA 3+ (A)     Color, UA Yellow     Clarity, UA Slightly Cloudy                 ED Course     Clinical Impression:   The primary encounter diagnosis was Urinary tract infection without hematuria, site unspecified. A diagnosis of  Pain was also pertinent to this visit.                           Pasha Decker MD  07/24/17 [id number]

## 2017-07-27 LAB — BACTERIA UR CULT: NORMAL

## 2017-08-01 ENCOUNTER — OFFICE VISIT (OUTPATIENT)
Dept: PODIATRY | Facility: CLINIC | Age: 47
End: 2017-08-01
Payer: MEDICAID

## 2017-08-01 ENCOUNTER — ANTI-COAG VISIT (OUTPATIENT)
Dept: CARDIOLOGY | Facility: CLINIC | Age: 47
End: 2017-08-01
Payer: MEDICAID

## 2017-08-01 VITALS
HEART RATE: 69 BPM | WEIGHT: 229.81 LBS | SYSTOLIC BLOOD PRESSURE: 113 MMHG | HEIGHT: 63 IN | DIASTOLIC BLOOD PRESSURE: 70 MMHG | BODY MASS INDEX: 40.72 KG/M2

## 2017-08-01 DIAGNOSIS — L84 CORN OR CALLUS: ICD-10-CM

## 2017-08-01 DIAGNOSIS — L97.512 DIABETIC ULCER OF OTHER PART OF RIGHT FOOT ASSOCIATED WITH TYPE 2 DIABETES MELLITUS, WITH FAT LAYER EXPOSED: ICD-10-CM

## 2017-08-01 DIAGNOSIS — E11.621 DIABETIC ULCER OF OTHER PART OF RIGHT FOOT ASSOCIATED WITH TYPE 2 DIABETES MELLITUS, WITH FAT LAYER EXPOSED: ICD-10-CM

## 2017-08-01 DIAGNOSIS — I48.0 PAROXYSMAL ATRIAL FIBRILLATION: ICD-10-CM

## 2017-08-01 DIAGNOSIS — E11.42 TYPE 2 DIABETES MELLITUS WITH DIABETIC POLYNEUROPATHY, WITH LONG-TERM CURRENT USE OF INSULIN: Primary | ICD-10-CM

## 2017-08-01 DIAGNOSIS — B35.1 ONYCHOMYCOSIS DUE TO DERMATOPHYTE: ICD-10-CM

## 2017-08-01 DIAGNOSIS — L84 PRE-ULCERATIVE CALLUSES: ICD-10-CM

## 2017-08-01 DIAGNOSIS — Z79.01 CHRONIC ANTICOAGULATION: Primary | ICD-10-CM

## 2017-08-01 DIAGNOSIS — Z79.4 TYPE 2 DIABETES MELLITUS WITH DIABETIC POLYNEUROPATHY, WITH LONG-TERM CURRENT USE OF INSULIN: Primary | ICD-10-CM

## 2017-08-01 LAB — INR PPP: 5.2 (ref 2–3)

## 2017-08-01 PROCEDURE — 85610 PROTHROMBIN TIME: CPT | Mod: PBBFAC

## 2017-08-01 PROCEDURE — 3044F HG A1C LEVEL LT 7.0%: CPT | Mod: ,,, | Performed by: PODIATRIST

## 2017-08-01 PROCEDURE — 11042 DBRDMT SUBQ TIS 1ST 20SQCM/<: CPT | Mod: S$PBB,59,, | Performed by: PODIATRIST

## 2017-08-01 PROCEDURE — 99213 OFFICE O/P EST LOW 20 MIN: CPT | Mod: S$PBB,25,, | Performed by: PODIATRIST

## 2017-08-01 PROCEDURE — 99999 PR PBB SHADOW E&M-EST. PATIENT-LVL IV: CPT | Mod: PBBFAC,,, | Performed by: PODIATRIST

## 2017-08-01 PROCEDURE — 11056 PARNG/CUTG B9 HYPRKR LES 2-4: CPT | Mod: S$PBB,,, | Performed by: PODIATRIST

## 2017-08-01 PROCEDURE — 3066F NEPHROPATHY DOC TX: CPT | Mod: ,,, | Performed by: PODIATRIST

## 2017-08-01 PROCEDURE — 11721 DEBRIDE NAIL 6 OR MORE: CPT | Mod: S$PBB,59,, | Performed by: PODIATRIST

## 2017-08-01 PROCEDURE — 3008F BODY MASS INDEX DOCD: CPT | Mod: ,,, | Performed by: PODIATRIST

## 2017-08-01 PROCEDURE — 99999 PR PBB SHADOW E&M-EST. PATIENT-LVL I: CPT | Mod: PBBFAC,,,

## 2017-08-01 PROCEDURE — 99211 OFF/OP EST MAY X REQ PHY/QHP: CPT | Mod: PBBFAC,25,27

## 2017-08-01 NOTE — PROGRESS NOTES
INR is high. Patient was in ER last week with UTI. She was started on nitrofurantoin and hydrocodone. She only took a few doses of nitrofurantoin the stopped it out of concern for interaction with warfarin. We are not concerned with interaction with nitrofurantoin. Patient advised to call when starting antibiotics for instruction. INR likely elevated due to acute infection. Patient encouraged to resume antibiotics stat. Patient denies any other changes. No signs or symptoms of bleeding. Denies hematuria. Hold coumadin for 2 days and decrease dose for now. Repeat INR Monday

## 2017-08-01 NOTE — LETTER
August 3, 2017      Dandre Jules MD  [address] [facility] [address]           [facility]  [address] [facility]  [facility] [address]  Phone: [phone]          Patient: Laure Ross   MR Number: [id number]   YOB: 1970   Date of Visit: 8/1/2017       Dear Dr. Dandre Jules:    Thank you for referring Laure Ross to me for evaluation. Attached you will find relevant portions of my assessment and plan of care.    If you have questions, please do not hesitate to call me. I look forward to following Laure Ross along with you.    Sincerely,    Freddie Hampton, DPWILMAN    Enclosure  CC:  No Recipients    If you would like to receive this communication electronically, please contact [email or web address] or [phone] to request more information on Frequency Link access.    For providers and/or their staff who would like to refer a patient to [facility], please contact us through our one-stop-shop provider referral line, [facility], at [phone].    If you feel you have received this communication in error or would no longer like to receive these types of communications, please e-mail [email or web address]

## 2017-08-01 NOTE — PROCEDURES
"Wound Debridement  Date/Time: 8/1/2017 11:31 AM  Performed by: MAKSIM SAVAGE  Authorized by: MANINDER ASHBY     Time out: Immediately prior to procedure a "time out" was called to verify the correct patient, procedure, equipment, support staff and site/side marked as required.    Consent Done?:  Yes (Verbal)  Local anesthesia used?: No      Wound Details:    Location:  Right foot    Location:  Right 2nd Metatarsal Head    Type of Debridement:  Excisional       Length (cm):  0.5       Area (sq cm):  0.15       Width (cm):  0.3       Percent Debrided (%):  100       Depth (cm):  0.2       Total Area Debrided (sq cm):  0.15    Depth of debridement:  Subcutaneous tissue    Tissue debrided:  Dermis, Epidermis and Subcutaneous    Devitalized tissue debrided:  Callus, Fibrin and Sough    Instruments:  Blade and Forceps    Bleeding:  Minimal  Hemostasis Achieved: Yes    Method Used:  Pressure  Patient tolerance:  Patient tolerated the procedure well with no immediate complications     Dressed with marilyn, offloaded with football dressing consisting of ayah foam, three rolls cast padding and secured with coban. Ambulate in darco shoe only.      "

## 2017-08-01 NOTE — PROGRESS NOTES
Subjective:      Patient ID: Laure Ross is a 46 y.o. female.    Chief Complaint: Diabetes Mellitus (PCP Dr. Olmstead); Diabetic Foot Exam; Routine Foot Care; Foot Ulcer (right ft.); and Callouses    Laure is a 46 y.o. female who presents to the clinic for evaluation and treatment of high risk feet. Laure has a past medical history of Anticoagulant long-term use; Arthritis; Asthma; Asthma; Cardiomyopathy; CHF (congestive heart failure); CHF (congestive heart failure); GERD (gastroesophageal reflux disease); H/O tubal ligation; Hypertension; Obesity; Renal disorder; Type 2 diabetes mellitus with hyperglycemia; and Type 2 diabetes mellitus with polyneuropathy. The patient's chief complaint is large plantar calluses with noted drainage and discoloration to the area of the callus to the right foot over the last couple of weeks. History of recurrent forefoot ulcer over the last several years.  Also complains of long and painful fungal nails. This patient has documented high risk feet requiring routine maintenance secondary to peripheral neuropathy.    PCP: Ayan Olmstead MD    Date Last Seen by PCP: 3/29/17, irina    Current shoe gear:  Affected Foot: Flip-flops     Unaffected Foot: Flip-flops    Hemoglobin A1C   Date Value Ref Range Status   03/17/2017 6.1 4.5 - 6.2 % Final     Comment:     According to ADA guidelines, hemoglobin A1C <7.0% represents  optimal control in non-pregnant diabetic patients.  Different  metrics may apply to specific populations.   Standards of Medical Care in Diabetes - 2016.  For the purpose of screening for the presence of diabetes:  <5.7%     Consistent with the absence of diabetes  5.7-6.4%  Consistent with increasing risk for diabetes   (prediabetes)  >or=6.5%  Consistent with diabetes  Currently no consensus exists for use of hemoglobin A1C  for diagnosis of diabetes for children.     11/23/2016 6.1 4.5 - 6.2 % Final     Comment:     According to ADA guidelines,  hemoglobin A1C <7.0% represents  optimal control in non-pregnant diabetic patients.  Different  metrics may apply to specific populations.   Standards of Medical Care in Diabetes - 2016.  For the purpose of screening for the presence of diabetes:  <5.7%     Consistent with the absence of diabetes  5.7-6.4%  Consistent with increasing risk for diabetes   (prediabetes)  >or=6.5%  Consistent with diabetes  Currently no consensus exists for use of hemoglobin A1C  for diagnosis of diabetes for children.     08/23/2016 7.2 (H) 4.5 - 6.2 % Final     Comment:     According to ADA guidelines, hemoglobin A1C <7.0% represents  optimal control in non-pregnant diabetic patients.  Different  metrics may apply to specific populations.   Standards of Medical Care in Diabetes - 2016.  For the purpose of screening for the presence of diabetes:  <5.7%     Consistent with the absence of diabetes  5.7-6.4%  Consistent with increasing risk for diabetes   (prediabetes)  >or=6.5%  Consistent with diabetes  Currently no consensus exists for use of hemoglobin A1C  for diagnosis of diabetes for children.         Review of Systems   Constitution: Negative for chills, fever and malaise/fatigue.   Cardiovascular: Negative for chest pain, leg swelling, orthopnea and palpitations.   Respiratory: Negative for cough, shortness of breath and wheezing.    Skin: Positive for color change, dry skin and nail changes. Negative for itching, poor wound healing and rash.   Musculoskeletal: Negative for arthritis, gout, joint pain, joint swelling, muscle weakness and myalgias.   Neurological: Positive for numbness, paresthesias and sensory change. Negative for disturbances in coordination, dizziness, focal weakness and tremors.           Objective:      Physical Exam   Cardiovascular:   Dorsalis pedis and posterior tibial pulses are diminished Bilaterally. Toes are cool to touch. Feet are warm proximally.There is decreased digital hair. Skin is atrophic,  slightly hyperpigmented, and mildly edematous       Musculoskeletal:   Musculoskeletal:  Muscle strength is 5/5 in all groups bilaterally.  No pain with ankle, STJ or MTPJ ROM, bilat. Ankle dorsiflexion is limited with knees extended and flexed, bilat.     Neurological:   [place]-Anya 5.07 monofilamant testing is diminished both feet. Sharp/dull sensation diminished Bilaterally. Light touch absent Bilaterally.       Skin: Lesion noted.   Pre-ulcerative lesion at plantar 2nd MTPJ left foot. Dermal bruising and callus. No erythema, fluctuance or drainage    Discolored callus to plantar second MTPJ right foot, after debridement ulceration noted.    Ulcer Location: Right second metatarsal head plantar  Measurements: Pre debridement: callus with underlying discoloration,  0.5x0.3x0.2 post debridement  Periwound: Intact, hyperkeratotic  Drainage: serosanguinous.  Pus: None.  Malodor: None.  Base:  100% granular. 0% fibrin.  Signs of infection: None.    Toenails 1-5 bilaterally are elongated by 2-3 mm, thickened by 2-3 mm, discolored/yellowed, dystrophic, brittle with subungual debris. No incurvation. Mild xerosis noted, bilat.    Hyperkeratotic lesions noted dorsal PIPJ of the fifth toe bilateral                       Assessment:       Encounter Diagnoses   Name Primary?    Type 2 diabetes mellitus with diabetic polyneuropathy, with long-term current use of insulin Yes    Diabetic ulcer of other part of right foot associated with type 2 diabetes mellitus, with fat layer exposed     Pre-ulcerative calluses     Corn or callus     Onychomycosis due to dermatophyte          Plan:       Laure was seen today for diabetes mellitus, diabetic foot exam, routine foot care, foot ulcer and callouses.    Diagnoses and all orders for this visit:    Type 2 diabetes mellitus with diabetic polyneuropathy, with long-term current use of insulin    Diabetic ulcer of other part of right foot associated with type 2 diabetes mellitus,  with fat layer exposed    Pre-ulcerative calluses    Corn or callus    Onychomycosis due to dermatophyte      I counseled the patient on her conditions, their implications and medical management.    - Shoe inspection. Diabetic Foot Education. Patient reminded of the importance of good nutrition and blood sugar control to help prevent podiatric complications of diabetes. Patient instructed on proper foot hygeine. We discussed wearing proper shoe gear, daily foot inspections, never walking without protective shoe gear, never putting sharp instruments to feet, routine podiatric nail visits every 2-3 months.      - With patient's permission, nails were aggressively reduced and debrided x 10 to their soft tissue attachment mechanically and with electric , removing all offending nail and debris. Patient relates relief following the procedure. She will continue to monitor the areas daily, inspect her feet, wear protective shoe gear when ambulatory, moisturizer to maintain skin integrity     - After cleansing the  area w/ alcohol prep pad the above mentioned hyperkeratosis/pre ulcer lesions was trimmed x3 utilizing No 15 scapel, to a smooth base with out incident. Patient tolerated this  well and reported comfort to the area of plantar 2nd MTP joint, bilat. No drainage or abscess noted.    -Ulcer was debrided, see accompanying procedure note, dressed with marilyn and football dressing and offloaded with darco shoe.       Return to clinic in 1 week for continued wound care    Branden Moon DPM PGY-3    I have reviewed and concur with the resident's history, physical, assessment, and plan.  I have personally interviewed and examined the patient at bedside.  See below addendum for my evaluation and additional findings.    Obtained verbal consent from the patient for excisional wound debridement, right plantar forefoot. No anesthesia was required, Utilizing a sterile curet, all non-viable soft tissue was excised to  level of health subcutaneous tissue. A healthy appearing wound base was achieved with minimal blood loss. Hemostasis achieved with compression. Wound site was irrigated with normal saline and dressed. Wound care instructions were reviewed with the patient. Patient tolerated the procedure well.

## 2017-08-07 ENCOUNTER — ANTI-COAG VISIT (OUTPATIENT)
Dept: CARDIOLOGY | Facility: CLINIC | Age: 47
End: 2017-08-07
Payer: MEDICAID

## 2017-08-07 DIAGNOSIS — Z79.01 CHRONIC ANTICOAGULATION: Primary | ICD-10-CM

## 2017-08-07 DIAGNOSIS — I48.0 PAROXYSMAL ATRIAL FIBRILLATION: ICD-10-CM

## 2017-08-07 LAB — INR PPP: 2.8 (ref 2–3)

## 2017-08-07 PROCEDURE — 99211 OFF/OP EST MAY X REQ PHY/QHP: CPT | Mod: S$PBB,,,

## 2017-08-07 PROCEDURE — 85610 PROTHROMBIN TIME: CPT | Mod: PBBFAC,PO

## 2017-08-07 NOTE — PROGRESS NOTES
Patient here for close monitoring due to recent high INR. INR much better today. Patient actually missed an additional dose yesterday. She ran out of coumadin and could not get filled on Sunday. She has 2 doses left of nitrofurantoin. No other changes. No signs or symptoms of bleeding. We will decrease dose for now. Reevaluate next week.

## 2017-08-08 ENCOUNTER — OFFICE VISIT (OUTPATIENT)
Dept: PODIATRY | Facility: CLINIC | Age: 47
End: 2017-08-08
Payer: MEDICAID

## 2017-08-08 VITALS
DIASTOLIC BLOOD PRESSURE: 74 MMHG | SYSTOLIC BLOOD PRESSURE: 108 MMHG | HEIGHT: 63 IN | WEIGHT: 231.88 LBS | HEART RATE: 80 BPM | BODY MASS INDEX: 41.09 KG/M2

## 2017-08-08 DIAGNOSIS — E11.42 TYPE 2 DIABETES MELLITUS WITH DIABETIC POLYNEUROPATHY, WITH LONG-TERM CURRENT USE OF INSULIN: ICD-10-CM

## 2017-08-08 DIAGNOSIS — Z12.31 SCREENING MAMMOGRAM, ENCOUNTER FOR: Primary | ICD-10-CM

## 2017-08-08 DIAGNOSIS — E11.621 DIABETIC ULCER OF OTHER PART OF RIGHT FOOT ASSOCIATED WITH TYPE 2 DIABETES MELLITUS, WITH FAT LAYER EXPOSED: Primary | ICD-10-CM

## 2017-08-08 DIAGNOSIS — Z79.4 TYPE 2 DIABETES MELLITUS WITH DIABETIC POLYNEUROPATHY, WITH LONG-TERM CURRENT USE OF INSULIN: ICD-10-CM

## 2017-08-08 DIAGNOSIS — L97.512 DIABETIC ULCER OF OTHER PART OF RIGHT FOOT ASSOCIATED WITH TYPE 2 DIABETES MELLITUS, WITH FAT LAYER EXPOSED: Primary | ICD-10-CM

## 2017-08-08 PROCEDURE — 99499 UNLISTED E&M SERVICE: CPT | Mod: S$PBB,,, | Performed by: PODIATRIST

## 2017-08-08 PROCEDURE — 99999 PR PBB SHADOW E&M-EST. PATIENT-LVL IV: CPT | Mod: PBBFAC,,, | Performed by: PODIATRIST

## 2017-08-08 PROCEDURE — 99214 OFFICE O/P EST MOD 30 MIN: CPT | Mod: PBBFAC | Performed by: PODIATRIST

## 2017-08-08 PROCEDURE — 11042 DBRDMT SUBQ TIS 1ST 20SQCM/<: CPT | Mod: S$PBB,,, | Performed by: PODIATRIST

## 2017-08-08 PROCEDURE — 11042 DBRDMT SUBQ TIS 1ST 20SQCM/<: CPT | Mod: PBBFAC | Performed by: PODIATRIST

## 2017-08-08 NOTE — PROGRESS NOTES
Subjective:      Patient ID: Laure Ross is a 46 y.o. female.    Chief Complaint: Diabetes Mellitus (PCP Dr. Olmstead); Foot Ulcer; Follow-up; Wound Care; and Dressing Change    Laure is a 46 y.o. female who presents to the clinic for evaluation and treatment of high risk feet. Laure has a past medical history of Anticoagulant long-term use; Arthritis; Asthma; Asthma; Cardiomyopathy; CHF (congestive heart failure); CHF (congestive heart failure); GERD (gastroesophageal reflux disease); H/O tubal ligation; Hypertension; Obesity; Renal disorder; Type 2 diabetes mellitus with hyperglycemia; and Type 2 diabetes mellitus with polyneuropathy. The patient's chief complaint is large plantar calluses with noted drainage and discoloration to the area of the callus to the right foot over the last couple of weeks. History of recurrent forefoot ulcer over the last several years.  Also complains of long and painful fungal nails. This patient has documented high risk feet requiring routine maintenance secondary to peripheral neuropathy.    PCP: Ayan Olmstead MD    Date Last Seen by PCP: 3/29/17, irina    Current shoe gear:  Affected Foot: Flip-flops     Unaffected Foot: Flip-flops    Hemoglobin A1C   Date Value Ref Range Status   03/17/2017 6.1 4.5 - 6.2 % Final     Comment:     According to ADA guidelines, hemoglobin A1C <7.0% represents  optimal control in non-pregnant diabetic patients.  Different  metrics may apply to specific populations.   Standards of Medical Care in Diabetes - 2016.  For the purpose of screening for the presence of diabetes:  <5.7%     Consistent with the absence of diabetes  5.7-6.4%  Consistent with increasing risk for diabetes   (prediabetes)  >or=6.5%  Consistent with diabetes  Currently no consensus exists for use of hemoglobin A1C  for diagnosis of diabetes for children.     11/23/2016 6.1 4.5 - 6.2 % Final     Comment:     According to ADA guidelines, hemoglobin A1C <7.0%  represents  optimal control in non-pregnant diabetic patients.  Different  metrics may apply to specific populations.   Standards of Medical Care in Diabetes - 2016.  For the purpose of screening for the presence of diabetes:  <5.7%     Consistent with the absence of diabetes  5.7-6.4%  Consistent with increasing risk for diabetes   (prediabetes)  >or=6.5%  Consistent with diabetes  Currently no consensus exists for use of hemoglobin A1C  for diagnosis of diabetes for children.     08/23/2016 7.2 (H) 4.5 - 6.2 % Final     Comment:     According to ADA guidelines, hemoglobin A1C <7.0% represents  optimal control in non-pregnant diabetic patients.  Different  metrics may apply to specific populations.   Standards of Medical Care in Diabetes - 2016.  For the purpose of screening for the presence of diabetes:  <5.7%     Consistent with the absence of diabetes  5.7-6.4%  Consistent with increasing risk for diabetes   (prediabetes)  >or=6.5%  Consistent with diabetes  Currently no consensus exists for use of hemoglobin A1C  for diagnosis of diabetes for children.         Review of Systems   Constitution: Negative for chills, fever and malaise/fatigue.   Cardiovascular: Negative for chest pain, leg swelling, orthopnea and palpitations.   Respiratory: Negative for cough, shortness of breath and wheezing.    Skin: Positive for color change, dry skin and nail changes. Negative for itching, poor wound healing and rash.   Musculoskeletal: Negative for arthritis, gout, joint pain, joint swelling, muscle weakness and myalgias.   Neurological: Positive for numbness, paresthesias and sensory change. Negative for disturbances in coordination, dizziness, focal weakness and tremors.           Objective:      Physical Exam   Cardiovascular:   Dorsalis pedis and posterior tibial pulses are diminished Bilaterally. Toes are cool to touch. Feet are warm proximally.There is decreased digital hair. Skin is atrophic, slightly hyperpigmented,  and mildly edematous       Musculoskeletal:   Musculoskeletal:  Muscle strength is 5/5 in all groups bilaterally.  No pain with ankle, STJ or MTPJ ROM, bilat. Ankle dorsiflexion is limited with knees extended and flexed, bilat.     Neurological:   [place]-Anya 5.07 monofilamant testing is diminished both feet. Sharp/dull sensation diminished Bilaterally. Light touch absent Bilaterally.       Skin: Lesion noted.   Pre-ulcerative lesion at plantar 2nd MTPJ left foot. Dermal bruising and callus. No erythema, fluctuance or drainage    Discolored callus to plantar second MTPJ right foot, after debridement ulceration noted.    Ulcer Location: Right second metatarsal head plantar  Measurements: 0.2 x 0.2 x 0.1 post debridement: 0.2 x 0.2 x 0.2  Periwound: Intact, hyperkeratotic  Drainage: serosanguinous.  Pus: None.  Malodor: None.  Base:  100% granular. 0% fibrin.  Signs of infection: None.    Hyperkeratotic lesions noted dorsal PIPJ of the fifth toe bilateral                       Assessment:       Encounter Diagnoses   Name Primary?    Diabetic ulcer of other part of right foot associated with type 2 diabetes mellitus, with fat layer exposed Yes    Type 2 diabetes mellitus with diabetic polyneuropathy, with long-term current use of insulin          Plan:       Laure was seen today for diabetes mellitus, foot ulcer, follow-up, wound care and dressing change.    Diagnoses and all orders for this visit:    Diabetic ulcer of other part of right foot associated with type 2 diabetes mellitus, with fat layer exposed    Type 2 diabetes mellitus with diabetic polyneuropathy, with long-term current use of insulin      I counseled the patient on her conditions, their implications and medical management.    - Shoe inspection. Diabetic Foot Education. Patient reminded of the importance of good nutrition and blood sugar control to help prevent podiatric complications of diabetes. Patient instructed on proper foot hygeine. We  discussed wearing proper shoe gear, daily foot inspections, never walking without protective shoe gear, never putting sharp instruments to feet    - Ulcer debrided. Dressed with mepelex border. Offload with surgical shoe. Wound care instructions discussed. F/u 2 weeks.      Obtained verbal consent from the patient for excisional wound debridement, right plantar forefoot. No anesthesia was required, Utilizing a sterile curet, all non-viable soft tissue was excised to level of health subcutaneous tissue. A healthy appearing wound base was achieved with minimal blood loss. Hemostasis achieved with compression. Wound site was irrigated with normal saline and dressed. Wound care instructions were reviewed with the patient. Patient tolerated the procedure well.

## 2017-08-10 ENCOUNTER — CLINICAL SUPPORT (OUTPATIENT)
Dept: ELECTROPHYSIOLOGY | Facility: CLINIC | Age: 47
End: 2017-08-10
Payer: MEDICAID

## 2017-08-10 DIAGNOSIS — I44.7 LBBB (LEFT BUNDLE BRANCH BLOCK): ICD-10-CM

## 2017-08-10 PROCEDURE — 93296 REM INTERROG EVL PM/IDS: CPT | Mod: PBBFAC | Performed by: INTERNAL MEDICINE

## 2017-08-10 PROCEDURE — 93295 DEV INTERROG REMOTE 1/2/MLT: CPT | Mod: ,,, | Performed by: INTERNAL MEDICINE

## 2017-08-11 ENCOUNTER — LAB VISIT (OUTPATIENT)
Dept: LAB | Facility: HOSPITAL | Age: 47
End: 2017-08-11
Attending: INTERNAL MEDICINE
Payer: MEDICAID

## 2017-08-11 DIAGNOSIS — I50.22 CHRONIC SYSTOLIC CONGESTIVE HEART FAILURE: ICD-10-CM

## 2017-08-11 LAB
ALBUMIN SERPL BCP-MCNC: 3.5 G/DL
ALP SERPL-CCNC: 78 U/L
ALT SERPL W/O P-5'-P-CCNC: 25 U/L
ANION GAP SERPL CALC-SCNC: 10 MMOL/L
AST SERPL-CCNC: 22 U/L
BILIRUB SERPL-MCNC: 0.6 MG/DL
BNP SERPL-MCNC: 245 PG/ML
BUN SERPL-MCNC: 17 MG/DL
CALCIUM SERPL-MCNC: 9 MG/DL
CHLORIDE SERPL-SCNC: 103 MMOL/L
CO2 SERPL-SCNC: 28 MMOL/L
CREAT SERPL-MCNC: 1.3 MG/DL
EST. GFR  (AFRICAN AMERICAN): 56.5 ML/MIN/1.73 M^2
EST. GFR  (NON AFRICAN AMERICAN): 49 ML/MIN/1.73 M^2
GLUCOSE SERPL-MCNC: 103 MG/DL
POTASSIUM SERPL-SCNC: 3.8 MMOL/L
PROT SERPL-MCNC: 7 G/DL
SODIUM SERPL-SCNC: 141 MMOL/L

## 2017-08-11 PROCEDURE — 83880 ASSAY OF NATRIURETIC PEPTIDE: CPT

## 2017-08-11 PROCEDURE — 36415 COLL VENOUS BLD VENIPUNCTURE: CPT

## 2017-08-11 PROCEDURE — 80053 COMPREHEN METABOLIC PANEL: CPT

## 2017-08-14 ENCOUNTER — OFFICE VISIT (OUTPATIENT)
Dept: TRANSPLANT | Facility: CLINIC | Age: 47
End: 2017-08-14
Payer: MEDICAID

## 2017-08-14 VITALS
DIASTOLIC BLOOD PRESSURE: 56 MMHG | WEIGHT: 233.25 LBS | SYSTOLIC BLOOD PRESSURE: 117 MMHG | HEART RATE: 74 BPM | HEIGHT: 63 IN | BODY MASS INDEX: 41.33 KG/M2

## 2017-08-14 DIAGNOSIS — I50.22 CHRONIC SYSTOLIC CONGESTIVE HEART FAILURE: Primary | ICD-10-CM

## 2017-08-14 DIAGNOSIS — I42.8 NICM (NONISCHEMIC CARDIOMYOPATHY): ICD-10-CM

## 2017-08-14 DIAGNOSIS — E66.9 OBESITY (BMI 30-39.9): ICD-10-CM

## 2017-08-14 DIAGNOSIS — Z79.4 TYPE 2 DIABETES MELLITUS WITH DIABETIC POLYNEUROPATHY, WITH LONG-TERM CURRENT USE OF INSULIN: Chronic | ICD-10-CM

## 2017-08-14 DIAGNOSIS — G47.33 OSA (OBSTRUCTIVE SLEEP APNEA): ICD-10-CM

## 2017-08-14 DIAGNOSIS — E11.42 TYPE 2 DIABETES MELLITUS WITH DIABETIC POLYNEUROPATHY, WITH LONG-TERM CURRENT USE OF INSULIN: Chronic | ICD-10-CM

## 2017-08-14 DIAGNOSIS — N18.30 CKD (CHRONIC KIDNEY DISEASE), STAGE III: ICD-10-CM

## 2017-08-14 PROCEDURE — 3066F NEPHROPATHY DOC TX: CPT | Mod: ,,, | Performed by: INTERNAL MEDICINE

## 2017-08-14 PROCEDURE — 99213 OFFICE O/P EST LOW 20 MIN: CPT | Mod: PBBFAC | Performed by: INTERNAL MEDICINE

## 2017-08-14 PROCEDURE — 3008F BODY MASS INDEX DOCD: CPT | Mod: ,,, | Performed by: INTERNAL MEDICINE

## 2017-08-14 PROCEDURE — 99214 OFFICE O/P EST MOD 30 MIN: CPT | Mod: S$PBB,,, | Performed by: INTERNAL MEDICINE

## 2017-08-14 PROCEDURE — 3044F HG A1C LEVEL LT 7.0%: CPT | Mod: ,,, | Performed by: INTERNAL MEDICINE

## 2017-08-14 PROCEDURE — 99999 PR PBB SHADOW E&M-EST. PATIENT-LVL III: CPT | Mod: PBBFAC,,, | Performed by: INTERNAL MEDICINE

## 2017-08-14 RX ORDER — POTASSIUM CHLORIDE 750 MG/1
CAPSULE, EXTENDED RELEASE ORAL
Qty: 60 CAPSULE | Refills: 3 | Status: SHIPPED | OUTPATIENT
Start: 2017-08-14 | End: 2017-12-13 | Stop reason: SDUPTHER

## 2017-08-14 NOTE — LETTER
August 14, 2017        Ayan Olmstead  [address] [place] [id number]  Phone: [phone]  Fax: [phone]             [facility] [address] [place] [address]  Phone: [phone]   Patient: Laure Ross   MR Number: [id number]   YOB: 1970   Date of Visit: 8/14/2017       Dear Dr. Ayan Olmstead    Thank you for referring Laure Ross to me for evaluation. Attached you will find relevant portions of my assessment and plan of care.    If you have questions, please do not hesitate to call me. I look forward to following Laure Ross along with you.    Sincerely,    Dandre Jules MD    Enclosure    If you would like to receive this communication electronically, please contact [email or web address] or [phone] to request [employer] access.    [employer] is a tool which provides read-only access to select patient information with whom you have a relationship. Its easy to use and provides real time access to review your patients record including encounter summaries, notes, results, and demographic information.    If you feel you have received this communication in error or would no longer like to receive these types of communications, please e-mail [email or web address]

## 2017-08-14 NOTE — PROGRESS NOTES
Subjective:     HPI:  Ms. Ross is a very pleasant 46 yo F with a history of NICM (EF 10-20%), paroxysmal atrial fibrillation, HTN, DM, asthma, HLD and CLARENCE who originally presented as a transfer from [facility] for ADHF and AF with RVR. Despite diuresis, she decompensated further with worsening hemodynamics, progressive AL and hepatic congestion, requiring inotropes and eventually an IABP, which was in from 4/22/16 to 4/25/16. She was aggressively diuresed with significant clinical improvement. A PICC line was placed in anticipation for home  but after further diuresis and afterload reduction, a repeat echo showed improved LV and RV size and function, so  was weaned from 5 to 2.5 mcg/jg/min and a RHC was obtained with a CI of 2.8 on  2.5.  was stopped on 5/6/16 which she clinically tolerated, feeling well with normal BP and normal/stable Cr and Tbili. For AF, she was loaded with IV Amiodarone and transitioned to PO.  She continues to do well. More recently had a CRT upgrade by Dr. Jolly. NYHA class II with occasional III symptoms. No  PND or orthopnea.  Labs today reviewed.     Past Medical History:   Diagnosis Date    Anticoagulant long-term use     Arthritis     Asthma     Asthma     Cardiomyopathy     CHF (congestive heart failure)     CHF (congestive heart failure)     GERD (gastroesophageal reflux disease)     H/O tubal ligation     Hypertension     Obesity     Renal disorder     Type 2 diabetes mellitus with hyperglycemia     Type 2 diabetes mellitus with polyneuropathy      Past Surgical History:   Procedure Laterality Date    CARDIAC DEFIBRILLATOR PLACEMENT      CHOLECYSTECTOMY      COLONOSCOPY N/A 5/2/2016    Procedure: COLONOSCOPY;  Surgeon: Eugene Soto MD;  Location: [facility] ([address] [place] [address]);  Service: Endoscopy;  Laterality: N/A;    GALLBLADDER SURGERY      iabp  4/2016    TUBAL LIGATION       OB History     No data available        Review of Systems  "  Constitution: Negative. Negative for chills, decreased appetite, diaphoresis, fever, weakness, malaise/fatigue, night sweats, weight gain and weight loss.   Eyes: Negative.    Cardiovascular: Positive for dyspnea on exertion. Negative for chest pain, claudication, cyanosis, irregular heartbeat, leg swelling, near-syncope, orthopnea, palpitations, paroxysmal nocturnal dyspnea and syncope.   Respiratory: Negative for cough, hemoptysis and shortness of breath.    Endocrine: Negative.    Hematologic/Lymphatic: Negative.    Skin: Negative for color change, dry skin and nail changes.   Musculoskeletal: Negative.    Gastrointestinal: Negative.    Genitourinary: Negative.        Objective:   Blood pressure (!) 117/56, pulse 74, height 5' 3" (1.6 m), weight 105.8 kg (233 lb 4 oz), last menstrual period 07/19/2017.body mass index is 41.32 kg/m².  Physical Exam   Constitutional: She is oriented to person, place, and time. Vital signs are normal. She appears well-developed and well-nourished.   HENT:   Head: Normocephalic.   Eyes: Pupils are equal, round, and reactive to light.   Neck: Neck supple. No JVD present.   Cardiovascular: Normal rate, regular rhythm and normal heart sounds.  PMI is displaced.  Exam reveals no gallop and no friction rub.    No murmur heard.  Pulmonary/Chest: Effort normal and breath sounds normal. She has no wheezes. She has no rales.   Abdominal: Soft. Bowel sounds are normal. She exhibits no distension. There is no tenderness. There is no rebound.   Musculoskeletal: She exhibits no edema.   Neurological: She is alert and oriented to person, place, and time.   Nursing note and vitals reviewed.      Labs:    Chemistry        Component Value Date/Time     08/11/2017 1646    K 3.8 08/11/2017 1646     08/11/2017 1646    CO2 28 08/11/2017 1646    BUN 17 08/11/2017 1646    CREATININE 1.3 08/11/2017 1646     08/11/2017 1646        Component Value Date/Time    CALCIUM 9.0 08/11/2017 1646 "    ALKPHOS 78 08/11/2017 1646    AST 22 08/11/2017 1646    ALT 25 08/11/2017 1646    BILITOT 0.6 08/11/2017 1646    ESTGFRAFRICA 56.5 (A) 08/11/2017 1646    EGFRNONAA 49.0 (A) 08/11/2017 1646          Magnesium   Date Value Ref Range Status   03/20/2017 2.5 1.6 - 2.6 mg/dL Final     Lab Results   Component Value Date    WBC 9.17 07/06/2017    HGB 12.4 07/06/2017    HCT 37.4 07/06/2017     07/06/2017     Lab Results   Component Value Date    INR 2.8 08/07/2017    INR 5.2 08/01/2017    INR 2.2 07/11/2017     BNP   Date Value Ref Range Status   08/11/2017 245 (H) 0 - 99 pg/mL Final     Comment:     Values of less than 100 pg/ml are consistent with non-CHF populations.   07/06/2017 118 (H) 0 - 99 pg/mL Final     Comment:     Values of less than 100 pg/ml are consistent with non-CHF populations.   02/13/2017 146 (H) 0 - 99 pg/mL Final     Comment:     Values of less than 100 pg/ml are consistent with non-CHF populations.     LD   Date Value Ref Range Status   04/14/2016 827 (H) 110 - 260 U/L Final     Comment:     Results are increased in hemolyzed samples.     No results found for this or any previous visit.  No results found for this or any previous visit.    Assessment:      1. Chronic systolic congestive heart failure    2. NICM (nonischemic cardiomyopathy)    3. Obesity (BMI 30-39.9)    4. CLARENCE (obstructive sleep apnea)    5. Type 2 diabetes mellitus with diabetic polyneuropathy, with long-term current use of insulin    6. CKD (chronic kidney disease), stage III        Plan:   Clinically continues to do well. Euvolemic on exam. Off inotropes.  So far she remains stable on Lisinopril and Aldactone.  Owing to her asthma Did not tolerate Bisoprolol.   Recommend 2 gram sodium restriction and 1500cc fluid restriction.  Encourage physical activity with graded exercise program.  Requested patient to weigh themselves daily, and to notify us if their weight increases by more than 3 lbs in 1 day or 5 lbs in 1 week.     RTC in 6 months with labs  with melinda Jules MD

## 2017-08-22 ENCOUNTER — HOSPITAL ENCOUNTER (OUTPATIENT)
Dept: RADIOLOGY | Facility: HOSPITAL | Age: 47
Discharge: HOME | End: 2017-08-22
Attending: INTERNAL MEDICINE
Payer: MEDICAID

## 2017-08-22 DIAGNOSIS — Z12.31 SCREENING MAMMOGRAM, ENCOUNTER FOR: ICD-10-CM

## 2017-08-22 PROCEDURE — 77067 SCR MAMMO BI INCL CAD: CPT | Mod: 26,,, | Performed by: RADIOLOGY

## 2017-08-22 PROCEDURE — 77067 SCR MAMMO BI INCL CAD: CPT | Mod: TC

## 2017-08-29 ENCOUNTER — OFFICE VISIT (OUTPATIENT)
Dept: PODIATRY | Facility: CLINIC | Age: 47
End: 2017-08-29
Payer: MEDICAID

## 2017-08-29 ENCOUNTER — ANTI-COAG VISIT (OUTPATIENT)
Dept: CARDIOLOGY | Facility: CLINIC | Age: 47
End: 2017-08-29
Payer: MEDICAID

## 2017-08-29 VITALS
WEIGHT: 235.13 LBS | HEART RATE: 78 BPM | HEIGHT: 63 IN | SYSTOLIC BLOOD PRESSURE: 108 MMHG | DIASTOLIC BLOOD PRESSURE: 71 MMHG | BODY MASS INDEX: 41.66 KG/M2

## 2017-08-29 DIAGNOSIS — Z79.01 CHRONIC ANTICOAGULATION: Primary | ICD-10-CM

## 2017-08-29 DIAGNOSIS — Z79.01 LONG TERM (CURRENT) USE OF ANTICOAGULANTS: ICD-10-CM

## 2017-08-29 DIAGNOSIS — I48.0 PAROXYSMAL ATRIAL FIBRILLATION: ICD-10-CM

## 2017-08-29 DIAGNOSIS — M25.376 INSTABILITY OF FOOT JOINT, UNSPECIFIED LATERALITY: ICD-10-CM

## 2017-08-29 DIAGNOSIS — E11.42 TYPE 2 DIABETES MELLITUS WITH DIABETIC POLYNEUROPATHY, WITH LONG-TERM CURRENT USE OF INSULIN: Primary | ICD-10-CM

## 2017-08-29 DIAGNOSIS — Z86.31 HISTORY OF DIABETIC ULCER OF FOOT: ICD-10-CM

## 2017-08-29 DIAGNOSIS — M62.469 GASTROCNEMIUS EQUINUS, UNSPECIFIED LATERALITY: ICD-10-CM

## 2017-08-29 DIAGNOSIS — Z79.4 TYPE 2 DIABETES MELLITUS WITH DIABETIC POLYNEUROPATHY, WITH LONG-TERM CURRENT USE OF INSULIN: Primary | ICD-10-CM

## 2017-08-29 LAB — INR PPP: 2 (ref 2–3)

## 2017-08-29 PROCEDURE — 99213 OFFICE O/P EST LOW 20 MIN: CPT | Mod: S$PBB,25,, | Performed by: PODIATRIST

## 2017-08-29 PROCEDURE — 99999 PR PBB SHADOW E&M-EST. PATIENT-LVL IV: CPT | Mod: PBBFAC,,, | Performed by: PODIATRIST

## 2017-08-29 PROCEDURE — 99999 PR PBB SHADOW E&M-EST. PATIENT-LVL I: CPT | Mod: PBBFAC,,, | Performed by: PHARMACIST

## 2017-08-29 PROCEDURE — 3066F NEPHROPATHY DOC TX: CPT | Mod: ,,, | Performed by: PODIATRIST

## 2017-08-29 PROCEDURE — 99214 OFFICE O/P EST MOD 30 MIN: CPT | Mod: PBBFAC,25 | Performed by: PODIATRIST

## 2017-08-29 PROCEDURE — 3044F HG A1C LEVEL LT 7.0%: CPT | Mod: ,,, | Performed by: PODIATRIST

## 2017-08-29 PROCEDURE — 11056 PARNG/CUTG B9 HYPRKR LES 2-4: CPT | Mod: PBBFAC | Performed by: PODIATRIST

## 2017-08-29 PROCEDURE — 3008F BODY MASS INDEX DOCD: CPT | Mod: ,,, | Performed by: PODIATRIST

## 2017-08-29 PROCEDURE — 99211 OFF/OP EST MAY X REQ PHY/QHP: CPT | Mod: PBBFAC,27 | Performed by: PHARMACIST

## 2017-08-29 PROCEDURE — 85610 PROTHROMBIN TIME: CPT | Mod: PBBFAC | Performed by: PHARMACIST

## 2017-08-29 PROCEDURE — 11056 PARNG/CUTG B9 HYPRKR LES 2-4: CPT | Mod: S$PBB,,, | Performed by: PODIATRIST

## 2017-08-29 RX ORDER — GABAPENTIN 300 MG/1
300 CAPSULE ORAL NIGHTLY
Qty: 90 CAPSULE | Refills: 3 | Status: SHIPPED | OUTPATIENT
Start: 2017-08-29 | End: 2018-08-03

## 2017-08-29 NOTE — PROGRESS NOTES
Subjective:      Patient ID: Laure Ross is a 46 y.o. female.    Chief Complaint: Diabetes Mellitus (PCP Dr. Olmstead); Foot Ulcer; Follow-up; Foot Problem (right ft.); and Foot Pain    Laure is a 46 y.o. female who presents to the clinic for evaluation and treatment of high risk feet. Laure has a past medical history of Anticoagulant long-term use; Arthritis; Asthma; Asthma; Cardiomyopathy; CHF (congestive heart failure); CHF (congestive heart failure); GERD (gastroesophageal reflux disease); H/O tubal ligation; Hypertension; Obesity; Renal disorder; Type 2 diabetes mellitus with hyperglycemia; and Type 2 diabetes mellitus with polyneuropathy. The patient's chief complaint is large plantar calluses. Notes that wound has remained healed. History of recurrent forefoot ulcer over the last several years.  Also complains of long and painful fungal nails. Complaining of increased burning pain to dorsal feet when in bed at night. She stopped taking gabapentin about a year ago. This patient has documented high risk feet requiring routine maintenance secondary to peripheral neuropathy.    PCP: Ayan Olmstead MD    Date Last Seen by PCP: 3/29/17, [facility]  Cardiology, Dr. Jolly, 9/6/17    Current shoe gear:  DM shoes, inserts    Hemoglobin A1C   Date Value Ref Range Status   03/17/2017 6.1 4.5 - 6.2 % Final     Comment:     According to ADA guidelines, hemoglobin A1C <7.0% represents  optimal control in non-pregnant diabetic patients.  Different  metrics may apply to specific populations.   Standards of Medical Care in Diabetes - 2016.  For the purpose of screening for the presence of diabetes:  <5.7%     Consistent with the absence of diabetes  5.7-6.4%  Consistent with increasing risk for diabetes   (prediabetes)  >or=6.5%  Consistent with diabetes  Currently no consensus exists for use of hemoglobin A1C  for diagnosis of diabetes for children.     11/23/2016 6.1 4.5 - 6.2 % Final     Comment:     According to  ADA guidelines, hemoglobin A1C <7.0% represents  optimal control in non-pregnant diabetic patients.  Different  metrics may apply to specific populations.   Standards of Medical Care in Diabetes - 2016.  For the purpose of screening for the presence of diabetes:  <5.7%     Consistent with the absence of diabetes  5.7-6.4%  Consistent with increasing risk for diabetes   (prediabetes)  >or=6.5%  Consistent with diabetes  Currently no consensus exists for use of hemoglobin A1C  for diagnosis of diabetes for children.     08/23/2016 7.2 (H) 4.5 - 6.2 % Final     Comment:     According to ADA guidelines, hemoglobin A1C <7.0% represents  optimal control in non-pregnant diabetic patients.  Different  metrics may apply to specific populations.   Standards of Medical Care in Diabetes - 2016.  For the purpose of screening for the presence of diabetes:  <5.7%     Consistent with the absence of diabetes  5.7-6.4%  Consistent with increasing risk for diabetes   (prediabetes)  >or=6.5%  Consistent with diabetes  Currently no consensus exists for use of hemoglobin A1C  for diagnosis of diabetes for children.         Review of Systems   Constitution: Negative for chills, fever and malaise/fatigue.   Cardiovascular: Negative for chest pain, leg swelling, orthopnea and palpitations.   Respiratory: Negative for cough, shortness of breath and wheezing.    Skin: Positive for color change, dry skin and nail changes. Negative for itching, poor wound healing and rash.   Musculoskeletal: Negative for arthritis, gout, joint pain, joint swelling, muscle weakness and myalgias.   Neurological: Positive for numbness, paresthesias and sensory change. Negative for disturbances in coordination, dizziness, focal weakness and tremors.           Objective:      Physical Exam   Cardiovascular:   Dorsalis pedis and posterior tibial pulses are diminished Bilaterally. Toes are cool to touch. Feet are warm proximally.There is decreased digital hair. Skin  is atrophic, slightly hyperpigmented, and mildly edematous       Musculoskeletal:   Musculoskeletal:  Muscle strength is 5/5 in all groups bilaterally.  No pain with ankle, STJ or MTPJ ROM, bilat. Ankle dorsiflexion is limited with knees extended and flexed, bilat.     Neurological:   Hildreth-Anya 5.07 monofilamant testing is diminished both feet. Sharp/dull sensation diminished Bilaterally. Light touch absent Bilaterally.       Skin: Lesion noted.   Pre ulcer, hyperkeratotic lesions noted plantar 2nd MTPJ and dorsal PIPJ of the fifth toe bilateral                       Assessment:       Encounter Diagnoses   Name Primary?    Type 2 diabetes mellitus with diabetic polyneuropathy, with long-term current use of insulin Yes    Instability of foot joint, unspecified laterality     Gastrocnemius equinus, unspecified laterality     Long term (current) use of anticoagulants     History of diabetic ulcer of foot - Right Foot          Plan:       Laure was seen today for diabetes mellitus, foot ulcer, follow-up, foot problem and foot pain.    Diagnoses and all orders for this visit:    Type 2 diabetes mellitus with diabetic polyneuropathy, with long-term current use of insulin  -     DIABETIC SHOES FOR HOME USE    Instability of foot joint, unspecified laterality  -     DIABETIC SHOES FOR HOME USE    Gastrocnemius equinus, unspecified laterality  -     DIABETIC SHOES FOR HOME USE    Long term (current) use of anticoagulants  -     DIABETIC SHOES FOR HOME USE    History of diabetic ulcer of foot - Right Foot  -     DIABETIC SHOES FOR HOME USE    Other orders  -     gabapentin (NEURONTIN) 300 MG capsule; Take 1 capsule (300 mg total) by mouth every evening.      I counseled the patient on her conditions, their implications and medical management.    Shoe inspection. Diabetic Foot Education. Patient reminded of the importance of good nutrition and blood sugar control to help prevent podiatric complications of  diabetes. Patient instructed on proper foot hygeine. We discussed wearing proper shoe gear, daily foot inspections, never walking without protective shoe gear, never putting sharp instruments to feet    After cleansing the  area w/ alcohol prep pad the above mentioned hyperkeratosis was trimmed utilizing No 15 scapel, to a smooth base with out incident. Patient tolerated this  well and reported comfort to the area of plantar 2nd MTPJ and dorsal PIPJ of the fifth toe bilateral    Agrees to discuss resuming gabapentin with PCP.  Side effects of medication(s) were discussed in detail and patient voiced understanding. Patient will call back for any issues or complications.

## 2017-08-29 NOTE — PROGRESS NOTES
Patient reports that she will be starting Gabapentin today. Otherwise, Patient denies any changes in diet, medications, or health that would effect warfarin therapy.  We will watch closely to assure INR does not continue to tend down. Patient was re-educated on situations that would require placing a call to the coumadin clinic, including bleeding or unusual bruising issues, changes in health, diet or medications, upcoming procedures that require warfarin interruption, and missed coumadin dose(s). Patient expressed understanding that avoidance of consistency with these parameters could cause fluctuations in INR, leading to more frequent visits and increase risk of adverse events.

## 2017-09-06 ENCOUNTER — OFFICE VISIT (OUTPATIENT)
Dept: ELECTROPHYSIOLOGY | Facility: CLINIC | Age: 47
End: 2017-09-06
Payer: MEDICAID

## 2017-09-06 ENCOUNTER — ANTI-COAG VISIT (OUTPATIENT)
Dept: CARDIOLOGY | Facility: CLINIC | Age: 47
End: 2017-09-06
Payer: MEDICAID

## 2017-09-06 ENCOUNTER — CLINICAL SUPPORT (OUTPATIENT)
Dept: ELECTROPHYSIOLOGY | Facility: CLINIC | Age: 47
End: 2017-09-06
Payer: MEDICAID

## 2017-09-06 ENCOUNTER — HOSPITAL ENCOUNTER (OUTPATIENT)
Dept: CARDIOLOGY | Facility: CLINIC | Age: 47
Discharge: HOME | End: 2017-09-06
Payer: MEDICAID

## 2017-09-06 VITALS
DIASTOLIC BLOOD PRESSURE: 70 MMHG | HEIGHT: 63 IN | HEART RATE: 70 BPM | WEIGHT: 229 LBS | BODY MASS INDEX: 40.57 KG/M2 | SYSTOLIC BLOOD PRESSURE: 134 MMHG

## 2017-09-06 DIAGNOSIS — Z95.810 ICD (IMPLANTABLE CARDIOVERTER-DEFIBRILLATOR) IN PLACE: ICD-10-CM

## 2017-09-06 DIAGNOSIS — I48.0 PAROXYSMAL ATRIAL FIBRILLATION: Primary | ICD-10-CM

## 2017-09-06 DIAGNOSIS — N18.30 CKD (CHRONIC KIDNEY DISEASE), STAGE III: ICD-10-CM

## 2017-09-06 DIAGNOSIS — I42.8 NICM (NONISCHEMIC CARDIOMYOPATHY): ICD-10-CM

## 2017-09-06 DIAGNOSIS — I42.0 CARDIOMYOPATHY, DILATED: ICD-10-CM

## 2017-09-06 DIAGNOSIS — Z79.4 TYPE 2 DIABETES MELLITUS WITH DIABETIC POLYNEUROPATHY, WITH LONG-TERM CURRENT USE OF INSULIN: Chronic | ICD-10-CM

## 2017-09-06 DIAGNOSIS — I48.0 PAROXYSMAL ATRIAL FIBRILLATION: ICD-10-CM

## 2017-09-06 DIAGNOSIS — Z79.01 LONG TERM (CURRENT) USE OF ANTICOAGULANTS: Primary | ICD-10-CM

## 2017-09-06 DIAGNOSIS — I44.7 LEFT BUNDLE-BRANCH BLOCK: ICD-10-CM

## 2017-09-06 DIAGNOSIS — Z79.01 CHRONIC ANTICOAGULATION: ICD-10-CM

## 2017-09-06 DIAGNOSIS — E11.42 TYPE 2 DIABETES MELLITUS WITH DIABETIC POLYNEUROPATHY, WITH LONG-TERM CURRENT USE OF INSULIN: Chronic | ICD-10-CM

## 2017-09-06 LAB — INR PPP: 2 (ref 2–3)

## 2017-09-06 PROCEDURE — 93005 ELECTROCARDIOGRAM TRACING: CPT | Mod: PBBFAC | Performed by: INTERNAL MEDICINE

## 2017-09-06 PROCEDURE — 93284 PRGRMG EVAL IMPLANTABLE DFB: CPT | Mod: PBBFAC | Performed by: INTERNAL MEDICINE

## 2017-09-06 PROCEDURE — 99211 OFF/OP EST MAY X REQ PHY/QHP: CPT | Mod: PBBFAC,27 | Performed by: PHARMACIST

## 2017-09-06 PROCEDURE — 93010 ELECTROCARDIOGRAM REPORT: CPT | Mod: S$PBB,,, | Performed by: INTERNAL MEDICINE

## 2017-09-06 PROCEDURE — 99214 OFFICE O/P EST MOD 30 MIN: CPT | Mod: S$PBB,,, | Performed by: INTERNAL MEDICINE

## 2017-09-06 PROCEDURE — 3008F BODY MASS INDEX DOCD: CPT | Mod: ,,, | Performed by: INTERNAL MEDICINE

## 2017-09-06 PROCEDURE — 99999 PR PBB SHADOW E&M-EST. PATIENT-LVL I: CPT | Mod: PBBFAC,,, | Performed by: PHARMACIST

## 2017-09-06 PROCEDURE — 99215 OFFICE O/P EST HI 40 MIN: CPT | Mod: PBBFAC | Performed by: INTERNAL MEDICINE

## 2017-09-06 PROCEDURE — 85610 PROTHROMBIN TIME: CPT | Mod: PBBFAC | Performed by: PHARMACIST

## 2017-09-06 PROCEDURE — 3044F HG A1C LEVEL LT 7.0%: CPT | Mod: ,,, | Performed by: INTERNAL MEDICINE

## 2017-09-06 PROCEDURE — 99999 PR PBB SHADOW E&M-EST. PATIENT-LVL V: CPT | Mod: PBBFAC,,, | Performed by: INTERNAL MEDICINE

## 2017-09-06 PROCEDURE — 3066F NEPHROPATHY DOC TX: CPT | Mod: ,,, | Performed by: INTERNAL MEDICINE

## 2017-09-06 NOTE — PROGRESS NOTES
Subjective:    Patient ID:  Laure Ross is a 46 y.o. female who presents for evaluation of Pacemaker Check      HPI   46 y.o. F  NICM 10-20%. R sided dual-chamber ICD -> biV upgrade 5/17.  pHTN  PAF, on amio since BRITTANEY/DCCV 4/16  HTN DM HL [facility] asthma    ADHF 2/16, requiring IABP. Had home  for a while; now off.   OHT noncandidate per Dr Jules.  Continues to have NYHA II sx, but much better since hospital d/c and better yet since biV upgrade 5/17.    c/o occasional diaph stim.  ECG opt done today (VV as well as LV pacing polarity), resulting in QRSd 216 -> 178 ms.    5/16 20%, PASP 63  LFTs recently OK    My interpretation of today's ECG is NSR biV pacing    Review of Systems   Constitution: Negative. Negative for weakness and malaise/fatigue.   HENT: Negative.  Negative for ear pain and tinnitus.    Eyes: Negative for blurred vision.   Cardiovascular: Positive for dyspnea on exertion. Negative for chest pain, near-syncope, palpitations and syncope.   Respiratory: Negative.  Negative for shortness of breath.    Endocrine: Negative.  Negative for polyuria.   Hematologic/Lymphatic: Does not bruise/bleed easily.   Skin: Negative.  Negative for rash.   Musculoskeletal: Negative.  Negative for joint pain and muscle weakness.   Gastrointestinal: Negative.  Negative for abdominal pain and change in bowel habit.   Genitourinary: Negative for frequency.   Neurological: Negative.  Negative for dizziness.   Psychiatric/Behavioral: Negative.  Negative for depression. The patient is not nervous/anxious.    Allergic/Immunologic: Negative for environmental allergies.        Objective:    Physical Exam   Constitutional: She is oriented to person, place, and time. Vital signs are normal. She appears well-developed and well-nourished. She is active and cooperative.   HENT:   Head: Normocephalic and atraumatic.   Eyes: Conjunctivae and EOM are normal.   Neck: Normal range of motion. Carotid bruit is not present. No tracheal  deviation and no edema present. No thyroid mass and no thyromegaly present.   Cardiovascular: Normal rate, regular rhythm, intact distal pulses and normal pulses.   No extrasystoles are present. PMI is not displaced.  Exam reveals no gallop and no friction rub.    Murmur heard.  High-pitched blowing holosystolic murmur is present with a grade of 2/6  at the apex  Pulmonary/Chest: Effort normal and breath sounds normal. No respiratory distress. She has no wheezes. She has no rales.   Abdominal: Soft. Normal appearance. She exhibits no distension. There is no hepatosplenomegaly.   Musculoskeletal: Normal range of motion.   Neurological: She is alert and oriented to person, place, and time. Coordination normal.   Skin: Skin is warm and dry. No rash noted.   Psychiatric: She has a normal mood and affect. Her speech is normal and behavior is normal. Thought content normal. Cognition and memory are normal.   Nursing note and vitals reviewed.        Assessment:       1. Paroxysmal atrial fibrillation    2. Left bundle-branch block    3. NICM (nonischemic cardiomyopathy)    4. CKD (chronic kidney disease), stage III    5. Type 2 diabetes mellitus with diabetic polyneuropathy, with long-term current use of insulin         Plan:       Doing well s/p biV upgrade.    AF:  Maintaining NSR at present. Continue amio for now.  Will consider changing AAD in future, given potential longterm tox of amio.    Return in 1 year with echo and amio tests, or earlier prn.

## 2017-09-28 ENCOUNTER — ANTI-COAG VISIT (OUTPATIENT)
Dept: CARDIOLOGY | Facility: CLINIC | Age: 47
End: 2017-09-28
Payer: MEDICAID

## 2017-09-28 DIAGNOSIS — Z79.01 CHRONIC ANTICOAGULATION: Primary | ICD-10-CM

## 2017-09-28 DIAGNOSIS — I48.0 PAROXYSMAL ATRIAL FIBRILLATION: ICD-10-CM

## 2017-09-28 LAB — INR PPP: 2.1 (ref 2–3)

## 2017-09-28 PROCEDURE — 99999 PR PBB SHADOW E&M-EST. PATIENT-LVL I: CPT | Mod: PBBFAC,,, | Performed by: PHARMACIST

## 2017-09-28 PROCEDURE — 99211 OFF/OP EST MAY X REQ PHY/QHP: CPT | Mod: PBBFAC | Performed by: PHARMACIST

## 2017-09-28 PROCEDURE — 85610 PROTHROMBIN TIME: CPT | Mod: PBBFAC | Performed by: PHARMACIST

## 2017-09-28 NOTE — PROGRESS NOTES
Patient reports no bleeding or bruising, no new medications and no diet changes.  She would like to start increasing the greens in her diet.  I provided her with a food list and discussed which foods are high/moderate in vit K.  She will eat greens 1-2x/week.  I am increasing her weekly dose at this time, to compensate for the increase in vit K in her diet.  I reminded the patient to call with any problems, changes or questions before the next visit.I have reviewed the student's initial findings and agree with their assessment.  I have personally spoken with and assessed the patient in clinic to devise care plan.

## 2017-10-02 ENCOUNTER — OFFICE VISIT (OUTPATIENT)
Dept: URGENT CARE | Facility: CLINIC | Age: 47
End: 2017-10-02
Payer: MEDICAID

## 2017-10-02 VITALS
SYSTOLIC BLOOD PRESSURE: 134 MMHG | WEIGHT: 230 LBS | BODY MASS INDEX: 40.75 KG/M2 | HEIGHT: 63 IN | OXYGEN SATURATION: 98 % | TEMPERATURE: 99 F | DIASTOLIC BLOOD PRESSURE: 64 MMHG | RESPIRATION RATE: 18 BRPM | HEART RATE: 77 BPM

## 2017-10-02 DIAGNOSIS — J40 BRONCHITIS: Primary | ICD-10-CM

## 2017-10-02 DIAGNOSIS — J01.40 ACUTE PANSINUSITIS, RECURRENCE NOT SPECIFIED: ICD-10-CM

## 2017-10-02 DIAGNOSIS — R50.9 FEVER, UNSPECIFIED FEVER CAUSE: ICD-10-CM

## 2017-10-02 DIAGNOSIS — J45.909 ASTHMA, UNSPECIFIED ASTHMA SEVERITY, UNSPECIFIED WHETHER COMPLICATED, UNSPECIFIED WHETHER PERSISTENT: ICD-10-CM

## 2017-10-02 LAB
CTP QC/QA: YES
FLUAV AG NPH QL: NEGATIVE
FLUBV AG NPH QL: NEGATIVE

## 2017-10-02 PROCEDURE — 94640 AIRWAY INHALATION TREATMENT: CPT | Mod: 59,S$GLB,, | Performed by: FAMILY MEDICINE

## 2017-10-02 PROCEDURE — 99204 OFFICE O/P NEW MOD 45 MIN: CPT | Mod: 25,S$GLB,, | Performed by: FAMILY MEDICINE

## 2017-10-02 PROCEDURE — 87804 INFLUENZA ASSAY W/OPTIC: CPT | Mod: QW,S$GLB,, | Performed by: FAMILY MEDICINE

## 2017-10-02 RX ORDER — MONTELUKAST SODIUM 10 MG/1
10 TABLET ORAL DAILY
Qty: 30 TABLET | Refills: 5 | Status: SHIPPED | OUTPATIENT
Start: 2017-10-02 | End: 2018-06-25 | Stop reason: SDUPTHER

## 2017-10-02 RX ORDER — BENZONATATE 200 MG/1
200 CAPSULE ORAL 3 TIMES DAILY PRN
Qty: 30 CAPSULE | Refills: 0 | Status: SHIPPED | OUTPATIENT
Start: 2017-10-02 | End: 2017-10-12

## 2017-10-02 RX ORDER — CEFTRIAXONE 1 G/1
1 INJECTION, POWDER, FOR SOLUTION INTRAMUSCULAR; INTRAVENOUS
Status: COMPLETED | OUTPATIENT
Start: 2017-10-02 | End: 2017-10-02

## 2017-10-02 RX ORDER — LEVALBUTEROL INHALATION SOLUTION 1.25 MG/3ML
1.25 SOLUTION RESPIRATORY (INHALATION)
Status: COMPLETED | OUTPATIENT
Start: 2017-10-02 | End: 2017-10-02

## 2017-10-02 RX ORDER — LEVALBUTEROL 1.25 MG/.5ML
1 SOLUTION, CONCENTRATE RESPIRATORY (INHALATION) EVERY 4 HOURS PRN
Qty: 30 EACH | Refills: 0 | Status: SHIPPED | OUTPATIENT
Start: 2017-10-02 | End: 2018-04-09

## 2017-10-02 RX ADMIN — LEVALBUTEROL INHALATION SOLUTION 1.25 MG: 1.25 SOLUTION RESPIRATORY (INHALATION) at 01:10

## 2017-10-02 RX ADMIN — CEFTRIAXONE 1 G: 1 INJECTION, POWDER, FOR SOLUTION INTRAMUSCULAR; INTRAVENOUS at 01:10

## 2017-10-02 NOTE — PROGRESS NOTES
"Subjective:       Patient ID: Laure Ross is a 47 y.o. female.    Vitals:  height is 5' 3" (1.6 m) and weight is 104.3 kg (230 lb). Her temperature is 98.5 °F (36.9 °C). Her blood pressure is 134/64 and her pulse is 77. Her respiration is 18 and oxygen saturation is 98%.     Chief Complaint: URI    URI    This is a new problem. The current episode started in the past 7 days. The problem has been unchanged. There has been no fever. Associated symptoms include congestion, coughing, headaches, nausea, a plugged ear sensation, rhinorrhea, sinus pain and a sore throat. Pertinent negatives include no abdominal pain, chest pain, ear pain or wheezing. She has tried nothing for the symptoms. The treatment provided no relief.     Review of Systems   Constitution: Positive for malaise/fatigue. Negative for chills and fever.   HENT: Positive for congestion, hoarse voice, rhinorrhea, sinus pain and sore throat. Negative for ear pain.    Eyes: Negative for discharge and redness.   Cardiovascular: Negative for chest pain, dyspnea on exertion and leg swelling.   Respiratory: Positive for cough and sputum production. Negative for shortness of breath and wheezing.    Musculoskeletal: Positive for back pain and myalgias.   Gastrointestinal: Positive for nausea. Negative for abdominal pain.   Neurological: Positive for headaches.       Objective:      Physical Exam   Constitutional: She is oriented to person, place, and time. She appears well-developed and well-nourished. She is cooperative.  Non-toxic appearance. She does not appear ill. No distress.   HENT:   Head: Normocephalic and atraumatic.   Right Ear: Hearing, tympanic membrane, external ear and ear canal normal.   Left Ear: Hearing, tympanic membrane, external ear and ear canal normal.   Nose: Mucosal edema, rhinorrhea and sinus tenderness present. No nasal deformity. No epistaxis. Right sinus exhibits maxillary sinus tenderness and frontal sinus tenderness. Left sinus " exhibits maxillary sinus tenderness and frontal sinus tenderness.   Mouth/Throat: Uvula is midline and mucous membranes are normal. No trismus in the jaw. Normal dentition. No uvula swelling. Posterior oropharyngeal edema and posterior oropharyngeal erythema present.   Eyes: Conjunctivae and lids are normal. No scleral icterus.   Sclera clear bilat   Neck: Trachea normal, full passive range of motion without pain and phonation normal. Neck supple.   Cardiovascular: Normal rate, regular rhythm, normal heart sounds, intact distal pulses and normal pulses.    Pulmonary/Chest: Effort normal. No respiratory distress. She has decreased breath sounds. She has wheezes. She has rhonchi. She has no rales.   Abdominal: Soft. Normal appearance and bowel sounds are normal. She exhibits no distension. There is no tenderness.   Musculoskeletal: Normal range of motion. She exhibits no edema or deformity.        Lumbar back: She exhibits tenderness and pain.        Back:    Neurological: She is alert and oriented to person, place, and time. She exhibits normal muscle tone. Coordination normal.   Skin: Skin is warm, dry and intact. She is not diaphoretic. No pallor.   Psychiatric: She has a normal mood and affect. Her speech is normal and behavior is normal. Judgment and thought content normal. Cognition and memory are normal.   Nursing note and vitals reviewed.      Assessment:       1. Bronchitis    2. Acute pansinusitis, recurrence not specified    3. Asthma, unspecified asthma severity, unspecified whether complicated, unspecified whether persistent    4. Fever, unspecified fever cause        Plan:         Bronchitis  -     cefTRIAXone injection 1 g; Inject 1 g into the muscle one time.  -     levalbuterol nebulizer solution 1.25 mg; Take 3 mLs (1.25 mg total) by nebulization one time.  -     X-Ray Chest PA And Lateral  -     benzonatate (TESSALON) 200 MG capsule; Take 1 capsule (200 mg total) by mouth 3 (three) times daily as  needed for Cough.  Dispense: 30 capsule; Refill: 0  -     levalbuterol (XOPENEX) 1.25 mg/0.5 mL nebulizer solution; Take 0.5 mLs (1.25 mg total) by nebulization every 4 (four) hours as needed for Wheezing.  Dispense: 30 each; Refill: 0  -     montelukast (SINGULAIR) 10 mg tablet; Take 1 tablet (10 mg total) by mouth once daily.  Dispense: 30 tablet; Refill: 5    Acute pansinusitis, recurrence not specified  -     cefTRIAXone injection 1 g; Inject 1 g into the muscle one time.    Asthma, unspecified asthma severity, unspecified whether complicated, unspecified whether persistent  -     levalbuterol nebulizer solution 1.25 mg; Take 3 mLs (1.25 mg total) by nebulization one time.    Fever, unspecified fever cause  -     POCT Influenza A/B

## 2017-10-02 NOTE — PATIENT INSTRUCTIONS
Go to the Emergency department for any worsening or failure to improve, otherwise follow up with your primary care provider.    What Is Acute Bronchitis?  Acute bronchitis is when the airways in your lungs (bronchial tubes) become red and swollen (inflamed). It is usually caused by a viral infection. But it can also occur because of a bacteria or allergen. Symptoms include a cough that produces yellow or greenish mucus and can last for days or sometimes weeks.  Inside healthy lungs    Air travels in and out of the lungs through the airways. The linings of these airways produce sticky mucus. This mucus traps particles that enter the lungs. Tiny structures called cilia then sweep the particles out of the airways.     Healthy airway: Airways are normally open. Air moves in and out easily.      Healthy cilia: Tiny, hairlike cilia sweep mucus and particles up and out of the airways.   Lungs with bronchitis  Bronchitis often occurs with a cold or the flu virus. The airways become inflamed (red and swollen). There is a deep hacking cough from the extra mucus. Other symptoms may include:  · Wheezing  · Chest discomfort  · Shortness of breath  · Mild fever  A second infection, this time due to bacteria, may then occur. And airways irritated by allergens or smoke are more likely to get infected.        Inflamed airway: Inflammation and extra mucus narrow the airway, causing shortness of breath.      Impaired cilia: Extra mucus impairs cilia, causing congestion and wheezing. Smoking makes the problem worse.   Making a diagnosis  A physical exam, health history, and certain tests help your healthcare provider make the diagnosis.  Health history  Your healthcare provider will ask you about your symptoms.  The exam  Your provider listens to your chest for signs of congestion. He or she may also check your ears, nose, and throat.  Possible tests  · A sputum test for bacteria. This requires a sample of mucus from your lungs.  · A  nasal or throat swab. This tests to see if you have a bacterial infection.  · A chest X-ray. This is done if your healthcare provider thinks you have pneumonia.  · Tests to check for an underlying condition. Other tests may be done to check for things such as allergies, asthma, or COPD (chronic obstructive pulmonary disease). You may need to see a specialist for more lung function testing.  Treating a cough  The main treatment for bronchitis is easing symptoms. Avoiding smoke, allergens, and other things that trigger coughing can often help. If the infection is bacterial, you may be given antibiotics. During the illness, it's important to get plenty of sleep. To ease symptoms:  · Dont smoke. Also avoid secondhand smoke.  · Use a humidifier. Or try breathing in steam from a hot shower. This may help loosen mucus.  · Drink a lot of water and juice. They can soothe the throat and may help thin mucus.  · Sit up or use extra pillows when in bed. This helps to lessen coughing and congestion.  · Ask your provider about using medicine. Ask about using cough medicine, pain and fever medicine, or a decongestant.  Antibiotics  Most cases of bronchitis are caused by cold or flu viruses. They dont need antibiotics to treat them, even if your mucus is thick and green or yellow. Antibiotics dont treat viral illness and antibiotics have not been shown to have any benefit in cases of acute bronchitis. Taking antibiotics when they are not needed increases your risk of getting an infection later that is antibiotic-resistant. Antibiotics can also cause severe cases of diarrhea that require other antibiotics to treat.  It is important that you accept your healthcare provider's opinion to not use antibiotics. Your provider will prescribe antibiotics if the infection is caused by bacteria. If they are prescribed:  · Take all of the medicine. Take the medicine until it is used up, even if symptoms have improved. If you dont, the  bronchitis may come back.  · Take the medicines as directed. For instance, some medicines should be taken with food.  · Ask about side effects. Ask your provider or pharmacist what side effects are common, and what to do about them.  Follow-up care  You should see your provider again in 2 to 3 weeks. By this time, symptoms should have improved. An infection that lasts longer may mean you have a more serious problem.  Prevention  · Avoid tobacco smoke. If you smoke, quit. Stay away from smoky places. Ask friends and family not to smoke around you, or in your home or car.  · Get checked for allergies.  · Ask your provider about getting a yearly flu shot. Also ask about pneumococcal or pneumonia shots.  · Wash your hands often. This helps reduce the chance of picking up viruses that cause colds and flu.  Call your healthcare provider if:  · Symptoms worsen, or you have new symptoms  · Breathing problems worsen or  become severe  · Symptoms dont get better within a week, or within 3 days of taking antibiotics   Date Last Reviewed: 2/1/2017  © 0925-5119 [employer]. [address] [place] [address], [place] [address]. All rights reserved. This information is not intended as a substitute for professional medical care. Always follow your healthcare professional's instructions.

## 2017-10-03 NOTE — PROGRESS NOTES
Patient called 10/2/17 to inform us that she received a (Injection of /Recephin). Also received a (Steriod Shot).

## 2017-10-09 ENCOUNTER — NURSE TRIAGE (OUTPATIENT)
Dept: ADMINISTRATIVE | Facility: CLINIC | Age: 47
End: 2017-10-09

## 2017-10-11 ENCOUNTER — ANTI-COAG VISIT (OUTPATIENT)
Dept: CARDIOLOGY | Facility: CLINIC | Age: 47
End: 2017-10-11
Payer: MEDICAID

## 2017-10-11 DIAGNOSIS — I48.0 PAROXYSMAL ATRIAL FIBRILLATION: ICD-10-CM

## 2017-10-11 DIAGNOSIS — Z79.01 LONG-TERM (CURRENT) USE OF ANTICOAGULANTS: Primary | ICD-10-CM

## 2017-10-11 DIAGNOSIS — Z79.01 CHRONIC ANTICOAGULATION: ICD-10-CM

## 2017-10-11 LAB — INR PPP: 2 (ref 2–3)

## 2017-10-11 PROCEDURE — 99211 OFF/OP EST MAY X REQ PHY/QHP: CPT | Mod: PBBFAC | Performed by: PHARMACIST

## 2017-10-11 PROCEDURE — 99999 PR PBB SHADOW E&M-EST. PATIENT-LVL I: CPT | Mod: PBBFAC,,, | Performed by: PHARMACIST

## 2017-10-11 PROCEDURE — 85610 PROTHROMBIN TIME: CPT | Mod: PBBFAC | Performed by: PHARMACIST

## 2017-10-11 NOTE — PROGRESS NOTES
Patient denies any changes in diet, medications, or health that would effect warfarin therapy. We will continue to watch closely for downward trend.   Patient was re-educated on situations that would require placing a call to the coumadin clinic, including bleeding or unusual bruising issues, changes in health, diet or medications, upcoming procedures that require warfarin interruption, and missed coumadin dose(s). Patient expressed understanding that avoidance of consistency with these parameters could cause fluctuations in INR, leading to more frequent visits and increase risk of adverse events.

## 2017-10-15 RX ORDER — LANOLIN ALCOHOL/MO/W.PET/CERES
CREAM (GRAM) TOPICAL
Qty: 60 TABLET | Refills: 1 | Status: SHIPPED | OUTPATIENT
Start: 2017-10-15 | End: 2017-12-29 | Stop reason: SDUPTHER

## 2017-10-20 NOTE — PROGRESS NOTES
"From HUNGJayy Ruggiero: "Patient is cleared for tooth extraction by Dr Jules. Please be aware that patients dentist will prescribing Clindamycin 150mg for 5days. Patient is on Warfarin.   If you'd like I can fax you the dental clearance form with DDS info. Just need your fax#.  I'm at ext [phone] today, if you'd like to reach me    Addendum: (10/23): From Ms. Ruggiero: "Dr. Jules is deferring holding the coumadin to pts dentist, since they didn't ask for coumadin to be held. He did day if dentist wanted to hold it for 3 -5 days he's ok with that.  I did fax the clearance this am to the dentist office."  "

## 2017-10-21 DIAGNOSIS — I42.0 CARDIOMYOPATHY, DILATED, NONISCHEMIC: ICD-10-CM

## 2017-10-21 RX ORDER — FUROSEMIDE 40 MG/1
TABLET ORAL
Qty: 120 TABLET | Refills: 3 | Status: SHIPPED | OUTPATIENT
Start: 2017-10-21 | End: 2018-03-23 | Stop reason: SDUPTHER

## 2017-10-24 DIAGNOSIS — Z11.1 TUBERCULOSIS SCREENING: Primary | ICD-10-CM

## 2017-10-26 ENCOUNTER — ANTI-COAG VISIT (OUTPATIENT)
Dept: CARDIOLOGY | Facility: CLINIC | Age: 47
End: 2017-10-26
Payer: MEDICAID

## 2017-10-26 DIAGNOSIS — I48.0 PAROXYSMAL ATRIAL FIBRILLATION: ICD-10-CM

## 2017-10-26 DIAGNOSIS — Z79.01 CHRONIC ANTICOAGULATION: Primary | ICD-10-CM

## 2017-10-26 LAB — INR PPP: 1.9 (ref 2–3)

## 2017-10-26 PROCEDURE — 99211 OFF/OP EST MAY X REQ PHY/QHP: CPT | Mod: S$PBB,25,,

## 2017-10-26 PROCEDURE — 85610 PROTHROMBIN TIME: CPT | Mod: PBBFAC,PO

## 2017-10-26 NOTE — PROGRESS NOTES
INR a tad low. Patient denies changes. She is due to take a big dose today. We will keep dose as is and reevaluate in 2 weeks.

## 2017-10-30 ENCOUNTER — OFFICE VISIT (OUTPATIENT)
Dept: PODIATRY | Facility: CLINIC | Age: 47
End: 2017-10-30
Payer: MEDICAID

## 2017-10-30 VITALS
SYSTOLIC BLOOD PRESSURE: 105 MMHG | DIASTOLIC BLOOD PRESSURE: 66 MMHG | BODY MASS INDEX: 42.08 KG/M2 | HEART RATE: 79 BPM | HEIGHT: 63 IN | WEIGHT: 237.5 LBS

## 2017-10-30 DIAGNOSIS — M25.376 INSTABILITY OF FOOT JOINT, UNSPECIFIED LATERALITY: ICD-10-CM

## 2017-10-30 DIAGNOSIS — Z86.31 HISTORY OF DIABETIC ULCER OF FOOT: ICD-10-CM

## 2017-10-30 DIAGNOSIS — L84 PRE-ULCERATIVE CALLUSES: ICD-10-CM

## 2017-10-30 DIAGNOSIS — Z79.4 TYPE 2 DIABETES MELLITUS WITH DIABETIC POLYNEUROPATHY, WITH LONG-TERM CURRENT USE OF INSULIN: ICD-10-CM

## 2017-10-30 DIAGNOSIS — E11.42 TYPE 2 DIABETES MELLITUS WITH DIABETIC POLYNEUROPATHY, WITH LONG-TERM CURRENT USE OF INSULIN: ICD-10-CM

## 2017-10-30 DIAGNOSIS — E11.621 DIABETIC ULCER OF TOE OF RIGHT FOOT ASSOCIATED WITH TYPE 2 DIABETES MELLITUS, WITH FAT LAYER EXPOSED: Primary | ICD-10-CM

## 2017-10-30 DIAGNOSIS — L97.512 DIABETIC ULCER OF TOE OF RIGHT FOOT ASSOCIATED WITH TYPE 2 DIABETES MELLITUS, WITH FAT LAYER EXPOSED: Primary | ICD-10-CM

## 2017-10-30 DIAGNOSIS — M62.469 GASTROCNEMIUS EQUINUS, UNSPECIFIED LATERALITY: ICD-10-CM

## 2017-10-30 PROCEDURE — 11042 DBRDMT SUBQ TIS 1ST 20SQCM/<: CPT | Mod: S$PBB,59,, | Performed by: PODIATRIST

## 2017-10-30 PROCEDURE — 11056 PARNG/CUTG B9 HYPRKR LES 2-4: CPT | Mod: S$PBB,,, | Performed by: PODIATRIST

## 2017-10-30 PROCEDURE — 99215 OFFICE O/P EST HI 40 MIN: CPT | Mod: PBBFAC,25 | Performed by: PODIATRIST

## 2017-10-30 PROCEDURE — 11056 PARNG/CUTG B9 HYPRKR LES 2-4: CPT | Mod: PBBFAC | Performed by: PODIATRIST

## 2017-10-30 PROCEDURE — 11042 DBRDMT SUBQ TIS 1ST 20SQCM/<: CPT | Mod: PBBFAC,59 | Performed by: PODIATRIST

## 2017-10-30 PROCEDURE — 99999 PR PBB SHADOW E&M-EST. PATIENT-LVL V: CPT | Mod: PBBFAC,,, | Performed by: PODIATRIST

## 2017-10-30 PROCEDURE — 99213 OFFICE O/P EST LOW 20 MIN: CPT | Mod: S$PBB,25,, | Performed by: PODIATRIST

## 2017-10-30 NOTE — PROGRESS NOTES
Subjective:      Patient ID: Laure Ross is a 47 y.o. female.    Chief Complaint: Diabetes Mellitus (PCP Dr. BRITTANY Mittal/JERRY/Aubrey 10/9/17); Diabetic Foot Exam; Routine Foot Care; and Follow-up    Laure is a 47 y.o. female who presents to the clinic for evaluation and treatment of high risk feet. Laure has a past medical history of Anticoagulant long-term use; Arthritis; Asthma; Asthma; Cardiomyopathy; CHF (congestive heart failure); CHF (congestive heart failure); GERD (gastroesophageal reflux disease); H/O tubal ligation; Hypertension; Obesity; Renal disorder; Type 2 diabetes mellitus with hyperglycemia; and Type 2 diabetes mellitus with polyneuropathy. The patient's chief complaint is large plantar calluses. Notes that plantar wound has remained healed. History of recurrent forefoot ulcer over the last several years.  Also complains of long and painful fungal nails.This patient has documented high risk feet requiring routine maintenance secondary to peripheral neuropathy.      PCP: Ayan Olmstead MD    Chief Complaint   Patient presents with    Diabetes Mellitus     PCP Dr. BRITTANY Mittal/JERRY/Aubrey 10/9/17    Diabetic Foot Exam    Routine Foot Care    Follow-up         Current shoe gear:  DM shoes, inserts    Hemoglobin A1C   Date Value Ref Range Status   03/17/2017 6.1 4.5 - 6.2 % Final     Comment:     According to ADA guidelines, hemoglobin A1C <7.0% represents  optimal control in non-pregnant diabetic patients.  Different  metrics may apply to specific populations.   Standards of Medical Care in Diabetes - 2016.  For the purpose of screening for the presence of diabetes:  <5.7%     Consistent with the absence of diabetes  5.7-6.4%  Consistent with increasing risk for diabetes   (prediabetes)  >or=6.5%  Consistent with diabetes  Currently no consensus exists for use of hemoglobin A1C  for diagnosis of diabetes for children.     11/23/2016 6.1 4.5 - 6.2 % Final     Comment:     According to ADA  guidelines, hemoglobin A1C <7.0% represents  optimal control in non-pregnant diabetic patients.  Different  metrics may apply to specific populations.   Standards of Medical Care in Diabetes - 2016.  For the purpose of screening for the presence of diabetes:  <5.7%     Consistent with the absence of diabetes  5.7-6.4%  Consistent with increasing risk for diabetes   (prediabetes)  >or=6.5%  Consistent with diabetes  Currently no consensus exists for use of hemoglobin A1C  for diagnosis of diabetes for children.     08/23/2016 7.2 (H) 4.5 - 6.2 % Final     Comment:     According to ADA guidelines, hemoglobin A1C <7.0% represents  optimal control in non-pregnant diabetic patients.  Different  metrics may apply to specific populations.   Standards of Medical Care in Diabetes - 2016.  For the purpose of screening for the presence of diabetes:  <5.7%     Consistent with the absence of diabetes  5.7-6.4%  Consistent with increasing risk for diabetes   (prediabetes)  >or=6.5%  Consistent with diabetes  Currently no consensus exists for use of hemoglobin A1C  for diagnosis of diabetes for children.         Review of Systems   Constitution: Negative for chills, fever and malaise/fatigue.   Cardiovascular: Negative for chest pain, leg swelling, orthopnea and palpitations.   Respiratory: Negative for cough, shortness of breath and wheezing.    Skin: Positive for color change, dry skin and nail changes. Negative for itching, poor wound healing and rash.   Musculoskeletal: Negative for arthritis, gout, joint pain, joint swelling, muscle weakness and myalgias.   Neurological: Positive for numbness, paresthesias and sensory change. Negative for disturbances in coordination, dizziness, focal weakness and tremors.           Objective:      Physical Exam   Cardiovascular:   Dorsalis pedis and posterior tibial pulses are diminished Bilaterally. Toes are cool to touch. Feet are warm proximally.There is decreased digital hair. Skin is  atrophic, slightly hyperpigmented, and mildly edematous       Musculoskeletal:   Musculoskeletal:  Muscle strength is 5/5 in all groups bilaterally.  No pain with ankle, STJ or MTPJ ROM, bilat. Ankle dorsiflexion is limited with knees extended and flexed, bilat.     Neurological:   [place]-Anya 5.07 monofilamant testing is diminished both feet. Sharp/dull sensation diminished Bilaterally. Light touch absent Bilaterally.       Skin: Lesion noted.   Ulcer Location: lateral 4th toe, right  Measurements: 7 x 7 x 2 mm  Post debridement Measurements: 8 x 7 x 3 mm  Periwound: Intact  Drainage: None  Pus: None.  Malodor: None.  Base:  100% granular. 0% fibrin.  Signs of infection: None.      Pre ulcer, hyperkeratotic lesions noted plantar 2nd MTPJ and dorsal PIPJ of the fifth toe bilateral                       Assessment:       Encounter Diagnoses   Name Primary?    Diabetic ulcer of toe of right foot associated with type 2 diabetes mellitus, with fat layer exposed Yes    Type 2 diabetes mellitus with diabetic polyneuropathy, with long-term current use of insulin     Instability of foot joint, unspecified laterality     Gastrocnemius equinus, unspecified laterality     History of diabetic ulcer of foot - Right Foot     Pre-ulcerative calluses          Plan:       Laure was seen today for diabetes mellitus, diabetic foot exam, routine foot care and follow-up.    Diagnoses and all orders for this visit:    Diabetic ulcer of toe of right foot associated with type 2 diabetes mellitus, with fat layer exposed  -     Ambulatory referral to Home Health    Type 2 diabetes mellitus with diabetic polyneuropathy, with long-term current use of insulin  -     Ambulatory referral to Home Health    Instability of foot joint, unspecified laterality    Gastrocnemius equinus, unspecified laterality    History of diabetic ulcer of foot - Right Foot    Pre-ulcerative calluses      I counseled the patient on her conditions, their  implications and medical management.    Shoe inspection. Diabetic Foot Education. Patient reminded of the importance of good nutrition and blood sugar control to help prevent podiatric complications of diabetes. Patient instructed on proper foot hygeine. We discussed wearing proper shoe gear, daily foot inspections, never walking without protective shoe gear, never putting sharp instruments to feet    After cleansing the  area w/ alcohol prep pad the above mentioned hyperkeratosis was trimmed utilizing No 15 scapel, to a smooth base with out incident. Patient tolerated this  well and reported comfort to the area of plantar 2nd MTPJ and dorsal PIPJ of the fifth toe bilateral    Pao or betadine to wound daily.    Offload right foot ulcer with cast padding between toes, surgical shoes.    Procedure: Excisional debridement including subcutaneous tissue    Obtained verbal consent from the patient for excisional wound debridement, right 4th toe. No anesthesia was required, Utilizing a sterile curet, all non-viable soft tissue was excised to the level of health subcutaneous tissue with excision of fibrin, slough, biofilm, dermis and nonviable subcutaneous tissue.  A healthy appearing wound base was achieved with minimal blood loss. Hemostasis achieved with compression. Wound site was irrigated with normal saline and dressed. Wound care instructions were reviewed with the patient. Patient tolerated the procedure well.    F/u 3 weeks or sooner, prn.

## 2017-11-01 ENCOUNTER — TELEPHONE (OUTPATIENT)
Dept: PODIATRY | Facility: CLINIC | Age: 47
End: 2017-11-01

## 2017-11-01 NOTE — TELEPHONE ENCOUNTER
Patient is concern do they need home health or not because it was mention during the 10/30/17 visit.

## 2017-11-01 NOTE — TELEPHONE ENCOUNTER
----- Message from Candy Mi MA sent at 11/1/2017  4:05 PM CDT -----  Contact: self   Pt is calling in regards to the need for home health and needing to speak to someone. Pt is stating that on Monday 10/30 appt home health was mention for pt to have. Pt can be reached at [phone].

## 2017-11-02 NOTE — TELEPHONE ENCOUNTER
Called pt made aware home health is coming  Tomorrow per  Dr. Hampton . pt would like to know which home health  and  To please call her before cominf to the house because she may be  in the back house just in case

## 2017-11-06 ENCOUNTER — TELEPHONE (OUTPATIENT)
Dept: PODIATRY | Facility: CLINIC | Age: 47
End: 2017-11-06

## 2017-11-06 NOTE — TELEPHONE ENCOUNTER
Spoke to yasmine and she said she waited till Friday, nobody came from [facility]. She called them and the lady told her they aren't taking her insurance and she need to call us to find out who is taking her insurance. Yasmine. Was a little disappointed that [facility] wasn't able to help her. She looked at her network paper and found [facility] . Faxed  orders out to them

## 2017-11-10 ENCOUNTER — TELEPHONE (OUTPATIENT)
Dept: PODIATRY | Facility: CLINIC | Age: 47
End: 2017-11-10

## 2017-11-10 NOTE — TELEPHONE ENCOUNTER
She should see me or available provider at first opening. Tuesday with me at 145 is OK. Please tell her to bring in her wound care supplies to the appointment. She should continue with daily dressing change until seen.

## 2017-11-10 NOTE — TELEPHONE ENCOUNTER
----- Message from Kenna Barrera sent at 11/10/2017  2:59 PM CST -----  Contact: Self   Pt is requesting a call back in regards to rescheduling her upcoming appt to a sooner date. Pt stated she has a wound on her foot that needs to be seen as soon as possible        Pt can be contacted at [phone]

## 2017-11-10 NOTE — TELEPHONE ENCOUNTER
Patient states home health hasn;t came out yet but some supplies did come patient's  Concern is  What should she  do with the box of first aid supplies that was sent. Patient perfer to come in sooner however Tuesday she has appt in [place] and you have availabilities that day with open times please advise

## 2017-11-14 ENCOUNTER — OFFICE VISIT (OUTPATIENT)
Dept: PODIATRY | Facility: CLINIC | Age: 47
End: 2017-11-14
Payer: MEDICAID

## 2017-11-14 ENCOUNTER — HOSPITAL ENCOUNTER (OUTPATIENT)
Dept: PULMONOLOGY | Facility: HOSPITAL | Age: 47
Discharge: HOME | End: 2017-11-14
Attending: INTERNAL MEDICINE
Payer: MEDICAID

## 2017-11-14 VITALS
SYSTOLIC BLOOD PRESSURE: 109 MMHG | HEART RATE: 88 BPM | HEIGHT: 63 IN | BODY MASS INDEX: 42.81 KG/M2 | DIASTOLIC BLOOD PRESSURE: 68 MMHG | WEIGHT: 241.63 LBS

## 2017-11-14 DIAGNOSIS — Z11.1 TUBERCULOSIS SCREENING: ICD-10-CM

## 2017-11-14 DIAGNOSIS — E11.621 DIABETIC ULCER OF TOE OF RIGHT FOOT ASSOCIATED WITH TYPE 2 DIABETES MELLITUS, LIMITED TO BREAKDOWN OF SKIN: Primary | ICD-10-CM

## 2017-11-14 DIAGNOSIS — L97.511 DIABETIC ULCER OF TOE OF RIGHT FOOT ASSOCIATED WITH TYPE 2 DIABETES MELLITUS, LIMITED TO BREAKDOWN OF SKIN: Primary | ICD-10-CM

## 2017-11-14 DIAGNOSIS — M25.376 INSTABILITY OF FOOT JOINT, UNSPECIFIED LATERALITY: ICD-10-CM

## 2017-11-14 DIAGNOSIS — Z86.31 HISTORY OF DIABETIC ULCER OF FOOT: ICD-10-CM

## 2017-11-14 PROCEDURE — 94726 PLETHYSMOGRAPHY LUNG VOLUMES: CPT

## 2017-11-14 PROCEDURE — 94010 BREATHING CAPACITY TEST: CPT

## 2017-11-14 PROCEDURE — 99212 OFFICE O/P EST SF 10 MIN: CPT | Mod: S$PBB,,, | Performed by: PODIATRIST

## 2017-11-14 PROCEDURE — 99999 PR PBB SHADOW E&M-EST. PATIENT-LVL V: CPT | Mod: PBBFAC,,, | Performed by: PODIATRIST

## 2017-11-14 PROCEDURE — 94729 DIFFUSING CAPACITY: CPT

## 2017-11-14 PROCEDURE — 94640 AIRWAY INHALATION TREATMENT: CPT

## 2017-11-14 PROCEDURE — 99215 OFFICE O/P EST HI 40 MIN: CPT | Mod: PBBFAC,25 | Performed by: PODIATRIST

## 2017-11-14 NOTE — PATIENT INSTRUCTIONS
Wound is healed. Great work!    Gel toe sleeve at 4th or 5th toe to relieve pressure at toe joints.    Alternatively use cast padding cotton or lambs wool between toes.    ([employer] or local pharmacy)

## 2017-11-14 NOTE — PROGRESS NOTES
Subjective:      Patient ID: Laure Ross is a 47 y.o. female.    Chief Complaint: Diabetes Mellitus (PCP Dr. Olmstead); Foot Ulcer; and Follow-up    Laure is a 47 y.o. female who presents to the clinic for evaluation and treatment of high risk feet. Laure has a past medical history of Anticoagulant long-term use; Arthritis; Asthma; Asthma; Cardiomyopathy; CHF (congestive heart failure); CHF (congestive heart failure); GERD (gastroesophageal reflux disease); H/O tubal ligation; Hypertension; Obesity; Renal disorder; Type 2 diabetes mellitus with hyperglycemia; and Type 2 diabetes mellitus with polyneuropathy. f/u for right toe ulcer. Her son has been performing daily wound care and she is wearing surgical shoe. Denies pain.This patient has documented high risk feet requiring routine maintenance secondary to peripheral neuropathy.      PCP: Ayan Olmstead MD    Chief Complaint   Patient presents with    Diabetes Mellitus     PCP Dr. Olmstead    Foot Ulcer    Follow-up         Current shoe gear:  DM shoes, inserts    Hemoglobin A1C   Date Value Ref Range Status   03/17/2017 6.1 4.5 - 6.2 % Final     Comment:     According to ADA guidelines, hemoglobin A1C <7.0% represents  optimal control in non-pregnant diabetic patients.  Different  metrics may apply to specific populations.   Standards of Medical Care in Diabetes - 2016.  For the purpose of screening for the presence of diabetes:  <5.7%     Consistent with the absence of diabetes  5.7-6.4%  Consistent with increasing risk for diabetes   (prediabetes)  >or=6.5%  Consistent with diabetes  Currently no consensus exists for use of hemoglobin A1C  for diagnosis of diabetes for children.     11/23/2016 6.1 4.5 - 6.2 % Final     Comment:     According to ADA guidelines, hemoglobin A1C <7.0% represents  optimal control in non-pregnant diabetic patients.  Different  metrics may apply to specific populations.   Standards of Medical Care in Diabetes -  2016.  For the purpose of screening for the presence of diabetes:  <5.7%     Consistent with the absence of diabetes  5.7-6.4%  Consistent with increasing risk for diabetes   (prediabetes)  >or=6.5%  Consistent with diabetes  Currently no consensus exists for use of hemoglobin A1C  for diagnosis of diabetes for children.     08/23/2016 7.2 (H) 4.5 - 6.2 % Final     Comment:     According to ADA guidelines, hemoglobin A1C <7.0% represents  optimal control in non-pregnant diabetic patients.  Different  metrics may apply to specific populations.   Standards of Medical Care in Diabetes - 2016.  For the purpose of screening for the presence of diabetes:  <5.7%     Consistent with the absence of diabetes  5.7-6.4%  Consistent with increasing risk for diabetes   (prediabetes)  >or=6.5%  Consistent with diabetes  Currently no consensus exists for use of hemoglobin A1C  for diagnosis of diabetes for children.         Review of Systems   Constitution: Negative for chills, fever and malaise/fatigue.   Cardiovascular: Negative for chest pain, leg swelling, orthopnea and palpitations.   Respiratory: Negative for cough, shortness of breath and wheezing.    Skin: Positive for color change, dry skin and nail changes. Negative for itching, poor wound healing and rash.   Musculoskeletal: Negative for arthritis, gout, joint pain, joint swelling, muscle weakness and myalgias.   Neurological: Positive for numbness, paresthesias and sensory change. Negative for disturbances in coordination, dizziness, focal weakness and tremors.           Objective:      Physical Exam   Cardiovascular:   Dorsalis pedis and posterior tibial pulses are diminished Bilaterally. Toes are cool to touch. Feet are warm proximally.There is decreased digital hair. Skin is atrophic, slightly hyperpigmented, and mildly edematous       Musculoskeletal:   Musculoskeletal:  Muscle strength is 5/5 in all groups bilaterally.  No pain with ankle, STJ or MTPJ ROM, bilat.  Ankle dorsiflexion is limited with knees extended and flexed, bilat.     Neurological:   [place]-Anya 5.07 monofilamant testing is diminished both feet. Sharp/dull sensation diminished Bilaterally. Light touch absent Bilaterally.       Skin: Lesion noted.   Ulcer Location: lateral 4th toe, right  Measurements:healed  No signs of infection.      Pre ulcer, hyperkeratotic lesions noted plantar 2nd MTPJ and dorsal PIPJ of the fifth toe bilateral                       Assessment:       Encounter Diagnoses   Name Primary?    Diabetic ulcer of toe of right foot associated with type 2 diabetes mellitus, limited to breakdown of skin Yes    Instability of foot joint, unspecified laterality     History of diabetic ulcer of foot - Right Foot          Plan:       Laure was seen today for diabetes mellitus, foot ulcer and follow-up.    Diagnoses and all orders for this visit:    Diabetic ulcer of toe of right foot associated with type 2 diabetes mellitus, limited to breakdown of skin    Instability of foot joint, unspecified laterality    History of diabetic ulcer of foot - Right Foot      I counseled the patient on her conditions, their implications and medical management.    Shoe inspection. Diabetic Foot Education. Patient reminded of the importance of good nutrition and blood sugar control to help prevent podiatric complications of diabetes. Patient instructed on proper foot hygeine. We discussed wearing proper shoe gear, daily foot inspections, never walking without protective shoe gear, never putting sharp instruments to feet    Offload right 4th toe with gel toe sleeve or lambs wool

## 2017-11-17 ENCOUNTER — OFFICE VISIT (OUTPATIENT)
Dept: URGENT CARE | Facility: CLINIC | Age: 47
End: 2017-11-17
Payer: MEDICAID

## 2017-11-17 VITALS
BODY MASS INDEX: 42.81 KG/M2 | OXYGEN SATURATION: 99 % | HEART RATE: 72 BPM | WEIGHT: 241.63 LBS | SYSTOLIC BLOOD PRESSURE: 136 MMHG | DIASTOLIC BLOOD PRESSURE: 88 MMHG | TEMPERATURE: 98 F | HEIGHT: 63 IN

## 2017-11-17 DIAGNOSIS — J44.9 COPD MIXED TYPE: Primary | ICD-10-CM

## 2017-11-17 DIAGNOSIS — J30.9 ACUTE ALLERGIC RHINITIS, UNSPECIFIED SEASONALITY, UNSPECIFIED TRIGGER: ICD-10-CM

## 2017-11-17 PROCEDURE — 99214 OFFICE O/P EST MOD 30 MIN: CPT | Mod: S$GLB,,, | Performed by: FAMILY MEDICINE

## 2017-11-17 RX ORDER — CETIRIZINE HYDROCHLORIDE 10 MG/1
10 TABLET ORAL DAILY
Refills: 0 | COMMUNITY
Start: 2017-11-17 | End: 2018-11-17

## 2017-11-17 RX ORDER — BENZONATATE 200 MG/1
200 CAPSULE ORAL 3 TIMES DAILY PRN
Qty: 30 CAPSULE | Refills: 0 | Status: SHIPPED | OUTPATIENT
Start: 2017-11-17 | End: 2017-11-27

## 2017-11-17 RX ORDER — AZELASTINE 1 MG/ML
2 SPRAY, METERED NASAL 2 TIMES DAILY
Qty: 30 ML | Refills: 2 | Status: SHIPPED | OUTPATIENT
Start: 2017-11-17 | End: 2018-11-02

## 2017-11-17 NOTE — PROGRESS NOTES
"Subjective:       Patient ID: Laure Ross is a 47 y.o. female.    Vitals:  height is 5' 3" (1.6 m) and weight is 109.6 kg (241 lb 10 oz). Her oral temperature is 97.7 °F (36.5 °C). Her blood pressure is 136/88 and her pulse is 72. Her oxygen saturation is 99%.     Chief Complaint: Shortness of Breath    Shortness of Breath   This is a chronic problem. The current episode started more than 1 month ago. The problem occurs intermittently. The problem has been waxing and waning. Pertinent negatives include no abdominal pain, chest pain, ear pain, fever, headaches, leg swelling, sore throat, sputum production or wheezing.     Review of Systems   Constitution: Negative for chills, fever and malaise/fatigue.   HENT: Negative for congestion, ear pain, hoarse voice and sore throat.    Eyes: Negative for discharge and redness.   Cardiovascular: Negative for chest pain, dyspnea on exertion and leg swelling.   Respiratory: Positive for shortness of breath. Negative for cough, sputum production and wheezing.    Musculoskeletal: Negative for myalgias.   Gastrointestinal: Negative for abdominal pain and nausea.   Neurological: Negative for headaches.       Objective:      Physical Exam   Constitutional: She is oriented to person, place, and time. She appears well-developed and well-nourished.   HENT:   Head: Normocephalic and atraumatic.   Right Ear: External ear normal.   Left Ear: External ear normal.   Nose: Mucosal edema present. No sinus tenderness. Right sinus exhibits frontal sinus tenderness (mild). Left sinus exhibits frontal sinus tenderness (mild).   Mouth/Throat: Uvula is midline, oropharynx is clear and moist and mucous membranes are normal.   Eyes: EOM are normal. Pupils are equal, round, and reactive to light.   Neck: Normal range of motion and full passive range of motion without pain. No JVD present.   Cardiovascular: Regular rhythm, normal heart sounds and normal pulses.    Pulmonary/Chest: Effort normal. " No accessory muscle usage. No tachypnea. No respiratory distress. She has decreased breath sounds (slightly decreased). She has no wheezes. She has no rhonchi. She has no rales.   Musculoskeletal: Normal range of motion.   Neurological: She is alert and oriented to person, place, and time.   Skin: Skin is warm.       Assessment:       1. COPD mixed type    2. Acute allergic rhinitis, unspecified seasonality, unspecified trigger        Plan:         COPD mixed type  -     benzonatate (TESSALON) 200 MG capsule; Take 1 capsule (200 mg total) by mouth 3 (three) times daily as needed for Cough.  Dispense: 30 capsule; Refill: 0    Acute allergic rhinitis, unspecified seasonality, unspecified trigger  -     azelastine (ASTELIN) 137 mcg (0.1 %) nasal spray; 2 sprays (274 mcg total) by Nasal route 2 (two) times daily.  Dispense: 30 mL; Refill: 2  -     cetirizine (ZYRTEC) 10 MG tablet; Take 1 tablet (10 mg total) by mouth once daily.; Refill: 0      Advised patient to continue current meds and follow up with pulmonologist as scheduled

## 2017-11-17 NOTE — PROCEDURES
These results meet ATS standards for acceptability and reproducibility.     Moderate obstruction. Significant response to bronchodilator.   No restriction.   Moderate to severe diffusion impairment.     Clinical correlation recommended.     Jonathan Bishop, PGY-5  Pulmonary & Critical Care Medicine  pager [phone]

## 2017-11-20 ENCOUNTER — TELEPHONE (OUTPATIENT)
Dept: PODIATRY | Facility: CLINIC | Age: 47
End: 2017-11-20

## 2017-11-20 RX ORDER — AMIODARONE HYDROCHLORIDE 200 MG/1
TABLET ORAL
Qty: 60 TABLET | Refills: 12 | Status: SHIPPED | OUTPATIENT
Start: 2017-11-20 | End: 2018-08-03 | Stop reason: SDUPTHER

## 2017-11-20 NOTE — TELEPHONE ENCOUNTER
----- Message from Juan Manuel Ahumada sent at 11/20/2017  9:52 AM CST -----  Contact: Self/[phone]  Pt called in requesting to be seen for pt's rt foot pain (8/10). Unfortunately, I could not find an available appt for the pt. Pt can be reached at [phone].

## 2017-11-20 NOTE — TELEPHONE ENCOUNTER
I called patient to schedule appt there was no voicemail I will try to all back later to be schedule to be seen

## 2017-11-21 ENCOUNTER — HOSPITAL ENCOUNTER (OUTPATIENT)
Dept: RADIOLOGY | Facility: HOSPITAL | Age: 47
Discharge: HOME | End: 2017-11-21
Attending: INTERNAL MEDICINE
Payer: MEDICAID

## 2017-11-21 DIAGNOSIS — Z11.1 TUBERCULOSIS SCREENING: ICD-10-CM

## 2017-11-21 PROCEDURE — 25500020 PHARM REV CODE 255: Performed by: INTERNAL MEDICINE

## 2017-11-21 PROCEDURE — 71260 CT THORAX DX C+: CPT | Mod: 26,,, | Performed by: RADIOLOGY

## 2017-11-21 PROCEDURE — 71260 CT THORAX DX C+: CPT | Mod: TC

## 2017-11-21 RX ADMIN — IOHEXOL 75 ML: 350 INJECTION, SOLUTION INTRAVENOUS at 12:11

## 2017-12-04 ENCOUNTER — ANTI-COAG VISIT (OUTPATIENT)
Dept: CARDIOLOGY | Facility: CLINIC | Age: 47
End: 2017-12-04
Payer: MEDICAID

## 2017-12-04 DIAGNOSIS — I48.0 PAROXYSMAL ATRIAL FIBRILLATION: ICD-10-CM

## 2017-12-04 DIAGNOSIS — Z79.01 CHRONIC ANTICOAGULATION: ICD-10-CM

## 2017-12-04 DIAGNOSIS — Z79.01 LONG-TERM (CURRENT) USE OF ANTICOAGULANTS: Primary | ICD-10-CM

## 2017-12-04 LAB — INR PPP: 2 (ref 2–3)

## 2017-12-04 PROCEDURE — 99999 PR PBB SHADOW E&M-EST. PATIENT-LVL I: CPT | Mod: PBBFAC,,, | Performed by: PHARMACIST

## 2017-12-04 PROCEDURE — 99211 OFF/OP EST MAY X REQ PHY/QHP: CPT | Mod: PBBFAC | Performed by: PHARMACIST

## 2017-12-04 PROCEDURE — 85610 PROTHROMBIN TIME: CPT | Mod: PBBFAC | Performed by: PHARMACIST

## 2017-12-04 NOTE — PROGRESS NOTES
INR within normal range but on the lower end today. Patient states that she is only eating greens 1x/week and boost 1x/week now. Patient states that she has an upcoming dental appointment but does not believe they will be doing any extractions since it is a new dentist. Called and confirmed with dentist's office ( Dr. Urias [phone]) that there will not be any extractions until further notice. Advised patient on the importance of notifying us of any upcoming dental procedures. Advised patient to notify us of any changes or concerns.

## 2017-12-14 RX ORDER — POTASSIUM CHLORIDE 750 MG/1
20 CAPSULE, EXTENDED RELEASE ORAL DAILY
Qty: 60 CAPSULE | Refills: 3 | Status: SHIPPED | OUTPATIENT
Start: 2017-12-14 | End: 2019-03-20 | Stop reason: SDUPTHER

## 2017-12-14 RX ORDER — POTASSIUM CHLORIDE 750 MG/1
CAPSULE, EXTENDED RELEASE ORAL
Qty: 60 CAPSULE | Refills: 3 | Status: SHIPPED | OUTPATIENT
Start: 2017-12-14 | End: 2018-01-04 | Stop reason: SDUPTHER

## 2017-12-19 DIAGNOSIS — R91.1 LUNG NODULE SEEN ON IMAGING STUDY: Primary | ICD-10-CM

## 2017-12-21 ENCOUNTER — TELEPHONE (OUTPATIENT)
Dept: ELECTROPHYSIOLOGY | Facility: CLINIC | Age: 47
End: 2017-12-21

## 2017-12-21 NOTE — TELEPHONE ENCOUNTER
----- Message from Olimpia Koenig sent at 12/21/2017  1:19 PM CST -----  Contact: pt   Pt called to reschedule her tune up appt she missed.  She can be reached @ [phone].  Thanks!

## 2017-12-27 ENCOUNTER — TELEPHONE (OUTPATIENT)
Dept: TRANSPLANT | Facility: CLINIC | Age: 47
End: 2017-12-27

## 2017-12-27 ENCOUNTER — ANTI-COAG VISIT (OUTPATIENT)
Dept: CARDIOLOGY | Facility: CLINIC | Age: 47
End: 2017-12-27
Payer: MEDICAID

## 2017-12-27 DIAGNOSIS — I50.22 CHRONIC SYSTOLIC CONGESTIVE HEART FAILURE: Primary | ICD-10-CM

## 2017-12-27 DIAGNOSIS — I48.0 PAROXYSMAL ATRIAL FIBRILLATION: ICD-10-CM

## 2017-12-27 DIAGNOSIS — Z79.01 CHRONIC ANTICOAGULATION: Primary | ICD-10-CM

## 2017-12-27 LAB — INR PPP: 3.1 (ref 2–3)

## 2017-12-27 PROCEDURE — 99211 OFF/OP EST MAY X REQ PHY/QHP: CPT | Mod: S$PBB,,,

## 2017-12-27 PROCEDURE — 85610 PROTHROMBIN TIME: CPT | Mod: PBBFAC

## 2017-12-27 NOTE — PROGRESS NOTES
INR slightly elevated today. Patient states that she is on clindamycin for a broken tooth and should be finishing it tomorrow, no DDI expected. Denies any bleeding, bruising or other changes. Will hold coumadin dose today and then resume weekly dose until follow-up in 3 weeks. Advised her to call with any changes or concerns.

## 2018-01-01 RX ORDER — LANOLIN ALCOHOL/MO/W.PET/CERES
CREAM (GRAM) TOPICAL
Qty: 60 TABLET | Refills: 1 | Status: SHIPPED | OUTPATIENT
Start: 2018-01-01 | End: 2018-03-13 | Stop reason: SDUPTHER

## 2018-01-01 RX ORDER — DIGOXIN 125 MCG
TABLET ORAL
Qty: 30 TABLET | Refills: 5 | Status: SHIPPED | OUTPATIENT
Start: 2018-01-01 | End: 2018-07-05

## 2018-01-02 ENCOUNTER — TELEPHONE (OUTPATIENT)
Dept: PHARMACY | Facility: CLINIC | Age: 48
End: 2018-01-02

## 2018-01-04 ENCOUNTER — LAB VISIT (OUTPATIENT)
Dept: LAB | Facility: HOSPITAL | Age: 48
End: 2018-01-04
Attending: INTERNAL MEDICINE
Payer: MEDICAID

## 2018-01-04 ENCOUNTER — OFFICE VISIT (OUTPATIENT)
Dept: TRANSPLANT | Facility: CLINIC | Age: 48
End: 2018-01-04
Payer: MEDICAID

## 2018-01-04 VITALS
HEIGHT: 63 IN | WEIGHT: 241.38 LBS | HEART RATE: 75 BPM | SYSTOLIC BLOOD PRESSURE: 145 MMHG | DIASTOLIC BLOOD PRESSURE: 77 MMHG | BODY MASS INDEX: 42.77 KG/M2

## 2018-01-04 DIAGNOSIS — I48.0 PAROXYSMAL ATRIAL FIBRILLATION: ICD-10-CM

## 2018-01-04 DIAGNOSIS — Z76.82 ORGAN TRANSPLANT CANDIDATE: ICD-10-CM

## 2018-01-04 DIAGNOSIS — N18.30 CKD (CHRONIC KIDNEY DISEASE), STAGE III: ICD-10-CM

## 2018-01-04 DIAGNOSIS — I44.7 LEFT BUNDLE-BRANCH BLOCK: ICD-10-CM

## 2018-01-04 DIAGNOSIS — Z79.01 CHRONIC ANTICOAGULATION: ICD-10-CM

## 2018-01-04 DIAGNOSIS — I50.42 CHRONIC COMBINED SYSTOLIC AND DIASTOLIC HEART FAILURE: Primary | ICD-10-CM

## 2018-01-04 DIAGNOSIS — I42.8 NICM (NONISCHEMIC CARDIOMYOPATHY): ICD-10-CM

## 2018-01-04 DIAGNOSIS — I50.22 CHRONIC SYSTOLIC CONGESTIVE HEART FAILURE: ICD-10-CM

## 2018-01-04 DIAGNOSIS — Z01.818 ENCOUNTER FOR PRE-TRANSPLANT EVALUATION FOR HEART TRANSPLANT: ICD-10-CM

## 2018-01-04 DIAGNOSIS — I11.0 HYPERTENSIVE HEART DISEASE WITH HEART FAILURE: ICD-10-CM

## 2018-01-04 DIAGNOSIS — G47.33 OSA (OBSTRUCTIVE SLEEP APNEA): ICD-10-CM

## 2018-01-04 LAB
ANION GAP SERPL CALC-SCNC: 6 MMOL/L
BNP SERPL-MCNC: 120 PG/ML
BUN SERPL-MCNC: 18 MG/DL
CALCIUM SERPL-MCNC: 9.1 MG/DL
CHLORIDE SERPL-SCNC: 103 MMOL/L
CO2 SERPL-SCNC: 30 MMOL/L
CREAT SERPL-MCNC: 1.3 MG/DL
EST. GFR  (AFRICAN AMERICAN): 56.1 ML/MIN/1.73 M^2
EST. GFR  (NON AFRICAN AMERICAN): 48.6 ML/MIN/1.73 M^2
GLUCOSE SERPL-MCNC: 101 MG/DL
POTASSIUM SERPL-SCNC: 4 MMOL/L
SODIUM SERPL-SCNC: 139 MMOL/L
TSH SERPL DL<=0.005 MIU/L-ACNC: 1.46 UIU/ML

## 2018-01-04 PROCEDURE — 83880 ASSAY OF NATRIURETIC PEPTIDE: CPT

## 2018-01-04 PROCEDURE — 36415 COLL VENOUS BLD VENIPUNCTURE: CPT

## 2018-01-04 PROCEDURE — 99999 PR PBB SHADOW E&M-EST. PATIENT-LVL III: CPT | Mod: PBBFAC,,, | Performed by: INTERNAL MEDICINE

## 2018-01-04 PROCEDURE — 84443 ASSAY THYROID STIM HORMONE: CPT

## 2018-01-04 PROCEDURE — 80048 BASIC METABOLIC PNL TOTAL CA: CPT

## 2018-01-04 PROCEDURE — 99214 OFFICE O/P EST MOD 30 MIN: CPT | Mod: S$PBB,,, | Performed by: INTERNAL MEDICINE

## 2018-01-04 PROCEDURE — 99213 OFFICE O/P EST LOW 20 MIN: CPT | Mod: PBBFAC | Performed by: INTERNAL MEDICINE

## 2018-01-04 RX ORDER — INSULIN GLARGINE 100 [IU]/ML
34 INJECTION, SOLUTION SUBCUTANEOUS 2 TIMES DAILY
Qty: 15 ML | Refills: 6 | Status: SHIPPED | OUTPATIENT
Start: 2018-01-04 | End: 2018-09-20 | Stop reason: SDUPTHER

## 2018-01-04 RX ORDER — INSULIN GLARGINE 100 [IU]/ML
INJECTION, SOLUTION SUBCUTANEOUS
COMMUNITY
End: 2018-01-04 | Stop reason: SDUPTHER

## 2018-01-04 NOTE — PROGRESS NOTES
Subjective:     HPI:  Ms. Ross is a very pleasant 47 y.o. AAF with a history of NICM (EF 10-20%), paroxysmal atrial fibrillation, HTN, DM, asthma, HLD and CLARENCE who originally presented as a transfer from [facility] for ADHF and AF with RVR. Despite diuresis, she decompensated further with worsening hemodynamics, progressive AL and hepatic congestion, requiring inotropes and eventually an IABP, which was in from 4/22/16 to 4/25/16. She was aggressively diuresed with significant clinical improvement. A PICC line was placed in anticipation for home  but after further diuresis and afterload reduction, a repeat echo showed improved LV and RV size and function, so  was weaned from 5 to 2.5 mcg/jg/min and a RHC was obtained with a CI of 2.8 on  2.5.  was stopped on 5/6/16 which she clinically tolerated, feeling well with normal BP and normal/stable Cr and Tbili. For AF, she was loaded with IV Amiodarone and transitioned to PO.  She continued to do well and last year (5/2017) she had a CRT upgrade by Dr. Jolly. She was last seen by Dr. Jules 8/2017 and presents today for routine 6month f/u.     Today, she endorses feeling about the same as she did 6 months ago. She has occasional days when she does too much. Her symptoms are consistent with NYHA class II with occasional III symptoms. No  PND or orthopnea.  Labs today pending (lab delayed). Of note, she took none of her meds today. She reports her BP at home runs 90s-110s/60s.    Echo 5/2016: LVEDD 6.3cm    1 - Eccentric hypertrophy.     2 - Severely depressed left ventricular systolic function (EF 20-25%).     3 - Left ventricular diastolic dysfunction.     4 - Biatrial enlargement.     5 - Normal right ventricular systolic function .     6 - Pulmonary hypertension. The estimated PA systolic pressure is 63 mmHg.     7 - Moderate mitral regurgitation.     8 - Trivial pulmonic regurgitation.     9 - Trivial pericardial effusion.     10 -  "Intermediate central venous pressure.     Past Medical History:   Diagnosis Date    Anticoagulant long-term use     Arthritis     Asthma     Asthma     Cardiomyopathy     CHF (congestive heart failure)     CHF (congestive heart failure)     GERD (gastroesophageal reflux disease)     H/O tubal ligation     Hypertension     Obesity     Renal disorder     Type 2 diabetes mellitus with hyperglycemia     Type 2 diabetes mellitus with polyneuropathy      Past Surgical History:   Procedure Laterality Date    CARDIAC DEFIBRILLATOR PLACEMENT      CHOLECYSTECTOMY      COLONOSCOPY N/A 2016    Procedure: COLONOSCOPY;  Surgeon: Eugene Soto MD;  Location: [address];  Service: Endoscopy;  Laterality: N/A;    GALLBLADDER SURGERY      iabp  2016    TUBAL LIGATION       OB History      Para Term  AB Living    3 3 3          SAB TAB Ectopic Multiple Live Births                     Review of Systems   Constitution: Negative. Negative for chills, decreased appetite, diaphoresis, fever, weakness, malaise/fatigue, night sweats, weight gain and weight loss.   Eyes: Negative.    Cardiovascular: Positive for dyspnea on exertion. Negative for chest pain, claudication, cyanosis, irregular heartbeat, leg swelling, near-syncope, orthopnea, palpitations, paroxysmal nocturnal dyspnea and syncope.   Respiratory: Negative for cough, hemoptysis and shortness of breath.    Endocrine: Negative.    Hematologic/Lymphatic: Negative.    Skin: Negative for color change, dry skin and nail changes.   Musculoskeletal: Negative.    Gastrointestinal: Negative.    Genitourinary: Negative.        Objective:   Blood pressure (!) 145/77, pulse 75, height 5' 3" (1.6 m), weight 109.5 kg (241 lb 6.5 oz).body mass index is 42.76 kg/m².  Physical Exam   Constitutional: She is oriented to person, place, and time. Vital signs are normal. She appears well-developed and well-nourished.   HENT:   Head: Normocephalic. "   Eyes: Pupils are equal, round, and reactive to light.   Neck: Neck supple. No JVD (JVP ~9-10cm) present.   Cardiovascular: Normal rate, regular rhythm and normal heart sounds.  PMI is displaced.  Exam reveals no gallop and no friction rub.    No murmur heard.  Pulmonary/Chest: Effort normal and breath sounds normal. She has no wheezes. She has no rales.   Abdominal: Soft. Bowel sounds are normal. She exhibits no distension. There is no tenderness. There is no rebound.   Musculoskeletal: She exhibits no edema.   Neurological: She is alert and oriented to person, place, and time.   Nursing note and vitals reviewed.      Labs:    Chemistry        Component Value Date/Time     01/04/2018 1040    K 4.0 01/04/2018 1040     01/04/2018 1040    CO2 30 (H) 01/04/2018 1040    BUN 18 01/04/2018 1040    CREATININE 1.3 01/04/2018 1040     01/04/2018 1040        Component Value Date/Time    CALCIUM 9.1 01/04/2018 1040    ALKPHOS 78 08/11/2017 1646    AST 22 08/11/2017 1646    ALT 25 08/11/2017 1646    BILITOT 0.6 08/11/2017 1646    ESTGFRAFRICA 56.1 (A) 01/04/2018 1040    EGFRNONAA 48.6 (A) 01/04/2018 1040          Magnesium   Date Value Ref Range Status   03/20/2017 2.5 1.6 - 2.6 mg/dL Final     Lab Results   Component Value Date    WBC 9.17 07/06/2017    HGB 12.4 07/06/2017    HCT 37.4 07/06/2017     07/06/2017     Lab Results   Component Value Date    INR 3.1 12/27/2017    INR 2.0 12/04/2017    INR 1.9 10/26/2017     BNP   Date Value Ref Range Status   08/11/2017 245 (H) 0 - 99 pg/mL Final     Comment:     Values of less than 100 pg/ml are consistent with non-CHF populations.   07/06/2017 118 (H) 0 - 99 pg/mL Final     Comment:     Values of less than 100 pg/ml are consistent with non-CHF populations.   02/13/2017 146 (H) 0 - 99 pg/mL Final     Comment:     Values of less than 100 pg/ml are consistent with non-CHF populations.     LD   Date Value Ref Range Status   04/14/2016 827 (H) 110 - 260 U/L  Final     Comment:     Results are increased in hemolyzed samples.     No results found for this or any previous visit.  No results found for this or any previous visit.    Assessment:      1. Chronic combined systolic and diastolic heart failure    2. Encounter for pre-transplant evaluation for heart transplant    3. Hypertensive heart disease with heart failure    4. NICM (nonischemic cardiomyopathy)    5. Left bundle-branch block    6. Paroxysmal atrial fibrillation    7. CKD (chronic kidney disease), stage III    8. Chronic anticoagulation    9. Organ transplant candidate    10. CLARENCE (obstructive sleep apnea)        Plan:   Chronic combined HF, NICM  - Clinically continues to do well. Borderline Euvolemic on exam.   - So far she remains stable on Lisinopril and Aldactone; will increase lisinopril to 10mg qam and 5mg qpm and told to call if she does not tolerate  - Owing to her asthma Did not tolerate beta blocker (Bisoprolol) in past.   - now s/p CRT-D  - will get Echo prior to next visit but continue current therapy for now  - AMIODARONE FOLLOW-UP:   CXR yearly--last 3 months ago   CMP, TSH every 6 months--last 3/2017, will get next visit    Recommend 2 gram sodium restriction and 1500cc fluid restriction.  Encourage physical activity with graded exercise program.  Requested patient to weigh themselves daily, and to notify us if their weight increases by more than 3 lbs in 1 day or 5 lbs in 1 week.      RTC in 4  months with labs with Dr. Jorge A Luque MD  Transplant Cardiology

## 2018-01-04 NOTE — LETTER
January 4, 2018        Ayan Olmstead  [address] [place] [address]  Phone: [phone]  Fax: [phone]             [facility] [address] [place] [address]  Phone: [phone]   Patient: Laure Ross   MR Number: [id number]   YOB: 1970   Date of Visit: 1/4/2018       Dear Dr. Ayan Olmstead    Thank you for referring Laure Ross to me for evaluation. Attached you will find relevant portions of my assessment and plan of care.    If you have questions, please do not hesitate to call me. I look forward to following Laure Ross along with you.    Sincerely,    Carlos Luque MD    Enclosure    If you would like to receive this communication electronically, please contact [email or web address] or [phone] to request [employer] access.    [employer] is a tool which provides read-only access to select patient information with whom you have a relationship. Its easy to use and provides real time access to review your patients record including encounter summaries, notes, results, and demographic information.    If you feel you have received this communication in error or would no longer like to receive these types of communications, please e-mail [email or web address]

## 2018-01-04 NOTE — TELEPHONE ENCOUNTER
----- Message from Gisele Siegel sent at 1/4/2018  9:12 AM CST -----  Regarding: Need a Drug Change  Good Morning,    I called yesterday to have MsJayy Cody Lantus Solostar Flexpen to Basaglar Insulin Pens because Lantus is no longer preferred.  Can you please send a new script to [facility]?     Thanks,  Gisele Siegel  Pharmacy Technician   [facility]  Phone: [phone]  Fax: [phone]

## 2018-01-04 NOTE — PATIENT INSTRUCTIONS
1. Increase Lisinopril to 10mg in am and 5mg in afternoon;    -Call if you do not tolerate  2. Take an extra 40mg lasix today

## 2018-01-10 DIAGNOSIS — K21.9 GASTROESOPHAGEAL REFLUX DISEASE, ESOPHAGITIS PRESENCE NOT SPECIFIED: ICD-10-CM

## 2018-01-10 RX ORDER — OMEPRAZOLE 40 MG/1
CAPSULE, DELAYED RELEASE ORAL
Qty: 30 CAPSULE | Refills: 6 | Status: SHIPPED | OUTPATIENT
Start: 2018-01-10 | End: 2018-08-16 | Stop reason: SDUPTHER

## 2018-01-10 NOTE — TELEPHONE ENCOUNTER
----- Message from Joe Magana MD sent at 1/10/2018  1:22 PM CST -----  Please schedule a visit with NP/PA

## 2018-01-10 NOTE — TELEPHONE ENCOUNTER
Per dr samuels patient needs appt with np or pa , ma called pt to let her know , pt verbalized understanding

## 2018-01-12 ENCOUNTER — CLINICAL SUPPORT (OUTPATIENT)
Dept: ELECTROPHYSIOLOGY | Facility: CLINIC | Age: 48
End: 2018-01-12
Attending: INTERNAL MEDICINE
Payer: MEDICAID

## 2018-01-12 DIAGNOSIS — I42.0 CARDIOMYOPATHY, DILATED: ICD-10-CM

## 2018-01-12 DIAGNOSIS — Z95.810 ICD (IMPLANTABLE CARDIOVERTER-DEFIBRILLATOR) IN PLACE: ICD-10-CM

## 2018-01-12 PROCEDURE — 93284 PRGRMG EVAL IMPLANTABLE DFB: CPT | Mod: PBBFAC | Performed by: INTERNAL MEDICINE

## 2018-01-17 ENCOUNTER — ANTI-COAG VISIT (OUTPATIENT)
Dept: CARDIOLOGY | Facility: CLINIC | Age: 48
End: 2018-01-17
Payer: MEDICAID

## 2018-01-17 DIAGNOSIS — I48.0 PAROXYSMAL ATRIAL FIBRILLATION: ICD-10-CM

## 2018-01-17 DIAGNOSIS — Z79.01 CHRONIC ANTICOAGULATION: ICD-10-CM

## 2018-01-17 DIAGNOSIS — Z79.01 LONG TERM (CURRENT) USE OF ANTICOAGULANTS: Primary | ICD-10-CM

## 2018-01-17 LAB — INR PPP: 2 (ref 2–3)

## 2018-01-17 PROCEDURE — 85610 PROTHROMBIN TIME: CPT | Mod: PBBFAC

## 2018-01-17 NOTE — PROGRESS NOTES
Pt seen by Patsy MARRERO. I have reviewed her documentation and agree with her assessment and plan.

## 2018-01-17 NOTE — PROGRESS NOTES
Quick follow-up from high INR 12/27. INR within normal range today. Patient with bruise on shoulder from use. Denies any bleeding or other changes. Will maintain weekly dose until follow-up in 3 weeks. Advised her to call with any changes or concerns.

## 2018-01-25 NOTE — PROGRESS NOTES
Patient called today to report she is having 3-4 teeth pulled today and she has been holding her coumadin the last 4 days. I advised patient in the future to always let us know in advance when she has to interrupt her coumadin. She will call coumadin clinic to let us know when her dentist allows her to restart her coumadin. I suggested as soon as safely possible with respect to post procedure bleed risk.

## 2018-01-30 NOTE — PROGRESS NOTES
I was able to reach patient today and she reports restarting her coumadin on 1/26. She will come in this week for an INR

## 2018-02-02 ENCOUNTER — ANTI-COAG VISIT (OUTPATIENT)
Dept: CARDIOLOGY | Facility: CLINIC | Age: 48
End: 2018-02-02
Payer: MEDICAID

## 2018-02-02 DIAGNOSIS — Z79.01 LONG TERM (CURRENT) USE OF ANTICOAGULANTS: Primary | ICD-10-CM

## 2018-02-02 DIAGNOSIS — I48.0 PAROXYSMAL ATRIAL FIBRILLATION: ICD-10-CM

## 2018-02-02 DIAGNOSIS — Z79.01 CHRONIC ANTICOAGULATION: ICD-10-CM

## 2018-02-02 LAB — INR PPP: 1.6 (ref 2–3)

## 2018-02-02 PROCEDURE — 85610 PROTHROMBIN TIME: CPT | Mod: PBBFAC

## 2018-02-02 PROCEDURE — 99211 OFF/OP EST MAY X REQ PHY/QHP: CPT | Mod: S$PBB,,,

## 2018-02-02 NOTE — PROGRESS NOTES
Quick follow-up from procedure hold. INR still low today. Patient denies any bruising, bleeding or changes. Will boost dose today and then resume weekly dose until follow-up in 2 weeks. Advised her to call with any changes or concerns.

## 2018-02-12 DIAGNOSIS — Z95.810 PRESENCE OF AUTOMATIC CARDIOVERTER/DEFIBRILLATOR (AICD): Primary | ICD-10-CM

## 2018-02-15 ENCOUNTER — TELEPHONE (OUTPATIENT)
Dept: GASTROENTEROLOGY | Facility: CLINIC | Age: 48
End: 2018-02-15

## 2018-02-15 NOTE — TELEPHONE ENCOUNTER
----- Message from Davina Norbert sent at 2/15/2018  4:17 PM CST -----  Contact: self - [phone]  Has constipation - miralax does not help - pt has medicaid - no appts available for me to book - please call patient at

## 2018-02-15 NOTE — TELEPHONE ENCOUNTER
Returned patient's call. Patient is calling to schedule an appointment with GI due to constipation. No appointments available to offer patient. Ms. Ross given number to [facility] at [phone] to assist pt. with an appointment.

## 2018-02-16 ENCOUNTER — ANTI-COAG VISIT (OUTPATIENT)
Dept: CARDIOLOGY | Facility: CLINIC | Age: 48
End: 2018-02-16
Payer: MEDICAID

## 2018-02-16 DIAGNOSIS — I48.0 PAROXYSMAL ATRIAL FIBRILLATION: ICD-10-CM

## 2018-02-16 DIAGNOSIS — Z79.01 LONG TERM (CURRENT) USE OF ANTICOAGULANTS: Primary | ICD-10-CM

## 2018-02-16 DIAGNOSIS — Z79.01 CHRONIC ANTICOAGULATION: ICD-10-CM

## 2018-02-16 LAB — INR PPP: 1.7 (ref 2–3)

## 2018-02-16 PROCEDURE — 85610 PROTHROMBIN TIME: CPT | Mod: PBBFAC

## 2018-02-16 NOTE — PROGRESS NOTES
Quick follow-up for low INR 2/2. INR still low today. Patient denies any bruising, bleeding or changes. Will increase weekly dose until follow-up in 2 weeks. Advised her to call with any changes or concerns.

## 2018-02-16 NOTE — PROGRESS NOTES
Pt seen by Patsy MARRERO. I have reviewed her initial findings and agree with her assessment and plan

## 2018-02-17 ENCOUNTER — OFFICE VISIT (OUTPATIENT)
Dept: URGENT CARE | Facility: CLINIC | Age: 48
End: 2018-02-17
Payer: MEDICAID

## 2018-02-17 VITALS
HEIGHT: 65 IN | WEIGHT: 240 LBS | BODY MASS INDEX: 39.99 KG/M2 | SYSTOLIC BLOOD PRESSURE: 117 MMHG | HEART RATE: 74 BPM | OXYGEN SATURATION: 97 % | RESPIRATION RATE: 18 BRPM | TEMPERATURE: 99 F | DIASTOLIC BLOOD PRESSURE: 74 MMHG

## 2018-02-17 DIAGNOSIS — R31.9 URINARY TRACT INFECTION WITH HEMATURIA, SITE UNSPECIFIED: Primary | ICD-10-CM

## 2018-02-17 DIAGNOSIS — J32.9 SINUSITIS, UNSPECIFIED CHRONICITY, UNSPECIFIED LOCATION: ICD-10-CM

## 2018-02-17 DIAGNOSIS — Z76.0 MEDICATION REFILL: ICD-10-CM

## 2018-02-17 DIAGNOSIS — N39.0 URINARY TRACT INFECTION WITH HEMATURIA, SITE UNSPECIFIED: Primary | ICD-10-CM

## 2018-02-17 LAB
BILIRUB UR QL STRIP: NEGATIVE
CTP QC/QA: YES
FLUAV AG NPH QL: NEGATIVE
FLUBV AG NPH QL: NEGATIVE
GLUCOSE UR QL STRIP: NEGATIVE
KETONES UR QL STRIP: NEGATIVE
LEUKOCYTE ESTERASE UR QL STRIP: POSITIVE
PH, POC UA: 6 (ref 5–8)
POC BLOOD, URINE: POSITIVE
POC NITRATES, URINE: NEGATIVE
PROT UR QL STRIP: NEGATIVE
SP GR UR STRIP: 1.02 (ref 1–1.03)
UROBILINOGEN UR STRIP-ACNC: NORMAL (ref 0.1–1.1)

## 2018-02-17 PROCEDURE — 3008F BODY MASS INDEX DOCD: CPT | Mod: S$GLB,,, | Performed by: PHYSICIAN ASSISTANT

## 2018-02-17 PROCEDURE — 87804 INFLUENZA ASSAY W/OPTIC: CPT | Mod: QW,S$GLB,, | Performed by: PHYSICIAN ASSISTANT

## 2018-02-17 PROCEDURE — 81003 URINALYSIS AUTO W/O SCOPE: CPT | Mod: QW,S$GLB,, | Performed by: PHYSICIAN ASSISTANT

## 2018-02-17 PROCEDURE — 99214 OFFICE O/P EST MOD 30 MIN: CPT | Mod: 25,S$GLB,, | Performed by: PHYSICIAN ASSISTANT

## 2018-02-17 RX ORDER — LEVALBUTEROL INHALATION SOLUTION 0.63 MG/3ML
1 SOLUTION RESPIRATORY (INHALATION) EVERY 4 HOURS PRN
Qty: 1 BOX | Refills: 0 | Status: SHIPPED | OUTPATIENT
Start: 2018-02-17 | End: 2018-04-09

## 2018-02-17 RX ORDER — AMOXICILLIN AND CLAVULANATE POTASSIUM 875; 125 MG/1; MG/1
1 TABLET, FILM COATED ORAL 2 TIMES DAILY
Qty: 20 TABLET | Refills: 0 | Status: SHIPPED | OUTPATIENT
Start: 2018-02-17 | End: 2018-02-27

## 2018-02-17 NOTE — PATIENT INSTRUCTIONS
"- Rest.    - Drink plenty of fluids.    - Tylenol or Ibuprofen as directed as needed for fever/pain.    - Follow up with your PCP or specialty clinic as directed in the next 1-2 weeks if not improved or as needed.  You can call [phone] to schedule an appointment with the appropriate provider.    - Go to the ED if your symptoms worsen.    Bladder Infection, Female (Adult)    Urine is normally doesn't have any bacteria in it. But bacteria can get into the urinary tract from the skin around the rectum. Or they can travel in the blood from elsewhere in the body. Once they are in your urinary tract, they can cause infection in the urethra (urethritis), the bladder (cystitis), or the kidneys (pyelonephritis).  The most common place for an infection is in the bladder. This is called a bladder infection. This is one of the most common infections in women. Most bladder infections are easily treated. They are not serious unless the infection spreads to the kidney.  The phrases "bladder infection," "UTI," and "cystitis" are often used to describe the same thing. But they are not always the same. Cystitis is an inflammation of the bladder. The most common cause of cystitis is an infection.  Symptoms  The infection causes inflammation in the urethra and bladder. This causes many of the symptoms. The most common symptoms of a bladder infection are:  · Pain or burning when urinating  · Having to urinate more often than usual  · Urgent need to urinate  · Only a small amount of urine comes out  · Blood in urine  · Abdominal discomfort. This is usually in the lower abdomen above the pubic bone.  · Cloudy urine  · Strong- or bad-smelling urine  · Unable to urinate (urinary retention)  · Unable to hold urine in (urinary incontinence)  · Fever  · Loss of appetite  · Confusion (in older adults)  Causes  Bladder infections are not contagious. You can't get one from someone else, from a toilet seat, or from sharing a bath.  The most " common cause of bladder infections is bacteria from the bowels. The bacteria get onto the skin around the opening of the urethra. From there, they can get into the urine and travel up to the bladder, causing inflammation and infection. This usually happens because of:  · Wiping improperly after urinating. Always wipe from front to back.  · Bowel incontinence  · Pregnancy  · Procedures such as having a catheter inserted  · Older age  · Not emptying your bladder. This can allow bacteria a chance to grow in your urine.  · Dehydration  · Constipation  · Sex  · Use of a diaphragm for birth control   Treatment  Bladder infections are diagnosed by a urine test. They are treated with antibiotics and usually clear up quickly without complications. Treatment helps prevent a more serious kidney infection.  Medicines  Medicines can help in the treatment of a bladder infection:  · Take antibiotics until they are used up, even if you feel better. It is important to finish them to make sure the infection has cleared.  · You can use acetaminophen or ibuprofen for pain, fever, or discomfort, unless another medicine was prescribed. If you have chronic liver or kidney disease, talk with your healthcare provider before using these medicines. Also talk with your provider if you've ever had a stomach ulcer or gastrointestinal bleeding, or are taking blood-thinner medicines.  · If you are given phenazopydridine to reduce burning with urination, it will cause your urine to become a bright orange color. This can stain clothing.  Care and prevention  These self-care steps can help prevent future infections:  · Drink plenty of fluids to prevent dehydration and flush out your bladder. Do this unless you must restrict fluids for other health reasons, or your doctor told you not to.  · Proper cleaning after going to the bathroom is important. Wipe from front to back after using the toilet to prevent the spread of bacteria.  · Urinate more often.  Don't try to hold urine in for a long time.  · Wear loose-fitting clothes and cotton underwear. Avoid tight-fitting pants.  · Improve your diet and prevent constipation. Eat more fresh fruit and vegetables, and fiber, and less junk and fatty foods.  · Avoid sex until your symptoms are gone.  · Avoid caffeine, alcohol, and spicy foods. These can irritate your bladder.  · Urinate right after intercourse to flush out your bladder.  · If you use birth control pills and have frequent bladder infections, discuss it with your doctor.  Follow-up care  Call your healthcare provider if all symptoms are not gone after 3 days of treatment. This is especially important if you have repeat infections.  If a culture was done, you will be told if your treatment needs to be changed. If directed, you can call to find out the results.  If X-rays were done, you will be told if the results will affect your treatment.  Call 911  Call 911 if any of the following occur:  · Trouble breathing  · Hard to wake up or confusion  · Fainting or loss of consciousness  · Rapid heart rate  When to seek medical advice  Call your healthcare provider right away if any of these occur:  · Fever of 100.4ºF (38.0ºC) or higher, or as directed by your healthcare provider  · Symptoms are not better by the third day of treatment  · Back or belly (abdominal) pain that gets worse  · Repeated vomiting, or unable to keep medicine down  · Weakness or dizziness  · Vaginal discharge  · Pain, redness, or swelling in the outer vaginal area (labia)  Date Last Reviewed: 10/1/2016  © 0019-7288 [employer]. [address] [place] [address], [place] [address]. All rights reserved. This information is not intended as a substitute for professional medical care. Always follow your healthcare professional's instructions.        Sinusitis (Antibiotic Treatment)    The sinuses are air-filled spaces within the bones of the face. They connect to the inside of the  nose. Sinusitis is an inflammation of the tissue lining the sinus cavity. Sinus inflammation can occur during a cold. It can also be due to allergies to pollens and other particles in the air. Sinusitis can cause symptoms of sinus congestion and fullness. A sinus infection causes fever, headache and facial pain. There is often green or yellow drainage from the nose or into the back of the throat (post-nasal drip). You have been given antibiotics to treat this condition.  Home care:  · Take the full course of antibiotics as instructed. Do not stop taking them, even if you feel better.  · Drink plenty of water, hot tea, and other liquids. This may help thin mucus. It also may promote sinus drainage.  · Heat may help soothe painful areas of the face. Use a towel soaked in hot water. Or,  the shower and direct the hot spray onto your face. Using a vaporizer along with a menthol rub at night may also help.   · An expectorant containing guaifenesin may help thin the mucus and promote drainage from the sinuses.  · Over-the-counter decongestants may be used unless a similar medicine was prescribed. Nasal sprays work the fastest. Use one that contains phenylephrine or oxymetazoline. First blow the nose gently. Then use the spray. Do not use these medicines more often than directed on the label or symptoms may get worse. You may also use tablets containing pseudoephedrine. Avoid products that combine ingredients, because side effects may be increased. Read labels. You can also ask the pharmacist for help. (NOTE: Persons with high blood pressure should not use decongestants. They can raise blood pressure.)  · Over-the-counter antihistamines may help if allergies contributed to your sinusitis.    · Do not use nasal rinses or irrigation during an acute sinus infection, unless told to by your health care provider. Rinsing may spread the infection to other sinuses.  · Use acetaminophen or ibuprofen to control pain, unless  another pain medicine was prescribed. (If you have chronic liver or kidney disease or ever had a stomach ulcer, talk with your doctor before using these medicines. Aspirin should never be used in anyone under 18 years of age who is ill with a fever. It may cause severe liver damage.)  · Don't smoke. This can worsen symptoms.  Follow-up care  Follow up with your healthcare provider or our staff if you are not improving within the next week.  When to seek medical advice  Call your healthcare provider if any of these occur:  · Facial pain or headache becoming more severe  · Stiff neck  · Unusual drowsiness or confusion  · Swelling of the forehead or eyelids  · Vision problems, including blurred or double vision  · Fever of 100.4ºF (38ºC) or higher, or as directed by your healthcare provider  · Seizure  · Breathing problems  · Symptoms not resolving within 10 days  Date Last Reviewed: 4/13/2015  © 4670-1616 [facility]. [address] [place] [address], [place] [address]. All rights reserved. This information is not intended as a substitute for professional medical care. Always follow your healthcare professional's instructions.

## 2018-02-17 NOTE — PROGRESS NOTES
"Subjective:       Patient ID: Laure Ross is a 47 y.o. female.    Vitals:  height is 5' 5" (1.651 m) and weight is 108.9 kg (240 lb). Her tympanic temperature is 98.5 °F (36.9 °C). Her blood pressure is 117/74 and her pulse is 74. Her respiration is 18 and oxygen saturation is 97%.     Chief Complaint: Dysuria; Cough; and Generalized Body Aches    This is a 47 y.o. female with Past Medical History:  No date: Anticoagulant long-term use  No date: Arthritis  No date: Asthma  No date: Asthma  No date: Cardiomyopathy  No date: CHF (congestive heart failure)  No date: CHF (congestive heart failure)  No date: GERD (gastroesophageal reflux disease)  No date: H/O tubal ligation  No date: Hypertension  No date: Obesity  No date: Renal disorder  No date: Type 2 diabetes mellitus with hyperglycemia  No date: Type 2 diabetes mellitus with polyneuropathy   who presents today with a chief complaint of cough, body aches and dysuria. Urine is cloudy and foul odor.      Dysuria    This is a new problem. The current episode started 1 to 4 weeks ago. The problem has been gradually worsening. The quality of the pain is described as burning. There has been no fever. She is sexually active. There is no history of pyelonephritis. Associated symptoms include flank pain and urgency. Pertinent negatives include no frequency or hematuria. She has tried nothing for the symptoms. Her past medical history is significant for recurrent UTIs.   Cough   This is a new problem. The current episode started in the past 7 days. The problem has been gradually worsening. The problem occurs hourly. The cough is non-productive. Associated symptoms include headaches, nasal congestion, shortness of breath and wheezing. Pertinent negatives include no ear congestion, ear pain, fever, postnasal drip or rhinorrhea. The symptoms are aggravated by lying down. She has tried nothing for the symptoms. The treatment provided no relief. Her past medical history is " significant for asthma. There is no history of environmental allergies.     Review of Systems   Constitution: Negative for fever.   HENT: Negative for ear pain, postnasal drip and rhinorrhea.    Respiratory: Positive for cough, shortness of breath and wheezing.    Genitourinary: Positive for dysuria, flank pain and urgency. Negative for frequency and hematuria.   Neurological: Positive for headaches.   Allergic/Immunologic: Negative for environmental allergies.       Objective:      Physical Exam   Constitutional: She is oriented to person, place, and time. She appears well-developed and well-nourished.   HENT:   Head: Normocephalic and atraumatic.   Right Ear: Hearing, tympanic membrane, external ear and ear canal normal.   Left Ear: Hearing, tympanic membrane, external ear and ear canal normal.   Nose: Mucosal edema present. Right sinus exhibits maxillary sinus tenderness and frontal sinus tenderness. Left sinus exhibits maxillary sinus tenderness and frontal sinus tenderness.   Mouth/Throat: Uvula is midline and oropharynx is clear and moist.   Eyes: Conjunctivae are normal.   Neck: Normal range of motion. Neck supple. No thyromegaly present.   Cardiovascular: Normal rate and regular rhythm.  Exam reveals no gallop and no friction rub.    No murmur heard.  Pulmonary/Chest: Effort normal and breath sounds normal. She has no wheezes. She has no rales.   Musculoskeletal: Normal range of motion.   Lymphadenopathy:     She has no cervical adenopathy.   Neurological: She is alert and oriented to person, place, and time.   Skin: Skin is warm and dry. No rash noted. No erythema.   Psychiatric: She has a normal mood and affect. Her behavior is normal. Judgment and thought content normal.   Nursing note and vitals reviewed.      Assessment:       1. Urinary tract infection with hematuria, site unspecified    2. Sinusitis, unspecified chronicity, unspecified location    3. Medication refill        Plan:         Urinary  tract infection with hematuria, site unspecified  -     POCT Urinalysis, Dipstick, Automated, W/O Scope  -     amoxicillin-clavulanate 875-125mg (AUGMENTIN) 875-125 mg per tablet; Take 1 tablet by mouth 2 (two) times daily.  Dispense: 20 tablet; Refill: 0    Sinusitis, unspecified chronicity, unspecified location  -     POCT Influenza A/B    Medication refill  -     levalbuterol (XOPENEX) 0.63 mg/3 mL nebulizer solution; Take 3 mLs (0.63 mg total) by nebulization every 4 (four) hours as needed for Wheezing. Rescue  Dispense: 1 Box; Refill: 0      Laure was seen today for dysuria, cough and generalized body aches.    Diagnoses and all orders for this visit:    Urinary tract infection with hematuria, site unspecified  -     POCT Urinalysis, Dipstick, Automated, W/O Scope  -     amoxicillin-clavulanate 875-125mg (AUGMENTIN) 875-125 mg per tablet; Take 1 tablet by mouth 2 (two) times daily.    Sinusitis, unspecified chronicity, unspecified location  -     POCT Influenza A/B    Medication refill  -     levalbuterol (XOPENEX) 0.63 mg/3 mL nebulizer solution; Take 3 mLs (0.63 mg total) by nebulization every 4 (four) hours as needed for Wheezing. Rescue      Patient Instructions   - Rest.    - Drink plenty of fluids.    - Tylenol or Ibuprofen as directed as needed for fever/pain.    - Follow up with your PCP or specialty clinic as directed in the next 1-2 weeks if not improved or as needed.  You can call [phone] to schedule an appointment with the appropriate provider.    - Go to the ED if your symptoms worsen.    Bladder Infection, Female (Adult)    Urine is normally doesn't have any bacteria in it. But bacteria can get into the urinary tract from the skin around the rectum. Or they can travel in the blood from elsewhere in the body. Once they are in your urinary tract, they can cause infection in the urethra (urethritis), the bladder (cystitis), or the kidneys (pyelonephritis).  The most common place for an  "infection is in the bladder. This is called a bladder infection. This is one of the most common infections in women. Most bladder infections are easily treated. They are not serious unless the infection spreads to the kidney.  The phrases "bladder infection," "UTI," and "cystitis" are often used to describe the same thing. But they are not always the same. Cystitis is an inflammation of the bladder. The most common cause of cystitis is an infection.  Symptoms  The infection causes inflammation in the urethra and bladder. This causes many of the symptoms. The most common symptoms of a bladder infection are:  · Pain or burning when urinating  · Having to urinate more often than usual  · Urgent need to urinate  · Only a small amount of urine comes out  · Blood in urine  · Abdominal discomfort. This is usually in the lower abdomen above the pubic bone.  · Cloudy urine  · Strong- or bad-smelling urine  · Unable to urinate (urinary retention)  · Unable to hold urine in (urinary incontinence)  · Fever  · Loss of appetite  · Confusion (in older adults)  Causes  Bladder infections are not contagious. You can't get one from someone else, from a toilet seat, or from sharing a bath.  The most common cause of bladder infections is bacteria from the bowels. The bacteria get onto the skin around the opening of the urethra. From there, they can get into the urine and travel up to the bladder, causing inflammation and infection. This usually happens because of:  · Wiping improperly after urinating. Always wipe from front to back.  · Bowel incontinence  · Pregnancy  · Procedures such as having a catheter inserted  · Older age  · Not emptying your bladder. This can allow bacteria a chance to grow in your urine.  · Dehydration  · Constipation  · Sex  · Use of a diaphragm for birth control   Treatment  Bladder infections are diagnosed by a urine test. They are treated with antibiotics and usually clear up quickly without complications. " Treatment helps prevent a more serious kidney infection.  Medicines  Medicines can help in the treatment of a bladder infection:  · Take antibiotics until they are used up, even if you feel better. It is important to finish them to make sure the infection has cleared.  · You can use acetaminophen or ibuprofen for pain, fever, or discomfort, unless another medicine was prescribed. If you have chronic liver or kidney disease, talk with your healthcare provider before using these medicines. Also talk with your provider if you've ever had a stomach ulcer or gastrointestinal bleeding, or are taking blood-thinner medicines.  · If you are given phenazopydridine to reduce burning with urination, it will cause your urine to become a bright orange color. This can stain clothing.  Care and prevention  These self-care steps can help prevent future infections:  · Drink plenty of fluids to prevent dehydration and flush out your bladder. Do this unless you must restrict fluids for other health reasons, or your doctor told you not to.  · Proper cleaning after going to the bathroom is important. Wipe from front to back after using the toilet to prevent the spread of bacteria.  · Urinate more often. Don't try to hold urine in for a long time.  · Wear loose-fitting clothes and cotton underwear. Avoid tight-fitting pants.  · Improve your diet and prevent constipation. Eat more fresh fruit and vegetables, and fiber, and less junk and fatty foods.  · Avoid sex until your symptoms are gone.  · Avoid caffeine, alcohol, and spicy foods. These can irritate your bladder.  · Urinate right after intercourse to flush out your bladder.  · If you use birth control pills and have frequent bladder infections, discuss it with your doctor.  Follow-up care  Call your healthcare provider if all symptoms are not gone after 3 days of treatment. This is especially important if you have repeat infections.  If a culture was done, you will be told if your  treatment needs to be changed. If directed, you can call to find out the results.  If X-rays were done, you will be told if the results will affect your treatment.  Call 911  Call 911 if any of the following occur:  · Trouble breathing  · Hard to wake up or confusion  · Fainting or loss of consciousness  · Rapid heart rate  When to seek medical advice  Call your healthcare provider right away if any of these occur:  · Fever of 100.4ºF (38.0ºC) or higher, or as directed by your healthcare provider  · Symptoms are not better by the third day of treatment  · Back or belly (abdominal) pain that gets worse  · Repeated vomiting, or unable to keep medicine down  · Weakness or dizziness  · Vaginal discharge  · Pain, redness, or swelling in the outer vaginal area (labia)  Date Last Reviewed: 10/1/2016  © 2757-3038 [employer]. [address] [place] [address]. All rights reserved. This information is not intended as a substitute for professional medical care. Always follow your healthcare professional's instructions.        Sinusitis (Antibiotic Treatment)    The sinuses are air-filled spaces within the bones of the face. They connect to the inside of the nose. Sinusitis is an inflammation of the tissue lining the sinus cavity. Sinus inflammation can occur during a cold. It can also be due to allergies to pollens and other particles in the air. Sinusitis can cause symptoms of sinus congestion and fullness. A sinus infection causes fever, headache and facial pain. There is often green or yellow drainage from the nose or into the back of the throat (post-nasal drip). You have been given antibiotics to treat this condition.  Home care:  · Take the full course of antibiotics as instructed. Do not stop taking them, even if you feel better.  · Drink plenty of water, hot tea, and other liquids. This may help thin mucus. It also may promote sinus drainage.  · Heat may help soothe painful areas of the face. Use  a towel soaked in hot water. Or,  the shower and direct the hot spray onto your face. Using a vaporizer along with a menthol rub at night may also help.   · An expectorant containing guaifenesin may help thin the mucus and promote drainage from the sinuses.  · Over-the-counter decongestants may be used unless a similar medicine was prescribed. Nasal sprays work the fastest. Use one that contains phenylephrine or oxymetazoline. First blow the nose gently. Then use the spray. Do not use these medicines more often than directed on the label or symptoms may get worse. You may also use tablets containing pseudoephedrine. Avoid products that combine ingredients, because side effects may be increased. Read labels. You can also ask the pharmacist for help. (NOTE: Persons with high blood pressure should not use decongestants. They can raise blood pressure.)  · Over-the-counter antihistamines may help if allergies contributed to your sinusitis.    · Do not use nasal rinses or irrigation during an acute sinus infection, unless told to by your health care provider. Rinsing may spread the infection to other sinuses.  · Use acetaminophen or ibuprofen to control pain, unless another pain medicine was prescribed. (If you have chronic liver or kidney disease or ever had a stomach ulcer, talk with your doctor before using these medicines. Aspirin should never be used in anyone under 18 years of age who is ill with a fever. It may cause severe liver damage.)  · Don't smoke. This can worsen symptoms.  Follow-up care  Follow up with your healthcare provider or our staff if you are not improving within the next week.  When to seek medical advice  Call your healthcare provider if any of these occur:  · Facial pain or headache becoming more severe  · Stiff neck  · Unusual drowsiness or confusion  · Swelling of the forehead or eyelids  · Vision problems, including blurred or double vision  · Fever of 100.4ºF (38ºC) or higher, or as  directed by your healthcare provider  · Seizure  · Breathing problems  · Symptoms not resolving within 10 days  Date Last Reviewed: 4/13/2015  © 7320-1814 [employer]. [address] [place] [address], [place] [address]. All rights reserved. This information is not intended as a substitute for professional medical care. Always follow your healthcare professional's instructions.

## 2018-03-02 ENCOUNTER — ANTI-COAG VISIT (OUTPATIENT)
Dept: CARDIOLOGY | Facility: CLINIC | Age: 48
End: 2018-03-02
Payer: MEDICAID

## 2018-03-02 ENCOUNTER — OFFICE VISIT (OUTPATIENT)
Dept: PODIATRY | Facility: CLINIC | Age: 48
End: 2018-03-02
Payer: MEDICAID

## 2018-03-02 VITALS
RESPIRATION RATE: 18 BRPM | BODY MASS INDEX: 40.32 KG/M2 | HEIGHT: 65 IN | SYSTOLIC BLOOD PRESSURE: 128 MMHG | DIASTOLIC BLOOD PRESSURE: 74 MMHG | HEART RATE: 77 BPM | WEIGHT: 242 LBS

## 2018-03-02 DIAGNOSIS — M20.41 HAMMER TOES OF BOTH FEET: ICD-10-CM

## 2018-03-02 DIAGNOSIS — B35.1 ONYCHOMYCOSIS DUE TO DERMATOPHYTE: ICD-10-CM

## 2018-03-02 DIAGNOSIS — L84 CORN OR CALLUS: ICD-10-CM

## 2018-03-02 DIAGNOSIS — M20.42 HAMMER TOES OF BOTH FEET: ICD-10-CM

## 2018-03-02 DIAGNOSIS — I48.0 PAROXYSMAL ATRIAL FIBRILLATION: ICD-10-CM

## 2018-03-02 DIAGNOSIS — Z79.4 TYPE 2 DIABETES MELLITUS WITH DIABETIC POLYNEUROPATHY, WITH LONG-TERM CURRENT USE OF INSULIN: Primary | Chronic | ICD-10-CM

## 2018-03-02 DIAGNOSIS — E66.9 OBESITY (BMI 30-39.9): ICD-10-CM

## 2018-03-02 DIAGNOSIS — Z79.01 CHRONIC ANTICOAGULATION: Primary | ICD-10-CM

## 2018-03-02 DIAGNOSIS — E11.42 TYPE 2 DIABETES MELLITUS WITH DIABETIC POLYNEUROPATHY, WITH LONG-TERM CURRENT USE OF INSULIN: Primary | Chronic | ICD-10-CM

## 2018-03-02 LAB — INR PPP: 3.8 (ref 2–3)

## 2018-03-02 PROCEDURE — 85610 PROTHROMBIN TIME: CPT | Mod: PBBFAC | Performed by: PHARMACIST

## 2018-03-02 PROCEDURE — 99213 OFFICE O/P EST LOW 20 MIN: CPT | Mod: 25,S$GLB,, | Performed by: PODIATRIST

## 2018-03-02 PROCEDURE — 99211 OFF/OP EST MAY X REQ PHY/QHP: CPT | Mod: PBBFAC | Performed by: PHARMACIST

## 2018-03-02 PROCEDURE — 99999 PR PBB SHADOW E&M-EST. PATIENT-LVL I: CPT | Mod: PBBFAC,,, | Performed by: PHARMACIST

## 2018-03-02 PROCEDURE — 11057 PARNG/CUTG B9 HYPRKR LES >4: CPT | Mod: S$GLB,,, | Performed by: PODIATRIST

## 2018-03-02 NOTE — PROGRESS NOTES
Subjective:      Patient ID: Laure Ross is a 47 y.o. female.    Chief Complaint: Diabetic Foot Exam (PCP Dr. KAYLAN Mittal 2/2018)    Laure is a 47 y.o. female who presents to the clinic for evaluation and treatment of high risk feet. Laure has a past medical history of Anticoagulant long-term use; Arthritis; Asthma; Asthma; Cardiomyopathy; CHF (congestive heart failure); CHF (congestive heart failure); GERD (gastroesophageal reflux disease); H/O tubal ligation; Hypertension; Obesity; Renal disorder; Type 2 diabetes mellitus with hyperglycemia; and Type 2 diabetes mellitus with polyneuropathy.  Denies pain.This patient has documented high risk feet requiring routine maintenance secondary to peripheral neuropathy.      PCP: Holly Mittal MD    Chief Complaint   Patient presents with    Diabetic Foot Exam     PCP Dr. KAYLAN Mittal 2/2018         Current shoe gear:  DM shoes, inserts    Hemoglobin A1C   Date Value Ref Range Status   03/17/2017 6.1 4.5 - 6.2 % Final     Comment:     According to ADA guidelines, hemoglobin A1C <7.0% represents  optimal control in non-pregnant diabetic patients.  Different  metrics may apply to specific populations.   Standards of Medical Care in Diabetes - 2016.  For the purpose of screening for the presence of diabetes:  <5.7%     Consistent with the absence of diabetes  5.7-6.4%  Consistent with increasing risk for diabetes   (prediabetes)  >or=6.5%  Consistent with diabetes  Currently no consensus exists for use of hemoglobin A1C  for diagnosis of diabetes for children.     11/23/2016 6.1 4.5 - 6.2 % Final     Comment:     According to ADA guidelines, hemoglobin A1C <7.0% represents  optimal control in non-pregnant diabetic patients.  Different  metrics may apply to specific populations.   Standards of Medical Care in Diabetes - 2016.  For the purpose of screening for the presence of diabetes:  <5.7%     Consistent with the absence of diabetes  5.7-6.4%  Consistent with increasing  risk for diabetes   (prediabetes)  >or=6.5%  Consistent with diabetes  Currently no consensus exists for use of hemoglobin A1C  for diagnosis of diabetes for children.     08/23/2016 7.2 (H) 4.5 - 6.2 % Final     Comment:     According to ADA guidelines, hemoglobin A1C <7.0% represents  optimal control in non-pregnant diabetic patients.  Different  metrics may apply to specific populations.   Standards of Medical Care in Diabetes - 2016.  For the purpose of screening for the presence of diabetes:  <5.7%     Consistent with the absence of diabetes  5.7-6.4%  Consistent with increasing risk for diabetes   (prediabetes)  >or=6.5%  Consistent with diabetes  Currently no consensus exists for use of hemoglobin A1C  for diagnosis of diabetes for children.         Review of Systems   Constitution: Negative for chills, fever and malaise/fatigue.   Cardiovascular: Negative for chest pain, leg swelling, orthopnea and palpitations.   Respiratory: Negative for cough, shortness of breath and wheezing.    Skin: Positive for color change, dry skin and nail changes. Negative for itching, poor wound healing and rash.   Musculoskeletal: Negative for arthritis, gout, joint pain, joint swelling, muscle weakness and myalgias.   Neurological: Positive for numbness, paresthesias and sensory change. Negative for disturbances in coordination, dizziness, focal weakness and tremors.           Objective:        Physical Exam   Cardiovascular:   Dorsalis pedis and posterior tibial pulses are diminished Bilaterally. Toes are cool to touch. Feet are warm proximally.There is decreased digital hair. Skin is atrophic, slightly hyperpigmented, and mildly edematous       Musculoskeletal:   Musculoskeletal:  Muscle strength is 5/5 in all groups bilaterally.  No pain with ankle, STJ or MTPJ ROM, bilat. Ankle dorsiflexion is limited with knees extended and flexed, bilat.     Feet:   Right Foot:   Skin Integrity: Positive for callus.   Left Foot:   Skin  "Integrity: Positive for callus.   Neurological:   [place]-Anya 5.07 monofilamant testing is diminished both feet. Sharp/dull sensation diminished Bilaterally. Light touch absent Bilaterally.       Skin:   + callus b/l   Nails elongated and dystrophic x10                 Assessment:       Encounter Diagnoses   Name Primary?    Type 2 diabetes mellitus with diabetic polyneuropathy, with long-term current use of insulin Yes    Obesity (BMI 30-39.9)     Hammer toes of both feet     Corn or callus     Onychomycosis due to dermatophyte          Plan:       Laure was seen today for diabetic foot exam.    Diagnoses and all orders for this visit:    Type 2 diabetes mellitus with diabetic polyneuropathy, with long-term current use of insulin  -     DIABETIC SHOES FOR HOME USE  -     Foot Care    Obesity (BMI 30-39.9)    Hammer toes of both feet  -     DIABETIC SHOES FOR HOME USE    Corn or callus  -     DIABETIC SHOES FOR HOME USE    Onychomycosis due to dermatophyte    Routine Foot Care  Date/Time: 3/2/2018 2:51 PM  Performed by: CODY CANO JR.  Authorized by: CODY CANO JR.     Time out: Immediately prior to procedure a "time out" was called to verify the correct patient, procedure, equipment, support staff and site/side marked as required.    Consent Done?:  Yes (Verbal)  Hyperkeratotic Skin Lesions?: Yes    Number of trimmed lesions:  6  Location(s):  Left 5th Toe, Right 5th Toe, Left 2nd Metatarsal Head, Right 2nd Metatarsal Head, Left 3rd Metatarsal Head and Right 3rd Metatarsal Head    Nail Care Type: deferred.  Patient tolerance:  Patient tolerated the procedure well with no immediate complications      I counseled the patient on her conditions, their implications and medical management.    Shoe inspection. Diabetic Foot Education. Patient reminded of the importance of good nutrition and blood sugar control to help prevent podiatric complications of diabetes. Patient instructed on proper foot " hygeine. We discussed wearing proper shoe gear, daily foot inspections, never walking without protective shoe gear, never putting sharp instruments to feet

## 2018-03-02 NOTE — PROGRESS NOTES
INR elevated since last dose increase. Patient was re-educated on situations that would require placing a call to the coumadin clinic, including bleeding or unusual bruising issues, changes in health, diet or medications, upcoming procedures that require warfarin interruption, and missed coumadin dose(s). Patient expressed understanding that avoidance of consistency with these parameters could cause fluctuations in INR, leading to more frequent visits and increase risk of adverse events.

## 2018-03-02 NOTE — PATIENT INSTRUCTIONS
Eating the Right Number of Calories (0891-6728 Guidelines)  Calories are a measure of the energy you get from food. If you eat more calories than you use, you will gain weight. If you eat fewer calories than you use, you will lose weight. Below are tables that give the number of calories needed each day. Look for your gender, age, and activity level. If you stick to this number, you should neither gain nor lose weight. Note that this is an estimated number of calories.* Your exact number may differ.  Women  Age in years Low activity level (calories/day) Moderate activity level (calories/day) High activity level (calories/day)   19 to 30 1,800-2,000 2,000-2,200 2,400   31 to 50 1,800 2,000 2,200   51 and older 1,600 1,800 2,000-2,200      Men  Age in years Low activity level  (calories/day) Moderate activity level (calories/day) High activity level (calories/day)   19 to 30 2,400-2,600 2,600-2,800 3,000   31 to 50 2,200-2,400 2,400-2,600 2,800-3,000   51 and older 2,000-2,200 2,200-2,400 2,400-2,800   Activity levels defined  · Low. Only light physical activity such as that done during typical daily life.  · Moderate. Light physical activity done during typical daily life AND physical activity equal to walking about 1.5 to 3 miles a day at 3 to 4 miles per hour.  · High. Light physical activity done during typical daily life AND physical activity equal to walking more than 3 miles a day at 3 to 4 miles per hour.  *From Dietary Guidelines for Americans, 9073-1958, U.S. Department of Health and Human Services.  Date Last Reviewed: 6/1/2015  © 5415-2829 [employer]. [address] [place] [address], [place] [address]. All rights reserved. This information is not intended as a substitute for professional medical care. Always follow your healthcare professional's instructions.        Diabetes: Inspecting Your Feet    Diabetes increases your chances of developing foot problems. So inspect your feet every day. This  helps you find small skin irritations before they become serious ulcers or infections. If you have trouble seeing the bottoms of your feet, use a mirror or ask a family member or friend to help.  How to check your feet  Below are tips to help you look for foot problems. Try to check your feet at the same time each day, such as when you get out of bed in the morning:  · Check the top of each foot. The tops of toes, back of the heel, and outer edge of the foot can get a lot of rubbing from poor-fitting shoes.  · Check the bottom of each foot. Daily wear and tear often leads to problems at pressure spots.  · Check the toes and nails. Fungal infections often occur between toes. Toenail problems can also be a sign of fungal infections or lead to breaks in the skin.  · Check your shoes, too. Loose objects inside a shoe can injure the foot. Use your hand to feel inside your shoes for things like livia, loose stitching, or rough areas that could irritate your skin.  Warning signs  Look for any color changes in the foot. Redness with streaks can signal a severe infection, which needs immediate medical attention. Tell your healthcare provider right away if you have any of these problems:  · Swelling, sometimes with color changes, may be a sign of poor blood flow or infection. Symptoms include tenderness and an increase in the size of your foot.  · Warm or hot areas on your feet may be signs of infection. A foot that is cold may not be getting enough blood.  · Sensations such as burning, tingling, or pins and needles can be signs of a problem. Also check for areas that may be numb.  · Hot spots are caused by friction or pressure. Look for hot spots in areas that get a lot of rubbing. Hot spots can turn into blisters, calluses, or sores.  · Cracks and sores are caused by dry or irritated skin. They are a sign that the skin is breaking down, which can lead to infection.  · Toenail problems to watch for include nails growing  into the skin (ingrown toenail) and causing redness or pain. Thick, yellow, or discolored nails can signal a fungal infection.  · Drainage and odor can develop from untreated sores and ulcers. Call your healthcare provider right away if you notice white or yellow drainage, bleeding, or unpleasant odor.   Date Last Reviewed: 6/1/2016 © 2000-2017 [employer]. [address] [place] [address]. All rights reserved. This information is not intended as a substitute for professional medical care. Always follow your healthcare professional's instructions.

## 2018-03-12 NOTE — TELEPHONE ENCOUNTER
Received call from  Claudia at [facility] in [place] as for clarification on spiriva, stating the spiriva is only prescribed once daily and says she confirmed with pt that pt is taking once daily. Gave ok to change to 1 inhalation once daily. No refills. Pts to get future refills from her GP.

## 2018-03-13 RX ORDER — LANOLIN ALCOHOL/MO/W.PET/CERES
CREAM (GRAM) TOPICAL
Qty: 60 TABLET | Refills: 0 | Status: SHIPPED | OUTPATIENT
Start: 2018-03-13 | End: 2018-04-20 | Stop reason: SDUPTHER

## 2018-03-13 RX ORDER — TIOTROPIUM BROMIDE 18 UG/1
18 CAPSULE ORAL; RESPIRATORY (INHALATION) DAILY
Qty: 30 CAPSULE | Refills: 0 | Status: SHIPPED | OUTPATIENT
Start: 2018-03-13 | End: 2018-04-09

## 2018-03-20 ENCOUNTER — ANTI-COAG VISIT (OUTPATIENT)
Dept: CARDIOLOGY | Facility: CLINIC | Age: 48
End: 2018-03-20
Payer: MEDICAID

## 2018-03-20 DIAGNOSIS — Z79.01 LONG TERM (CURRENT) USE OF ANTICOAGULANTS: Primary | ICD-10-CM

## 2018-03-20 DIAGNOSIS — I48.0 PAROXYSMAL ATRIAL FIBRILLATION: ICD-10-CM

## 2018-03-20 DIAGNOSIS — Z79.01 CHRONIC ANTICOAGULATION: ICD-10-CM

## 2018-03-20 LAB — INR PPP: 3.2 (ref 2–3)

## 2018-03-20 PROCEDURE — 99211 OFF/OP EST MAY X REQ PHY/QHP: CPT | Mod: S$PBB,25,, | Performed by: PHARMACIST

## 2018-03-20 PROCEDURE — 85610 PROTHROMBIN TIME: CPT | Mod: PBBFAC

## 2018-03-20 NOTE — PROGRESS NOTES
Pt seen by Griselda MARRERO. I have reviewed her initial findings and agree with her assessment and plan

## 2018-03-20 NOTE — PROGRESS NOTES
Quick follow up for elevated INR on 3/2. INR slightly elevated today. Patient states that she has some swelling in her legs that she believes is caused by recent air [place]. Patient states that she will be making an appt with Dr. Jules as soon as possible. She denies any other changes. We will decrease her weekly dose and re challenge in 2 weeks. Advised patient to call coumadin clonic with any changes or concerns.

## 2018-03-20 NOTE — PROGRESS NOTES
Patient called to reschedule 3/16 missed coumadin clinic appointment, it was rescheduled to today 3/20

## 2018-03-23 DIAGNOSIS — I42.0 CARDIOMYOPATHY, DILATED, NONISCHEMIC: ICD-10-CM

## 2018-03-23 RX ORDER — FUROSEMIDE 40 MG/1
TABLET ORAL
Qty: 120 TABLET | Refills: 3 | Status: SHIPPED | OUTPATIENT
Start: 2018-03-23 | End: 2018-08-14 | Stop reason: SDUPTHER

## 2018-03-27 ENCOUNTER — HOSPITAL ENCOUNTER (OUTPATIENT)
Facility: HOSPITAL | Age: 48
Discharge: HOME | End: 2018-03-28
Attending: EMERGENCY MEDICINE | Admitting: EMERGENCY MEDICINE
Payer: MEDICAID

## 2018-03-27 DIAGNOSIS — E11.9 DM2 (DIABETES MELLITUS, TYPE 2): ICD-10-CM

## 2018-03-27 DIAGNOSIS — E87.70 HYPERVOLEMIA: ICD-10-CM

## 2018-03-27 DIAGNOSIS — I50.9 CHF (CONGESTIVE HEART FAILURE): ICD-10-CM

## 2018-03-27 DIAGNOSIS — I10 ESSENTIAL HYPERTENSION: ICD-10-CM

## 2018-03-27 DIAGNOSIS — R07.89 OTHER CHEST PAIN: Primary | ICD-10-CM

## 2018-03-27 DIAGNOSIS — Z79.01 LONG TERM CURRENT USE OF ANTICOAGULANT: ICD-10-CM

## 2018-03-27 DIAGNOSIS — E11.42 TYPE 2 DIABETES MELLITUS WITH DIABETIC POLYNEUROPATHY: Chronic | ICD-10-CM

## 2018-03-27 DIAGNOSIS — R07.9 CHEST PAIN: ICD-10-CM

## 2018-03-27 LAB
BASOPHILS # BLD AUTO: 0.04 K/UL
BASOPHILS NFR BLD: 0.4 %
DIFFERENTIAL METHOD: ABNORMAL
EOSINOPHIL # BLD AUTO: 0.1 K/UL
EOSINOPHIL NFR BLD: 1.1 %
ERYTHROCYTE [DISTWIDTH] IN BLOOD BY AUTOMATED COUNT: 14.5 %
HCT VFR BLD AUTO: 39.8 %
HGB BLD-MCNC: 12.5 G/DL
IMM GRANULOCYTES # BLD AUTO: 0.03 K/UL
IMM GRANULOCYTES NFR BLD AUTO: 0.3 %
INR PPP: 2.2
LYMPHOCYTES # BLD AUTO: 2.7 K/UL
LYMPHOCYTES NFR BLD: 29.4 %
MCH RBC QN AUTO: 26.8 PG
MCHC RBC AUTO-ENTMCNC: 31.4 G/DL
MCV RBC AUTO: 85 FL
MONOCYTES # BLD AUTO: 0.9 K/UL
MONOCYTES NFR BLD: 9.8 %
NEUTROPHILS # BLD AUTO: 5.3 K/UL
NEUTROPHILS NFR BLD: 59 %
NRBC BLD-RTO: 0 /100 WBC
PLATELET # BLD AUTO: 311 K/UL
PMV BLD AUTO: 9.4 FL
PROTHROMBIN TIME: 21.6 SEC
RBC # BLD AUTO: 4.66 M/UL
WBC # BLD AUTO: 9.07 K/UL

## 2018-03-27 PROCEDURE — 96372 THER/PROPH/DIAG INJ SC/IM: CPT

## 2018-03-27 PROCEDURE — 99285 EMERGENCY DEPT VISIT HI MDM: CPT | Mod: 25

## 2018-03-27 PROCEDURE — 84484 ASSAY OF TROPONIN QUANT: CPT

## 2018-03-27 PROCEDURE — 93010 ELECTROCARDIOGRAM REPORT: CPT | Mod: ,,, | Performed by: INTERNAL MEDICINE

## 2018-03-27 PROCEDURE — 99284 EMERGENCY DEPT VISIT MOD MDM: CPT | Mod: ,,, | Performed by: EMERGENCY MEDICINE

## 2018-03-27 PROCEDURE — 85025 COMPLETE CBC W/AUTO DIFF WBC: CPT

## 2018-03-27 PROCEDURE — 80162 ASSAY OF DIGOXIN TOTAL: CPT

## 2018-03-27 PROCEDURE — 83735 ASSAY OF MAGNESIUM: CPT

## 2018-03-27 PROCEDURE — 85610 PROTHROMBIN TIME: CPT

## 2018-03-27 PROCEDURE — 93005 ELECTROCARDIOGRAM TRACING: CPT

## 2018-03-27 PROCEDURE — 96376 TX/PRO/DX INJ SAME DRUG ADON: CPT

## 2018-03-27 PROCEDURE — 96374 THER/PROPH/DIAG INJ IV PUSH: CPT

## 2018-03-27 PROCEDURE — 82962 GLUCOSE BLOOD TEST: CPT

## 2018-03-27 PROCEDURE — 80053 COMPREHEN METABOLIC PANEL: CPT

## 2018-03-27 PROCEDURE — 83880 ASSAY OF NATRIURETIC PEPTIDE: CPT

## 2018-03-28 VITALS
HEIGHT: 63 IN | BODY MASS INDEX: 43.91 KG/M2 | WEIGHT: 247.81 LBS | DIASTOLIC BLOOD PRESSURE: 59 MMHG | OXYGEN SATURATION: 97 % | RESPIRATION RATE: 17 BRPM | HEART RATE: 68 BPM | TEMPERATURE: 96 F | SYSTOLIC BLOOD PRESSURE: 114 MMHG

## 2018-03-28 PROBLEM — J44.9 COPD (CHRONIC OBSTRUCTIVE PULMONARY DISEASE): Status: ACTIVE | Noted: 2018-03-28

## 2018-03-28 PROBLEM — R07.9 CHEST PAIN: Status: ACTIVE | Noted: 2018-03-28

## 2018-03-28 PROBLEM — I10 ESSENTIAL HYPERTENSION: Status: ACTIVE | Noted: 2018-03-28

## 2018-03-28 PROBLEM — R07.89 OTHER CHEST PAIN: Status: RESOLVED | Noted: 2018-03-28 | Resolved: 2018-03-28

## 2018-03-28 PROBLEM — R07.89 OTHER CHEST PAIN: Status: ACTIVE | Noted: 2018-03-28

## 2018-03-28 LAB
ALBUMIN SERPL BCP-MCNC: 3.8 G/DL
ALP SERPL-CCNC: 76 U/L
ALT SERPL W/O P-5'-P-CCNC: 58 U/L
ANION GAP SERPL CALC-SCNC: 10 MMOL/L
ANION GAP SERPL CALC-SCNC: 8 MMOL/L
AST SERPL-CCNC: 37 U/L
BASOPHILS # BLD AUTO: 0.05 K/UL
BASOPHILS NFR BLD: 0.5 %
BILIRUB SERPL-MCNC: 0.4 MG/DL
BNP SERPL-MCNC: 98 PG/ML
BUN SERPL-MCNC: 20 MG/DL
BUN SERPL-MCNC: 21 MG/DL
CALCIUM SERPL-MCNC: 9.2 MG/DL
CALCIUM SERPL-MCNC: 9.3 MG/DL
CHLORIDE SERPL-SCNC: 102 MMOL/L
CHLORIDE SERPL-SCNC: 103 MMOL/L
CO2 SERPL-SCNC: 28 MMOL/L
CO2 SERPL-SCNC: 28 MMOL/L
CREAT SERPL-MCNC: 1.2 MG/DL
CREAT SERPL-MCNC: 1.3 MG/DL
DIASTOLIC DYSFUNCTION: YES
DIFFERENTIAL METHOD: ABNORMAL
DIGOXIN SERPL-MCNC: 0.8 NG/ML
EOSINOPHIL # BLD AUTO: 0.1 K/UL
EOSINOPHIL NFR BLD: 1.2 %
ERYTHROCYTE [DISTWIDTH] IN BLOOD BY AUTOMATED COUNT: 14.6 %
EST. GFR  (AFRICAN AMERICAN): 56.1 ML/MIN/1.73 M^2
EST. GFR  (AFRICAN AMERICAN): >60 ML/MIN/1.73 M^2
EST. GFR  (NON AFRICAN AMERICAN): 48.6 ML/MIN/1.73 M^2
EST. GFR  (NON AFRICAN AMERICAN): 53.6 ML/MIN/1.73 M^2
ESTIMATED AVG GLUCOSE: 131 MG/DL
ESTIMATED PA SYSTOLIC PRESSURE: 36.41
GLUCOSE SERPL-MCNC: 102 MG/DL
GLUCOSE SERPL-MCNC: 87 MG/DL
HBA1C MFR BLD HPLC: 6.2 %
HCT VFR BLD AUTO: 39.9 %
HGB BLD-MCNC: 12.1 G/DL
IMM GRANULOCYTES # BLD AUTO: 0.05 K/UL
IMM GRANULOCYTES NFR BLD AUTO: 0.5 %
INR PPP: 2.3
LYMPHOCYTES # BLD AUTO: 3 K/UL
LYMPHOCYTES NFR BLD: 31.8 %
MAGNESIUM SERPL-MCNC: 2 MG/DL
MAGNESIUM SERPL-MCNC: 2.2 MG/DL
MCH RBC QN AUTO: 25.9 PG
MCHC RBC AUTO-ENTMCNC: 30.3 G/DL
MCV RBC AUTO: 85 FL
MITRAL VALVE MOBILITY: NORMAL
MITRAL VALVE REGURGITATION: ABNORMAL
MONOCYTES # BLD AUTO: 1 K/UL
MONOCYTES NFR BLD: 10.6 %
NEUTROPHILS # BLD AUTO: 5.2 K/UL
NEUTROPHILS NFR BLD: 55.4 %
NRBC BLD-RTO: 0 /100 WBC
PHOSPHATE SERPL-MCNC: 3.1 MG/DL
PLATELET # BLD AUTO: 330 K/UL
PMV BLD AUTO: 9.5 FL
POCT GLUCOSE: 92 MG/DL (ref 70–110)
POCT GLUCOSE: 98 MG/DL (ref 70–110)
POTASSIUM SERPL-SCNC: 3.6 MMOL/L
POTASSIUM SERPL-SCNC: 3.8 MMOL/L
PROT SERPL-MCNC: 7.6 G/DL
PROTHROMBIN TIME: 22.1 SEC
RBC # BLD AUTO: 4.68 M/UL
RETIRED EF AND QEF - SEE NOTES: 30 (ref 55–65)
SODIUM SERPL-SCNC: 139 MMOL/L
SODIUM SERPL-SCNC: 140 MMOL/L
TROPONIN I SERPL DL<=0.01 NG/ML-MCNC: 0.04 NG/ML
TROPONIN I SERPL DL<=0.01 NG/ML-MCNC: 0.05 NG/ML
TROPONIN I SERPL DL<=0.01 NG/ML-MCNC: 0.06 NG/ML
WBC # BLD AUTO: 9.31 K/UL

## 2018-03-28 PROCEDURE — 25000242 PHARM REV CODE 250 ALT 637 W/ HCPCS: Performed by: PHYSICIAN ASSISTANT

## 2018-03-28 PROCEDURE — 25000003 PHARM REV CODE 250: Performed by: PHYSICIAN ASSISTANT

## 2018-03-28 PROCEDURE — 84484 ASSAY OF TROPONIN QUANT: CPT | Mod: 91

## 2018-03-28 PROCEDURE — 85025 COMPLETE CBC W/AUTO DIFF WBC: CPT

## 2018-03-28 PROCEDURE — 83735 ASSAY OF MAGNESIUM: CPT

## 2018-03-28 PROCEDURE — 84100 ASSAY OF PHOSPHORUS: CPT

## 2018-03-28 PROCEDURE — 83036 HEMOGLOBIN GLYCOSYLATED A1C: CPT

## 2018-03-28 PROCEDURE — 85610 PROTHROMBIN TIME: CPT

## 2018-03-28 PROCEDURE — 93306 TTE W/DOPPLER COMPLETE: CPT

## 2018-03-28 PROCEDURE — 63600175 PHARM REV CODE 636 W HCPCS: Performed by: PHYSICIAN ASSISTANT

## 2018-03-28 PROCEDURE — 84484 ASSAY OF TROPONIN QUANT: CPT

## 2018-03-28 PROCEDURE — 93306 TTE W/DOPPLER COMPLETE: CPT | Mod: 26,,, | Performed by: INTERNAL MEDICINE

## 2018-03-28 PROCEDURE — G0378 HOSPITAL OBSERVATION PER HR: HCPCS

## 2018-03-28 PROCEDURE — 80048 BASIC METABOLIC PNL TOTAL CA: CPT

## 2018-03-28 RX ORDER — GLUCAGON 1 MG
1 KIT INJECTION
Status: DISCONTINUED | OUTPATIENT
Start: 2018-03-28 | End: 2018-03-28 | Stop reason: HOSPADM

## 2018-03-28 RX ORDER — TIOTROPIUM BROMIDE 18 UG/1
18 CAPSULE ORAL; RESPIRATORY (INHALATION) DAILY
Status: DISCONTINUED | OUTPATIENT
Start: 2018-03-28 | End: 2018-03-28 | Stop reason: HOSPADM

## 2018-03-28 RX ORDER — AZELASTINE 1 MG/ML
2 SPRAY, METERED NASAL 2 TIMES DAILY
Status: DISCONTINUED | OUTPATIENT
Start: 2018-03-28 | End: 2018-03-28 | Stop reason: HOSPADM

## 2018-03-28 RX ORDER — CETIRIZINE HYDROCHLORIDE 5 MG/1
10 TABLET ORAL DAILY
Status: DISCONTINUED | OUTPATIENT
Start: 2018-03-28 | End: 2018-03-28 | Stop reason: HOSPADM

## 2018-03-28 RX ORDER — SODIUM CHLORIDE 0.9 % (FLUSH) 0.9 %
5 SYRINGE (ML) INJECTION
Status: DISCONTINUED | OUTPATIENT
Start: 2018-03-28 | End: 2018-03-28 | Stop reason: HOSPADM

## 2018-03-28 RX ORDER — AMOXICILLIN 250 MG
1 CAPSULE ORAL 2 TIMES DAILY PRN
Status: DISCONTINUED | OUTPATIENT
Start: 2018-03-28 | End: 2018-03-28 | Stop reason: HOSPADM

## 2018-03-28 RX ORDER — INSULIN ASPART 100 [IU]/ML
0-5 INJECTION, SOLUTION INTRAVENOUS; SUBCUTANEOUS
Status: DISCONTINUED | OUTPATIENT
Start: 2018-03-28 | End: 2018-03-28 | Stop reason: HOSPADM

## 2018-03-28 RX ORDER — AMIODARONE HYDROCHLORIDE 200 MG/1
400 TABLET ORAL DAILY
Status: DISCONTINUED | OUTPATIENT
Start: 2018-03-28 | End: 2018-03-28 | Stop reason: HOSPADM

## 2018-03-28 RX ORDER — BUDESONIDE AND FORMOTEROL FUMARATE DIHYDRATE 160; 4.5 UG/1; UG/1
2 AEROSOL RESPIRATORY (INHALATION) EVERY 12 HOURS
Qty: 1 INHALER | Refills: 0 | Status: SHIPPED | OUTPATIENT
Start: 2018-03-28 | End: 2018-11-02

## 2018-03-28 RX ORDER — LANOLIN ALCOHOL/MO/W.PET/CERES
400 CREAM (GRAM) TOPICAL 2 TIMES DAILY
Status: DISCONTINUED | OUTPATIENT
Start: 2018-03-28 | End: 2018-03-28 | Stop reason: HOSPADM

## 2018-03-28 RX ORDER — LANOLIN ALCOHOL/MO/W.PET/CERES
400 CREAM (GRAM) TOPICAL 2 TIMES DAILY
Status: DISCONTINUED | OUTPATIENT
Start: 2018-03-28 | End: 2018-03-28

## 2018-03-28 RX ORDER — LISINOPRIL 5 MG/1
5 TABLET ORAL 2 TIMES DAILY
Status: DISCONTINUED | OUTPATIENT
Start: 2018-03-28 | End: 2018-03-28 | Stop reason: HOSPADM

## 2018-03-28 RX ORDER — IBUPROFEN 200 MG
16 TABLET ORAL
Status: DISCONTINUED | OUTPATIENT
Start: 2018-03-28 | End: 2018-03-28 | Stop reason: HOSPADM

## 2018-03-28 RX ORDER — FLUTICASONE FUROATE AND VILANTEROL 100; 25 UG/1; UG/1
1 POWDER RESPIRATORY (INHALATION) DAILY
Status: DISCONTINUED | OUTPATIENT
Start: 2018-03-28 | End: 2018-03-28 | Stop reason: HOSPADM

## 2018-03-28 RX ORDER — FUROSEMIDE 10 MG/ML
80 INJECTION INTRAMUSCULAR; INTRAVENOUS
Status: COMPLETED | OUTPATIENT
Start: 2018-03-28 | End: 2018-03-28

## 2018-03-28 RX ORDER — LEVALBUTEROL INHALATION SOLUTION 0.63 MG/3ML
0.63 SOLUTION RESPIRATORY (INHALATION) EVERY 4 HOURS PRN
Status: DISCONTINUED | OUTPATIENT
Start: 2018-03-28 | End: 2018-03-28 | Stop reason: HOSPADM

## 2018-03-28 RX ORDER — ONDANSETRON 4 MG/1
4 TABLET, ORALLY DISINTEGRATING ORAL EVERY 8 HOURS PRN
Status: DISCONTINUED | OUTPATIENT
Start: 2018-03-28 | End: 2018-03-28 | Stop reason: HOSPADM

## 2018-03-28 RX ORDER — ASPIRIN 325 MG
325 TABLET ORAL
Status: COMPLETED | OUTPATIENT
Start: 2018-03-28 | End: 2018-03-28

## 2018-03-28 RX ORDER — SPIRONOLACTONE 25 MG/1
25 TABLET ORAL DAILY
Status: DISCONTINUED | OUTPATIENT
Start: 2018-03-28 | End: 2018-03-28 | Stop reason: HOSPADM

## 2018-03-28 RX ORDER — KETOROLAC TROMETHAMINE 15 MG/ML
15 INJECTION, SOLUTION INTRAMUSCULAR; INTRAVENOUS EVERY 6 HOURS PRN
Status: DISCONTINUED | OUTPATIENT
Start: 2018-03-28 | End: 2018-03-28 | Stop reason: HOSPADM

## 2018-03-28 RX ORDER — CHOLECALCIFEROL (VITAMIN D3) 25 MCG
2000 TABLET ORAL DAILY
Status: DISCONTINUED | OUTPATIENT
Start: 2018-03-28 | End: 2018-03-28 | Stop reason: HOSPADM

## 2018-03-28 RX ORDER — IBUPROFEN 200 MG
16 TABLET ORAL
Status: DISCONTINUED | OUTPATIENT
Start: 2018-03-28 | End: 2018-03-28

## 2018-03-28 RX ORDER — GABAPENTIN 300 MG/1
300 CAPSULE ORAL NIGHTLY
Status: DISCONTINUED | OUTPATIENT
Start: 2018-03-28 | End: 2018-03-28 | Stop reason: HOSPADM

## 2018-03-28 RX ORDER — ACETAMINOPHEN 325 MG/1
650 TABLET ORAL EVERY 4 HOURS PRN
Status: DISCONTINUED | OUTPATIENT
Start: 2018-03-28 | End: 2018-03-28 | Stop reason: HOSPADM

## 2018-03-28 RX ORDER — MONTELUKAST SODIUM 10 MG/1
10 TABLET ORAL DAILY
Status: DISCONTINUED | OUTPATIENT
Start: 2018-03-28 | End: 2018-03-28 | Stop reason: HOSPADM

## 2018-03-28 RX ORDER — PROCHLORPERAZINE EDISYLATE 5 MG/ML
5 INJECTION INTRAMUSCULAR; INTRAVENOUS EVERY 6 HOURS PRN
Status: DISCONTINUED | OUTPATIENT
Start: 2018-03-28 | End: 2018-03-28 | Stop reason: HOSPADM

## 2018-03-28 RX ORDER — POTASSIUM CHLORIDE 20 MEQ/1
20 TABLET, EXTENDED RELEASE ORAL ONCE
Status: COMPLETED | OUTPATIENT
Start: 2018-03-28 | End: 2018-03-28

## 2018-03-28 RX ORDER — LISINOPRIL 5 MG/1
5 TABLET ORAL 2 TIMES DAILY
Status: DISCONTINUED | OUTPATIENT
Start: 2018-03-28 | End: 2018-03-28

## 2018-03-28 RX ORDER — INSULIN ASPART 100 [IU]/ML
12 INJECTION, SOLUTION INTRAVENOUS; SUBCUTANEOUS
Status: DISCONTINUED | OUTPATIENT
Start: 2018-03-28 | End: 2018-03-28 | Stop reason: HOSPADM

## 2018-03-28 RX ORDER — FUROSEMIDE 10 MG/ML
80 INJECTION INTRAMUSCULAR; INTRAVENOUS 2 TIMES DAILY
Status: DISCONTINUED | OUTPATIENT
Start: 2018-03-28 | End: 2018-03-28 | Stop reason: HOSPADM

## 2018-03-28 RX ORDER — IBUPROFEN 200 MG
24 TABLET ORAL
Status: DISCONTINUED | OUTPATIENT
Start: 2018-03-28 | End: 2018-03-28

## 2018-03-28 RX ORDER — NITROGLYCERIN 0.4 MG/1
0.4 TABLET SUBLINGUAL
Status: DISCONTINUED | OUTPATIENT
Start: 2018-03-28 | End: 2018-03-28

## 2018-03-28 RX ORDER — RAMELTEON 8 MG/1
8 TABLET ORAL NIGHTLY PRN
Status: DISCONTINUED | OUTPATIENT
Start: 2018-03-28 | End: 2018-03-28 | Stop reason: HOSPADM

## 2018-03-28 RX ORDER — IBUPROFEN 200 MG
24 TABLET ORAL
Status: DISCONTINUED | OUTPATIENT
Start: 2018-03-28 | End: 2018-03-28 | Stop reason: HOSPADM

## 2018-03-28 RX ORDER — DIGOXIN 125 MCG
0.12 TABLET ORAL DAILY
Status: DISCONTINUED | OUTPATIENT
Start: 2018-03-28 | End: 2018-03-28 | Stop reason: HOSPADM

## 2018-03-28 RX ORDER — PANTOPRAZOLE SODIUM 40 MG/1
40 TABLET, DELAYED RELEASE ORAL DAILY
Status: DISCONTINUED | OUTPATIENT
Start: 2018-03-28 | End: 2018-03-28 | Stop reason: HOSPADM

## 2018-03-28 RX ADMIN — LISINOPRIL 5 MG: 5 TABLET ORAL at 04:03

## 2018-03-28 RX ADMIN — VITAMIN D, TAB 1000IU (100/BT) 2000 UNITS: 25 TAB at 08:03

## 2018-03-28 RX ADMIN — MONTELUKAST SODIUM 10 MG: 10 TABLET, FILM COATED ORAL at 08:03

## 2018-03-28 RX ADMIN — FUROSEMIDE 80 MG: 10 INJECTION, SOLUTION INTRAMUSCULAR; INTRAVENOUS at 12:03

## 2018-03-28 RX ADMIN — MAGNESIUM OXIDE TAB 400 MG (241.3 MG ELEMENTAL MG) 400 MG: 400 (241.3 MG) TAB at 04:03

## 2018-03-28 RX ADMIN — INSULIN DETEMIR 23 UNITS: 100 INJECTION, SOLUTION SUBCUTANEOUS at 08:03

## 2018-03-28 RX ADMIN — DIGOXIN 0.12 MG: 0.12 TABLET ORAL at 08:03

## 2018-03-28 RX ADMIN — TIOTROPIUM BROMIDE 18 MCG: 18 CAPSULE ORAL; RESPIRATORY (INHALATION) at 10:03

## 2018-03-28 RX ADMIN — INSULIN ASPART 12 UNITS: 100 INJECTION, SOLUTION INTRAVENOUS; SUBCUTANEOUS at 10:03

## 2018-03-28 RX ADMIN — POTASSIUM CHLORIDE 20 MEQ: 1500 TABLET, EXTENDED RELEASE ORAL at 08:03

## 2018-03-28 RX ADMIN — FUROSEMIDE 80 MG: 10 INJECTION, SOLUTION INTRAMUSCULAR; INTRAVENOUS at 08:03

## 2018-03-28 RX ADMIN — ASPIRIN 325 MG ORAL TABLET 325 MG: 325 PILL ORAL at 01:03

## 2018-03-28 RX ADMIN — AMIODARONE HYDROCHLORIDE 400 MG: 200 TABLET ORAL at 08:03

## 2018-03-28 RX ADMIN — SPIRONOLACTONE 25 MG: 25 TABLET, FILM COATED ORAL at 08:03

## 2018-03-28 RX ADMIN — WARFARIN SODIUM 3.75 MG: 2.5 TABLET ORAL at 05:03

## 2018-03-28 RX ADMIN — CETIRIZINE HYDROCHLORIDE 10 MG: 5 TABLET, FILM COATED ORAL at 08:03

## 2018-03-28 RX ADMIN — FLUTICASONE FUROATE AND VILANTEROL TRIFENATATE 1 PUFF: 100; 25 POWDER RESPIRATORY (INHALATION) at 10:03

## 2018-03-28 NOTE — ED NOTES
Continuous pulse ox ordered from war room. Tech states they are out of teleboxes currently, but will send one when able.

## 2018-03-28 NOTE — ED PROVIDER NOTES
"Encounter Date: 3/27/2018       History     Chief Complaint   Patient presents with    chest fullness     chest fullness since 1500 this afternoon, denies SOB, BLE swelling, reports 20 pound weight gain in the last month. hx of CHF     Ms Ross ia a 47YOAAF who presents with CP and discomfort since 1500 today. She first experienced "heaviness" sensation at brother's , states it came on suddenly and she had to sit down for several minutes. She denies associated palpitations, SOB, headache, syncope, N/V. Pt has complicated cardiac history with HFrEF, defibrillator, CHF, DM 2, HTN and is on digoxin, amiodarone and warfarin. Pt states the pain is still present and has only mildly decreased in acuity. She states she has also noticed weight gain over the past month, "about 20 lbs". She denies changes in medication, decreased UOP, dysuria, back pain nor abdominal pain.           Review of patient's allergies indicates:  No Known Allergies  Past Medical History:   Diagnosis Date    Anticoagulant long-term use     Arthritis     Asthma     Asthma     Cardiomyopathy     CHF (congestive heart failure)     CHF (congestive heart failure)     GERD (gastroesophageal reflux disease)     H/O tubal ligation     Hypertension     Obesity     Renal disorder     Type 2 diabetes mellitus with hyperglycemia     Type 2 diabetes mellitus with polyneuropathy      Past Surgical History:   Procedure Laterality Date    CARDIAC DEFIBRILLATOR PLACEMENT      CHOLECYSTECTOMY      COLONOSCOPY N/A 2016    Procedure: COLONOSCOPY;  Surgeon: Eugene Soto MD;  Location: [facility] ([address] [place] [address]);  Service: Endoscopy;  Laterality: N/A;    GALLBLADDER SURGERY      iabp  2016    TUBAL LIGATION       Family History   Problem Relation Age of Onset    Heart disease Mother     Heart disease Father      Social History   Substance Use Topics    Smoking status: Never Smoker    Smokeless tobacco: Never Used    Alcohol use No " "    Review of Systems   Constitutional: Negative for chills, diaphoresis, fatigue and fever.   HENT: Negative for congestion, rhinorrhea, sinus pain, sinus pressure and sore throat.    Eyes: Negative for photophobia and visual disturbance.   Respiratory: Negative for cough, choking and shortness of breath.    Cardiovascular: Negative for chest pain, palpitations and leg swelling.   Gastrointestinal: Negative for abdominal pain, constipation, diarrhea, nausea and vomiting.   Genitourinary: Negative for decreased urine volume, dyspareunia, dysuria, hematuria and pelvic pain.   Musculoskeletal: Negative for arthralgias, gait problem, neck pain and neck stiffness.   Skin: Negative for color change and rash.   Neurological: Positive for headaches (states "chung had a small headache all day"). Negative for dizziness, tremors, seizures and light-headedness.   Psychiatric/Behavioral: Negative for confusion, decreased concentration and sleep disturbance. The patient is nervous/anxious.        Physical Exam     Initial Vitals [03/27/18 2149]   BP Pulse Resp Temp SpO2   139/73 77 18 97.7 °F (36.5 °C) 99 %      MAP       95         Physical Exam    Nursing note and vitals reviewed.  Constitutional: She appears well-developed and well-nourished. She is not diaphoretic. No distress.   HENT:   Head: Normocephalic and atraumatic.   Mouth/Throat: Oropharynx is clear and moist.   Eyes: Conjunctivae and EOM are normal. Pupils are equal, round, and reactive to light. No scleral icterus.   Neck: Normal range of motion. No JVD present.   Cardiovascular: Normal rate, regular rhythm, normal heart sounds and intact distal pulses.   Pulmonary/Chest: Breath sounds normal. No respiratory distress.   Chest non-TTP   Abdominal: Soft. Bowel sounds are normal.   No fluid wave noted.   Musculoskeletal: Normal range of motion.   Neurological: She is alert and oriented to person, place, and time. She has normal strength and normal reflexes.   Skin: " Skin is warm and dry.   +1 pedal edema B/L   Psychiatric: She has a normal mood and affect. Her behavior is normal. Thought content normal.   Visibly teary, attended brother's  today.         ED Course   Procedures  Labs Reviewed   CBC W/ AUTO DIFFERENTIAL   COMPREHENSIVE METABOLIC PANEL   TROPONIN I   B-TYPE NATRIURETIC PEPTIDE   PROTIME-INR         Labs Reviewed   CBC W/ AUTO DIFFERENTIAL - Abnormal; Notable for the following:        Result Value    MCH 26.8 (*)     MCHC 31.4 (*)     All other components within normal limits   COMPREHENSIVE METABOLIC PANEL - Abnormal; Notable for the following:     ALT 58 (*)     eGFR if  56.1 (*)     eGFR if non  48.6 (*)     All other components within normal limits   TROPONIN I - Abnormal; Notable for the following:     Troponin I 0.048 (*)     All other components within normal limits   PROTIME-INR - Abnormal; Notable for the following:     Prothrombin Time 21.6 (*)     INR 2.2 (*)     All other components within normal limits   B-TYPE NATRIURETIC PEPTIDE   MAGNESIUM   DIGOXIN LEVEL     Imaging Results          X-Ray Chest AP Portable (Final result)  Result time 18 00:27:21    Final result by Pedro Luis Armstrong MD (18 00:27:21)                 Impression:      AICD leads appear unchanged. Stable cardiomegaly.    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  This was evaluated on chest CT 2017.    No significant change from prior study.      Electronically signed by: Pedro Luis Armstrong MD  Date:    2018  Time:    00:27             Narrative:    EXAMINATION:  XR CHEST AP PORTABLE    CLINICAL HISTORY:  CHF;    TECHNIQUE:  Single frontal view of the chest was performed.    COMPARISON:  2017    FINDINGS:  AICD leads appear unchanged.  Stable cardiomegaly.    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  This was evaluated on chest CT 2017.    Heart and  "lungs  appear unchanged when allowing for differences in technique and positioning.                                     Medical Decision Making:   Initial Assessment:   Pt presents to ED with CP since 1500 today. PMHx decompensated CHF with defibrillator, on digoxin and amiodarone. She was at brothers  when "chest heaviness" occurred. Cites 20+lb weight gain in past several weeks, no recent change in medications. 1+ pitting edema B/L with normal cardiopulmonary exam.   Differential Diagnosis:   Workup indicated to lower clinical suspicion for ACS, CHF exacerbation, PNA, panic attack, GERD. Warfarin adherence decreases clinical suspicion for PE   ED Management:  Workup initiated, BNP WNL and troponin elevated at 0.048. Chest rad reveal no acute cardiopulmonary findings. Given ASA and patient recommended for observation. Nitroglycerin trial initiated for possible resolution of symptoms. Upon re-check for nitro initiation, patient stated she no longer had chest pain and it had resolved "about an hour ago". Hospital medicine consulted and recommended observation due to co-morbidities and elevation in troponin.                       Clinical Impression:   The encounter diagnosis was Chest pain.    Disposition:   Disposition: Placed in Observation  Condition: Raudel Moody PA-C  18 0133    "

## 2018-03-28 NOTE — HOSPITAL COURSE
Pt admitted to observation for chest heaviness.  BNP 98 lower than baseline.  EKG paced rhythm, CXR without acute cardiopulmonary changes.  Troponin 0.048->0.060->0.044.  US negative for DVT.  Pt given IV furosemide 80 mg x 1 due to b/l leg swelling which improved.  Chest pain resolved at discharge.  Suspect combination of volume overload and anxiety.  Outpatient stress test at discharge.

## 2018-03-28 NOTE — DISCHARGE SUMMARY
"[facility] Hospital Medicine  Discharge Summary      Patient Name: Laure Ross  MRN: [id number]  Admission Date: 3/27/2018  Hospital Length of Stay: 0 days  Discharge Date and Time:  2018 2:52 PM  Attending Physician: Azalea Figueroa MD   Discharging Provider: Shantell Phelps PA-C  Primary Care Provider: Holly Mittal MD  [facility] Medicine Team: [facility]OSP MED F Shantell Phelps PA-C    HPI:   Ms Ross is a 48YO AAF with pmhx HFrEF, defibrillator, COPD, DM 2, HTN, pAF (on warfarin) presents with centraol chest heaviness x3 hours. She first experienced "heaviness" sensation after watching conference call to brother's .  She states it came on suddenly and she had to sit down for several minutes. She denies associated palpitations, SOB, headache, syncope, N/V, diaphoresis.  Previous similar episode x2 years ago when patient had decompensated heart failure. Chest heaviness improved after  mg and lasix 80 mg IV given.  No exacerbating factors reported.  No change in CP with position.  Patient states that sensation similar to gas sensation in past- had eaten beans earlier today.  Pt denies current chest pain.  She also reports weight gain over the past month, "about 20 lbs" and BLE edema x1 week s/p 4 hour flight from [place]. Edema improved with elevated of legs.  She denies changes in medication, decreased UOP, dysuria, back pain or abdominal pain.     In ED, given  mg, lasix 80 mg IV. CP resolved before nitro given.  EKG shows atrial-sensed ventricular-paced rhythm, biventricular pacemaker detected.  CXR without any changes.    * No surgery found *      Hospital Course:   Pt admitted to observation for chest heaviness.  BNP 98 lower than baseline.  EKG paced rhythm, CXR without acute cardiopulmonary changes.  Troponin 0.048->0.060->0.044.  US negative for DVT.  Pt given IV furosemide 80 mg x 1 due to b/l leg swelling which improved.  Chest pain resolved at " discharge.  Suspect combination of volume overload and anxiety.  Outpatient stress test at discharge.     Consults:     * Other chest pain-resolved as of 3/28/2018    48 y/o F with pmhx of HFrEF, (EF 20%), pAF, COPD, DM 2 presents with constant chest heaviness x3 hours which was relieved after  mg and lasix 80 mg IV given.  Currently asymptomatic.    - Suspect anxiety/stress as cause of CP  - BNP 98 lower than baseline but BLE on exam   US lower ext [place] ordered and negative  - initial trop .048-trended down  - EKG paced rhythm, CXR without acute cardiopulmonary changes  Outpatient stress test at discharge.  Discussed with Dr. Johansen.        Chronic combined systolic and diastolic heart failure    Stable  EF 20%, DD, PASP 62.76 (5/3/2016)   - 99% on RA  - Given lasix 80 mg IV in ED with appropriate UOP.  Will continue home dose of 80 mg BID intravenously.  - BNP 98 (baseline 100s-300s) in setting of obesity  - BLE edema may be 2/2 acute exacerbation of HF.  US negative.          COPD (chronic obstructive pulmonary disease)    Controlled  - 99% on RA  - PRN duo-nebs; uses rescue inhaler at most x3/week  - Continue spiriva 18 mcg daily, Singulair mg PO daily  - Home med symbicort 160-4.5 mcg 2 puffs BID changed to breo 100-25 mcg 1 puff daily        Paroxysmal atrial fibrillation    Chronic anticoagulation  Stable  CHADSVASC 4  - INR 2.2; daily PT/INR  - Continue Warfarin 3.75 mg PO daily  - Continue Amiodarone 4000 mg PO daily, digoxin 0.125 mg PO daily  - f/w Dr. Jules  - on tele; EKG paced rhythm, HR 78  - asymptomatic        CKD (chronic kidney disease), stage III    Cr 1.3 (baseline); CrCl 63.8  - K 3.8; Mag 2.0          Type 2 diabetes mellitus with diabetic polyneuropathy    Stable  Home meds: glargine 34 U BID, aspart 16 U TIDWM plus SSI  Inpatient meds: levemir 23 U BID, aspart 12 U TID WM   - low dose SSI, accuchecks ACHS  - Last Hg A1c 6.1 x1 year ago.  Hg A1c 6.2%.  - diabetic diet        GERD  (gastroesophageal reflux disease)    Continue PPI daily        Essential hypertension    Stable  - Continue spironolactone 25 mg PO daily, lisinopril 5 mg PO BID          Final Active Diagnoses:    Diagnosis Date Noted POA    Chronic combined systolic and diastolic heart failure [I50.42] 06/03/2016 Yes    COPD (chronic obstructive pulmonary disease) [J44.9] 03/28/2018 Unknown    Paroxysmal atrial fibrillation [I48.0] 04/22/2016 Yes    CKD (chronic kidney disease), stage III [N18.3] 03/17/2017 Yes    Type 2 diabetes mellitus with diabetic polyneuropathy [E11.42] 04/20/2016 Yes     Chronic    GERD (gastroesophageal reflux disease) [K21.9] 11/17/2016 Yes    Essential hypertension [I10] 03/28/2018 Unknown    Chronic anticoagulation [Z79.01] 05/10/2016 Not Applicable      Problems Resolved During this Admission:    Diagnosis Date Noted Date Resolved POA    PRINCIPAL PROBLEM:  Other chest pain [R07.89] 03/28/2018 03/28/2018 Unknown       Discharged Condition: good    Disposition: Home or Self Care    Follow Up:  Follow-up Information     Holly Mittal MD.    Specialty:  Internal Medicine  Contact information:  [address] [facility] [address] [phone]                 Patient Instructions:     Notify your health care provider if you experience any of the following:  persistent nausea and vomiting or diarrhea     Notify your health care provider if you experience any of the following:  severe uncontrolled pain     Notify your health care provider if you experience any of the following:  difficulty breathing or increased cough     Notify your health care provider if you experience any of the following:  persistent dizziness, light-headedness, or visual disturbances     Exercise stress echo with color flow   Standing Status: Future  Standing Exp. Date: 03/28/19         Significant Diagnostic Studies: Labs: All labs within the past 24 hours have been reviewed    Pending Diagnostic Studies:     None     "     Medications:  Reconciled Home Medications:      Medication List      CONTINUE taking these medications    amiodarone 200 MG Tab  Commonly known as:  PACERONE  TAKE TWO TABLETS BY MOUTH EVERY DAY     azelastine 137 mcg (0.1 %) nasal spray  Commonly known as:  ASTELIN  2 sprays (274 mcg total) by Nasal route 2 (two) times daily.     BD ULTRA-FINE ESSENCE PEN NEEDLES 32 gauge x 5/32" Ndle  Generic drug:  pen needle, diabetic  Takes 5 times  day     blood sugar diagnostic Strp  Uses 4 times a day, true metrix meter.     budesonide-formoterol 160-4.5 mcg 160-4.5 mcg/actuation Hfaa  Commonly known as:  SYMBICORT  Inhale 2 puffs into the lungs every 12 (twelve) hours.     cetirizine 10 MG tablet  Commonly known as:  ZYRTEC  Take 1 tablet (10 mg total) by mouth once daily.     digoxin 125 mcg tablet  Commonly known as:  LANOXIN  TAKE 1 TABLET BY MOUTH ONCE DAILY     furosemide 40 MG tablet  Commonly known as:  LASIX  TAKE 2 TABLETS BY MOUTH TWO TIMES A DAY     gabapentin 300 MG capsule  Commonly known as:  NEURONTIN  Take 1 capsule (300 mg total) by mouth every evening.     insulin aspart U-100 100 unit/mL Inpn pen  Commonly known as:  NovoLOG Flexpen U-100 Insulin  Inject 16 units w/ meals plus scale 150-200 +2, 201-250 +4, 251-300 +6, 301-350 +8.     insulin glargine 100 unit/mL (3 mL) Inpn pen  Commonly known as:  BASAGLAR KWIKPEN U-100 INSULIN  Inject 34 Units into the skin 2 (two) times daily.     ketoconazole 2 % cream  Commonly known as:  NIZORAL  Apply topically once daily.     lancets Misc  Uses true metrix meter, 4 times a day.     * levalbuterol 1.25 mg/0.5 mL nebulizer solution  Commonly known as:  XOPENEX  Take 0.5 mLs (1.25 mg total) by nebulization every 4 (four) hours as needed for Wheezing.     * levalbuterol 0.63 mg/3 mL nebulizer solution  Commonly known as:  XOPENEX  Take 3 mLs (0.63 mg total) by nebulization every 4 (four) hours as needed for Wheezing. Rescue     lisinopril 5 MG tablet  Commonly known " as:  PRINIVIL,ZESTRIL  Take 1 tablet (5 mg total) by mouth 2 (two) times daily.     magnesium oxide 400 mg tablet  Commonly known as:  MAG-OX  TAKE ONE TABLET BY MOUTH TWO TIMES A DAY     montelukast 10 mg tablet  Commonly known as:  SINGULAIR  Take 1 tablet (10 mg total) by mouth once daily.     omeprazole 40 MG capsule  Commonly known as:  PRILOSEC  TAKE 1 CAPSULE BY MOUTH EVERY MORNING     potassium chloride 10 MEQ Cpsr  Commonly known as:  MICRO-K  Take 2 capsules (20 mEq total) by mouth once daily.     ranitidine 300 MG tablet  Commonly known as:  ZANTAC  Take 1 tablet (300 mg total) by mouth every evening.     SPIRIVA WITH HANDIHALER 18 mcg inhalation capsule  Generic drug:  tiotropium  Inhale 1 capsule (18 mcg total) into the lungs once daily.     spironolactone 25 MG tablet  Commonly known as:  ALDACTONE  Take 1 tablet (25 mg total) by mouth once daily.     vitamin D 1000 units Tab  Take 2 tablets (2,000 Units total) by mouth once daily.     warfarin 7.5 MG tablet  Commonly known as:  COUMADIN  Take 0.5-1 tablets (3.75-7.5 mg total) by mouth Daily. As directed by coumadin clinic        * This list has 2 medication(s) that are the same as other medications prescribed for you. Read the directions carefully, and ask your doctor or other care provider to review them with you.               Where to Get Your Medications      These medications were sent to [facility] - [place], [place] - [address], [place] [address]    Phone:  [phone]   · budesonide-formoterol 160-4.5 mcg 160-4.5 mcg/actuation Hfaa         Indwelling Lines/Drains at time of discharge:   Lines/Drains/Airways          No matching active lines, drains, or airways          Time spent on the discharge of patient: >30 minutes  Patient was seen and examined on the date of discharge and determined to be suitable for discharge.         Shantell Phelps PA-C  [facility]  Medicine  [facility]

## 2018-03-28 NOTE — ED NOTES
"Chief Complaint   Patient presents with    chest fullness     chest fullness since 1500 this afternoon, denies SOB, BLE swelling, reports 20 pound weight gain in the last month. hx of CHF     Patient presents to ED with C/O "fullness" to midsternal region. Patient reports her brother passed away, and his memorial was today. Patient reports onset of fullness began today after the memorial. Denies other CP, SOB, NV. EKG completed in triage. Patient in NAD at this time.     Active Ambulatory Problems     Diagnosis Date Noted    Hypertensive heart disease with heart failure 04/13/2016    Type 2 diabetes mellitus with diabetic polyneuropathy 04/20/2016    CLARENCE (obstructive sleep apnea)     Paroxysmal atrial fibrillation 04/22/2016    Obesity (BMI 30-39.9) 04/25/2016    Encounter for pre-transplant evaluation for heart transplant 04/27/2016    Palliative care encounter 04/29/2016    Fatigue 04/29/2016    Breast mass on GYN exam 4/29/16 04/30/2016    Left bundle-branch block 05/01/2016    Organ transplant candidate     Tuberculosis screening     Vitamin D deficiency disease 05/04/2016    Chronic anticoagulation 05/10/2016    Muscle weakness 05/27/2016    Chronic combined systolic and diastolic heart failure 06/03/2016    COCM (congestive cardiomyopathy) 06/05/2016    High risk medications (not anticoagulants) long-term use     Laryngopharyngeal reflux 09/28/2016    Gastroesophageal reflux disease 09/28/2016    Chronic rhinitis 09/28/2016    Dysphonia 09/28/2016    GERD (gastroesophageal reflux disease) 11/17/2016    CKD (chronic kidney disease), stage III 03/17/2017    Carpal tunnel syndrome, bilateral 03/29/2017    NICM (nonischemic cardiomyopathy) 05/04/2017    Acute diastolic CHF (congestive heart failure), NYHA class 3 05/04/2017    History of diabetic ulcer of foot - Right Foot 08/29/2017     Resolved Ambulatory Problems     Diagnosis Date Noted    Renal failure 04/16/2016    Type 2 " "diabetes mellitus with hyperglycemia 04/20/2016    Diabetes mellitus due to underlying condition with diabetic nephropathy 04/22/2016    Uncontrolled diabetes mellitus     Physical deconditioning 05/17/2016    Decreased strength, endurance, and mobility 05/17/2016    Morbid obesity due to excess calories 06/05/2016    Throat clearing 09/28/2016     Past Medical History:   Diagnosis Date    Anticoagulant long-term use     Arthritis     Asthma     Asthma     Cardiomyopathy     CHF (congestive heart failure)     CHF (congestive heart failure)     GERD (gastroesophageal reflux disease)     H/O tubal ligation     Hypertension     Obesity     Renal disorder     Type 2 diabetes mellitus with hyperglycemia     Type 2 diabetes mellitus with polyneuropathy      Review of patient's allergies indicates:  No Known Allergies    Adult physical assessment:    Neuro: Patient AAOx4.   HEENT: PERRLA, no purulent drainage to nares, throat, eyes.  Cardiac: WNL, no abnormalities present. +Pain upon palpation of right chest wall.  Respiratory: Breath sounds diminished to left lobes.  Musculoskeletal: Patient moving all extremities spontaneously. Patient reports bilateral "ankle swelling."    "

## 2018-03-28 NOTE — ASSESSMENT & PLAN NOTE
Controlled  - 99% on RA  - PRN duo-nebs; uses rescue inhaler at most x3/week  - Continue spiriva 18 mcg daily, Singulair mg PO daily  - Home med symbicort 160-4.5 mcg 2 puffs BID changed to breo 100-25 mcg 1 puff daily

## 2018-03-28 NOTE — HPI
"Ms Ross is a 48YO AAF with pmhx HFrEF, defibrillator, COPD, DM 2, HTN, pAF (on warfarin) presents with centraol chest heaviness x3 hours. She first experienced "heaviness" sensation after watching conference call to brother's .  She states it came on suddenly and she had to sit down for several minutes. She denies associated palpitations, SOB, headache, syncope, N/V, diaphoresis.  Previous similar episode x2 years ago when patient had decompensated heart failure. Chest heaviness improved after  mg and lasix 80 mg IV given.  No exacerbating factors reported.  No change in CP with position.  Patient states that sensation similar to gas sensation in past- had eaten beans earlier today.  Pt denies current chest pain.  She also reports weight gain over the past month, "about 20 lbs" and BLE edema x1 week s/p 4 hour flight from [place]. Edema improved with elevated of legs.  She denies changes in medication, decreased UOP, dysuria, back pain or abdominal pain.     In ED, given  mg, lasix 80 mg IV. CP resolved before nitro given.  EKG shows atrial-sensed ventricular-paced rhythm, biventricular pacemaker detected.  CXR without any changes.  "

## 2018-03-28 NOTE — ASSESSMENT & PLAN NOTE
46 y/o F with pmhx of HFrEF, (EF 20%), pAF, COPD, DM 2 presents with constant chest heaviness x3 hours which was relieved after  mg and lasix 80 mg IV given.  Currently asymptomatic.    - Suspect anxiety/stress as cause of CP  - BNP 98 lower than baseline but BLE on exam   US lower ext [place] ordered  - initial trop .048- will trend  - Will keep NPO until ACS r/o  - 2D ECHO for AM  - EKG paced rhythm, CXR without acute cardiopulmonary changes

## 2018-03-28 NOTE — PROVIDER PROGRESS NOTES - EMERGENCY DEPT.
Encounter Date: 3/27/2018    ED Physician Progress Notes         EKG - STEMI Decision  Initial Reading: No STEMI present (paced rhythm).    SCRIBE NOTE: Jagdish BURNETTE, dewey scribing for, and in the presence of, Abdiel Castillo.

## 2018-03-28 NOTE — ASSESSMENT & PLAN NOTE
Stable  Home meds: glargine 34 U BID, aspart 16 U TIDWM plus SSI  Inpatient meds: levemir 23 U BID, aspart 12 U TID WM   - low dose SSI, accuchecks ACHS  - Last Hg A1c 6.1 x1 year ago.  Hg A1c 6.2%.  - diabetic diet

## 2018-03-28 NOTE — ED NOTES
Dr. Figueroa at pt side while speaking to pt and pt reports no chest pain on scale of 1-10 and states hers is 0/10

## 2018-03-28 NOTE — ASSESSMENT & PLAN NOTE
Stable  Home meds: glargine 34 U BID, aspart 16 U TIDWM plus SSI  Inpatient meds: levemir 23 U BID, aspart 12 U TID WM   - low dose SSI, accuchecks ACHS  - Last Hg A1c 6.1 x1 year ago.  Hg A1c ordered.  - diabetic diet

## 2018-03-28 NOTE — SUBJECTIVE & OBJECTIVE
"Past Medical History:   Diagnosis Date    Anticoagulant long-term use     Arthritis     Asthma     Asthma     Cardiomyopathy     CHF (congestive heart failure)     CHF (congestive heart failure)     COPD (chronic obstructive pulmonary disease) 3/28/2018    GERD (gastroesophageal reflux disease)     H/O tubal ligation     Hypertension     Obesity     Renal disorder     Type 2 diabetes mellitus with hyperglycemia     Type 2 diabetes mellitus with polyneuropathy        Past Surgical History:   Procedure Laterality Date    CARDIAC DEFIBRILLATOR PLACEMENT      CHOLECYSTECTOMY      COLONOSCOPY N/A 5/2/2016    Procedure: COLONOSCOPY;  Surgeon: Eugene Soto MD;  Location: [address]);  Service: Endoscopy;  Laterality: N/A;    GALLBLADDER SURGERY      iabp  4/2016    TUBAL LIGATION         Review of patient's allergies indicates:  No Known Allergies    No current facility-administered medications on file prior to encounter.      Current Outpatient Prescriptions on File Prior to Encounter   Medication Sig    amiodarone (PACERONE) 200 MG Tab TAKE TWO TABLETS BY MOUTH EVERY DAY    azelastine (ASTELIN) 137 mcg (0.1 %) nasal spray 2 sprays (274 mcg total) by Nasal route 2 (two) times daily.    BD ULTRA-FINE ESSENCE PEN NEEDLES 32 gauge x 5/32" Ndle Takes 5 times  day    blood sugar diagnostic Strp Uses 4 times a day, true metrix meter.    budesonide-formoterol 160-4.5 mcg (SYMBICORT) 160-4.5 mcg/actuation HFAA Inhale 2 puffs into the lungs every 12 (twelve) hours.    cetirizine (ZYRTEC) 10 MG tablet Take 1 tablet (10 mg total) by mouth once daily.    digoxin (LANOXIN) 125 mcg tablet TAKE 1 TABLET BY MOUTH ONCE DAILY    furosemide (LASIX) 40 MG tablet TAKE 2 TABLETS BY MOUTH TWO TIMES A DAY    gabapentin (NEURONTIN) 300 MG capsule Take 1 capsule (300 mg total) by mouth every evening.    insulin aspart (NOVOLOG FLEXPEN) 100 unit/mL InPn pen Inject 16 units w/ meals plus scale 150-200 +2, " 201-250 +4, 251-300 +6, 301-350 +8.    insulin glargine (BASAGLAR KWIKPEN) 100 unit/mL (3 mL) InPn pen Inject 34 Units into the skin 2 (two) times daily.    ketoconazole (NIZORAL) 2 % cream Apply topically once daily.    lancets Misc Uses true metrix meter, 4 times a day.    levalbuterol (XOPENEX) 0.63 mg/3 mL nebulizer solution Take 3 mLs (0.63 mg total) by nebulization every 4 (four) hours as needed for Wheezing. Rescue    levalbuterol (XOPENEX) 1.25 mg/0.5 mL nebulizer solution Take 0.5 mLs (1.25 mg total) by nebulization every 4 (four) hours as needed for Wheezing.    lisinopril (PRINIVIL,ZESTRIL) 5 MG tablet Take 1 tablet (5 mg total) by mouth 2 (two) times daily.    magnesium oxide (MAG-OX) 400 mg tablet TAKE ONE TABLET BY MOUTH TWO TIMES A DAY    montelukast (SINGULAIR) 10 mg tablet Take 1 tablet (10 mg total) by mouth once daily.    omeprazole (PRILOSEC) 40 MG capsule TAKE 1 CAPSULE BY MOUTH EVERY MORNING    potassium chloride (MICRO-K) 10 MEQ CpSR Take 2 capsules (20 mEq total) by mouth once daily.    ranitidine (ZANTAC) 300 MG tablet Take 1 tablet (300 mg total) by mouth every evening.    SPIRIVA WITH HANDIHALER 18 mcg inhalation capsule Inhale 1 capsule (18 mcg total) into the lungs once daily.    spironolactone (ALDACTONE) 25 MG tablet Take 1 tablet (25 mg total) by mouth once daily.    vitamin D 1000 units Tab Take 2 tablets (2,000 Units total) by mouth once daily.    warfarin (COUMADIN) 7.5 MG tablet Take 0.5-1 tablets (3.75-7.5 mg total) by mouth Daily. As directed by coumadin clinic     Family History     Problem Relation (Age of Onset)    Heart disease Mother, Father        Social History Main Topics    Smoking status: Never Smoker    Smokeless tobacco: Never Used    Alcohol use No    Drug use: No    Sexual activity: Yes     Partners: Male     Review of Systems   Constitutional: Negative for chills, fatigue and fever. Unexpected weight change: +20 lb gain.   HENT: Negative for  congestion and rhinorrhea.    Eyes: Negative for photophobia and visual disturbance.   Respiratory: Negative for cough, chest tightness and shortness of breath.    Cardiovascular: Positive for chest pain and leg swelling. Negative for palpitations.   Gastrointestinal: Negative for abdominal pain, constipation, diarrhea and vomiting.   Genitourinary: Negative for difficulty urinating and dysuria.   Musculoskeletal: Negative for myalgias and neck pain.   Skin: Negative for rash and wound.   Neurological: Negative for dizziness and weakness.   Psychiatric/Behavioral: Negative for confusion. The patient is not nervous/anxious.      Objective:     Vital Signs (Most Recent):  Temp: 97.7 °F (36.5 °C) (03/27/18 2149)  Pulse: 72 (03/28/18 0111)  Resp: 18 (03/27/18 2149)  BP: 137/77 (03/28/18 0111)  SpO2: 99 % (03/27/18 2149) Vital Signs (24h Range):  Temp:  [97.7 °F (36.5 °C)] 97.7 °F (36.5 °C)  Pulse:  [72-77] 72  Resp:  [18] 18  SpO2:  [99 %] 99 %  BP: (137-139)/(73-77) 137/77     Weight: 112.4 kg (247 lb 12.8 oz)  Body mass index is 43.9 kg/m².    Physical Exam   Constitutional: She is oriented to person, place, and time. She appears well-developed and well-nourished.   HENT:   Head: Normocephalic and atraumatic.   Eyes: EOM are normal. Pupils are equal, round, and reactive to light.   Neck: Normal range of motion. Neck supple.   Cardiovascular: Normal rate, regular rhythm, normal heart sounds and intact distal pulses.    No murmur heard.  1+ pitting edema BLE  Negative michelle's sign   Pulmonary/Chest: Effort normal and breath sounds normal. No respiratory distress. She has no wheezes. She has no rales. She exhibits no tenderness.   Abdominal: Soft. Bowel sounds are normal. She exhibits no distension. There is no tenderness.   Obese abdomen   Musculoskeletal: Normal range of motion.   Neurological: She is alert and oriented to person, place, and time.   Skin: Skin is warm and dry.   R upper chest defibrillator    Psychiatric: She has a normal mood and affect. Her behavior is normal. Judgment and thought content normal.   Nursing note and vitals reviewed.        CRANIAL NERVES     CN III, IV, VI   Pupils are equal, round, and reactive to light.  Extraocular motions are normal.        Significant Labs:   CBC:   Recent Labs  Lab 03/27/18  2332   WBC 9.07   HGB 12.5   HCT 39.8        CMP:   Recent Labs  Lab 03/27/18  2332      K 3.8      CO2 28      BUN 20   CREATININE 1.3   CALCIUM 9.3   PROT 7.6   ALBUMIN 3.8   BILITOT 0.4   ALKPHOS 76   AST 37   ALT 58*   ANIONGAP 8   EGFRNONAA 48.6*       Significant Imaging: I have reviewed all pertinent imaging results/findings within the past 24 hours.     X-ray Chest Ap Portable  Result Date: 3/28/2018  EXAMINATION: XR CHEST AP PORTABLE CLINICAL HISTORY: CHF; TECHNIQUE: Single frontal view of the chest was performed. COMPARISON: October 2, 2017 FINDINGS: AICD leads appear unchanged.  Stable cardiomegaly. 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  This was evaluated on chest CT November 21, 2017. Heart and lungs  appear unchanged when allowing for differences in technique and positioning.     AICD leads appear unchanged. Stable cardiomegaly. 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  This was evaluated on chest CT November 21, 2017. No significant change from prior study.

## 2018-03-28 NOTE — ASSESSMENT & PLAN NOTE
Chronic anticoagulation  Stable  CHADSVASC 4  - INR 2.2; daily PT/INR  - Continue Warfarin 3.75 mg PO daily  - Continue Amiodarone 4000 mg PO daily, digoxin 0.125 mg PO daily  - f/w Dr. Jules  - on tele; EKG paced rhythm, HR 78  - asymptomatic

## 2018-03-28 NOTE — H&P
"[facility] Medicine  History & Physical    Patient Name: Laure Ross  MRN: [id number]  Admission Date: 3/27/2018  Attending Physician: Azalea Figueroa MD   Primary Care Provider: Holly Mittal MD    [facility] Medicine Team: [facility] F Brenton Campos PA-C     Patient information was obtained from patient, past medical records and ER records.     Subjective:     Principal Problem:Other chest pain    Chief Complaint:   Chief Complaint   Patient presents with    chest fullness     chest fullness since 1500 this afternoon, denies SOB, BLE swelling, reports 20 pound weight gain in the last month. hx of CHF        HPI: Ms Ross is a 48YO AAF with pmhx HFrEF, defibrillator, COPD, DM 2, HTN, pAF (on warfarin) presents with centraol chest heaviness x3 hours. She first experienced "heaviness" sensation after watching conference call to brother's .  She states it came on suddenly and she had to sit down for several minutes. She denies associated palpitations, SOB, headache, syncope, N/V, diaphoresis.  Previous similar episode x2 years ago when patient had decompensated heart failure. Chest heaviness improved after  mg and lasix 80 mg IV given.  No exacerbating factors reported.  No change in CP with position.  Patient states that sensation similar to gas sensation in past- had eaten beans earlier today.  Pt denies current chest pain.  She also reports weight gain over the past month, "about 20 lbs" and BLE edema x1 week s/p 4 hour flight from [place]. Edema improved with elevated of legs.  She denies changes in medication, decreased UOP, dysuria, back pain or abdominal pain.     In ED, given  mg, lasix 80 mg IV. CP resolved before nitro given.  EKG shows atrial-sensed ventricular-paced rhythm, biventricular pacemaker detected.  CXR without any changes.    Past Medical History:   Diagnosis Date    Anticoagulant long-term use     Arthritis     Asthma     " "Asthma     Cardiomyopathy     CHF (congestive heart failure)     CHF (congestive heart failure)     COPD (chronic obstructive pulmonary disease) 3/28/2018    GERD (gastroesophageal reflux disease)     H/O tubal ligation     Hypertension     Obesity     Renal disorder     Type 2 diabetes mellitus with hyperglycemia     Type 2 diabetes mellitus with polyneuropathy        Past Surgical History:   Procedure Laterality Date    CARDIAC DEFIBRILLATOR PLACEMENT      CHOLECYSTECTOMY      COLONOSCOPY N/A 5/2/2016    Procedure: COLONOSCOPY;  Surgeon: Eugene Soto MD;  Location: [facility] ([address] [place] [address]);  Service: Endoscopy;  Laterality: N/A;    GALLBLADDER SURGERY      iabp  4/2016    TUBAL LIGATION         Review of patient's allergies indicates:  No Known Allergies    No current facility-administered medications on file prior to encounter.      Current Outpatient Prescriptions on File Prior to Encounter   Medication Sig    amiodarone (PACERONE) 200 MG Tab TAKE TWO TABLETS BY MOUTH EVERY DAY    azelastine (ASTELIN) 137 mcg (0.1 %) nasal spray 2 sprays (274 mcg total) by Nasal route 2 (two) times daily.    BD ULTRA-FINE ESSENCE PEN NEEDLES 32 gauge x 5/32" Ndle Takes 5 times  day    blood sugar diagnostic Strp Uses 4 times a day, true metrix meter.    budesonide-formoterol 160-4.5 mcg (SYMBICORT) 160-4.5 mcg/actuation HFAA Inhale 2 puffs into the lungs every 12 (twelve) hours.    cetirizine (ZYRTEC) 10 MG tablet Take 1 tablet (10 mg total) by mouth once daily.    digoxin (LANOXIN) 125 mcg tablet TAKE 1 TABLET BY MOUTH ONCE DAILY    furosemide (LASIX) 40 MG tablet TAKE 2 TABLETS BY MOUTH TWO TIMES A DAY    gabapentin (NEURONTIN) 300 MG capsule Take 1 capsule (300 mg total) by mouth every evening.    insulin aspart (NOVOLOG FLEXPEN) 100 unit/mL InPn pen Inject 16 units w/ meals plus scale 150-200 +2, 201-250 +4, 251-300 +6, 301-350 +8.    insulin glargine (BASAGLAR KWIKPEN) 100 unit/mL (3 mL) InPn pen " Inject 34 Units into the skin 2 (two) times daily.    ketoconazole (NIZORAL) 2 % cream Apply topically once daily.    lancets Misc Uses true metrix meter, 4 times a day.    levalbuterol (XOPENEX) 0.63 mg/3 mL nebulizer solution Take 3 mLs (0.63 mg total) by nebulization every 4 (four) hours as needed for Wheezing. Rescue    levalbuterol (XOPENEX) 1.25 mg/0.5 mL nebulizer solution Take 0.5 mLs (1.25 mg total) by nebulization every 4 (four) hours as needed for Wheezing.    lisinopril (PRINIVIL,ZESTRIL) 5 MG tablet Take 1 tablet (5 mg total) by mouth 2 (two) times daily.    magnesium oxide (MAG-OX) 400 mg tablet TAKE ONE TABLET BY MOUTH TWO TIMES A DAY    montelukast (SINGULAIR) 10 mg tablet Take 1 tablet (10 mg total) by mouth once daily.    omeprazole (PRILOSEC) 40 MG capsule TAKE 1 CAPSULE BY MOUTH EVERY MORNING    potassium chloride (MICRO-K) 10 MEQ CpSR Take 2 capsules (20 mEq total) by mouth once daily.    ranitidine (ZANTAC) 300 MG tablet Take 1 tablet (300 mg total) by mouth every evening.    SPIRIVA WITH HANDIHALER 18 mcg inhalation capsule Inhale 1 capsule (18 mcg total) into the lungs once daily.    spironolactone (ALDACTONE) 25 MG tablet Take 1 tablet (25 mg total) by mouth once daily.    vitamin D 1000 units Tab Take 2 tablets (2,000 Units total) by mouth once daily.    warfarin (COUMADIN) 7.5 MG tablet Take 0.5-1 tablets (3.75-7.5 mg total) by mouth Daily. As directed by coumadin clinic     Family History     Problem Relation (Age of Onset)    Heart disease Mother, Father        Social History Main Topics    Smoking status: Never Smoker    Smokeless tobacco: Never Used    Alcohol use No    Drug use: No    Sexual activity: Yes     Partners: Male     Review of Systems   Constitutional: Negative for chills, fatigue and fever. Unexpected weight change: +20 lb gain.   HENT: Negative for congestion and rhinorrhea.    Eyes: Negative for photophobia and visual disturbance.   Respiratory:  Negative for cough, chest tightness and shortness of breath.    Cardiovascular: Positive for chest pain and leg swelling. Negative for palpitations.   Gastrointestinal: Negative for abdominal pain, constipation, diarrhea and vomiting.   Genitourinary: Negative for difficulty urinating and dysuria.   Musculoskeletal: Negative for myalgias and neck pain.   Skin: Negative for rash and wound.   Neurological: Negative for dizziness and weakness.   Psychiatric/Behavioral: Negative for confusion. The patient is not nervous/anxious.      Objective:     Vital Signs (Most Recent):  Temp: 97.7 °F (36.5 °C) (03/27/18 2149)  Pulse: 72 (03/28/18 0111)  Resp: 18 (03/27/18 2149)  BP: 137/77 (03/28/18 0111)  SpO2: 99 % (03/27/18 2149) Vital Signs (24h Range):  Temp:  [97.7 °F (36.5 °C)] 97.7 °F (36.5 °C)  Pulse:  [72-77] 72  Resp:  [18] 18  SpO2:  [99 %] 99 %  BP: (137-139)/(73-77) 137/77     Weight: 112.4 kg (247 lb 12.8 oz)  Body mass index is 43.9 kg/m².    Physical Exam   Constitutional: She is oriented to person, place, and time. She appears well-developed and well-nourished.   HENT:   Head: Normocephalic and atraumatic.   Eyes: EOM are normal. Pupils are equal, round, and reactive to light.   Neck: Normal range of motion. Neck supple.   Cardiovascular: Normal rate, regular rhythm, normal heart sounds and intact distal pulses.    No murmur heard.  1+ pitting edema BLE  Negative michelle's sign   Pulmonary/Chest: Effort normal and breath sounds normal. No respiratory distress. She has no wheezes. She has no rales. She exhibits no tenderness.   Abdominal: Soft. Bowel sounds are normal. She exhibits no distension. There is no tenderness.   Obese abdomen   Musculoskeletal: Normal range of motion.   Neurological: She is alert and oriented to person, place, and time.   Skin: Skin is warm and dry.   R upper chest defibrillator   Psychiatric: She has a normal mood and affect. Her behavior is normal. Judgment and thought content normal.    Nursing note and vitals reviewed.        CRANIAL NERVES     CN III, IV, VI   Pupils are equal, round, and reactive to light.  Extraocular motions are normal.        Significant Labs:   CBC:   Recent Labs  Lab 03/27/18  2332   WBC 9.07   HGB 12.5   HCT 39.8        CMP:   Recent Labs  Lab 03/27/18  2332      K 3.8      CO2 28      BUN 20   CREATININE 1.3   CALCIUM 9.3   PROT 7.6   ALBUMIN 3.8   BILITOT 0.4   ALKPHOS 76   AST 37   ALT 58*   ANIONGAP 8   EGFRNONAA 48.6*       Significant Imaging: I have reviewed all pertinent imaging results/findings within the past 24 hours.     X-ray Chest Ap Portable  Result Date: 3/28/2018  EXAMINATION: XR CHEST AP PORTABLE CLINICAL HISTORY: CHF; TECHNIQUE: Single frontal view of the chest was performed. COMPARISON: October 2, 2017 FINDINGS: AICD leads appear unchanged.  Stable cardiomegaly. 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  This was evaluated on chest CT November 21, 2017. Heart and lungs  appear unchanged when allowing for differences in technique and positioning.     AICD leads appear unchanged. Stable cardiomegaly. 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  This was evaluated on chest CT November 21, 2017. No significant change from prior study.     Assessment/Plan:     * Other chest pain    48 y/o F with pmhx of HFrEF, (EF 20%), pAF, COPD, DM 2 presents with constant chest heaviness x3 hours which was relieved after  mg and lasix 80 mg IV given.  Currently asymptomatic.    - Suspect anxiety/stress as cause of CP  - BNP 98 lower than baseline but BLE on exam   US lower ext franklin ordered  - initial trop .048- will trend  - Will keep NPO until ACS r/o  - 2D ECHO for AM  - EKG paced rhythm, CXR without acute cardiopulmonary changes        Chronic combined systolic and diastolic heart failure    Stable  EF 20%, DD, PASP 62.76 (5/3/2016)   - 99% on RA  - Given lasix 80 mg IV in ED with appropriate  UOP.  Will continue home dose of 80 mg BID intravenously.  - BNP 98 (baseline 100s-300s) in setting of obesity  - BLE edema may be 2/2 acute exacerbation of HF.  US pending.    - strict I/O, daily weights, tele        COPD (chronic obstructive pulmonary disease)    Controlled  - 99% on RA  - PRN duo-nebs; uses rescue inhaler at most x3/week  - Continue spiriva 18 mcg daily, Singulair mg PO daily  - Home med symbicort 160-4.5 mcg 2 puffs BID changed to breo 100-25 mcg 1 puff daily        Paroxysmal atrial fibrillation    Chronic anticoagulation  Stable  CHADSVASC 4  - INR 2.2; daily PT/INR  - Continue Warfarin 3.75 mg PO daily  - Continue Amiodarone 4000 mg PO daily, digoxin 0.125 mg PO daily  - f/w Dr. Jules  - on tele; EKG paced rhythm, HR 78  - asymptomatic        CKD (chronic kidney disease), stage III    Cr 1.3 (baseline); CrCl 63.8  - K 3.8; Mag/phos  - Daily bmp        Type 2 diabetes mellitus with diabetic polyneuropathy    Stable  Home meds: glargine 34 U BID, aspart 16 U TIDWM plus SSI  Inpatient meds: levemir 23 U BID, aspart 12 U TID WM   - low dose SSI, accuchecks ACHS  - Last Hg A1c 6.1 x1 year ago.  Hg A1c ordered.  - diabetic diet        GERD (gastroesophageal reflux disease)    Continue PPI daily        Essential hypertension    Stable  - Continue spironolactone 25 mg PO daily, lisinopril 5 mg PO BID          VTE Risk Mitigation         Ordered     Place MYRON hose  Until discontinued      03/28/18 0404     Place sequential compression device  Until discontinued      03/28/18 0404     IP VTE HIGH RISK PATIENT  Once      03/28/18 0404     warfarin (COUMADIN) split tablet 3.75 mg  Daily     Route:  Oral        03/28/18 0305             Brenton Campos PA-C  Department of Hospital Medicine   [facility]

## 2018-03-28 NOTE — ED NOTES
Bed: [facility] 01  Expected date:   Expected time:   Means of arrival:   Comments:

## 2018-03-28 NOTE — ASSESSMENT & PLAN NOTE
48 y/o F with pmhx of HFrEF, (EF 20%), pAF, COPD, DM 2 presents with constant chest heaviness x3 hours which was relieved after  mg and lasix 80 mg IV given.  Currently asymptomatic.    - Suspect anxiety/stress as cause of CP  - BNP 98 lower than baseline but BLE on exam   US lower ext franklin ordered and negative  - initial trop .048-trended down  - EKG paced rhythm, CXR without acute cardiopulmonary changes  Outpatient stress test at discharge.  Discussed with Dr. Johansen.

## 2018-03-28 NOTE — ASSESSMENT & PLAN NOTE
Stable  EF 20%, DD, PASP 62.76 (5/3/2016)   - 99% on RA  - Given lasix 80 mg IV in ED with appropriate UOP.  Will continue home dose of 80 mg BID intravenously.  - BNP 98 (baseline 100s-300s) in setting of obesity  - BLE edema may be 2/2 acute exacerbation of HF.  US negative.

## 2018-03-28 NOTE — ASSESSMENT & PLAN NOTE
Stable  EF 20%, DD, PASP 62.76 (5/3/2016)   - 99% on RA  - Given lasix 80 mg IV in ED with appropriate UOP.  Will continue home dose of 80 mg BID intravenously.  - BNP 98 (baseline 100s-300s) in setting of obesity  - BLE edema may be 2/2 acute exacerbation of HF.  US pending.    - strict I/O, daily weights, tele

## 2018-03-28 NOTE — ED NOTES
"Pt is resting in bed, labs drawn, family at bedside. Pt is resting comfortably, pt has no complaints and no pain at this time. States, "I am feeling better, I was having tightness in my chest earlier, I have a history of CHF". Pt is NPO and going to echo now.   "

## 2018-04-03 ENCOUNTER — ANTI-COAG VISIT (OUTPATIENT)
Dept: CARDIOLOGY | Facility: CLINIC | Age: 48
End: 2018-04-03
Payer: MEDICAID

## 2018-04-03 DIAGNOSIS — I48.0 PAROXYSMAL ATRIAL FIBRILLATION: ICD-10-CM

## 2018-04-03 DIAGNOSIS — Z79.01 CHRONIC ANTICOAGULATION: Primary | ICD-10-CM

## 2018-04-03 LAB — INR PPP: 1.8 (ref 2–3)

## 2018-04-03 PROCEDURE — 85610 PROTHROMBIN TIME: CPT | Mod: PBBFAC

## 2018-04-03 PROCEDURE — 99211 OFF/OP EST MAY X REQ PHY/QHP: CPT | Mod: S$PBB,25,, | Performed by: PHARMACIST

## 2018-04-03 NOTE — PROGRESS NOTES
Patient  was admitted to [facility] last week overnight 3/27-3/28 (Suspect combination of volume overload and anxiety)  Her coumadin was missed 3/27. INR slightly low likely due to missed dose of coumadin last week. Previous to this admit her INR  Was high on this dose so we will not boost her dose at this time. Patient was re-educated on situations that would require placing a call to the coumadin clinic, including bleeding or unusual bruising issues, changes in health, diet or medications, upcoming procedures that require warfarin interruption, and missed coumadin dose(s). Patient expressed understanding that avoidance of consistency with these parameters could cause fluctuations in INR, leading to more frequent visits and increase risk of adverse events.

## 2018-04-09 ENCOUNTER — OFFICE VISIT (OUTPATIENT)
Dept: TRANSPLANT | Facility: CLINIC | Age: 48
End: 2018-04-09
Payer: MEDICAID

## 2018-04-09 VITALS
HEIGHT: 63 IN | BODY MASS INDEX: 43.98 KG/M2 | DIASTOLIC BLOOD PRESSURE: 56 MMHG | WEIGHT: 248.25 LBS | HEART RATE: 74 BPM | SYSTOLIC BLOOD PRESSURE: 126 MMHG

## 2018-04-09 DIAGNOSIS — I42.8 NICM (NONISCHEMIC CARDIOMYOPATHY): ICD-10-CM

## 2018-04-09 DIAGNOSIS — I50.22 CHRONIC SYSTOLIC CONGESTIVE HEART FAILURE: Primary | ICD-10-CM

## 2018-04-09 DIAGNOSIS — I10 ESSENTIAL HYPERTENSION: ICD-10-CM

## 2018-04-09 DIAGNOSIS — I48.0 PAROXYSMAL ATRIAL FIBRILLATION: ICD-10-CM

## 2018-04-09 DIAGNOSIS — Z79.01 CHRONIC ANTICOAGULATION: ICD-10-CM

## 2018-04-09 PROCEDURE — 99213 OFFICE O/P EST LOW 20 MIN: CPT | Mod: PBBFAC | Performed by: INTERNAL MEDICINE

## 2018-04-09 PROCEDURE — 99999 PR PBB SHADOW E&M-EST. PATIENT-LVL III: CPT | Mod: PBBFAC,,, | Performed by: INTERNAL MEDICINE

## 2018-04-09 PROCEDURE — 99214 OFFICE O/P EST MOD 30 MIN: CPT | Mod: S$PBB,,, | Performed by: INTERNAL MEDICINE

## 2018-04-09 NOTE — LETTER
April 9, 2018        Ayan Olmstead  [address] [place] [address]  Phone: [phone]  Fax: [phone]             [facility] [address] [place] [address]  Phone: [phone]   Patient: Laure Ross   MR Number: [id number]   YOB: 1970   Date of Visit: 4/9/2018       Dear Dr. Ayan Olmstead    Thank you for referring Laure Ross to me for evaluation. Attached you will find relevant portions of my assessment and plan of care.    If you have questions, please do not hesitate to call me. I look forward to following Laure Ross along with you.    Sincerely,    Dandre Jules MD    Enclosure    If you would like to receive this communication electronically, please contact [email or web address] or [phone] to request [employer] Link access.    [employer] Link is a tool which provides read-only access to select patient information with whom you have a relationship. Its easy to use and provides real time access to review your patients record including encounter summaries, notes, results, and demographic information.    If you feel you have received this communication in error or would no longer like to receive these types of communications, please e-mail [email or web address]

## 2018-04-09 NOTE — PROGRESS NOTES
Subjective:     HPI:  Ms. Ross is a very pleasant 44 yo F with a history of NICM (EF 10-20%), paroxysmal atrial fibrillation, HTN, DM, asthma, HLD and CLARENCE who comes for a follow-up visit. She was recently discharged from the hospital after being evlauated for chest pain. Patient states that she recently lost her Brother and has been under lots of stress.  Today she reports NYHA class II with occasional III symptoms. No  PND or orthopnea.  Labs today are pending. Does report occasional chest tightness. As mentioned previously she had a negative ischemic work-up but that was years ago.      2D echo with CFD done last month  - Severe left atrial enlargement.   - Eccentric hypertrophy.   - Moderately depressed left ventricular systolic function (EF 30-35%).   - Quantitatively measured LV function is 33%.   - Impaired LV relaxation, elevated LAP (grade 2 diastolic dysfunction).   - Normal right ventricular systolic function .   - The estimated PA systolic pressure is 36 mmHg.   - Mild mitral regurgitation.     Past Medical History:   Diagnosis Date    Anticoagulant long-term use     Arthritis     Asthma     Asthma     Cardiomyopathy     CHF (congestive heart failure)     CHF (congestive heart failure)     COPD (chronic obstructive pulmonary disease) 3/28/2018    GERD (gastroesophageal reflux disease)     H/O tubal ligation     Hypertension     Obesity     Renal disorder     Type 2 diabetes mellitus with hyperglycemia     Type 2 diabetes mellitus with polyneuropathy      Past Surgical History:   Procedure Laterality Date    CARDIAC DEFIBRILLATOR PLACEMENT      CHOLECYSTECTOMY      COLONOSCOPY N/A 2016    Procedure: COLONOSCOPY;  Surgeon: Eugene Soto MD;  Location: [facility] ([address] [place] [address]);  Service: Endoscopy;  Laterality: N/A;    GALLBLADDER SURGERY      iabp  2016    TUBAL LIGATION       OB History      Para Term  AB Living    3 3 3          SAB TAB Ectopic Multiple Live Births  "                    Review of Systems   Constitution: Negative. Negative for chills, decreased appetite, diaphoresis, fever, weakness, malaise/fatigue, night sweats, weight gain and weight loss.   Eyes: Negative.    Cardiovascular: Positive for chest pain and dyspnea on exertion. Negative for claudication, cyanosis, irregular heartbeat, leg swelling, near-syncope, orthopnea, palpitations, paroxysmal nocturnal dyspnea and syncope.   Respiratory: Negative for cough, hemoptysis and shortness of breath.    Endocrine: Negative.    Hematologic/Lymphatic: Negative.    Skin: Negative for color change, dry skin and nail changes.   Musculoskeletal: Negative.    Gastrointestinal: Negative.    Genitourinary: Negative.        Objective:   Blood pressure (!) 126/56, pulse 74, height 5' 3" (1.6 m), weight 112.6 kg (248 lb 3.8 oz).body mass index is 43.97 kg/m².  Physical Exam   Constitutional: She is oriented to person, place, and time. Vital signs are normal. She appears well-developed and well-nourished.   HENT:   Head: Normocephalic.   Eyes: Pupils are equal, round, and reactive to light.   Neck: Neck supple. No JVD present.   Cardiovascular: Normal rate, regular rhythm, normal heart sounds and intact distal pulses.  PMI is displaced.  Exam reveals no gallop, no S3 and no friction rub.    No murmur heard.  Pulmonary/Chest: Effort normal and breath sounds normal. No respiratory distress. She has no wheezes. She has no rales.   Abdominal: Soft. Bowel sounds are normal. She exhibits no distension. There is no tenderness. There is no rebound.   Musculoskeletal: She exhibits no edema.   Neurological: She is alert and oriented to person, place, and time.   Nursing note and vitals reviewed.      Labs:    Chemistry        Component Value Date/Time     03/28/2018 0428    K 3.6 03/28/2018 0428     03/28/2018 0428    CO2 28 03/28/2018 0428    BUN 21 (H) 03/28/2018 0428    CREATININE 1.2 03/28/2018 0428    GLU 87 03/28/2018 " 0428        Component Value Date/Time    CALCIUM 9.2 03/28/2018 0428    ALKPHOS 76 03/27/2018 2332    AST 37 03/27/2018 2332    ALT 58 (H) 03/27/2018 2332    BILITOT 0.4 03/27/2018 2332    ESTGFRAFRICA >60.0 03/28/2018 0428    EGFRNONAA 53.6 (A) 03/28/2018 0428          Magnesium   Date Value Ref Range Status   03/28/2018 2.0 1.6 - 2.6 mg/dL Final     Lab Results   Component Value Date    WBC 9.31 03/28/2018    HGB 12.1 03/28/2018    HCT 39.9 03/28/2018     03/28/2018     Lab Results   Component Value Date    INR 1.8 (A) 04/03/2018    INR 2.3 (H) 03/28/2018    INR 2.2 (H) 03/27/2018     BNP   Date Value Ref Range Status   03/27/2018 98 0 - 99 pg/mL Final     Comment:     Values of less than 100 pg/ml are consistent with non-CHF populations.   01/04/2018 120 (H) 0 - 99 pg/mL Final     Comment:     Values of less than 100 pg/ml are consistent with non-CHF populations.   08/11/2017 245 (H) 0 - 99 pg/mL Final     Comment:     Values of less than 100 pg/ml are consistent with non-CHF populations.     LD   Date Value Ref Range Status   04/14/2016 827 (H) 110 - 260 U/L Final     Comment:     Results are increased in hemolyzed samples.       Assessment:      1. Chronic systolic congestive heart failure    2. NICM (nonischemic cardiomyopathy)    3. Essential hypertension    4. Paroxysmal atrial fibrillation    5. Chronic anticoagulation        Plan:   NYHA class II-III symptoms. Appears  euvolemic on exam.   BMP and BNP ordered today.   Stress echo ordered while she was in the ED will be done this week. Will call patient with the results.   Continue Lisinopril and Aldactone.  Recommend 2 gram sodium restriction and 1500cc fluid restriction.  Encourage physical activity with graded exercise program.  Requested patient to weigh themselves daily, and to notify us if their weight increases by more than 3 lbs in 1 day or 5 lbs in 1 week.    RTC in 3 months with labs  with melinda Jules MD

## 2018-04-12 ENCOUNTER — ANTI-COAG VISIT (OUTPATIENT)
Dept: CARDIOLOGY | Facility: CLINIC | Age: 48
End: 2018-04-12
Payer: MEDICAID

## 2018-04-12 DIAGNOSIS — Z79.01 LONG TERM (CURRENT) USE OF ANTICOAGULANTS: Primary | ICD-10-CM

## 2018-04-12 DIAGNOSIS — Z79.01 CHRONIC ANTICOAGULATION: ICD-10-CM

## 2018-04-12 DIAGNOSIS — I48.0 PAROXYSMAL ATRIAL FIBRILLATION: ICD-10-CM

## 2018-04-12 LAB — INR PPP: 1.6 (ref 2–3)

## 2018-04-12 PROCEDURE — 85610 PROTHROMBIN TIME: CPT | Mod: PBBFAC

## 2018-04-12 NOTE — PROGRESS NOTES
Quick follow up for subtherapeutic INR on 4/3. INR subtherapeutic today. Patient reports 2 new medications- Symbicort and Incruse- no DDI expected. She denies any bleeding, bruising or other changes. Will increase her weekly dose and follow up in 1 week. Patient advised to call coumadin clinic with any changes or concerns.

## 2018-04-12 NOTE — PROGRESS NOTES
Pt seen by Griselda MARRERO . I have reviewed her initial findings and agree with her assessment and plan

## 2018-04-14 ENCOUNTER — HOSPITAL ENCOUNTER (EMERGENCY)
Facility: OTHER | Age: 48
Discharge: HOME | End: 2018-04-14
Attending: EMERGENCY MEDICINE
Payer: MEDICAID

## 2018-04-14 VITALS
DIASTOLIC BLOOD PRESSURE: 67 MMHG | OXYGEN SATURATION: 97 % | TEMPERATURE: 99 F | HEART RATE: 78 BPM | WEIGHT: 243 LBS | BODY MASS INDEX: 43.05 KG/M2 | HEIGHT: 63 IN | RESPIRATION RATE: 20 BRPM | SYSTOLIC BLOOD PRESSURE: 126 MMHG

## 2018-04-14 DIAGNOSIS — R06.02 SOB (SHORTNESS OF BREATH): ICD-10-CM

## 2018-04-14 DIAGNOSIS — J40 BRONCHITIS: Primary | ICD-10-CM

## 2018-04-14 LAB
ALBUMIN SERPL-MCNC: 3.5 G/DL (ref 3.3–5.5)
ALP SERPL-CCNC: 58 U/L (ref 42–141)
BILIRUB SERPL-MCNC: 0.6 MG/DL (ref 0.2–1.6)
BUN SERPL-MCNC: 11 MG/DL (ref 7–22)
CALCIUM SERPL-MCNC: 8.9 MG/DL (ref 8–10.3)
CHLORIDE SERPL-SCNC: 100 MMOL/L (ref 98–108)
CREAT SERPL-MCNC: 1 MG/DL (ref 0.6–1.2)
GLUCOSE SERPL-MCNC: 110 MG/DL (ref 73–118)
POC ALT (SGPT): 65 U/L (ref 10–47)
POC AST (SGOT): 57 U/L (ref 11–38)
POC B-TYPE NATRIURETIC PEPTIDE: 120 PG/ML (ref 0–100)
POC CARDIAC TROPONIN I: 0.02 NG/ML
POC PTINR: 1.2 (ref 0.9–1.2)
POC PTWBT: 14.2 SEC (ref 9.7–14.3)
POC TCO2: 28 MMOL/L (ref 18–33)
POTASSIUM BLD-SCNC: 4.1 MMOL/L (ref 3.6–5.1)
PROTEIN, POC: 7.1 G/DL (ref 6.4–8.1)
SAMPLE: NORMAL
SAMPLE: NORMAL
SODIUM BLD-SCNC: 141 MMOL/L (ref 128–145)

## 2018-04-14 PROCEDURE — 84484 ASSAY OF TROPONIN QUANT: CPT

## 2018-04-14 PROCEDURE — 93005 ELECTROCARDIOGRAM TRACING: CPT

## 2018-04-14 PROCEDURE — 93010 ELECTROCARDIOGRAM REPORT: CPT | Mod: ,,, | Performed by: INTERNAL MEDICINE

## 2018-04-14 PROCEDURE — 99284 EMERGENCY DEPT VISIT MOD MDM: CPT | Mod: 25

## 2018-04-14 PROCEDURE — 85610 PROTHROMBIN TIME: CPT

## 2018-04-14 PROCEDURE — 94760 N-INVAS EAR/PLS OXIMETRY 1: CPT

## 2018-04-14 PROCEDURE — 80053 COMPREHEN METABOLIC PANEL: CPT

## 2018-04-14 PROCEDURE — 85025 COMPLETE CBC W/AUTO DIFF WBC: CPT

## 2018-04-14 PROCEDURE — 25000242 PHARM REV CODE 250 ALT 637 W/ HCPCS: Performed by: EMERGENCY MEDICINE

## 2018-04-14 PROCEDURE — 94640 AIRWAY INHALATION TREATMENT: CPT

## 2018-04-14 PROCEDURE — 83880 ASSAY OF NATRIURETIC PEPTIDE: CPT

## 2018-04-14 RX ORDER — AMOXICILLIN AND CLAVULANATE POTASSIUM 875; 125 MG/1; MG/1
1 TABLET, FILM COATED ORAL 2 TIMES DAILY
Qty: 14 TABLET | Refills: 0 | Status: SHIPPED | OUTPATIENT
Start: 2018-04-14 | End: 2018-04-20

## 2018-04-14 RX ORDER — BENZONATATE 100 MG/1
100 CAPSULE ORAL 3 TIMES DAILY PRN
Qty: 20 CAPSULE | Refills: 0 | Status: SHIPPED | OUTPATIENT
Start: 2018-04-14 | End: 2018-04-24

## 2018-04-14 RX ORDER — IPRATROPIUM BROMIDE AND ALBUTEROL SULFATE 2.5; .5 MG/3ML; MG/3ML
3 SOLUTION RESPIRATORY (INHALATION)
Status: COMPLETED | OUTPATIENT
Start: 2018-04-14 | End: 2018-04-14

## 2018-04-14 RX ORDER — ALBUTEROL SULFATE 0.83 MG/ML
2.5 SOLUTION RESPIRATORY (INHALATION)
Status: DISCONTINUED | OUTPATIENT
Start: 2018-04-14 | End: 2018-04-14

## 2018-04-14 RX ORDER — AZITHROMYCIN 250 MG/1
TABLET, FILM COATED ORAL
Qty: 6 TABLET | Refills: 0 | Status: SHIPPED | OUTPATIENT
Start: 2018-04-14 | End: 2018-04-14 | Stop reason: CLARIF

## 2018-04-14 RX ADMIN — IPRATROPIUM BROMIDE AND ALBUTEROL SULFATE 3 ML: 2.5; .5 SOLUTION RESPIRATORY (INHALATION) at 07:04

## 2018-04-14 RX ADMIN — IPRATROPIUM BROMIDE AND ALBUTEROL SULFATE 3 ML: .5; 3 SOLUTION RESPIRATORY (INHALATION) at 08:04

## 2018-04-14 NOTE — ED PROVIDER NOTES
Encounter Date: 4/14/2018       History     Chief Complaint   Patient presents with    Shortness of Breath     pt c/o sob with non productive cough with nasal congestion, right ear pain x3 days, wheezing noted to right lobes. reports use of xopenex inhaler this am and nub treatment last night. pt able to talk in complete sentence. denies c/p     Complaint: Cough  47-year-old complains of a productive cough for the last 2 days.  Patient says that she is not sure of  the color of the sputum.  She also reports wheezing.  She says she has a history of asthma.  She denies fever.  Patient's shortness of breath worsens with exertion.  She has mild sore throat but denies other URI-type symptoms.  Patient used Symbicort and Xopenex.  She saw her primary care physician on the day the symptoms began and was prescribed Cheratussin.  Patient feels that this made her worse.  No chest pain.  She is not a smoker.  Patient has a history of congestive heart failure but feels this is more respiratory in origin.      The history is provided by the patient.     Review of patient's allergies indicates:  No Known Allergies  Past Medical History:   Diagnosis Date    Anticoagulant long-term use     Arthritis     Asthma     Asthma     Cardiomyopathy     CHF (congestive heart failure)     CHF (congestive heart failure)     COPD (chronic obstructive pulmonary disease) 3/28/2018    GERD (gastroesophageal reflux disease)     H/O tubal ligation     Hypertension     Obesity     Renal disorder     Type 2 diabetes mellitus with hyperglycemia     Type 2 diabetes mellitus with polyneuropathy      Past Surgical History:   Procedure Laterality Date    CARDIAC DEFIBRILLATOR PLACEMENT      CHOLECYSTECTOMY      COLONOSCOPY N/A 5/2/2016    Procedure: COLONOSCOPY;  Surgeon: Eugene Soto MD;  Location: [facility] ([address] [place] [address]);  Service: Endoscopy;  Laterality: N/A;    GALLBLADDER SURGERY      iabp  4/2016    TUBAL LIGATION       Family  History   Problem Relation Age of Onset    Heart disease Mother     Heart disease Father      Social History   Substance Use Topics    Smoking status: Never Smoker    Smokeless tobacco: Never Used    Alcohol use No     Review of Systems   Constitutional: Negative for fever.   HENT: Positive for sore throat.    Respiratory: Positive for cough, shortness of breath and wheezing.    Gastrointestinal: Negative for abdominal pain.   Genitourinary: Negative for dysuria.   Neurological: Negative for headaches.   All other systems reviewed and are negative.      Physical Exam     Initial Vitals [04/14/18 0645]   BP Pulse Resp Temp SpO2   (!) 143/82 81 20 97.9 °F (36.6 °C) (!) 94 %      MAP       102.33         Physical Exam    Nursing note and vitals reviewed.  Constitutional: She appears well-developed and well-nourished. She is Obese .   HENT:   Head: Normocephalic and atraumatic.   Eyes: Conjunctivae and EOM are normal. Pupils are equal, round, and reactive to light.   Neck: Normal range of motion. Neck supple.   Cardiovascular: Normal rate and regular rhythm.   Pulmonary/Chest: No respiratory distress. She has wheezes.   Abdominal: Soft. There is no tenderness. There is no rebound and no guarding.   Musculoskeletal: Normal range of motion. She exhibits no edema.   Neurological: She is alert and oriented to person, place, and time. She has normal strength.   Skin: Skin is warm and dry.   Psychiatric: She has a normal mood and affect.         ED Course   Procedures  Labs Reviewed   POCT CBC   POCT GLUCOSE MONITORING CONTINUOUS   POCT TROPONIN   POCT B-TYPE NATRIURETIC PEPTIDE (BNP)   POCT PROTIME-INR   POCT CMP     EKG Readings: (Independently Interpreted)   Initial Reading: No STEMI. Rhythm: Paced Rhythm. Heart Rate: 78.          Medical Decision Making:   Initial Assessment:   47-year-old with a history of asthma and congestive heart failure,  Complains of shortness of breath and wheezing.  Patient also has a cough.   Exam: patient does have diffuse wheezing but speaking in complete sentences.  She has no tenderness or swelling to her legs  Differential Diagnosis:   CHF, pneumonia, bronchitis, asthma exacerbation.  ED Management:  Patient will be evaluated for pneumonia, asthma exacerbation and congestive heart failure.  To be given a DuoNeb.  Chest x-ray also be done.  No significant lab abnormalities were found.  Chest x-ray shows an ongoing nodule but no acute findings.  Patient felt better after 2 nebulizer treatments.  She'll be discharged on Augmentin as well as Tessalon Perles.  SHe ambulated out of the ED without shortness of breath.                      Clinical Impression:   The primary encounter diagnosis was Bronchitis. A diagnosis of SOB (shortness of breath) was also pertinent to this visit.                           Ai Monahan MD  04/14/18 3509

## 2018-04-15 LAB — POCT GLUCOSE: 105 MG/DL (ref 70–110)

## 2018-04-16 ENCOUNTER — CLINICAL SUPPORT (OUTPATIENT)
Dept: ELECTROPHYSIOLOGY | Facility: CLINIC | Age: 48
End: 2018-04-16
Attending: INTERNAL MEDICINE
Payer: MEDICAID

## 2018-04-16 DIAGNOSIS — Z95.810 PRESENCE OF AUTOMATIC CARDIOVERTER/DEFIBRILLATOR (AICD): ICD-10-CM

## 2018-04-16 PROCEDURE — 93295 DEV INTERROG REMOTE 1/2/MLT: CPT | Mod: ,,, | Performed by: INTERNAL MEDICINE

## 2018-04-16 PROCEDURE — 93296 REM INTERROG EVL PM/IDS: CPT | Mod: PBBFAC | Performed by: INTERNAL MEDICINE

## 2018-04-17 ENCOUNTER — HOSPITAL ENCOUNTER (EMERGENCY)
Facility: HOSPITAL | Age: 48
Discharge: HOME | End: 2018-04-18
Payer: MEDICAID

## 2018-04-17 VITALS
RESPIRATION RATE: 20 BRPM | TEMPERATURE: 99 F | BODY MASS INDEX: 43.05 KG/M2 | HEIGHT: 63 IN | SYSTOLIC BLOOD PRESSURE: 123 MMHG | HEART RATE: 80 BPM | OXYGEN SATURATION: 96 % | DIASTOLIC BLOOD PRESSURE: 81 MMHG | WEIGHT: 243 LBS

## 2018-04-17 DIAGNOSIS — J40 BRONCHITIS: Primary | ICD-10-CM

## 2018-04-17 DIAGNOSIS — R06.02 SHORTNESS OF BREATH: ICD-10-CM

## 2018-04-17 LAB
ALBUMIN SERPL BCP-MCNC: 3.3 G/DL
ALP SERPL-CCNC: 74 U/L
ALT SERPL W/O P-5'-P-CCNC: 69 U/L
ANION GAP SERPL CALC-SCNC: 9 MMOL/L
ANISOCYTOSIS BLD QL SMEAR: SLIGHT
APTT BLDCRRT: 30.8 SEC
AST SERPL-CCNC: 52 U/L
BASOPHILS # BLD AUTO: 0.04 K/UL
BASOPHILS NFR BLD: 0.5 %
BILIRUB SERPL-MCNC: 0.3 MG/DL
BNP SERPL-MCNC: 97 PG/ML
BUN SERPL-MCNC: 15 MG/DL
CALCIUM SERPL-MCNC: 9.1 MG/DL
CHLORIDE SERPL-SCNC: 101 MMOL/L
CO2 SERPL-SCNC: 27 MMOL/L
CREAT SERPL-MCNC: 1.2 MG/DL
DIFFERENTIAL METHOD: ABNORMAL
DIGOXIN SERPL-MCNC: 1.3 NG/ML
EOSINOPHIL # BLD AUTO: 0.2 K/UL
EOSINOPHIL NFR BLD: 2.8 %
ERYTHROCYTE [DISTWIDTH] IN BLOOD BY AUTOMATED COUNT: 14.8 %
EST. GFR  (AFRICAN AMERICAN): >60 ML/MIN/1.73 M^2
EST. GFR  (NON AFRICAN AMERICAN): 53.6 ML/MIN/1.73 M^2
GIANT PLATELETS BLD QL SMEAR: PRESENT
GLUCOSE SERPL-MCNC: 114 MG/DL
HCT VFR BLD AUTO: 39.4 %
HGB BLD-MCNC: 12.3 G/DL
IMM GRANULOCYTES # BLD AUTO: 0.03 K/UL
IMM GRANULOCYTES NFR BLD AUTO: 0.4 %
INR PPP: 2.2
LYMPHOCYTES # BLD AUTO: 2.4 K/UL
LYMPHOCYTES NFR BLD: 31.9 %
MCH RBC QN AUTO: 26.7 PG
MCHC RBC AUTO-ENTMCNC: 31.2 G/DL
MCV RBC AUTO: 86 FL
MONOCYTES # BLD AUTO: 0.5 K/UL
MONOCYTES NFR BLD: 6 %
NEUTROPHILS # BLD AUTO: 4.3 K/UL
NEUTROPHILS NFR BLD: 58.4 %
NRBC BLD-RTO: 0 /100 WBC
PLATELET # BLD AUTO: 319 K/UL
PLATELET BLD QL SMEAR: ABNORMAL
PMV BLD AUTO: 9.5 FL
POTASSIUM SERPL-SCNC: 4 MMOL/L
PROT SERPL-MCNC: 7.2 G/DL
PROTHROMBIN TIME: 21 SEC
RBC # BLD AUTO: 4.61 M/UL
SODIUM SERPL-SCNC: 137 MMOL/L
TROPONIN I SERPL DL<=0.01 NG/ML-MCNC: 0.03 NG/ML
WBC # BLD AUTO: 7.45 K/UL

## 2018-04-17 PROCEDURE — 84484 ASSAY OF TROPONIN QUANT: CPT

## 2018-04-17 PROCEDURE — 99284 EMERGENCY DEPT VISIT MOD MDM: CPT | Mod: 25

## 2018-04-17 PROCEDURE — 85730 THROMBOPLASTIN TIME PARTIAL: CPT

## 2018-04-17 PROCEDURE — 94761 N-INVAS EAR/PLS OXIMETRY MLT: CPT

## 2018-04-17 PROCEDURE — 80162 ASSAY OF DIGOXIN TOTAL: CPT

## 2018-04-17 PROCEDURE — 63600175 PHARM REV CODE 636 W HCPCS: Performed by: PHYSICIAN ASSISTANT

## 2018-04-17 PROCEDURE — 80053 COMPREHEN METABOLIC PANEL: CPT

## 2018-04-17 PROCEDURE — 96374 THER/PROPH/DIAG INJ IV PUSH: CPT

## 2018-04-17 PROCEDURE — 25000242 PHARM REV CODE 250 ALT 637 W/ HCPCS: Performed by: PHYSICIAN ASSISTANT

## 2018-04-17 PROCEDURE — 25000003 PHARM REV CODE 250

## 2018-04-17 PROCEDURE — 93010 ELECTROCARDIOGRAM REPORT: CPT | Mod: ,,, | Performed by: INTERNAL MEDICINE

## 2018-04-17 PROCEDURE — 85610 PROTHROMBIN TIME: CPT

## 2018-04-17 PROCEDURE — 99283 EMERGENCY DEPT VISIT LOW MDM: CPT | Mod: ,,, | Performed by: PHYSICIAN ASSISTANT

## 2018-04-17 PROCEDURE — 83880 ASSAY OF NATRIURETIC PEPTIDE: CPT

## 2018-04-17 PROCEDURE — 94640 AIRWAY INHALATION TREATMENT: CPT

## 2018-04-17 PROCEDURE — 85025 COMPLETE CBC W/AUTO DIFF WBC: CPT

## 2018-04-17 RX ORDER — ONDANSETRON 4 MG/1
4 TABLET, ORALLY DISINTEGRATING ORAL
Status: COMPLETED | OUTPATIENT
Start: 2018-04-17 | End: 2018-04-17

## 2018-04-17 RX ORDER — IPRATROPIUM BROMIDE AND ALBUTEROL SULFATE 2.5; .5 MG/3ML; MG/3ML
3 SOLUTION RESPIRATORY (INHALATION)
Status: COMPLETED | OUTPATIENT
Start: 2018-04-17 | End: 2018-04-17

## 2018-04-17 RX ORDER — PREDNISONE 20 MG/1
40 TABLET ORAL DAILY
Qty: 10 TABLET | Refills: 0 | Status: SHIPPED | OUTPATIENT
Start: 2018-04-17 | End: 2018-04-18

## 2018-04-17 RX ORDER — METHYLPREDNISOLONE SOD SUCC 125 MG
125 VIAL (EA) INJECTION
Status: COMPLETED | OUTPATIENT
Start: 2018-04-17 | End: 2018-04-17

## 2018-04-17 RX ADMIN — IPRATROPIUM BROMIDE AND ALBUTEROL SULFATE 3 ML: .5; 3 SOLUTION RESPIRATORY (INHALATION) at 02:04

## 2018-04-17 RX ADMIN — METHYLPREDNISOLONE SODIUM SUCCINATE 125 MG: 125 INJECTION, POWDER, FOR SOLUTION INTRAMUSCULAR; INTRAVENOUS at 01:04

## 2018-04-17 RX ADMIN — ONDANSETRON 4 MG: 4 TABLET, ORALLY DISINTEGRATING ORAL at 01:04

## 2018-04-17 RX ADMIN — IPRATROPIUM BROMIDE AND ALBUTEROL SULFATE 3 ML: .5; 3 SOLUTION RESPIRATORY (INHALATION) at 01:04

## 2018-04-17 NOTE — ED PROVIDER NOTES
"Encounter Date: 4/17/2018       History     Chief Complaint   Patient presents with    Shortness of Breath     seen sunday and told bronchitis, no better, wheezing      Pt is a 48 y/o F with PMHx of CHF s/p AICD on digoxin, Afib on coumadin, asthma, CKD stage III, T2DM, HTN, chronic rhinitis, GERD, CLARENCE and obesity who presents with worsening SOB for the past 5 days. She admits to SOB at rest that is worse with exertion. She admits to associated dry cough with intermittent sputum production, wheezing, runny nose, congestion, PND, R ear fullness, and headache. She denies fever, chill, changes in her BMs, body aches, urinary symptoms, and leg swelling. She also reports intermittent chest pain that began 5 days ago as well. She describes her chest discomfort as a "fullness" in the substernal region that does not radiate and is associated with SOB and lightheadedness. She denies dizziness, diaphoresis, N/V, abdominal pain, vision changes, numbness/tingling, syncope. She has had a history of cardiac cath many years ago. No stent placement. Was scheduled for outpatient stress test today but rescheduled for Friday given her diagnosis of bronchitis. She also is scheduled to see pulmonology soon.           Of note, pt was seen in ED 5 days ago for her SOB and cough. She had a negative CXR, was diagnosed with bronchitis, and given Rx for augmentin and tessalon perles. She has not felt much better since starting augmentin and tessalon perles. She has also taken mucinex which has somewhat helped. She is compliant with her inhalers and uses a nebulizer machine at home as well.        The history is provided by the patient.     Review of patient's allergies indicates:  No Known Allergies  Past Medical History:   Diagnosis Date    Anticoagulant long-term use     Arthritis     Asthma     Asthma     Cardiomyopathy     CHF (congestive heart failure)     CHF (congestive heart failure)     COPD (chronic obstructive pulmonary " disease) 3/28/2018    GERD (gastroesophageal reflux disease)     H/O tubal ligation     Hypertension     Obesity     Renal disorder     Type 2 diabetes mellitus with hyperglycemia     Type 2 diabetes mellitus with polyneuropathy      Past Surgical History:   Procedure Laterality Date    CARDIAC DEFIBRILLATOR PLACEMENT      CHOLECYSTECTOMY      COLONOSCOPY N/A 5/2/2016    Procedure: COLONOSCOPY;  Surgeon: Eugene Soto MD;  Location: [facility] ([address] [place] [address]);  Service: Endoscopy;  Laterality: N/A;    GALLBLADDER SURGERY      iabp  4/2016    TUBAL LIGATION       Family History   Problem Relation Age of Onset    Heart disease Mother     Heart disease Father      Social History   Substance Use Topics    Smoking status: Never Smoker    Smokeless tobacco: Never Used    Alcohol use No     Review of Systems   Constitutional: Negative for chills, diaphoresis and fever.   HENT: Positive for congestion, ear pain, postnasal drip and rhinorrhea. Negative for sinus pressure and sore throat.    Eyes: Negative for visual disturbance.   Respiratory: Positive for cough, shortness of breath and wheezing.    Cardiovascular: Positive for chest pain. Negative for palpitations and leg swelling.   Gastrointestinal: Negative for abdominal pain, diarrhea, nausea and vomiting.   Genitourinary: Negative for difficulty urinating, dysuria, frequency and urgency.   Musculoskeletal: Negative for myalgias.   Neurological: Positive for light-headedness and headaches. Negative for dizziness, syncope, facial asymmetry and weakness.       Physical Exam     Initial Vitals [04/17/18 1202]   BP Pulse Resp Temp SpO2   120/64 90 (!) 22 98.6 °F (37 °C) (!) 93 %      MAP       82.67         Physical Exam    Constitutional: She appears well-developed. She is not diaphoretic. She is Obese . She is cooperative. She does not appear ill. No distress.   HENT:   Head: Normocephalic and atraumatic.   Right Ear: Tympanic membrane, external ear and ear  canal normal.   Left Ear: Tympanic membrane, external ear and ear canal normal.   Mouth/Throat: Uvula is midline. Oropharyngeal exudate present.   Eyes: Conjunctivae are normal. No scleral icterus.   Neck: Normal range of motion. Neck supple.   Cardiovascular: Normal rate, regular rhythm, intact distal pulses and normal pulses. Exam reveals no gallop and no friction rub.    No murmur heard.  No swelling to b/l lower extremities    Pulmonary/Chest: Effort normal. She has wheezes (mild-moderate intermittent wheezing in all lung fields; increases on expiration). She has rhonchi.   Abdominal: Soft. Bowel sounds are normal.   Musculoskeletal: Normal range of motion. She exhibits no edema.   Neurological: She is alert and oriented to person, place, and time. No cranial nerve deficit.         ED Course   Procedures  Labs Reviewed   CBC W/ AUTO DIFFERENTIAL - Abnormal; Notable for the following:        Result Value    MCH 26.7 (*)     MCHC 31.2 (*)     RDW 14.8 (*)     All other components within normal limits   COMPREHENSIVE METABOLIC PANEL - Abnormal; Notable for the following:     Glucose 114 (*)     Albumin 3.3 (*)     AST 52 (*)     ALT 69 (*)     eGFR if non  53.6 (*)     All other components within normal limits   TROPONIN I - Abnormal; Notable for the following:     Troponin I 0.027 (*)     All other components within normal limits   PROTIME-INR - Abnormal; Notable for the following:     Prothrombin Time 21.0 (*)     INR 2.2 (*)     All other components within normal limits   B-TYPE NATRIURETIC PEPTIDE   APTT   DIGOXIN LEVEL              X-Ray Chest PA And Lateral   Final Result      See above         Electronically signed by: Pasha Watts MD   Date:    04/17/2018   Time:    12:58                APC / Resident Notes:   Patient was seen in the ER promptly upon arrival. She is afebrile no acute distress. EKG unremarkable. Physical examination does reveal diffuse expiratory wheezing with rhonchi.   She was given a DuoNeb treatment in ED.  Given Solu-Medrol    Laboratory studies normal white count 7.4.  Hemoglobin stable.  Chemistries unremarkable.  Troponin 0.027.  This is to patient's baseline given her CKD, improved from previous lab work.  BNP 97.  INR within normal limits 2.2    X-ray of the chest does not show acute pneumonia.    Upon reassessment patient's continues to have wheezing but has improved from the first DuoNeb.  She did receive additional DuoNeb.    Patient will be prescribed home on prednisone to use as directed.  She was given strict instructions to monitor her blood glucose closely as prednisone may elevate her blood sugar.  She was advised to finish the full course of Augmentin since she already initiated.  She is to continue taking Mucinex and Tessalon Perles.  She does have an appointment with pulmonologist later this week.  She is also scheduled for a stress test on Friday they instructed her to follow-up with.  I do feel that her symptoms are likely secondary to her persistent bronchitis.  She is otherwise stable for outpatient therapy at this time.    The care of this patient was overseen by attending physician who agrees with treatment, plan, and disposition.                   Clinical Impression:   The primary encounter diagnosis was Bronchitis. A diagnosis of Shortness of breath was also pertinent to this visit.    Disposition:   Disposition: Discharged  Condition: Stable                        Ghazala Murphy PA-C  04/17/18 3422

## 2018-04-17 NOTE — ED NOTES
Pt. Resting in bed in NAD. RR e/u. Continuous cardiac and O2 monitoring in progress. VS being monitoring continuously per MD orders. Pt. Offered bathroom assistance and denies need at this time. Explanation of care/wait provided. Bed in low, locked position with rails up and call bell in reach. Will continue to monitor.

## 2018-04-17 NOTE — ED NOTES
LOC: Patient name and date of birth verified.  The patient is awake, alert and aware of environment with an appropriate affect, the patient is oriented x 3 and speaking appropriately.  Pt in NAD.    APPEARANCE: Patient appears uncomfortable. Pt have a non productive cough. Pt is overweight.   SKIN: The skin is warm and dry, color consistent with ethnicity, patient has normal skin turgor and moist mucus membranes, skin intact, no breakdown or brusing noted.  MUSCULOSKELETAL: Patient moving all extremities well, no obvious swelling or deformities noted.  RESPIRATORY: Airway is open and patent, respirations are spontaneous, patient has a normal effort and rate, no accessory muscle use noted. Wheezing heard to anterior/posterior lung fields.  CARDIAC: Patient has a normal rate and rhythm, no periphreal edema noted, capillary refill < 3 seconds.  ABDOMEN: No distention noted. Bowel sounds present in all four quadrants.   NEUROLOGIC: Eyes open spontaneously, behavior appropriate to situation, follows commands, facial expression symmetrical, bilateral hand grasp equal and even, purposeful motor response noted, normal sensation in all extremities when touched.

## 2018-04-17 NOTE — DISCHARGE INSTRUCTIONS
Follow up with family doctor. Keep appointment for your stress test and pulmonology.    Take medication as prescribed. Watch your blood sugar as prednisone may elevate your glucose. Stay complaint with your insulin.

## 2018-04-18 RX ORDER — PREDNISONE 20 MG/1
40 TABLET ORAL DAILY
Qty: 10 TABLET | Refills: 0 | Status: SHIPPED | OUTPATIENT
Start: 2018-04-18 | End: 2018-04-23

## 2018-04-18 NOTE — PROGRESS NOTES
Patient reports she will Prednisone 40mg daily as of 4/18. Will adjust dose if needed 4/20. Patient aware.

## 2018-04-18 NOTE — PROGRESS NOTES
Patient called to report rossy you Saturday she was in the ER was given -Augmentin, went back to the ER 4/17--has bronchitis was given IV MethylPred but states makes her glucose elevated, was given a prescription for Prednisone -20mg but is afraid if she takes the prednisone it will elevate her glucose, her INR 4/17 at ER was 2.2, Patient's call back # [phone], next INR is due 4/20

## 2018-04-20 ENCOUNTER — DOCUMENTATION ONLY (OUTPATIENT)
Dept: CARDIOLOGY | Facility: CLINIC | Age: 48
End: 2018-04-20

## 2018-04-20 ENCOUNTER — ANTI-COAG VISIT (OUTPATIENT)
Dept: CARDIOLOGY | Facility: CLINIC | Age: 48
End: 2018-04-20
Payer: MEDICAID

## 2018-04-20 ENCOUNTER — HOSPITAL ENCOUNTER (OUTPATIENT)
Dept: PULMONOLOGY | Facility: CLINIC | Age: 48
Discharge: HOME | End: 2018-04-20
Payer: MEDICAID

## 2018-04-20 ENCOUNTER — OFFICE VISIT (OUTPATIENT)
Dept: PULMONOLOGY | Facility: CLINIC | Age: 48
End: 2018-04-20
Payer: MEDICAID

## 2018-04-20 ENCOUNTER — HOSPITAL ENCOUNTER (OUTPATIENT)
Dept: CARDIOLOGY | Facility: CLINIC | Age: 48
Discharge: HOME | End: 2018-04-20
Attending: PHYSICIAN ASSISTANT
Payer: MEDICAID

## 2018-04-20 VITALS
DIASTOLIC BLOOD PRESSURE: 80 MMHG | RESPIRATION RATE: 16 BRPM | WEIGHT: 240 LBS | SYSTOLIC BLOOD PRESSURE: 138 MMHG | HEIGHT: 65 IN | BODY MASS INDEX: 39.99 KG/M2 | OXYGEN SATURATION: 93 % | HEART RATE: 82 BPM

## 2018-04-20 DIAGNOSIS — I42.8 NICM (NONISCHEMIC CARDIOMYOPATHY): Primary | ICD-10-CM

## 2018-04-20 DIAGNOSIS — E66.9 OBESITY (BMI 30-39.9): ICD-10-CM

## 2018-04-20 DIAGNOSIS — I48.0 PAROXYSMAL ATRIAL FIBRILLATION: ICD-10-CM

## 2018-04-20 DIAGNOSIS — J45.41 MODERATE PERSISTENT CHRONIC ASTHMA WITH ACUTE EXACERBATION: ICD-10-CM

## 2018-04-20 DIAGNOSIS — G47.33 OSA (OBSTRUCTIVE SLEEP APNEA): ICD-10-CM

## 2018-04-20 DIAGNOSIS — R05.9 COUGH: ICD-10-CM

## 2018-04-20 DIAGNOSIS — I50.9 ACUTE ON CHRONIC CONGESTIVE HEART FAILURE, UNSPECIFIED CONGESTIVE HEART FAILURE TYPE: ICD-10-CM

## 2018-04-20 DIAGNOSIS — Z79.01 CHRONIC ANTICOAGULATION: ICD-10-CM

## 2018-04-20 DIAGNOSIS — Z79.01 CHRONIC ANTICOAGULATION: Primary | ICD-10-CM

## 2018-04-20 DIAGNOSIS — R05.9 COUGH: Primary | ICD-10-CM

## 2018-04-20 DIAGNOSIS — R07.89 OTHER CHEST PAIN: ICD-10-CM

## 2018-04-20 PROBLEM — J45.901 CHRONIC ASTHMA WITH ACUTE EXACERBATION: Status: ACTIVE | Noted: 2018-04-20

## 2018-04-20 LAB
INR PPP: 2.5 (ref 2–3)
PRE FEV1 FVC: 68
PRE FEV1: 1.32
PRE FVC: 1.93
PREDICTED FEV1 FVC: 82
PREDICTED FEV1: 2.66
PREDICTED FVC: 3.23

## 2018-04-20 PROCEDURE — 99214 OFFICE O/P EST MOD 30 MIN: CPT | Mod: 25,S$PBB,, | Performed by: INTERNAL MEDICINE

## 2018-04-20 PROCEDURE — 99213 OFFICE O/P EST LOW 20 MIN: CPT | Mod: PBBFAC | Performed by: INTERNAL MEDICINE

## 2018-04-20 PROCEDURE — 99211 OFF/OP EST MAY X REQ PHY/QHP: CPT | Mod: S$PBB,25,, | Performed by: PHARMACIST

## 2018-04-20 PROCEDURE — 99999 PR PBB SHADOW E&M-EST. PATIENT-LVL III: CPT | Mod: PBBFAC,,, | Performed by: INTERNAL MEDICINE

## 2018-04-20 PROCEDURE — 94010 BREATHING CAPACITY TEST: CPT | Mod: PBBFAC | Performed by: INTERNAL MEDICINE

## 2018-04-20 PROCEDURE — 94010 BREATHING CAPACITY TEST: CPT | Mod: 26,S$PBB,, | Performed by: INTERNAL MEDICINE

## 2018-04-20 PROCEDURE — 85610 PROTHROMBIN TIME: CPT | Mod: PBBFAC

## 2018-04-20 RX ORDER — CALCIUM CITRATE/VITAMIN D3 200MG-6.25
TABLET ORAL
Qty: 150 EACH | Refills: 11 | OUTPATIENT
Start: 2018-04-20

## 2018-04-20 RX ORDER — PREDNISONE 10 MG/1
TABLET ORAL
Qty: 20 TABLET | Refills: 0 | Status: SHIPPED | OUTPATIENT
Start: 2018-04-20 | End: 2018-05-21 | Stop reason: ALTCHOICE

## 2018-04-20 RX ORDER — LISINOPRIL 5 MG/1
TABLET ORAL
Qty: 60 TABLET | Refills: 8 | Status: SHIPPED | OUTPATIENT
Start: 2018-04-20 | End: 2019-03-26 | Stop reason: SDUPTHER

## 2018-04-20 RX ORDER — PEN NEEDLE, DIABETIC 31 GX5/16"
NEEDLE, DISPOSABLE MISCELLANEOUS
Qty: 200 EACH | Refills: 11 | OUTPATIENT
Start: 2018-04-20

## 2018-04-20 RX ORDER — DOXYCYCLINE HYCLATE 100 MG
100 TABLET ORAL EVERY 12 HOURS
Qty: 20 TABLET | Refills: 0 | Status: SHIPPED | OUTPATIENT
Start: 2018-04-20 | End: 2018-05-21 | Stop reason: ALTCHOICE

## 2018-04-20 RX ORDER — LANOLIN ALCOHOL/MO/W.PET/CERES
CREAM (GRAM) TOPICAL
Qty: 60 TABLET | Refills: 0 | Status: SHIPPED | OUTPATIENT
Start: 2018-04-20 | End: 2018-06-25 | Stop reason: SDUPTHER

## 2018-04-20 NOTE — PATIENT INSTRUCTIONS
Increase Symbicort dose to 2 puffs twice daily.  Extend Prednisone 20 mg daily x 5, then 10 mg daily x 5 if needed.  Continue Mucinex and Xopenex Aerosols until congestion improves.  Call/return if resp symptoms remain unimproved.

## 2018-04-20 NOTE — PROGRESS NOTES
Subjective:       Patient ID: Laure Ross is a 47 y.o. female.    Chief Complaint: Asthma    HPI Mrs. Ross is a 47-year-old nonsmoker who comes for assessment of recent   respiratory problems.  She has a longstanding history of asthma.  She had 1   previous visit in this clinic (see the note by Dr. Martinez from March 2017).  More   recently, she has been followed at the [facility] in [place] .    Mrs. Ross had a flare of increased respiratory congestion and shortness of   breath earlier this month.  She has had 2 recent visits in the Emergency   Department in [place].  She was treated with Augmentin and prednisone.  Her   respiratory symptoms are somewhat improved, but have not yet returned to   baseline.  She reports a daily cough with sputum, which is nonpurulent in   character.  She has now completed the course of treatment with Augmentin.  She   is currently taking a 5-day course of prednisone 40 mg daily.  She had also used   Symbicort, but was only using this medication once daily.  She also uses   Mucinex and Xopenex aerosol treatments.    Mrs. Ross is followed in the Cardiology Clinic for management of a nonischemic   cardiomyopathy.  She is treated with amiodarone due to paroxysmal atrial   fibrillation.  She is on long-term anticoagulation therapy with Coumadin.    DATA:  I have reviewed the images from a chest x-ray done within the past   several days.  The cardiac silhouette is enlarged.  There are no acute   cardiovascular abnormalities.  Note is made of an implanted defibrillator.    There are no areas of consolidation and no pleural effusions.    A spirometry study done today shows the forced vital capacity is 1.93 L, which   is 62% of predicted.  The FEV1 is 1.32 L or 51% of predicted.  The ratio of   these values is 68%.  When today's spirometry is compared to a previous study   from November ([facility] [place]) and to a previous study done here   in March of last year,  the current values are lower.  This likely reflects the   unresolved asthma exacerbation.      CB/IN  dd: 04/20/2018 18:39:04 (CDT)  td: 04/21/2018 08:44:56 (CDT)  Doc ID   #[id number]  Job ID #[id number]    CC:       Review of Systems   Constitutional: Negative for fever and fatigue.   HENT: Positive for congestion. Negative for postnasal drip, sinus pressure and voice change.    Respiratory: Positive for cough, sputum production, shortness of breath and dyspnea on extertion. Negative for wheezing.    Cardiovascular: Negative for chest pain and leg swelling.   Genitourinary: Negative for difficulty urinating.   Musculoskeletal: Negative for arthralgias and back pain.   Skin: Negative for rash.   Gastrointestinal: Negative for abdominal pain and acid reflux.   Neurological: Negative for dizziness and weakness.   Hematological: Negative for adenopathy.       Objective:      Physical Exam   Constitutional: She is oriented to person, place, and time. She appears well-developed. She is obese.   HENT:   Head: Normocephalic.   Nose: Nose normal.   Mouth/Throat: No oropharyngeal exudate.   Neck: Normal range of motion. No JVD present. No tracheal deviation present. No thyromegaly present.   Cardiovascular: Normal rate, regular rhythm and normal heart sounds.    No murmur heard.  Pulmonary/Chest: Symmetric chest wall expansion. No stridor. She has decreased breath sounds. She has no wheezes. She has rhonchi (occas rhonchi). She has no rales. She exhibits no tenderness.   Abdominal: Soft. There is no tenderness.   Musculoskeletal: She exhibits no edema.   Lymphadenopathy:     She has no cervical adenopathy.   Neurological: She is alert and oriented to person, place, and time.   Skin: Skin is warm and dry. No rash noted. No erythema.   Psychiatric: She has a normal mood and affect.   Vitals reviewed.    Personal Diagnostic Review    No flowsheet data found.      Assessment:       1. NICM (nonischemic cardiomyopathy)    2. Moderate  "persistent chronic asthma with acute exacerbation    3. Obesity (BMI 30-39.9)    4. Paroxysmal atrial fibrillation    5. CLARENCE (obstructive sleep apnea)    6. Chronic anticoagulation        Outpatient Encounter Prescriptions as of 4/20/2018   Medication Sig Dispense Refill    amiodarone (PACERONE) 200 MG Tab TAKE TWO TABLETS BY MOUTH EVERY DAY 60 tablet 12    azelastine (ASTELIN) 137 mcg (0.1 %) nasal spray 2 sprays (274 mcg total) by Nasal route 2 (two) times daily. 30 mL 2    BD ULTRA-FINE ESSENCE PEN NEEDLES 32 gauge x 5/32" Ndle Takes 5 times  day 200 each 11    benzonatate (TESSALON) 100 MG capsule Take 1 capsule (100 mg total) by mouth 3 (three) times daily as needed for Cough. 20 capsule 0    blood sugar diagnostic Strp Uses 4 times a day, true metrix meter. 150 each 11    budesonide-formoterol 160-4.5 mcg (SYMBICORT) 160-4.5 mcg/actuation HFAA Inhale 2 puffs into the lungs every 12 (twelve) hours. 1 Inhaler 0    cetirizine (ZYRTEC) 10 MG tablet Take 1 tablet (10 mg total) by mouth once daily.  0    digoxin (LANOXIN) 125 mcg tablet TAKE 1 TABLET BY MOUTH ONCE DAILY 30 tablet 5    doxycycline (VIBRA-TABS) 100 MG tablet Take 1 tablet (100 mg total) by mouth every 12 (twelve) hours. 20 tablet 0    furosemide (LASIX) 40 MG tablet TAKE 2 TABLETS BY MOUTH TWO TIMES A  tablet 3    gabapentin (NEURONTIN) 300 MG capsule Take 1 capsule (300 mg total) by mouth every evening. 90 capsule 3    insulin aspart (NOVOLOG FLEXPEN) 100 unit/mL InPn pen Inject 16 units w/ meals plus scale 150-200 +2, 201-250 +4, 251-300 +6, 301-350 +8. 2 Box 6    insulin glargine (BASAGLAR KWIKPEN) 100 unit/mL (3 mL) InPn pen Inject 34 Units into the skin 2 (two) times daily. 15 mL 6    ketoconazole (NIZORAL) 2 % cream Apply topically once daily. 30 g 1    lancets Misc Uses true metrix meter, 4 times a day. 150 each 11    lisinopril (PRINIVIL,ZESTRIL) 5 MG tablet TAKE 1 TABLET BY MOUTH TWO TIMES A DAY 60 tablet 8    magnesium " oxide (MAG-OX) 400 mg tablet TAKE ONE TABLET BY MOUTH TWO TIMES A DAY 60 tablet 0    montelukast (SINGULAIR) 10 mg tablet Take 1 tablet (10 mg total) by mouth once daily. 30 tablet 5    omeprazole (PRILOSEC) 40 MG capsule TAKE 1 CAPSULE BY MOUTH EVERY MORNING 30 capsule 6    potassium chloride (MICRO-K) 10 MEQ CpSR Take 2 capsules (20 mEq total) by mouth once daily. 60 capsule 3    predniSONE (DELTASONE) 10 MG tablet Take 2 tabs per day x 5 days, then 1 tab per day x 5 days, then stop. Take with food 20 tablet 0    predniSONE (DELTASONE) 20 MG tablet Take 2 tablets (40 mg total) by mouth once daily. 10 tablet 0    ranitidine (ZANTAC) 300 MG tablet Take 1 tablet (300 mg total) by mouth every evening. 30 tablet 11    spironolactone (ALDACTONE) 25 MG tablet Take 1 tablet (25 mg total) by mouth once daily. 90 tablet 3    vitamin D 1000 units Tab Take 2 tablets (2,000 Units total) by mouth once daily.      warfarin (COUMADIN) 7.5 MG tablet Take 0.5-1 tablets (3.75-7.5 mg total) by mouth Daily. As directed by coumadin clinic 30 tablet 11    [DISCONTINUED] amoxicillin-clavulanate 875-125mg (AUGMENTIN) 875-125 mg per tablet Take 1 tablet by mouth 2 (two) times daily. 14 tablet 0    [DISCONTINUED] lisinopril (PRINIVIL,ZESTRIL) 5 MG tablet Take 1 tablet (5 mg total) by mouth 2 (two) times daily. 60 tablet 8    [DISCONTINUED] magnesium oxide (MAG-OX) 400 mg tablet TAKE ONE TABLET BY MOUTH TWO TIMES A DAY 60 tablet 0     No facility-administered encounter medications on file as of 4/20/2018.      No orders of the defined types were placed in this encounter.    Plan:     Increase Symbicort dose to 2 puffs twice daily.  Extend Prednisone 20 mg daily x 5, then 10 mg daily x 5 if needed.  Continue Mucinex and Xopenex Aerosols until congestion improves.  Call/return if resp symptoms remain unimproved.

## 2018-04-20 NOTE — PROGRESS NOTES
Pt here for CARL.  Pt recovering from bronchitis & states that she doesn't feel well.  Pt also w/ hx of LBBB, PAF & DDD PCM.  Discussed w/ Dr. Jules, CARL cancelled.  Dr. Jules came in room & discussed w/ pt, questions answered & understanding verbalized.

## 2018-04-20 NOTE — PROGRESS NOTES
Patient started prednisone earlier this week and will complete this on 4/23. NO interaction seen thus far with the prednisone. We will resume her current coumadin dose and keep close watch. Patient was re-educated on situations that would require placing a call to the coumadin clinic, including bleeding or unusual bruising issues, changes in health, diet or medications, upcoming procedures that require warfarin interruption, and missed coumadin dose(s). Patient expressed understanding that avoidance of consistency with these parameters could cause fluctuations in INR, leading to more frequent visits and increase risk of adverse events.

## 2018-04-25 ENCOUNTER — ANTI-COAG VISIT (OUTPATIENT)
Dept: CARDIOLOGY | Facility: CLINIC | Age: 48
End: 2018-04-25
Payer: MEDICAID

## 2018-04-25 DIAGNOSIS — Z79.01 CHRONIC ANTICOAGULATION: ICD-10-CM

## 2018-04-25 DIAGNOSIS — I48.0 PAROXYSMAL ATRIAL FIBRILLATION: ICD-10-CM

## 2018-04-25 LAB — INR PPP: 2.8 (ref 2–3)

## 2018-04-25 PROCEDURE — 85610 PROTHROMBIN TIME: CPT | Mod: PBBFAC

## 2018-04-25 RX ORDER — PEN NEEDLE, DIABETIC 30 GX3/16"
NEEDLE, DISPOSABLE MISCELLANEOUS
Qty: 200 EACH | Refills: 5 | Status: SHIPPED | OUTPATIENT
Start: 2018-04-25

## 2018-04-25 NOTE — PROGRESS NOTES
Patient confirms dose. States prednisone will be extended for 5 days more, to complete 4/30. Will continue this dose as prednisone now tapering lower. INR 5/1. I have reviewed the student's initial findings and agree with their assessment.  I have personally spoken with and assessed the patient in clinic to devise care plan.

## 2018-05-01 ENCOUNTER — ANTI-COAG VISIT (OUTPATIENT)
Dept: CARDIOLOGY | Facility: CLINIC | Age: 48
End: 2018-05-01
Payer: MEDICAID

## 2018-05-01 DIAGNOSIS — Z79.01 CHRONIC ANTICOAGULATION: ICD-10-CM

## 2018-05-01 DIAGNOSIS — I48.0 PAROXYSMAL ATRIAL FIBRILLATION: ICD-10-CM

## 2018-05-01 DIAGNOSIS — Z79.01 LONG TERM (CURRENT) USE OF ANTICOAGULANTS: Primary | ICD-10-CM

## 2018-05-01 LAB — INR PPP: 4.5 (ref 2–3)

## 2018-05-01 PROCEDURE — 85610 PROTHROMBIN TIME: CPT | Mod: PBBFAC

## 2018-05-01 NOTE — PROGRESS NOTES
Close monitoring for DDI prednisone and doxycycline. INR high today. Patient reports she finished prednisone on 5/1 and will doxycycline 5/1 pm. No bleeding or bruising. Will hold coumadin today and decrease weekly dose until follow up in 1 week for close monitoring. Advised patient to call with any changes or concerns.

## 2018-05-02 NOTE — PROGRESS NOTES
Patient seen by Norma MARRERO. I have reviewed her initial findings and agree with her assessment.  Care plan made together. Looks like DDI for doxy was not accounted for last week. We will hold a dose today and resume same weekly dose, but adjusting day of 7.5mg, since DDI's complete today

## 2018-05-08 ENCOUNTER — ANTI-COAG VISIT (OUTPATIENT)
Dept: CARDIOLOGY | Facility: CLINIC | Age: 48
End: 2018-05-08
Payer: MEDICAID

## 2018-05-08 DIAGNOSIS — Z79.01 LONG TERM (CURRENT) USE OF ANTICOAGULANTS: Primary | ICD-10-CM

## 2018-05-08 DIAGNOSIS — Z79.01 CHRONIC ANTICOAGULATION: ICD-10-CM

## 2018-05-08 DIAGNOSIS — I48.0 PAROXYSMAL ATRIAL FIBRILLATION: ICD-10-CM

## 2018-05-08 LAB — INR PPP: 3 (ref 2–3)

## 2018-05-08 PROCEDURE — 85610 PROTHROMBIN TIME: CPT | Mod: PBBFAC

## 2018-05-08 NOTE — PROGRESS NOTES
Quick follow up from high INR on 5/1 and close monitoring for new dose. INR in therapeutic range today. Patient reports no bleeding, bruising or other changes. Will maintain current weekly dose until follow up 1 week. Advised patient to call with any changes or concerns.

## 2018-05-11 NOTE — PROGRESS NOTES
Patient seen by Norma MARRERO. I have reviewed her initial findings and agree with her assessment.  Care plan made together.

## 2018-05-14 ENCOUNTER — HOSPITAL ENCOUNTER (EMERGENCY)
Facility: HOSPITAL | Age: 48
Discharge: HOME | End: 2018-05-14
Attending: EMERGENCY MEDICINE
Payer: MEDICAID

## 2018-05-14 VITALS
WEIGHT: 240 LBS | RESPIRATION RATE: 18 BRPM | SYSTOLIC BLOOD PRESSURE: 125 MMHG | HEIGHT: 64 IN | DIASTOLIC BLOOD PRESSURE: 68 MMHG | HEART RATE: 81 BPM | TEMPERATURE: 99 F | OXYGEN SATURATION: 99 % | BODY MASS INDEX: 40.97 KG/M2

## 2018-05-14 DIAGNOSIS — R21 RASH AND NONSPECIFIC SKIN ERUPTION: Primary | ICD-10-CM

## 2018-05-14 LAB
B-HCG UR QL: NEGATIVE
CTP QC/QA: YES
POCT GLUCOSE: 141 MG/DL (ref 70–110)

## 2018-05-14 PROCEDURE — 99283 EMERGENCY DEPT VISIT LOW MDM: CPT | Mod: 25

## 2018-05-14 PROCEDURE — 81025 URINE PREGNANCY TEST: CPT | Performed by: EMERGENCY MEDICINE

## 2018-05-14 RX ORDER — HYDROXYZINE PAMOATE 25 MG/1
25 CAPSULE ORAL EVERY 8 HOURS PRN
Qty: 15 CAPSULE | Refills: 0 | Status: SHIPPED | OUTPATIENT
Start: 2018-05-14 | End: 2018-08-03

## 2018-05-14 RX ORDER — TRIAMCINOLONE ACETONIDE 1 MG/G
CREAM TOPICAL 2 TIMES DAILY
Qty: 45 G | Refills: 0 | Status: SHIPPED | OUTPATIENT
Start: 2018-05-14 | End: 2019-08-09

## 2018-05-14 NOTE — ED PROVIDER NOTES
Encounter Date: 5/14/2018       History     Chief Complaint   Patient presents with    Rash     pt reports noticing a rash on the left hand that radiate up both arms this morning.      A 47-year-old female who presents to the ED complaining of rash to bilateral arms which started this morning.  Patient reports some itching.  Patient denies new medication, skin products, or new pets. Pt has a history of HTN, DM, CHF COPD.  Patient denies fever or chills.  Patient has not taken any over-the-counter medications for symptoms or applied any topical creams.      The history is provided by the patient.   Rash    This is a new problem. The current episode started 2 to 3 hours ago. The problem is associated with nothing. The rash is present on the right arm and left arm. The pain is at a severity of 0/10. Associated symptoms include itching. She has tried nothing for the symptoms.     Review of patient's allergies indicates:  No Known Allergies  Past Medical History:   Diagnosis Date    Anticoagulant long-term use     Arthritis     Asthma     Asthma     Cardiomyopathy     CHF (congestive heart failure)     CHF (congestive heart failure)     COPD (chronic obstructive pulmonary disease) 3/28/2018    GERD (gastroesophageal reflux disease)     H/O tubal ligation     Hypertension     Obesity     Renal disorder     Type 2 diabetes mellitus with hyperglycemia     Type 2 diabetes mellitus with polyneuropathy      Past Surgical History:   Procedure Laterality Date    CARDIAC DEFIBRILLATOR PLACEMENT      CHOLECYSTECTOMY      COLONOSCOPY N/A 5/2/2016    Procedure: COLONOSCOPY;  Surgeon: Eugene Soto MD;  Location: [address];  Service: Endoscopy;  Laterality: N/A;    GALLBLADDER SURGERY      iabp  4/2016    TUBAL LIGATION       Family History   Problem Relation Age of Onset    Heart disease Mother     Heart disease Father      Social History   Substance Use Topics    Smoking status: Never Smoker     Smokeless tobacco: Never Used    Alcohol use No     Review of Systems   Constitutional: Negative.  Negative for chills and fever.   HENT: Negative.  Negative for congestion.    Eyes: Negative.    Respiratory: Negative.  Negative for chest tightness and shortness of breath.    Cardiovascular: Negative.  Negative for chest pain.   Gastrointestinal: Negative.  Negative for abdominal pain and vomiting.   Endocrine: Negative.    Genitourinary: Negative.  Negative for dysuria and hematuria.   Musculoskeletal: Negative.  Negative for back pain and neck pain.   Skin: Positive for itching and rash.   Allergic/Immunologic: Negative for immunocompromised state.   Neurological: Negative.  Negative for weakness.   Hematological: Does not bruise/bleed easily.   Psychiatric/Behavioral: Negative.    All other systems reviewed and are negative.      Physical Exam     Initial Vitals [05/14/18 1259]   BP Pulse Resp Temp SpO2   132/74 86 17 98 °F (36.7 °C) 96 %      MAP       93.33         Physical Exam    Nursing note and vitals reviewed.  Constitutional: Vital signs are normal. She appears well-developed. She is cooperative. She does not appear ill.   HENT:   Head: Normocephalic and atraumatic.   Right Ear: Tympanic membrane and external ear normal.   Left Ear: Tympanic membrane and external ear normal.   Nose: Nose normal.   Mouth/Throat: Uvula is midline, oropharynx is clear and moist and mucous membranes are normal.   Eyes: Conjunctivae and lids are normal.   Neck: Normal range of motion. Neck supple.   Cardiovascular: Normal rate, regular rhythm, S1 normal, S2 normal and normal heart sounds.   Pulmonary/Chest: Effort normal and breath sounds normal.   Abdominal: Soft. Normal appearance and bowel sounds are normal. There is no tenderness.   Musculoskeletal: Normal range of motion.   Neurological: She is alert and oriented to person, place, and time.   Skin: Skin is warm, dry and intact.   Psychiatric: She has a normal mood  and affect. Thought content normal. Cognition and memory are normal.         ED Course   Procedures  Labs Reviewed   POCT GLUCOSE - Abnormal; Notable for the following:        Result Value    POCT Glucose 141 (*)     All other components within normal limits   POCT URINE PREGNANCY   POCT GLUCOSE             Medical Decision Making:   Initial Assessment:   A 47-year-old female who presents to the ED with a rash to bilateral arms was started this morning.  Patient states the rash is itchy.  Patient denies fever or chills.  Patient has a history of COPD, CHF, Hypertension and DM. Glucose 141.  Differential Diagnosis:   Contact dermatitis  Nonspecific rash  Clinical Tests:   Lab Tests: Ordered and Reviewed  ED Management:  Physical exam.  Glucose.  Patient to be discharged home with triamcinolone cream twice a day.  Vistaril when necessary itching.  Patient to follow-up with PCP in 2-3 days.  Return to ED for worsening of symptoms.                      Clinical Impression:   The encounter diagnosis was Rash and nonspecific skin eruption.                           Yoselin Nolen, REMEDIOS  05/14/18 5886

## 2018-05-15 ENCOUNTER — ANTI-COAG VISIT (OUTPATIENT)
Dept: CARDIOLOGY | Facility: CLINIC | Age: 48
End: 2018-05-15
Payer: MEDICAID

## 2018-05-15 DIAGNOSIS — Z79.01 CHRONIC ANTICOAGULATION: ICD-10-CM

## 2018-05-15 DIAGNOSIS — I48.0 PAROXYSMAL ATRIAL FIBRILLATION: ICD-10-CM

## 2018-05-15 DIAGNOSIS — Z79.01 LONG TERM (CURRENT) USE OF ANTICOAGULANTS: Primary | ICD-10-CM

## 2018-05-15 LAB — INR PPP: 4.2 (ref 2–3)

## 2018-05-15 PROCEDURE — 85610 PROTHROMBIN TIME: CPT | Mod: PBBFAC

## 2018-05-15 NOTE — PROGRESS NOTES
Close monitoring for new dose. INR high today. Patient reports a skin rash on bilateral hands and arms. Reports she will start a new cream, triamcinolone acetonide. Reports she is starting visteral 25mg on 5/15 for 7 days. No bleeding or bruising. Will hold coumadin today and decrease weekly dose until follow up in 1 week for close monitoring. Advised patient to call with any changes or concerns.

## 2018-05-21 ENCOUNTER — TELEPHONE (OUTPATIENT)
Dept: PODIATRY | Facility: CLINIC | Age: 48
End: 2018-05-21

## 2018-05-21 ENCOUNTER — OFFICE VISIT (OUTPATIENT)
Dept: PODIATRY | Facility: CLINIC | Age: 48
End: 2018-05-21
Payer: MEDICAID

## 2018-05-21 VITALS
HEIGHT: 64 IN | BODY MASS INDEX: 41.83 KG/M2 | RESPIRATION RATE: 18 BRPM | DIASTOLIC BLOOD PRESSURE: 78 MMHG | SYSTOLIC BLOOD PRESSURE: 120 MMHG | WEIGHT: 245 LBS | HEART RATE: 80 BPM

## 2018-05-21 DIAGNOSIS — L84 CORN OR CALLUS: ICD-10-CM

## 2018-05-21 DIAGNOSIS — E11.49 TYPE II DIABETES MELLITUS WITH NEUROLOGICAL MANIFESTATIONS: Primary | ICD-10-CM

## 2018-05-21 DIAGNOSIS — L84 PRE-ULCERATIVE CALLUSES: ICD-10-CM

## 2018-05-21 DIAGNOSIS — M20.41 HAMMER TOES OF BOTH FEET: ICD-10-CM

## 2018-05-21 DIAGNOSIS — M20.42 HAMMER TOES OF BOTH FEET: ICD-10-CM

## 2018-05-21 DIAGNOSIS — B35.1 ONYCHOMYCOSIS DUE TO DERMATOPHYTE: ICD-10-CM

## 2018-05-21 DIAGNOSIS — R60.0 BILATERAL LOWER EXTREMITY EDEMA: ICD-10-CM

## 2018-05-21 PROCEDURE — 11057 PARNG/CUTG B9 HYPRKR LES >4: CPT | Mod: S$GLB,,, | Performed by: PODIATRIST

## 2018-05-21 PROCEDURE — 99499 UNLISTED E&M SERVICE: CPT | Mod: S$GLB,,, | Performed by: PODIATRIST

## 2018-05-21 PROCEDURE — 11721 DEBRIDE NAIL 6 OR MORE: CPT | Mod: 59,S$GLB,, | Performed by: PODIATRIST

## 2018-05-21 NOTE — TELEPHONE ENCOUNTER
----- Message from Bouchra Blandon sent at 5/21/2018 11:04 AM CDT -----  Contact: Self/ [phone]  Patient called in to let you she is running later she will be there by 11:25 am. Patient asked if she can still be seen today.    Please call and advise.

## 2018-05-21 NOTE — PROGRESS NOTES
Subjective:      Patient ID: Laure Ross is a 47 y.o. female.    Chief Complaint: Diabetic Foot Exam (pcp Dr. Mittal last appt 4/2018); black toe nails (bilateral feet); and Callouses    Laure is a 47 y.o. female who presents to the clinic for evaluation and treatment of high risk feet. Laure has a past medical history of Anticoagulant long-term use; Arthritis; Asthma; Asthma; Cardiomyopathy; CHF (congestive heart failure); CHF (congestive heart failure); COPD (chronic obstructive pulmonary disease) (3/28/2018); GERD (gastroesophageal reflux disease); H/O tubal ligation; Hypertension; Obesity; Renal disorder; Type 2 diabetes mellitus with hyperglycemia; and Type 2 diabetes mellitus with polyneuropathy. Here for routine DM foot exam, nail and callus trimming. This routine trimming helps prevent painful symptoms. Requesting new Rx for Dm shoes. Has hx of healed foot ulcer right foot. No problems with wounds. Has numbness to both feet. No pain currently. No other pedal issues today. This patient has documented high risk feet requiring routine maintenance secondary to peripheral neuropathy.      PCP: Holly Mittal MD    Chief Complaint   Patient presents with    Diabetic Foot Exam     pcp Dr. Mittal last appt 4/2018    black toe nails     bilateral feet    Callouses         Current shoe gear:  DM shoes, inserts    Hemoglobin A1C   Date Value Ref Range Status   03/28/2018 6.2 (H) 4.0 - 5.6 % Final     Comment:     According to ADA guidelines, hemoglobin A1c <7.0% represents  optimal control in non-pregnant diabetic patients. Different  metrics may apply to specific patient populations.   Standards of Medical Care in Diabetes-2016.  For the purpose of screening for the presence of diabetes:  <5.7%     Consistent with the absence of diabetes  5.7-6.4%  Consistent with increasing risk for diabetes   (prediabetes)  >or=6.5%  Consistent with diabetes  Currently, no consensus exists for use of hemoglobin A1c  for  diagnosis of diabetes for children.  This Hemoglobin A1c assay has significant interference with fetal   hemoglobin   (HbF). The results are invalid for patients with abnormal amounts of   HbF,   including those with known Hereditary Persistence   of Fetal Hemoglobin. Heterozygous hemoglobin variants (HbAS, HbAC,   HbAD, HbAE, HbA2) do not significantly interfere with this assay;   however, presence of multiple variants in a sample may impact the %   interference.     03/17/2017 6.1 4.5 - 6.2 % Final     Comment:     According to ADA guidelines, hemoglobin A1C <7.0% represents  optimal control in non-pregnant diabetic patients.  Different  metrics may apply to specific populations.   Standards of Medical Care in Diabetes - 2016.  For the purpose of screening for the presence of diabetes:  <5.7%     Consistent with the absence of diabetes  5.7-6.4%  Consistent with increasing risk for diabetes   (prediabetes)  >or=6.5%  Consistent with diabetes  Currently no consensus exists for use of hemoglobin A1C  for diagnosis of diabetes for children.     11/23/2016 6.1 4.5 - 6.2 % Final     Comment:     According to ADA guidelines, hemoglobin A1C <7.0% represents  optimal control in non-pregnant diabetic patients.  Different  metrics may apply to specific populations.   Standards of Medical Care in Diabetes - 2016.  For the purpose of screening for the presence of diabetes:  <5.7%     Consistent with the absence of diabetes  5.7-6.4%  Consistent with increasing risk for diabetes   (prediabetes)  >or=6.5%  Consistent with diabetes  Currently no consensus exists for use of hemoglobin A1C  for diagnosis of diabetes for children.         Review of Systems   Constitution: Negative for chills, fever and malaise/fatigue.   Cardiovascular: Positive for leg swelling. Negative for chest pain, orthopnea and palpitations.   Respiratory: Negative for cough, shortness of breath and wheezing.    Skin: Positive for color change, dry skin,  nail changes and suspicious lesions (calluses/corns). Negative for itching, poor wound healing and rash.   Musculoskeletal: Negative for arthritis, gout, joint pain, joint swelling, muscle weakness and myalgias.   Neurological: Positive for numbness, paresthesias and sensory change. Negative for disturbances in coordination, dizziness, focal weakness and tremors.           Objective:      Physical Exam   Cardiovascular:   Dorsalis pedis and posterior tibial pulses are diminished Bilaterally. Toes are cool to touch. Feet are warm proximally.There is decreased digital hair. Skin is atrophic, slightly hyperpigmented, and mildly edematous       Musculoskeletal:   Musculoskeletal:  Muscle strength is 5/5 in all groups bilaterally.  No pain with ankle, STJ or MTPJ ROM, bilat. Ankle dorsiflexion is limited with knees extended and flexed, bilat.  Semi-reducible hammertoe contractures noted to toes 2-4 b/l-asymptomatic.    Neurological:   [place] 5.07 monofilamant testing is diminished both feet. Sharp/dull sensation diminished Bilaterally. Light touch absent Bilaterally.       Skin: Lesion noted. No bruising and no ecchymosis noted. No erythema.   No open lesions, lacerations or wounds noted. Nails are thickened, elongated, discolored yellow/brown with subungual debris and brittleness to R 1-5 and L 1-5. Interdigital spaces clean, dry and intact b/l. No erythema noted to b/l foot. Skin texture thin, atrophic, dry. Pedal hair diminished b/l.     Hyperkeratotic lesion noted to b/l plantar 2nd met heads with subdermal hematoma, dry with no open wound noted today. No SOI. (pre ulcerative)     Additional hyperkeratotic lesions noted to b/l plantar medial hallux IPJ and dorsal 5th toe PIPJs. Skin lines present to these lesions. No signs of deep tissue injury.                      Assessment:       Encounter Diagnoses   Name Primary?    Type II diabetes mellitus with neurological manifestations Yes    Bilateral lower  extremity edema     Onychomycosis due to dermatophyte     Corn or callus     Hammer toes of both feet     Pre-ulcerative calluses          Plan:       Laure was seen today for diabetic foot exam, black toe nails and callouses.    Diagnoses and all orders for this visit:    Type II diabetes mellitus with neurological manifestations  -     DIABETIC SHOES FOR HOME USE    Bilateral lower extremity edema  -     DIABETIC SHOES FOR HOME USE    Onychomycosis due to dermatophyte    Corn or callus  -     DIABETIC SHOES FOR HOME USE    Hammer toes of both feet  -     DIABETIC SHOES FOR HOME USE    Pre-ulcerative calluses  -     DIABETIC SHOES FOR HOME USE      I counseled the patient on her conditions, their implications and medical management.    - Shoe inspection. Diabetic Foot Education. Patient reminded of the importance of good nutrition and blood sugar control to help prevent podiatric complications of diabetes. Patient instructed on proper foot hygeine. We discussed wearing proper shoe gear, daily foot inspections, never walking without protective shoe gear, caution putting sharp instruments to feet     - Discussed DM foot care:  Wear comfortable, proper fitting shoes. Wash feet daily. Dry well. After drying, apply moisturizer to feet (no lotion to webspaces). Inspect feet daily for skin breaks, blisters, swelling, or redness. Wear cotton socks (preferably white)  Change socks every day. Do NOT walk barefoot. Do NOT use heating pads or warm/hot water soaks     With patient's permission, nails were aggressively reduced and debrided 1,2,3,4, 5 R and 1,2, 3,4,5 L and filed to their soft tissue attachment mechanically and with electric , removing all offending nail and debris. Patient tolerated this well and no blood was drawn. Patient reports relief following the procedure.     The affected area was cleansed with an alcohol prep pad. Next, utilizing a No.15 scalpel, the hyperkeratotic tissues were trimmed from  b/l plantar 2nd met heads, b/l plantar medial hallux IPJ and dorsal 5th toe PIPJs (6 lesions total) down to appropriate level of skin. Care was taken to remove any nucleated core from the center of the lesion. No pinpoint bleeding was encountered. The patient tolerated relief following this procedure.     Discussed regular and routine moisturizer to skin of both feet to help improve dry skin. Advised to apply twice daily until resolution of symptoms. Avoid between toes.     Rx diabetic shoes with custom molded inserts to be worn at all times while ambulating. Prescription provided with list of local retailers.     I warned patient of any new wounds opening as well as signs and symptoms of infection including redness, drainage, purulence, odor, streaking, fever, chills, etc and I advised her to seek medical attention (ER or urgent car) if these symptoms arise.     RTC 3 months, sooner PRN

## 2018-05-22 ENCOUNTER — ANTI-COAG VISIT (OUTPATIENT)
Dept: CARDIOLOGY | Facility: CLINIC | Age: 48
End: 2018-05-22
Payer: MEDICAID

## 2018-05-22 DIAGNOSIS — Z79.01 CHRONIC ANTICOAGULATION: ICD-10-CM

## 2018-05-22 DIAGNOSIS — I48.0 PAROXYSMAL ATRIAL FIBRILLATION: ICD-10-CM

## 2018-05-22 DIAGNOSIS — Z79.01 LONG TERM (CURRENT) USE OF ANTICOAGULANTS: Primary | ICD-10-CM

## 2018-05-22 LAB — INR PPP: 3 (ref 2–3)

## 2018-05-22 PROCEDURE — 85610 PROTHROMBIN TIME: CPT | Mod: PBBFAC

## 2018-06-01 DIAGNOSIS — J40 BRONCHITIS: ICD-10-CM

## 2018-06-02 RX ORDER — MONTELUKAST SODIUM 10 MG/1
10 TABLET ORAL DAILY
Qty: 30 TABLET | Refills: 5 | OUTPATIENT
Start: 2018-06-02

## 2018-06-02 RX ORDER — LANCETS
EACH MISCELLANEOUS
Qty: 150 EACH | Refills: 11 | Status: CANCELLED | OUTPATIENT
Start: 2018-06-02

## 2018-06-04 ENCOUNTER — HOSPITAL ENCOUNTER (OUTPATIENT)
Dept: RADIOLOGY | Facility: HOSPITAL | Age: 48
Discharge: HOME | End: 2018-06-04
Attending: INTERNAL MEDICINE
Payer: MEDICAID

## 2018-06-04 ENCOUNTER — ANTI-COAG VISIT (OUTPATIENT)
Dept: CARDIOLOGY | Facility: CLINIC | Age: 48
End: 2018-06-04

## 2018-06-04 DIAGNOSIS — I48.0 PAROXYSMAL ATRIAL FIBRILLATION: ICD-10-CM

## 2018-06-04 DIAGNOSIS — R91.1 LUNG NODULE SEEN ON IMAGING STUDY: ICD-10-CM

## 2018-06-04 DIAGNOSIS — E11.42 TYPE 2 DIABETES MELLITUS WITH POLYNEUROPATHY: Primary | ICD-10-CM

## 2018-06-04 DIAGNOSIS — Z79.01 CHRONIC ANTICOAGULATION: ICD-10-CM

## 2018-06-04 PROCEDURE — 25500020 PHARM REV CODE 255: Performed by: INTERNAL MEDICINE

## 2018-06-04 PROCEDURE — 71260 CT THORAX DX C+: CPT | Mod: TC

## 2018-06-04 PROCEDURE — 71260 CT THORAX DX C+: CPT | Mod: 26,,, | Performed by: RADIOLOGY

## 2018-06-04 RX ORDER — BLOOD-GLUCOSE CONTROL, NORMAL
EACH MISCELLANEOUS
Qty: 150 EACH | Refills: 2 | Status: SHIPPED | OUTPATIENT
Start: 2018-06-04 | End: 2019-08-09 | Stop reason: SDUPTHER

## 2018-06-04 RX ADMIN — IOHEXOL 75 ML: 350 INJECTION, SOLUTION INTRAVENOUS at 12:06

## 2018-06-09 DIAGNOSIS — I50.9 CONGESTIVE HEART FAILURE, UNSPECIFIED HF CHRONICITY, UNSPECIFIED HEART FAILURE TYPE: Primary | ICD-10-CM

## 2018-06-11 DIAGNOSIS — I50.9 CONGESTIVE HEART FAILURE, UNSPECIFIED HF CHRONICITY, UNSPECIFIED HEART FAILURE TYPE: Primary | ICD-10-CM

## 2018-06-11 RX ORDER — SPIRONOLACTONE 25 MG/1
25 TABLET ORAL DAILY
Qty: 90 TABLET | Refills: 3 | Status: CANCELLED | OUTPATIENT
Start: 2018-06-11 | End: 2019-06-11

## 2018-06-11 RX ORDER — SPIRONOLACTONE 25 MG/1
25 TABLET ORAL DAILY
Qty: 90 TABLET | Refills: 3 | Status: SHIPPED | OUTPATIENT
Start: 2018-06-11 | End: 2019-06-20

## 2018-06-13 DIAGNOSIS — N18.30 CHRONIC KIDNEY DISEASE, STAGE III (MODERATE): Primary | ICD-10-CM

## 2018-06-25 ENCOUNTER — OFFICE VISIT (OUTPATIENT)
Dept: URGENT CARE | Facility: CLINIC | Age: 48
End: 2018-06-25
Payer: MEDICAID

## 2018-06-25 ENCOUNTER — ANTI-COAG VISIT (OUTPATIENT)
Dept: CARDIOLOGY | Facility: CLINIC | Age: 48
End: 2018-06-25
Payer: MEDICAID

## 2018-06-25 VITALS
RESPIRATION RATE: 18 BRPM | OXYGEN SATURATION: 98 % | HEIGHT: 63 IN | DIASTOLIC BLOOD PRESSURE: 75 MMHG | HEART RATE: 73 BPM | WEIGHT: 244 LBS | SYSTOLIC BLOOD PRESSURE: 123 MMHG | TEMPERATURE: 99 F | BODY MASS INDEX: 43.23 KG/M2

## 2018-06-25 DIAGNOSIS — Z79.01 LONG TERM (CURRENT) USE OF ANTICOAGULANTS: Primary | ICD-10-CM

## 2018-06-25 DIAGNOSIS — Z79.01 CHRONIC ANTICOAGULATION: ICD-10-CM

## 2018-06-25 DIAGNOSIS — I48.0 PAROXYSMAL ATRIAL FIBRILLATION: ICD-10-CM

## 2018-06-25 DIAGNOSIS — S16.1XXA STRAIN OF NECK MUSCLE, INITIAL ENCOUNTER: Primary | ICD-10-CM

## 2018-06-25 LAB — INR PPP: 2.1 (ref 2–3)

## 2018-06-25 PROCEDURE — 85610 PROTHROMBIN TIME: CPT | Mod: PBBFAC

## 2018-06-25 PROCEDURE — 99214 OFFICE O/P EST MOD 30 MIN: CPT | Mod: S$GLB,,, | Performed by: FAMILY MEDICINE

## 2018-06-25 PROCEDURE — 99211 OFF/OP EST MAY X REQ PHY/QHP: CPT | Mod: S$PBB,25,,

## 2018-06-25 RX ORDER — CYCLOBENZAPRINE HCL 10 MG
10 TABLET ORAL 3 TIMES DAILY PRN
Qty: 30 TABLET | Refills: 0 | Status: SHIPPED | OUTPATIENT
Start: 2018-06-25 | End: 2018-07-05

## 2018-06-25 NOTE — PATIENT INSTRUCTIONS
Neck Sprain or Strain  A sudden force that causes turning or bending of the neck can cause sprain or strain. An example would be the force from a car accident. This can stretch or tear muscles called a strain. It can also stretch or tear ligaments called a sprain. Either of these can cause neck pain. Sometimes neck pain occurs after a simple awkward movement. In either case, muscle spasm is commonly present and contributes to the pain.     Unless you had a forceful physical injury (for example, a car accident or fall), X-rays are usually not ordered for the initial evaluation of neck pain. If pain continues and dose not respond to medical treatment, X-rays and other tests may be performed at a later time.  Home care  · You may feel more soreness and spasm the first few days after the injury. Rest until symptoms begin to improve.  · When lying down, use a comfortable pillow or a rolled towel that supports the head and keeps the spine in a neutral position. The position of the head should not be tilted forward or backward.  · Apply an ice pack over the injured area for 15 to 20 minutes every 3 to 6 hours. You should do this for the first 24 to 48 hours. You can make an ice pack by filling a plastic bag that seals at the top with ice cubes and then wrapping it with a thin towel. After 48 hours, apply heat (warm shower or warm bath) for 15 to 20 minutes several times a day, or alternate ice and heat.  · You may use over-the-counter pain medicine to control pain, unless another pain medicine was prescribed. If you have chronic liver or kidney disease or ever had a stomach ulcer or GI bleeding, talk with your healthcare provider before using these medicines.  · If a soft cervical collar was prescribed, it should be worn only for periods of increased pain. It should not be worn for more than 3 hours a day, or for a period longer than 1 to 2 weeks.  Follow-up care  Follow up with your healthcare provider as directed.  Physical therapy may be needed.  Sometimes fractures dont show up on the first X-ray. Bruises and sprains can sometimes hurt as much as a fracture. These injuries can take time to heal completely. If your symptoms dont improve or they get worse, talk with your healthcare provider. You may need a repeat X-ray or other tests. If X-rays were taken, you will be told of any new findings that may affect your care.  Call 911  Call 911 if you have:  · Neck swelling, difficulty or painful swallowing  · Difficulty breathing  · Chest pain  When to seek medical advice  Call your healthcare provider right away if any of these occur:  · Pain becomes worse or spreads into your arms  · Weakness or numbness in one or both arms  Date Last Reviewed: 11/19/2015  © 5174-3689 [employer]. [address] [place] [address], [place] [address]. All rights reserved. This information is not intended as a substitute for professional medical care. Always follow your healthcare professional's instructions.

## 2018-06-25 NOTE — PROGRESS NOTES
"Subjective:       Patient ID: Laure Ross is a 47 y.o. female.    Vitals:  height is 5' 3" (1.6 m) and weight is 110.7 kg (244 lb). Her temporal temperature is 98.9 °F (37.2 °C). Her blood pressure is 123/75 and her pulse is 73. Her respiration is 18 and oxygen saturation is 98%.     Chief Complaint: Neck Pain    Neck Pain    This is a new problem. The current episode started yesterday. The problem occurs constantly. The problem has been gradually worsening. The pain is associated with nothing. The quality of the pain is described as aching. The pain is at a severity of 10/10. The pain is severe. The symptoms are aggravated by twisting. Pertinent negatives include no chest pain, fever or headaches. She has tried muscle relaxants and acetaminophen for the symptoms. The treatment provided no relief.     Review of Systems   Constitution: Negative for chills and fever.   HENT: Negative for sore throat.    Eyes: Negative for blurred vision.   Cardiovascular: Negative for chest pain.   Respiratory: Negative for shortness of breath.    Skin: Negative for rash.   Musculoskeletal: Positive for joint pain and neck pain. Negative for back pain.   Gastrointestinal: Negative for abdominal pain, diarrhea, nausea and vomiting.   Neurological: Negative for headaches.   Psychiatric/Behavioral: The patient is not nervous/anxious.        Objective:      Physical Exam   Constitutional: She is oriented to person, place, and time. She appears well-developed.   HENT:   Head: Normocephalic.   Nose: Nose normal.   Mouth/Throat: Oropharynx is clear and moist.   Eyes: Conjunctivae and EOM are normal. Pupils are equal, round, and reactive to light.   Neck: Trachea normal. Muscular tenderness present. No spinous process tenderness present. No neck rigidity. Decreased range of motion present.       Cardiovascular: Normal rate and regular rhythm.    Musculoskeletal: She exhibits tenderness.        Cervical back: She exhibits decreased " range of motion, tenderness, pain and spasm. She exhibits no bony tenderness and no swelling.   Neurological: She is alert and oriented to person, place, and time.       Assessment:       1. Strain of neck muscle, initial encounter        Plan:         Strain of neck muscle, initial encounter  -     cyclobenzaprine (FLEXERIL) 10 MG tablet; Take 1 tablet (10 mg total) by mouth 3 (three) times daily as needed for Muscle spasms.  Dispense: 30 tablet; Refill: 0      Patient Instructions     Neck Sprain or Strain  A sudden force that causes turning or bending of the neck can cause sprain or strain. An example would be the force from a car accident. This can stretch or tear muscles called a strain. It can also stretch or tear ligaments called a sprain. Either of these can cause neck pain. Sometimes neck pain occurs after a simple awkward movement. In either case, muscle spasm is commonly present and contributes to the pain.     Unless you had a forceful physical injury (for example, a car accident or fall), X-rays are usually not ordered for the initial evaluation of neck pain. If pain continues and dose not respond to medical treatment, X-rays and other tests may be performed at a later time.  Home care  · You may feel more soreness and spasm the first few days after the injury. Rest until symptoms begin to improve.  · When lying down, use a comfortable pillow or a rolled towel that supports the head and keeps the spine in a neutral position. The position of the head should not be tilted forward or backward.  · Apply an ice pack over the injured area for 15 to 20 minutes every 3 to 6 hours. You should do this for the first 24 to 48 hours. You can make an ice pack by filling a plastic bag that seals at the top with ice cubes and then wrapping it with a thin towel. After 48 hours, apply heat (warm shower or warm bath) for 15 to 20 minutes several times a day, or alternate ice and heat.  · You may use over-the-counter pain  medicine to control pain, unless another pain medicine was prescribed. If you have chronic liver or kidney disease or ever had a stomach ulcer or GI bleeding, talk with your healthcare provider before using these medicines.  · If a soft cervical collar was prescribed, it should be worn only for periods of increased pain. It should not be worn for more than 3 hours a day, or for a period longer than 1 to 2 weeks.  Follow-up care  Follow up with your healthcare provider as directed. Physical therapy may be needed.  Sometimes fractures dont show up on the first X-ray. Bruises and sprains can sometimes hurt as much as a fracture. These injuries can take time to heal completely. If your symptoms dont improve or they get worse, talk with your healthcare provider. You may need a repeat X-ray or other tests. If X-rays were taken, you will be told of any new findings that may affect your care.  Call 911  Call 911 if you have:  · Neck swelling, difficulty or painful swallowing  · Difficulty breathing  · Chest pain  When to seek medical advice  Call your healthcare provider right away if any of these occur:  · Pain becomes worse or spreads into your arms  · Weakness or numbness in one or both arms  Date Last Reviewed: 11/19/2015  © 5220-7718 [employer] [address] [place] [address], [place] [address]. All rights reserved. This information is not intended as a substitute for professional medical care. Always follow your healthcare professional's instructions.

## 2018-06-25 NOTE — PROGRESS NOTES
INR within therapeutic today. Patient reports pain in her neck/shoulder area. She will be going to Urgent care today. She has been taking acetaminophen for pain. She denies any other changes. Will maintain current dose. Patient advised to call coumadin clinic with any changes in medication or any questions.

## 2018-06-30 RX ORDER — LANOLIN ALCOHOL/MO/W.PET/CERES
CREAM (GRAM) TOPICAL 2 TIMES DAILY
Qty: 60 TABLET | Refills: 1 | Status: CANCELLED | OUTPATIENT
Start: 2018-06-30 | End: 2019-06-30

## 2018-07-03 RX ORDER — LANOLIN ALCOHOL/MO/W.PET/CERES
CREAM (GRAM) TOPICAL 2 TIMES DAILY
Qty: 60 TABLET | Refills: 1 | Status: SHIPPED | OUTPATIENT
Start: 2018-07-03 | End: 2018-10-22

## 2018-07-05 DIAGNOSIS — I50.9 CONGESTIVE HEART FAILURE, UNSPECIFIED HF CHRONICITY, UNSPECIFIED HEART FAILURE TYPE: Primary | ICD-10-CM

## 2018-07-05 RX ORDER — LEVALBUTEROL TARTRATE 45 UG/1
AEROSOL, METERED ORAL
Qty: 15 G | Refills: 1 | Status: CANCELLED | OUTPATIENT
Start: 2018-07-05 | End: 2019-07-05

## 2018-07-06 DIAGNOSIS — I50.9 CONGESTIVE HEART FAILURE, UNSPECIFIED HF CHRONICITY, UNSPECIFIED HEART FAILURE TYPE: Primary | ICD-10-CM

## 2018-07-06 RX ORDER — LEVALBUTEROL TARTRATE 45 UG/1
AEROSOL, METERED ORAL
Qty: 15 G | Refills: 1 | Status: CANCELLED | OUTPATIENT
Start: 2018-07-05 | End: 2019-07-05

## 2018-07-06 RX ORDER — DIGOXIN 125 MCG
TABLET ORAL
Qty: 30 TABLET | Refills: 5 | Status: CANCELLED | OUTPATIENT
Start: 2018-07-06

## 2018-07-09 ENCOUNTER — ANTI-COAG VISIT (OUTPATIENT)
Dept: CARDIOLOGY | Facility: CLINIC | Age: 48
End: 2018-07-09
Payer: MEDICAID

## 2018-07-09 DIAGNOSIS — Z79.01 CHRONIC ANTICOAGULATION: ICD-10-CM

## 2018-07-09 DIAGNOSIS — I48.0 PAROXYSMAL ATRIAL FIBRILLATION: ICD-10-CM

## 2018-07-09 DIAGNOSIS — Z79.01 LONG TERM (CURRENT) USE OF ANTICOAGULANTS: Primary | ICD-10-CM

## 2018-07-09 LAB — INR PPP: 1.8 (ref 2–3)

## 2018-07-09 PROCEDURE — 99211 OFF/OP EST MAY X REQ PHY/QHP: CPT | Mod: S$PBB,25,, | Performed by: PHARMACIST

## 2018-07-09 PROCEDURE — 85610 PROTHROMBIN TIME: CPT | Mod: PBBFAC

## 2018-07-09 RX ORDER — DIGOXIN 125 MCG
TABLET ORAL
Qty: 30 TABLET | Refills: 5 | Status: SHIPPED | OUTPATIENT
Start: 2018-07-09 | End: 2018-07-10 | Stop reason: SDUPTHER

## 2018-07-09 NOTE — PROGRESS NOTES
INR subtherapeutic today. Patient reports one missed dose and bruising from use. She denies any bleeding or changes that may affect warfarin therapy. We will boost dose today then resume weekly dose. Patient reminded to call coumadin clinic with any changes or concerns.

## 2018-07-10 DIAGNOSIS — I50.9 CONGESTIVE HEART FAILURE, UNSPECIFIED HF CHRONICITY, UNSPECIFIED HEART FAILURE TYPE: ICD-10-CM

## 2018-07-10 RX ORDER — DIGOXIN 125 MCG
TABLET ORAL
Qty: 30 TABLET | Refills: 5 | Status: SHIPPED | OUTPATIENT
Start: 2018-07-10 | End: 2019-02-07 | Stop reason: SDUPTHER

## 2018-07-20 ENCOUNTER — TELEPHONE (OUTPATIENT)
Dept: ENDOCRINOLOGY | Facility: CLINIC | Age: 48
End: 2018-07-20

## 2018-07-20 DIAGNOSIS — E11.42 TYPE 2 DIABETES MELLITUS WITH DIABETIC POLYNEUROPATHY, WITH LONG-TERM CURRENT USE OF INSULIN: Primary | Chronic | ICD-10-CM

## 2018-07-20 DIAGNOSIS — Z79.4 TYPE 2 DIABETES MELLITUS WITH DIABETIC POLYNEUROPATHY, WITH LONG-TERM CURRENT USE OF INSULIN: Primary | Chronic | ICD-10-CM

## 2018-07-20 NOTE — TELEPHONE ENCOUNTER
----- Message from Kishore Stephens sent at 7/19/2018  5:21 PM CDT -----  Contact: Pt  Pt would like to be called back regarding needing to reschedule appt sara to family emergency.      Pt can be reached at [phone].    Thank You.

## 2018-07-20 NOTE — TELEPHONE ENCOUNTER
Spoke with the pt and reschedule first available per Medicaid insurance. Pt would like to do lab prior to see you at main campus. Please put the lab in for pt. Thanks.

## 2018-07-23 ENCOUNTER — TELEPHONE (OUTPATIENT)
Dept: ELECTROPHYSIOLOGY | Facility: CLINIC | Age: 48
End: 2018-07-23

## 2018-07-23 NOTE — NURSING
Patient removed from [facility] Potential membership list due to stability 10/2016. Potential Folder Discarded, signed paperwork scanned into EMR. Will education patient if [facility] needs arise.

## 2018-07-23 NOTE — TELEPHONE ENCOUNTER
----- Message from Anya Vital sent at 7/23/2018 12:10 PM CDT -----  Contact: pt [phone]  Please call pt to schedule her recall appt with Dr. Jolly and she has a device.    Thanks

## 2018-07-23 NOTE — TELEPHONE ENCOUNTER
Spoke to patient.Returned patient call. Appointment scheduled for follow-up appointment and mailed to patient.

## 2018-07-24 ENCOUNTER — CLINICAL SUPPORT (OUTPATIENT)
Dept: ELECTROPHYSIOLOGY | Facility: CLINIC | Age: 48
End: 2018-07-24
Attending: INTERNAL MEDICINE
Payer: MEDICAID

## 2018-07-24 DIAGNOSIS — Z95.810 PRESENCE OF AUTOMATIC CARDIOVERTER/DEFIBRILLATOR (AICD): ICD-10-CM

## 2018-07-24 PROCEDURE — 93295 DEV INTERROG REMOTE 1/2/MLT: CPT | Mod: ,,, | Performed by: INTERNAL MEDICINE

## 2018-07-24 PROCEDURE — 93296 REM INTERROG EVL PM/IDS: CPT | Mod: PBBFAC | Performed by: INTERNAL MEDICINE

## 2018-07-30 ENCOUNTER — HOSPITAL ENCOUNTER (OUTPATIENT)
Dept: PULMONOLOGY | Facility: CLINIC | Age: 48
Discharge: HOME | End: 2018-07-30
Payer: MEDICAID

## 2018-07-30 ENCOUNTER — ANTI-COAG VISIT (OUTPATIENT)
Dept: CARDIOLOGY | Facility: CLINIC | Age: 48
End: 2018-07-30
Payer: MEDICAID

## 2018-07-30 ENCOUNTER — LAB VISIT (OUTPATIENT)
Dept: LAB | Facility: HOSPITAL | Age: 48
End: 2018-07-30
Payer: MEDICAID

## 2018-07-30 DIAGNOSIS — I42.8 NICM (NONISCHEMIC CARDIOMYOPATHY): ICD-10-CM

## 2018-07-30 DIAGNOSIS — Z79.01 CHRONIC ANTICOAGULATION: Primary | ICD-10-CM

## 2018-07-30 DIAGNOSIS — I48.0 PAROXYSMAL ATRIAL FIBRILLATION: ICD-10-CM

## 2018-07-30 DIAGNOSIS — I44.7 LEFT BUNDLE-BRANCH BLOCK: ICD-10-CM

## 2018-07-30 LAB
ALBUMIN SERPL BCP-MCNC: 3.7 G/DL
ALP SERPL-CCNC: 71 U/L
ALT SERPL W/O P-5'-P-CCNC: 79 U/L
AST SERPL-CCNC: 53 U/L
BILIRUB DIRECT SERPL-MCNC: 0.2 MG/DL
BILIRUB SERPL-MCNC: 0.4 MG/DL
INR PPP: 2.6 (ref 2–3)
PRE FEV1 FVC: 70
PRE FEV1: 1.33
PRE FVC: 1.91
PREDICTED FEV1 FVC: 82
PREDICTED FEV1: 2.66
PREDICTED FVC: 3.23
PROT SERPL-MCNC: 7.3 G/DL
TSH SERPL DL<=0.005 MIU/L-ACNC: 1.29 UIU/ML

## 2018-07-30 PROCEDURE — 94010 BREATHING CAPACITY TEST: CPT | Mod: PBBFAC | Performed by: INTERNAL MEDICINE

## 2018-07-30 PROCEDURE — 80076 HEPATIC FUNCTION PANEL: CPT

## 2018-07-30 PROCEDURE — 94010 BREATHING CAPACITY TEST: CPT | Mod: 26,S$PBB,, | Performed by: INTERNAL MEDICINE

## 2018-07-30 PROCEDURE — 94729 DIFFUSING CAPACITY: CPT | Mod: PBBFAC | Performed by: INTERNAL MEDICINE

## 2018-07-30 PROCEDURE — 99211 OFF/OP EST MAY X REQ PHY/QHP: CPT | Mod: S$PBB,,,

## 2018-07-30 PROCEDURE — 36415 COLL VENOUS BLD VENIPUNCTURE: CPT

## 2018-07-30 PROCEDURE — 84443 ASSAY THYROID STIM HORMONE: CPT

## 2018-07-30 PROCEDURE — 85610 PROTHROMBIN TIME: CPT | Mod: PBBFAC

## 2018-07-30 PROCEDURE — 94729 DIFFUSING CAPACITY: CPT | Mod: 26,S$PBB,, | Performed by: INTERNAL MEDICINE

## 2018-07-30 NOTE — PROGRESS NOTES
Follow-up for low INR 7/9.  INR within range today. Will maintain and follow up in 2 weeks.  Patient with bruises from use, denies any bleeding or other changes.

## 2018-07-31 ENCOUNTER — TELEPHONE (OUTPATIENT)
Dept: CARDIOLOGY | Facility: CLINIC | Age: 48
End: 2018-07-31

## 2018-07-31 DIAGNOSIS — I48.0 PAF (PAROXYSMAL ATRIAL FIBRILLATION): Primary | ICD-10-CM

## 2018-08-03 ENCOUNTER — OFFICE VISIT (OUTPATIENT)
Dept: ELECTROPHYSIOLOGY | Facility: CLINIC | Age: 48
End: 2018-08-03
Payer: MEDICAID

## 2018-08-03 ENCOUNTER — HOSPITAL ENCOUNTER (OUTPATIENT)
Dept: CARDIOLOGY | Facility: CLINIC | Age: 48
Discharge: HOME | End: 2018-08-03
Payer: MEDICAID

## 2018-08-03 VITALS
HEART RATE: 80 BPM | HEIGHT: 64 IN | SYSTOLIC BLOOD PRESSURE: 142 MMHG | DIASTOLIC BLOOD PRESSURE: 66 MMHG | BODY MASS INDEX: 42.49 KG/M2 | WEIGHT: 248.88 LBS

## 2018-08-03 DIAGNOSIS — I44.7 LEFT BUNDLE-BRANCH BLOCK: ICD-10-CM

## 2018-08-03 DIAGNOSIS — I10 ESSENTIAL HYPERTENSION: ICD-10-CM

## 2018-08-03 DIAGNOSIS — G47.33 OSA (OBSTRUCTIVE SLEEP APNEA): ICD-10-CM

## 2018-08-03 DIAGNOSIS — I48.0 PAROXYSMAL ATRIAL FIBRILLATION: ICD-10-CM

## 2018-08-03 DIAGNOSIS — Z95.810 BIVENTRICULAR AUTOMATIC IMPLANTABLE CARDIOVERTER DEFIBRILLATOR IN SITU: Primary | ICD-10-CM

## 2018-08-03 DIAGNOSIS — I48.0 PAF (PAROXYSMAL ATRIAL FIBRILLATION): ICD-10-CM

## 2018-08-03 DIAGNOSIS — I42.8 NICM (NONISCHEMIC CARDIOMYOPATHY): ICD-10-CM

## 2018-08-03 DIAGNOSIS — Z79.899 ON AMIODARONE THERAPY: ICD-10-CM

## 2018-08-03 PROCEDURE — 93010 ELECTROCARDIOGRAM REPORT: CPT | Mod: S$PBB,,, | Performed by: INTERNAL MEDICINE

## 2018-08-03 PROCEDURE — 99999 PR PBB SHADOW E&M-EST. PATIENT-LVL III: CPT | Mod: PBBFAC,,, | Performed by: INTERNAL MEDICINE

## 2018-08-03 PROCEDURE — 93005 ELECTROCARDIOGRAM TRACING: CPT | Mod: PBBFAC | Performed by: INTERNAL MEDICINE

## 2018-08-03 PROCEDURE — 99213 OFFICE O/P EST LOW 20 MIN: CPT | Mod: PBBFAC,25 | Performed by: INTERNAL MEDICINE

## 2018-08-03 PROCEDURE — 99214 OFFICE O/P EST MOD 30 MIN: CPT | Mod: S$PBB,,, | Performed by: INTERNAL MEDICINE

## 2018-08-03 RX ORDER — AMIODARONE HYDROCHLORIDE 200 MG/1
200 TABLET ORAL DAILY
Qty: 60 TABLET | Refills: 12 | Status: SHIPPED | OUTPATIENT
Start: 2018-08-03 | End: 2018-08-29 | Stop reason: ALTCHOICE

## 2018-08-03 NOTE — PROGRESS NOTES
Ms. Ross is a patient of Dr. Jolly and was last seen in clinic 9/6/2017.      Subjective:   Patient ID:  Laure Ross is a 47 y.o. female who presents for follow-up of paroxysmal atrial fibrillation  .     HPI:    Ms. Ross is a 47 y.o. female with NICM (EF 10-20%), CRT-D (2017), pAF, HTN, DM, HLD, CLARENCE, asthma here for annual follow up.     Background:    NICM 10-20%. R sided dual-chamber ICD -> biV upgrade 5/17.  pHTN  PAF, on amio since BRITTANEY/DCCV 4/16  HTN DM HL CLARENCE asthma    ADHF 2/16, requiring IABP. Had home  for a while; now off.   OHT noncandidate per Dr Jules. She has continued to have NYHA II sx, but much better since hospital d/c and better yet since biV upgrade 5/17.    Update (08/03/2018):    Today she reports some chronic SOB that is asthma-related. No new palpitations, chest pain, light-headedness, or syncope. She says that she has noticed that her sleep has not been as restful as before - she is compliant with CPAP.    She is currently taking coumadin for stroke prophylaxis and denies significant bleeding episodes. She is currently being treated with amiodarone 400mg for rhythm control and digoxin 125mcg for HR control.  Kidney function is stable, with a creatinine of 1.2 on 4/7/2018. (BP is up today but she didn't take any of her meds because she wasn't sure if she needed fasting labs.) Hepatic transaminases are elevated (~1.5 ULN). PFTs on 7/31/2018 show a DLCO ratio of 85. TSH 7/30/2018 is WNL.    Device Interrogation (7/24/2018) reveals  stable lead and device function. AF <1%. She paces 0% in the RA and 100% in the BiV. Estimated battery longevity 8 years.     I have personally reviewed the patient's EKG today, which shows BiV paced rhythm at 80bpm. MI interval is 114. QTc is 532.    Recent Cardiac Tests:    2D Echo (3/28/2018):  CONCLUSIONS     1 - Severe left atrial enlargement.     2 - Eccentric hypertrophy.     3 - Moderately depressed left ventricular systolic function (EF 30-35%).  "    4 - Quantitatively measured LV function is 33%.     5 - Impaired LV relaxation, elevated LAP (grade 2 diastolic dysfunction).     6 - Normal right ventricular systolic function .     7 - The estimated PA systolic pressure is 36 mmHg.     8 - Mild mitral regurgitation.     Current Outpatient Prescriptions   Medication Sig    amiodarone (PACERONE) 200 MG Tab TAKE TWO TABLETS BY MOUTH EVERY DAY    BD ULTRA-FINE ESSENCE PEN NEEDLES 32 gauge x 5/32" Ndle Takes 5 times  day    blood sugar diagnostic (TRUE METRIX GLUCOSE TEST STRIP) Strp Use to test two times a day    blood sugar diagnostic Strp Uses 4 times a day, true metrix meter.    cetirizine (ZYRTEC) 10 MG tablet Take 1 tablet (10 mg total) by mouth once daily.    digoxin (LANOXIN) 125 mcg tablet TAKE 1 TABLET BY MOUTH ONCE DAILY    furosemide (LASIX) 40 MG tablet TAKE 2 TABLETS BY MOUTH TWO TIMES A DAY    insulin aspart U-100 (NOVOLOG FLEXPEN U-100 INSULIN) 100 unit/mL InPn pen Inject 16 units w/ meals plus scale 150-200 +2, 201-250 +4, 251-300 +6, 301-350 +8.    insulin aspart U-100 (NOVOLOG FLEXPEN U-100 INSULIN) 100 unit/mL InPn pen inject 16 units subcutaneously with meals    insulin glargine (BASAGLAR KWIKPEN U-100 INSULIN) 100 unit/mL (3 mL) InPn pen Inject 34 Units into the skin 2 (two) times daily.    ketoconazole (NIZORAL) 2 % cream Apply topically once daily.    lancets 30 gauge Misc use 4 times a day    levalbuterol (XOPENEX HFA) 45 mcg/actuation inhaler INHALE 1 TO 2 PUFFS BY MOUTH EVERY 4 TO 6 HOURS AS NEEDED.    levalbuterol (XOPENEX) 1.25 mg/3 mL nebulizer solution INHALE 1 VIAL PER NEBULIZER EVERY 6 TO 8 HOURS AS NEEDED    lisinopril (PRINIVIL,ZESTRIL) 5 MG tablet TAKE 1 TABLET BY MOUTH TWO TIMES A DAY    magnesium oxide (MAG-OX) 400 mg tablet TAKE 1 TABLET BY MOUTH TWICE DAILY    montelukast (SINGULAIR) 10 mg tablet Take 1 tablet (10 mg total) by mouth once daily.    omeprazole (PRILOSEC) 40 MG capsule TAKE 1 CAPSULE BY MOUTH " "EVERY MORNING    pen needle, diabetic 32 gauge x 5/32" Ndle USE AS DIRECTED 5 TIMES DAILY    potassium chloride (MICRO-K) 10 MEQ CpSR Take 2 capsules (20 mEq total) by mouth once daily.    ranitidine (ZANTAC) 300 MG tablet Take 1 tablet by mouth every evening    spironolactone (ALDACTONE) 25 MG tablet Take 1 tablet (25 mg total) by mouth once daily.    triamcinolone acetonide 0.1% (KENALOG) 0.1 % cream Apply topically 2 (two) times daily. Apply twice a day for 7 days to arms.    vitamin D 1000 units Tab Take 2 tablets (2,000 Units total) by mouth once daily.    warfarin (COUMADIN) 7.5 MG tablet Take ½ tablet by mouth once daily and 1 tablet on thursday    azelastine (ASTELIN) 137 mcg (0.1 %) nasal spray 2 sprays (274 mcg total) by Nasal route 2 (two) times daily.    budesonide-formoterol 160-4.5 mcg (SYMBICORT) 160-4.5 mcg/actuation HFAA Inhale 2 puffs into the lungs every 12 (twelve) hours.    levalbuterol (XOPENEX HFA) 45 mcg/actuation inhaler INHALE 1 TO 2 PUFFS BY MOUTH EVERY 4 - 6 HOURS AS NEEDED     No current facility-administered medications for this visit.        Review of Systems   Constitution: Negative for malaise/fatigue.   Cardiovascular: Negative for chest pain, dyspnea on exertion, irregular heartbeat, leg swelling and palpitations.   Respiratory: Positive for shortness of breath.    Hematologic/Lymphatic: Negative for bleeding problem.   Skin: Negative for rash.   Musculoskeletal: Negative for myalgias.   Gastrointestinal: Negative for hematemesis, hematochezia and nausea.   Genitourinary: Negative for hematuria.   Neurological: Negative for light-headedness.   Psychiatric/Behavioral: Negative for altered mental status.   Allergic/Immunologic: Negative for persistent infections.     Objective:        BP (!) 142/66   Pulse 80   Ht 5' 4" (1.626 m)   Wt 112.9 kg (248 lb 14.4 oz)   BMI 42.72 kg/m²     Physical Exam   Constitutional: She is oriented to person, place, and time. She appears " well-developed and well-nourished.   HENT:   Head: Normocephalic.   Nose: Nose normal.   Eyes: Pupils are equal, round, and reactive to light.   Cardiovascular: Normal rate, regular rhythm, S1 normal and S2 normal.    No murmur heard.  Pulses:       Radial pulses are 2+ on the right side, and 2+ on the left side.   Pulmonary/Chest: Breath sounds normal. No respiratory distress.   Device to RUCW   Abdominal: Normal appearance.   Musculoskeletal: Normal range of motion. She exhibits no edema.   Neurological: She is alert and oriented to person, place, and time.   Skin: Skin is warm and dry. No erythema.   Psychiatric: She has a normal mood and affect. Her speech is normal and behavior is normal.   Nursing note and vitals reviewed.    Lab Results   Component Value Date     04/17/2018    K 4.0 04/17/2018    MG 2.0 03/28/2018    BUN 15 04/17/2018    CREATININE 1.2 04/17/2018    ALT 79 (H) 07/30/2018    AST 53 (H) 07/30/2018    HGB 12.3 04/17/2018    HCT 39.4 04/17/2018    TSH 1.287 07/30/2018    LDLCALC 122.6 04/27/2016         Recent Labs  Lab 06/04/18  1127 06/25/18  1125 07/09/18  1139 07/30/18  1201   INR 2.5 H 2.1 1.8 2.6           Assessment:       1. Biventricular automatic implantable cardioverter defibrillator in situ    2. NICM (nonischemic cardiomyopathy)    3. Left bundle-branch block    4. Paroxysmal atrial fibrillation    5. Essential hypertension    6. CLARENCE (obstructive sleep apnea)    7. Class 3 obesity with serious comorbidity and body mass index (BMI) of 40.0 to 44.9 in adult, unspecified obesity type      Plan:     In summary, Ms. Ross is a 47 y.o. female with NICM (EF 10-20%), CRT-D (2017), pAF, HTN, DM, HLD, CLARENCE, asthma here for annual follow up. Ms. Ross is doing well from a device perspective with stable lead and device function. AF burden <1% . While long term use of amiodarone is not idea in a patient her age, her history of CHF and her pre-CRT LBBB limit her options - will decrease  amiodarone to 200mg daily. 100% BiV pacing with an EF that has increased to 30-35% (from 10% in 2016). She is reporting less restful sleep and was encouraged to contact sleep medicine to have her CPAP adjusted if needed. Liver enzymes moderately elevated - will continue to monitor. PFTs, TSH all WNL.    Decrease amiodarone to 200mg daily.  Continue current device settings.   Follow up in device clinic as scheduled.   Follow up in EP clinic in 1 year with echo, PFTs, LFTs, and TSH, sooner as needed.     *Case was discussed with Dr. Jolly, who also counseled the patient.*    Follow-up in about 1 year (around 8/3/2019).    ------------------------------------------------------------------    THANIA East, NP-C  [facility]

## 2018-08-06 ENCOUNTER — OFFICE VISIT (OUTPATIENT)
Dept: PODIATRY | Facility: CLINIC | Age: 48
End: 2018-08-06
Payer: MEDICAID

## 2018-08-06 VITALS
WEIGHT: 246 LBS | BODY MASS INDEX: 42 KG/M2 | RESPIRATION RATE: 18 BRPM | DIASTOLIC BLOOD PRESSURE: 71 MMHG | HEIGHT: 64 IN | SYSTOLIC BLOOD PRESSURE: 124 MMHG | HEART RATE: 79 BPM

## 2018-08-06 DIAGNOSIS — B35.1 ONYCHOMYCOSIS DUE TO DERMATOPHYTE: ICD-10-CM

## 2018-08-06 DIAGNOSIS — L84 CORN OR CALLUS: ICD-10-CM

## 2018-08-06 DIAGNOSIS — M20.41 HAMMER TOES OF BOTH FEET: ICD-10-CM

## 2018-08-06 DIAGNOSIS — M20.42 HAMMER TOES OF BOTH FEET: ICD-10-CM

## 2018-08-06 DIAGNOSIS — L84 PRE-ULCERATIVE CALLUSES: ICD-10-CM

## 2018-08-06 DIAGNOSIS — E11.49 TYPE II DIABETES MELLITUS WITH NEUROLOGICAL MANIFESTATIONS: Primary | ICD-10-CM

## 2018-08-06 PROCEDURE — 99999 PR PBB SHADOW E&M-EST. PATIENT-LVL III: CPT | Mod: PBBFAC,,, | Performed by: PODIATRIST

## 2018-08-06 PROCEDURE — 99213 OFFICE O/P EST LOW 20 MIN: CPT | Mod: PBBFAC,PN | Performed by: PODIATRIST

## 2018-08-06 PROCEDURE — 11721 DEBRIDE NAIL 6 OR MORE: CPT | Mod: PBBFAC,PN | Performed by: PODIATRIST

## 2018-08-06 PROCEDURE — 11721 DEBRIDE NAIL 6 OR MORE: CPT | Mod: S$PBB,59,Q9, | Performed by: PODIATRIST

## 2018-08-06 PROCEDURE — 11056 PARNG/CUTG B9 HYPRKR LES 2-4: CPT | Mod: PBBFAC,PN | Performed by: PODIATRIST

## 2018-08-06 PROCEDURE — 99499 UNLISTED E&M SERVICE: CPT | Mod: S$PBB,,, | Performed by: PODIATRIST

## 2018-08-06 NOTE — PROCEDURES
Routine Foot Care  Date/Time: 8/6/2018 1:07 PM  Performed by: MARISA ADAMS  Authorized by: MARISA ADAMS     Consent Done?:  Yes (Verbal)  Hyperkeratotic Skin Lesions?: Yes    Number of trimmed lesions:  2  Location(s):  Left Plantar and Right Plantar    Nail Care Type:  Debride  Location(s): All  (Left 1st Toe, Left 3rd Toe, Left 2nd Toe, Left 4th Toe, Left 5th Toe, Right 1st Toe, Right 2nd Toe, Right 3rd Toe, Right 4th Toe and Right 5th Toe)  Patient tolerance:  Patient tolerated the procedure well with no immediate complications

## 2018-08-06 NOTE — PROGRESS NOTES
Subjective:      Patient ID: Laure Ross is a 47 y.o. female.    Chief Complaint: Diabetes Mellitus (corns and callouses pcp Dr. Mittal last appt 5/2018)    Laure is a 47 y.o. female who presents to the clinic for evaluation and treatment of high risk feet. Laure has a past medical history of Anticoagulant long-term use; Arthritis; Asthma; Asthma; Cardiomyopathy; CHF (congestive heart failure); CHF (congestive heart failure); COPD (chronic obstructive pulmonary disease) (3/28/2018); GERD (gastroesophageal reflux disease); H/O tubal ligation; Hypertension; Obesity; Renal disorder; Type 2 diabetes mellitus with hyperglycemia; and Type 2 diabetes mellitus with polyneuropathy. Here for routine DM foot exam, nail and callus trimming. This routine trimming helps prevent painful symptoms.States she received prescription for diabetic shoes on her last visit however has misplaced the prescription. Has hx of healed foot ulcer right foot. No problems with wounds. Has numbness to both feet. No pain currently. No other pedal issues today. This patient has documented high risk feet requiring routine maintenance secondary to peripheral neuropathy.      PCP: Holly Mittal MD    Chief Complaint   Patient presents with    Diabetes Mellitus     corns and callouses pcp Dr. Mittal last appt 5/2018         Current shoe gear:  DM shoes, inserts    Hemoglobin A1C   Date Value Ref Range Status   03/28/2018 6.2 (H) 4.0 - 5.6 % Final     Comment:     According to ADA guidelines, hemoglobin A1c <7.0% represents  optimal control in non-pregnant diabetic patients. Different  metrics may apply to specific patient populations.   Standards of Medical Care in Diabetes-2016.  For the purpose of screening for the presence of diabetes:  <5.7%     Consistent with the absence of diabetes  5.7-6.4%  Consistent with increasing risk for diabetes   (prediabetes)  >or=6.5%  Consistent with diabetes  Currently, no consensus exists for use of  hemoglobin A1c  for diagnosis of diabetes for children.  This Hemoglobin A1c assay has significant interference with fetal   hemoglobin   (HbF). The results are invalid for patients with abnormal amounts of   HbF,   including those with known Hereditary Persistence   of Fetal Hemoglobin. Heterozygous hemoglobin variants (HbAS, HbAC,   HbAD, HbAE, HbA2) do not significantly interfere with this assay;   however, presence of multiple variants in a sample may impact the %   interference.     03/17/2017 6.1 4.5 - 6.2 % Final     Comment:     According to ADA guidelines, hemoglobin A1C <7.0% represents  optimal control in non-pregnant diabetic patients.  Different  metrics may apply to specific populations.   Standards of Medical Care in Diabetes - 2016.  For the purpose of screening for the presence of diabetes:  <5.7%     Consistent with the absence of diabetes  5.7-6.4%  Consistent with increasing risk for diabetes   (prediabetes)  >or=6.5%  Consistent with diabetes  Currently no consensus exists for use of hemoglobin A1C  for diagnosis of diabetes for children.     11/23/2016 6.1 4.5 - 6.2 % Final     Comment:     According to ADA guidelines, hemoglobin A1C <7.0% represents  optimal control in non-pregnant diabetic patients.  Different  metrics may apply to specific populations.   Standards of Medical Care in Diabetes - 2016.  For the purpose of screening for the presence of diabetes:  <5.7%     Consistent with the absence of diabetes  5.7-6.4%  Consistent with increasing risk for diabetes   (prediabetes)  >or=6.5%  Consistent with diabetes  Currently no consensus exists for use of hemoglobin A1C  for diagnosis of diabetes for children.         Review of Systems   Constitution: Negative for chills, fever and malaise/fatigue.   Cardiovascular: Positive for leg swelling. Negative for chest pain, orthopnea and palpitations.   Respiratory: Negative for cough, shortness of breath and wheezing.    Skin: Positive for color  change, dry skin, nail changes and suspicious lesions (calluses/corns). Negative for itching, poor wound healing and rash.   Musculoskeletal: Negative for arthritis, gout, joint pain, joint swelling, muscle weakness and myalgias.   Neurological: Positive for numbness, paresthesias and sensory change. Negative for disturbances in coordination, dizziness, focal weakness and tremors.           Objective:      Physical Exam   Cardiovascular:   Dorsalis pedis and posterior tibial pulses are diminished Bilaterally. Toes are cool to touch. Feet are warm proximally.There is decreased digital hair. Skin is atrophic, slightly hyperpigmented, and mildly edematous       Musculoskeletal:   Musculoskeletal:  Muscle strength is 5/5 in all groups bilaterally.  No pain with ankle, STJ or MTPJ ROM, bilat. Ankle dorsiflexion is limited with knees extended and flexed, bilat.  Semi-reducible hammertoe contractures noted to toes 2-4 b/l-asymptomatic.    Neurological:   [place] 5.07 monofilamant testing is diminished both feet. Sharp/dull sensation diminished Bilaterally. Light touch absent Bilaterally.       Skin: Lesion noted. No bruising and no ecchymosis noted. No erythema.   No open lesions, lacerations or wounds noted. Nails are thickened, elongated, discolored yellow/brown with subungual debris and brittleness to R 1-5 and L 1-5. Interdigital spaces clean, dry and intact b/l. No erythema noted to b/l foot. Skin texture thin, atrophic, dry. Pedal hair diminished b/l.     Hyperkeratotic lesion noted to b/l plantar 2nd met heads with subdermal hematoma, dry with no open wound noted today. No SOI. (pre ulcerative)                        Assessment:       Encounter Diagnoses   Name Primary?    Type II diabetes mellitus with neurological manifestations Yes    Onychomycosis due to dermatophyte     Corn or callus     Hammer toes of both feet     Pre-ulcerative calluses          Plan:       Laure was seen today for diabetes  mellitus.    Diagnoses and all orders for this visit:    Type II diabetes mellitus with neurological manifestations  -     DIABETIC SHOES FOR HOME USE    Onychomycosis due to dermatophyte  -     Foot Care    Corn or callus  -     DIABETIC SHOES FOR HOME USE  -     Foot Care    Hammer toes of both feet  -     DIABETIC SHOES FOR HOME USE    Pre-ulcerative calluses  -     DIABETIC SHOES FOR HOME USE      I counseled the patient on her conditions, their implications and medical management.    - Shoe inspection. Diabetic Foot Education. Patient reminded of the importance of good nutrition and blood sugar control to help prevent podiatric complications of diabetes. Patient instructed on proper foot hygeine. We discussed wearing proper shoe gear, daily foot inspections, never walking without protective shoe gear, caution putting sharp instruments to feet     - Discussed DM foot care:  Wear comfortable, proper fitting shoes. Wash feet daily. Dry well. After drying, apply moisturizer to feet (no lotion to webspaces). Inspect feet daily for skin breaks, blisters, swelling, or redness. Wear cotton socks (preferably white)  Change socks every day. Do NOT walk barefoot. Do NOT use heating pads or warm/hot water soaks     See routine foot care note for nail debridement and callus trimming.     Discussed regular and routine moisturizer to skin of both feet to help improve dry skin. Advised to apply twice daily until resolution of symptoms. Avoid between toes.     Rx diabetic shoes with custom molded inserts to be worn at all times while ambulating. Prescription provided with list of local retailers.     I warned patient of any new wounds opening as well as signs and symptoms of infection including redness, drainage, purulence, odor, streaking, fever, chills, etc and I advised her to seek medical attention (ER or urgent car) if these symptoms arise.     RTC 3 months, sooner KALIE Cormier DPM

## 2018-08-09 ENCOUNTER — TELEPHONE (OUTPATIENT)
Dept: NEPHROLOGY | Facility: CLINIC | Age: 48
End: 2018-08-09

## 2018-08-09 NOTE — TELEPHONE ENCOUNTER
----- Message from Jeana Nolen sent at 8/9/2018  3:07 PM CDT -----  Contact: Self [phone]  PT has swollen feet and is having a hard time urinating.   She is requesting an appointment.

## 2018-08-10 DIAGNOSIS — I42.0 CARDIOMYOPATHY, DILATED, NONISCHEMIC: ICD-10-CM

## 2018-08-10 RX ORDER — FUROSEMIDE 40 MG/1
TABLET ORAL
Qty: 120 TABLET | Refills: 3 | Status: CANCELLED | OUTPATIENT
Start: 2018-08-10

## 2018-08-10 RX ORDER — POTASSIUM CHLORIDE 750 MG/1
20 CAPSULE, EXTENDED RELEASE ORAL DAILY
Qty: 60 CAPSULE | Refills: 3 | Status: CANCELLED | OUTPATIENT
Start: 2018-08-10

## 2018-08-11 DIAGNOSIS — E11.42 TYPE 2 DIABETES MELLITUS WITH DIABETIC POLYNEUROPATHY: ICD-10-CM

## 2018-08-11 RX ORDER — INSULIN ASPART 100 [IU]/ML
INJECTION, SOLUTION INTRAVENOUS; SUBCUTANEOUS
Qty: 15 ML | Refills: 0 | Status: CANCELLED | OUTPATIENT
Start: 2018-08-11

## 2018-08-14 DIAGNOSIS — I42.0 CARDIOMYOPATHY, DILATED, NONISCHEMIC: ICD-10-CM

## 2018-08-14 RX ORDER — FUROSEMIDE 40 MG/1
80 TABLET ORAL 2 TIMES DAILY
Qty: 120 TABLET | Refills: 6 | Status: SHIPPED | OUTPATIENT
Start: 2018-08-14 | End: 2018-08-14 | Stop reason: SDUPTHER

## 2018-08-14 RX ORDER — POTASSIUM CHLORIDE 750 MG/1
20 CAPSULE, EXTENDED RELEASE ORAL DAILY
Qty: 60 CAPSULE | Refills: 6 | Status: SHIPPED | OUTPATIENT
Start: 2018-08-14 | End: 2018-08-30 | Stop reason: SDUPTHER

## 2018-08-15 DIAGNOSIS — E11.42 TYPE 2 DIABETES MELLITUS WITH DIABETIC POLYNEUROPATHY: ICD-10-CM

## 2018-08-15 DIAGNOSIS — K21.9 GASTROESOPHAGEAL REFLUX DISEASE, ESOPHAGITIS PRESENCE NOT SPECIFIED: ICD-10-CM

## 2018-08-15 RX ORDER — FUROSEMIDE 40 MG/1
80 TABLET ORAL 2 TIMES DAILY
Qty: 120 TABLET | Refills: 6 | Status: SHIPPED | OUTPATIENT
Start: 2018-08-15 | End: 2019-07-24 | Stop reason: SDUPTHER

## 2018-08-15 RX ORDER — INSULIN ASPART 100 [IU]/ML
INJECTION, SOLUTION INTRAVENOUS; SUBCUTANEOUS
Qty: 15 ML | Refills: 0 | Status: CANCELLED | OUTPATIENT
Start: 2018-08-11

## 2018-08-15 RX ORDER — OMEPRAZOLE 40 MG/1
CAPSULE, DELAYED RELEASE ORAL
Qty: 30 CAPSULE | Refills: 6 | Status: CANCELLED | OUTPATIENT
Start: 2018-08-15

## 2018-08-16 DIAGNOSIS — E11.42 TYPE 2 DIABETES MELLITUS WITH DIABETIC POLYNEUROPATHY: ICD-10-CM

## 2018-08-16 RX ORDER — INSULIN ASPART 100 [IU]/ML
INJECTION, SOLUTION INTRAVENOUS; SUBCUTANEOUS
Qty: 15 ML | Refills: 0 | Status: CANCELLED | OUTPATIENT
Start: 2018-08-11

## 2018-08-17 ENCOUNTER — OFFICE VISIT (OUTPATIENT)
Dept: URGENT CARE | Facility: CLINIC | Age: 48
End: 2018-08-17
Payer: MEDICAID

## 2018-08-17 VITALS
SYSTOLIC BLOOD PRESSURE: 124 MMHG | OXYGEN SATURATION: 97 % | HEART RATE: 74 BPM | TEMPERATURE: 97 F | BODY MASS INDEX: 42 KG/M2 | DIASTOLIC BLOOD PRESSURE: 71 MMHG | WEIGHT: 246 LBS | HEIGHT: 64 IN | RESPIRATION RATE: 16 BRPM

## 2018-08-17 DIAGNOSIS — M54.6 ACUTE RIGHT-SIDED THORACIC BACK PAIN: Primary | ICD-10-CM

## 2018-08-17 LAB
BILIRUB UR QL STRIP: NEGATIVE
GLUCOSE UR QL STRIP: NEGATIVE
KETONES UR QL STRIP: NEGATIVE
LEUKOCYTE ESTERASE UR QL STRIP: NEGATIVE
PH, POC UA: 5 (ref 5–8)
POC BLOOD, URINE: POSITIVE
POC NITRATES, URINE: NEGATIVE
PROT UR QL STRIP: NEGATIVE
SP GR UR STRIP: 1 (ref 1–1.03)
UROBILINOGEN UR STRIP-ACNC: NORMAL (ref 0.1–1.1)

## 2018-08-17 PROCEDURE — 99214 OFFICE O/P EST MOD 30 MIN: CPT | Mod: 25,S$GLB,, | Performed by: SURGERY

## 2018-08-17 PROCEDURE — 81003 URINALYSIS AUTO W/O SCOPE: CPT | Mod: QW,S$GLB,, | Performed by: SURGERY

## 2018-08-17 RX ORDER — DEXAMETHASONE SODIUM PHOSPHATE 100 MG/10ML
5 INJECTION INTRAMUSCULAR; INTRAVENOUS ONCE
Status: COMPLETED | OUTPATIENT
Start: 2018-08-17 | End: 2018-08-17

## 2018-08-17 RX ORDER — HYDROCODONE BITARTRATE AND ACETAMINOPHEN 7.5; 325 MG/1; MG/1
1 TABLET ORAL EVERY 6 HOURS PRN
Qty: 12 TABLET | Refills: 0 | Status: SHIPPED | OUTPATIENT
Start: 2018-08-17 | End: 2018-08-20

## 2018-08-17 RX ADMIN — DEXAMETHASONE SODIUM PHOSPHATE 5 MG: 100 INJECTION INTRAMUSCULAR; INTRAVENOUS at 06:08

## 2018-08-17 NOTE — PROGRESS NOTES
"Subjective:       Patient ID: Laure Ross is a 47 y.o. female.    Vitals:  height is 5' 4" (1.626 m) and weight is 111.6 kg (246 lb). Her temperature is 97.3 °F (36.3 °C). Her blood pressure is 124/71 and her pulse is 74. Her respiration is 16 and oxygen saturation is 97%.     Chief Complaint: Back Pain    Pt comes in today c/o pain in the RIGHT mid back. Reports worse when bending.      Back Pain   This is a new problem. The current episode started in the past 7 days. The problem occurs intermittently. The problem has been waxing and waning since onset. The pain is present in the thoracic spine. The symptoms are aggravated by bending and twisting. Pertinent negatives include no abdominal pain, bladder incontinence, bowel incontinence, chest pain, dysuria, fever, headaches or numbness.     Review of Systems   Constitution: Negative for chills, fever and malaise/fatigue.   HENT: Negative for congestion, ear pain, hoarse voice and sore throat.    Eyes: Negative for discharge and redness.   Cardiovascular: Negative for chest pain, dyspnea on exertion and leg swelling.   Respiratory: Negative for cough, shortness of breath, sputum production and wheezing.    Skin: Negative for rash.   Musculoskeletal: Positive for back pain. Negative for muscle cramps, muscle weakness, myalgias and stiffness.   Gastrointestinal: Negative for abdominal pain, bowel incontinence and nausea.   Genitourinary: Negative for bladder incontinence, dysuria, hematuria and urgency.   Neurological: Negative for disturbances in coordination, headaches and numbness.       Objective:      Physical Exam   Constitutional: She is oriented to person, place, and time. Vital signs are normal. She appears well-developed and well-nourished. She is active and cooperative. No distress.   HENT:   Head: Normocephalic and atraumatic.   Nose: Nose normal.   Mouth/Throat: Oropharynx is clear and moist and mucous membranes are normal.   Eyes: Conjunctivae and " lids are normal.   Neck: Trachea normal, normal range of motion, full passive range of motion without pain and phonation normal. Neck supple.   Cardiovascular: Normal rate, regular rhythm, normal heart sounds, intact distal pulses and normal pulses.   Pulmonary/Chest: Effort normal and breath sounds normal.   Abdominal: Soft. Normal appearance and bowel sounds are normal. She exhibits no abdominal bruit, no pulsatile midline mass and no mass.   Musculoskeletal: She exhibits no edema or deformity.        Thoracic back: She exhibits decreased range of motion and tenderness.        Back:    Neurological: She is alert and oriented to person, place, and time. She has normal strength and normal reflexes. No sensory deficit.   Skin: Skin is warm, dry and intact. She is not diaphoretic.   Psychiatric: She has a normal mood and affect. Her speech is normal and behavior is normal. Judgment and thought content normal. Cognition and memory are normal.   Nursing note and vitals reviewed.      Assessment:       1. Acute right-sided thoracic back pain        Plan:         Acute right-sided thoracic back pain  -     XR CHEST PA AND LATERAL; Future; Expected date: 08/17/2018  -     POCT Urinalysis, Dipstick, Automated, W/O Scope  -     dexamethasone injection 5 mg; Inject 0.5 mLs (5 mg total) into the muscle once.  -     HYDROcodone-acetaminophen (NORCO) 7.5-325 mg per tablet; Take 1 tablet by mouth every 6 (six) hours as needed for Pain.  Dispense: 12 tablet; Refill: 0      Results for orders placed or performed in visit on 08/17/18   POCT Urinalysis, Dipstick, Automated, W/O Scope   Result Value Ref Range    POC Blood, Urine Positive (A) Negative    POC Bilirubin, Urine Negative Negative    POC Urobilinogen, Urine normal 0.1 - 1.1    POC Ketones, Urine Negative Negative    POC Protein, Urine Negative Negative    POC Nitrates, Urine Negative Negative    POC Glucose, Urine Negative Negative    pH, UA 5.0 5 - 8    POC Specific  Gravity, Urine 1.005 1.003 - 1.029    POC Leukocytes, Urine Negative Negative           Xr Chest Pa And Lateral    Result Date: 8/17/2018  EXAMINATION: XR CHEST PA AND LATERAL CLINICAL HISTORY: Dorsalgia, unspecified TECHNIQUE: PA and lateral views of the chest were performed. COMPARISON: CT thorax 06/04/2018 and chest radiograph 04/17/2018 FINDINGS: Right chest multi lead cardiac device appears stable.  Cardiomediastinal silhouette is stable without evidence of failure.  Pulmonary vasculature and hilar regions are within normal limits.  Grossly stable lobulated opacity projected over the right upper lung zone likely corresponding to suspected pulmonary AVM seen on prior CT, and grossly stable small calcified granuloma projected over the left midlung zone.  Otherwise, the lungs are symmetrically well expanded without large consolidation or definite new focal opacity.  No pleural effusion or pneumothorax.  No acute osseous process seen.  Resolution is somewhat limited by body habitus with underpenetration.     Cardiac device without detrimental change or radiographic acute intrathoracic process seen. Grossly stable chronic findings as above. Electronically signed by: Momo Ricardo MD Date:    08/17/2018 Time:    18:23

## 2018-08-17 NOTE — PATIENT INSTRUCTIONS
Back Pain (Acute or Chronic)    Back pain is one of the most common problems. The good news is that most people feel better in 1 to 2 weeks, and most of the rest in 1 to 2 months. Most people can remain active.  People experience and describe pain differently; not everyone is the same.  · The pain can be sharp, stabbing, shooting, aching, cramping or burning.  · Movement, standing, bending, lifting, sitting, or walking may worsen pain.  · It can be localized to one spot or area, or it can be more generalized.  · It can spread or radiate upwards, to the front, or go down your arms or legs (sciatica).  · It can cause muscle spasm.  Most of the time, mechanical problems with the muscles or spine cause the pain. Mechanical problems are usually caused by an injury to the muscles or ligaments. While illness can cause back pain, it is usually not caused by a serious illness. Mechanical problems include:   · Physical activity such as sports, exercise, work, or normal activity  · Overexertion, lifting, pushing, pulling incorrectly or too aggressively  · Sudden twisting, bending, or stretching from an accident, or accidental movement  · Poor posture  · Stretching or moving wrong, without noticing pain at the time  · Poor coordination, lack of regular exercise (check with your doctor about this)  · Spinal disc disease or arthritis  · Stress  Pain can also be related to pregnancy, or illness like appendicitis, bladder or kidney infections, pelvic infections, and many other things.  Acute back pain usually gets better in 1 to 2 weeks. Back pain related to disk disease, arthritis in the spinal joints or spinal stenosis (narrowing of the spinal canal) can become chronic and last for months or years.  Unless you had a physical injury (for example, a car accident or fall) X-rays are usually not needed for the initial evaluation of back pain. If pain continues and does not respond to medical treatment, X-rays and other tests may be  needed.  Home care  Try these home care recommendations:  · When in bed, try to find a position of comfort. A firm mattress is best. Try lying flat on your back with pillows under your knees. You can also try lying on your side with your knees bent up towards your chest and a pillow between your knees.  · At first, do not try to stretch out the sore spots. If there is a strain, it is not like the good soreness you get after exercising without an injury. In this case, stretching may make it worse.  · Avoid prolong sitting, long car rides, or travel. This puts more stress on the lower back than standing or walking.  · During the first 24 to 72 hours after an acute injury or flare up of chronic back pain, apply an ice pack to the painful area for 20 minutes and then remove it for 20 minutes. Do this over a period of 60 to 90 minutes or several times a day. This will reduce swelling and pain. Wrap the ice pack in a thin towel or plastic to protect your skin.  · You can start with ice, then switch to heat. Heat (hot shower, hot bath, or heating pad) reduces pain and works well for muscle spasms. Heat can be applied to the painful area for 20 minutes then remove it for 20 minutes. Do this over a period of 60 to 90 minutes or several times a day. Do not sleep on a heating pad. It can lead to skin burns or tissue damage.  · You can alternate ice and heat therapy. Talk with your doctor about the best treatment for your back pain.  · Therapeutic massage can help relax the back muscles without stretching them.  · Be aware of safe lifting methods and do not lift anything without stretching first.  Medicines  Talk to your doctor before using medicine, especially if you have other medical problems or are taking other medicines.  · You may use over-the-counter medicine as directed on the bottle to control pain, unless another pain medicine was prescribed. If you have chronic conditions like diabetes, liver or kidney disease,  stomach ulcers, or gastrointestinal bleeding, or are taking blood thinners, talk to your doctor before taking any medicine.  · Be careful if you are given a prescription medicines, narcotics, or medicine for muscle spasms. They can cause drowsiness, affect your coordination, reflexes, and judgement. Do not drive or operate heavy machinery.  Follow-up care  Follow up with your healthcare provider, or as advised.   A radiologist will review any X-rays that were taken. Your provide will notify you of any new findings that may affect your care.  Call 911  Call emergency services if any of the following occur:  · Trouble breathing  · Confusion  · Very drowsy or trouble awakening  · Fainting or loss of consciousness  · Rapid or very slow heart rate  · Loss of bowel or bladder control  When to seek medical advice  Call your healthcare provider right away if any of these occur:   · Pain becomes worse or spreads to your legs  · Weakness or numbness in one or both legs  · Numbness in the groin or genital area  Date Last Reviewed: 7/1/2016  © 2939-7958 [employer] [place]. [address] [place] [address], [place] [address]. All rights reserved. This information is not intended as a substitute for professional medical care. Always follow your healthcare professional's instructions.

## 2018-08-20 ENCOUNTER — ANTI-COAG VISIT (OUTPATIENT)
Dept: CARDIOLOGY | Facility: CLINIC | Age: 48
End: 2018-08-20
Payer: MEDICAID

## 2018-08-20 DIAGNOSIS — Z79.01 LONG TERM (CURRENT) USE OF ANTICOAGULANTS: Primary | ICD-10-CM

## 2018-08-20 DIAGNOSIS — I48.0 PAROXYSMAL ATRIAL FIBRILLATION: ICD-10-CM

## 2018-08-20 DIAGNOSIS — I50.9 ACUTE HEART FAILURE, UNSPECIFIED HEART FAILURE TYPE: Primary | ICD-10-CM

## 2018-08-20 DIAGNOSIS — Z79.01 CHRONIC ANTICOAGULATION: ICD-10-CM

## 2018-08-20 LAB — INR PPP: 2 (ref 2–3)

## 2018-08-20 PROCEDURE — 99211 OFF/OP EST MAY X REQ PHY/QHP: CPT | Mod: S$PBB,25,, | Performed by: INTERNAL MEDICINE

## 2018-08-20 PROCEDURE — 85610 PROTHROMBIN TIME: CPT | Mod: PBBFAC

## 2018-08-20 NOTE — PROGRESS NOTES
INR within range today.  Patient reports going to urgent care for right upper back pain and was given steroid injection and Lortab to take prn which she states has helped with the pain.  Will maintain and follow up in 2 weeks.  Bruising from use but denies bleeding or other issues.  Patient will call with concerns or questions.'

## 2018-08-21 ENCOUNTER — HOSPITAL ENCOUNTER (OUTPATIENT)
Dept: RADIOLOGY | Facility: HOSPITAL | Age: 48
Discharge: HOME | End: 2018-08-21
Attending: INTERNAL MEDICINE
Payer: MEDICAID

## 2018-08-21 DIAGNOSIS — J06.9 UPPER RESPIRATORY INFECTION WITH COUGH AND CONGESTION: ICD-10-CM

## 2018-08-21 DIAGNOSIS — R91.1 LUNG NODULE SEEN ON IMAGING STUDY: ICD-10-CM

## 2018-08-21 DIAGNOSIS — R06.02 SOB (SHORTNESS OF BREATH): Primary | ICD-10-CM

## 2018-08-21 DIAGNOSIS — J45.909 ASTHMA IN ADULT: Primary | ICD-10-CM

## 2018-08-21 DIAGNOSIS — J45.909 ASTHMA IN ADULT: ICD-10-CM

## 2018-08-22 ENCOUNTER — HOSPITAL ENCOUNTER (OUTPATIENT)
Dept: RADIOLOGY | Facility: HOSPITAL | Age: 48
Discharge: HOME | End: 2018-08-22
Attending: INTERNAL MEDICINE
Payer: MEDICAID

## 2018-08-22 PROCEDURE — 72100 X-RAY EXAM L-S SPINE 2/3 VWS: CPT | Mod: TC,FY

## 2018-08-22 PROCEDURE — 72070 X-RAY EXAM THORAC SPINE 2VWS: CPT | Mod: 26,,, | Performed by: RADIOLOGY

## 2018-08-22 PROCEDURE — 72070 X-RAY EXAM THORAC SPINE 2VWS: CPT | Mod: TC,FY

## 2018-08-22 PROCEDURE — 72100 X-RAY EXAM L-S SPINE 2/3 VWS: CPT | Mod: 26,,, | Performed by: RADIOLOGY

## 2018-08-25 ENCOUNTER — HOSPITAL ENCOUNTER (EMERGENCY)
Facility: HOSPITAL | Age: 48
Discharge: HOME | End: 2018-08-25
Attending: EMERGENCY MEDICINE
Payer: MEDICAID

## 2018-08-25 VITALS
HEIGHT: 64 IN | DIASTOLIC BLOOD PRESSURE: 83 MMHG | TEMPERATURE: 98 F | HEART RATE: 75 BPM | SYSTOLIC BLOOD PRESSURE: 139 MMHG | RESPIRATION RATE: 16 BRPM | OXYGEN SATURATION: 98 % | BODY MASS INDEX: 41.83 KG/M2 | WEIGHT: 245 LBS

## 2018-08-25 DIAGNOSIS — R10.9 ABDOMINAL PAIN: Primary | ICD-10-CM

## 2018-08-25 PROBLEM — T46.2X1A AMIODARONE TOXICITY: Status: ACTIVE | Noted: 2018-08-25

## 2018-08-25 LAB
ALBUMIN SERPL BCP-MCNC: 3.7 G/DL
ALP SERPL-CCNC: 86 U/L
ALT SERPL W/O P-5'-P-CCNC: 60 U/L
ANION GAP SERPL CALC-SCNC: 10 MMOL/L
AST SERPL-CCNC: 41 U/L
B-HCG UR QL: NEGATIVE
BACTERIA #/AREA URNS AUTO: NORMAL /HPF
BASOPHILS # BLD AUTO: 0.05 K/UL
BASOPHILS NFR BLD: 0.7 %
BILIRUB SERPL-MCNC: 0.4 MG/DL
BILIRUB UR QL STRIP: NEGATIVE
BUN SERPL-MCNC: 19 MG/DL
CALCIUM SERPL-MCNC: 9.5 MG/DL
CHLORIDE SERPL-SCNC: 104 MMOL/L
CLARITY UR REFRACT.AUTO: CLEAR
CO2 SERPL-SCNC: 26 MMOL/L
COLOR UR AUTO: ABNORMAL
CREAT SERPL-MCNC: 1.3 MG/DL
CTP QC/QA: YES
DIFFERENTIAL METHOD: ABNORMAL
EOSINOPHIL # BLD AUTO: 0.2 K/UL
EOSINOPHIL NFR BLD: 2.2 %
ERYTHROCYTE [DISTWIDTH] IN BLOOD BY AUTOMATED COUNT: 15.1 %
EST. GFR  (AFRICAN AMERICAN): 56.1 ML/MIN/1.73 M^2
EST. GFR  (NON AFRICAN AMERICAN): 48.6 ML/MIN/1.73 M^2
GLUCOSE SERPL-MCNC: 113 MG/DL
GLUCOSE UR QL STRIP: NEGATIVE
HCT VFR BLD AUTO: 39.7 %
HGB BLD-MCNC: 12.4 G/DL
HGB UR QL STRIP: ABNORMAL
IMM GRANULOCYTES # BLD AUTO: 0.04 K/UL
IMM GRANULOCYTES NFR BLD AUTO: 0.6 %
KETONES UR QL STRIP: NEGATIVE
LEUKOCYTE ESTERASE UR QL STRIP: NEGATIVE
LYMPHOCYTES # BLD AUTO: 2.1 K/UL
LYMPHOCYTES NFR BLD: 29.1 %
MCH RBC QN AUTO: 26.8 PG
MCHC RBC AUTO-ENTMCNC: 31.2 G/DL
MCV RBC AUTO: 86 FL
MICROSCOPIC COMMENT: NORMAL
MONOCYTES # BLD AUTO: 0.6 K/UL
MONOCYTES NFR BLD: 8.4 %
NEUTROPHILS # BLD AUTO: 4.2 K/UL
NEUTROPHILS NFR BLD: 59 %
NITRITE UR QL STRIP: NEGATIVE
NRBC BLD-RTO: 0 /100 WBC
PH UR STRIP: 7 [PH] (ref 5–8)
PLATELET # BLD AUTO: 346 K/UL
PMV BLD AUTO: 9.8 FL
POTASSIUM SERPL-SCNC: 4.6 MMOL/L
PROT SERPL-MCNC: 7.2 G/DL
PROT UR QL STRIP: NEGATIVE
RBC # BLD AUTO: 4.62 M/UL
RBC #/AREA URNS AUTO: 2 /HPF (ref 0–4)
SODIUM SERPL-SCNC: 140 MMOL/L
SP GR UR STRIP: 1.01 (ref 1–1.03)
SQUAMOUS #/AREA URNS AUTO: 11 /HPF
URN SPEC COLLECT METH UR: ABNORMAL
UROBILINOGEN UR STRIP-ACNC: NEGATIVE EU/DL
WBC # BLD AUTO: 7.18 K/UL
WBC #/AREA URNS AUTO: 2 /HPF (ref 0–5)

## 2018-08-25 PROCEDURE — 93010 ELECTROCARDIOGRAM REPORT: CPT | Mod: ,,, | Performed by: INTERNAL MEDICINE

## 2018-08-25 PROCEDURE — 81001 URINALYSIS AUTO W/SCOPE: CPT

## 2018-08-25 PROCEDURE — 99284 EMERGENCY DEPT VISIT MOD MDM: CPT | Mod: 25

## 2018-08-25 PROCEDURE — 81025 URINE PREGNANCY TEST: CPT | Performed by: EMERGENCY MEDICINE

## 2018-08-25 PROCEDURE — 80299 QUANTITATIVE ASSAY DRUG: CPT

## 2018-08-25 PROCEDURE — 85025 COMPLETE CBC W/AUTO DIFF WBC: CPT

## 2018-08-25 PROCEDURE — 63600175 PHARM REV CODE 636 W HCPCS: Performed by: PHYSICIAN ASSISTANT

## 2018-08-25 PROCEDURE — 93005 ELECTROCARDIOGRAM TRACING: CPT

## 2018-08-25 PROCEDURE — 99285 EMERGENCY DEPT VISIT HI MDM: CPT | Mod: ,,, | Performed by: PHYSICIAN ASSISTANT

## 2018-08-25 PROCEDURE — 99285 EMERGENCY DEPT VISIT HI MDM: CPT | Mod: ,,, | Performed by: INTERNAL MEDICINE

## 2018-08-25 PROCEDURE — 96374 THER/PROPH/DIAG INJ IV PUSH: CPT

## 2018-08-25 PROCEDURE — 80053 COMPREHEN METABOLIC PANEL: CPT

## 2018-08-25 RX ORDER — METHOCARBAMOL 500 MG/1
500 TABLET, FILM COATED ORAL 3 TIMES DAILY
Qty: 20 TABLET | Refills: 0 | Status: SHIPPED | OUTPATIENT
Start: 2018-08-25 | End: 2018-08-25 | Stop reason: CLARIF

## 2018-08-25 RX ORDER — KETOROLAC TROMETHAMINE 30 MG/ML
10 INJECTION, SOLUTION INTRAMUSCULAR; INTRAVENOUS
Status: COMPLETED | OUTPATIENT
Start: 2018-08-25 | End: 2018-08-25

## 2018-08-25 RX ORDER — DIAZEPAM 5 MG/1
5 TABLET ORAL EVERY 6 HOURS PRN
Qty: 10 TABLET | Refills: 0 | Status: SHIPPED | OUTPATIENT
Start: 2018-08-25 | End: 2018-08-29

## 2018-08-25 RX ADMIN — KETOROLAC TROMETHAMINE 10 MG: 30 INJECTION, SOLUTION INTRAMUSCULAR at 10:08

## 2018-08-25 NOTE — CONSULTS
[facility]  Cardiology  Consult Note    Patient Name: Laure Ross  MRN: [id number]  Admission Date: 8/25/2018  Hospital Length of Stay: 0 days  Attending Physician: Stone Damon MD  Primary Care Provider: Holly Mittal MD   Principal Problem:Amiodarone toxicity    Inpatient consult to Cardiology  Consult performed by: Omar Wilkerson MD  Consult ordered by: Lali Lazaro PA-C        Subjective:     History of Present Illness:  47 y.o. female with NICM (EF 10-20%), CRT-D (2017), pAF on warfarin and amiodarone since 4/2016 s/p DCCV and has been in sinus since, HTN, pHTN, DM, HLD, CLARENCE, asthma  presenting to the ED with RUQ pain.  She was on home  in 2016, is no longer.  She is not an advanced options candidate.  She is NYHA II at baseline.  Device Interrogation (7/24/2018) revealed stable lead and device function. AF <1%. She paces 0% in the RA and 100% in the BiV. Estimated battery longevity 8 years.  EKGs reviewed and patient has been paced since 2016.    She had a CT renal stone protocol today for evaluation of her RUQ pain which showed diffusely increased attenuation of the liver potentially representing amiodarone toxicity or hemochromatosis, AST/ALT 46 and 60.  Cardiology consulted to assess whether or not stopping amiodarone would be appropriate.          Past Medical History:   Diagnosis Date    Anticoagulant long-term use     Arthritis     Asthma     Asthma     Cardiomyopathy     CHF (congestive heart failure)     CHF (congestive heart failure)     COPD (chronic obstructive pulmonary disease) 3/28/2018    GERD (gastroesophageal reflux disease)     H/O tubal ligation     Hypertension     Obesity     Renal disorder     Type 2 diabetes mellitus with hyperglycemia     Type 2 diabetes mellitus with polyneuropathy        Past Surgical History:   Procedure Laterality Date    CARDIAC DEFIBRILLATOR PLACEMENT      CHOLECYSTECTOMY      GALLBLADDER SURGERY  "     iabp  4/2016    TUBAL LIGATION         Review of patient's allergies indicates:  No Known Allergies    No current facility-administered medications for this encounter.      Current Outpatient Medications   Medication Sig    amiodarone (PACERONE) 200 MG Tab Take 1 tablet (200 mg total) by mouth once daily.    cetirizine (ZYRTEC) 10 MG tablet Take 1 tablet (10 mg total) by mouth once daily.    digoxin (LANOXIN) 125 mcg tablet TAKE 1 TABLET BY MOUTH ONCE DAILY    furosemide (LASIX) 40 MG tablet Take 2 tablets (80 mg total) by mouth 2 (two) times daily.    insulin glargine (BASAGLAR KWIKPEN U-100 INSULIN) 100 unit/mL (3 mL) InPn pen Inject 34 Units into the skin 2 (two) times daily.    lisinopril (PRINIVIL,ZESTRIL) 5 MG tablet TAKE 1 TABLET BY MOUTH TWO TIMES A DAY    magnesium oxide (MAG-OX) 400 mg tablet TAKE 1 TABLET BY MOUTH TWICE DAILY    montelukast (SINGULAIR) 10 mg tablet Take 1 tablet (10 mg total) by mouth once daily.    omeprazole (PRILOSEC) 40 MG capsule Take 1 capsule by mouth every morning    potassium chloride (MICRO-K) 10 MEQ CpSR Take 2 capsules (20 mEq total) by mouth once daily.    ranitidine (ZANTAC) 300 MG tablet Take 1 tablet by mouth every evening    spironolactone (ALDACTONE) 25 MG tablet Take 1 tablet (25 mg total) by mouth once daily.    vitamin D 1000 units Tab Take 2 tablets (2,000 Units total) by mouth once daily.    warfarin (COUMADIN) 7.5 MG tablet Take ½ tablet by mouth once daily and 1 tablet on thursday    azelastine (ASTELIN) 137 mcg (0.1 %) nasal spray 2 sprays (274 mcg total) by Nasal route 2 (two) times daily.    BD ULTRA-FINE ESSENCE PEN NEEDLES 32 gauge x 5/32" Ndle Takes 5 times  day    blood sugar diagnostic (TRUE METRIX GLUCOSE TEST STRIP) Strp Use to test two times a day    blood sugar diagnostic Strp Uses 4 times a day, true metrix meter.    budesonide-formoterol 160-4.5 mcg (SYMBICORT) 160-4.5 mcg/actuation HFAA Inhale 2 puffs into the lungs every 12 " "(twelve) hours.    insulin aspart U-100 (NOVOLOG FLEXPEN U-100 INSULIN) 100 unit/mL InPn pen Inject 16 units w/ meals plus scale 150-200 +2, 201-250 +4, 251-300 +6, 301-350 +8.    insulin glargine (BASAGLAR KWIKPEN U-100 INSULIN) 100 unit/mL (3 mL) InPn pen inject 34 under the skin in the morning and 34 units at night    ketoconazole (NIZORAL) 2 % cream Apply topically once daily.    lancets 30 gauge Misc use 4 times a day    levalbuterol (XOPENEX HFA) 45 mcg/actuation inhaler INHALE 1 TO 2 PUFFS BY MOUTH EVERY 4 - 6 HOURS AS NEEDED    levalbuterol (XOPENEX HFA) 45 mcg/actuation inhaler Inhale 1 to 2 puffs by mouth every 4 to 6 hours as needed    levalbuterol (XOPENEX) 1.25 mg/3 mL nebulizer solution INHALE 1 VIAL PER NEBULIZER EVERY 6 TO 8 HOURS AS NEEDED    pen needle, diabetic 32 gauge x 5/32" Ndle USE AS DIRECTED 5 TIMES DAILY    potassium chloride (MICRO-K) 10 MEQ CpSR Take 2 capsules (20 mEq total) by mouth once daily.    triamcinolone acetonide 0.1% (KENALOG) 0.1 % cream Apply topically 2 (two) times daily. Apply twice a day for 7 days to arms.     Family History     Problem Relation (Age of Onset)    Heart disease Mother, Father        Tobacco Use    Smoking status: Never Smoker    Smokeless tobacco: Never Used   Substance and Sexual Activity    Alcohol use: No    Drug use: No    Sexual activity: Yes     Partners: Male     Review of Systems   Constitutional: Negative.    HENT: Negative.    Eyes: Negative.    Respiratory: Negative.    Cardiovascular: Negative.    Gastrointestinal: Positive for abdominal pain (RUQ worse with movement).   Endocrine: Negative.    Genitourinary: Negative.    Musculoskeletal: Negative.    Skin: Negative.    Allergic/Immunologic: Negative.    Neurological: Negative.    Hematological: Negative.    Psychiatric/Behavioral: Negative.      Objective:     Vital Signs (Most Recent):  Temp: 98.2 °F (36.8 °C) (08/25/18 1253)  Pulse: 73 (08/25/18 1253)  Resp: 17 (08/25/18 " 1253)  BP: 132/78 (08/25/18 1253)  SpO2: 99 % (08/25/18 1253) Vital Signs (24h Range):  Temp:  [98.2 °F (36.8 °C)] 98.2 °F (36.8 °C)  Pulse:  [71-88] 73  Resp:  [16-18] 17  SpO2:  [96 %-99 %] 99 %  BP: (127-139)/(69-78) 132/78     Patient Vitals for the past 72 hrs (Last 3 readings):   Weight   08/25/18 0832 111.1 kg (245 lb)     Body mass index is 42.05 kg/m².    No intake or output data in the 24 hours ending 08/25/18 1315    Physical Exam   Constitutional: She is oriented to person, place, and time. She appears well-developed and well-nourished.   HENT:   Head: Normocephalic and atraumatic.   Eyes: EOM are normal. Pupils are equal, round, and reactive to light.   Neck: Normal range of motion. No JVD present.   Cardiovascular: Normal rate, regular rhythm, normal heart sounds and intact distal pulses. Exam reveals no gallop and no friction rub.   No murmur heard.  Pulmonary/Chest: Effort normal and breath sounds normal. No respiratory distress. She has no wheezes. She has no rales. She exhibits no tenderness.   Abdominal: Soft. Bowel sounds are normal. She exhibits no distension. There is no tenderness.   Musculoskeletal: Normal range of motion. She exhibits no edema.   Neurological: She is alert and oriented to person, place, and time.   Skin: Skin is warm and dry. Capillary refill takes less than 2 seconds.       Significant Labs:  CBC:  Recent Labs   Lab  08/25/18   0914   WBC  7.18   RBC  4.62   HGB  12.4   HCT  39.7   PLT  346   MCV  86   MCH  26.8*   MCHC  31.2*     BNP:  No results for input(s): BNP in the last 168 hours.    Invalid input(s): BNPTRIAGELBLO  CMP:  Recent Labs   Lab  08/25/18   0914   GLU  113*   CALCIUM  9.5   ALBUMIN  3.7   PROT  7.2   NA  140   K  4.6   CO2  26   CL  104   BUN  19   CREATININE  1.3   ALKPHOS  86   ALT  60*   AST  41*   BILITOT  0.4      Coagulation:   Recent Labs   Lab  08/20/18   1047   INR  2.0     LDH:  No results for input(s): LDH in the last 72  hours.  Microbiology:  Microbiology Results (last 7 days)     ** No results found for the last 168 hours. **          I have reviewed all pertinent labs within the past 24 hours.    Diagnostic Results:  I have reviewed all pertinent imaging results/findings within the past 24 hours.    Assessment/Plan:     * Amiodarone toxicity    continue amiodarone  Gi consult as outpatient  Check amio level prior to dc  Follow up with EP outpatient  No other changes to medications  No further workup from cardiac standpoint  Discussed with and plan per Dr Jolly            Thank you for your consult. I will sign off. Please contact us if you have any additional questions.    Omar Wilkerson MD  Heart Transplant  [facility]

## 2018-08-25 NOTE — ED PROVIDER NOTES
Encounter Date: 8/25/2018    SCRIBE #1 NOTE: I, Jodie Erazo, am scribing for, and in the presence of,  Dr. Damon. I have scribed the following portions of the note - the EKG reading.       History     Chief Complaint   Patient presents with    Abdominal Pain     right upper quadrant abd pain that radiates to right flank x couple of days      HPI  Review of patient's allergies indicates:  No Known Allergies  Past Medical History:   Diagnosis Date    Anticoagulant long-term use     Arthritis     Asthma     Asthma     Cardiomyopathy     CHF (congestive heart failure)     CHF (congestive heart failure)     COPD (chronic obstructive pulmonary disease) 3/28/2018    GERD (gastroesophageal reflux disease)     H/O tubal ligation     Hypertension     Obesity     Renal disorder     Type 2 diabetes mellitus with hyperglycemia     Type 2 diabetes mellitus with polyneuropathy      Past Surgical History:   Procedure Laterality Date    CARDIAC DEFIBRILLATOR PLACEMENT      CHOLECYSTECTOMY      GALLBLADDER SURGERY      iabp  4/2016    TUBAL LIGATION       Family History   Problem Relation Age of Onset    Heart disease Mother     Heart disease Father      Social History     Tobacco Use    Smoking status: Never Smoker    Smokeless tobacco: Never Used   Substance Use Topics    Alcohol use: No    Drug use: No     Review of Systems    Physical Exam     Initial Vitals [08/25/18 0832]   BP Pulse Resp Temp SpO2   127/78 88 18 98.2 °F (36.8 °C) 96 %      MAP       --         Physical Exam    ED Course   Procedures  Labs Reviewed   CBC W/ AUTO DIFFERENTIAL   COMPREHENSIVE METABOLIC PANEL   URINALYSIS, REFLEX TO URINE CULTURE   POCT URINE PREGNANCY     EKG Readings: (Independently Interpreted)   Paced rhythm at a rate of 75. Right superior axis deviation       Imaging Results    None          Medical Decision Making:   History:   Old Medical Records: I decided to obtain old medical records.  Clinical Tests:   Lab  Tests: Ordered and Reviewed  Medical Tests: Ordered and Reviewed            Scribe Attestation:   Scribe #1: I performed the above scribed service and the documentation accurately describes the services I performed. I attest to the accuracy of the note.               Clinical Impression:   The encounter diagnosis was Abdominal pain.

## 2018-08-25 NOTE — SUBJECTIVE & OBJECTIVE
Past Medical History:   Diagnosis Date    Anticoagulant long-term use     Arthritis     Asthma     Asthma     Cardiomyopathy     CHF (congestive heart failure)     CHF (congestive heart failure)     COPD (chronic obstructive pulmonary disease) 3/28/2018    GERD (gastroesophageal reflux disease)     H/O tubal ligation     Hypertension     Obesity     Renal disorder     Type 2 diabetes mellitus with hyperglycemia     Type 2 diabetes mellitus with polyneuropathy        Past Surgical History:   Procedure Laterality Date    CARDIAC DEFIBRILLATOR PLACEMENT      CHOLECYSTECTOMY      GALLBLADDER SURGERY      iabp  4/2016    TUBAL LIGATION         Review of patient's allergies indicates:  No Known Allergies    No current facility-administered medications for this encounter.      Current Outpatient Medications   Medication Sig    amiodarone (PACERONE) 200 MG Tab Take 1 tablet (200 mg total) by mouth once daily.    cetirizine (ZYRTEC) 10 MG tablet Take 1 tablet (10 mg total) by mouth once daily.    digoxin (LANOXIN) 125 mcg tablet TAKE 1 TABLET BY MOUTH ONCE DAILY    furosemide (LASIX) 40 MG tablet Take 2 tablets (80 mg total) by mouth 2 (two) times daily.    insulin glargine (BASAGLAR KWIKPEN U-100 INSULIN) 100 unit/mL (3 mL) InPn pen Inject 34 Units into the skin 2 (two) times daily.    lisinopril (PRINIVIL,ZESTRIL) 5 MG tablet TAKE 1 TABLET BY MOUTH TWO TIMES A DAY    magnesium oxide (MAG-OX) 400 mg tablet TAKE 1 TABLET BY MOUTH TWICE DAILY    montelukast (SINGULAIR) 10 mg tablet Take 1 tablet (10 mg total) by mouth once daily.    omeprazole (PRILOSEC) 40 MG capsule Take 1 capsule by mouth every morning    potassium chloride (MICRO-K) 10 MEQ CpSR Take 2 capsules (20 mEq total) by mouth once daily.    ranitidine (ZANTAC) 300 MG tablet Take 1 tablet by mouth every evening    spironolactone (ALDACTONE) 25 MG tablet Take 1 tablet (25 mg total) by mouth once daily.    vitamin D 1000 units Tab  "Take 2 tablets (2,000 Units total) by mouth once daily.    warfarin (COUMADIN) 7.5 MG tablet Take ½ tablet by mouth once daily and 1 tablet on thursday    azelastine (ASTELIN) 137 mcg (0.1 %) nasal spray 2 sprays (274 mcg total) by Nasal route 2 (two) times daily.    BD ULTRA-FINE ESSENCE PEN NEEDLES 32 gauge x 5/32" Ndle Takes 5 times  day    blood sugar diagnostic (TRUE METRIX GLUCOSE TEST STRIP) Strp Use to test two times a day    blood sugar diagnostic Strp Uses 4 times a day, true metrix meter.    budesonide-formoterol 160-4.5 mcg (SYMBICORT) 160-4.5 mcg/actuation HFAA Inhale 2 puffs into the lungs every 12 (twelve) hours.    insulin aspart U-100 (NOVOLOG FLEXPEN U-100 INSULIN) 100 unit/mL InPn pen Inject 16 units w/ meals plus scale 150-200 +2, 201-250 +4, 251-300 +6, 301-350 +8.    insulin glargine (BASAGLAR KWIKPEN U-100 INSULIN) 100 unit/mL (3 mL) InPn pen inject 34 under the skin in the morning and 34 units at night    ketoconazole (NIZORAL) 2 % cream Apply topically once daily.    lancets 30 gauge Misc use 4 times a day    levalbuterol (XOPENEX HFA) 45 mcg/actuation inhaler INHALE 1 TO 2 PUFFS BY MOUTH EVERY 4 - 6 HOURS AS NEEDED    levalbuterol (XOPENEX HFA) 45 mcg/actuation inhaler Inhale 1 to 2 puffs by mouth every 4 to 6 hours as needed    levalbuterol (XOPENEX) 1.25 mg/3 mL nebulizer solution INHALE 1 VIAL PER NEBULIZER EVERY 6 TO 8 HOURS AS NEEDED    pen needle, diabetic 32 gauge x 5/32" Ndle USE AS DIRECTED 5 TIMES DAILY    potassium chloride (MICRO-K) 10 MEQ CpSR Take 2 capsules (20 mEq total) by mouth once daily.    triamcinolone acetonide 0.1% (KENALOG) 0.1 % cream Apply topically 2 (two) times daily. Apply twice a day for 7 days to arms.     Family History     Problem Relation (Age of Onset)    Heart disease Mother, Father        Tobacco Use    Smoking status: Never Smoker    Smokeless tobacco: Never Used   Substance and Sexual Activity    Alcohol use: No    Drug use: No    " Sexual activity: Yes     Partners: Male     Review of Systems   Constitutional: Negative.    HENT: Negative.    Eyes: Negative.    Respiratory: Negative.    Cardiovascular: Negative.    Gastrointestinal: Positive for abdominal pain (RUQ worse with movement).   Endocrine: Negative.    Genitourinary: Negative.    Musculoskeletal: Negative.    Skin: Negative.    Allergic/Immunologic: Negative.    Neurological: Negative.    Hematological: Negative.    Psychiatric/Behavioral: Negative.      Objective:     Vital Signs (Most Recent):  Temp: 98.2 °F (36.8 °C) (08/25/18 1253)  Pulse: 73 (08/25/18 1253)  Resp: 17 (08/25/18 1253)  BP: 132/78 (08/25/18 1253)  SpO2: 99 % (08/25/18 1253) Vital Signs (24h Range):  Temp:  [98.2 °F (36.8 °C)] 98.2 °F (36.8 °C)  Pulse:  [71-88] 73  Resp:  [16-18] 17  SpO2:  [96 %-99 %] 99 %  BP: (127-139)/(69-78) 132/78     Patient Vitals for the past 72 hrs (Last 3 readings):   Weight   08/25/18 0832 111.1 kg (245 lb)     Body mass index is 42.05 kg/m².    No intake or output data in the 24 hours ending 08/25/18 1315    Physical Exam   Constitutional: She is oriented to person, place, and time. She appears well-developed and well-nourished.   HENT:   Head: Normocephalic and atraumatic.   Eyes: EOM are normal. Pupils are equal, round, and reactive to light.   Neck: Normal range of motion. No JVD present.   Cardiovascular: Normal rate, regular rhythm, normal heart sounds and intact distal pulses. Exam reveals no gallop and no friction rub.   No murmur heard.  Pulmonary/Chest: Effort normal and breath sounds normal. No respiratory distress. She has no wheezes. She has no rales. She exhibits no tenderness.   Abdominal: Soft. Bowel sounds are normal. She exhibits no distension. There is no tenderness.   Musculoskeletal: Normal range of motion. She exhibits no edema.   Neurological: She is alert and oriented to person, place, and time.   Skin: Skin is warm and dry. Capillary refill takes less than 2  seconds.       Significant Labs:  CBC:  Recent Labs   Lab  08/25/18   0914   WBC  7.18   RBC  4.62   HGB  12.4   HCT  39.7   PLT  346   MCV  86   MCH  26.8*   MCHC  31.2*     BNP:  No results for input(s): BNP in the last 168 hours.    Invalid input(s): BNPTRIAGELBLO  CMP:  Recent Labs   Lab  08/25/18   0914   GLU  113*   CALCIUM  9.5   ALBUMIN  3.7   PROT  7.2   NA  140   K  4.6   CO2  26   CL  104   BUN  19   CREATININE  1.3   ALKPHOS  86   ALT  60*   AST  41*   BILITOT  0.4      Coagulation:   Recent Labs   Lab  08/20/18   1047   INR  2.0     LDH:  No results for input(s): LDH in the last 72 hours.  Microbiology:  Microbiology Results (last 7 days)     ** No results found for the last 168 hours. **          I have reviewed all pertinent labs within the past 24 hours.    Diagnostic Results:  I have reviewed all pertinent imaging results/findings within the past 24 hours.

## 2018-08-25 NOTE — DISCHARGE INSTRUCTIONS
Please schedule an appointment with a GI doctor for follow up about your liver tests. You should also follow up with your Cardiology doctor. If your pain gets much worse, you have fever or continuous nausea/ vomiting, please come back to the Emergency Department.

## 2018-08-25 NOTE — ED TRIAGE NOTES
Presents to ER with complaint of right upper quadrant pain that radiates to her back for 1 week.  Patient's name and date of birth checked and is correct.  LOC: The patient is awake, alert and aware of environment with an appropriate affect, the patient is oriented x 3 and speaking appropriately.  APPEARANCE: Patient resting comfortably and in no acute distress, patient is clean and well groomed, patient's clothing is properly fastened.  CARDIOVASCULAR:  Heart rate regular and even with no peripheral edema noted.  SKIN: The skin is warm and dry, patient has normal skin turgor and moist mucus membranes, skin intact, no breakdown or brusing noted. MUSKULOSKELETAL: Patient moving all extremities well, no obvious swelling or deformities noted.  RESPIRATORY: Airway is open and patent, respirations are spontaneous, patient has a normal effort and rate.

## 2018-08-25 NOTE — HPI
47 y.o. female with NICM (EF 10-20%), CRT-D (2017), pAF on warfarin and amiodarone since 4/2016 s/p DCCV and has been in sinus since, HTN, pHTN, DM, HLD, CLARENCE, asthma presenting to the ED with RUQ pain.  She was on home  in 2016, is no longer.  She is not an advanced options candidate.  She is NYHA II at baseline.  Device Interrogation (7/24/2018) revealed stable lead and device function. AF <1%. She paces 0% in the RA and 100% in the BiV. Estimated battery longevity 8 years. EKGs reviewed and patient has been paced since 2016.    She had a CT renal stone protocol today for evaluation of her RUQ pain which showed diffusely increased attenuation of the liver potentially representing amiodarone toxicity or hemochromatosis, AST/ALT 46 and 60.  Cardiology consulted to assess whether or not stopping amiodarone would be appropriate.

## 2018-08-25 NOTE — ED PROVIDER NOTES
Encounter Date: 8/25/2018    SCRIBE #1 NOTE: I, Jodie Erazo, am scribing for, and in the presence of,  Dr. Damon. I have scribed the following portions of the note - the EKG reading and the APC attestation.       History     Chief Complaint   Patient presents with    Abdominal Pain     right upper quadrant abd pain that radiates to right flank x couple of days      47-year-old female with multiple comorbidities including hypertension, diabetes, AFib, COPD, CHF (EF=20%) presents for right upper quadrant pain radiating to the right flank x5 days.  Patient reports gradual onset of aching pain which has been worsening in intensity.  Patient seen at urgent care for same complaint, given Decadron and Norco with temporary relief.  Patient took Flexeril and Norco last night, reports improvement of pain but subsequent headache. Reports associated dry cough, patient has chronic shortness of breath unchanged from baseline.  Denies urinary symptoms, nausea/vomiting, chest pain or fever.  Denies weakness, numbness, gait disturbance, bowel or bladder dysfunction.          Review of patient's allergies indicates:  No Known Allergies  Past Medical History:   Diagnosis Date    Anticoagulant long-term use     Arthritis     Asthma     Asthma     Cardiomyopathy     CHF (congestive heart failure)     CHF (congestive heart failure)     COPD (chronic obstructive pulmonary disease) 3/28/2018    GERD (gastroesophageal reflux disease)     H/O tubal ligation     Hypertension     Obesity     Renal disorder     Type 2 diabetes mellitus with hyperglycemia     Type 2 diabetes mellitus with polyneuropathy      Past Surgical History:   Procedure Laterality Date    CARDIAC DEFIBRILLATOR PLACEMENT      CHOLECYSTECTOMY      GALLBLADDER SURGERY      iabp  4/2016    TUBAL LIGATION       Family History   Problem Relation Age of Onset    Heart disease Mother     Heart disease Father      Social History     Tobacco Use    Smoking  status: Never Smoker    Smokeless tobacco: Never Used   Substance Use Topics    Alcohol use: No    Drug use: No     Review of Systems   Constitutional: Negative for chills, fatigue and fever.   Respiratory: Positive for cough and shortness of breath. Negative for chest tightness.    Cardiovascular: Negative for chest pain, palpitations and leg swelling.   Gastrointestinal: Positive for abdominal pain. Negative for blood in stool, constipation, diarrhea, nausea and vomiting.   Endocrine: Negative for polyuria.   Genitourinary: Positive for flank pain. Negative for difficulty urinating, dysuria, frequency, hematuria and urgency.   Musculoskeletal: Positive for back pain and myalgias. Negative for arthralgias, gait problem, joint swelling, neck pain and neck stiffness.   Skin: Negative for color change and pallor.   Neurological: Positive for headaches. Negative for weakness, light-headedness and numbness.   Hematological: Does not bruise/bleed easily.       Physical Exam     Initial Vitals [08/25/18 0832]   BP Pulse Resp Temp SpO2   127/78 88 18 98.2 °F (36.8 °C) 96 %      MAP       --         Physical Exam    Nursing note and vitals reviewed.  Constitutional: She appears well-developed and well-nourished. She is not diaphoretic. No distress.   HENT:   Head: Normocephalic and atraumatic.   Eyes: EOM are normal. Pupils are equal, round, and reactive to light.   Neck: Normal range of motion. Neck supple.   No posterior midline tenderness   Cardiovascular: Normal rate, normal heart sounds and intact distal pulses. Exam reveals no gallop and no friction rub.    No murmur heard.  Pulmonary/Chest: No respiratory distress. She has wheezes. She has no rhonchi. She has no rales. She exhibits tenderness.   Tenderness to palpation right lower ribs   Abdominal: Soft. Bowel sounds are normal. She exhibits no distension and no mass. There is tenderness. There is no rebound and no guarding.   Tenderness to palpation right upper  quadrant and right CVA tenderness   Musculoskeletal: Normal range of motion. She exhibits no edema or tenderness.   Neurological: She is alert and oriented to person, place, and time. She has normal strength. No sensory deficit.   Skin: Skin is warm and dry.   Psychiatric: She has a normal mood and affect.         ED Course   Procedures  Labs Reviewed   CBC W/ AUTO DIFFERENTIAL - Abnormal; Notable for the following components:       Result Value    MCH 26.8 (*)     MCHC 31.2 (*)     RDW 15.1 (*)     Immature Granulocytes 0.6 (*)     All other components within normal limits   COMPREHENSIVE METABOLIC PANEL - Abnormal; Notable for the following components:    Glucose 113 (*)     AST 41 (*)     ALT 60 (*)     eGFR if  56.1 (*)     eGFR if non  48.6 (*)     All other components within normal limits   URINALYSIS, REFLEX TO URINE CULTURE - Abnormal; Notable for the following components:    Occult Blood UA 1+ (*)     All other components within normal limits    Narrative:     Preferred Collection Type->Urine, Clean Catch   URINALYSIS MICROSCOPIC    Narrative:     Preferred Collection Type->Urine, Clean Catch   POCT URINE PREGNANCY     EKG Readings: (Independently Interpreted)   Paced rhythm at a rate of 75. Right superior axis deviation       Imaging Results          CT Renal Stone Study ABD Pelvis WO (In process)  Result time 08/25/18 10:02:05                 Medical Decision Making:   History:   Old Medical Records: I decided to obtain old medical records.  Independently Interpreted Test(s):   I have ordered and independently interpreted EKG Reading(s) - see prior notes  Clinical Tests:   Lab Tests: Ordered and Reviewed  Medical Tests: Ordered and Reviewed       APC / Resident Notes:   47-year-old female presenting with right upper quadrant/ right rib pain radiating to right flank.  Stable vitals, exam notable for tenderness to palpation of right lower ribs and right flank.  Patient seen  for same complaint at urgent care, x-ray performed at that time showed no acute abnormalities.  Patient had cholecystectomy multiple years ago.  DDx includes muscle strain muscle spasm, nephrolithiasis, pyelonephritis, hepatic hypoperfusion.  Given comorbidities, will check labs, do CT renal stone study, give Toradol for pain and reassess.    Patient reporting some improvement of pain after Toradol.  Labs notable for mild transaminitis improved from previous.  Creatinine stable at 1.3. UA with rare bacteria, 11 squamous cells suspect contaminated sample.  No leukocytosis.  CT shows increased attenuation of the liver potentially representing amiodarone toxicity or hemochromatosis.  Cardiology consulted given possible amiodarone toxicity.    Per Cardiology recommendations, will continue amiodarone and instruct patient to follow up with GI regarding potential hepatic toxicity.  Discussed the importance of follow-up, strict ED return precautions given.  Patient voiced understanding and is comfortable with discharge. I discussed this patient with my supervising physician.    ANDRZEJ Swainibnakia Attestation:   Scribe #1: I performed the above scribed service and the documentation accurately describes the services I performed. I attest to the accuracy of the note.    Attending Attestation:     Physician Attestation Statement for NP/PA:   I discussed this assessment and plan of this patient with the NP/PA, but I did not personally examine the patient. The face to face encounter was performed by the NP/PA.    Other NP/PA Attestation Additions:    History of Present Illness: 47 y.o. female with co-morbidities of cardiomyopathy presents for evaluation of persistent right flank discomfort for several days duration.                       Clinical Impression:   The encounter diagnosis was Abdominal pain.      Disposition:   Disposition: Discharged  Condition: Stable                        Lali Lazaro  ADNRZEJ  08/25/18 0034

## 2018-08-27 ENCOUNTER — TELEPHONE (OUTPATIENT)
Dept: ELECTROPHYSIOLOGY | Facility: CLINIC | Age: 48
End: 2018-08-27

## 2018-08-27 ENCOUNTER — TELEPHONE (OUTPATIENT)
Dept: NEPHROLOGY | Facility: CLINIC | Age: 48
End: 2018-08-27

## 2018-08-27 NOTE — TELEPHONE ENCOUNTER
----- Message from Beba Do sent at 8/27/2018  9:29 AM CDT -----  Contact: pt  Pt calling for urgent work      Went to urgent care  And ER     Having pain in right back that wraps waist     Suffering for over a week      -     Urine had trace of blood      Has several test in system      Told her to get with specialist            Please find an urgent work in   -  Pt said she is really suffering       Time has to be between 9 and 1  Needs daughter to bring her       Please call  [phone]     Thanks     Pt will see her PCP first then call nephrology if needed

## 2018-08-27 NOTE — TELEPHONE ENCOUNTER
----- Message from Anya Vital sent at 8/27/2018  8:38 AM CDT -----  Contact: pt [phone]  Pt need to schedule a ER f/u appt please call her at [phone]    Thanks

## 2018-08-29 ENCOUNTER — OFFICE VISIT (OUTPATIENT)
Dept: ELECTROPHYSIOLOGY | Facility: CLINIC | Age: 48
End: 2018-08-29
Payer: MEDICAID

## 2018-08-29 VITALS
WEIGHT: 244 LBS | HEART RATE: 80 BPM | BODY MASS INDEX: 41.66 KG/M2 | DIASTOLIC BLOOD PRESSURE: 71 MMHG | SYSTOLIC BLOOD PRESSURE: 108 MMHG | HEIGHT: 64 IN

## 2018-08-29 DIAGNOSIS — T46.2X1A AMIODARONE TOXICITY, ACCIDENTAL OR UNINTENTIONAL, INITIAL ENCOUNTER: ICD-10-CM

## 2018-08-29 DIAGNOSIS — Z79.01 CHRONIC ANTICOAGULATION: Primary | ICD-10-CM

## 2018-08-29 DIAGNOSIS — G47.33 OSA (OBSTRUCTIVE SLEEP APNEA): ICD-10-CM

## 2018-08-29 DIAGNOSIS — I48.0 PAF (PAROXYSMAL ATRIAL FIBRILLATION): ICD-10-CM

## 2018-08-29 LAB
AMIODARONE FLD-MCNC: 1.6 UG/ML (ref 1–3)
N-DESETHYL-AMIODARONE: 2 UG/ML

## 2018-08-29 PROCEDURE — 93005 ELECTROCARDIOGRAM TRACING: CPT | Mod: PBBFAC | Performed by: INTERNAL MEDICINE

## 2018-08-29 PROCEDURE — 93010 ELECTROCARDIOGRAM REPORT: CPT | Mod: ,,, | Performed by: INTERNAL MEDICINE

## 2018-08-29 PROCEDURE — 99214 OFFICE O/P EST MOD 30 MIN: CPT | Mod: S$PBB,,, | Performed by: INTERNAL MEDICINE

## 2018-08-29 PROCEDURE — 99213 OFFICE O/P EST LOW 20 MIN: CPT | Mod: PBBFAC,25 | Performed by: INTERNAL MEDICINE

## 2018-08-29 PROCEDURE — 99999 PR PBB SHADOW E&M-EST. PATIENT-LVL III: CPT | Mod: PBBFAC,,, | Performed by: INTERNAL MEDICINE

## 2018-08-29 NOTE — PROGRESS NOTES
Ms. Ross is a patient of Dr. Jolly and was last seen in clinic 8/17      Subjective:   Patient ID:  Laure Ross is a 47 y.o. female who presents for follow-up of Atrial Fibrillation    Problem list:     NICM  S/p biV upgrade 5/17. Improved EF 10% --> 30-35%,  pHTN  PAF, on amio since BRITTANEY/DCCV 4/16  HTN DM HL CLARENCE asthma  ADHF 2/16, Had home  for a while; now off.     HPI:  Ms. Ross is a 47 y.o. female with PMH as above is here after d/s from ER recently for right flank pain.     Update (08/29/2018):  She was recently in the hospital for RUQ with radiation to the Flank. She had a CT renal which showed diffuse attenuation of the liver. She was d/s on pain meds with the thought that pain was musculoskeletal. Her amio level and GI cx is pending.  Of note,  her AST/LT 41/60. PFTs on 7/31/2018 show a DLCO ratio of 85 - nl . TSH 7/30/2018 is WNL. No skin changes , and she has chronic vision problem - not worsened in the recent past. Amiodarone was  decreased from 400mg to 200 one mt ago. There was a  plan to possibly d/c amio and consider tikosyn at some point in future.   It was reported on the radiology report that this might be a reflection of amio toxicity hence she comes in today to discuss dosing of amiodarone.     She admits chronic SOB that is asthma-related. No new palpitations, chest pain, light-headedness, or syncope. she is compliant with CPAP.  She is currently taking coumadin for stroke prophylaxis and denies significant bleeding episodes. She reports she did not tolerate BB in the past.     Device Interrogation (7/24/2018) revealed  stable lead and device function. AF <1%. She paces 0% in the RA and 100% in the BiV. Estimated battery longevity 8 years.  I have personally reviewed the patient's EKG today, which shows BiV paced rhythm at 80bpm. DC interval is 114. QTc is 536.      Recent Cardiac Tests:  CT renal stone:  ===================  1.  No evidence of obstructive uropathy or renal calculi.  " No acute abnormality.  2.  Diffusely increased attenuation of the liver potentially representing amiodarone toxicity or hemochromatosis    2D Echo (3/28/2018):  CONCLUSIONS     1 - Severe left atrial enlargement.     2 - Eccentric hypertrophy.     3 - Moderately depressed left ventricular systolic function (EF 30-35%).     4 - Quantitatively measured LV function is 33%.     5 - Impaired LV relaxation, elevated LAP (grade 2 diastolic dysfunction).     6 - Normal right ventricular systolic function .     7 - The estimated PA systolic pressure is 36 mmHg.     8 - Mild mitral regurgitation.           Current Outpatient Medications:     azelastine (ASTELIN) 137 mcg (0.1 %) nasal spray, 2 sprays (274 mcg total) by Nasal route 2 (two) times daily., Disp: 30 mL, Rfl: 2    BD ULTRA-FINE ESSENCE PEN NEEDLES 32 gauge x 5/32" Ndle, Takes 5 times  day, Disp: 200 each, Rfl: 11    blood sugar diagnostic (TRUE METRIX GLUCOSE TEST STRIP) Strp, Use to test two times a day, Disp: 200 strip, Rfl: 2    blood sugar diagnostic Strp, Uses 4 times a day, true metrix meter., Disp: 150 each, Rfl: 11    budesonide-formoterol 160-4.5 mcg (SYMBICORT) 160-4.5 mcg/actuation HFAA, Inhale 2 puffs into the lungs every 12 (twelve) hours., Disp: 1 Inhaler, Rfl: 0    cetirizine (ZYRTEC) 10 MG tablet, Take 1 tablet (10 mg total) by mouth once daily., Disp: , Rfl: 0    digoxin (LANOXIN) 125 mcg tablet, TAKE 1 TABLET BY MOUTH ONCE DAILY, Disp: 30 tablet, Rfl: 5    furosemide (LASIX) 40 MG tablet, Take 2 tablets (80 mg total) by mouth 2 (two) times daily., Disp: 120 tablet, Rfl: 6    insulin aspart U-100 (NOVOLOG FLEXPEN U-100 INSULIN) 100 unit/mL InPn pen, Inject 16 units w/ meals plus scale 150-200 +2, 201-250 +4, 251-300 +6, 301-350 +8., Disp: 30 mL, Rfl: 6    insulin glargine (BASAGLAR KWIKPEN U-100 INSULIN) 100 unit/mL (3 mL) InPn pen, Inject 34 Units into the skin 2 (two) times daily., Disp: 15 mL, Rfl: 6    insulin glargine (BASAGLAR KWIKPEN " "U-100 INSULIN) 100 unit/mL (3 mL) InPn pen, inject 34 under the skin in the morning and 34 units at night, Disp: 9 mL, Rfl: 6    ketoconazole (NIZORAL) 2 % cream, Apply topically once daily., Disp: 30 g, Rfl: 1    lancets 30 gauge Misc, use 4 times a day, Disp: 150 each, Rfl: 2    levalbuterol (XOPENEX HFA) 45 mcg/actuation inhaler, INHALE 1 TO 2 PUFFS BY MOUTH EVERY 4 - 6 HOURS AS NEEDED, Disp: 15 g, Rfl: 1    levalbuterol (XOPENEX HFA) 45 mcg/actuation inhaler, Inhale 1 to 2 puffs by mouth every 4 to 6 hours as needed, Disp: 15 g, Rfl: 1    levalbuterol (XOPENEX) 1.25 mg/3 mL nebulizer solution, INHALE 1 VIAL PER NEBULIZER EVERY 6 TO 8 HOURS AS NEEDED, Disp: 288 mL, Rfl: 3    lisinopril (PRINIVIL,ZESTRIL) 5 MG tablet, TAKE 1 TABLET BY MOUTH TWO TIMES A DAY, Disp: 60 tablet, Rfl: 8    magnesium oxide (MAG-OX) 400 mg tablet, TAKE 1 TABLET BY MOUTH TWICE DAILY, Disp: 60 tablet, Rfl: 1    montelukast (SINGULAIR) 10 mg tablet, Take 1 tablet (10 mg total) by mouth once daily., Disp: 30 tablet, Rfl: 3    omeprazole (PRILOSEC) 40 MG capsule, Take 1 capsule by mouth every morning, Disp: 30 capsule, Rfl: 6    pen needle, diabetic 32 gauge x 5/32" Ndle, USE AS DIRECTED 5 TIMES DAILY, Disp: 200 each, Rfl: 5    potassium chloride (MICRO-K) 10 MEQ CpSR, Take 2 capsules (20 mEq total) by mouth once daily., Disp: 60 capsule, Rfl: 3    potassium chloride (MICRO-K) 10 MEQ CpSR, Take 2 capsules (20 mEq total) by mouth once daily., Disp: 60 capsule, Rfl: 6    ranitidine (ZANTAC) 300 MG tablet, Take 1 tablet by mouth every evening, Disp: 30 tablet, Rfl: 11    spironolactone (ALDACTONE) 25 MG tablet, Take 1 tablet (25 mg total) by mouth once daily., Disp: 90 tablet, Rfl: 3    triamcinolone acetonide 0.1% (KENALOG) 0.1 % cream, Apply topically 2 (two) times daily. Apply twice a day for 7 days to arms., Disp: 45 g, Rfl: 0    vitamin D 1000 units Tab, Take 2 tablets (2,000 Units total) by mouth once daily., Disp: , Rfl: " "    warfarin (COUMADIN) 7.5 MG tablet, Take ½ tablet by mouth once daily and 1 tablet on thursday, Disp: 60 tablet, Rfl: 3.      Review of Systems   Constitution: Negative for malaise/fatigue.   Cardiovascular: Negative for chest pain, dyspnea on exertion, irregular heartbeat, leg swelling and palpitations.   Respiratory: Positive for shortness of breath.    Hematologic/Lymphatic: Negative for bleeding problem.   Skin: Negative for rash.   Musculoskeletal: Negative for myalgias.   Gastrointestinal: Negative for hematemesis, hematochezia and nausea.   Genitourinary: Negative for hematuria.   Neurological: Negative for light-headedness.   Psychiatric/Behavioral: Negative for altered mental status.   Allergic/Immunologic: Negative for persistent infections.     Objective:        /71   Pulse 80   Ht 5' 4" (1.626 m)   Wt 110.7 kg (244 lb)   LMP 08/20/2018   BMI 41.88 kg/m²     Physical Exam   Constitutional: She is oriented to person, place, and time. She appears well-developed and well-nourished.   HENT:   Head: Normocephalic.   Nose: Nose normal.   Eyes: Pupils are equal, round, and reactive to light.   Cardiovascular: Normal rate, regular rhythm, S1 normal and S2 normal.   No murmur heard.  Pulses:       Radial pulses are 2+ on the right side, and 2+ on the left side.   Pulmonary/Chest: Breath sounds normal. No respiratory distress.   Device to RUCW   Abdominal: Normal appearance.   Musculoskeletal: Normal range of motion. She exhibits no edema.   Neurological: She is alert and oriented to person, place, and time.   Skin: Skin is warm and dry. No erythema.   Psychiatric: She has a normal mood and affect. Her speech is normal and behavior is normal.   Nursing note and vitals reviewed.    Lab Results   Component Value Date     08/25/2018    K 4.6 08/25/2018    MG 2.0 03/28/2018    BUN 19 08/25/2018    CREATININE 1.3 08/25/2018    ALT 60 (H) 08/25/2018    AST 41 (H) 08/25/2018    HGB 12.4 08/25/2018    " "HCT 39.7 08/25/2018    TSH 1.287 07/30/2018    LDLCALC 122.6 04/27/2016       Recent Labs   Lab  06/25/18   1125  07/09/18   1139  07/30/18   1201  08/20/18   1047   INR  2.1  1.8  2.6  2.0           Assessment:       1. Chronic anticoagulation    2. PAF (paroxysmal atrial fibrillation)    3. CLARENCE (obstructive sleep apnea)    4. Amiodarone toxicity, accidental or unintentional, initial encounter      Plan:     Persistent Afib: Plesx5sflr of 4:   Ms. Ross is a 47 y.o. female with Afib and NICM is here for evaluation of possible amiodarone toxicity. Her AST/ALT are mildly increased , her CT renal showed diffuse liver attenuation.    Last device check showed  AF burden <1% . Of note, she was  100% BiV pacing - and her EF  increased to 30-35% (from 10% in 2016), so we will make every attempt to keep her in sinus and allow maximal BIV pacing.   At this point we will stop the amiodaorne.  In future if we need to try an AAD, we will consider tikosyn instead.   Refer to hepatology - for evaluation of "abnormal CT scan result" and determine if radiologic findings are reflection of amiodarone toxicity. Also if we can use amio in future.   Follow up in EP clinic in [facility] to evaluate symptoms off Amio and need for alternative AAD.   Cont CPAP    D/w Dr Jolly.          "

## 2018-08-30 ENCOUNTER — OFFICE VISIT (OUTPATIENT)
Dept: HEPATOLOGY | Facility: CLINIC | Age: 48
End: 2018-08-30
Payer: MEDICAID

## 2018-08-30 ENCOUNTER — LAB VISIT (OUTPATIENT)
Dept: LAB | Facility: HOSPITAL | Age: 48
End: 2018-08-30
Attending: INTERNAL MEDICINE
Payer: MEDICAID

## 2018-08-30 VITALS
TEMPERATURE: 97 F | BODY MASS INDEX: 42.23 KG/M2 | WEIGHT: 247.38 LBS | HEART RATE: 93 BPM | OXYGEN SATURATION: 94 % | HEIGHT: 64 IN | SYSTOLIC BLOOD PRESSURE: 138 MMHG | RESPIRATION RATE: 18 BRPM | DIASTOLIC BLOOD PRESSURE: 72 MMHG

## 2018-08-30 DIAGNOSIS — R93.2 ABNORMAL LIVER DIAGNOSTIC IMAGING: ICD-10-CM

## 2018-08-30 DIAGNOSIS — R74.8 ELEVATED LIVER ENZYMES: ICD-10-CM

## 2018-08-30 DIAGNOSIS — R74.8 ELEVATED LIVER ENZYMES: Primary | ICD-10-CM

## 2018-08-30 LAB
ALBUMIN SERPL BCP-MCNC: 3.7 G/DL
ALP SERPL-CCNC: 73 U/L
ALT SERPL W/O P-5'-P-CCNC: 67 U/L
AST SERPL-CCNC: 43 U/L
BILIRUB DIRECT SERPL-MCNC: 0.2 MG/DL
BILIRUB SERPL-MCNC: 0.4 MG/DL
ERYTHROCYTE [DISTWIDTH] IN BLOOD BY AUTOMATED COUNT: 15.2 %
FERRITIN SERPL-MCNC: 37 NG/ML
HCT VFR BLD AUTO: 38.9 %
HGB BLD-MCNC: 12 G/DL
IGG SERPL-MCNC: 936 MG/DL
IGM SERPL-MCNC: 43 MG/DL
INR PPP: 2.1
IRON SERPL-MCNC: 35 UG/DL
MCH RBC QN AUTO: 26.3 PG
MCHC RBC AUTO-ENTMCNC: 30.8 G/DL
MCV RBC AUTO: 85 FL
PLATELET # BLD AUTO: 340 K/UL
PMV BLD AUTO: 9.9 FL
PROT SERPL-MCNC: 7.2 G/DL
PROTHROMBIN TIME: 20.5 SEC
RBC # BLD AUTO: 4.57 M/UL
SATURATED IRON: 8 %
TOTAL IRON BINDING CAPACITY: 441 UG/DL
TRANSFERRIN SERPL-MCNC: 298 MG/DL
WBC # BLD AUTO: 9.69 K/UL

## 2018-08-30 PROCEDURE — 87340 HEPATITIS B SURFACE AG IA: CPT

## 2018-08-30 PROCEDURE — 86803 HEPATITIS C AB TEST: CPT

## 2018-08-30 PROCEDURE — 99999 PR PBB SHADOW E&M-EST. PATIENT-LVL IV: CPT | Mod: PBBFAC,,, | Performed by: INTERNAL MEDICINE

## 2018-08-30 PROCEDURE — 80076 HEPATIC FUNCTION PANEL: CPT

## 2018-08-30 PROCEDURE — 82784 ASSAY IGA/IGD/IGG/IGM EACH: CPT

## 2018-08-30 PROCEDURE — 86256 FLUORESCENT ANTIBODY TITER: CPT | Mod: 91

## 2018-08-30 PROCEDURE — 82784 ASSAY IGA/IGD/IGG/IGM EACH: CPT | Mod: 59

## 2018-08-30 PROCEDURE — 83540 ASSAY OF IRON: CPT

## 2018-08-30 PROCEDURE — 86038 ANTINUCLEAR ANTIBODIES: CPT

## 2018-08-30 PROCEDURE — 86704 HEP B CORE ANTIBODY TOTAL: CPT

## 2018-08-30 PROCEDURE — 85027 COMPLETE CBC AUTOMATED: CPT

## 2018-08-30 PROCEDURE — 85610 PROTHROMBIN TIME: CPT

## 2018-08-30 PROCEDURE — 86235 NUCLEAR ANTIGEN ANTIBODY: CPT

## 2018-08-30 PROCEDURE — 99214 OFFICE O/P EST MOD 30 MIN: CPT | Mod: PBBFAC | Performed by: INTERNAL MEDICINE

## 2018-08-30 PROCEDURE — 82728 ASSAY OF FERRITIN: CPT

## 2018-08-30 PROCEDURE — 99214 OFFICE O/P EST MOD 30 MIN: CPT | Mod: S$PBB,,, | Performed by: INTERNAL MEDICINE

## 2018-08-30 PROCEDURE — 36415 COLL VENOUS BLD VENIPUNCTURE: CPT

## 2018-08-30 PROCEDURE — 86706 HEP B SURFACE ANTIBODY: CPT

## 2018-08-30 NOTE — PATIENT INSTRUCTIONS
- please schedule blood tests  - please schedule ultrasound  - will call you with results  - life-style modifications: weight loss, low carb/low fat diet  - control of diabetes or insulin resistance   - control of high cholesterol, if needed with statin drugs or other cholesterol-lowering agents  - vitamin E supplements (no more than 800 mg per day) may help reduce liver fat      Nonalcoholic Fatty Liver Disease (NAFLD)  Nonalcoholic fatty liver disease (NAFLD) is a common disease of the liver. It occurs when you have too much fat in the liver. If NAFLD is severe, it can cause liver damage that seems like the damage caused by drinking too much alcohol. But NAFLD is not caused by drinking alcohol. This sheet tells you more about NAFLD and how it can be managed.    How the liver works   The liver is an organ in the upper right side of the belly (abdomen). It has many important jobs. These include:  · Breaking down (metabolizing) proteins, carbohydrates, and fats  · Making a substance called bile that helps break down fats  · Storing and releasing sugar (glucose) into the blood to give the body energy  · Removing toxins from the blood  · Helping with blood clotting  Understanding NAFLD  A healthy liver may contain some fat. But if too much fat builds up in the liver, this causes NAFLD. NAFLD can be mild, causing fatty liver. Or it can be more severe and show inflammation, as well as the fat. This can cause non-alcoholic steatohepatitis (BAUER).  · Fatty liver. With fatty liver, the liver simply has more fat than normal. This extra fat usually does not harm the liver.  · BAUER. With BAUER, the fatty liver becomes inflamed over time. BAUER is serious because it can lead to scarring of the liver (fibrosis). Over time, the scarring may lead to cirrhosis of the liver. This can eventually cause liver failure or liver cancer.  Causes and risk factors of NAFLD  Doctors don't know what causes NAFLD. But certain things make the  problem more likely to happen. These include:  · Obesity  · Prediabetes or diabetes  · High levels of fat found in the blood (cholesterol and triglycerides)  · Being exposed to certain medicines   Symptoms of NAFLD  Most people with NAFLD have no symptoms. If symptoms do occur, they can include:  · Tiredness  · Weakness  · Weight loss  · Loss of appetite  · Nausea and vomiting  · Belly pain and cramping  · Yellowing of the skin and eyes (jaundice), as well as dark urine, or light-colored stools  · Swelling in the belly or legs  Diagnosing NAFLD  Your healthcare provider may think you have NAFLD if routine blood tests show high levels of liver enzymes. This may mean you have a liver problem. You may need one or more imaging tests, such as an ultrasound, CT, or MRI. You may need more blood tests to look for other causes of liver disease. You may also need a liver biopsy. During this test, a hollow needle is used to remove a tiny tissue sample from your liver. This tissue is then checked in a lab. This test can find signs of damage to liver tissue. It can also help figure out the cause of the damage and tell the difference between fatty liver and BAUER.  Treating NAFLD  Treatment for NAFLD varies for each person. The best early treatment is to treat any underlying conditions causing metabolic syndrome. This is the name for a group of conditions that includes:  · High blood pressure  · High levels of cholesterol and triglycerides  · Being overweight or obese  · Diabetes  Your healthcare provider will monitor your health and treat any symptoms or underlying health problems you have. Your provider will also work with you to control your risk factors. This will make liver damage less likely. In fact, treating those underlying conditions can often improve liver disease. You may need to take certain medicines, but no medicine will cure NAFLD. This is why treating the underlying conditions is most important. Your plan may  include:  · Losing extra weight  · Getting regular exercise  · Controlling diabetes and high cholesterol or triglyceride levels  · Taking medicines and vitamins as prescribed by your provider  · Quitting smoking  · Not drinking alcohol  · Eating a healthy and balanced diet  Living with NAFLD  If NAFLD is caught early, it can be managed with treatment. Your healthcare provider will discuss further treatment choices with you as needed.  Be sure to ask your provider about recommended vaccines. These include vaccines for viruses that can cause liver disease.  Date Last Reviewed: 12/1/2016 © 2000-2017 [employer]. [address] [place] [address], [place] [address]. All rights reserved. This information is not intended as a substitute for professional medical care. Always follow your healthcare professional's instructions.

## 2018-08-30 NOTE — LETTER
August 31, 2018      Vonda Oliver MD  [address]  [facility] [address]           [facility]  [address]  [facility] [address]  Phone: [phone]  Fax: [phone]          Patient: Laure Ross   MR Number: [id number]   YOB: 1970   Date of Visit: 8/30/2018       Dear Dr. Vonda Oliver:    Thank you for referring Laure Ross to me for evaluation. Attached you will find relevant portions of my assessment and plan of care.    If you have questions, please do not hesitate to call me. I look forward to following Laure Ross along with you.    Sincerely,    Ramiro Horton MD    Enclosure  CC:  No Recipients    If you would like to receive this communication electronically, please contact [email or web address] or [phone] to request more information on [employer] Link access.    For providers and/or their staff who would like to refer a patient to [facility], please contact us through our one-stop-shop provider referral line, [facility], at [phone].    If you feel you have received this communication in error or would no longer like to receive these types of communications, please e-mail [email or web address]

## 2018-08-31 LAB
ANA SER QL IF: NORMAL
HBV CORE AB SERPL QL IA: NEGATIVE
HBV SURFACE AB SER-ACNC: ABNORMAL M[IU]/ML
HBV SURFACE AG SERPL QL IA: NEGATIVE
HCV AB SERPL QL IA: NEGATIVE
MITOCHONDRIA AB TITR SER IF: NORMAL {TITER}
SMOOTH MUSCLE AB TITR SER IF: NORMAL {TITER}

## 2018-08-31 RX ORDER — LEVALBUTEROL TARTRATE 45 UG/1
AEROSOL, METERED ORAL
Qty: 15 G | Refills: 1 | Status: CANCELLED | OUTPATIENT
Start: 2018-08-31 | End: 2019-08-31

## 2018-08-31 NOTE — PROGRESS NOTES
Hepatology Consult Note    Referring provider: Dr. Vonda Oliver    Chief complaint:   Chief Complaint   Patient presents with    abnormal liver tests       HPI:  Laure Ross is a pleasant 47 y.o. femalewho was referred to Hepatology Clinic for consultation of had concerns including abnormal liver tests..      Patient has multiple cardiac comorbidities - NYHA class 3 heart failure, s/p AICD.    8/25/18: CT renal stone: Liver: Diffusely increased attenuation of the liver, potentially representing hemosiderosis or amiodarone toxicity.  No focal abnormalities within the liver.    As regards to liver disease,  - The patient reports no symptoms of hepatitis including malaise or flu-like symptoms to suggest a flare.  - The patient reports no new manifestations of portal hypertension including ascites, edema, GI bleeding, or hepatic encephalopathy to suggest liver decompensation.  - The patient reports no fevers/chills or pruritis to suggest biliary disease.      Patient Active Problem List   Diagnosis    Hypertensive heart disease with heart failure    Type 2 diabetes mellitus with diabetic polyneuropathy    CLARENCE (obstructive sleep apnea)    Paroxysmal atrial fibrillation    Class 3 obesity with serious comorbidity in adult    Encounter for pre-transplant evaluation for heart transplant    Palliative care encounter    Fatigue    Breast mass on GYN exam 4/29/16    Left bundle-branch block    Organ transplant candidate    Tuberculosis screening    Vitamin D deficiency disease    Chronic anticoagulation    Muscle weakness    Chronic combined systolic and diastolic heart failure    COCM (congestive cardiomyopathy)    High risk medications (not anticoagulants) long-term use    Laryngopharyngeal reflux    Gastroesophageal reflux disease    Chronic rhinitis    Dysphonia    GERD (gastroesophageal reflux disease)    CKD (chronic kidney disease), stage III    Carpal tunnel syndrome, bilateral     NICM (nonischemic cardiomyopathy)    Acute diastolic CHF (congestive heart failure), NYHA class 3    History of diabetic ulcer of foot - Right Foot    Chest pain    Essential hypertension    Chronic asthma with acute exacerbation    Biventricular automatic implantable cardioverter defibrillator in situ    Amiodarone toxicity       Past Medical History:   Diagnosis Date    Anticoagulant long-term use     Arthritis     Asthma     Asthma     Cardiomyopathy     CHF (congestive heart failure)     CHF (congestive heart failure)     COPD (chronic obstructive pulmonary disease) 3/28/2018    GERD (gastroesophageal reflux disease)     H/O tubal ligation     Hypertension     Obesity     Renal disorder     Type 2 diabetes mellitus with hyperglycemia     Type 2 diabetes mellitus with polyneuropathy        Past Surgical History:   Procedure Laterality Date    CARDIAC DEFIBRILLATOR PLACEMENT      CHOLECYSTECTOMY      GALLBLADDER SURGERY      iabp  4/2016    TUBAL LIGATION         Family History   Problem Relation Age of Onset    Heart disease Mother     Heart disease Father        Social History     Socioeconomic History    Marital status: Single     Spouse name: None    Number of children: None    Years of education: None    Highest education level: None   Social Needs    Financial resource strain: None    Food insecurity - worry: None    Food insecurity - inability: None    Transportation needs - medical: None    Transportation needs - non-medical: None   Occupational History    None   Tobacco Use    Smoking status: Never Smoker    Smokeless tobacco: Never Used   Substance and Sexual Activity    Alcohol use: No    Drug use: No    Sexual activity: Yes     Partners: Male   Other Topics Concern    None   Social History Narrative    None       Current Outpatient Medications   Medication Sig Dispense Refill    azelastine (ASTELIN) 137 mcg (0.1 %) nasal spray 2 sprays (274 mcg total) by  Nasal route 2 (two) times daily. 30 mL 2    cetirizine (ZYRTEC) 10 MG tablet Take 1 tablet (10 mg total) by mouth once daily.  0    dicyclomine (BENTYL) 10 MG capsule TAKE 1 CAPSULE 4 TIMES DAILY as needed for abdominal pain 30 capsule 0    digoxin (LANOXIN) 125 mcg tablet TAKE 1 TABLET BY MOUTH ONCE DAILY 30 tablet 5    furosemide (LASIX) 40 MG tablet Take 2 tablets (80 mg total) by mouth 2 (two) times daily. 120 tablet 6    insulin aspart U-100 (NOVOLOG FLEXPEN U-100 INSULIN) 100 unit/mL InPn pen Inject 16 units w/ meals plus scale 150-200 +2, 201-250 +4, 251-300 +6, 301-350 +8. 30 mL 6    insulin glargine (BASAGLAR KWIKPEN U-100 INSULIN) 100 unit/mL (3 mL) InPn pen Inject 34 Units into the skin 2 (two) times daily. 15 mL 6    insulin glargine (BASAGLAR KWIKPEN U-100 INSULIN) 100 unit/mL (3 mL) InPn pen inject 34 under the skin in the morning and 34 units at night 9 mL 6    ketoconazole (NIZORAL) 2 % cream Apply topically once daily. 30 g 1    levalbuterol (XOPENEX HFA) 45 mcg/actuation inhaler INHALE 1 TO 2 PUFFS BY MOUTH EVERY 4 - 6 HOURS AS NEEDED 15 g 1    linaclotide 72 mcg Cap take one tablet daily as needed for constipation 30 capsule 2    lisinopril (PRINIVIL,ZESTRIL) 5 MG tablet TAKE 1 TABLET BY MOUTH TWO TIMES A DAY 60 tablet 8    magnesium oxide (MAG-OX) 400 mg tablet TAKE 1 TABLET BY MOUTH TWICE DAILY 60 tablet 1    montelukast (SINGULAIR) 10 mg tablet Take 1 tablet (10 mg total) by mouth once daily. 30 tablet 3    omeprazole (PRILOSEC) 40 MG capsule Take 1 capsule by mouth every morning 30 capsule 6    potassium chloride (MICRO-K) 10 MEQ CpSR Take 2 capsules (20 mEq total) by mouth once daily. 60 capsule 3    ranitidine (ZANTAC) 300 MG tablet Take 1 tablet by mouth every evening 30 tablet 11    spironolactone (ALDACTONE) 25 MG tablet Take 1 tablet (25 mg total) by mouth once daily. 90 tablet 3    vitamin D 1000 units Tab Take 2 tablets (2,000 Units total) by mouth once daily.       "warfarin (COUMADIN) 7.5 MG tablet Take ½ tablet by mouth once daily and 1 tablet on thursday 60 tablet 3    BD ULTRA-FINE ESSENCE PEN NEEDLES 32 gauge x 5/32" Ndle Takes 5 times  day 200 each 11    blood sugar diagnostic (TRUE METRIX GLUCOSE TEST STRIP) Strp Use to test two times a day 200 strip 2    blood sugar diagnostic Strp Uses 4 times a day, true metrix meter. 150 each 11    budesonide-formoterol 160-4.5 mcg (SYMBICORT) 160-4.5 mcg/actuation HFAA Inhale 2 puffs into the lungs every 12 (twelve) hours. 1 Inhaler 0    lancets 30 gauge Misc use 4 times a day 150 each 2    pen needle, diabetic 32 gauge x 5/32" Ndle USE AS DIRECTED 5 TIMES DAILY 200 each 5    triamcinolone acetonide 0.1% (KENALOG) 0.1 % cream Apply topically 2 (two) times daily. Apply twice a day for 7 days to arms. 45 g 0     No current facility-administered medications for this visit.        Review of patient's allergies indicates:  No Known Allergies    Review of Systems   Constitutional: Positive for malaise/fatigue. Negative for chills, fever and weight loss.   HENT: Negative for congestion, nosebleeds and sore throat.    Eyes: Negative for blurred vision, double vision and photophobia.   Respiratory: Negative for cough and shortness of breath.    Cardiovascular: Positive for palpitations. Negative for chest pain, orthopnea and leg swelling.   Gastrointestinal: Negative for abdominal pain, blood in stool, constipation, diarrhea, melena and vomiting.   Genitourinary: Negative for dysuria.   Musculoskeletal: Negative for falls and joint pain.   Skin: Negative for itching and rash.   Neurological: Negative for dizziness, tremors and weakness.   Endo/Heme/Allergies: Does not bruise/bleed easily.   Psychiatric/Behavioral: Negative for depression and substance abuse. The patient is not nervous/anxious and does not have insomnia.        Vitals:    08/30/18 1551   BP: 138/72   Pulse: 93   Resp: 18   Temp: 97.3 °F (36.3 °C)   TempSrc: Oral   SpO2: " "(!) 94%   Weight: 112.2 kg (247 lb 5.7 oz)   Height: 5' 4" (1.626 m)       Physical Exam   Constitutional: She is oriented to person, place, and time. She appears well-developed and well-nourished.   HENT:   Head: Normocephalic and atraumatic.   Mouth/Throat: Oropharynx is clear and moist.   Eyes: Conjunctivae are normal. No scleral icterus.   Neck: Normal range of motion.   Cardiovascular: Normal rate, regular rhythm and normal heart sounds.   Pulmonary/Chest: Effort normal and breath sounds normal. No respiratory distress. She has no rales.   Abdominal: Soft. Bowel sounds are normal. She exhibits no distension. There is no tenderness. No hernia.   Musculoskeletal: She exhibits no edema.   Lymphadenopathy:     She has no cervical adenopathy.   Neurological: She is alert and oriented to person, place, and time.   Skin: Skin is warm and dry. No rash noted.   Psychiatric: She has a normal mood and affect. Her behavior is normal. Her mood appears not anxious.   Nursing note and vitals reviewed.      LABS: I personally reviewed pertinent laboratory findings.    Lab Results   Component Value Date    ALT 67 (H) 08/30/2018    AST 43 (H) 08/30/2018    ALKPHOS 73 08/30/2018    BILITOT 0.4 08/30/2018       Lab Results   Component Value Date    WBC 9.69 08/30/2018    HGB 12.0 08/30/2018    HCT 38.9 08/30/2018    MCV 85 08/30/2018     08/30/2018       Lab Results   Component Value Date     08/25/2018    K 4.6 08/25/2018     08/25/2018    CO2 26 08/25/2018    BUN 19 08/25/2018    CREATININE 1.3 08/25/2018    CALCIUM 9.5 08/25/2018    ANIONGAP 10 08/25/2018    ESTGFRAFRICA 56.1 (A) 08/25/2018    EGFRNONAA 48.6 (A) 08/25/2018       Lab Results   Component Value Date    HEPBCAB Negative 04/26/2016    HEPCAB Negative 04/26/2016       No results found for: SMOOTHMUSCAB, MITOAB    I personally reviewed all recent lab results.  I personally reviewed imaging studies.    Assessment:  47 y.o. female presenting with "     1. Elevated liver enzymes    2. Abnormal liver diagnostic imaging      Slightly elevated liver enzymes: likely due to congestive hepatopathy.  CT renal showed diffusely increased attenuation.  VCTE: not eligible due to AICD.      Recommendation(s):  - ok to continue amiodarone  - serologies/US and US elastography for work up  - life style modifications for NAFLD    A total of 45 minutes were spent face-to-face with the patient during this encounter and over half of that time was spent on counseling and coordination of care.  We discussed in depth the nature of the patient's liver disease, the management plan in details. I also educated the patient about lifestyle modifications which may improve hepatic steatosis, overweight/obesity, insulin resistance and high blood pressure issues. I have provided the patient with an opportunity to ask questions and have all questions answered to his satisfaction.     I have sent communication to the referring physician and/or primary care provider.    Ramiro Horton MD  Staff Physician  Hepatology and Liver Transplant  [facility]  [facility]

## 2018-09-04 ENCOUNTER — ANTI-COAG VISIT (OUTPATIENT)
Dept: CARDIOLOGY | Facility: CLINIC | Age: 48
End: 2018-09-04
Payer: MEDICAID

## 2018-09-04 DIAGNOSIS — Z79.01 LONG TERM (CURRENT) USE OF ANTICOAGULANTS: Primary | ICD-10-CM

## 2018-09-04 DIAGNOSIS — Z79.01 CHRONIC ANTICOAGULATION: ICD-10-CM

## 2018-09-04 DIAGNOSIS — I48.0 PAROXYSMAL ATRIAL FIBRILLATION: ICD-10-CM

## 2018-09-04 LAB — INR PPP: 2.6 (ref 2–3)

## 2018-09-04 PROCEDURE — 85610 PROTHROMBIN TIME: CPT | Mod: PBBFAC

## 2018-09-04 PROCEDURE — 99211 OFF/OP EST MAY X REQ PHY/QHP: CPT | Mod: S$PBB,25,,

## 2018-09-04 NOTE — PROGRESS NOTES
INR within therapeutic range today.Patient denies any bleeding, bruising or other changes that may affect warfarin therapy. We will resume her weekly dose and follow up in 2 weeks. Patient reminded to call clinic with any changes or concerns.

## 2018-09-05 NOTE — PROGRESS NOTES
Pt seen by Griselda MARRERO. I have reviewed her initial findings and agree with her assessment.

## 2018-09-07 ENCOUNTER — OFFICE VISIT (OUTPATIENT)
Dept: SLEEP MEDICINE | Facility: CLINIC | Age: 48
End: 2018-09-07
Payer: MEDICAID

## 2018-09-07 VITALS — HEART RATE: 85 BPM | OXYGEN SATURATION: 97 % | BODY MASS INDEX: 42.17 KG/M2 | WEIGHT: 247 LBS | HEIGHT: 64 IN

## 2018-09-07 DIAGNOSIS — R91.1 PULMONARY NODULE: ICD-10-CM

## 2018-09-07 DIAGNOSIS — J45.21 INTERMITTENT ASTHMA WITH ACUTE EXACERBATION, UNSPECIFIED ASTHMA SEVERITY: Primary | ICD-10-CM

## 2018-09-07 DIAGNOSIS — G47.33 OSA (OBSTRUCTIVE SLEEP APNEA): ICD-10-CM

## 2018-09-07 PROCEDURE — 99215 OFFICE O/P EST HI 40 MIN: CPT | Mod: S$PBB,,, | Performed by: INTERNAL MEDICINE

## 2018-09-07 PROCEDURE — 99215 OFFICE O/P EST HI 40 MIN: CPT | Mod: PBBFAC,PO | Performed by: INTERNAL MEDICINE

## 2018-09-07 PROCEDURE — 99999 PR PBB SHADOW E&M-EST. PATIENT-LVL V: CPT | Mod: PBBFAC,,, | Performed by: INTERNAL MEDICINE

## 2018-09-07 RX ORDER — PREDNISONE 20 MG/1
40 TABLET ORAL DAILY
Qty: 10 TABLET | Refills: 0 | Status: SHIPPED | OUTPATIENT
Start: 2018-09-07 | End: 2018-09-12

## 2018-09-07 RX ORDER — LEVALBUTEROL INHALATION SOLUTION 0.31 MG/3ML
1 SOLUTION RESPIRATORY (INHALATION) EVERY 4 HOURS PRN
Qty: 72 ML | Refills: 3 | Status: SHIPPED | OUTPATIENT
Start: 2018-09-07

## 2018-09-07 RX ORDER — LEVALBUTEROL TARTRATE 45 UG/1
AEROSOL, METERED ORAL
Qty: 15 G | Refills: 1 | Status: SHIPPED | OUTPATIENT
Start: 2018-09-07 | End: 2019-01-15 | Stop reason: SDUPTHER

## 2018-09-07 NOTE — PROGRESS NOTES
Laure Ross  was seen as a follow up.    CHIEF COMPLAINT:    Chief Complaint   Patient presents with    Sleep Apnea       HISTORY OF PRESENT ILLNESS: Laure Ross is a 47 y.o. female is here for sleep evaluation.   Patient with NICM (EF 10-20%), paroxysmal atrial fibrillation, HTN, DM, asthma, HLD is here for CLARENCE evaluation.  Patient was hospitalized 3/16 for hypercapnic respiratory failure.  Patient was started on BPAP and diuresed.  Patient tolerated BPAP well.  No prior sleep study.  +snoring.  No witnessed sleep apnea.  +feeling rested in am.  No parasomnia.  No cataplexy.      +numbness in left lateral thigh since IABP.      [place] Sleepiness Scale score during initial sleep evaluation was 12.  S/p sleep study 8/24/16.  Patient was set up with apap in 2016.  Patient has been using apap nightly.  Feeling rested upon awake.  Occasional headache.  Once per week.  No snoring apap.  Waking up less.  Sleep latency improve.  Patient with worsening of cough x 2 days.  No fever/chill.  +nasal congestion.      SLEEP ROUTINE:  Activity the hour prior to sleep: watch tv and playing on the phone in bed    Bed partner:  daughter  Time to bed:  12 am   Lights off:  tv is on   Sleep onset latency:  20-30 minutes        Disruptions or awakenings:   1 time  Wakeup time:      7-9 am   Perceived sleep quality:  rested       Daytime naps:      90 minutes in afternoon  Weekend sleep routine:      same  Caffeine use: none  exercise habit:   Therapy 3 times per week      PAST MEDICAL HISTORY:    Active Ambulatory Problems     Diagnosis Date Noted    Hypertensive heart disease with heart failure 04/13/2016    Type 2 diabetes mellitus with diabetic polyneuropathy 04/20/2016    CLARENCE (obstructive sleep apnea)     Paroxysmal atrial fibrillation 04/22/2016    Class 3 obesity with serious comorbidity in adult 04/25/2016    Encounter for pre-transplant evaluation for heart transplant 04/27/2016    Palliative care encounter  04/29/2016    Fatigue 04/29/2016    Breast mass on GYN exam 4/29/16 04/30/2016    Left bundle-branch block 05/01/2016    Organ transplant candidate     Tuberculosis screening     Vitamin D deficiency disease 05/04/2016    Chronic anticoagulation 05/10/2016    Muscle weakness 05/27/2016    Chronic combined systolic and diastolic heart failure 06/03/2016    COCM (congestive cardiomyopathy) 06/05/2016    High risk medications (not anticoagulants) long-term use     Laryngopharyngeal reflux 09/28/2016    Gastroesophageal reflux disease 09/28/2016    Chronic rhinitis 09/28/2016    Dysphonia 09/28/2016    GERD (gastroesophageal reflux disease) 11/17/2016    CKD (chronic kidney disease), stage III 03/17/2017    Carpal tunnel syndrome, bilateral 03/29/2017    NICM (nonischemic cardiomyopathy) 05/04/2017    Acute diastolic CHF (congestive heart failure), NYHA class 3 05/04/2017    History of diabetic ulcer of foot - Right Foot 08/29/2017    Chest pain 03/28/2018    Essential hypertension 03/28/2018    Chronic asthma with acute exacerbation 04/20/2018    Biventricular automatic implantable cardioverter defibrillator in situ 08/03/2018    Amiodarone toxicity 08/25/2018     Resolved Ambulatory Problems     Diagnosis Date Noted    Renal failure 04/16/2016    Type 2 diabetes mellitus with hyperglycemia 04/20/2016    Diabetes mellitus due to underlying condition with diabetic nephropathy 04/22/2016    Uncontrolled diabetes mellitus     Physical deconditioning 05/17/2016    Decreased strength, endurance, and mobility 05/17/2016    Morbid obesity due to excess calories 06/05/2016    Throat clearing 09/28/2016    Other chest pain 03/28/2018     Past Medical History:   Diagnosis Date    Anticoagulant long-term use     Arthritis     Asthma     Asthma     Cardiomyopathy     CHF (congestive heart failure)     CHF (congestive heart failure)     COPD (chronic obstructive pulmonary disease)  "3/28/2018    GERD (gastroesophageal reflux disease)     H/O tubal ligation     Hypertension     Obesity     Renal disorder     Type 2 diabetes mellitus with hyperglycemia     Type 2 diabetes mellitus with polyneuropathy                 PAST SURGICAL HISTORY:    Past Surgical History:   Procedure Laterality Date    CARDIAC DEFIBRILLATOR PLACEMENT      CARDIOVERSION N/A 4/19/2016    Performed by Navi Jolly [facility] at [facility] CATH LAB    CHOLECYSTECTOMY      COLONOSCOPY N/A 5/2/2016    Procedure: COLONOSCOPY;  Surgeon: Eugene Soto MD;  Location: [facility] (2ND FLR);  Service: Endoscopy;  Laterality: N/A;    COLONOSCOPY N/A 5/2/2016    Performed by Eugene Soto MD at [facility] ENDO (2ND FLR)    ECHOCARDIOGRAM-TRANSESOPHAGEAL N/A 4/15/2016    [facility]    ESOPHAGOGASTRODUODENOSCOPY (EGD) N/A 11/17/2016    Performed by Joe Magana MD at [facility] (2ND FLR)    GALLBLADDER SURGERY      iabp  4/2016    INSERTION-ICD-BIVENTRICULAR Right 5/4/2017    Performed by Navi Jolly MD at [facility] CATH LAB    TUBAL LIGATION           FAMILY HISTORY:                Family History   Problem Relation Age of Onset    Heart disease Mother     Heart disease Father        SOCIAL HISTORY:          Tobacco:   Social History     Tobacco Use   Smoking Status Never Smoker   Smokeless Tobacco Never Used       alcohol use:    Social History     Substance and Sexual Activity   Alcohol Use No                 Occupation:  disable    ALLERGIES:  Review of patient's allergies indicates:  No Known Allergies    CURRENT MEDICATIONS:    Current Outpatient Medications   Medication Sig Dispense Refill    azelastine (ASTELIN) 137 mcg (0.1 %) nasal spray 2 sprays (274 mcg total) by Nasal route 2 (two) times daily. 30 mL 2    BD ULTRA-FINE ESSENCE PEN NEEDLES 32 gauge x 5/32" Ndle Takes 5 times  day 200 each 11    blood sugar diagnostic (TRUE METRIX GLUCOSE TEST STRIP) Strp Use to test two times a day 200 " "strip 2    blood sugar diagnostic Strp Uses 4 times a day, true metrix meter. 150 each 11    cetirizine (ZYRTEC) 10 MG tablet Take 1 tablet (10 mg total) by mouth once daily.  0    dicyclomine (BENTYL) 10 MG capsule TAKE 1 CAPSULE 4 TIMES DAILY as needed for abdominal pain 30 capsule 0    digoxin (LANOXIN) 125 mcg tablet TAKE 1 TABLET BY MOUTH ONCE DAILY 30 tablet 5    furosemide (LASIX) 40 MG tablet Take 2 tablets (80 mg total) by mouth 2 (two) times daily. 120 tablet 6    insulin aspart U-100 (NOVOLOG FLEXPEN U-100 INSULIN) 100 unit/mL InPn pen Inject 16 units w/ meals plus scale 150-200 +2, 201-250 +4, 251-300 +6, 301-350 +8. 30 mL 6    insulin glargine (BASAGLAR KWIKPEN U-100 INSULIN) 100 unit/mL (3 mL) InPn pen Inject 34 Units into the skin 2 (two) times daily. 15 mL 6    insulin glargine (BASAGLAR KWIKPEN U-100 INSULIN) 100 unit/mL (3 mL) InPn pen inject 34 under the skin in the morning and 34 units at night 9 mL 6    ketoconazole (NIZORAL) 2 % cream Apply topically once daily. 30 g 1    lancets 30 gauge Misc use 4 times a day 150 each 2    levalbuterol (XOPENEX HFA) 45 mcg/actuation inhaler INHALE 1 TO 2 PUFFS BY MOUTH EVERY 4 - 6 HOURS AS NEEDED 15 g 1    linaclotide 72 mcg Cap take one tablet daily as needed for constipation 30 capsule 2    lisinopril (PRINIVIL,ZESTRIL) 5 MG tablet TAKE 1 TABLET BY MOUTH TWO TIMES A DAY 60 tablet 8    magnesium oxide (MAG-OX) 400 mg tablet TAKE 1 TABLET BY MOUTH TWICE DAILY 60 tablet 1    montelukast (SINGULAIR) 10 mg tablet Take 1 tablet (10 mg total) by mouth once daily. 30 tablet 3    omeprazole (PRILOSEC) 40 MG capsule Take 1 capsule by mouth every morning 30 capsule 6    pen needle, diabetic 32 gauge x 5/32" Ndle USE AS DIRECTED 5 TIMES DAILY 200 each 5    potassium chloride (MICRO-K) 10 MEQ CpSR Take 2 capsules (20 mEq total) by mouth once daily. 60 capsule 3    ranitidine (ZANTAC) 300 MG tablet Take 1 tablet by mouth every evening 30 tablet 11    " "spironolactone (ALDACTONE) 25 MG tablet Take 1 tablet (25 mg total) by mouth once daily. 90 tablet 3    vitamin D 1000 units Tab Take 2 tablets (2,000 Units total) by mouth once daily.      warfarin (COUMADIN) 7.5 MG tablet Take ½ tablet by mouth once daily and 1 tablet on thursday 60 tablet 3    budesonide-formoterol 160-4.5 mcg (SYMBICORT) 160-4.5 mcg/actuation HFAA Inhale 2 puffs into the lungs every 12 (twelve) hours. 1 Inhaler 0    levalbuterol (XOPENEX) 0.31 mg/3 mL nebulizer solution Take 3 mLs (0.31 mg total) by nebulization every 4 (four) hours as needed for Wheezing. Rescue 72 mL 3    predniSONE (DELTASONE) 20 MG tablet Take 2 tablets (40 mg total) by mouth once daily for 5 days 10 tablet 0    triamcinolone acetonide 0.1% (KENALOG) 0.1 % cream Apply topically 2 (two) times daily. Apply twice a day for 7 days to arms. 45 g 0     No current facility-administered medications for this visit.                   REVIEW OF SYSTEMS:   No acute changes from previous encounter dated 7/26/2016 with exceptions mentioned in HPI.         PHYSICAL EXAM:  Vitals:    09/07/18 0947   Pulse: 85   SpO2: 97%   Weight: 112 kg (247 lb)   Height: 5' 4" (1.626 m)   PainSc:   4   PainLoc: Hip     Body mass index is 42.4 kg/m².     GENERAL: Normal development, well groomed  HEENT:  Conjunctivae are non-erythematous; Pupils equal, round, and reactive to light; Nose is symmetrical; Nasal mucosa is pink and moist; Septum is midline; Inferior turbinates are normal; Nasal airflow is congested; Posterior pharynx is pink; Modified Mallampati: 3; Posterior palate is normal; Tonsils +1; Uvula is normal and pink;Tongue is normal; Dentition is fair; No TMJ tenderness; Jaw opening and protrusion without click and without discomfort.  NECK: Supple. Neck circumference is 16 inches. No thyromegaly. No palpable nodes.     SKIN: On face and neck: No abrasions, no rashes, no lesions.  No subcutaneous nodules are palpable.  RESPIRATORY: Chest is " clear to auscultation.  Normal chest expansion and non-labored breathing at rest.  CARDIOVASCULAR: Normal S1, S2.  No murmurs, gallops or rubs. No carotid bruits bilaterally.  EXTREMITIES: No edema. No clubbing. No cyanosis. Station normal. Gait normal.        NEURO/PSYCH: Oriented to time, place and person. Normal attention span and concentration. Affect is full. Mood is normal.                                              DATA   Echo 5/3/16    1 - Eccentric hypertrophy.     2 - Severely depressed left ventricular systolic function (EF 20-25%).     3 - Left ventricular diastolic dysfunction.     4 - Biatrial enlargement.     5 - Normal right ventricular systolic function .     6 - Pulmonary hypertension. The estimated PA systolic pressure is 63 mmHg.     7 - Moderate mitral regurgitation.     8 - Trivial pulmonic regurgitation.     9 - Trivial pericardial effusion.     10 - Intermediate central venous pressure.     8/29/16 ahi of 13 with supine ahi of 19 and rem ahi of 33    pft 7/30/18 ratio 70%; fvc 59%; fev1 50%; dlco 85%    Ct chest 6/4/18- lobular enhancing lesion in rul.?avm.  unchange    Lab Results   Component Value Date    TSH 1.287 07/30/2018     ASSESSMENT    ICD-10-CM ICD-9-CM    1. Intermittent asthma with acute exacerbation, unspecified asthma severity J45.21 493.92 predniSONE (DELTASONE) 20 MG tablet      levalbuterol (XOPENEX) 0.31 mg/3 mL nebulizer solution      levalbuterol (XOPENEX HFA) 45 mcg/actuation inhaler   2. CLARENCE (obstructive sleep apnea) G47.33 327.23    3. Pulmonary nodule R91.1 793.11        PLAN:    Sleep Apnea NEC. - doing well with apap with good compliance.  93%>4  Hours.  Residual ahi of 3.4.  Patient is benefiting from apap.    obestiy - lost weight with diuresis.    Poor sleep hygiene - sleep hygiene d/w patient.      Asthma - xopenex, spiriva, singulair and symbicort.  Prednisone burst.  Patient will monitor glucose.    Nasal congestion - currently on flonase.  Recommend sinus  irrigation.      Education: During our discussion today, we talked about the etiology of obstructive sleep apnea as well as the potential ramifications of untreated sleep apnea, which could include daytime sleepiness, hypertension, heart disease and/or stroke.     Precautions: The patient was advised to abstain from driving should they feel sleepy or drowsy.       Patient will Follow-up in about 3 months (around 12/7/2018).       This is 35 minutes visit, over 50% of time spent in direct consultation with patient.

## 2018-09-07 NOTE — PATIENT INSTRUCTIONS
1.  NeilMed Sinus Rinse Regular Kit    Recipe 1/4 teaspoon of salt and 1/4 teaspoon of baking soda in distilled water.  Be sure to tilt head forward as shown in picture.           flonase or nasonex over the counter.  2 sprays per nostril daily. Be sure to tilt head forward when dispensing medication.

## 2018-09-10 ENCOUNTER — TELEPHONE (OUTPATIENT)
Dept: TRANSPLANT | Facility: CLINIC | Age: 48
End: 2018-09-10

## 2018-09-10 NOTE — TELEPHONE ENCOUNTER
Called and informed patient that her liver blood tests: serology/autoimmune markers are unremarkable. Patient expressed understanding.

## 2018-09-14 ENCOUNTER — HOSPITAL ENCOUNTER (OUTPATIENT)
Dept: RADIOLOGY | Facility: HOSPITAL | Age: 48
Discharge: HOME | End: 2018-09-14
Attending: INTERNAL MEDICINE
Payer: MEDICAID

## 2018-09-14 DIAGNOSIS — R74.8 ELEVATED LIVER ENZYMES: ICD-10-CM

## 2018-09-14 DIAGNOSIS — R93.2 ABNORMAL LIVER DIAGNOSTIC IMAGING: ICD-10-CM

## 2018-09-14 PROCEDURE — 93975 VASCULAR STUDY: CPT | Mod: TC

## 2018-09-14 PROCEDURE — 93975 VASCULAR STUDY: CPT | Mod: 26,,, | Performed by: RADIOLOGY

## 2018-09-14 PROCEDURE — 76705 ECHO EXAM OF ABDOMEN: CPT | Mod: TC

## 2018-09-14 PROCEDURE — 76705 ECHO EXAM OF ABDOMEN: CPT | Mod: 26,59,, | Performed by: RADIOLOGY

## 2018-09-19 ENCOUNTER — LAB VISIT (OUTPATIENT)
Dept: LAB | Facility: HOSPITAL | Age: 48
End: 2018-09-19
Attending: INTERNAL MEDICINE
Payer: MEDICAID

## 2018-09-19 DIAGNOSIS — I50.9 ACUTE HEART FAILURE, UNSPECIFIED HEART FAILURE TYPE: ICD-10-CM

## 2018-09-19 LAB
ALBUMIN SERPL BCP-MCNC: 3.5 G/DL
ALP SERPL-CCNC: 67 U/L
ALT SERPL W/O P-5'-P-CCNC: 60 U/L
ANION GAP SERPL CALC-SCNC: 9 MMOL/L
AST SERPL-CCNC: 39 U/L
BILIRUB SERPL-MCNC: 0.5 MG/DL
BNP SERPL-MCNC: 98 PG/ML
BUN SERPL-MCNC: 19 MG/DL
CALCIUM SERPL-MCNC: 9.6 MG/DL
CHLORIDE SERPL-SCNC: 101 MMOL/L
CO2 SERPL-SCNC: 30 MMOL/L
CREAT SERPL-MCNC: 1.3 MG/DL
EST. GFR  (AFRICAN AMERICAN): 56.1 ML/MIN/1.73 M^2
EST. GFR  (NON AFRICAN AMERICAN): 48.6 ML/MIN/1.73 M^2
GLUCOSE SERPL-MCNC: 160 MG/DL
MAGNESIUM SERPL-MCNC: 2 MG/DL
POTASSIUM SERPL-SCNC: 4.4 MMOL/L
PROT SERPL-MCNC: 6.9 G/DL
SODIUM SERPL-SCNC: 140 MMOL/L

## 2018-09-19 PROCEDURE — 80053 COMPREHEN METABOLIC PANEL: CPT

## 2018-09-19 PROCEDURE — 83880 ASSAY OF NATRIURETIC PEPTIDE: CPT

## 2018-09-19 PROCEDURE — 36415 COLL VENOUS BLD VENIPUNCTURE: CPT

## 2018-09-19 PROCEDURE — 83735 ASSAY OF MAGNESIUM: CPT

## 2018-09-20 ENCOUNTER — OFFICE VISIT (OUTPATIENT)
Dept: TRANSPLANT | Facility: CLINIC | Age: 48
End: 2018-09-20
Payer: MEDICAID

## 2018-09-20 ENCOUNTER — ANTI-COAG VISIT (OUTPATIENT)
Dept: CARDIOLOGY | Facility: CLINIC | Age: 48
End: 2018-09-20
Payer: MEDICAID

## 2018-09-20 VITALS
DIASTOLIC BLOOD PRESSURE: 80 MMHG | SYSTOLIC BLOOD PRESSURE: 148 MMHG | BODY MASS INDEX: 42.76 KG/M2 | WEIGHT: 250.44 LBS | HEART RATE: 80 BPM | HEIGHT: 64 IN

## 2018-09-20 DIAGNOSIS — I10 ESSENTIAL HYPERTENSION: ICD-10-CM

## 2018-09-20 DIAGNOSIS — I48.0 PAROXYSMAL ATRIAL FIBRILLATION: ICD-10-CM

## 2018-09-20 DIAGNOSIS — Z79.01 CHRONIC ANTICOAGULATION: ICD-10-CM

## 2018-09-20 DIAGNOSIS — N18.30 CKD (CHRONIC KIDNEY DISEASE), STAGE III: ICD-10-CM

## 2018-09-20 DIAGNOSIS — Z95.810 BIVENTRICULAR AUTOMATIC IMPLANTABLE CARDIOVERTER DEFIBRILLATOR IN SITU: ICD-10-CM

## 2018-09-20 DIAGNOSIS — I50.42 CHRONIC COMBINED SYSTOLIC AND DIASTOLIC CONGESTIVE HEART FAILURE: Primary | ICD-10-CM

## 2018-09-20 DIAGNOSIS — Z79.01 LONG TERM (CURRENT) USE OF ANTICOAGULANTS: Primary | ICD-10-CM

## 2018-09-20 LAB — INR PPP: 1.7 (ref 2–3)

## 2018-09-20 PROCEDURE — 99211 OFF/OP EST MAY X REQ PHY/QHP: CPT | Mod: S$PBB,25,, | Performed by: INTERNAL MEDICINE

## 2018-09-20 PROCEDURE — 99214 OFFICE O/P EST MOD 30 MIN: CPT | Mod: S$PBB,,, | Performed by: INTERNAL MEDICINE

## 2018-09-20 PROCEDURE — 85610 PROTHROMBIN TIME: CPT | Mod: PBBFAC

## 2018-09-20 PROCEDURE — 99999 PR PBB SHADOW E&M-EST. PATIENT-LVL III: CPT | Mod: PBBFAC,,, | Performed by: INTERNAL MEDICINE

## 2018-09-20 PROCEDURE — 99213 OFFICE O/P EST LOW 20 MIN: CPT | Mod: PBBFAC | Performed by: INTERNAL MEDICINE

## 2018-09-20 NOTE — LETTER
September 20, 2018        Ayan Olmstead  [address] [place] [id number]  Phone: [phone]  Fax: [phone]             [facility] [address] [place] [address]  Phone: [phone]   Patient: Laure Ross   MR Number: [id number]   YOB: 1970   Date of Visit: 9/20/2018       Dear Dr. Ayan Olmstead    Thank you for referring Laure Ross to me for evaluation. Attached you will find relevant portions of my assessment and plan of care.    If you have questions, please do not hesitate to call me. I look forward to following Laure Ross along with you.    Sincerely,    Dandre Jules MD    Enclosure    If you would like to receive this communication electronically, please contact [email or web address] or [phone] to request [employer] access.    [employer] is a tool which provides read-only access to select patient information with whom you have a relationship. Its easy to use and provides real time access to review your patients record including encounter summaries, notes, results, and demographic information.    If you feel you have received this communication in error or would no longer like to receive these types of communications, please e-mail [email or web address]

## 2018-09-20 NOTE — PROGRESS NOTES
Subjective:     HPI:  Ms. Ross is a very pleasant 46 yo F with a history of NICM (EF 10-20%), paroxysmal atrial fibrillation, HTN, DM, asthma, HLD and CLARENCE who comes for a follow-up visit. Continues to do well from a HF standpoint. Today she reports NYHA class II with occasional IIsymptoms. No  PND or orthopnea.  No HF admissions. Had some RUQ pain and was seen by liver team. Also due to increase in her LFTs, Dr. Jolly discontinued her Amiodarone.     Past Medical History:   Diagnosis Date    Anticoagulant long-term use     Arthritis     Asthma     Asthma     Cardiomyopathy     CHF (congestive heart failure)     CHF (congestive heart failure)     Chronic combined systolic and diastolic heart failure 6/3/2016    COPD (chronic obstructive pulmonary disease) 3/28/2018    GERD (gastroesophageal reflux disease)     H/O tubal ligation     Hypertension     Obesity     Renal disorder     Type 2 diabetes mellitus with hyperglycemia     Type 2 diabetes mellitus with polyneuropathy      Past Surgical History:   Procedure Laterality Date    CARDIAC DEFIBRILLATOR PLACEMENT      CARDIOVERSION N/A 2016    Performed by Navi [facility]olly MD at [facility] CATH LAB    CHOLECYSTECTOMY      COLONOSCOPY N/A 2016    Procedure: COLONOSCOPY;  Surgeon: Eugene Soto MD;  Location: [facility] ([address] [place] [address]);  Service: Endoscopy;  Laterality: N/A;    COLONOSCOPY N/A 2016    Performed by Eugnee Soto MD at [facility] ([address] [place] [address])    ECHOCARDIOGRAM-TRANSESOPHAGEAL N/A 4/15/2016    [facility]    ESOPHAGOGASTRODUODENOSCOPY (EGD) N/A 2016    Performed by Joe Magana MD at [facility] ([address] [place] [address])    GALLBLADDER SURGERY      iabp  2016    INSERTION-ICD-BIVENTRICULAR Right 2017    Performed by Navi Jolly MD at [facility] CATH LAB    TUBAL LIGATION       OB History      Para Term  AB Living    3 3 3          SAB TAB Ectopic Multiple Live Births                  "    Review of Systems   Constitution: Negative. Negative for chills, decreased appetite, diaphoresis, fever, weakness, malaise/fatigue, night sweats, weight gain and weight loss.   Eyes: Negative.    Cardiovascular: Positive for dyspnea on exertion. Negative for chest pain, claudication, cyanosis, irregular heartbeat, leg swelling, near-syncope, orthopnea, palpitations, paroxysmal nocturnal dyspnea and syncope.   Respiratory: Negative for cough, hemoptysis and shortness of breath.    Endocrine: Negative.    Hematologic/Lymphatic: Negative.    Skin: Negative for color change, dry skin and nail changes.   Musculoskeletal: Negative.    Gastrointestinal: Negative.    Genitourinary: Negative.        Objective:   Blood pressure (!) 148/80, pulse 80, height 5' 4" (1.626 m), weight 113.6 kg (250 lb 7.1 oz).body mass index is 42.99 kg/m².  Physical Exam   Constitutional: She is oriented to person, place, and time. Vital signs are normal. She appears well-developed and well-nourished.   HENT:   Head: Normocephalic.   Eyes: Pupils are equal, round, and reactive to light.   Neck: Neck supple. No JVD present.   Cardiovascular: Normal rate, regular rhythm, normal heart sounds and intact distal pulses. PMI is displaced. Exam reveals no gallop and no friction rub.   No murmur heard.  Normal right and left Kash test    Pulmonary/Chest: Effort normal and breath sounds normal. No respiratory distress. She has no wheezes. She has no rales.   Abdominal: Soft. Bowel sounds are normal. She exhibits no distension. There is no tenderness. There is no rebound.   Musculoskeletal: She exhibits no edema.   Neurological: She is alert and oriented to person, place, and time.   Nursing note and vitals reviewed.      Labs:    Chemistry        Component Value Date/Time     09/19/2018 1010    K 4.4 09/19/2018 1010     09/19/2018 1010    CO2 30 (H) 09/19/2018 1010    BUN 19 09/19/2018 1010    CREATININE 1.3 09/19/2018 1010     (H) " 09/19/2018 1010        Component Value Date/Time    CALCIUM 9.6 09/19/2018 1010    ALKPHOS 67 09/19/2018 1010    AST 39 09/19/2018 1010    ALT 60 (H) 09/19/2018 1010    BILITOT 0.5 09/19/2018 1010    ESTGFRAFRICA 56.1 (A) 09/19/2018 1010    EGFRNONAA 48.6 (A) 09/19/2018 1010          Magnesium   Date Value Ref Range Status   09/19/2018 2.0 1.6 - 2.6 mg/dL Final     Lab Results   Component Value Date    WBC 9.69 08/30/2018    HGB 12.0 08/30/2018    HCT 38.9 08/30/2018     08/30/2018     Lab Results   Component Value Date    INR 2.6 09/04/2018    INR 2.1 (H) 08/30/2018    INR 2.0 08/20/2018     BNP   Date Value Ref Range Status   09/19/2018 98 0 - 99 pg/mL Final     Comment:     Values of less than 100 pg/ml are consistent with non-CHF populations.   04/17/2018 97 0 - 99 pg/mL Final     Comment:     Values of less than 100 pg/ml are consistent with non-CHF populations.   04/09/2018 114 (H) 0 - 99 pg/mL Final     Comment:     Values of less than 100 pg/ml are consistent with non-CHF populations.     LD   Date Value Ref Range Status   04/14/2016 827 (H) 110 - 260 U/L Final     Comment:     Results are increased in hemolyzed samples.       Assessment:      1. Chronic combined systolic and diastolic congestive heart failure    2. Biventricular automatic implantable cardioverter defibrillator in situ    3. Essential hypertension    4. CKD (chronic kidney disease), stage III        Plan:   Stable from a HF standpoint.   NYHA class II symptoms. Appears  euvolemic on exam.   Continue Lisinopril and Aldactone.  Recommend 2 gram sodium restriction and 1500cc fluid restriction.  Encourage physical activity with graded exercise program.  Requested patient to weigh themselves daily, and to notify us if their weight increases by more than 3 lbs in 1 day or 5 lbs in 1 week.    RTC in 6 months with labs       Dandre Jules MD

## 2018-09-20 NOTE — PROGRESS NOTES
INR subtherapeutic today. Patient reports taking prednisone 9/7-9/12. I reminded her to call clinic with any new medications or changes to medications. Med card states that amiodarone was discontinued on 8/29. Patient states that she did stop it X 2 days but then resumed. It was discontinued today again. Due to DDI we will increase her weekly dose. Patient denies any other changes. Follow up in 2 weeks. Patient reminded to call clinic with any changes or concerns. Care plan made with SHALA PEREZ.

## 2018-09-30 ENCOUNTER — HOSPITAL ENCOUNTER (EMERGENCY)
Facility: HOSPITAL | Age: 48
Discharge: HOME | End: 2018-09-30
Attending: EMERGENCY MEDICINE
Payer: MEDICAID

## 2018-09-30 VITALS
RESPIRATION RATE: 18 BRPM | DIASTOLIC BLOOD PRESSURE: 70 MMHG | HEART RATE: 86 BPM | TEMPERATURE: 98 F | BODY MASS INDEX: 41.83 KG/M2 | OXYGEN SATURATION: 100 % | SYSTOLIC BLOOD PRESSURE: 153 MMHG | HEIGHT: 64 IN | WEIGHT: 245 LBS

## 2018-09-30 DIAGNOSIS — J40 BRONCHITIS: ICD-10-CM

## 2018-09-30 DIAGNOSIS — R06.02 SOB (SHORTNESS OF BREATH): ICD-10-CM

## 2018-09-30 DIAGNOSIS — J45.31 MILD PERSISTENT ASTHMA WITH ACUTE EXACERBATION: Primary | ICD-10-CM

## 2018-09-30 LAB
ALBUMIN SERPL BCP-MCNC: 3.7 G/DL
ALP SERPL-CCNC: 76 U/L
ALT SERPL W/O P-5'-P-CCNC: 81 U/L
ANION GAP SERPL CALC-SCNC: 9 MMOL/L
AST SERPL-CCNC: 56 U/L
BASOPHILS # BLD AUTO: 0.03 K/UL
BASOPHILS NFR BLD: 0.4 %
BILIRUB SERPL-MCNC: 0.4 MG/DL
BNP SERPL-MCNC: 101 PG/ML
BUN SERPL-MCNC: 16 MG/DL
CALCIUM SERPL-MCNC: 9.3 MG/DL
CHLORIDE SERPL-SCNC: 104 MMOL/L
CO2 SERPL-SCNC: 25 MMOL/L
CREAT SERPL-MCNC: 1.2 MG/DL
DIFFERENTIAL METHOD: ABNORMAL
EOSINOPHIL # BLD AUTO: 0.2 K/UL
EOSINOPHIL NFR BLD: 2.4 %
ERYTHROCYTE [DISTWIDTH] IN BLOOD BY AUTOMATED COUNT: 15.4 %
EST. GFR  (AFRICAN AMERICAN): >60 ML/MIN/1.73 M^2
EST. GFR  (NON AFRICAN AMERICAN): 53.2 ML/MIN/1.73 M^2
GLUCOSE SERPL-MCNC: 132 MG/DL
HCT VFR BLD AUTO: 41.4 %
HGB BLD-MCNC: 12.6 G/DL
IMM GRANULOCYTES # BLD AUTO: 0.02 K/UL
IMM GRANULOCYTES NFR BLD AUTO: 0.3 %
INR PPP: 2.2
LYMPHOCYTES # BLD AUTO: 2.1 K/UL
LYMPHOCYTES NFR BLD: 30.2 %
MCH RBC QN AUTO: 25.8 PG
MCHC RBC AUTO-ENTMCNC: 30.4 G/DL
MCV RBC AUTO: 85 FL
MONOCYTES # BLD AUTO: 0.6 K/UL
MONOCYTES NFR BLD: 8.5 %
NEUTROPHILS # BLD AUTO: 4.1 K/UL
NEUTROPHILS NFR BLD: 58.2 %
NRBC BLD-RTO: 0 /100 WBC
PLATELET # BLD AUTO: 339 K/UL
PMV BLD AUTO: 9.9 FL
POTASSIUM SERPL-SCNC: 4 MMOL/L
PROT SERPL-MCNC: 7.6 G/DL
PROTHROMBIN TIME: 21.5 SEC
RBC # BLD AUTO: 4.88 M/UL
SODIUM SERPL-SCNC: 138 MMOL/L
TROPONIN I SERPL DL<=0.01 NG/ML-MCNC: 0.05 NG/ML
WBC # BLD AUTO: 7.02 K/UL

## 2018-09-30 PROCEDURE — 85025 COMPLETE CBC W/AUTO DIFF WBC: CPT | Mod: NTX

## 2018-09-30 PROCEDURE — 85610 PROTHROMBIN TIME: CPT | Mod: NTX

## 2018-09-30 PROCEDURE — 63600175 PHARM REV CODE 636 W HCPCS: Mod: NTX | Performed by: EMERGENCY MEDICINE

## 2018-09-30 PROCEDURE — 84484 ASSAY OF TROPONIN QUANT: CPT | Mod: NTX

## 2018-09-30 PROCEDURE — 83880 ASSAY OF NATRIURETIC PEPTIDE: CPT | Mod: NTX

## 2018-09-30 PROCEDURE — 80053 COMPREHEN METABOLIC PANEL: CPT | Mod: NTX

## 2018-09-30 PROCEDURE — 25000242 PHARM REV CODE 250 ALT 637 W/ HCPCS: Mod: NTX | Performed by: EMERGENCY MEDICINE

## 2018-09-30 PROCEDURE — 99284 EMERGENCY DEPT VISIT MOD MDM: CPT | Mod: 25

## 2018-09-30 PROCEDURE — 94761 N-INVAS EAR/PLS OXIMETRY MLT: CPT | Mod: NTX

## 2018-09-30 PROCEDURE — 25000003 PHARM REV CODE 250: Mod: NTX | Performed by: EMERGENCY MEDICINE

## 2018-09-30 PROCEDURE — 96374 THER/PROPH/DIAG INJ IV PUSH: CPT

## 2018-09-30 PROCEDURE — 94640 AIRWAY INHALATION TREATMENT: CPT | Mod: NTX

## 2018-09-30 PROCEDURE — 99285 EMERGENCY DEPT VISIT HI MDM: CPT | Mod: NTX,,, | Performed by: EMERGENCY MEDICINE

## 2018-09-30 PROCEDURE — 27100098 HC SPACER: Mod: NTX

## 2018-09-30 PROCEDURE — 27000221 HC OXYGEN, UP TO 24 HOURS: Mod: NTX

## 2018-09-30 RX ORDER — BENZONATATE 100 MG/1
200 CAPSULE ORAL
Status: COMPLETED | OUTPATIENT
Start: 2018-09-30 | End: 2018-09-30

## 2018-09-30 RX ORDER — IPRATROPIUM BROMIDE AND ALBUTEROL SULFATE 2.5; .5 MG/3ML; MG/3ML
3 SOLUTION RESPIRATORY (INHALATION)
Status: COMPLETED | OUTPATIENT
Start: 2018-09-30 | End: 2018-09-30

## 2018-09-30 RX ORDER — AMOXICILLIN AND CLAVULANATE POTASSIUM 875; 125 MG/1; MG/1
1 TABLET, FILM COATED ORAL 2 TIMES DAILY
Qty: 14 TABLET | Refills: 0 | Status: SHIPPED | OUTPATIENT
Start: 2018-09-30 | End: 2018-11-02

## 2018-09-30 RX ORDER — METHYLPREDNISOLONE SOD SUCC 125 MG
125 VIAL (EA) INJECTION
Status: COMPLETED | OUTPATIENT
Start: 2018-09-30 | End: 2018-09-30

## 2018-09-30 RX ORDER — BENZONATATE 100 MG/1
100 CAPSULE ORAL 3 TIMES DAILY PRN
Qty: 20 CAPSULE | Refills: 4 | Status: SHIPPED | OUTPATIENT
Start: 2018-09-30 | End: 2018-10-10

## 2018-09-30 RX ORDER — PREDNISONE 20 MG/1
40 TABLET ORAL DAILY
Qty: 10 TABLET | Refills: 2 | Status: SHIPPED | OUTPATIENT
Start: 2018-09-30 | End: 2018-10-05

## 2018-09-30 RX ADMIN — IPRATROPIUM BROMIDE AND ALBUTEROL SULFATE 3 ML: .5; 3 SOLUTION RESPIRATORY (INHALATION) at 11:09

## 2018-09-30 RX ADMIN — BENZONATATE 200 MG: 100 CAPSULE ORAL at 12:09

## 2018-09-30 RX ADMIN — IPRATROPIUM BROMIDE AND ALBUTEROL SULFATE 3 ML: .5; 3 SOLUTION RESPIRATORY (INHALATION) at 09:09

## 2018-09-30 RX ADMIN — METHYLPREDNISOLONE SODIUM SUCCINATE 125 MG: 125 INJECTION, POWDER, FOR SOLUTION INTRAMUSCULAR; INTRAVENOUS at 09:09

## 2018-09-30 NOTE — DISCHARGE INSTRUCTIONS
Take the medications as prescribed  Use the spacer with the inhaler  Follow up with your doctor as needed

## 2018-10-01 ENCOUNTER — LAB VISIT (OUTPATIENT)
Dept: LAB | Facility: HOSPITAL | Age: 48
End: 2018-10-01
Payer: MEDICAID

## 2018-10-01 ENCOUNTER — OFFICE VISIT (OUTPATIENT)
Dept: PODIATRY | Facility: CLINIC | Age: 48
End: 2018-10-01
Payer: MEDICAID

## 2018-10-01 ENCOUNTER — TELEPHONE (OUTPATIENT)
Dept: ENDOCRINOLOGY | Facility: CLINIC | Age: 48
End: 2018-10-01

## 2018-10-01 VITALS
WEIGHT: 245 LBS | SYSTOLIC BLOOD PRESSURE: 130 MMHG | DIASTOLIC BLOOD PRESSURE: 76 MMHG | HEIGHT: 64 IN | BODY MASS INDEX: 41.83 KG/M2

## 2018-10-01 DIAGNOSIS — E11.42 TYPE 2 DIABETES MELLITUS WITH DIABETIC POLYNEUROPATHY, WITH LONG-TERM CURRENT USE OF INSULIN: Chronic | ICD-10-CM

## 2018-10-01 DIAGNOSIS — E11.49 TYPE II DIABETES MELLITUS WITH NEUROLOGICAL MANIFESTATIONS: Primary | ICD-10-CM

## 2018-10-01 DIAGNOSIS — L84 CORN OR CALLUS: ICD-10-CM

## 2018-10-01 DIAGNOSIS — B35.3 TINEA PEDIS OF BOTH FEET: ICD-10-CM

## 2018-10-01 DIAGNOSIS — Z79.4 TYPE 2 DIABETES MELLITUS WITH DIABETIC POLYNEUROPATHY, WITH LONG-TERM CURRENT USE OF INSULIN: Chronic | ICD-10-CM

## 2018-10-01 LAB
CHOLEST SERPL-MCNC: 261 MG/DL
CHOLEST/HDLC SERPL: 3.7 {RATIO}
ESTIMATED AVG GLUCOSE: 166 MG/DL
HBA1C MFR BLD HPLC: 7.4 %
HDLC SERPL-MCNC: 70 MG/DL
HDLC SERPL: 26.8 %
LDLC SERPL CALC-MCNC: 174.2 MG/DL
NONHDLC SERPL-MCNC: 191 MG/DL
TRIGL SERPL-MCNC: 84 MG/DL

## 2018-10-01 PROCEDURE — 11056 PARNG/CUTG B9 HYPRKR LES 2-4: CPT | Mod: S$PBB,,, | Performed by: PODIATRIST

## 2018-10-01 PROCEDURE — 99999 PR PBB SHADOW E&M-EST. PATIENT-LVL IV: CPT | Mod: PBBFAC,,, | Performed by: PODIATRIST

## 2018-10-01 PROCEDURE — 99214 OFFICE O/P EST MOD 30 MIN: CPT | Mod: PBBFAC,PO | Performed by: PODIATRIST

## 2018-10-01 PROCEDURE — 83036 HEMOGLOBIN GLYCOSYLATED A1C: CPT | Mod: TXP

## 2018-10-01 PROCEDURE — 80061 LIPID PANEL: CPT | Mod: NTX

## 2018-10-01 PROCEDURE — 36415 COLL VENOUS BLD VENIPUNCTURE: CPT | Mod: TXP

## 2018-10-01 PROCEDURE — 99213 OFFICE O/P EST LOW 20 MIN: CPT | Mod: S$PBB,25,, | Performed by: PODIATRIST

## 2018-10-01 RX ORDER — KETOCONAZOLE 20 MG/G
CREAM TOPICAL DAILY
Qty: 30 G | Refills: 1 | Status: SHIPPED | OUTPATIENT
Start: 2018-10-01 | End: 2019-05-01 | Stop reason: SDUPTHER

## 2018-10-01 RX ORDER — ATORVASTATIN CALCIUM 20 MG/1
20 TABLET, FILM COATED ORAL DAILY
Qty: 90 TABLET | Refills: 1 | Status: SHIPPED | OUTPATIENT
Start: 2018-10-01 | End: 2019-02-07 | Stop reason: SDUPTHER

## 2018-10-01 NOTE — PROGRESS NOTES
Subjective:      Patient ID: Laure Ross is a 48 y.o. female.    Chief Complaint: Diabetes Mellitus (PCP Dr Mittal); Diabetic Foot Exam; and Nail Care    Laure is a 48 y.o. female who presents to the clinic for evaluation and treatment of high risk feet. Laure has a past medical history of Anticoagulant long-term use, Arthritis, Asthma, Asthma, Cardiomyopathy, CHF (congestive heart failure), CHF (congestive heart failure), Chronic combined systolic and diastolic heart failure (6/3/2016), COPD (chronic obstructive pulmonary disease) (3/28/2018), GERD (gastroesophageal reflux disease), H/O tubal ligation, Hypertension, Obesity, Renal disorder, Type 2 diabetes mellitus with hyperglycemia, and Type 2 diabetes mellitus with polyneuropathy. Here for routine DM foot exam,callus trimming. This routine trimming helps prevent painful symptoms.. Has hx of healed foot ulcer right foot. No problems with wounds. Nails painted today with polish x10.  No other pedal issues today. This patient has documented high risk feet requiring routine maintenance secondary to peripheral neuropathy.      PCP: Holly Mittal MD    Chief Complaint   Patient presents with    Diabetes Mellitus     PCP Dr Mittal    Diabetic Foot Exam    Nail Care         Current shoe gear:  DM shoes, inserts    Hemoglobin A1C   Date Value Ref Range Status   10/01/2018 7.4 (H) 4.0 - 5.6 % Final     Comment:     ADA Screening Guidelines:  5.7-6.4%  Consistent with prediabetes  >or=6.5%  Consistent with diabetes  High levels of fetal hemoglobin interfere with the HbA1C  assay. Heterozygous hemoglobin variants (HbS, HgC, etc)do  not significantly interfere with this assay.   However, presence of multiple variants may affect accuracy.     03/28/2018 6.2 (H) 4.0 - 5.6 % Final     Comment:     According to ADA guidelines, hemoglobin A1c <7.0% represents  optimal control in non-pregnant diabetic patients. Different  metrics may apply to specific patient  populations.   Standards of Medical Care in Diabetes-2016.  For the purpose of screening for the presence of diabetes:  <5.7%     Consistent with the absence of diabetes  5.7-6.4%  Consistent with increasing risk for diabetes   (prediabetes)  >or=6.5%  Consistent with diabetes  Currently, no consensus exists for use of hemoglobin A1c  for diagnosis of diabetes for children.  This Hemoglobin A1c assay has significant interference with fetal   hemoglobin   (HbF). The results are invalid for patients with abnormal amounts of   HbF,   including those with known Hereditary Persistence   of Fetal Hemoglobin. Heterozygous hemoglobin variants (HbAS, HbAC,   HbAD, HbAE, HbA2) do not significantly interfere with this assay;   however, presence of multiple variants in a sample may impact the %   interference.     03/17/2017 6.1 4.5 - 6.2 % Final     Comment:     According to ADA guidelines, hemoglobin A1C <7.0% represents  optimal control in non-pregnant diabetic patients.  Different  metrics may apply to specific populations.   Standards of Medical Care in Diabetes - 2016.  For the purpose of screening for the presence of diabetes:  <5.7%     Consistent with the absence of diabetes  5.7-6.4%  Consistent with increasing risk for diabetes   (prediabetes)  >or=6.5%  Consistent with diabetes  Currently no consensus exists for use of hemoglobin A1C  for diagnosis of diabetes for children.         Review of Systems   Constitution: Negative for chills, fever and malaise/fatigue.   Cardiovascular: Positive for leg swelling. Negative for chest pain, orthopnea and palpitations.   Respiratory: Negative for cough, shortness of breath and wheezing.    Skin: Positive for color change, dry skin, nail changes and suspicious lesions (calluses/corns). Negative for itching, poor wound healing and rash.   Musculoskeletal: Negative for arthritis, gout, joint pain, joint swelling, muscle weakness and myalgias.   Neurological: Positive for  numbness, paresthesias and sensory change. Negative for disturbances in coordination, dizziness, focal weakness and tremors.           Objective:      Physical Exam   Cardiovascular:   Dorsalis pedis and posterior tibial pulses are diminished Bilaterally. Toes are cool to touch. Feet are warm proximally.There is decreased digital hair. Skin is atrophic, slightly hyperpigmented, and mildly edematous       Musculoskeletal:   Musculoskeletal:  Muscle strength is 5/5 in all groups bilaterally.  No pain with ankle, STJ or MTPJ ROM, bilat. Ankle dorsiflexion is limited with knees extended and flexed, bilat.  Semi-reducible hammertoe contractures noted to toes 2-4 b/l-asymptomatic.    Neurological:   Lowman-Anya 5.07 monofilamant testing is diminished both feet. Sharp/dull sensation diminished Bilaterally. Light touch absent Bilaterally.       Skin: Lesion noted. No bruising and no ecchymosis noted. No erythema.   No open lesions, lacerations or wounds noted. Unable to examine nails for trophic changes as nails painted with polish 1-5 B/L.  Interdigital spaces clean, dry and intact b/l. No erythema noted to b/l foot. Skin texture thin, atrophic, dry. Pedal hair diminished b/l.     Hyperkeratotic lesion noted to b/l plantar 2nd met heads with subdermal hematoma, dry with no open wound noted today. No SOI. (pre ulcerative)     Scaling dryness in a moccasin distribution is noted to the bilateral lower extremities.                          Assessment:       Encounter Diagnoses   Name Primary?    Tinea pedis of both feet     Type II diabetes mellitus with neurological manifestations Yes    Corn or callus          Plan:       Laure was seen today for diabetes mellitus, diabetic foot exam and nail care.    Diagnoses and all orders for this visit:    Type II diabetes mellitus with neurological manifestations    Tinea pedis of both feet  -     ketoconazole (NIZORAL) 2 % cream; Apply topically once daily.    Corn or  callus      I counseled the patient on her conditions, their implications and medical management.    - Shoe inspection. Diabetic Foot Education. Patient reminded of the importance of good nutrition and blood sugar control to help prevent podiatric complications of diabetes. Patient instructed on proper foot hygeine. We discussed wearing proper shoe gear, daily foot inspections, never walking without protective shoe gear, caution putting sharp instruments to feet     - Discussed DM foot care:  Wear comfortable, proper fitting shoes. Wash feet daily. Dry well. After drying, apply moisturizer to feet (no lotion to webspaces). Inspect feet daily for skin breaks, blisters, swelling, or redness. Wear cotton socks (preferably white)  Change socks every day. Do NOT walk barefoot. Do NOT use heating pads or warm/hot water soaks     - With patient's permission calluses trimmed x2 with a #15 blade no blood drawn. Patient relates relief following trimming. Upon patient request right great toenail and B/L 5th toenail trimmed with nail nippers no blood drawn.     Ketoconazole 2% topical cream prescribed for treatment of aforementioned tinea pedis. Patient will use this medication as directed in addition to thourougly drying between toes daily, and applying powder as needed    I warned patient of any new wounds opening as well as signs and symptoms of infection including redness, drainage, purulence, odor, streaking, fever, chills, etc and I advised her to seek medical attention (ER or urgent car) if these symptoms arise.     RTC 3 months, sooner KALIE Cormier DPM

## 2018-10-02 NOTE — ED PROVIDER NOTES
"Encounter Date: 9/30/2018       History     Chief Complaint   Patient presents with    Asthma     SOB - "my asthma"/ Denies intubation hx.  Onset SOb last night. Using her inhaler w/ slight relief.     48-year-old female presenting to the ED with complaints of shortness of breath this is been going on for several days she has been using her inhaler without much success she does have history of asthma but also history of CHF cardiomyopathy COPD.  She denies any fever chills night sweats though she does say that she feels like she is coming down with a cold with nasal congestion the with clear drainage. She does have chest tightness but no chest pain. She states that she has been using her inhaler but this has not been adequate to control her symptoms. She states that in the past what has worked best is being placed on steroids.           Review of patient's allergies indicates:  No Known Allergies  Past Medical History:   Diagnosis Date    Anticoagulant long-term use     Arthritis     Asthma     Asthma     Cardiomyopathy     CHF (congestive heart failure)     CHF (congestive heart failure)     Chronic combined systolic and diastolic heart failure 6/3/2016    COPD (chronic obstructive pulmonary disease) 3/28/2018    GERD (gastroesophageal reflux disease)     H/O tubal ligation     Hypertension     Obesity     Renal disorder     Type 2 diabetes mellitus with hyperglycemia     Type 2 diabetes mellitus with polyneuropathy      Past Surgical History:   Procedure Laterality Date    CARDIAC DEFIBRILLATOR PLACEMENT      CARDIOVERSION N/A 4/19/2016    [facility] CATH LAB    CHOLECYSTECTOMY      COLONOSCOPY N/A 5/2/2016    Procedure: COLONOSCOPY;  Surgeon: Eugene Soto MD;  Location: [facility] ([address] [place] [address]);  Service: Endoscopy;  Laterality: N/A;    COLONOSCOPY N/A 5/2/2016    [facility]    ECHOCARDIOGRAM-TRANSESOPHAGEAL N/A " 4/15/2016    Performed by Rossana Surgeon at [facility] ROSSANA    ESOPHAGOGASTRODUODENOSCOPY (EGD) N/A 11/17/2016    Performed by Joe Magana MD at [facility] ENDO (2ND FLR)    GALLBLADDER SURGERY      iabp  4/2016    INSERTION-ICD-BIVENTRICULAR Right 5/4/2017    Performed [facility]i [facility] at [facility] CATH LAB    TUBAL LIGATION       Family History   Problem Relation Age of Onset    Heart disease Mother     Heart disease Father      Social History     Tobacco Use    Smoking status: Never Smoker    Smokeless tobacco: Never Used   Substance Use Topics    Alcohol use: No    Drug use: No     Review of Systems   Constitutional: Positive for activity change and fatigue. Negative for chills and fever.   HENT: Positive for rhinorrhea and sinus pressure. Negative for trouble swallowing.    Respiratory: Positive for chest tightness, shortness of breath and wheezing.    Cardiovascular: Negative for chest pain, palpitations and leg swelling.   Gastrointestinal: Negative for nausea and vomiting.   Genitourinary: Negative for difficulty urinating.   Musculoskeletal: Positive for myalgias. Negative for neck pain and neck stiffness.   Skin: Negative for pallor and wound.   Neurological: Positive for weakness and light-headedness. Negative for syncope, speech difficulty and numbness.       Physical Exam     Initial Vitals   BP Pulse Resp Temp SpO2   09/30/18 0842 09/30/18 0842 09/30/18 0910 09/30/18 0842 09/30/18 0842   (!) 176/79 91 20 98.4 °F (36.9 °C) (!) 89 %      MAP       --                Physical Exam    Constitutional: She is cooperative. She does not have a sickly appearance. She appears ill. No distress.   HENT:   Head: Normocephalic and atraumatic.   Nose: Rhinorrhea present.   Mouth/Throat: Mucous membranes are normal.   Eyes: Conjunctivae are normal.   Neck: Normal range of motion. Neck supple. No JVD present.   Cardiovascular: Normal rate, regular rhythm and normal heart sounds.   Pulmonary/Chest: No accessory  muscle usage. Tachypnea noted. No respiratory distress. She has wheezes.   Abdominal: She exhibits no distension.   Musculoskeletal: Normal range of motion.   Neurological: She is alert and oriented to person, place, and time. No cranial nerve deficit. GCS eye subscore is 4. GCS verbal subscore is 5. GCS motor subscore is 6.   Skin: Skin is warm and dry.         ED Course   Procedures  Labs Reviewed   CBC W/ AUTO DIFFERENTIAL - Abnormal; Notable for the following components:       Result Value    MCH 25.8 (*)     MCHC 30.4 (*)     RDW 15.4 (*)     All other components within normal limits   COMPREHENSIVE METABOLIC PANEL - Abnormal; Notable for the following components:    Glucose 132 (*)     AST 56 (*)     ALT 81 (*)     eGFR if non  53.2 (*)     All other components within normal limits   TROPONIN I - Abnormal; Notable for the following components:    Troponin I 0.051 (*)     All other components within normal limits   B-TYPE NATRIURETIC PEPTIDE - Abnormal; Notable for the following components:     (*)     All other components within normal limits   PROTIME-INR - Abnormal; Notable for the following components:    Prothrombin Time 21.5 (*)     INR 2.2 (*)     All other components within normal limits     EKG Readings: (Independently Interpreted)   Initial Reading: No STEMI. Heart Rate: 84. Clinical Impression: Paced Rhythm       Imaging Results          X-Ray Chest PA And Lateral (Final result)  Result time 09/30/18 09:38:19    Final result by Reid Faulkner III, MD (09/30/18 09:38:19)                 Impression:      See above    Right upper lung nodule known.  This is worrisome for malignancy.      Electronically signed by: Reid Faulkner MD  Date:    09/30/2018  Time:    09:38             Narrative:    EXAMINATION:  XR CHEST PA AND LATERAL    CLINICAL HISTORY:  Shortness of breath    FINDINGS:  There is a pacer.  There is cardiomegaly.  There is a known right upper lung nodule.  Lungs are  otherwise clear.                              X-Rays:   Independently Interpreted Readings:   Chest X-Ray: No acute abnormalities.     Medical Decision Making:   Initial Assessment:   Patient with history of asthma presenting with shortness of breath and wheezing however she also has history of a cardiomyopathy and CHF so we will rule out the possibility of congestive heart failure  Clinical Tests:   Lab Tests: Ordered and Reviewed       <> Summary of Lab: Will do labs including cardiac markers chest x-ray  Radiological Study: Ordered and Reviewed  Medical Tests: Ordered and Reviewed              Attending Attestation:             Attending ED Notes:   Patient improved with the treatments given including 2 duo nebs Solu-Medrol.  X-ray showed no infiltrate patient will be discharged with a prescription for an inhaler also for steroids will also cover the patient with antibiotics in this case Augmentin so this does not interfere with her Coumadin and the patient is subsequently discharged             Clinical Impression:   The primary encounter diagnosis was Mild persistent asthma with acute exacerbation. Diagnoses of SOB (shortness of breath) and Bronchitis were also pertinent to this visit.      Disposition:   Disposition: Discharged  Condition: Stable                        Hernandez Arizmendi MD  10/02/18 102

## 2018-10-04 RX ORDER — MONTELUKAST SODIUM 10 MG/1
10 TABLET ORAL DAILY
Qty: 30 TABLET | Refills: 3 | Status: CANCELLED | OUTPATIENT
Start: 2018-10-04

## 2018-10-04 NOTE — PROGRESS NOTES
CC: The primary encounter diagnosis was Type 2 diabetes mellitus with diabetic polyneuropathy, with long-term current use of insulin. Diagnoses of CKD (chronic kidney disease), stage III, Chronic combined systolic and diastolic congestive heart failure, CLARENCE (obstructive sleep apnea), NICM (nonischemic cardiomyopathy), Vitamin D deficiency disease, History of diabetic ulcer of foot - Right Foot, Essential hypertension, and Obesity with body mass index (BMI) of 30.0 to 39.9 were also pertinent to this visit.      HPI: Mrs. Laure Ross is a 48 y.o. Black or  female who was diagnosed with Type 2 DM.   Pt was last seen by Dr. Driver in August 2016.  Pt was last seen by me in March 2017 and is now being seen by me again today.  a1c elevated over the past 6 mos, 6.2% to 7.4%.  BG during office visit 253 mg/dl, ate breakfast   Recently was prescribed steroid pack, on tessalon perle-d/t asthma exacerbation, bronchitis.  Prednisone therapy (solumedrol then medrol pack) x 2 in the past 6 weeks.    Lab Results   Component Value Date    HGBA1C 7.4 (H) 10/01/2018     Pt is from outside of [place], (from [place], [place]), lives in [place] now.    BG readings are checked 4x-5x/day and readings are:  200s-300s in the past 6 weeks     Hypoglycemia: No, seldom, lowest 80s (glucose tabs)    Skipped/missed glycemic medications: No    Prandial injections taken with meals: No    Physical Activity: No    Skipping meals and/or snacking: The patient eats a regular, healthy diet. semi    CURRENT DIABETIC MEDS: basaglar 34 units bid, novolog 16 units ac w/ mod dose correction scale 150-200 +2, etc.   Uses Vials or Pens: pens   Type of Glucose Meter: true metrix    Last Eye Exam: 7/2018  Last Podiatry Exam: 10/1/2018    Diabetes Management Status    Statin: Taking  ACE/ARB: Taking    Screening or Prevention Patient's value Goal Complete/Controlled?   HgA1C Testing and Control   Lab Results   Component Value Date    HGBA1C  "7.4 (H) 10/01/2018      Annually/Less than 8% Yes   Lipid profile : 10/01/2018 Annually Yes   LDL control Lab Results   Component Value Date    LDLCALC 174.2 (H) 10/01/2018    Annually/Less than 100 mg/dl  No   Nephropathy screening Lab Results   Component Value Date    LABMICR 59.0 10/01/2018     Lab Results   Component Value Date    PROTEINUA Negative 08/25/2018    Annually Yes   Blood pressure BP Readings from Last 1 Encounters:   10/05/18 131/72    Less than 140/90 Yes   Dilated retinal exam : 05/02/2016 Annually No   Foot exam   : 10/30/2017 Annually Yes         REVIEW OF SYSTEMS  General: no weakness, + fatigue, or +weight changes #2 fluctuations   Eyes: no blurry vision, eye pain, or discharge.   Cardiac: no chest pain or palpitations.   Respiratory: + cough or dyspnea.   GI: no abdominal pain, nausea, diarrhea, or + constipation (mag citrate prn).   Skin: no rashes, wounds, or reactions at insulin injection sites.   Neuro: + numbness, tingling, or dizziness. +carpal tunnel  Endocrine: no polyuria, polydipsia, polyphagia, heat or cold intolerance.     Vital Signs  /72 (BP Location: Right arm, Patient Position: Sitting, BP Method: Medium (Manual))   Ht 5' 4" (1.626 m)   Wt 111.2 kg (245 lb 2.4 oz)   BMI 42.08 kg/m²     Hemoglobin A1C   Date Value Ref Range Status   10/01/2018 7.4 (H) 4.0 - 5.6 % Final     Comment:     ADA Screening Guidelines:  5.7-6.4%  Consistent with prediabetes  >or=6.5%  Consistent with diabetes  High levels of fetal hemoglobin interfere with the HbA1C  assay. Heterozygous hemoglobin variants (HbS, HgC, etc)do  not significantly interfere with this assay.   However, presence of multiple variants may affect accuracy.     03/28/2018 6.2 (H) 4.0 - 5.6 % Final     Comment:     According to ADA guidelines, hemoglobin A1c <7.0% represents  optimal control in non-pregnant diabetic patients. Different  metrics may apply to specific patient populations.   Standards of Medical Care in " Diabetes-2016.  For the purpose of screening for the presence of diabetes:  <5.7%     Consistent with the absence of diabetes  5.7-6.4%  Consistent with increasing risk for diabetes   (prediabetes)  >or=6.5%  Consistent with diabetes  Currently, no consensus exists for use of hemoglobin A1c  for diagnosis of diabetes for children.  This Hemoglobin A1c assay has significant interference with fetal   hemoglobin   (HbF). The results are invalid for patients with abnormal amounts of   HbF,   including those with known Hereditary Persistence   of Fetal Hemoglobin. Heterozygous hemoglobin variants (HbAS, HbAC,   HbAD, HbAE, HbA2) do not significantly interfere with this assay;   however, presence of multiple variants in a sample may impact the %   interference.     03/17/2017 6.1 4.5 - 6.2 % Final     Comment:     According to ADA guidelines, hemoglobin A1C <7.0% represents  optimal control in non-pregnant diabetic patients.  Different  metrics may apply to specific populations.   Standards of Medical Care in Diabetes - 2016.  For the purpose of screening for the presence of diabetes:  <5.7%     Consistent with the absence of diabetes  5.7-6.4%  Consistent with increasing risk for diabetes   (prediabetes)  >or=6.5%  Consistent with diabetes  Currently no consensus exists for use of hemoglobin A1C  for diagnosis of diabetes for children.            Chemistry        Component Value Date/Time     09/30/2018 0908    K 4.0 09/30/2018 0908     09/30/2018 0908    CO2 25 09/30/2018 0908    BUN 16 09/30/2018 0908    CREATININE 1.2 09/30/2018 0908     (H) 09/30/2018 0908        Component Value Date/Time    CALCIUM 9.3 09/30/2018 0908    ALKPHOS 76 09/30/2018 0908    AST 56 (H) 09/30/2018 0908    ALT 81 (H) 09/30/2018 0908    BILITOT 0.4 09/30/2018 0908          Lab Results   Component Value Date    CHOL 261 (H) 10/01/2018    CHOL 202 (H) 04/27/2016     Lab Results   Component Value Date    HDL 70 10/01/2018     HDL 39 (L) 04/27/2016     Lab Results   Component Value Date    LDLCALC 174.2 (H) 10/01/2018    LDLCALC 122.6 04/27/2016     Lab Results   Component Value Date    TRIG 84 10/01/2018    TRIG 202 (H) 04/27/2016     Lab Results   Component Value Date    CHOLHDL 26.8 10/01/2018    CHOLHDL 19.3 (L) 04/27/2016       Lab Results   Component Value Date    TSH 1.287 07/30/2018       Lab Results   Component Value Date    MICALBCREAT 42.4 (H) 10/01/2018       Vit D, 25-Hydroxy   Date Value Ref Range Status   11/23/2016 32 30 - 96 ng/mL Final     Comment:     Vitamin D deficiency.........<10 ng/mL                              Vitamin D insufficiency......10-29 ng/mL       Vitamin D sufficiency........> or equal to 30 ng/mL  Vitamin D toxicity............>100 ng/mL         PHYSICAL EXAMINATION  Constitutional: Appears well, no distress  Neck: Supple, trachea midline.   Respiratory: no wheezes, decreased at bases, even and unlabored.  Cardiovascular: RRR; no carotid bruits or murmurs.   Lymph: DP pulses  2+ bilaterally; no edema.   Skin: warm and dry; no injection site reactions, no acanthosis nigracans observed.  Neuro:patient alert and cooperative, normal affect.    Diabetes Foot Exam:   Defer done on 10/1/18      Assessment/Plan  1. Type 2 diabetes mellitus with diabetic polyneuropathy, with long-term current use of insulin  Hemoglobin A1c next time  F/u in 3 mos  a1c goal less than 7%  Above r/t steroid treatment  Increase novolog to 22 units ac w/ scale 150-200+2, etc  Continue basaglar 34 units bid    Add glp1a trulicity 0.75 mg weekly rx sent to pharmacy downstairs [facility]    No h/o medullary thyroid ca or pancreatitis        2. CKD (chronic kidney disease), stage III  Continue to monitor  F/u with pcp   3. Chronic combined systolic and diastolic congestive heart failure  Avoid hypoglycemia  F/u with cards    4. CLARENCE (obstructive sleep apnea)  On cpap  Optimize bg will help with condition   5. NICM (nonischemic  cardiomyopathy)  See above   6. Vitamin D deficiency disease  Vitamin d 1000 I u daily   Next appt check vit d     7. History of diabetic ulcer of foot - Right Foot  Seen by podiatry 10/1/18  No ulcers  +calluses  Inserts given during visit, has diabetic shoes   8. Essential hypertension  Controlled, continue med(s)on acei   9. Obesity with body mass index (BMI) of 30.0 to 39.9  Body mass index is 42.08 kg/m². may increase insulin resistance  glp1a  Bariatric medicine referral-may not be covered by medicaid   Encourage exercise 3-5 times a week once sickness has improved. For at least 30 mins          Current DM Educational Needs:  1. Educational Need Identified: nutrition/carbs   2. Type of therapy and dose: insulin   3. Last DM Education: 2016     FOLLOW UP  Follow-up in about 3 months (around 1/5/2019).     Orders Placed This Encounter   Procedures    Hemoglobin A1c     Standing Status:   Future     Standing Expiration Date:   12/3/2019

## 2018-10-05 ENCOUNTER — ANTI-COAG VISIT (OUTPATIENT)
Dept: CARDIOLOGY | Facility: CLINIC | Age: 48
End: 2018-10-05
Payer: MEDICAID

## 2018-10-05 ENCOUNTER — TELEPHONE (OUTPATIENT)
Dept: BARIATRICS | Facility: CLINIC | Age: 48
End: 2018-10-05

## 2018-10-05 ENCOUNTER — OFFICE VISIT (OUTPATIENT)
Dept: ENDOCRINOLOGY | Facility: CLINIC | Age: 48
End: 2018-10-05
Payer: MEDICAID

## 2018-10-05 VITALS
HEIGHT: 64 IN | WEIGHT: 245.13 LBS | BODY MASS INDEX: 41.85 KG/M2 | SYSTOLIC BLOOD PRESSURE: 131 MMHG | DIASTOLIC BLOOD PRESSURE: 72 MMHG

## 2018-10-05 DIAGNOSIS — Z79.4 TYPE 2 DIABETES MELLITUS WITH DIABETIC POLYNEUROPATHY, WITH LONG-TERM CURRENT USE OF INSULIN: Primary | Chronic | ICD-10-CM

## 2018-10-05 DIAGNOSIS — I50.42 CHRONIC COMBINED SYSTOLIC AND DIASTOLIC CONGESTIVE HEART FAILURE: ICD-10-CM

## 2018-10-05 DIAGNOSIS — I42.8 NICM (NONISCHEMIC CARDIOMYOPATHY): ICD-10-CM

## 2018-10-05 DIAGNOSIS — I10 ESSENTIAL HYPERTENSION: ICD-10-CM

## 2018-10-05 DIAGNOSIS — I48.0 PAROXYSMAL ATRIAL FIBRILLATION: ICD-10-CM

## 2018-10-05 DIAGNOSIS — E66.9 OBESITY WITH BODY MASS INDEX (BMI) OF 30.0 TO 39.9: ICD-10-CM

## 2018-10-05 DIAGNOSIS — Z79.01 CHRONIC ANTICOAGULATION: ICD-10-CM

## 2018-10-05 DIAGNOSIS — N18.30 CKD (CHRONIC KIDNEY DISEASE), STAGE III: ICD-10-CM

## 2018-10-05 DIAGNOSIS — E11.42 TYPE 2 DIABETES MELLITUS WITH DIABETIC POLYNEUROPATHY, WITH LONG-TERM CURRENT USE OF INSULIN: Primary | Chronic | ICD-10-CM

## 2018-10-05 DIAGNOSIS — Z79.01 LONG TERM (CURRENT) USE OF ANTICOAGULANTS: Primary | ICD-10-CM

## 2018-10-05 DIAGNOSIS — Z86.31 HISTORY OF DIABETIC ULCER OF FOOT: ICD-10-CM

## 2018-10-05 DIAGNOSIS — G47.33 OSA (OBSTRUCTIVE SLEEP APNEA): ICD-10-CM

## 2018-10-05 DIAGNOSIS — E55.9 VITAMIN D DEFICIENCY DISEASE: ICD-10-CM

## 2018-10-05 LAB — INR PPP: 3.2 (ref 2–3)

## 2018-10-05 PROCEDURE — 85610 PROTHROMBIN TIME: CPT | Mod: PBBFAC

## 2018-10-05 PROCEDURE — 99999 PR PBB SHADOW E&M-EST. PATIENT-LVL V: CPT | Mod: PBBFAC,,, | Performed by: NURSE PRACTITIONER

## 2018-10-05 PROCEDURE — 99215 OFFICE O/P EST HI 40 MIN: CPT | Mod: PBBFAC | Performed by: NURSE PRACTITIONER

## 2018-10-05 PROCEDURE — 99214 OFFICE O/P EST MOD 30 MIN: CPT | Mod: S$PBB,,, | Performed by: NURSE PRACTITIONER

## 2018-10-05 NOTE — TELEPHONE ENCOUNTER
----- Message from Belén Jennings sent at 10/5/2018 12:34 PM CDT -----  Contact: Self  .Needs Advice    Reason for call: Was told by Rx needs a PA for Trulicity @ [facility], per insurance.        Communication Preference: [phone]    Additional Information:

## 2018-10-05 NOTE — PATIENT INSTRUCTIONS
Snacks can be an important part of a balanced, healthy meal plan. They allow you to eat more frequently, feeling full and satisfied throughout the day. Also, they allow you to spread carbohydrates evenly, which may stabilize blood sugars.  Plus, snacks are enjoyable!     The amount of carbohydrate needed at snacks varies. Generally, about 15-30 grams of carbohydrate per snack is recommended.  Below you will find some tasty treats.       0-5 gm carb   Crystal Light   Vitamin Water Zero   Herbal tea, unsweetened   2 tsp peanut butter on celery   1./2 cup sugar-free jell-o   1 sugar-free popsicle   ¼ cup blueberries   8oz Blue Pat unsweetened almond milk   5 baby carrots & celery sticks, cucumbers, bell peppers dipped in ¼ cup salsa, 2Tbsp light ranch dressing or 2Tbsp plain Greek yogurt   10 Goldfish crackers   ½ oz low-fat cheese or string cheese   1 closed handful of nuts, unsalted   1 Tbsp of sunflower seeds, unsalted   1 cup Smart Pop popcorn   1 whole grain brown rice cake        15 gm carb   1 small piece of fruit or ½ banana or 1/2 cup lite canned fruit   3 kim cracker squares   3 cups Smart Pop popcorn, top spray butter, Brandt lite salt or cinnamon and Truvia   5 Vanilla Wafers   ½ cup low fat, no added sugar ice cream or frozen yogurt (Blue bell, Blue Bunny, Weight Watchers, Skinny Cow)   ½ turkey, ham, or chicken sandwich   ½ c fruit with ½ c Cottage cheese   4-6 unsalted wheat crackers with 1 oz low fat cheese or 1 tbsp peanut butter    30-45 goldfish crackers (depending on flavor)    7-8 Episcopalian mini brown rice cakes (caramel, apple cinnamon, chocolate)    12 Episcopalian mini brown rice cakes (cheddar, bbq, ranch)    1/3 cup hummus dip with raw veg   1/2 whole wheat ana, 1Tbsp hummus   Mini Pizza (1/2 whole wheat English muffin, low-fat  cheese, tomato sauce)   100 calorie snack pack (Oreo, Chips Ahoy, Ritz Mix, Baked Cheetos)   4-6 oz. light or Greek Style yogurt  (Alvin, Ezequiel, Dharmesh, [facility][note 3])   ½ cup sugar-free pudding     6 in. wheat tortilla or ana oven toasted chips (topped with spray butter flavoring, cinnamon, Truvia OR spray butter, garlic powder, chili powder)    18 BBQ Popchips (available at [employer], Whole Foods, Fresh Market)                   Diabetes Support Group Meetings         Date: Topic:   February 8 Health Promotion/Cooking Demo   March 8 Taking Care of Your Kidneys   April 12 Taking Care of Your Feet   May 10 Ease Your Mind with Diabetes   Vandana 14 Summer Treats/Cooking Demo   July 12 Super Market Sweep   August 9 Taking Care of Your Eyes   Sept 13 Technology/ADA updates   October 11 Recipes & Treats/Cooking Demo   November 8 Heart Health/Pump it up!   December 13 Year-End Close Out        Meetings are held in the [facility] (A) of the [facility] located at [address] [place] [address]. Please call [phone] for additional information.    Free service, offered every 2nd Thursday of every month! Family members and/or friends are welcome as well!  Support group is for patients with type 1 or type 2 diabetes.    From 3:30p to 4:30p        Dulaglutide injection  What is this medicine?  DULAGLUTIDE (DOO la GLOO tide) is used to improve blood sugar control in adults with type 2 diabetes. This medicine may be used with other oral diabetes medicines.  How should I use this medicine?  This medicine is for injection under the skin of your upper leg (thigh), stomach area, or upper arm. It is usually given once every week (every 7 days). You will be taught how to prepare and give this medicine. Use exactly as directed. Take your medicine at regular intervals. Do not take it more often than directed.  If you use this medicine with insulin, you should inject this medicine and the insulin separately. Do not mix them together. Do not give the injections right next to each other. Change (rotate) injection  sites with each injection.  It is important that you put your used needles and syringes in a special sharps container. Do not put them in a trash can. If you do not have a sharps container, call your pharmacist or healthcare provider to get one.  A special MedGuide will be given to you by the pharmacist with each prescription and refill. Be sure to read this information carefully each time.  Talk to your pediatrician regarding the use of this medicine in children. Special care may be needed.  What side effects may I notice from receiving this medicine?  Side effects that you should report to your doctor or health care professional as soon as possible:  · allergic reactions like skin rash, itching or hives, swelling of the face, lips, or tongue  · breathing problems  · signs and symptoms of low blood sugar such as feeling anxious, confusion, dizziness, increased hunger, unusually weak or tired, sweating, shakiness, cold, irritable, headache, blurred vision, fast heartbeat, loss of consciousness  · unusual stomach upset or pain  · vomiting  Side effects that usually do not require medical attention (Report these to your doctor or health care professional if they continue or are bothersome.):diarrhea  · heartburn  · loss of appetite  · nausea  · pain, redness, or irritation at site where injected  What may interact with this medicine?  Do not take this medicine with any of the following medications:  · gatifloxacin  Many medications may cause changes in blood sugar, these include:  · alcohol containing beverages  · aspirin and aspirin-like drugs  · chloramphenicol  · chromium  · diuretics  · female hormones, such as estrogens or progestins, birth control pills  · heart medicines  · isoniazid  · male hormones or anabolic steroids  · medications for weight loss  · medicines for allergies, asthma, cold, or cough  · medicines for mental problems  · medicines called MAO inhibitors - Nardil, Parnate, Marplan,  Eldepryl  · niacin  · NSAIDS, such as ibuprofen  · pentamidine  · phenytoin  · probenecid  · quinolone antibiotics such as ciprofloxacin, levofloxacin, ofloxacin  · some herbal dietary supplements  · steroid medicines such as prednisone or cortisone  · thyroid hormonesSome medications can hide the warning symptoms of low blood sugar (hypoglycemia). You may need to monitor your blood sugar more closely if you are taking one of these medications. These include:  · beta-blockers, often used for high blood pressure or heart problems (examples include atenolol, metoprolol, propranolol)  · clonidine  · guanethidine  · reserpine  What if I miss a dose?  If you miss a dose, take it as soon as you can within 3 days after the missed dose. Then take your next dose at your regular weekly time. If it has been longer than 3 days after the missed dose, do not take the missed dose. Take the next dose at your regular time. Do not take double or extra doses. If you have questions about a missed dose, contact your health care provider for advice.  Where should I keep my medicine?  Keep out of the reach of children.  Store this medicine in a refrigerator between 2 and 8 degrees C (36 and 46 degrees F). Do not freeze or use if the medicine has been frozen. Protect from light and excessive heat. Each single-dose pen or prefilled syringe can be kept at room temperature, not to exceed 30 degrees C (86 degrees F) for a total of 14 days, if needed. Store in the carton until use. Throw away any unused medicine after the expiration date.  What should I tell my health care provider before I take this medicine?  They need to know if you have any of these conditions:  · endocrine tumors (MEN 2) or if someone in your family had these tumors  · history of pancreatitis  · kidney disease  · liver disease  · stomach problems  · thyroid cancer or if someone in your family had thyroid cancer  · an unusual or allergic reaction to dulaglutide, other  medicines, foods, dyes, or preservatives  · pregnant or trying to get pregnant  · breast-feeding  What should I watch for while using this medicine?  Visit your doctor or health care professional for regular checks on your progress.  A test called the HbA1C (A1C) will be monitored. This is a simple blood test. It measures your blood sugar control over the last 2 to 3 months. You will receive this test every 3 to 6 months.  Learn how to check your blood sugar. Learn the symptoms of low and high blood sugar and how to manage them.  Always carry a quick-source of sugar with you in case you have symptoms of low blood sugar. Examples include hard sugar candy or glucose tablets. Make sure others know that you can choke if you eat or drink when you develop serious symptoms of low blood sugar, such as seizures or unconsciousness. They must get medical help at once.  Tell your doctor or health care professional if you have high blood sugar. You might need to change the dose of your medicine. If you are sick or exercising more than usual, you might need to change the dose of your medicine.  Do not skip meals. Ask your doctor or health care professional if you should avoid alcohol. Many nonprescription cough and cold products contain sugar or alcohol. These can affect blood sugar.  Wear a medical ID bracelet or chain, and carry a card that describes your disease and details of your medicine and dosage times.  NOTE:This sheet is a summary. It may not cover all possible information. If you have questions about this medicine, talk to your doctor, pharmacist, or health care provider. Copyright© 2017 [employer]        Weight Management: Exercise and Activity    Studies show that people who exercise are the most likely to lose weight and keep it off. Exercise burns calories. It helps build muscle to make your body stronger. Make exercise an important part of your weight-management plan.  Make activity part of your day  You may  not think you have the time to exercise. But you can work activity into your daily life--you just need to be committed. Take 10 minutes out of your lunch hour to take a walk. Walk to the [employer] to get your paper instead of having it delivered. Make it a habit to take the stairs instead of the elevator. Park in a far away parking spot instead of the closest. Youll be surprised at how fast these little changes can make a difference.  Some people really cannot walk very far, and tire out quickly with exercise. Instead of becoming discouraged, resolve to do what you can do, and work to make that a regular frequent habit.   The benefits of exercise  Exercise offers many benefits including:   · Exercise increases your metabolism (the speed at which your body burns calories).  · Regular exercise can increase the amount of muscle in your body. Muscle burns calories faster than fat. The more muscle you have, the more calories you burn.  · Exercise gives you energy and curbs your appetite.  · Exercise decreases stress and helps you sleep better.  Make exercise fun  Exercise can be fun. Choose an activity you enjoy. You may even get a friend to do it with you:  · Take a resistance-training or aerobics class  · Join a team sport  · Take a dance class  · Walk the dog  · Ride a bike  If you have health problems, be sure to ask your healthcare provider before you start an exercise program. Have a  help you develop a plan thats safe for you.   Date Last Reviewed: 2/4/2016 © 2000-2017 [employer]. [address] [place] [address], [place] [address]. All rights reserved. This information is not intended as a substitute for professional medical care. Always follow your healthcare professional's instructions.

## 2018-10-08 NOTE — PROGRESS NOTES
INR elevated. Patient completed prednisone today and will start Augmentin today. She denies any bleeding, bruising or other changes. We will hold her dose today then resume. Follow up in 1 week. Patient reminded to call clinic with any changes or concerns.

## 2018-10-09 ENCOUNTER — TELEPHONE (OUTPATIENT)
Dept: PHARMACY | Facility: CLINIC | Age: 48
End: 2018-10-09

## 2018-10-11 ENCOUNTER — ANTI-COAG VISIT (OUTPATIENT)
Dept: CARDIOLOGY | Facility: CLINIC | Age: 48
End: 2018-10-11
Payer: MEDICAID

## 2018-10-11 DIAGNOSIS — Z79.01 LONG TERM (CURRENT) USE OF ANTICOAGULANTS: Primary | ICD-10-CM

## 2018-10-11 DIAGNOSIS — Z79.01 CHRONIC ANTICOAGULATION: ICD-10-CM

## 2018-10-11 DIAGNOSIS — I48.0 PAROXYSMAL ATRIAL FIBRILLATION: ICD-10-CM

## 2018-10-11 LAB — INR PPP: 1.5 (ref 2–3)

## 2018-10-11 PROCEDURE — 99211 OFF/OP EST MAY X REQ PHY/QHP: CPT | Mod: S$PBB,25,, | Performed by: INTERNAL MEDICINE

## 2018-10-11 PROCEDURE — 85610 PROTHROMBIN TIME: CPT | Mod: PBBFAC

## 2018-10-11 NOTE — PROGRESS NOTES
Quick follow up for elevated INR on 10/5. INR subtherapeutic today. Patient completed prednisone on 10/5. She continues on Augmentin. She denies any bleeding, bruising or other changes that may affect warfarin therapy. We will resume her weekly dose today and follow up in 1 week. Patient reminded to call clinic with any changes or concerns. Care plan made with THERESE PEREZ

## 2018-10-18 ENCOUNTER — ANTI-COAG VISIT (OUTPATIENT)
Dept: CARDIOLOGY | Facility: CLINIC | Age: 48
End: 2018-10-18
Payer: MEDICAID

## 2018-10-18 DIAGNOSIS — Z79.01 LONG TERM (CURRENT) USE OF ANTICOAGULANTS: Primary | ICD-10-CM

## 2018-10-18 DIAGNOSIS — I48.0 PAROXYSMAL ATRIAL FIBRILLATION: ICD-10-CM

## 2018-10-18 DIAGNOSIS — Z79.01 CHRONIC ANTICOAGULATION: ICD-10-CM

## 2018-10-18 LAB — INR PPP: 2 (ref 2–3)

## 2018-10-18 PROCEDURE — 99211 OFF/OP EST MAY X REQ PHY/QHP: CPT | Mod: S$PBB,25,,

## 2018-10-18 PROCEDURE — 85610 PROTHROMBIN TIME: CPT | Mod: PBBFAC

## 2018-10-18 NOTE — PROGRESS NOTES
Quick follow up for subtherapeutic INR on 10/11. INR within therapeutic range today. Bruising noted to arms from use. Patient denies any bleeding or other changes that would affect warfarin therapy. Will maintain current dose and follow up in 2 weeks . Patient advised to call coumadin clinic with any changes or concerns. Care plan made with THERESE PEREZ

## 2018-10-19 NOTE — PROGRESS NOTES
I have reviewed the chart and recent INR.  I agree with the plan outlined by BRENNEN Barajas LPN.  Abx complete, INR in range.

## 2018-10-22 ENCOUNTER — CLINICAL SUPPORT (OUTPATIENT)
Dept: ELECTROPHYSIOLOGY | Facility: CLINIC | Age: 48
End: 2018-10-22
Attending: INTERNAL MEDICINE
Payer: MEDICAID

## 2018-10-22 DIAGNOSIS — Z95.810 PRESENCE OF AUTOMATIC CARDIOVERTER/DEFIBRILLATOR (AICD): ICD-10-CM

## 2018-10-22 PROCEDURE — 93295 DEV INTERROG REMOTE 1/2/MLT: CPT | Mod: ,,, | Performed by: INTERNAL MEDICINE

## 2018-10-22 PROCEDURE — 93296 REM INTERROG EVL PM/IDS: CPT | Mod: PBBFAC | Performed by: INTERNAL MEDICINE

## 2018-10-22 RX ORDER — LANOLIN ALCOHOL/MO/W.PET/CERES
CREAM (GRAM) TOPICAL 2 TIMES DAILY
Qty: 60 TABLET | Refills: 1 | Status: CANCELLED | OUTPATIENT
Start: 2018-10-22 | End: 2019-10-22

## 2018-10-23 RX ORDER — LANOLIN ALCOHOL/MO/W.PET/CERES
CREAM (GRAM) TOPICAL 2 TIMES DAILY
Qty: 60 TABLET | Refills: 1 | Status: SHIPPED | OUTPATIENT
Start: 2018-10-23 | End: 2019-01-10

## 2018-11-01 ENCOUNTER — ANTI-COAG VISIT (OUTPATIENT)
Dept: CARDIOLOGY | Facility: CLINIC | Age: 48
End: 2018-11-01
Payer: MEDICAID

## 2018-11-01 DIAGNOSIS — I48.0 PAROXYSMAL ATRIAL FIBRILLATION: ICD-10-CM

## 2018-11-01 DIAGNOSIS — Z79.01 LONG TERM (CURRENT) USE OF ANTICOAGULANTS: Primary | ICD-10-CM

## 2018-11-01 DIAGNOSIS — Z79.01 CHRONIC ANTICOAGULATION: ICD-10-CM

## 2018-11-01 LAB — INR PPP: 2.3 (ref 2–3)

## 2018-11-01 PROCEDURE — 85610 PROTHROMBIN TIME: CPT | Mod: PBBFAC

## 2018-11-01 PROCEDURE — 99211 OFF/OP EST MAY X REQ PHY/QHP: CPT | Mod: S$PBB,25,,

## 2018-11-01 NOTE — PROGRESS NOTES
I have reviewed the chart and recent INR.  I agree with the plan outlined by BRENNEN Barajas LPN.

## 2018-11-01 NOTE — PROGRESS NOTES
INR within therapeutic range today. Bruising noted to arms from use. Patient denies any bleeding or other changes that would affect warfarin therapy. Will maintain current dose and follow up in 3 weeks. Patient advised to call coumadin clinic with any changes or concerns. Care plan made with THERESE PEREZ

## 2018-11-02 ENCOUNTER — HOSPITAL ENCOUNTER (EMERGENCY)
Facility: HOSPITAL | Age: 48
Discharge: HOME | End: 2018-11-02
Attending: EMERGENCY MEDICINE
Payer: MEDICAID

## 2018-11-02 VITALS
BODY MASS INDEX: 41.83 KG/M2 | DIASTOLIC BLOOD PRESSURE: 78 MMHG | RESPIRATION RATE: 20 BRPM | OXYGEN SATURATION: 98 % | HEART RATE: 78 BPM | TEMPERATURE: 98 F | SYSTOLIC BLOOD PRESSURE: 161 MMHG | WEIGHT: 245 LBS | HEIGHT: 64 IN
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
## 2018-11-02 DIAGNOSIS — M79.641 RIGHT HAND PAIN: ICD-10-CM

## 2018-11-02 DIAGNOSIS — S09.90XA INJURY OF HEAD, INITIAL ENCOUNTER: Primary | ICD-10-CM

## 2018-11-02 PROCEDURE — 99283 EMERGENCY DEPT VISIT LOW MDM: CPT | Mod: ,,, | Performed by: EMERGENCY MEDICINE

## 2018-11-02 PROCEDURE — 99284 EMERGENCY DEPT VISIT MOD MDM: CPT | Mod: 25

## 2018-11-02 NOTE — ED TRIAGE NOTES
Stepped off the curb this am sustaining a fall in which she hit her right forehead, nose and right hand.  Patient's name and date of birth checked and is correct.    LOC: The patient is awake, alert, and oriented to place, time, situation. Affect is appropriate.  Speech is appropriate and clear.      APPEARANCE: Patient resting comfortably, reporting palpation, light headedness,  in no acute distress.  Patient is clean and well groomed.     SKIN: The skin is warm and dry; color consistent with ethnicity.  Patient has normal skin turgor and moist mucus membranes. Abrasion noted to right forehead.       MUSCULOSKELETAL: Patient moving upper and lower extremities without difficulty.  Swelling noted to palm of right hand.\     RESPIRATORY: Airway is open and patent. Respirations spontaneous, even, easy, and non-labored.  Patient has a normal effort and rate.  No accessory muscle use noted. Denies cough.  BS clear.     CARDIAC:  No peripheral edema noted. No complaints of chest pain.       ABDOMEN: Soft and non tender to palpation.  No distention noted.      NEUROLOGIC: Eyes open spontaneously.  Behavior appropriate to situation.  Follows commands; facial expression symmetrical.  Purposeful motor response noted; normal sensation in all extremities.

## 2018-11-02 NOTE — DISCHARGE INSTRUCTIONS
-tylenol as needed for pain  - return to ED if symptoms get worse    Our goal in the emergency department is to always give you outstanding care and exceptional service. You may receive a survey by mail or e-mail in the next week regarding your experience in our ED. We would greatly appreciate your completing and returning the survey. Your feedback provides us with a way to recognize our staff who give very good care and it helps us learn how to improve when your experience was below our aspiration of excellence.

## 2018-11-02 NOTE — PROGRESS NOTES
Patient called this am 11/2/18 to inform us that she (Fell) and hit her head. She said she has a (Edilberto) on forehead and a (Cut/Nose) from her glasses. It (Bled) a little while then stopped. She would like to know what to do from here please call ([phone]). Advise

## 2018-11-02 NOTE — PROGRESS NOTES
Patient advised to maintain, Also to eat greens twice and week and drink boost once a week. Patient verbalized understanding.

## 2018-11-02 NOTE — ED PROVIDER NOTES
Encounter Date: 11/2/2018    SCRIBE #1 NOTE: I, Wilner Aguilar, am scribing for, and in the presence of,  Dr. Simmons. I have scribed the entire note.       History     Chief Complaint   Patient presents with    Fall     fell this morning hit R side of face, broken glasses, no loc, r wrist pain     48 y.o. female with history of COPD and CHF presents to the ED complaining of a mild headache and right hand pain S/P fall this morning.  Patient did not lose consciousness and describes her pain as a 7/10, throbbing in nature.  Denies blurred vision, confusion, neck pain, lightheadedness and difficulty walking.  She also had glasses on which broke and caused an abrasion on the right side of her face.  Patient is on coumadin.      The history is provided by the patient and medical records.     Review of patient's allergies indicates:  No Known Allergies  Past Medical History:   Diagnosis Date    Anticoagulant long-term use     Arthritis     Asthma     Asthma     Cardiomyopathy     CHF (congestive heart failure)     CHF (congestive heart failure)     Chronic combined systolic and diastolic heart failure 6/3/2016    COPD (chronic obstructive pulmonary disease) 3/28/2018    GERD (gastroesophageal reflux disease)     H/O tubal ligation     Hypertension     Obesity     Renal disorder     Type 2 diabetes mellitus with hyperglycemia     Type 2 diabetes mellitus with polyneuropathy      Past Surgical History:   Procedure Laterality Date    CARDIAC DEFIBRILLATOR PLACEMENT      CARDIOVERSION N/A 4/19/2016    Performed by Navi Jolly MD at [facility] CATH LAB    CHOLECYSTECTOMY      COLONOSCOPY N/A 5/2/2016    Procedure: COLONOSCOPY;  Surgeon: Eugene oSto MD;  Location: [facility] ([address] [place] [address]);  Service: Endoscopy;  Laterality: N/A;    COLONOSCOPY N/A 5/2/2016    Performed by Eugene Soto MD at [facility] (2ND FLR)    ECHOCARDIOGRAM-TRANSESOPHAGEAL N/A 4/15/2016    Performed by Rossana Surgeon at [facility]     ESOPHAGOGASTRODUODENOSCOPY (EGD) N/A 11/17/2016    Performed by [facility] at [facility] ENDO (2ND FLR)    GALLBLADDER SURGERY      iabp  4/2016    INSERTION-ICD-BIVENTRICULAR Right 5/4/2017    Performed by [facility] at [facility] CATH LAB    TUBAL LIGATION       Family History   Problem Relation Age of Onset    Heart disease Mother     Heart disease Father      Social History     Tobacco Use    Smoking status: Never Smoker    Smokeless tobacco: Never Used   Substance Use Topics    Alcohol use: No    Drug use: No     Review of Systems   Constitutional: Negative for fever.   HENT: Positive for facial swelling (right sided). Negative for sore throat.         Abrasion on R. face   Eyes: Negative for visual disturbance.   Respiratory: Negative for shortness of breath.    Cardiovascular: Negative for chest pain.   Gastrointestinal: Negative for nausea.   Genitourinary: Negative for dysuria.   Musculoskeletal: Negative for back pain, gait problem and neck pain.   Skin: Negative for rash.   Neurological: Positive for headaches (mild). Negative for weakness.   Hematological: Does not bruise/bleed easily.   Psychiatric/Behavioral: Negative for confusion.       Physical Exam     Initial Vitals [11/02/18 1036]   BP Pulse Resp Temp SpO2   (!) 143/74 82 18 98.2 °F (36.8 °C) 97 %      MAP       --         Physical Exam    Nursing note and vitals reviewed.  Constitutional: She appears well-developed and well-nourished. She is not diaphoretic. No distress.   HENT:   Head: Head is with abrasion (superficial on R. face).   Mouth/Throat: Oropharynx is clear and moist. No oropharyngeal exudate.   Superficial Abrasion on bilateral nasolabial folds   Eyes: EOM are normal. Pupils are equal, round, and reactive to light. No scleral icterus.   Neck: Normal range of motion. Neck supple. No thyromegaly present. No tracheal deviation present.   Cardiovascular: Normal rate, regular rhythm and normal heart sounds. Exam reveals  no gallop and no friction rub.    No murmur heard.  Pulmonary/Chest: Breath sounds normal. No stridor. She has no wheezes. She has no rhonchi. She has no rales.   Abdominal: Soft. There is no tenderness. There is no rebound and no guarding.   Musculoskeletal: Normal range of motion. She exhibits no edema.        Right wrist: Normal. She exhibits no tenderness (No snuffbox tenderness).        Right hand: She exhibits tenderness (Right Palmar surface).   Neurological: She is alert and oriented to person, place, and time. She has normal strength. No cranial nerve deficit or sensory deficit. GCS score is 15. GCS eye subscore is 4. GCS verbal subscore is 5. GCS motor subscore is 6.   Skin: Skin is warm and dry. No rash noted. There is erythema (Right hand and face).   Psychiatric: She has a normal mood and affect. Thought content normal.         ED Course   Procedures  Labs Reviewed - No data to display       Imaging Results          CT Head Without Contrast (Final result)  Result time 11/02/18 12:19:29    Final result by Jorge Duran MD (11/02/18 12:19:29)                 Impression:      1. No acute intracranial abnormalities.      Electronically signed by: Jorge Duran MD  Date:    11/02/2018  Time:    12:19             Narrative:    EXAMINATION:  CT HEAD WITHOUT CONTRAST    CLINICAL HISTORY:  Head trauma, minor, GCS>=13, NOC/NEXUS/CCR neg, first study;    TECHNIQUE:  Low dose axial images were obtained through the head.  Coronal and sagittal reformations were also performed. Contrast was not administered.    COMPARISON:  04/26/2016    FINDINGS:  There is no evidence of acute major vascular territory infarct, hemorrhage, or mass.  There is no hydrocephalus.  There are no abnormal extra-axial fluid collections.  The paranasal sinuses and mastoid air cells are clear, and there is no evidence of calvarial fracture.  The visualized soft tissues are unremarkable.                               X-Ray Hand 3 view  Right (Final result)  Result time 11/02/18 11:29:01    Final result by Janes Urias MD (11/02/18 11:29:01)                 Impression:      No fracture or dislocation identified      Electronically signed by: Janes Urias MD  Date:    11/02/2018  Time:    11:29             Narrative:    EXAMINATION:  XR HAND COMPLETE 3 VIEW RIGHT    CLINICAL HISTORY:  right hand pain;    TECHNIQUE:  PA, lateral, and oblique views of the right hand were performed.    COMPARISON:  None    FINDINGS:  The skeletal structures are intact.  No fracture or dislocation is identified.  Joint spaces are satisfactory overall.  Minor degenerative changes are developing at a few positions.  No focal soft tissue swelling is detected.                                 Medical Decision Making:   History:   Old Medical Records: I decided to obtain old medical records.  Initial Assessment:   Emergent evaluation of 48 y.o female with headache and right hand pain s/p fall.  Patient is on coumadin.  Differential Diagnosis:   My initial differential diagnoses include, but are not limited to:  Intracranial hemorhhage, skull fracture, concussion, superficial abrasion, hand fracture.    Clinical Tests:   Radiological Study: Ordered and Reviewed  ED Management:  Patient seen and examined.  Vital signs are stable.  She is currently neurologically intact.  She has tenderness over the right palmar surface of the hand.  CT head and X-ray ordered.      CT scan reviewed with no evidence of intracranial hemorrhage or skull fracture.  X-ray reviewed with no acute fracture.  I have advised patient to applied Neosporin for her superficial abrasions on her face.  Also,  Tylenol for pain as needed.  Patient stable for discharge            Scribe Attestation:   Scribe #1: I performed the above scribed service and the documentation accurately describes the services I performed. I attest to the accuracy of the note.               Clinical Impression:   The  primary encounter diagnosis was Injury of head, initial encounter. Diagnoses of Fall, initial encounter and Right hand pain were also pertinent to this visit.      Disposition:   Disposition: Discharged  Condition: Stable                        Emily Simmons MD  11/02/18 1350       Emily Simmons MD  11/02/18 1356

## 2018-11-04 DIAGNOSIS — I50.9 ACUTE HEART FAILURE, UNSPECIFIED HEART FAILURE TYPE: Primary | ICD-10-CM

## 2018-11-05 ENCOUNTER — LAB VISIT (OUTPATIENT)
Dept: LAB | Facility: HOSPITAL | Age: 48
End: 2018-11-05
Payer: MEDICAID

## 2018-11-05 ENCOUNTER — ANTI-COAG VISIT (OUTPATIENT)
Dept: CARDIOLOGY | Facility: CLINIC | Age: 48
End: 2018-11-05

## 2018-11-05 DIAGNOSIS — Z79.01 LONG TERM (CURRENT) USE OF ANTICOAGULANTS: ICD-10-CM

## 2018-11-05 DIAGNOSIS — I48.0 PAROXYSMAL ATRIAL FIBRILLATION: ICD-10-CM

## 2018-11-05 DIAGNOSIS — Z79.01 CHRONIC ANTICOAGULATION: ICD-10-CM

## 2018-11-05 LAB
INR PPP: 1.7
PROTHROMBIN TIME: 17.1 SEC

## 2018-11-05 PROCEDURE — 85610 PROTHROMBIN TIME: CPT

## 2018-11-05 PROCEDURE — 36415 COLL VENOUS BLD VENIPUNCTURE: CPT

## 2018-11-05 RX ORDER — WARFARIN 7.5 MG/1
TABLET ORAL
Qty: 60 TABLET | Refills: 3 | Status: SHIPPED | OUTPATIENT
Start: 2018-11-05 | End: 2019-12-09 | Stop reason: SDUPTHER

## 2018-11-05 RX ORDER — OMEPRAZOLE 40 MG/1
CAPSULE, DELAYED RELEASE ORAL
Qty: 30 CAPSULE | Refills: 6 | Status: SHIPPED | OUTPATIENT
Start: 2018-11-05 | End: 2019-02-07

## 2018-11-05 NOTE — PROGRESS NOTES
Patient advised to take 3.75mg daily except 7.5mg Monday   Advised follow up 11/8/18   Patient verbalized understanding

## 2018-11-08 ENCOUNTER — ANTI-COAG VISIT (OUTPATIENT)
Dept: CARDIOLOGY | Facility: CLINIC | Age: 48
End: 2018-11-08
Payer: MEDICAID

## 2018-11-08 DIAGNOSIS — I48.0 PAROXYSMAL ATRIAL FIBRILLATION: ICD-10-CM

## 2018-11-08 DIAGNOSIS — Z79.01 LONG TERM (CURRENT) USE OF ANTICOAGULANTS: Primary | ICD-10-CM

## 2018-11-08 DIAGNOSIS — Z79.01 CHRONIC ANTICOAGULATION: ICD-10-CM

## 2018-11-08 LAB — INR PPP: 2.4 (ref 2–3)

## 2018-11-08 PROCEDURE — 99211 OFF/OP EST MAY X REQ PHY/QHP: CPT | Mod: S$PBB,25,, | Performed by: INTERNAL MEDICINE

## 2018-11-08 PROCEDURE — 85610 PROTHROMBIN TIME: CPT | Mod: PBBFAC

## 2018-11-08 NOTE — PROGRESS NOTES
Quick follow-up for low INR 11/5 and DDI prednisone. INR within normal range today. Patient finished prednisone 11/7. She has bruises on body from use. Denies any bleeding or other changes. Will maintain weekly dose until follow-up in 2 weeks. Advised her to call with any changes or concerns.

## 2018-11-13 ENCOUNTER — OFFICE VISIT (OUTPATIENT)
Dept: NEPHROLOGY | Facility: CLINIC | Age: 48
End: 2018-11-13
Payer: MEDICAID

## 2018-11-13 VITALS
DIASTOLIC BLOOD PRESSURE: 74 MMHG | OXYGEN SATURATION: 97 % | HEART RATE: 97 BPM | BODY MASS INDEX: 41.82 KG/M2 | WEIGHT: 244.94 LBS | SYSTOLIC BLOOD PRESSURE: 118 MMHG | HEIGHT: 64 IN

## 2018-11-13 DIAGNOSIS — N18.30 CKD (CHRONIC KIDNEY DISEASE), STAGE III: Primary | ICD-10-CM

## 2018-11-13 DIAGNOSIS — E55.9 VITAMIN D DEFICIENCY DISEASE: ICD-10-CM

## 2018-11-13 DIAGNOSIS — I10 ESSENTIAL HYPERTENSION: ICD-10-CM

## 2018-11-13 PROCEDURE — 99213 OFFICE O/P EST LOW 20 MIN: CPT | Mod: PBBFAC | Performed by: INTERNAL MEDICINE

## 2018-11-13 PROCEDURE — 99999 PR PBB SHADOW E&M-EST. PATIENT-LVL III: CPT | Mod: PBBFAC,,, | Performed by: INTERNAL MEDICINE

## 2018-11-13 PROCEDURE — 99213 OFFICE O/P EST LOW 20 MIN: CPT | Mod: S$PBB,,, | Performed by: INTERNAL MEDICINE

## 2018-11-13 NOTE — PROGRESS NOTES
"Subjective:       Patient ID: Laure Ross is a 48 y.o. Black or  female who presents for a follow up evaluation of CKD  HPI   A 44 yo F with a history of NICM (EF 30-35%%), paroxysmal atrial fibrillation, HTN, DM, asthma, HLD and CLARENCE. AICD replacement on 5/4/17. The patient had AL 4/2016 requiring RRT in on outside hospital due to hyperkalemia. Etiology at that time was thought to be most likely multifactorial due to ATN on top of cardiorenal pathophysiology. She saw me 2 yrs ago for possible CKD. Her most recent creatinine is mildly elevated at 1.2 mg/dl. IT has been stable between 1.2 and 1.4 mg/dl.   The patient has no family history of kidney disease, no history of kidney stones. The patient does not freuqently use NSAIDS or herbal supplements.   Complains about a "runny nose" which started 4 days ago , no fever or chills.    Review of Systems   Constitutional: Negative for activity change, appetite change, chills, fatigue, fever and unexpected weight change.   HENT: Negative.  Negative for nosebleeds.    Eyes: Negative.    Respiratory: Positive for shortness of breath (improved). Negative for cough and chest tightness.    Cardiovascular: Negative.  Negative for chest pain and leg swelling.   Gastrointestinal: Negative for abdominal pain, anal bleeding, diarrhea, nausea and vomiting.   Genitourinary: Negative for decreased urine volume, difficulty urinating, dysuria, flank pain, frequency, hematuria, pelvic pain, urgency and vaginal bleeding.   Musculoskeletal: Negative.  Negative for joint swelling and myalgias.   Skin: Negative.  Negative for rash.   Neurological: Negative.    Psychiatric/Behavioral: Negative.    All other systems reviewed and are negative.      Objective:      Physical Exam   Constitutional: She is oriented to person, place, and time. She appears well-developed and well-nourished. No distress.   HENT:   Head: Normocephalic and atraumatic.   Right Ear: External ear " normal.   Left Ear: External ear normal.   Nose: Nose normal.   Mouth/Throat: Oropharynx is clear and moist.   Eyes: Conjunctivae and EOM are normal. Pupils are equal, round, and reactive to light.   Neck: Normal range of motion. Neck supple. No thyromegaly present.   Cardiovascular: Normal rate, regular rhythm and intact distal pulses. Gallop: third heart sound.   Murmur (distant) heard.  Pulmonary/Chest: Effort normal and breath sounds normal. No respiratory distress. She has no wheezes. She has no rales. She exhibits no tenderness.   Abdominal: Soft. Normal appearance and bowel sounds are normal. She exhibits no distension. There is no tenderness. There is no rebound and no guarding.   Musculoskeletal: Normal range of motion. She exhibits no edema or tenderness.   Neurological: She is alert and oriented to person, place, and time. She has normal reflexes.   Skin: Skin is warm, dry and intact. She is not diaphoretic.   Psychiatric: She has a normal mood and affect. Her behavior is normal. Judgment and thought content normal.   Nursing note and vitals reviewed.      Assessment:       1. CKD (chronic kidney disease), stage III    2. Essential hypertension    3. Vitamin D deficiency disease        Plan:       1. CKD3: Likely multifactorial from long standing HTN and DM as well of episodes of AL in the past from possible cardiorenal pathophysisology. We will send the patient for SPEP/EMMANUEL, UA and protein/creatinine ratio and uric acid level.  - continue to monitor  - the patient was changed from omeprazole to ranitidine     Her ultrasound in the past demonstrated sign for chronic kidney disease.   - on lowdose ACE (higher dose caused her significant headache)       Lab Results   Component Value Date    CREATININE 1.2 09/30/2018     Protein Creatinine Ratios: will repeat.    Prot/Creat Ratio, Ur   Date Value Ref Range Status   03/17/2017 0.18 0.00 - 0.20 Final   04/15/2016 0.43 (H) 0.00 - 0.20 Final      ·   ·   Acid-Base:   Lab Results   Component Value Date     09/30/2018    K 4.0 09/30/2018    CO2 25 09/30/2018     2. HTN: Blood pressures are on the low side (low EF)    3. Renal osteodystrophy: will re-check PTH   Lab Results   Component Value Date    .0 (H) 03/20/2017    CALCIUM 9.3 09/30/2018    PHOS 3.1 03/28/2018       4. Normal H/H  Lab Results   Component Value Date    HGB 12.6 09/30/2018        5. DM:  Last HbA1C: well controlled with insulin.   Lab Results   Component Value Date    HGBA1C 7.4 (H) 10/01/2018       6. Lipid management: not within target.   Lab Results   Component Value Date    LDLCALC 174.2 (H) 10/01/2018        Follow up in 12 month with labs.

## 2018-11-20 ENCOUNTER — ANTI-COAG VISIT (OUTPATIENT)
Dept: CARDIOLOGY | Facility: CLINIC | Age: 48
End: 2018-11-20
Payer: MEDICAID

## 2018-11-20 DIAGNOSIS — Z79.01 LONG TERM (CURRENT) USE OF ANTICOAGULANTS: Primary | ICD-10-CM

## 2018-11-20 DIAGNOSIS — Z79.01 CHRONIC ANTICOAGULATION: ICD-10-CM

## 2018-11-20 DIAGNOSIS — I48.0 PAROXYSMAL ATRIAL FIBRILLATION: ICD-10-CM

## 2018-11-20 LAB — INR PPP: 4.7 (ref 2–3)

## 2018-11-20 PROCEDURE — 85610 PROTHROMBIN TIME: CPT | Mod: PBBFAC

## 2018-11-20 PROCEDURE — 99211 OFF/OP EST MAY X REQ PHY/QHP: CPT | Mod: S$PBB,25,, | Performed by: INTERNAL MEDICINE

## 2018-11-20 NOTE — PROGRESS NOTES
INR high today. Patient reports she is taking prednisone 20mg daily until 11/24 or 11/25. Reports she has been taking mucinex. No bleeding or bruising. Will hold today and eat greens and resume with weekly dose until follow up in 1 week for close monitoring. Advised patient to call with any changes or concerns. Care Plan made with Rebekah Michael D.

## 2018-11-27 ENCOUNTER — ANTI-COAG VISIT (OUTPATIENT)
Dept: CARDIOLOGY | Facility: CLINIC | Age: 48
End: 2018-11-27
Payer: MEDICAID

## 2018-11-27 DIAGNOSIS — Z79.01 CHRONIC ANTICOAGULATION: ICD-10-CM

## 2018-11-27 DIAGNOSIS — I48.0 PAROXYSMAL ATRIAL FIBRILLATION: ICD-10-CM

## 2018-11-27 DIAGNOSIS — Z79.01 LONG TERM (CURRENT) USE OF ANTICOAGULANTS: Primary | ICD-10-CM

## 2018-11-27 LAB — INR PPP: 2.1 (ref 2–3)

## 2018-11-27 PROCEDURE — 99211 OFF/OP EST MAY X REQ PHY/QHP: CPT | Mod: S$PBB,25,, | Performed by: PHARMACIST

## 2018-11-27 PROCEDURE — 85610 PROTHROMBIN TIME: CPT | Mod: PBBFAC

## 2018-11-27 NOTE — PROGRESS NOTES
Quick follow up for elevated INR on 11/20. INR within therapeutic range today. Patient completed last dose of prednisone on Saturday.  Patient denies any bleeding, bruising or other changes that would affect warfarin therapy. Will maintain current dose and follow up in 1 week. Patient advised to call coumadin clinic with any changes or concerns. Care plan made with J Cordaro Pharm D.

## 2018-12-05 ENCOUNTER — HOSPITAL ENCOUNTER (EMERGENCY)
Facility: HOSPITAL | Age: 48
Discharge: HOME | End: 2018-12-05
Attending: EMERGENCY MEDICINE
Payer: MEDICAID

## 2018-12-05 VITALS
BODY MASS INDEX: 41.83 KG/M2 | HEART RATE: 89 BPM | OXYGEN SATURATION: 98 % | RESPIRATION RATE: 18 BRPM | WEIGHT: 245 LBS | HEIGHT: 64 IN | TEMPERATURE: 98 F | DIASTOLIC BLOOD PRESSURE: 58 MMHG | SYSTOLIC BLOOD PRESSURE: 107 MMHG

## 2018-12-05 DIAGNOSIS — R09.1 PLEURISY: Primary | ICD-10-CM

## 2018-12-05 DIAGNOSIS — J06.9 UPPER RESPIRATORY TRACT INFECTION, UNSPECIFIED TYPE: ICD-10-CM

## 2018-12-05 DIAGNOSIS — R07.9 CHEST PAIN: ICD-10-CM

## 2018-12-05 LAB
ALBUMIN SERPL BCP-MCNC: 3.5 G/DL
ALP SERPL-CCNC: 73 U/L
ALT SERPL W/O P-5'-P-CCNC: 47 U/L
ANION GAP SERPL CALC-SCNC: 8 MMOL/L
AST SERPL-CCNC: 39 U/L
BASOPHILS # BLD AUTO: 0.05 K/UL
BASOPHILS NFR BLD: 0.5 %
BILIRUB SERPL-MCNC: 0.5 MG/DL
BNP SERPL-MCNC: 160 PG/ML
BUN SERPL-MCNC: 13 MG/DL
CALCIUM SERPL-MCNC: 9.2 MG/DL
CHLORIDE SERPL-SCNC: 103 MMOL/L
CO2 SERPL-SCNC: 28 MMOL/L
CREAT SERPL-MCNC: 1.2 MG/DL
DIFFERENTIAL METHOD: ABNORMAL
EOSINOPHIL # BLD AUTO: 0.1 K/UL
EOSINOPHIL NFR BLD: 1.3 %
ERYTHROCYTE [DISTWIDTH] IN BLOOD BY AUTOMATED COUNT: 15.7 %
EST. GFR  (AFRICAN AMERICAN): >60 ML/MIN/1.73 M^2
EST. GFR  (NON AFRICAN AMERICAN): 53.2 ML/MIN/1.73 M^2
GLUCOSE SERPL-MCNC: 108 MG/DL
HCT VFR BLD AUTO: 39.2 %
HGB BLD-MCNC: 11.9 G/DL
IMM GRANULOCYTES # BLD AUTO: 0.04 K/UL
IMM GRANULOCYTES NFR BLD AUTO: 0.4 %
INR PPP: 3
LYMPHOCYTES # BLD AUTO: 2.7 K/UL
LYMPHOCYTES NFR BLD: 25.7 %
MCH RBC QN AUTO: 25.8 PG
MCHC RBC AUTO-ENTMCNC: 30.4 G/DL
MCV RBC AUTO: 85 FL
MONOCYTES # BLD AUTO: 0.7 K/UL
MONOCYTES NFR BLD: 6.9 %
NEUTROPHILS # BLD AUTO: 6.8 K/UL
NEUTROPHILS NFR BLD: 65.2 %
NRBC BLD-RTO: 0 /100 WBC
PLATELET # BLD AUTO: 316 K/UL
PMV BLD AUTO: 9.8 FL
POTASSIUM SERPL-SCNC: 3.5 MMOL/L
PROT SERPL-MCNC: 7.1 G/DL
PROTHROMBIN TIME: 29.3 SEC
RBC # BLD AUTO: 4.62 M/UL
SODIUM SERPL-SCNC: 139 MMOL/L
TROPONIN I SERPL DL<=0.01 NG/ML-MCNC: 0.04 NG/ML
WBC # BLD AUTO: 10.49 K/UL

## 2018-12-05 PROCEDURE — 99284 EMERGENCY DEPT VISIT MOD MDM: CPT | Mod: ,,, | Performed by: PHYSICIAN ASSISTANT

## 2018-12-05 PROCEDURE — 25000242 PHARM REV CODE 250 ALT 637 W/ HCPCS: Performed by: PHYSICIAN ASSISTANT

## 2018-12-05 PROCEDURE — 94640 AIRWAY INHALATION TREATMENT: CPT

## 2018-12-05 PROCEDURE — 25000003 PHARM REV CODE 250: Performed by: PHYSICIAN ASSISTANT

## 2018-12-05 PROCEDURE — 84484 ASSAY OF TROPONIN QUANT: CPT

## 2018-12-05 PROCEDURE — 93005 ELECTROCARDIOGRAM TRACING: CPT

## 2018-12-05 PROCEDURE — 83880 ASSAY OF NATRIURETIC PEPTIDE: CPT

## 2018-12-05 PROCEDURE — 99285 EMERGENCY DEPT VISIT HI MDM: CPT | Mod: 25

## 2018-12-05 PROCEDURE — 93010 ELECTROCARDIOGRAM REPORT: CPT | Mod: ,,, | Performed by: INTERNAL MEDICINE

## 2018-12-05 PROCEDURE — 80053 COMPREHEN METABOLIC PANEL: CPT

## 2018-12-05 PROCEDURE — 85025 COMPLETE CBC W/AUTO DIFF WBC: CPT

## 2018-12-05 PROCEDURE — 85610 PROTHROMBIN TIME: CPT

## 2018-12-05 RX ORDER — IPRATROPIUM BROMIDE AND ALBUTEROL SULFATE 2.5; .5 MG/3ML; MG/3ML
3 SOLUTION RESPIRATORY (INHALATION)
Status: COMPLETED | OUTPATIENT
Start: 2018-12-05 | End: 2018-12-05

## 2018-12-05 RX ORDER — BENZONATATE 100 MG/1
100 CAPSULE ORAL ONCE
Status: COMPLETED | OUTPATIENT
Start: 2018-12-05 | End: 2018-12-05

## 2018-12-05 RX ORDER — BENZONATATE 100 MG/1
100 CAPSULE ORAL 3 TIMES DAILY PRN
Qty: 12 CAPSULE | Refills: 0 | Status: SHIPPED | OUTPATIENT
Start: 2018-12-05 | End: 2018-12-15

## 2018-12-05 RX ADMIN — IPRATROPIUM BROMIDE AND ALBUTEROL SULFATE 3 ML: .5; 3 SOLUTION RESPIRATORY (INHALATION) at 06:12

## 2018-12-05 RX ADMIN — BENZONATATE 100 MG: 100 CAPSULE ORAL at 06:12

## 2018-12-05 NOTE — PROVIDER PROGRESS NOTES - EMERGENCY DEPT.
Encounter Date: 12/5/2018    ED Physician Progress Notes         EKG - STEMI Decision  Initial Reading: No STEMI present.  Response: No Action Needed.

## 2018-12-05 NOTE — ED TRIAGE NOTES
"Patient states mid upper chest pain and top back pain , States chest feels "sore" and back feels "cold" States when she takes a deep breath in, pain worse. Positive cough, temp 99 several days ago. ALso states she has had a cold, cough, runny nose and sneezing since last week.   "

## 2018-12-05 NOTE — ED PROVIDER NOTES
Encounter Date: 12/5/2018    SCRIBE #1 NOTE: I, Vlae Romero, am scribing for, and in the presence of,  Dr. Boyd. I have scribed the following portions of the note -       History     Chief Complaint   Patient presents with    Chest Pain     chest pain on and off all day. hx chf and cardiomyopathy.      5:40 PM    Patient is a 48-year-old female with a past medical history of HTN, DM 2, CHF, COPD, AFib, on Coumadin who presents the ED with chest pain. Patient states that for the past 3 weeks she has been coughing.  She recently finished a course of prednisone 1 week ago for an upper respiratory infection.  She presents today to the ED due to chest pain that started last night before going to bed.  She states that she fell asleep, but woke up this morning with worsening pain.  She complains of 8/10 pain to her sternal region that is constant and worse with coughing.  She endorses her baseline SOB due to her asthma but denies any WEBBER.  She has had intermittent wheezing for 3 weeks with her cough.  She denies any lower extremity edema. She has no other complaints.          Review of patient's allergies indicates:  No Known Allergies  Past Medical History:   Diagnosis Date    Anticoagulant long-term use     Arthritis     Asthma     Asthma     Atrial fibrillation     Cardiomyopathy     Cardiomyopathy     CHF (congestive heart failure)     CHF (congestive heart failure)     Chronic combined systolic and diastolic heart failure 6/3/2016    COPD (chronic obstructive pulmonary disease) 3/28/2018    GERD (gastroesophageal reflux disease)     H/O tubal ligation     Hypertension     Obesity     Renal disorder     Type 2 diabetes mellitus with hyperglycemia     Type 2 diabetes mellitus with polyneuropathy      Past Surgical History:   Procedure Laterality Date    CARDIAC DEFIBRILLATOR PLACEMENT      CARDIAC DEFIBRILLATOR PLACEMENT      CARDIOVERSION N/A 4/19/2016    Performed [facility] at [facility]  CATH LAB    CHOLECYSTECTOMY      COLONOSCOPY N/A 5/2/2016    Procedure: COLONOSCOPY;  Surgeon: Eugene Soto MD;  [facility]  Service: Endoscopy;  Laterality: N/A;    COLONOSCOPY N/A 5/2/2016    Performed by Eugene Soto MD at [facility] (2ND FLR)    ECHOCARDIOGRAM-TRANSESOPHAGEAL N/A 4/15/2016    Performed by Rossana Surgeon at [facility]NA    ESOPHAGOGASTRODUODENOSCOPY (EGD) N/A 11/17/2016    Performed by Joe Magana MD at [facility] (2ND FLR)    GALLBLADDER SURGERY      iabp  4/2016    INSERTION-ICD-BIVENTRICULAR Right 5/4/2017    Performed by Navi Jolly MD at [facility] CATH LAB    TUBAL LIGATION       Family History   Problem Relation Age of Onset    Heart disease Mother     Heart disease Father      Social History     Tobacco Use    Smoking status: Never Smoker    Smokeless tobacco: Never Used   Substance Use Topics    Alcohol use: No    Drug use: No     Review of Systems   Constitutional: Negative for chills and fever.   HENT: Negative for ear pain and sore throat.    Eyes: Negative for itching and visual disturbance.   Respiratory: Positive for cough and shortness of breath (baseline, unchanged).    Cardiovascular: Positive for chest pain. Negative for leg swelling.   Gastrointestinal: Negative for abdominal pain, nausea and vomiting.   Genitourinary: Negative for dysuria and hematuria.   Musculoskeletal: Negative for back pain.   Skin: Negative for rash.   Neurological: Negative for facial asymmetry and weakness.   Hematological: Does not bruise/bleed easily.       Physical Exam     Initial Vitals [12/05/18 1652]   BP Pulse Resp Temp SpO2   133/69 102 18 98 °F (36.7 °C) 95 %      MAP       --         Physical Exam    Vitals reviewed.  Constitutional: She appears well-developed and well-nourished. She is not diaphoretic. No distress.   HENT:   Head: Normocephalic and atraumatic.   Nose: Nose normal.   Mouth/Throat: No oropharyngeal exudate or posterior oropharyngeal  erythema.   Eyes: Conjunctivae and EOM are normal.   Neck: Normal range of motion.   Cardiovascular: Normal rate, regular rhythm and normal heart sounds. Exam reveals no friction rub.    No murmur heard.  No peripheral edema or calf tenderness.   Pulmonary/Chest: Breath sounds normal. No accessory muscle usage. No tachypnea. No respiratory distress. She has no wheezes. She has no rales. She exhibits tenderness.       Abdominal: Soft. Bowel sounds are normal. She exhibits no distension. There is no tenderness. There is no rebound.   Musculoskeletal: Normal range of motion.   Neurological: She is alert and oriented to person, place, and time. She has normal strength. No sensory deficit.   Skin: Skin is warm and dry. No erythema. No pallor.   Psychiatric: She has a normal mood and affect. Her behavior is normal. Judgment and thought content normal.         ED Course   Procedures  Labs Reviewed   CBC W/ AUTO DIFFERENTIAL - Abnormal; Notable for the following components:       Result Value    Hemoglobin 11.9 (*)     MCH 25.8 (*)     MCHC 30.4 (*)     RDW 15.7 (*)     All other components within normal limits    Narrative:     shared lavender 17:38  12/05/2018    COMPREHENSIVE METABOLIC PANEL - Abnormal; Notable for the following components:    ALT 47 (*)     eGFR if non  53.2 (*)     All other components within normal limits    Narrative:     ADD ON PT [id number] PER DR. BUENO 12/05/2018  18:08    TROPONIN I - Abnormal; Notable for the following components:    Troponin I 0.041 (*)     All other components within normal limits    Narrative:     ADD ON PT [id number] PER DR. BUENO 12/05/2018  18:08    B-TYPE NATRIURETIC PEPTIDE - Abnormal; Notable for the following components:     (*)     All other components within normal limits    Narrative:     ADD ON PT [id number] PER DR. BUENO 12/05/2018  18:08    PROTIME-INR - Abnormal; Notable for the following components:    Prothrombin Time 29.3 (*)     INR 3.0 (*)      All other components within normal limits    Narrative:     ADD ON PT [id number] PER DR. BUENO 12/05/2018  18:08    PROTIME-INR          Imaging Results          X-Ray Chest PA And Lateral (Final result)  Result time 12/05/18 18:26:17    Final result by Momo Ricardo MD (12/05/18 18:26:17)                 Impression:      Cardiac device and stable enlarged cardiac silhouette without radiographic evidence of failure or new focal opacity.      Electronically signed by: Momo Ricardo MD  Date:    12/05/2018  Time:    18:26             Narrative:    EXAMINATION:  XR CHEST PA AND LATERAL    CLINICAL HISTORY:  Chest Pain;    TECHNIQUE:  PA and lateral views of the chest were performed.    COMPARISON:  Chest radiograph 09/30/2018 and CT thorax 06/04/2018    FINDINGS:  No detrimental change.  Resolution is limited by body habitus with underpenetration.  Right chest multi lead cardiac device appears stable.  Cardiac silhouette remains enlarged without evidence of failure.  Stable mediastinal contours and hilar regions.  The lungs are symmetrically well expanded without large consolidation or new focal opacity.  Left lower lobe small calcified granuloma is unchanged.  Previous opacity at the right upper lung zone is grossly unchanged and likely corresponds to patient's known pulmonary AVM.  No pleural effusion or pneumothorax.  No acute osseous process seen.                                 Medical Decision Making:   History:   Old Medical Records: I decided to obtain old medical records.  Clinical Tests:   Lab Tests: Ordered and Reviewed  Radiological Study: Ordered and Reviewed  Medical Tests: Ordered and Reviewed       APC / Resident Notes:   DDX includes but is not limited to pleurisy, bronchitis, viral syndrome, ACS, heart failure, costochondritis.  Will initiate workup, give breathing treatment and Tessalon, and reassess.    CBC with no leukocytosis.  No anemia.    CMP with no electrolyte abnormalities.  Creatinine stable  at 1.2.  Troponin at patient's baseline at 0.04.    BNP at 160.    INR therapeutic at 3.0.  CXR with cardiac device and stable enlarged cardiac silhouette without radiographic evidence of failure or new focal opacity.    Patient updated with results.  She does not report improvement in her chest pain after breathing treatment, however her cough has improved.  Given history, physical exam, and workup, her symptoms are most likely due to pleurisy and costochondritis due to bronchitis.  She was recently prescribed steroids last week.  I educated her on etiology.  I will Rx her cough suppressant (Tessalon).  She is to take Tylenol for pain relief.  Follow up with PCP.  All questions were answered.  Patient is comfortable plan is for discharge. I have reviewed patient's chart and discussed this case with my supervising MD.      Shelbie Pulido PA-C  Emergent Department  [facility]           Scribe Attestation:   Scribe #1: I performed the above scribed service and the documentation accurately describes the services I performed. I attest to the accuracy of the note.    Attending Attestation:     Physician Attestation Statement for NP/PA:   I discussed this assessment and plan of this patient with the NP/PA, but I did not personally examine the patient. The face to face encounter was performed by the NP/PA.                     Clinical Impression:   The primary encounter diagnosis was Pleurisy. Diagnoses of Chest pain and Upper respiratory tract infection, unspecified type were also pertinent to this visit.      Disposition:   Disposition: Discharged  Condition: Stable                        Shelbie Pulido PA-C  12/05/18 2003

## 2018-12-05 NOTE — ED NOTES
Patient identifiers verified and correct for Ms Ross  C/C: CHest pain , cough, cold symptoms  APPEARANCE: awake and alert in NAD.  SKIN: warm, dry and intact. No breakdown or bruising.  MUSCULOSKELETAL: Patient moving all extremities spontaneously, no obvious swelling or deformities noted. Ambulates independently.  RESPIRATORY: Positive shortness of breath.Respirations unlabored. Positive NPC,  Harsh cough  CARDIAC: Positive mid sternal CP, 2+ distal pulses; no peripheral edema  ABDOMEN: S/ND/NT, Denies nausea  : voids spontaneously, denies difficulty  Neurologic: AAO x 4; follows commands equal strength in all extremities; denies numbness/tingling. Denies dizziness Denies weakness

## 2018-12-06 ENCOUNTER — ANTI-COAG VISIT (OUTPATIENT)
Dept: CARDIOLOGY | Facility: CLINIC | Age: 48
End: 2018-12-06
Payer: MEDICAID

## 2018-12-06 ENCOUNTER — OFFICE VISIT (OUTPATIENT)
Dept: URGENT CARE | Facility: CLINIC | Age: 48
End: 2018-12-06
Payer: MEDICAID

## 2018-12-06 VITALS
BODY MASS INDEX: 42.05 KG/M2 | WEIGHT: 245 LBS | TEMPERATURE: 98 F | OXYGEN SATURATION: 98 % | DIASTOLIC BLOOD PRESSURE: 88 MMHG | SYSTOLIC BLOOD PRESSURE: 142 MMHG | HEART RATE: 84 BPM

## 2018-12-06 DIAGNOSIS — Z79.01 CHRONIC ANTICOAGULATION: ICD-10-CM

## 2018-12-06 DIAGNOSIS — N30.00 ACUTE CYSTITIS WITHOUT HEMATURIA: Primary | ICD-10-CM

## 2018-12-06 DIAGNOSIS — M25.511 ACUTE PAIN OF RIGHT SHOULDER: ICD-10-CM

## 2018-12-06 DIAGNOSIS — I48.0 PAROXYSMAL ATRIAL FIBRILLATION: ICD-10-CM

## 2018-12-06 DIAGNOSIS — Z79.01 LONG TERM (CURRENT) USE OF ANTICOAGULANTS: Primary | ICD-10-CM

## 2018-12-06 DIAGNOSIS — R39.9 UTI SYMPTOMS: ICD-10-CM

## 2018-12-06 DIAGNOSIS — R05.9 COUGH: ICD-10-CM

## 2018-12-06 LAB
BILIRUB UR QL STRIP: NEGATIVE
GLUCOSE UR QL STRIP: NEGATIVE
INR PPP: 3.1 (ref 2–3)
KETONES UR QL STRIP: NEGATIVE
LEUKOCYTE ESTERASE UR QL STRIP: POSITIVE
PH, POC UA: 6.5 (ref 5–8)
POC BLOOD, URINE: POSITIVE
POC NITRATES, URINE: NEGATIVE
PROT UR QL STRIP: POSITIVE
SP GR UR STRIP: 1.01 (ref 1–1.03)
UROBILINOGEN UR STRIP-ACNC: 1 (ref 0.1–1.1)

## 2018-12-06 PROCEDURE — 85610 PROTHROMBIN TIME: CPT | Mod: PBBFAC

## 2018-12-06 PROCEDURE — 99214 OFFICE O/P EST MOD 30 MIN: CPT | Mod: 25,S$GLB,, | Performed by: SURGERY

## 2018-12-06 PROCEDURE — 99211 OFF/OP EST MAY X REQ PHY/QHP: CPT | Mod: S$PBB,25,, | Performed by: INTERNAL MEDICINE

## 2018-12-06 PROCEDURE — 81003 URINALYSIS AUTO W/O SCOPE: CPT | Mod: QW,S$GLB,, | Performed by: SURGERY

## 2018-12-06 RX ORDER — CETIRIZINE HYDROCHLORIDE 10 MG/1
10 TABLET ORAL DAILY
Qty: 30 TABLET | Refills: 0 | Status: SHIPPED | OUTPATIENT
Start: 2018-12-06 | End: 2019-02-17

## 2018-12-06 RX ORDER — FLUTICASONE PROPIONATE 50 MCG
2 SPRAY, SUSPENSION (ML) NASAL DAILY
Qty: 1 BOTTLE | Refills: 0 | Status: SHIPPED | OUTPATIENT
Start: 2018-12-06 | End: 2019-01-05

## 2018-12-06 RX ORDER — NITROFURANTOIN 25; 75 MG/1; MG/1
100 CAPSULE ORAL 2 TIMES DAILY
Qty: 14 CAPSULE | Refills: 0 | Status: SHIPPED | OUTPATIENT
Start: 2018-12-06 | End: 2018-12-13

## 2018-12-06 RX ORDER — CYCLOBENZAPRINE HCL 10 MG
10 TABLET ORAL 3 TIMES DAILY PRN
Qty: 21 TABLET | Refills: 0 | Status: SHIPPED | OUTPATIENT
Start: 2018-12-06 | End: 2018-12-13

## 2018-12-06 NOTE — PROGRESS NOTES
INR slightly elevated today. Patient went to ER yesterday for difficulty breathing. She was prescribed benzonatate for cough. She denies any bleeding, bruising or other changes. We will decrease her weekly dose and follow up in 1 week. Patient reminded to call clinic with any changes or concerns. Care plan made with J Cordaro Pharm D.

## 2018-12-06 NOTE — DISCHARGE INSTRUCTIONS
You have pleurisy, inflammation of her lung cavity. This will get better once your upper respiratory infection improves. Continue to take cough medication (tessalon) on A SCHEDULED BASIS. Take acetaminophen as needed for pain relief. Use inhalers as needed.     Call and follow up with primary care physician if your symptoms do not improve or worsen or return to the emergency department.     Future Appointments   Date Time Provider Department Center   12/6/2018 10:00 AM LAB, COUMADIN 2 [facility]   12/10/2018 11:30 AM [facility] CT1 LIMIT 400 LBS [facility] CT SCAN [facility]   1/2/2019  9:30 AM Jeannie Cormier DPM [facility] POD [facility]   1/21/2019  8:00 AM HOME MONITOR DEVICE CHECK, [facility] AR[facility]PRO [facility]       Our goal in the emergency department is to always give you outstanding care and exceptional service. You may receive a survey by mail or e-mail in the next week regarding your experience in our ED. We would greatly appreciate your completing and returning the survey. Your feedback provides us with a way to recognize our staff who give very good care and it helps us learn how to improve when your experience was below our aspiration of excellence.

## 2018-12-06 NOTE — PROGRESS NOTES
Subjective:       Patient ID: Laure Ross is a 48 y.o. female.    Vitals:  weight is 111.1 kg (245 lb). Her temperature is 98.1 °F (36.7 °C). Her blood pressure is 142/88 (abnormal) and her pulse is 84. Her oxygen saturation is 98%.     Chief Complaint: Urinary Tract Infection and Shoulder Pain    Pt states that she is having some UTI symptoms and also right shoulder pain .       Urinary Tract Infection    This is a new problem. The current episode started in the past 7 days. The problem has been gradually worsening. The quality of the pain is described as aching. She is sexually active. Associated symptoms include flank pain, frequency and urgency. Pertinent negatives include no chills, hematuria, nausea, vomiting or rash.   Shoulder Pain    Pertinent negatives include no fever.       Constitution: Negative for chills and fever.   Neck: Negative for painful lymph nodes.   Gastrointestinal: Negative for abdominal pain, nausea and vomiting.   Genitourinary: Positive for frequency, urgency and flank pain. Negative for dysuria, urine decreased, hematuria, history of kidney stones, painful menstruation, irregular menstruation, missed menses, heavy menstrual bleeding, ovarian cysts, genital trauma, vaginal pain, vaginal discharge, vaginal bleeding, vaginal odor, painful intercourse, genital sore, painful ejaculation and pelvic pain.   Musculoskeletal: Negative for back pain.   Skin: Negative for rash and lesion.   Hematologic/Lymphatic: Negative for swollen lymph nodes.       Objective:      Physical Exam   Constitutional: She is oriented to person, place, and time. She appears well-developed and well-nourished. She is cooperative.  Non-toxic appearance. She does not appear ill. No distress.   HENT:   Head: Normocephalic and atraumatic.   Right Ear: Hearing, tympanic membrane, external ear and ear canal normal.   Left Ear: Hearing, tympanic membrane, external ear and ear canal normal.   Nose: Mucosal edema and  rhinorrhea present. No nasal deformity. No epistaxis. Right sinus exhibits no maxillary sinus tenderness and no frontal sinus tenderness. Left sinus exhibits no maxillary sinus tenderness and no frontal sinus tenderness.   Mouth/Throat: Uvula is midline, oropharynx is clear and moist and mucous membranes are normal. No trismus in the jaw. Normal dentition. No uvula swelling. No posterior oropharyngeal erythema.   Eyes: Conjunctivae and lids are normal. No scleral icterus.   Sclera clear bilat   Neck: Trachea normal, full passive range of motion without pain and phonation normal. Neck supple.   Cardiovascular: Normal rate, regular rhythm, normal heart sounds, intact distal pulses and normal pulses.   Pulmonary/Chest: Effort normal and breath sounds normal. No respiratory distress.   Frequent dry cough   Abdominal: Soft. Normal appearance and bowel sounds are normal. She exhibits no distension. There is no tenderness.   Musculoskeletal: She exhibits no edema or deformity.        Right shoulder: She exhibits decreased range of motion, tenderness and bony tenderness.   Neurological: She is alert and oriented to person, place, and time. She exhibits normal muscle tone. Coordination normal.   Skin: Skin is warm, dry and intact. She is not diaphoretic. No pallor.   Psychiatric: She has a normal mood and affect. Her speech is normal and behavior is normal. Judgment and thought content normal. Cognition and memory are normal.   Nursing note and vitals reviewed.      Assessment:       1. Acute cystitis without hematuria    2. UTI symptoms    3. Cough    4. Acute pain of right shoulder        Plan:         Acute cystitis without hematuria  -     nitrofurantoin, macrocrystal-monohydrate, (MACROBID) 100 MG capsule; Take 1 capsule (100 mg total) by mouth 2 (two) times daily. for 7 days  Dispense: 14 capsule; Refill: 0  -     Urine culture    UTI symptoms  -     POCT Urinalysis, Dipstick, Automated, W/O Scope    Cough  -      fluticasone (FLONASE) 50 mcg/actuation nasal spray; 2 sprays (100 mcg total) by Each Nare route once daily.  Dispense: 1 Bottle; Refill: 0  -     cetirizine (ZYRTEC) 10 MG tablet; Take 1 tablet (10 mg total) by mouth once daily.  Dispense: 30 tablet; Refill: 0    Acute pain of right shoulder  -     cyclobenzaprine (FLEXERIL) 10 MG tablet; Take 1 tablet (10 mg total) by mouth 3 (three) times daily as needed for Muscle spasms.  Dispense: 21 tablet; Refill: 0    Pt reports having taken flexeril in recent past with good efficacy and no side effects.

## 2018-12-11 ENCOUNTER — TELEPHONE (OUTPATIENT)
Dept: URGENT CARE | Facility: CLINIC | Age: 48
End: 2018-12-11

## 2018-12-11 LAB
BACTERIA UR CULT: ABNORMAL
BACTERIA UR CULT: ABNORMAL
OTHER ANTIBIOTIC SUSC ISLT: ABNORMAL

## 2018-12-13 ENCOUNTER — ANTI-COAG VISIT (OUTPATIENT)
Dept: CARDIOLOGY | Facility: CLINIC | Age: 48
End: 2018-12-13
Payer: MEDICAID

## 2018-12-13 DIAGNOSIS — Z79.01 CHRONIC ANTICOAGULATION: ICD-10-CM

## 2018-12-13 DIAGNOSIS — I48.0 PAROXYSMAL ATRIAL FIBRILLATION: ICD-10-CM

## 2018-12-13 DIAGNOSIS — Z79.01 LONG TERM (CURRENT) USE OF ANTICOAGULANTS: Primary | ICD-10-CM

## 2018-12-13 LAB — INR PPP: 3.2 (ref 2–3)

## 2018-12-13 PROCEDURE — 99211 OFF/OP EST MAY X REQ PHY/QHP: CPT | Mod: S$PBB,25,, | Performed by: INTERNAL MEDICINE

## 2018-12-13 PROCEDURE — 85610 PROTHROMBIN TIME: CPT | Mod: PBBFAC

## 2018-12-13 RX ORDER — BUDESONIDE AND FORMOTEROL FUMARATE DIHYDRATE 160; 4.5 UG/1; UG/1
AEROSOL RESPIRATORY (INHALATION)
Qty: 10.2 G | Refills: 0 | Status: CANCELLED | OUTPATIENT
Start: 2018-12-13 | End: 2019-12-13

## 2018-12-13 NOTE — PROGRESS NOTES
INR elevated today but patient did not start taking lower dose she was advised on and took 7.5 mg on 12/10/18.  Patient instructed on correct dose and given written instruction which she verbalized understanding.  She also reports 12/6/18  UTI and micoroscopic hematuria per dip stick in urgent care was prescribed Macrobid 100 mg BID for 7 days (started 12/6/18) and urince C&S positive for ecoli but no change or addition to antibiotic therapy.  She has only had greens once this week so patient instructed to eat servings of greens today which she verbalized understanding, Her plan of care is for greens twice weekly.  Follow up INR in 1 week to make sure she is adhering to dose adjustment and monitor INR.

## 2018-12-20 ENCOUNTER — ANTI-COAG VISIT (OUTPATIENT)
Dept: CARDIOLOGY | Facility: CLINIC | Age: 48
End: 2018-12-20
Payer: MEDICAID

## 2018-12-20 DIAGNOSIS — Z79.01 LONG TERM (CURRENT) USE OF ANTICOAGULANTS: Primary | ICD-10-CM

## 2018-12-20 DIAGNOSIS — Z79.01 CHRONIC ANTICOAGULATION: ICD-10-CM

## 2018-12-20 DIAGNOSIS — I48.0 PAROXYSMAL ATRIAL FIBRILLATION: ICD-10-CM

## 2018-12-20 LAB — INR PPP: 3 (ref 2–3)

## 2018-12-20 PROCEDURE — 99211 OFF/OP EST MAY X REQ PHY/QHP: CPT | Mod: S$PBB,,,

## 2018-12-20 PROCEDURE — 85610 PROTHROMBIN TIME: CPT | Mod: PBBFAC

## 2018-12-20 NOTE — PROGRESS NOTES
Quick follow up for elevated INR on 12/13. INR within therapeutic range today. Patient states that she had one dose solu-medrol for asthma symptoms yesterday. She denies any bleeding, bruising or other changes. We will resume her weekly dose and follow up in 1 week. Patient reminded to call clinic with any changes or concerns. Care plan made with THERESE PEREZ.

## 2018-12-27 ENCOUNTER — LAB VISIT (OUTPATIENT)
Dept: LAB | Facility: HOSPITAL | Age: 48
End: 2018-12-27
Attending: INTERNAL MEDICINE
Payer: MEDICAID

## 2018-12-27 DIAGNOSIS — Z79.01 LONG TERM (CURRENT) USE OF ANTICOAGULANTS: ICD-10-CM

## 2018-12-27 DIAGNOSIS — I48.0 PAROXYSMAL ATRIAL FIBRILLATION: ICD-10-CM

## 2018-12-27 DIAGNOSIS — Z79.01 CHRONIC ANTICOAGULATION: ICD-10-CM

## 2018-12-27 LAB
INR PPP: 1.4
PROTHROMBIN TIME: 13.7 SEC

## 2018-12-27 PROCEDURE — 36415 COLL VENOUS BLD VENIPUNCTURE: CPT

## 2018-12-27 PROCEDURE — 85610 PROTHROMBIN TIME: CPT

## 2018-12-31 ENCOUNTER — ANTI-COAG VISIT (OUTPATIENT)
Dept: CARDIOLOGY | Facility: CLINIC | Age: 48
End: 2018-12-31

## 2018-12-31 ENCOUNTER — LAB VISIT (OUTPATIENT)
Dept: LAB | Facility: HOSPITAL | Age: 48
End: 2018-12-31
Attending: INTERNAL MEDICINE
Payer: MEDICAID

## 2018-12-31 DIAGNOSIS — I48.0 PAROXYSMAL ATRIAL FIBRILLATION: ICD-10-CM

## 2018-12-31 DIAGNOSIS — Z79.01 LONG TERM (CURRENT) USE OF ANTICOAGULANTS: ICD-10-CM

## 2018-12-31 DIAGNOSIS — Z79.01 CHRONIC ANTICOAGULATION: ICD-10-CM

## 2018-12-31 LAB
INR PPP: 1.5
PROTHROMBIN TIME: 14.5 SEC

## 2018-12-31 PROCEDURE — 85610 PROTHROMBIN TIME: CPT

## 2018-12-31 PROCEDURE — 36415 COLL VENOUS BLD VENIPUNCTURE: CPT

## 2018-12-31 NOTE — PROGRESS NOTES
Seems that patient was dosed on 12/31 with INR from 12/27 (before 12/31 result known). Once 12/31 result was reviewed, patient should have already been advised with no change to plan. On 1/3 it was noted that patient states she was not advised to take higher dose on 12/31. Will give boost today and recheck INR next week.

## 2019-01-01 ENCOUNTER — TELEPHONE (OUTPATIENT)
Dept: PHARMACY | Facility: CLINIC | Age: 49
End: 2019-01-01

## 2019-01-01 ENCOUNTER — OFFICE VISIT (OUTPATIENT)
Dept: PODIATRY | Facility: CLINIC | Age: 49
End: 2019-01-01
Payer: MEDICAID

## 2019-01-01 VITALS
WEIGHT: 250 LBS | SYSTOLIC BLOOD PRESSURE: 116 MMHG | BODY MASS INDEX: 42.68 KG/M2 | DIASTOLIC BLOOD PRESSURE: 74 MMHG | HEIGHT: 64 IN

## 2019-01-01 DIAGNOSIS — L84 PRE-ULCERATIVE CALLUSES: ICD-10-CM

## 2019-01-01 DIAGNOSIS — E11.49 TYPE II DIABETES MELLITUS WITH NEUROLOGICAL MANIFESTATIONS: Primary | ICD-10-CM

## 2019-01-01 DIAGNOSIS — B35.1 ONYCHOMYCOSIS DUE TO DERMATOPHYTE: ICD-10-CM

## 2019-01-01 PROCEDURE — 99999 PR PBB SHADOW E&M-EST. PATIENT-LVL IV: ICD-10-PCS | Mod: PBBFAC,,, | Performed by: PODIATRIST

## 2019-01-01 PROCEDURE — 99214 OFFICE O/P EST MOD 30 MIN: CPT | Mod: PBBFAC,PO | Performed by: PODIATRIST

## 2019-01-01 PROCEDURE — 99999 PR PBB SHADOW E&M-EST. PATIENT-LVL IV: CPT | Mod: PBBFAC,,, | Performed by: PODIATRIST

## 2019-01-01 PROCEDURE — 99213 PR OFFICE/OUTPT VISIT, EST, LEVL III, 20-29 MIN: ICD-10-PCS | Mod: S$PBB,,, | Performed by: PODIATRIST

## 2019-01-01 PROCEDURE — 99213 OFFICE O/P EST LOW 20 MIN: CPT | Mod: S$PBB,,, | Performed by: PODIATRIST

## 2019-01-02 ENCOUNTER — OFFICE VISIT (OUTPATIENT)
Dept: PODIATRY | Facility: CLINIC | Age: 49
End: 2019-01-02
Payer: MEDICAID

## 2019-01-02 VITALS — HEIGHT: 64 IN | BODY MASS INDEX: 41.83 KG/M2 | WEIGHT: 245 LBS

## 2019-01-02 DIAGNOSIS — E11.49 TYPE II DIABETES MELLITUS WITH NEUROLOGICAL MANIFESTATIONS: Primary | ICD-10-CM

## 2019-01-02 DIAGNOSIS — B35.1 ONYCHOMYCOSIS DUE TO DERMATOPHYTE: ICD-10-CM

## 2019-01-02 DIAGNOSIS — L84 CORN OR CALLUS: ICD-10-CM

## 2019-01-02 PROCEDURE — 99999 PR PBB SHADOW E&M-EST. PATIENT-LVL IV: CPT | Mod: PBBFAC,,, | Performed by: PODIATRIST

## 2019-01-02 PROCEDURE — 11721 DEBRIDE NAIL 6 OR MORE: CPT | Mod: PBBFAC,PO,59 | Performed by: PODIATRIST

## 2019-01-02 PROCEDURE — 99499 NO LOS: ICD-10-PCS | Mod: S$PBB,,, | Performed by: PODIATRIST

## 2019-01-02 PROCEDURE — 99499 UNLISTED E&M SERVICE: CPT | Mod: S$PBB,,, | Performed by: PODIATRIST

## 2019-01-02 PROCEDURE — 11056 ROUTINE FOOT CARE: ICD-10-PCS | Mod: S$PBB,Q9,, | Performed by: PODIATRIST

## 2019-01-02 PROCEDURE — 99214 OFFICE O/P EST MOD 30 MIN: CPT | Mod: PBBFAC,PO | Performed by: PODIATRIST

## 2019-01-02 PROCEDURE — 11056 PARNG/CUTG B9 HYPRKR LES 2-4: CPT | Mod: PBBFAC,PO | Performed by: PODIATRIST

## 2019-01-02 PROCEDURE — 11056 PARNG/CUTG B9 HYPRKR LES 2-4: CPT | Mod: S$PBB,Q9,, | Performed by: PODIATRIST

## 2019-01-02 PROCEDURE — 99999 PR PBB SHADOW E&M-EST. PATIENT-LVL IV: ICD-10-PCS | Mod: PBBFAC,,, | Performed by: PODIATRIST

## 2019-01-02 PROCEDURE — 11721 ROUTINE FOOT CARE: ICD-10-PCS | Mod: S$PBB,59,Q9, | Performed by: PODIATRIST

## 2019-01-02 RX ORDER — CICLOPIROX 80 MG/ML
SOLUTION TOPICAL NIGHTLY
Qty: 6.6 ML | Refills: 3 | Status: SHIPPED | OUTPATIENT
Start: 2019-01-02 | End: 2020-01-01 | Stop reason: SDUPTHER

## 2019-01-02 NOTE — PROGRESS NOTES
Subjective:      Patient ID: Laure Ross is a 48 y.o. female.    Chief Complaint: Diabetes Mellitus (PCP Dr Mittal); Diabetic Foot Exam; and Nail Care    Laure is a 48 y.o. female who presents to the clinic for evaluation and treatment of high risk feet. Laure has a past medical history of Anticoagulant long-term use, Arthritis, Asthma, Asthma, Atrial fibrillation, Cardiomyopathy, Cardiomyopathy, CHF (congestive heart failure), CHF (congestive heart failure), Chronic combined systolic and diastolic heart failure (6/3/2016), COPD (chronic obstructive pulmonary disease) (3/28/2018), GERD (gastroesophageal reflux disease), H/O tubal ligation, Hypertension, Obesity, Renal disorder, Type 2 diabetes mellitus with hyperglycemia, and Type 2 diabetes mellitus with polyneuropathy. Here for routine DM foot exam,callus trimming. This routine trimming helps prevent painful symptoms.. Has hx of healed foot ulcer right foot. No problems with wounds. .  No other pedal issues today. This patient has documented high risk feet requiring routine maintenance secondary to peripheral neuropathy.      PCP: Holly Mittal MD    Chief Complaint   Patient presents with    Diabetes Mellitus     PCP Dr Mittal    Diabetic Foot Exam    Nail Care         Current shoe gear:  DM shoes, inserts    Hemoglobin A1C   Date Value Ref Range Status   10/01/2018 7.4 (H) 4.0 - 5.6 % Final     Comment:     ADA Screening Guidelines:  5.7-6.4%  Consistent with prediabetes  >or=6.5%  Consistent with diabetes  High levels of fetal hemoglobin interfere with the HbA1C  assay. Heterozygous hemoglobin variants (HbS, HgC, etc)do  not significantly interfere with this assay.   However, presence of multiple variants may affect accuracy.     03/28/2018 6.2 (H) 4.0 - 5.6 % Final     Comment:     According to ADA guidelines, hemoglobin A1c <7.0% represents  optimal control in non-pregnant diabetic patients. Different  metrics may apply to specific patient  populations.   Standards of Medical Care in Diabetes-2016.  For the purpose of screening for the presence of diabetes:  <5.7%     Consistent with the absence of diabetes  5.7-6.4%  Consistent with increasing risk for diabetes   (prediabetes)  >or=6.5%  Consistent with diabetes  Currently, no consensus exists for use of hemoglobin A1c  for diagnosis of diabetes for children.  This Hemoglobin A1c assay has significant interference with fetal   hemoglobin   (HbF). The results are invalid for patients with abnormal amounts of   HbF,   including those with known Hereditary Persistence   of Fetal Hemoglobin. Heterozygous hemoglobin variants (HbAS, HbAC,   HbAD, HbAE, HbA2) do not significantly interfere with this assay;   however, presence of multiple variants in a sample may impact the %   interference.     03/17/2017 6.1 4.5 - 6.2 % Final     Comment:     According to ADA guidelines, hemoglobin A1C <7.0% represents  optimal control in non-pregnant diabetic patients.  Different  metrics may apply to specific populations.   Standards of Medical Care in Diabetes - 2016.  For the purpose of screening for the presence of diabetes:  <5.7%     Consistent with the absence of diabetes  5.7-6.4%  Consistent with increasing risk for diabetes   (prediabetes)  >or=6.5%  Consistent with diabetes  Currently no consensus exists for use of hemoglobin A1C  for diagnosis of diabetes for children.         Review of Systems   Constitution: Negative for chills, fever and malaise/fatigue.   Cardiovascular: Positive for leg swelling. Negative for chest pain, orthopnea and palpitations.   Respiratory: Negative for cough, shortness of breath and wheezing.    Skin: Positive for color change, dry skin, nail changes and suspicious lesions (calluses/corns). Negative for itching, poor wound healing and rash.   Musculoskeletal: Negative for arthritis, gout, joint pain, joint swelling, muscle weakness and myalgias.   Neurological: Positive for  numbness, paresthesias and sensory change. Negative for disturbances in coordination, dizziness, focal weakness and tremors.           Objective:      Physical Exam   Cardiovascular:   Dorsalis pedis and posterior tibial pulses are diminished Bilaterally. Toes are cool to touch. Feet are warm proximally.There is decreased digital hair. Skin is atrophic, slightly hyperpigmented, and mildly edematous       Musculoskeletal:   Musculoskeletal:  Muscle strength is 5/5 in all groups bilaterally.  No pain with ankle, STJ or MTPJ ROM, bilat. Ankle dorsiflexion is limited with knees extended and flexed, bilat.  Semi-reducible hammertoe contractures noted to toes 2-4 b/l-asymptomatic.    Neurological:   [place]-Anya 5.07 monofilamant testing is diminished both feet. Sharp/dull sensation diminished Bilaterally. Light touch absent Bilaterally.       Skin: Lesion noted. No bruising and no ecchymosis noted. No erythema.   No open lesions, lacerations or wounds noted. Toenails 1-5 bilaterally are elongated by 2-3 mm, thickened by 2-3 mm, discolored/yellowed, dystrophic, brittle with subungual debris.  .  Interdigital spaces clean, dry and intact b/l. No erythema noted to b/l foot. Skin texture thin, atrophic, dry. Pedal hair diminished b/l.         Scaling dryness in a moccasin distribution is noted to the bilateral lower extremities.     Focal hyperkeratotic lesion consisting entirely of hyperkeratotic tissue without open skin, drainage, pus, fluctuance, malodor, or signs of infection: B/L submet head 2, right submet head 5                        Assessment:       Encounter Diagnoses   Name Primary?    Type II diabetes mellitus with neurological manifestations Yes    Corn or callus     Onychomycosis due to dermatophyte          Plan:       Laure was seen today for diabetes mellitus, diabetic foot exam and nail care.    Diagnoses and all orders for this visit:    Type II diabetes mellitus with neurological  manifestations  -     Routine Foot Care    [place] or callus  -     Routine Foot Care    Onychomycosis due to dermatophyte  -     Routine Foot Care    Other orders  -     ciclopirox (PENLAC) 8 % Soln; Apply topically nightly.      I counseled the patient on her conditions, their implications and medical management.    - Shoe inspection. Diabetic Foot Education. Patient reminded of the importance of good nutrition and blood sugar control to help prevent podiatric complications of diabetes. Patient instructed on proper foot hygeine. We discussed wearing proper shoe gear, daily foot inspections, never walking without protective shoe gear, caution putting sharp instruments to feet     - Discussed DM foot care:  Wear comfortable, proper fitting shoes. Wash feet daily. Dry well. After drying, apply moisturizer to feet (no lotion to webspaces). Inspect feet daily for skin breaks, blisters, swelling, or redness. Wear cotton socks (preferably white)  Change socks every day. Do NOT walk barefoot. Do NOT use heating pads or warm/hot water soaks     - At patient's request, I discussed different treatments for toenail fungus. We discussed oral antifungals but I did not recommend them as a first line treatment since the medication is taken internally and can have side effects such as rash, taste disturbances, and liver enzyme elevation. We discussed topical Penlac to be applied daily and removed weekly. Pt. Expresses understanding and would like to try the Penlac. Rx sent to the pharmacy.     See routine foot care procedure note for nail debridement / callus trimming.     Ketoconazole 2% topical cream prescribed for treatment of aforementioned tinea pedis. Patient will use this medication as directed in addition to thourougly drying between toes daily, and applying powder as needed- continue     RTC 3 months, sooner PRN    Jeannie Cormier DPM

## 2019-01-02 NOTE — PROCEDURES
Routine Foot Care  Date/Time: 1/2/2019 10:17 AM  Performed by: Jeannie Cormier DPM  Authorized by: Jeannie Cormier DPM     Consent Done?:  Yes (Verbal)  Hyperkeratotic Skin Lesions?: Yes    Number of trimmed lesions:  3  Location(s):  Left Plantar and Right Plantar    Nail Care Type:  Debride  Location(s): All  (Left 1st Toe, Left 3rd Toe, Left 2nd Toe, Left 4th Toe, Left 5th Toe, Right 1st Toe, Right 2nd Toe, Right 3rd Toe, Right 4th Toe and Right 5th Toe)  Patient tolerance:  Patient tolerated the procedure well with no immediate complications

## 2019-01-07 ENCOUNTER — LAB VISIT (OUTPATIENT)
Dept: LAB | Facility: HOSPITAL | Age: 49
End: 2019-01-07
Payer: MEDICAID

## 2019-01-07 DIAGNOSIS — I48.0 PAROXYSMAL ATRIAL FIBRILLATION: ICD-10-CM

## 2019-01-07 DIAGNOSIS — Z79.01 LONG TERM (CURRENT) USE OF ANTICOAGULANTS: ICD-10-CM

## 2019-01-07 DIAGNOSIS — Z79.01 CHRONIC ANTICOAGULATION: ICD-10-CM

## 2019-01-07 LAB
INR PPP: 1.9
PROTHROMBIN TIME: 18.5 SEC

## 2019-01-07 PROCEDURE — 36415 COLL VENOUS BLD VENIPUNCTURE: CPT

## 2019-01-07 PROCEDURE — 85610 PROTHROMBIN TIME: CPT

## 2019-01-09 ENCOUNTER — ANTI-COAG VISIT (OUTPATIENT)
Dept: CARDIOLOGY | Facility: CLINIC | Age: 49
End: 2019-01-09

## 2019-01-09 DIAGNOSIS — I48.0 PAROXYSMAL ATRIAL FIBRILLATION: ICD-10-CM

## 2019-01-09 DIAGNOSIS — Z79.01 CHRONIC ANTICOAGULATION: ICD-10-CM

## 2019-01-10 ENCOUNTER — ANTI-COAG VISIT (OUTPATIENT)
Dept: CARDIOLOGY | Facility: CLINIC | Age: 49
End: 2019-01-10
Payer: MEDICAID

## 2019-01-10 DIAGNOSIS — Z79.01 CHRONIC ANTICOAGULATION: ICD-10-CM

## 2019-01-10 DIAGNOSIS — Z79.01 LONG TERM (CURRENT) USE OF ANTICOAGULANTS: Primary | ICD-10-CM

## 2019-01-10 DIAGNOSIS — I48.0 PAROXYSMAL ATRIAL FIBRILLATION: ICD-10-CM

## 2019-01-10 DIAGNOSIS — I50.42 CHRONIC COMBINED SYSTOLIC AND DIASTOLIC CONGESTIVE HEART FAILURE: Primary | ICD-10-CM

## 2019-01-10 LAB — INR PPP: 1.8 (ref 2–3)

## 2019-01-10 PROCEDURE — 99211 PR OFFICE/OUTPT VISIT, EST, LEVL I: ICD-10-PCS | Mod: S$PBB,25,, | Performed by: INTERNAL MEDICINE

## 2019-01-10 PROCEDURE — 99211 OFF/OP EST MAY X REQ PHY/QHP: CPT | Mod: S$PBB,25,, | Performed by: INTERNAL MEDICINE

## 2019-01-10 PROCEDURE — 85610 PROTHROMBIN TIME: CPT | Mod: PBBFAC

## 2019-01-10 RX ORDER — LANOLIN ALCOHOL/MO/W.PET/CERES
CREAM (GRAM) TOPICAL 2 TIMES DAILY
Qty: 60 TABLET | Refills: 1 | Status: SHIPPED | OUTPATIENT
Start: 2019-01-10 | End: 2019-04-08

## 2019-01-10 NOTE — PROGRESS NOTES
Quick follow up for subtherapeutic INR on 1/7. INR subtherapeutic today. Patient denies any bleeding, bruising or other changes. We will boost her dose today then increase her weekly dose. Follow up next Tuesday. Patient reminded to call clinic with any changes or concerns. Care plan made with A Denilson Pharm D.

## 2019-01-15 ENCOUNTER — ANTI-COAG VISIT (OUTPATIENT)
Dept: CARDIOLOGY | Facility: CLINIC | Age: 49
End: 2019-01-15
Payer: MEDICAID

## 2019-01-15 DIAGNOSIS — Z79.01 LONG TERM (CURRENT) USE OF ANTICOAGULANTS: Primary | ICD-10-CM

## 2019-01-15 DIAGNOSIS — Z79.01 CHRONIC ANTICOAGULATION: ICD-10-CM

## 2019-01-15 DIAGNOSIS — I48.0 PAROXYSMAL ATRIAL FIBRILLATION: ICD-10-CM

## 2019-01-15 LAB — INR PPP: 1.7 (ref 2–3)

## 2019-01-15 PROCEDURE — 99211 PR OFFICE/OUTPT VISIT, EST, LEVL I: ICD-10-PCS | Mod: S$PBB,25,, | Performed by: INTERNAL MEDICINE

## 2019-01-15 PROCEDURE — 85610 PROTHROMBIN TIME: CPT | Mod: PBBFAC

## 2019-01-15 PROCEDURE — 99211 OFF/OP EST MAY X REQ PHY/QHP: CPT | Mod: S$PBB,25,, | Performed by: INTERNAL MEDICINE

## 2019-01-15 NOTE — PROGRESS NOTES
Quick follow up for subtherapeutic INR on 1/10. INR subtherapeutic today. Patient states that she is having increased back pain. She denies any bleeding, bruising or other changes. We will increase her weekly dose and follow up in 1 week. Patient was re-educated on situations that would require placing a call to the Coumadin  Clinic, including bleeding or unusual bruising issues, changes in health, diet or medications,upcoming procedures that require warfarin interruption, and missed Coumadin dose(s). Patient expressed understanding that avoidance of consistency with these parameters could cause fluctuations in INR, leading to more frequent visits and increase risk of adverse events. Care plan made with J Cordaro Pharm D.

## 2019-01-16 RX ORDER — INSULIN GLARGINE 100 [IU]/ML
INJECTION, SOLUTION SUBCUTANEOUS
Qty: 9 ML | Refills: 6 | Status: SHIPPED | OUTPATIENT
Start: 2019-01-16 | End: 2019-07-22

## 2019-01-21 ENCOUNTER — CLINICAL SUPPORT (OUTPATIENT)
Dept: CARDIOLOGY | Facility: HOSPITAL | Age: 49
End: 2019-01-21
Attending: INTERNAL MEDICINE
Payer: MEDICAID

## 2019-01-21 DIAGNOSIS — Z95.810 PRESENCE OF AUTOMATIC CARDIOVERTER/DEFIBRILLATOR (AICD): ICD-10-CM

## 2019-01-21 PROCEDURE — 93296 REM INTERROG EVL PM/IDS: CPT

## 2019-01-22 ENCOUNTER — ANTI-COAG VISIT (OUTPATIENT)
Dept: CARDIOLOGY | Facility: CLINIC | Age: 49
End: 2019-01-22
Payer: MEDICAID

## 2019-01-22 DIAGNOSIS — I48.0 PAROXYSMAL ATRIAL FIBRILLATION: ICD-10-CM

## 2019-01-22 DIAGNOSIS — Z79.01 CHRONIC ANTICOAGULATION: ICD-10-CM

## 2019-01-22 DIAGNOSIS — Z79.01 LONG TERM (CURRENT) USE OF ANTICOAGULANTS: Primary | ICD-10-CM

## 2019-01-22 LAB — INR PPP: 3 (ref 2–3)

## 2019-01-22 PROCEDURE — 85610 PROTHROMBIN TIME: CPT | Mod: PBBFAC

## 2019-01-22 PROCEDURE — 99211 PR OFFICE/OUTPT VISIT, EST, LEVL I: ICD-10-PCS | Mod: S$PBB,25,, | Performed by: INTERNAL MEDICINE

## 2019-01-22 PROCEDURE — 99211 OFF/OP EST MAY X REQ PHY/QHP: CPT | Mod: S$PBB,25,, | Performed by: INTERNAL MEDICINE

## 2019-01-22 NOTE — PROGRESS NOTES
Quick follow up for subtherapeutic INR on 1/15. INR within therapeutic range today but on the higher end. Patient states that she has not had greens yet this week. She denies any bleeding or other changes that would affect warfarin therapy. Will maintain current dose and follow up in 1 week. Patient was re-educated on situations that would require placing a call to the Coumadin  Clinic, including bleeding or unusual bruising issues, changes in health, diet or medications,upcoming procedures that require warfarin interruption, and missed Coumadin dose(s). Patient expressed understanding that avoidance of consistency with these parameters could cause fluctuations in INR, leading to more frequent visits and increase risk of adverse events. Care plan made with J Cordaro Pharm D.

## 2019-01-23 DIAGNOSIS — Z95.810 CARDIAC DEFIBRILLATOR IN SITU: Primary | ICD-10-CM

## 2019-01-29 ENCOUNTER — ANTI-COAG VISIT (OUTPATIENT)
Dept: CARDIOLOGY | Facility: CLINIC | Age: 49
End: 2019-01-29
Payer: MEDICAID

## 2019-01-29 DIAGNOSIS — Z79.01 CHRONIC ANTICOAGULATION: ICD-10-CM

## 2019-01-29 DIAGNOSIS — Z79.01 LONG TERM (CURRENT) USE OF ANTICOAGULANTS: Primary | ICD-10-CM

## 2019-01-29 DIAGNOSIS — I48.0 PAROXYSMAL ATRIAL FIBRILLATION: ICD-10-CM

## 2019-01-29 LAB — INR PPP: 2.4 (ref 2–3)

## 2019-01-29 PROCEDURE — 85610 PROTHROMBIN TIME: CPT | Mod: PBBFAC

## 2019-01-29 PROCEDURE — 99211 PR OFFICE/OUTPT VISIT, EST, LEVL I: ICD-10-PCS | Mod: S$PBB,25,, | Performed by: INTERNAL MEDICINE

## 2019-01-29 PROCEDURE — 99211 OFF/OP EST MAY X REQ PHY/QHP: CPT | Mod: S$PBB,25,, | Performed by: INTERNAL MEDICINE

## 2019-01-29 NOTE — PROGRESS NOTES
INR within therapeutic range today. Patient states that she is no longer taking omeprazole and she will start allopurinol tonight. She denies any bleeding, bruising or other changes. We will resume her weekly dose and follow up in 2 weeks. .Patient was re-educated on situations that would require placing a call to the Coumadin  Clinic, including bleeding or unusual bruising issues, changes in health, diet or medications,upcoming procedures that require warfarin interruption, and missed Coumadin dose(s). Patient expressed understanding that avoidance of consistency with these parameters could cause fluctuations in INR, leading to more frequent visits and increase risk of adverse events. Care plan made with J Cordaro Pharm D.

## 2019-02-07 ENCOUNTER — OFFICE VISIT (OUTPATIENT)
Dept: TRANSPLANT | Facility: CLINIC | Age: 49
End: 2019-02-07
Payer: MEDICAID

## 2019-02-07 VITALS
DIASTOLIC BLOOD PRESSURE: 54 MMHG | SYSTOLIC BLOOD PRESSURE: 114 MMHG | BODY MASS INDEX: 42.24 KG/M2 | WEIGHT: 253.5 LBS | HEIGHT: 65 IN | HEART RATE: 88 BPM

## 2019-02-07 DIAGNOSIS — Z95.810 BIVENTRICULAR AUTOMATIC IMPLANTABLE CARDIOVERTER DEFIBRILLATOR IN SITU: ICD-10-CM

## 2019-02-07 DIAGNOSIS — I42.8 NICM (NONISCHEMIC CARDIOMYOPATHY): ICD-10-CM

## 2019-02-07 DIAGNOSIS — I10 ESSENTIAL HYPERTENSION: ICD-10-CM

## 2019-02-07 DIAGNOSIS — I50.42 CHRONIC COMBINED SYSTOLIC AND DIASTOLIC CONGESTIVE HEART FAILURE: Primary | ICD-10-CM

## 2019-02-07 DIAGNOSIS — I50.9 CONGESTIVE HEART FAILURE, UNSPECIFIED HF CHRONICITY, UNSPECIFIED HEART FAILURE TYPE: ICD-10-CM

## 2019-02-07 DIAGNOSIS — J45.21 INTERMITTENT ASTHMA WITH ACUTE EXACERBATION, UNSPECIFIED ASTHMA SEVERITY: ICD-10-CM

## 2019-02-07 DIAGNOSIS — J30.9 ALLERGIC RHINITIS: ICD-10-CM

## 2019-02-07 DIAGNOSIS — I48.0 PAROXYSMAL ATRIAL FIBRILLATION: ICD-10-CM

## 2019-02-07 PROCEDURE — 99214 PR OFFICE/OUTPT VISIT, EST, LEVL IV, 30-39 MIN: ICD-10-PCS | Mod: S$PBB,,, | Performed by: INTERNAL MEDICINE

## 2019-02-07 PROCEDURE — 99999 PR PBB SHADOW E&M-EST. PATIENT-LVL III: CPT | Mod: PBBFAC,,, | Performed by: INTERNAL MEDICINE

## 2019-02-07 PROCEDURE — 99214 OFFICE O/P EST MOD 30 MIN: CPT | Mod: S$PBB,,, | Performed by: INTERNAL MEDICINE

## 2019-02-07 PROCEDURE — 99213 OFFICE O/P EST LOW 20 MIN: CPT | Mod: PBBFAC | Performed by: INTERNAL MEDICINE

## 2019-02-07 PROCEDURE — 99999 PR PBB SHADOW E&M-EST. PATIENT-LVL III: ICD-10-PCS | Mod: PBBFAC,,, | Performed by: INTERNAL MEDICINE

## 2019-02-07 RX ORDER — DIGOXIN 125 MCG
TABLET ORAL
Qty: 30 TABLET | Refills: 5 | Status: SHIPPED | OUTPATIENT
Start: 2019-02-07 | End: 2019-07-24 | Stop reason: SDUPTHER

## 2019-02-07 RX ORDER — MONTELUKAST SODIUM 10 MG/1
10 TABLET ORAL DAILY
Qty: 30 TABLET | Refills: 3 | Status: SHIPPED | OUTPATIENT
Start: 2019-02-07 | End: 2019-02-07 | Stop reason: SDUPTHER

## 2019-02-07 RX ORDER — ATORVASTATIN CALCIUM 20 MG/1
20 TABLET, FILM COATED ORAL DAILY
Qty: 90 TABLET | Refills: 1 | Status: SHIPPED | OUTPATIENT
Start: 2019-02-07 | End: 2019-07-24 | Stop reason: SDUPTHER

## 2019-02-07 RX ORDER — MONTELUKAST SODIUM 10 MG/1
10 TABLET ORAL DAILY
Qty: 30 TABLET | Refills: 3 | Status: SHIPPED | OUTPATIENT
Start: 2019-02-07 | End: 2019-06-14

## 2019-02-07 NOTE — PROGRESS NOTES
Subjective:   Transplant status: declined    HPI:  Ms. Ross is a very pleasant 44 yo F with a history of NICM (EF 10-20%), paroxysmal atrial fibrillation, HTN, DM, asthma, HLD and CLARENCE who comes for a follow-up visit. Continues to do well from a HF standpoint. Today she reports NYHA class II with occasional III symptoms. No  PND or orthopnea.  No HF admissions or ED visits. Reports no issues with her HF regimen. Labs were reviewed today.     Past Medical History:   Diagnosis Date    Anticoagulant long-term use     Arthritis     Asthma     Asthma     Atrial fibrillation     Cardiomyopathy     Cardiomyopathy     CHF (congestive heart failure)     CHF (congestive heart failure)     Chronic combined systolic and diastolic heart failure 6/3/2016    COPD (chronic obstructive pulmonary disease) 3/28/2018    GERD (gastroesophageal reflux disease)     H/O tubal ligation     Hypertension     Obesity     Renal disorder     Type 2 diabetes mellitus with hyperglycemia     Type 2 diabetes mellitus with polyneuropathy      Past Surgical History:   Procedure Laterality Date    CARDIAC DEFIBRILLATOR PLACEMENT      CARDIAC DEFIBRILLATOR PLACEMENT      CARDIOVERSION N/A 2016    [facility] at [facility] CATH LAB    CHOLECYSTECTOMY      COLONOSCOPY N/A 2016    Performed by Eugene Soto MD at [facility] ENDO (2ND FLR)    ECHOCARDIOGRAM-TRANSESOPHAGEAL N/A 4/15/2016    Performed by Rossana Surgeon at [facility] ROSSANA    ESOPHAGOGASTRODUODENOSCOPY (EGD) N/A 2016    Performed by Joe Magana MD at [facility] ENDO (2ND FLR)    GALLBLADDER SURGERY      iabp  2016    INSERTION-ICD-BIVENTRICULAR Right 2017    Performed by [facility] at [facility] CATH LAB    TUBAL LIGATION       OB History      Para Term  AB Living    3 3 3          SAB TAB Ectopic Multiple Live Births                     Review of Systems   Constitution: Negative. Negative for chills, decreased appetite,  "diaphoresis, fever, weakness, malaise/fatigue, night sweats, weight gain and weight loss.   Eyes: Negative.    Cardiovascular: Positive for dyspnea on exertion. Negative for chest pain, claudication, cyanosis, irregular heartbeat, leg swelling, near-syncope, orthopnea, palpitations, paroxysmal nocturnal dyspnea and syncope.   Respiratory: Negative for cough, hemoptysis and shortness of breath.    Endocrine: Negative.    Hematologic/Lymphatic: Negative.    Skin: Negative for color change, dry skin and nail changes.   Musculoskeletal: Negative.    Gastrointestinal: Negative.    Genitourinary: Negative.        Objective:   Blood pressure (!) 114/54, pulse 88, height 5' 5" (1.651 m), weight 115 kg (253 lb 8.5 oz).body mass index is 42.19 kg/m².  Physical Exam   Constitutional: She is oriented to person, place, and time. Vital signs are normal. She appears well-developed and well-nourished.   HENT:   Head: Normocephalic.   Eyes: Pupils are equal, round, and reactive to light.   Neck: Neck supple. No JVD present.   Cardiovascular: Normal rate, regular rhythm, normal heart sounds and intact distal pulses. PMI is displaced. Exam reveals no gallop and no friction rub.   No murmur heard.      Pulmonary/Chest: Effort normal and breath sounds normal. No respiratory distress. She has no wheezes. She has no rales.   Abdominal: Soft. Bowel sounds are normal. She exhibits no distension. There is no tenderness. There is no rebound.   Musculoskeletal: She exhibits no edema.   Neurological: She is alert and oriented to person, place, and time.   Nursing note and vitals reviewed.      Labs:    Chemistry        Component Value Date/Time     02/07/2019 0716    K 4.4 02/07/2019 0716     02/07/2019 0716    CO2 30 (H) 02/07/2019 0716    BUN 24 (H) 02/07/2019 0716    CREATININE 1.4 02/07/2019 0716     02/07/2019 0716        Component Value Date/Time    CALCIUM 9.7 02/07/2019 0716    ALKPHOS 76 02/07/2019 0716    AST 19 " 02/07/2019 0716    ALT 23 02/07/2019 0716    BILITOT 0.4 02/07/2019 0716    ESTGFRAFRICA 50.9 (A) 02/07/2019 0716    EGFRNONAA 44.1 (A) 02/07/2019 0716          Magnesium   Date Value Ref Range Status   09/19/2018 2.0 1.6 - 2.6 mg/dL Final     Lab Results   Component Value Date    WBC 10.49 12/05/2018    HGB 11.9 (L) 12/05/2018    HCT 39.2 12/05/2018     12/05/2018     Lab Results   Component Value Date    INR 2.4 01/29/2019    INR 3.0 01/22/2019    INR 1.7 01/15/2019     BNP   Date Value Ref Range Status   02/07/2019 97 0 - 99 pg/mL Final     Comment:     Values of less than 100 pg/ml are consistent with non-CHF populations.   12/05/2018 160 (H) 0 - 99 pg/mL Final     Comment:     Values of less than 100 pg/ml are consistent with non-CHF populations.   09/30/2018 101 (H) 0 - 99 pg/mL Final     Comment:     Values of less than 100 pg/ml are consistent with non-CHF populations.         Assessment:      1. Chronic combined systolic and diastolic congestive heart failure    2. NICM (nonischemic cardiomyopathy)    3. Essential hypertension    4. Biventricular automatic implantable cardioverter defibrillator in situ    5. Paroxysmal atrial fibrillation        Plan:   Stable from a HF standpoint.   NYHA class II symptoms. Appears  euvolemic on exam.   Continue current HF regimen  Recommend 2 gram sodium restriction and 1500cc fluid restriction.  Encourage physical activity with graded exercise program.  Requested patient to weigh themselves daily, and to notify us if their weight increases by more than 3 lbs in 1 day or 5 lbs in 1 week.    RTC in 6 months with labs       Dandre Jules MD

## 2019-02-12 ENCOUNTER — ANTI-COAG VISIT (OUTPATIENT)
Dept: CARDIOLOGY | Facility: CLINIC | Age: 49
End: 2019-02-12
Payer: MEDICAID

## 2019-02-12 DIAGNOSIS — Z79.01 LONG TERM (CURRENT) USE OF ANTICOAGULANTS: Primary | ICD-10-CM

## 2019-02-12 DIAGNOSIS — Z79.01 CHRONIC ANTICOAGULATION: ICD-10-CM

## 2019-02-12 DIAGNOSIS — I48.0 PAROXYSMAL ATRIAL FIBRILLATION: ICD-10-CM

## 2019-02-12 LAB — INR PPP: 2.8 (ref 2–3)

## 2019-02-12 PROCEDURE — 85610 PROTHROMBIN TIME: CPT | Mod: PBBFAC

## 2019-02-12 NOTE — PROGRESS NOTES
INR within therapeutic range today. Patient states that she has not been feeling well lately. She denies any bleeding or other changes that would affect warfarin therapy. Will maintain current dose and follow up in 3 weeks. Patient was re-educated on situations that would require placing a call to the Coumadin  Clinic, including bleeding or unusual bruising issues, changes in health, diet or medications,upcoming procedures that require warfarin interruption, and missed Coumadin dose(s). Patient expressed understanding that avoidance of consistency with these parameters could cause fluctuations in INR, leading to more frequent visits and increase risk of adverse events. Care plan made with J Cordaro Pharm D.

## 2019-02-17 DIAGNOSIS — R05.9 COUGH: ICD-10-CM

## 2019-02-17 RX ORDER — CETIRIZINE HYDROCHLORIDE 10 MG/1
TABLET ORAL
Qty: 30 TABLET | Refills: 0 | Status: SHIPPED | OUTPATIENT
Start: 2019-02-17 | End: 2019-05-01 | Stop reason: SDUPTHER

## 2019-02-17 RX ORDER — CETIRIZINE HYDROCHLORIDE 10 MG/1
10 TABLET ORAL DAILY
Qty: 30 TABLET | Refills: 0 | Status: SHIPPED | OUTPATIENT
Start: 2019-02-17 | End: 2019-05-01 | Stop reason: SDUPTHER

## 2019-03-08 ENCOUNTER — ANTI-COAG VISIT (OUTPATIENT)
Dept: CARDIOLOGY | Facility: CLINIC | Age: 49
End: 2019-03-08
Payer: MEDICAID

## 2019-03-08 DIAGNOSIS — Z79.01 LONG TERM (CURRENT) USE OF ANTICOAGULANTS: Primary | ICD-10-CM

## 2019-03-08 DIAGNOSIS — Z79.01 CHRONIC ANTICOAGULATION: ICD-10-CM

## 2019-03-08 DIAGNOSIS — I48.0 PAROXYSMAL ATRIAL FIBRILLATION: ICD-10-CM

## 2019-03-08 LAB — INR PPP: 2.5 (ref 2–3)

## 2019-03-08 PROCEDURE — 99211 OFF/OP EST MAY X REQ PHY/QHP: CPT | Mod: S$PBB,,, | Performed by: INTERNAL MEDICINE

## 2019-03-08 PROCEDURE — 85610 PROTHROMBIN TIME: CPT | Mod: PBBFAC

## 2019-03-08 PROCEDURE — 99211 PR OFFICE/OUTPT VISIT, EST, LEVL I: ICD-10-PCS | Mod: S$PBB,,, | Performed by: INTERNAL MEDICINE

## 2019-03-08 NOTE — PROGRESS NOTES
INR within therapeutic range today. Patient denies any bleeding or other changes that would affect warfarin therapy. Will maintain current dose and follow up in 4 weeks. Patient was re-educated on situations that would require placing a call to the Coumadin  Clinic, including bleeding or unusual bruising issues, changes in health, diet or medications,upcoming procedures that require warfarin interruption, and missed Coumadin dose(s). Patient expressed understanding that avoidance of consistency with these parameters could cause fluctuations in INR, leading to more frequent visits and increase risk of adverse events. Care plan made with THERESE Welch D

## 2019-03-09 RX ORDER — BUDESONIDE AND FORMOTEROL FUMARATE DIHYDRATE 160; 4.5 UG/1; UG/1
AEROSOL RESPIRATORY (INHALATION)
Qty: 10.2 G | Refills: 0 | Status: CANCELLED | OUTPATIENT
Start: 2019-03-09 | End: 2020-01-01

## 2019-03-18 PROCEDURE — 99284 EMERGENCY DEPT VISIT MOD MDM: CPT | Mod: ER

## 2019-03-18 PROCEDURE — 81025 URINE PREGNANCY TEST: CPT | Mod: ER | Performed by: INTERNAL MEDICINE

## 2019-03-19 ENCOUNTER — HOSPITAL ENCOUNTER (EMERGENCY)
Facility: HOSPITAL | Age: 49
Discharge: HOME | End: 2019-03-19
Attending: INTERNAL MEDICINE
Payer: MEDICAID

## 2019-03-19 VITALS
SYSTOLIC BLOOD PRESSURE: 143 MMHG | WEIGHT: 245 LBS | RESPIRATION RATE: 16 BRPM | BODY MASS INDEX: 40.82 KG/M2 | TEMPERATURE: 98 F | HEIGHT: 65 IN | HEART RATE: 88 BPM | OXYGEN SATURATION: 99 % | DIASTOLIC BLOOD PRESSURE: 76 MMHG

## 2019-03-19 DIAGNOSIS — J32.0 MAXILLARY SINUSITIS, UNSPECIFIED CHRONICITY: ICD-10-CM

## 2019-03-19 DIAGNOSIS — J06.9 ACUTE URI: Primary | ICD-10-CM

## 2019-03-19 LAB
B-HCG UR QL: NEGATIVE
CTP QC/QA: YES

## 2019-03-19 RX ORDER — AZELASTINE 1 MG/ML
2 SPRAY, METERED NASAL 2 TIMES DAILY
Qty: 30 ML | Refills: 0 | Status: SHIPPED | OUTPATIENT
Start: 2019-03-19 | End: 2019-07-24 | Stop reason: SDUPTHER

## 2019-03-19 RX ORDER — IBUPROFEN 800 MG/1
800 TABLET ORAL EVERY 8 HOURS PRN
Qty: 30 TABLET | Refills: 0 | Status: SHIPPED | OUTPATIENT
Start: 2019-03-19 | End: 2019-07-02

## 2019-03-19 RX ORDER — FLUTICASONE PROPIONATE 50 MCG
2 SPRAY, SUSPENSION (ML) NASAL DAILY
Qty: 15 G | Refills: 0 | Status: SHIPPED | OUTPATIENT
Start: 2019-03-19 | End: 2019-05-01 | Stop reason: SDUPTHER

## 2019-03-19 NOTE — ED PROVIDER NOTES
Encounter Date: 3/18/2019    SCRIBE #1 NOTE: I, Liz Jimenez, am scribing for, and in the presence of,  Dr. Walsh. I have scribed the following portions of the note - Other sections scribed: HPI, ROS, PE.       History     Chief Complaint   Patient presents with    Facial Pain     pt c/o L side facial burning x 2 hours, pt states she has nasal congestion and dry skin to area      Laure Ross is a 48 y.o. female w/ PMHx of a defibrillator implant, a pacer implant, DM, asthma, and CHF who presents to the ED complaining of left sided facial burning onset 2 hours PTA. Pt states she has nasal congestion and dried skin to the same area of the face. Pt denies any teeth pain at this time.      The history is provided by the patient.     Review of patient's allergies indicates:  No Known Allergies  Past Medical History:   Diagnosis Date    Anticoagulant long-term use     Arthritis     Asthma     Asthma     Atrial fibrillation     Cardiomyopathy     Cardiomyopathy     CHF (congestive heart failure)     CHF (congestive heart failure)     Chronic combined systolic and diastolic heart failure 6/3/2016    COPD (chronic obstructive pulmonary disease) 3/28/2018    GERD (gastroesophageal reflux disease)     H/O tubal ligation     Hypertension     Obesity     Renal disorder     Type 2 diabetes mellitus with hyperglycemia     Type 2 diabetes mellitus with polyneuropathy      Past Surgical History:   Procedure Laterality Date    CARDIAC DEFIBRILLATOR PLACEMENT      CARDIAC DEFIBRILLATOR PLACEMENT      CARDIAC DEFIBRILLATOR PLACEMENT      CARDIOVERSION N/A 4/19/2016    [facility] CATH LAB    CHOLECYSTECTOMY      COLONOSCOPY N/A 5/2/2016    Performed by Eugene Soto MD at [facility] ENDO (2ND FLR)    ECHOCARDIOGRAM-TRANSESOPHAGEAL N/A 4/15/2016    Performed by Rossana Surgeon at [facility] ROSSANA    ESOPHAGOGASTRODUODENOSCOPY (EGD) N/A 11/17/2016    Performed by [facility] at  [facility] ENDO (2ND FLR)    GALLBLADDER SURGERY      iabp  4/2016    INSERTION-ICD-BIVENTRICULAR Right 5/4/2017    Performed by [facility]i [facility] MD at [facility] CATH LAB    TUBAL LIGATION       Family History   Problem Relation Age of Onset    Heart disease Mother     Heart disease Father      Social History     Tobacco Use    Smoking status: Never Smoker    Smokeless tobacco: Never Used   Substance Use Topics    Alcohol use: No    Drug use: No     Review of Systems   Constitutional: Negative for fever.   HENT: Positive for congestion. Negative for sore throat.    Respiratory: Negative for shortness of breath.    Cardiovascular: Negative for chest pain.   Gastrointestinal: Negative for nausea and vomiting.   Genitourinary: Negative for dysuria.   Musculoskeletal: Negative for back pain.   Skin:        Positive for dry skin on left side of face  Positive for burning sensation to left side of face.   Neurological: Negative for weakness.   Hematological: Negative for adenopathy.   Psychiatric/Behavioral: Negative for behavioral problems.   All other systems reviewed and are negative.      Physical Exam     Initial Vitals [03/18/19 2245]   BP Pulse Resp Temp SpO2   131/71 91 18 97.8 °F (36.6 °C) 99 %      MAP       --         Physical Exam    Nursing note and vitals reviewed.  Constitutional: She appears well-developed and well-nourished.   HENT:   Head: Normocephalic and atraumatic.   Right Ear: External ear normal.   Left Ear: External ear normal.   Nose: Nose normal. No rhinorrhea.   Mouth/Throat: Posterior oropharyngeal erythema present. No oropharyngeal exudate, posterior oropharyngeal edema or tonsillar abscesses.   Left maxillary tenderness to palpation without swelling or gross deformity. Enlarged nasal turbinates.    Eyes: Conjunctivae are normal. Pupils are equal, round, and reactive to light.   Neck: Normal range of motion. Neck supple.   Cardiovascular: Normal rate, regular rhythm and intact distal pulses.  Exam reveals no gallop and no friction rub.    No murmur heard.  Pulmonary/Chest: Effort normal. No respiratory distress. She has no wheezes. She has no rhonchi. She has no rales.   Abdominal: Soft. Bowel sounds are normal. There is no tenderness. There is no rigidity, no rebound and no guarding.   Musculoskeletal: Normal range of motion.   Neurological: She is alert and oriented to person, place, and time.   Skin: Skin is warm and dry. Capillary refill takes less than 2 seconds.   Psychiatric: She has a normal mood and affect. Her behavior is normal.         ED Course   Procedures  Labs Reviewed   POCT URINE PREGNANCY          Imaging Results    None          Medical Decision Making:   History:   Old Medical Records: I decided to obtain old medical records.  Initial Assessment:   Laure Ross is a 48 y.o. female w/ PMHx of a defibrillator implant, a pacer implant, DM, asthma, and CHF who presents to the ED complaining of left sided facial burning onset 2 hours PTA. Pt states she has nasal congestion and dried skin to the same area of the face. Pt denies any teeth pain at this time.            Scribe Attestation:   Scribe #1: I performed the above scribed service and the documentation accurately describes the services I performed. I attest to the accuracy of the note.    This document was produced by a scribe under my direction and in my presence. I agree with the content of the note and have made any necessary edits.     Dr. Walsh    03/19/2019 1:55 AM             Clinical Impression:     1. Acute URI    2. Maxillary sinusitis, unspecified chronicity          Disposition:   Disposition: Discharged  Condition: Stable                        Dwight Walsh MD  03/19/19 0155

## 2019-03-20 ENCOUNTER — HOSPITAL ENCOUNTER (OUTPATIENT)
Dept: CARDIOLOGY | Facility: CLINIC | Age: 49
Discharge: HOME | End: 2019-03-20
Attending: INTERNAL MEDICINE
Payer: MEDICAID

## 2019-03-20 VITALS
WEIGHT: 245 LBS | DIASTOLIC BLOOD PRESSURE: 76 MMHG | HEART RATE: 65 BPM | SYSTOLIC BLOOD PRESSURE: 143 MMHG | HEIGHT: 65 IN | BODY MASS INDEX: 40.82 KG/M2

## 2019-03-20 DIAGNOSIS — I50.42 CHRONIC COMBINED SYSTOLIC AND DIASTOLIC CONGESTIVE HEART FAILURE: ICD-10-CM

## 2019-03-20 LAB
ASCENDING AORTA: 2.82 CM
BSA FOR ECHO PROCEDURE: 2.26 M2
CV ECHO LV RWT: 0.25 CM
DOP CALC LVOT AREA: 4.45 CM2
DOP CALC LVOT DIAMETER: 2.38 CM
DOP CALC LVOT PEAK VEL: 1.08 M/S
DOP CALC LVOT STROKE VOLUME: 75.9 CM3
DOP CALCLVOT PEAK VEL VTI: 17.07 CM
E WAVE DECELERATION TIME: 190.67 MSEC
E/A RATIO: 0.87
E/E' RATIO: 22.22
ECHO LV POSTERIOR WALL: 0.9 CM (ref 0.6–1.1)
FRACTIONAL SHORTENING: 23 % (ref 28–44)
INTERVENTRICULAR SEPTUM: 0.93 CM (ref 0.6–1.1)
LA MAJOR: 6.56 CM
LA MINOR: 6.47 CM
LA WIDTH: 4.72 CM
LEFT ATRIUM SIZE: 4.83 CM
LEFT ATRIUM VOLUME INDEX: 58.5 ML/M2
LEFT ATRIUM VOLUME: 126.24 CM3
LEFT INTERNAL DIMENSION IN SYSTOLE: 5.63 CM (ref 2.1–4)
LEFT VENTRICLE DIASTOLIC VOLUME INDEX: 130.92 ML/M2
LEFT VENTRICLE DIASTOLIC VOLUME: 282.3 ML
LEFT VENTRICLE MASS INDEX: 144.5 G/M2
LEFT VENTRICLE SYSTOLIC VOLUME INDEX: 72.2 ML/M2
LEFT VENTRICLE SYSTOLIC VOLUME: 155.67 ML
LEFT VENTRICULAR INTERNAL DIMENSION IN DIASTOLE: 7.32 CM (ref 3.5–6)
LEFT VENTRICULAR MASS: 311.63 G
LV LATERAL E/E' RATIO: 16.67
LV SEPTAL E/E' RATIO: 33.33
MV PEAK A VEL: 1.15 M/S
MV PEAK E VEL: 1 M/S
PISA TR MAX VEL: 2.52 M/S
PULM VEIN S/D RATIO: 1.33
PV PEAK D VEL: 0.36 M/S
PV PEAK S VEL: 0.48 M/S
RA MAJOR: 5.45 CM
RA WIDTH: 4.17 CM
RIGHT VENTRICULAR END-DIASTOLIC DIMENSION: 4.72 CM
RV TISSUE DOPPLER FREE WALL SYSTOLIC VELOCITY 1 (APICAL 4 CHAMBER VIEW): 10.46 M/S
SINUS: 3.49 CM
STJ: 2.73 CM
TDI LATERAL: 0.06
TDI SEPTAL: 0.03
TDI: 0.05
TR MAX PG: 25.4 MMHG
TRICUSPID ANNULAR PLANE SYSTOLIC EXCURSION: 2.81 CM

## 2019-03-20 PROCEDURE — 93306 TTE W/DOPPLER COMPLETE: CPT | Mod: PBBFAC | Performed by: INTERNAL MEDICINE

## 2019-03-20 PROCEDURE — 93306 TRANSTHORACIC ECHO (TTE) COMPLETE (CUPID ONLY): ICD-10-PCS | Mod: 26,S$PBB,, | Performed by: INTERNAL MEDICINE

## 2019-03-20 RX ORDER — POTASSIUM CHLORIDE 750 MG/1
20 CAPSULE, EXTENDED RELEASE ORAL DAILY
Qty: 60 CAPSULE | Refills: 11 | Status: SHIPPED | OUTPATIENT
Start: 2019-03-20 | End: 2020-01-01

## 2019-03-25 DIAGNOSIS — I50.9 ACUTE ON CHRONIC CONGESTIVE HEART FAILURE, UNSPECIFIED CONGESTIVE HEART FAILURE TYPE: ICD-10-CM

## 2019-03-26 RX ORDER — LISINOPRIL 5 MG/1
TABLET ORAL
Qty: 60 TABLET | Refills: 8 | Status: SHIPPED | OUTPATIENT
Start: 2019-03-26 | End: 2020-01-01

## 2019-03-31 ENCOUNTER — HOSPITAL ENCOUNTER (EMERGENCY)
Facility: HOSPITAL | Age: 49
Discharge: HOME | End: 2019-03-31
Attending: EMERGENCY MEDICINE
Payer: MEDICAID

## 2019-03-31 VITALS
TEMPERATURE: 97 F | WEIGHT: 250 LBS | HEART RATE: 86 BPM | SYSTOLIC BLOOD PRESSURE: 119 MMHG | OXYGEN SATURATION: 99 % | DIASTOLIC BLOOD PRESSURE: 61 MMHG | BODY MASS INDEX: 41.65 KG/M2 | HEIGHT: 65 IN | RESPIRATION RATE: 20 BRPM

## 2019-03-31 DIAGNOSIS — M54.9 UPPER BACK PAIN: ICD-10-CM

## 2019-03-31 DIAGNOSIS — M62.830 SPASM OF THORACIC BACK MUSCLE: Primary | ICD-10-CM

## 2019-03-31 LAB
B-HCG UR QL: NEGATIVE
CTP QC/QA: YES

## 2019-03-31 PROCEDURE — 99283 EMERGENCY DEPT VISIT LOW MDM: CPT | Mod: 25,ER

## 2019-03-31 PROCEDURE — 81025 URINE PREGNANCY TEST: CPT | Mod: ER | Performed by: EMERGENCY MEDICINE

## 2019-03-31 RX ORDER — METHOCARBAMOL 750 MG/1
1500 TABLET, FILM COATED ORAL EVERY 6 HOURS
Qty: 24 TABLET | Refills: 0 | Status: SHIPPED | OUTPATIENT
Start: 2019-03-31 | End: 2019-04-03

## 2019-04-01 NOTE — ED NOTES
C/O pain in between shoulder blades x 3 days - hurts with movement - 9/10 pain level - intermittany pain - has been taking OTC tylenol

## 2019-04-01 NOTE — ED PROVIDER NOTES
"Encounter Date: 3/31/2019    SCRIBE #1 NOTE: I, Sherwin Waldron, am scribing for, and in the presence of,  Dr. Burt. I have scribed the following portions of the note - Other sections scribed: HPI, ROS, PE.       History     Chief Complaint   Patient presents with    Back Pain     x 3 days. 9/10 that is aching and throbbing. Deneis having back pain like this before. Pain is located at right shoulder blade     48 y.o. female, with asthma, DM, heart failure, and others who presents to the ED with a chief complaint of acute nontraumatic right upper "aching and throbbing" back pain that is worse with sitting up that began three days ago.  She has taken Tylenol with slight relief.  Denies fever or coughing.  Pt is right handed.  She denies strenuous or repetitive activity.  Her LNMP was this month.      The history is provided by the patient.     Review of patient's allergies indicates:  No Known Allergies  Past Medical History:   Diagnosis Date    Anticoagulant long-term use     Arthritis     Asthma     Asthma     Atrial fibrillation     Cardiomyopathy     Cardiomyopathy     CHF (congestive heart failure)     CHF (congestive heart failure)     Chronic combined systolic and diastolic heart failure 6/3/2016    COPD (chronic obstructive pulmonary disease) 3/28/2018    GERD (gastroesophageal reflux disease)     H/O tubal ligation     Hypertension     Obesity     Renal disorder     Type 2 diabetes mellitus with hyperglycemia     Type 2 diabetes mellitus with polyneuropathy      Past Surgical History:   Procedure Laterality Date    CARDIAC DEFIBRILLATOR PLACEMENT      CARDIAC DEFIBRILLATOR PLACEMENT      CARDIAC DEFIBRILLATOR PLACEMENT      CARDIOVERSION N/A 4/19/2016    Performed by Navi Jolly MD at [facility] CATH LAB    CHOLECYSTECTOMY      COLONOSCOPY N/A 5/2/2016    Performed by Eugene Soto MD at [facility] ENDO (2ND FLR)    ECHOCARDIOGRAM-TRANSESOPHAGEAL N/A 4/15/2016    Performed by Rossana " Surgeon at [facility] ISABELA    ESOPHAGOGASTRODUODENOSCOPY (EGD) N/A 11/17/2016    Performed by Joe Magana MD at [facility] ENDO (2ND FLR)    GALLBLADDER SURGERY      iabp  4/2016    INSERTION-ICD-BIVENTRICULAR Right 5/4/2017    Performed by Navi Jolly MD at [facility] CATH LAB    TUBAL LIGATION       Family History   Problem Relation Age of Onset    Heart disease Mother     Heart disease Father      Social History     Tobacco Use    Smoking status: Never Smoker    Smokeless tobacco: Never Used   Substance Use Topics    Alcohol use: No    Drug use: No     Review of Systems   Constitutional: Negative for fever.   Respiratory: Positive for shortness of breath (Chronic; unchanged from baseline). Negative for cough.         Pt suffers with asthma and notes that she was recently treated for PNA with Abx.     Genitourinary: Negative for dysuria, frequency and hematuria.   Musculoskeletal: Positive for back pain. Negative for gait problem.   Neurological: Negative for weakness and headaches.   All other systems reviewed and are negative.      Physical Exam     Initial Vitals [03/31/19 2225]   BP Pulse Resp Temp SpO2   137/83 90 20 97.4 °F (36.3 °C) 97 %      MAP       --         Physical Exam    Nursing note and vitals reviewed.  Constitutional: She appears well-developed and well-nourished.   HENT:   Head: Normocephalic and atraumatic.   Right Ear: External ear normal.   Left Ear: External ear normal.   Eyes: Conjunctivae are normal.   Neck: Normal range of motion and phonation normal. Neck supple.   Cardiovascular: Normal rate and intact distal pulses.   Pulses:       Radial pulses are 2+ on the right side, and 2+ on the left side.   Pulmonary/Chest: Effort normal. No stridor. No respiratory distress.   Musculoskeletal: Normal range of motion. She exhibits tenderness.        Cervical back: She exhibits tenderness and pain. She exhibits normal range of motion, no bony tenderness, no swelling, no edema, no deformity,  no laceration, no spasm and normal pulse.        Back:    Right thoracic Paraspinous tenderness.    Neurological: She is alert and oriented to person, place, and time.   Skin: Skin is warm and dry. Capillary refill takes less than 2 seconds. No rash noted.   Psychiatric: She has a normal mood and affect. Her behavior is normal.         ED Course   Procedures  Labs Reviewed   POCT URINE PREGNANCY          Imaging Results          X-Ray Chest PA And Lateral (Final result)  Result time 03/31/19 23:12:30    Final result by Brandy Benitez MD (03/31/19 23:12:30)                 Impression:      Stable cardiomegaly with no acute cardiopulmonary process identified.      Electronically signed by: Brandy Benitez MD  Date:    03/31/2019  Time:    23:12             Narrative:    EXAMINATION:  XR CHEST PA AND LATERAL    CLINICAL HISTORY:  Dorsalgia, unspecified    TECHNIQUE:  PA and lateral views of the chest were performed.    COMPARISON:  December 2018.    FINDINGS:  Cardiac silhouette is enlarged but stable in size.  Right-sided pacer device is seen.  Lungs are symmetrically expanded.  No evidence of focal consolidative process, pneumothorax, or significant effusion.  No acute osseous abnormality identified.                              Labs Reviewed  Admission on 03/31/2019, Discharged on 03/31/2019   Component Date Value Ref Range Status    POC Preg Test, Ur 03/31/2019 Negative  Negative Final     Acceptable 03/31/2019 Yes   Final        Imaging Reviewed    Imaging Results          X-Ray Chest PA And Lateral (Final result)  Result time 03/31/19 23:12:30    Final result by Brandy Benitez MD (03/31/19 23:12:30)                 Impression:      Stable cardiomegaly with no acute cardiopulmonary process identified.      Electronically signed by: Brandy Benitez MD  Date:    03/31/2019  Time:    23:12             Narrative:    EXAMINATION:  XR CHEST PA AND LATERAL    CLINICAL HISTORY:  Dorsalgia,  unspecified    TECHNIQUE:  PA and lateral views of the chest were performed.    COMPARISON:  December 2018.    FINDINGS:  Cardiac silhouette is enlarged but stable in size.  Right-sided pacer device is seen.  Lungs are symmetrically expanded.  No evidence of focal consolidative process, pneumothorax, or significant effusion.  No acute osseous abnormality identified.                                Medications given in ED    Medications - No data to display    This document was produced by a scribe under my direction and in my presence. I agree with the content of the note and have made any necessary edits.     Alberto Burt MD         Note was created using voice recognition software. Note may have occasional typographical errors that may not have been identified and edited despite good maggie initial review prior to signing.        Medical Decision Making:   Clinical Tests:   Lab Tests: Ordered and Reviewed            Scribe Attestation:   Scribe #1: I performed the above scribed service and the documentation accurately describes the services I performed. I attest to the accuracy of the note.      Discharge Medications     Discharge Medication List as of 3/31/2019 11:25 PM      START taking these medications    Details   methocarbamol (ROBAXIN) 750 MG Tab Take 2 tablets (1,500 mg total) by mouth every 6 (six) hours. for 3 days, Starting Sun 3/31/2019, Until Wed 4/3/2019, Print         CONTINUE these medications which have NOT CHANGED    Details   allopurinol (ZYLOPRIM) 100 MG tablet TAKE 1 TABLET BY MOUTH EVERY DAY, Starting Wed 1/23/2019, Normal      amoxicillin-clavulanate 875-125mg (AUGMENTIN) 875-125 mg per tablet TAKE 1 TABLET TWICE DAILY UNTIL FINISHED., Starting Thu 3/21/2019, Normal      atorvastatin (LIPITOR) 20 MG tablet Take 1 tablet (20 mg total) by mouth once daily., Starting Thu 2/7/2019, Until Fri 2/7/2020, Normal      azelastine (ASTELIN) 137 mcg (0.1 %) nasal spray 2 sprays (274 mcg total) by Nasal  "route 2 (two) times daily., Starting Tue 3/19/2019, Until Wed 3/18/2020, Print      !! BD ULTRA-FINE ESSENCE PEN NEEDLES 32 gauge x 5/32" Ndle Takes 5 times  day, Normal      !! blood sugar diagnostic (TRUE METRIX GLUCOSE TEST STRIP) Strp Use to test two times a day, Starting Fri 4/27/2018, Until Sat 4/27/2019, Normal      !! blood sugar diagnostic Strp Uses 4 times a day, true metrix meter., Normal      budesonide-formoterol 160-4.5 mcg (SYMBICORT) 160-4.5 mcg/actuation HFAA INHALE 2 PUFFS BY MOUTH TWICE DAILY. RINSE MOUTH AFTER USE., Starting Thu 3/21/2019, Normal      !! cetirizine (ZYRTEC) 10 MG tablet TAKE 1 TABLET BY MOUTH ONCE DAILY, Normal      !! cetirizine (ZYRTEC) 10 MG tablet Take 1 tablet (10 mg total) by mouth once daily., Starting Sun 2/17/2019, Until Mon 2/17/2020, Normal      !! cetirizine (ZYRTEC) 10 MG tablet TAKE 1 TABLET AT BEDTIME for allergy relief, Starting Thu 3/21/2019, Normal      ciclopirox (PENLAC) 8 % Soln Apply topically nightly., Starting Wed 1/2/2019, Normal      cimetidine (TAGAMET ORAL) Take by mouth., Historical Med      dicyclomine (BENTYL) 10 MG capsule TAKE 1 CAPSULE 4 TIMES DAILY as needed for abdominal pain, Starting Wed 8/29/2018, Normal      digoxin (LANOXIN) 125 mcg tablet TAKE 1 TABLET BY MOUTH ONCE DAILY, Starting Thu 2/7/2019, Normal      dulaglutide (TRULICITY) 0.75 mg/0.5 mL PnIj Inject 0.5 mLs (0.75 mg total) into the skin every 7 days., Starting Fri 10/5/2018, Normal      fluticasone (FLONASE) 50 mcg/actuation nasal spray 2 sprays (100 mcg total) by Each Nare route once daily., Starting Tue 3/19/2019, Print      furosemide (LASIX) 40 MG tablet Take 2 tablets (80 mg total) by mouth 2 (two) times daily., Starting Wed 8/15/2018, Normal      ibuprofen (ADVIL,MOTRIN) 800 MG tablet Take 1 tablet (800 mg total) by mouth every 8 (eight) hours as needed for Pain., Starting Tue 3/19/2019, Print      insulin (BASAGLAR KWIKPEN U-100 INSULIN) glargine 100 units/mL (3mL) SubQ pen " "inject 34 under the skin in the morning and 34 units at night, Starting Wed 1/16/2019, Until Thu 1/16/2020, Normal      insulin aspart U-100 (NOVOLOG) 100 unit/mL InPn pen Inject 16 units into the skin with meals plus scale, Starting Fri 9/7/2018, Normal      !! ketoconazole (NIZORAL) 2 % cream Apply topically once daily., Starting Mon 10/1/2018, Normal      !! ketoconazole (NIZORAL) 2 % cream APPLY SPARINGLY TO AFFECTED AREA(S) ONCE DAILY, Starting Thu 3/21/2019, Normal      lancets 30 gauge Misc use 4 times a day, Starting Mon 6/4/2018, Normal      levalbuterol (XOPENEX HFA) 45 mcg/actuation inhaler INHALE 1 TO 2 PUFFS EVERY 4 TO 6 HOURS AS NEEDED, Starting Tue 1/15/2019, Normal      levalbuterol (XOPENEX) 0.31 mg/3 mL nebulizer solution Take 3 mLs (0.31 mg total) by nebulization every 4 (four) hours as needed for Wheezing. Rescue, Starting Fri 9/7/2018, Normal      levalbuterol (XOPENEX) 1.25 mg/0.5 mL nebulizer solution TAKE 1 VIAL IN NEBULIZER 3 TIMES A DAY., Starting Thu 3/21/2019, Normal      linaclotide 72 mcg Cap take one tablet daily as needed for constipation, Starting Wed 8/29/2018, Normal      lisinopril (PRINIVIL,ZESTRIL) 5 MG tablet TAKE 1 TABLET BY MOUTH TWO TIMES A DAY, Starting Tue 3/26/2019, Normal      magnesium oxide (MAG-OX) 400 mg (241.3 mg magnesium) tablet TAKE 1 TABLET BY MOUTH TWICE DAILY, Starting Thu 1/10/2019, Until Fri 1/10/2020, Normal      montelukast (SINGULAIR) 10 mg tablet TAKE 1 TABLET BY MOUTH DAILY., Starting Thu 2/7/2019, Normal      !! pen needle, diabetic 32 gauge x 5/32" Ndle USE AS DIRECTED 5 TIMES DAILY, Starting Wed 4/25/2018, Normal      potassium chloride (KLOR-CON) 10 MEQ TbSR TAKE 2 TABLETS BY MOUTH ONCE DAILY., Starting Thu 3/21/2019, Normal      potassium chloride (MICRO-K) 10 MEQ CpSR Take 2 capsules (20 mEq total) by mouth once daily., Starting Wed 3/20/2019, Normal      ranitidine (ZANTAC) 300 MG tablet Take 1 tablet by mouth every evening, Starting Wed " 6/13/2018, Until Thu 6/13/2019, Normal      spironolactone (ALDACTONE) 25 MG tablet Take 1 tablet (25 mg total) by mouth once daily., Starting Mon 6/11/2018, Until Tue 6/11/2019, Normal      triamcinolone acetonide 0.1% (KENALOG) 0.1 % cream Apply topically 2 (two) times daily. Apply twice a day for 7 days to arms., Starting Mon 5/14/2018, Until Thu 2/7/2019, Print      vitamin D 1000 units Tab Take 2 tablets (2,000 Units total) by mouth once daily., Starting 5/9/2016, Until Discontinued, OTC      warfarin (COUMADIN) 7.5 MG tablet Take ½ tablet by mouth once daily and 1 tablet on thursday, Starting Mon 11/5/2018, Until Tue 11/5/2019, Normal       !! - Potential duplicate medications found. Please discuss with provider.                Patient discharged to home in stable condition with instructions to:   1. Please take all meds as prescribed.  2. Follow-up with your primary care doctor   3. Return precautions discussed and patient and/or family/caretaker understands to return to the emergency room for any concerns including worsening of your current symptoms, fever, chills, night sweats, worsening pain, chest pain, shortness of breath, nausea, vomiting, diarrhea, bleeding, headache, difficulty talking, visual disturbances, weakness, numbness or any other acute concerns             Clinical Impression:     1. Spasm of thoracic back muscle    2. Upper back pain                                  Alberto Burt MD  04/07/19 1058

## 2019-04-08 ENCOUNTER — TELEPHONE (OUTPATIENT)
Dept: CARDIOLOGY | Facility: HOSPITAL | Age: 49
End: 2019-04-08

## 2019-04-08 ENCOUNTER — ANTI-COAG VISIT (OUTPATIENT)
Dept: CARDIOLOGY | Facility: CLINIC | Age: 49
End: 2019-04-08
Payer: MEDICAID

## 2019-04-08 ENCOUNTER — OFFICE VISIT (OUTPATIENT)
Dept: PODIATRY | Facility: CLINIC | Age: 49
End: 2019-04-08
Payer: MEDICAID

## 2019-04-08 VITALS — WEIGHT: 250 LBS | HEIGHT: 65 IN | BODY MASS INDEX: 41.65 KG/M2

## 2019-04-08 DIAGNOSIS — I42.8 NICM (NONISCHEMIC CARDIOMYOPATHY): ICD-10-CM

## 2019-04-08 DIAGNOSIS — L84 CORN OR CALLUS: ICD-10-CM

## 2019-04-08 DIAGNOSIS — Z95.810 CARDIAC DEFIBRILLATOR IN SITU: Primary | ICD-10-CM

## 2019-04-08 DIAGNOSIS — I50.42 CHRONIC COMBINED SYSTOLIC AND DIASTOLIC CONGESTIVE HEART FAILURE: ICD-10-CM

## 2019-04-08 DIAGNOSIS — I48.0 PAROXYSMAL ATRIAL FIBRILLATION: ICD-10-CM

## 2019-04-08 DIAGNOSIS — E11.49 TYPE II DIABETES MELLITUS WITH NEUROLOGICAL MANIFESTATIONS: Primary | ICD-10-CM

## 2019-04-08 DIAGNOSIS — Z79.01 CHRONIC ANTICOAGULATION: Primary | ICD-10-CM

## 2019-04-08 DIAGNOSIS — B35.1 ONYCHOMYCOSIS DUE TO DERMATOPHYTE: ICD-10-CM

## 2019-04-08 LAB — INR PPP: 2.1 (ref 2–3)

## 2019-04-08 PROCEDURE — 99214 OFFICE O/P EST MOD 30 MIN: CPT | Mod: PBBFAC,PO,25 | Performed by: PODIATRIST

## 2019-04-08 PROCEDURE — 99499 UNLISTED E&M SERVICE: CPT | Mod: S$PBB,,, | Performed by: PODIATRIST

## 2019-04-08 PROCEDURE — 93793 ANTICOAG MGMT PT WARFARIN: CPT | Mod: ,,,

## 2019-04-08 PROCEDURE — 85610 PROTHROMBIN TIME: CPT | Mod: PBBFAC,PO

## 2019-04-08 PROCEDURE — 99499 NO LOS: ICD-10-PCS | Mod: S$PBB,,, | Performed by: PODIATRIST

## 2019-04-08 PROCEDURE — 11721 DEBRIDE NAIL 6 OR MORE: CPT | Mod: PBBFAC,PO | Performed by: PODIATRIST

## 2019-04-08 PROCEDURE — 11056 PARNG/CUTG B9 HYPRKR LES 2-4: CPT | Mod: S$PBB,,, | Performed by: PODIATRIST

## 2019-04-08 PROCEDURE — 99999 PR PBB SHADOW E&M-EST. PATIENT-LVL IV: CPT | Mod: PBBFAC,,, | Performed by: PODIATRIST

## 2019-04-08 PROCEDURE — 11056 ROUTINE FOOT CARE: ICD-10-PCS | Mod: S$PBB,,, | Performed by: PODIATRIST

## 2019-04-08 PROCEDURE — 99999 PR PBB SHADOW E&M-EST. PATIENT-LVL IV: ICD-10-PCS | Mod: PBBFAC,,, | Performed by: PODIATRIST

## 2019-04-08 PROCEDURE — 11721 ROUTINE FOOT CARE: ICD-10-PCS | Mod: S$PBB,59,, | Performed by: PODIATRIST

## 2019-04-08 PROCEDURE — 93793 PR ANTICOAGULANT MGMT FOR PT TAKING WARFARIN: ICD-10-PCS | Mod: ,,,

## 2019-04-08 NOTE — PROGRESS NOTES
Subjective:      Patient ID: Laure Ross is a 48 y.o. female.    Chief Complaint: Diabetes Mellitus (PCP Dr Mittal 04/01/2019); Diabetic Foot Exam; and Nail Care    Laure is a 48 y.o. female who presents to the clinic for evaluation and treatment of high risk feet. Laure has a past medical history of Anticoagulant long-term use, Arthritis, Asthma, Asthma, Atrial fibrillation, Cardiomyopathy, Cardiomyopathy, CHF (congestive heart failure), CHF (congestive heart failure), Chronic combined systolic and diastolic heart failure (6/3/2016), COPD (chronic obstructive pulmonary disease) (3/28/2018), GERD (gastroesophageal reflux disease), H/O tubal ligation, Hypertension, Obesity, Renal disorder, Type 2 diabetes mellitus with hyperglycemia, and Type 2 diabetes mellitus with polyneuropathy. Here for routine DM foot exam,callus trimming. This routine trimming helps prevent painful symptoms.. Has hx of healed foot ulcer right foot. No problems with wounds. .  No other pedal issues today. Pt. Was unable to obtain penlac, requesting different prescription.  This patient has documented high risk feet requiring routine maintenance secondary to peripheral neuropathy.      PCP: Holly Mittal MD    Chief Complaint   Patient presents with    Diabetes Mellitus     PCP Dr Mittal 04/01/2019    Diabetic Foot Exam    Nail Care         Current shoe gear:  DM shoes, inserts    Hemoglobin A1C   Date Value Ref Range Status   10/01/2018 7.4 (H) 4.0 - 5.6 % Final     Comment:     ADA Screening Guidelines:  5.7-6.4%  Consistent with prediabetes  >or=6.5%  Consistent with diabetes  High levels of fetal hemoglobin interfere with the HbA1C  assay. Heterozygous hemoglobin variants (HbS, HgC, etc)do  not significantly interfere with this assay.   However, presence of multiple variants may affect accuracy.     03/28/2018 6.2 (H) 4.0 - 5.6 % Final     Comment:     According to ADA guidelines, hemoglobin A1c <7.0% represents  optimal control  in non-pregnant diabetic patients. Different  metrics may apply to specific patient populations.   Standards of Medical Care in Diabetes-2016.  For the purpose of screening for the presence of diabetes:  <5.7%     Consistent with the absence of diabetes  5.7-6.4%  Consistent with increasing risk for diabetes   (prediabetes)  >or=6.5%  Consistent with diabetes  Currently, no consensus exists for use of hemoglobin A1c  for diagnosis of diabetes for children.  This Hemoglobin A1c assay has significant interference with fetal   hemoglobin   (HbF). The results are invalid for patients with abnormal amounts of   HbF,   including those with known Hereditary Persistence   of Fetal Hemoglobin. Heterozygous hemoglobin variants (HbAS, HbAC,   HbAD, HbAE, HbA2) do not significantly interfere with this assay;   however, presence of multiple variants in a sample may impact the %   interference.     03/17/2017 6.1 4.5 - 6.2 % Final     Comment:     According to ADA guidelines, hemoglobin A1C <7.0% represents  optimal control in non-pregnant diabetic patients.  Different  metrics may apply to specific populations.   Standards of Medical Care in Diabetes - 2016.  For the purpose of screening for the presence of diabetes:  <5.7%     Consistent with the absence of diabetes  5.7-6.4%  Consistent with increasing risk for diabetes   (prediabetes)  >or=6.5%  Consistent with diabetes  Currently no consensus exists for use of hemoglobin A1C  for diagnosis of diabetes for children.         Review of Systems   Constitution: Negative for chills, fever and malaise/fatigue.   Cardiovascular: Positive for leg swelling. Negative for chest pain, orthopnea and palpitations.   Respiratory: Negative for cough, shortness of breath and wheezing.    Skin: Positive for color change, dry skin, nail changes and suspicious lesions (calluses/corns). Negative for itching, poor wound healing and rash.   Musculoskeletal: Negative for arthritis, gout, joint pain,  joint swelling, muscle weakness and myalgias.   Neurological: Positive for numbness, paresthesias and sensory change. Negative for disturbances in coordination, dizziness, focal weakness and tremors.           Objective:      Physical Exam   Cardiovascular:   Dorsalis pedis and posterior tibial pulses are diminished Bilaterally. Toes are cool to touch. Feet are warm proximally.There is decreased digital hair. Skin is atrophic, slightly hyperpigmented, and mildly edematous       Musculoskeletal:   Musculoskeletal:  Muscle strength is 5/5 in all groups bilaterally.  No pain with ankle, STJ or MTPJ ROM, bilat. Ankle dorsiflexion is limited with knees extended and flexed, bilat.  Semi-reducible hammertoe contractures noted to toes 2-4 b/l-asymptomatic.    Neurological:   Avawam-Anya 5.07 monofilamant testing is diminished both feet. Sharp/dull sensation diminished Bilaterally. Light touch absent Bilaterally.       Skin: Lesion noted. No bruising and no ecchymosis noted. No erythema.   No open lesions, lacerations or wounds noted. Toenails 1-5 bilaterally are elongated by 2-3 mm, thickened by 2-3 mm, discolored/yellowed, dystrophic, brittle with subungual debris.  .  Interdigital spaces clean, dry and intact b/l. No erythema noted to b/l foot. Skin texture thin, atrophic, dry. Pedal hair diminished b/l.         Scaling dryness in a moccasin distribution is noted to the bilateral lower extremities.     Focal hyperkeratotic lesion consisting entirely of hyperkeratotic tissue without open skin, drainage, pus, fluctuance, malodor, or signs of infection: B/L submet head 2, right submet head 5                        Assessment:       Encounter Diagnoses   Name Primary?    Type II diabetes mellitus with neurological manifestations Yes    Corn or callus     Onychomycosis due to dermatophyte          Plan:       Laure was seen today for diabetes mellitus, diabetic foot exam and nail care.    Diagnoses and all orders for  this visit:    Type II diabetes mellitus with neurological manifestations    Corn or callus    Onychomycosis due to dermatophyte      I counseled the patient on her conditions, their implications and medical management.    - Shoe inspection. Diabetic Foot Education. Patient reminded of the importance of good nutrition and blood sugar control to help prevent podiatric complications of diabetes. Patient instructed on proper foot hygeine. We discussed wearing proper shoe gear, daily foot inspections, never walking without protective shoe gear, caution putting sharp instruments to feet     - Discussed DM foot care:  Wear comfortable, proper fitting shoes. Wash feet daily. Dry well. After drying, apply moisturizer to feet (no lotion to webspaces). Inspect feet daily for skin breaks, blisters, swelling, or redness. Wear cotton socks (preferably white)  Change socks every day. Do NOT walk barefoot. Do NOT use heating pads or warm/hot water soaks     - At patient's request, I discussed different treatments for toenail fungus. We discussed oral antifungals but I did not recommend them as a first line treatment since the medication is taken internally and can have side effects such as rash, taste disturbances, and liver enzyme elevation. We discussed topical Penlac to be applied daily and removed weekly. Pt. Expresses understanding and would like to try the Penlac. Rx sent to the pharmacy.     See routine foot care procedure note for nail debridement / callus trimming.     Prescription for penlac sent to [facility].     RTC 3 months, sooner KALIE Cormier DPM

## 2019-04-08 NOTE — TELEPHONE ENCOUNTER
----- Message from Yamilet Lr MA sent at 4/5/2019  4:32 PM CDT -----  Contact: self  Pt. has a device ck appt. on 4/12. Wants to change it to sooner. Please call.[phone].   pt.

## 2019-04-08 NOTE — PROGRESS NOTES
INR good. Patient reports taking augmentin about 2 weeks ago for sinus infection. She reports should pain recently and was prescribed robaxin. No other changes. No signs or symptoms of bleeding. Maintain current dose and routine INR. Patient reminded to call with new medications.

## 2019-04-08 NOTE — TELEPHONE ENCOUNTER
Attempted to return the patient's call on this am @ [phone], unable to leave a message (VM stated the caller is not able to be reached @ this time, please try your call again later).  Patient just needs to be informed that she does Not need to come into the clinic on 4/12/19 for an ICD ck.  Patient rescheduled to a remote ICD check on 4/22/19 and the in clinic ICD check has been added to the Recall in October for annual ck up with Dr. Jolly.  This will allow for coordination of MD and ICD appointments due same time.

## 2019-04-08 NOTE — PROCEDURES
Routine Foot Care  Date/Time: 4/8/2019 11:45 AM  Performed by: Jeannie Cormier DPM  Authorized by: Jeannie Cormier DPM     Consent Done?:  Yes (Verbal)  Hyperkeratotic Skin Lesions?: Yes    Number of trimmed lesions:  3  Location(s):  Left Plantar and Right Plantar    Nail Care Type:  Debride  Location(s): All  (Left 1st Toe, Left 3rd Toe, Left 2nd Toe, Left 4th Toe, Left 5th Toe, Right 1st Toe, Right 2nd Toe, Right 3rd Toe, Right 4th Toe and Right 5th Toe)  Patient tolerance:  Patient tolerated the procedure well with no immediate complications

## 2019-04-09 RX ORDER — LANOLIN ALCOHOL/MO/W.PET/CERES
CREAM (GRAM) TOPICAL 2 TIMES DAILY
Qty: 60 TABLET | Refills: 1 | Status: SHIPPED | OUTPATIENT
Start: 2019-04-09 | End: 2019-07-24 | Stop reason: SDUPTHER

## 2019-04-16 DIAGNOSIS — R52 PAIN: Primary | ICD-10-CM

## 2019-04-17 ENCOUNTER — HOSPITAL ENCOUNTER (OUTPATIENT)
Dept: RADIOLOGY | Facility: HOSPITAL | Age: 49
Discharge: HOME | End: 2019-04-17
Attending: INTERNAL MEDICINE
Payer: MEDICAID

## 2019-04-17 DIAGNOSIS — R52 PAIN: ICD-10-CM

## 2019-04-17 PROCEDURE — 71100 XR RIBS 2 VIEW RIGHT: ICD-10-PCS | Mod: 26,RT,, | Performed by: RADIOLOGY

## 2019-04-17 PROCEDURE — 71100 X-RAY EXAM RIBS UNI 2 VIEWS: CPT | Mod: TC,FY,RT

## 2019-04-17 PROCEDURE — 71100 X-RAY EXAM RIBS UNI 2 VIEWS: CPT | Mod: 26,RT,, | Performed by: RADIOLOGY

## 2019-04-22 RX ORDER — OMEPRAZOLE 40 MG/1
CAPSULE, DELAYED RELEASE ORAL
Qty: 30 CAPSULE | Refills: 6 | Status: CANCELLED | OUTPATIENT
Start: 2019-04-22 | End: 2020-01-01

## 2019-04-23 ENCOUNTER — CLINICAL SUPPORT (OUTPATIENT)
Dept: CARDIOLOGY | Facility: HOSPITAL | Age: 49
End: 2019-04-23
Attending: INTERNAL MEDICINE
Payer: MEDICAID

## 2019-04-23 ENCOUNTER — HOSPITAL ENCOUNTER (EMERGENCY)
Facility: HOSPITAL | Age: 49
Discharge: HOME | End: 2019-04-23
Attending: EMERGENCY MEDICINE
Payer: MEDICAID

## 2019-04-23 VITALS
BODY MASS INDEX: 42.68 KG/M2 | DIASTOLIC BLOOD PRESSURE: 75 MMHG | HEART RATE: 85 BPM | HEIGHT: 64 IN | WEIGHT: 250 LBS | SYSTOLIC BLOOD PRESSURE: 108 MMHG | TEMPERATURE: 98 F | OXYGEN SATURATION: 99 % | RESPIRATION RATE: 17 BRPM

## 2019-04-23 DIAGNOSIS — I48.0 PAROXYSMAL ATRIAL FIBRILLATION: ICD-10-CM
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
## 2019-04-23 DIAGNOSIS — R07.9 CHEST PAIN: ICD-10-CM

## 2019-04-23 DIAGNOSIS — I42.8 NICM (NONISCHEMIC CARDIOMYOPATHY): ICD-10-CM

## 2019-04-23 DIAGNOSIS — Z95.810 CARDIAC DEFIBRILLATOR IN SITU: ICD-10-CM

## 2019-04-23 DIAGNOSIS — I50.9 CHF (CONGESTIVE HEART FAILURE): Primary | ICD-10-CM

## 2019-04-23 LAB
ALBUMIN SERPL BCP-MCNC: 3.4 G/DL (ref 3.5–5.2)
ALP SERPL-CCNC: 73 U/L (ref 55–135)
ALT SERPL W/O P-5'-P-CCNC: 15 U/L (ref 10–44)
ANION GAP SERPL CALC-SCNC: 6 MMOL/L (ref 8–16)
AST SERPL-CCNC: 16 U/L (ref 10–40)
B-HCG UR QL: NEGATIVE
BASOPHILS # BLD AUTO: 0.03 K/UL (ref 0–0.2)
BASOPHILS NFR BLD: 0.4 % (ref 0–1.9)
BILIRUB SERPL-MCNC: 0.5 MG/DL (ref 0.1–1)
BNP SERPL-MCNC: 132 PG/ML (ref 0–99)
BUN SERPL-MCNC: 21 MG/DL (ref 6–20)
CALCIUM SERPL-MCNC: 8.9 MG/DL (ref 8.7–10.5)
CHLORIDE SERPL-SCNC: 102 MMOL/L (ref 95–110)
CO2 SERPL-SCNC: 29 MMOL/L (ref 23–29)
CREAT SERPL-MCNC: 1.2 MG/DL (ref 0.5–1.4)
CTP QC/QA: YES
DIFFERENTIAL METHOD: ABNORMAL
EOSINOPHIL # BLD AUTO: 0.2 K/UL (ref 0–0.5)
EOSINOPHIL NFR BLD: 2.6 % (ref 0–8)
ERYTHROCYTE [DISTWIDTH] IN BLOOD BY AUTOMATED COUNT: 15.3 % (ref 11.5–14.5)
EST. GFR  (AFRICAN AMERICAN): >60 ML/MIN/1.73 M^2
EST. GFR  (NON AFRICAN AMERICAN): 53.2 ML/MIN/1.73 M^2
GLUCOSE SERPL-MCNC: 160 MG/DL (ref 70–110)
HCT VFR BLD AUTO: 36.1 % (ref 37–48.5)
HGB BLD-MCNC: 11.2 G/DL (ref 12–16)
IMM GRANULOCYTES # BLD AUTO: 0.03 K/UL (ref 0–0.04)
IMM GRANULOCYTES NFR BLD AUTO: 0.4 % (ref 0–0.5)
INR PPP: 1.9 (ref 0.8–1.2)
LYMPHOCYTES # BLD AUTO: 1.8 K/UL (ref 1–4.8)
LYMPHOCYTES NFR BLD: 23.4 % (ref 18–48)
MCH RBC QN AUTO: 26.2 PG (ref 27–31)
MCHC RBC AUTO-ENTMCNC: 31 G/DL (ref 32–36)
MCV RBC AUTO: 84 FL (ref 82–98)
MONOCYTES # BLD AUTO: 0.7 K/UL (ref 0.3–1)
MONOCYTES NFR BLD: 9.1 % (ref 4–15)
NEUTROPHILS # BLD AUTO: 5 K/UL (ref 1.8–7.7)
NEUTROPHILS NFR BLD: 64.1 % (ref 38–73)
NRBC BLD-RTO: 0 /100 WBC
PLATELET # BLD AUTO: 280 K/UL (ref 150–350)
PMV BLD AUTO: 9.8 FL (ref 9.2–12.9)
POTASSIUM SERPL-SCNC: 4 MMOL/L (ref 3.5–5.1)
PROT SERPL-MCNC: 6.7 G/DL (ref 6–8.4)
PROTHROMBIN TIME: 18.6 SEC (ref 9–12.5)
RBC # BLD AUTO: 4.28 M/UL (ref 4–5.4)
SODIUM SERPL-SCNC: 137 MMOL/L (ref 136–145)
TROPONIN I SERPL DL<=0.01 NG/ML-MCNC: 0.03 NG/ML (ref 0–0.03)
WBC # BLD AUTO: 7.73 K/UL (ref 3.9–12.7)

## 2019-04-23 PROCEDURE — 94761 N-INVAS EAR/PLS OXIMETRY MLT: CPT

## 2019-04-23 PROCEDURE — 83880 ASSAY OF NATRIURETIC PEPTIDE: CPT

## 2019-04-23 PROCEDURE — 99285 PR EMERGENCY DEPT VISIT,LEVEL V: ICD-10-PCS | Mod: ,,, | Performed by: EMERGENCY MEDICINE

## 2019-04-23 PROCEDURE — 84484 ASSAY OF TROPONIN QUANT: CPT

## 2019-04-23 PROCEDURE — 93010 EKG 12-LEAD: ICD-10-PCS | Mod: ,,, | Performed by: INTERNAL MEDICINE

## 2019-04-23 PROCEDURE — 99285 EMERGENCY DEPT VISIT HI MDM: CPT | Mod: ,,, | Performed by: EMERGENCY MEDICINE

## 2019-04-23 PROCEDURE — 93010 ELECTROCARDIOGRAM REPORT: CPT | Mod: ,,, | Performed by: INTERNAL MEDICINE

## 2019-04-23 PROCEDURE — 85610 PROTHROMBIN TIME: CPT

## 2019-04-23 PROCEDURE — 94640 AIRWAY INHALATION TREATMENT: CPT

## 2019-04-23 PROCEDURE — 93296 REM INTERROG EVL PM/IDS: CPT

## 2019-04-23 PROCEDURE — 96374 THER/PROPH/DIAG INJ IV PUSH: CPT

## 2019-04-23 PROCEDURE — 25000242 PHARM REV CODE 250 ALT 637 W/ HCPCS: Performed by: STUDENT IN AN ORGANIZED HEALTH CARE EDUCATION/TRAINING PROGRAM

## 2019-04-23 PROCEDURE — 99285 EMERGENCY DEPT VISIT HI MDM: CPT | Mod: 25

## 2019-04-23 PROCEDURE — 80053 COMPREHEN METABOLIC PANEL: CPT

## 2019-04-23 PROCEDURE — 63600175 PHARM REV CODE 636 W HCPCS: Performed by: EMERGENCY MEDICINE

## 2019-04-23 PROCEDURE — 81025 URINE PREGNANCY TEST: CPT | Performed by: STUDENT IN AN ORGANIZED HEALTH CARE EDUCATION/TRAINING PROGRAM

## 2019-04-23 PROCEDURE — 25000003 PHARM REV CODE 250: Performed by: EMERGENCY MEDICINE

## 2019-04-23 PROCEDURE — 93005 ELECTROCARDIOGRAM TRACING: CPT

## 2019-04-23 PROCEDURE — 85025 COMPLETE CBC W/AUTO DIFF WBC: CPT

## 2019-04-23 PROCEDURE — 27000221 HC OXYGEN, UP TO 24 HOURS

## 2019-04-23 RX ORDER — ACETAMINOPHEN 500 MG
1000 TABLET ORAL
Status: COMPLETED | OUTPATIENT
Start: 2019-04-23 | End: 2019-04-23

## 2019-04-23 RX ORDER — LEVALBUTEROL INHALATION SOLUTION 0.63 MG/3ML
0.63 SOLUTION RESPIRATORY (INHALATION) ONCE
Status: DISCONTINUED | OUTPATIENT
Start: 2019-04-23 | End: 2019-04-23

## 2019-04-23 RX ORDER — LEVALBUTEROL 1.25 MG/.5ML
SOLUTION, CONCENTRATE RESPIRATORY (INHALATION)
Status: DISCONTINUED
Start: 2019-04-23 | End: 2019-04-23 | Stop reason: WASHOUT

## 2019-04-23 RX ORDER — IPRATROPIUM BROMIDE AND ALBUTEROL SULFATE 2.5; .5 MG/3ML; MG/3ML
3 SOLUTION RESPIRATORY (INHALATION)
Status: COMPLETED | OUTPATIENT
Start: 2019-04-23 | End: 2019-04-23

## 2019-04-23 RX ORDER — FUROSEMIDE 10 MG/ML
80 INJECTION INTRAMUSCULAR; INTRAVENOUS
Status: COMPLETED | OUTPATIENT
Start: 2019-04-23 | End: 2019-04-23

## 2019-04-23 RX ADMIN — ACETAMINOPHEN 1000 MG: 500 TABLET ORAL at 11:04

## 2019-04-23 RX ADMIN — IPRATROPIUM BROMIDE AND ALBUTEROL SULFATE 3 ML: .5; 3 SOLUTION RESPIRATORY (INHALATION) at 10:04

## 2019-04-23 RX ADMIN — FUROSEMIDE 80 MG: 10 INJECTION, SOLUTION INTRAMUSCULAR; INTRAVENOUS at 11:04

## 2019-04-23 NOTE — DISCHARGE INSTRUCTIONS
Follow up with primary care doctor.  Heart failure optimization clinic will also be following up with patient.
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
## 2019-04-23 NOTE — ED TRIAGE NOTES
Patient identifiers verified and correct for Laure Ross.     LOC: The patient is awake, alert and aware of environment with an appropriate affect, the patient is oriented x 3 and speaking appropriately.   APPEARANCE: Patient appears comfortable and in no acute distress, patient is clean and well groomed.  SKIN: The skin is warm and dry, color consistent with ethnicity, patient has normal skin turgor and moist mucus membranes, skin intact, no breakdown or bruising noted.   MUSCULOSKELETAL: Patient moving all extremities spontaneously, no swelling noted. Pt c/o pain to left arm and shoulder, 5/10.  RESPIRATORY: Airway is open and patent, respirations are spontaneous, patient has a normal effort and rate, no accessory muscle use noted, pt placed on continuous pulse ox with O2 sats noted at 98% on 4Lnc..  CARDIAC: Pt placed on cardiac monitor. Patient has a normal rate and regular rhythm, no edema noted, capillary refill < 3 seconds.   GASTRO: Soft and non tender to palpation, no distention noted, normoactive bowel sounds present in all four quadrants. Pt states bowel movements have been regular.  : Pt denies any pain or frequency with urination.  NEURO: Pt opens eyes spontaneously, behavior appropriate to situation, follows commands, facial expression symmetrical, bilateral hand grasp equal and even, purposeful motor response noted, normal sensation in all extremities when touched with a finger. Mild HA noted.

## 2019-04-23 NOTE — ED NOTES
Pt instructed to take evening dose of Lasix today and resume regular schedule tomorrow. Verbalized understanding

## 2019-05-01 ENCOUNTER — OFFICE VISIT (OUTPATIENT)
Dept: TRANSPLANT | Facility: CLINIC | Age: 49
End: 2019-05-01
Payer: MEDICAID

## 2019-05-01 ENCOUNTER — LAB VISIT (OUTPATIENT)
Dept: LAB | Facility: HOSPITAL | Age: 49
End: 2019-05-01
Attending: INTERNAL MEDICINE
Payer: MEDICAID

## 2019-05-01 VITALS
HEIGHT: 65 IN | DIASTOLIC BLOOD PRESSURE: 63 MMHG | SYSTOLIC BLOOD PRESSURE: 110 MMHG | HEART RATE: 88 BPM | BODY MASS INDEX: 42.9 KG/M2 | WEIGHT: 257.5 LBS

## 2019-05-01 DIAGNOSIS — T46.2X1A AMIODARONE TOXICITY, ACCIDENTAL OR UNINTENTIONAL, INITIAL ENCOUNTER: ICD-10-CM

## 2019-05-01 DIAGNOSIS — I42.8 NICM (NONISCHEMIC CARDIOMYOPATHY): ICD-10-CM

## 2019-05-01 DIAGNOSIS — I10 ESSENTIAL HYPERTENSION: ICD-10-CM

## 2019-05-01 DIAGNOSIS — I48.0 PAROXYSMAL ATRIAL FIBRILLATION: ICD-10-CM

## 2019-05-01 DIAGNOSIS — Z01.818 ENCOUNTER FOR PRE-TRANSPLANT EVALUATION FOR HEART TRANSPLANT: ICD-10-CM

## 2019-05-01 DIAGNOSIS — I42.0 COCM (CONGESTIVE CARDIOMYOPATHY): ICD-10-CM

## 2019-05-01 DIAGNOSIS — Z86.31 HISTORY OF DIABETIC ULCER OF FOOT: ICD-10-CM

## 2019-05-01 DIAGNOSIS — E11.42 TYPE 2 DIABETES MELLITUS WITH DIABETIC POLYNEUROPATHY, UNSPECIFIED WHETHER LONG TERM INSULIN USE: Chronic | ICD-10-CM

## 2019-05-01 DIAGNOSIS — I44.7 LEFT BUNDLE-BRANCH BLOCK: ICD-10-CM

## 2019-05-01 DIAGNOSIS — I11.0 HYPERTENSIVE HEART DISEASE WITH HEART FAILURE: ICD-10-CM

## 2019-05-01 DIAGNOSIS — I50.42 CHRONIC COMBINED SYSTOLIC AND DIASTOLIC CONGESTIVE HEART FAILURE: ICD-10-CM

## 2019-05-01 DIAGNOSIS — Z95.810 BIVENTRICULAR AUTOMATIC IMPLANTABLE CARDIOVERTER DEFIBRILLATOR IN SITU: Primary | ICD-10-CM

## 2019-05-01 DIAGNOSIS — N18.30 CKD (CHRONIC KIDNEY DISEASE), STAGE III: ICD-10-CM

## 2019-05-01 DIAGNOSIS — Z51.5 PALLIATIVE CARE ENCOUNTER: ICD-10-CM

## 2019-05-01 LAB
ALBUMIN SERPL BCP-MCNC: 3.8 G/DL (ref 3.5–5.2)
ALP SERPL-CCNC: 89 U/L (ref 55–135)
ALT SERPL W/O P-5'-P-CCNC: 16 U/L (ref 10–44)
ANION GAP SERPL CALC-SCNC: 8 MMOL/L (ref 8–16)
AST SERPL-CCNC: 12 U/L (ref 10–40)
BILIRUB SERPL-MCNC: 0.8 MG/DL (ref 0.1–1)
BNP SERPL-MCNC: 191 PG/ML (ref 0–99)
BUN SERPL-MCNC: 23 MG/DL (ref 6–20)
CALCIUM SERPL-MCNC: 9.4 MG/DL (ref 8.7–10.5)
CHLORIDE SERPL-SCNC: 100 MMOL/L (ref 95–110)
CO2 SERPL-SCNC: 29 MMOL/L (ref 23–29)
CREAT SERPL-MCNC: 1.4 MG/DL (ref 0.5–1.4)
EST. GFR  (AFRICAN AMERICAN): 50.9 ML/MIN/1.73 M^2
EST. GFR  (NON AFRICAN AMERICAN): 44.1 ML/MIN/1.73 M^2
GLUCOSE SERPL-MCNC: 193 MG/DL (ref 70–110)
POTASSIUM SERPL-SCNC: 4.1 MMOL/L (ref 3.5–5.1)
PROT SERPL-MCNC: 7.3 G/DL (ref 6–8.4)
SODIUM SERPL-SCNC: 137 MMOL/L (ref 136–145)

## 2019-05-01 PROCEDURE — 99999 PR PBB SHADOW E&M-EST. PATIENT-LVL IV: ICD-10-PCS | Mod: PBBFAC,,, | Performed by: INTERNAL MEDICINE

## 2019-05-01 PROCEDURE — 83880 ASSAY OF NATRIURETIC PEPTIDE: CPT

## 2019-05-01 PROCEDURE — 36415 COLL VENOUS BLD VENIPUNCTURE: CPT

## 2019-05-01 PROCEDURE — 99215 PR OFFICE/OUTPT VISIT, EST, LEVL V, 40-54 MIN: ICD-10-PCS | Mod: S$PBB,,, | Performed by: INTERNAL MEDICINE

## 2019-05-01 PROCEDURE — 99215 OFFICE O/P EST HI 40 MIN: CPT | Mod: S$PBB,,, | Performed by: INTERNAL MEDICINE

## 2019-05-01 PROCEDURE — 99214 OFFICE O/P EST MOD 30 MIN: CPT | Mod: PBBFAC | Performed by: INTERNAL MEDICINE

## 2019-05-01 PROCEDURE — 99999 PR PBB SHADOW E&M-EST. PATIENT-LVL IV: CPT | Mod: PBBFAC,,, | Performed by: INTERNAL MEDICINE

## 2019-05-01 PROCEDURE — 80053 COMPREHEN METABOLIC PANEL: CPT
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
## 2019-05-01 NOTE — LETTER
May 1, 2019        Ayan Olmstead  [address] [place] [id number]  Phone: [phone]  Fax: [phone]             [facility] [address] [place] [address]  Phone: [phone]   Patient: Laure Ross   MR Number: [id number]   YOB: 1970   Date of Visit: 5/1/2019       Dear Dr. Ayan Olmstead    Thank you for referring Laure Ross to me for evaluation. Attached you will find relevant portions of my assessment and plan of care.    If you have questions, please do not hesitate to call me. I look forward to following Laure Ross along with you.    Sincerely,    Nash Manzanares MD    Enclosure    If you would like to receive this communication electronically, please contact [email or web address] or [phone] to request [facility] access.    [facility] is a tool which provides read-only access to select patient information with whom you have a relationship. Its easy to use and provides real time access to review your patients record including encounter summaries, notes, results, and demographic information.    If you feel you have received this communication in error or would no longer like to receive these types of communications, please e-mail [email or web address]

## 2019-05-02 RX ORDER — OMEPRAZOLE 40 MG/1
CAPSULE, DELAYED RELEASE ORAL
Qty: 30 CAPSULE | Refills: 6 | Status: CANCELLED | OUTPATIENT
Start: 2019-04-22 | End: 2020-01-01

## 2019-05-06 ENCOUNTER — ANTI-COAG VISIT (OUTPATIENT)
Dept: CARDIOLOGY | Facility: CLINIC | Age: 49
End: 2019-05-06
Payer: MEDICAID

## 2019-05-06 DIAGNOSIS — Z79.01 CHRONIC ANTICOAGULATION: Primary | ICD-10-CM

## 2019-05-06 DIAGNOSIS — I48.0 PAROXYSMAL ATRIAL FIBRILLATION: ICD-10-CM

## 2019-05-06 LAB — INR PPP: 2.4 (ref 2–3)

## 2019-05-06 PROCEDURE — 93793 ANTICOAG MGMT PT WARFARIN: CPT | Mod: ,,,

## 2019-05-06 PROCEDURE — 85610 PROTHROMBIN TIME: CPT | Mod: PBBFAC,PO

## 2019-05-06 PROCEDURE — 93793 PR ANTICOAGULANT MGMT FOR PT TAKING WARFARIN: ICD-10-PCS | Mod: ,,,
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
## 2019-06-03 ENCOUNTER — ANTI-COAG VISIT (OUTPATIENT)
Dept: CARDIOLOGY | Facility: CLINIC | Age: 49
End: 2019-06-03
Payer: MEDICAID

## 2019-06-03 ENCOUNTER — TELEPHONE (OUTPATIENT)
Dept: PHARMACY | Facility: CLINIC | Age: 49
End: 2019-06-03

## 2019-06-03 DIAGNOSIS — Z79.01 CHRONIC ANTICOAGULATION: Primary | ICD-10-CM

## 2019-06-03 DIAGNOSIS — I48.0 PAROXYSMAL ATRIAL FIBRILLATION: ICD-10-CM

## 2019-06-03 DIAGNOSIS — I50.42 CHRONIC COMBINED SYSTOLIC AND DIASTOLIC CONGESTIVE HEART FAILURE: Primary | ICD-10-CM

## 2019-06-03 LAB — INR PPP: 2.2 (ref 2–3)

## 2019-06-03 PROCEDURE — 93793 PR ANTICOAGULANT MGMT FOR PT TAKING WARFARIN: ICD-10-PCS | Mod: ,,,

## 2019-06-03 PROCEDURE — 93793 ANTICOAG MGMT PT WARFARIN: CPT | Mod: ,,,

## 2019-06-03 PROCEDURE — 85610 PROTHROMBIN TIME: CPT | Mod: PBBFAC

## 2019-06-03 NOTE — PROGRESS NOTES
INR at goal. Medications and chart reviewed. No changes noted to necessitate adjustment of warfarin or follow-up plan. See calendar.  Pt findings: Pt states that her cycle has been down for > 2 weeks & Pt also says she is having leg cramps .  She plans to contact her PCP. Will maintain current dose & re-assess in 4 weeks.

## 2019-06-04 ENCOUNTER — LAB VISIT (OUTPATIENT)
Dept: LAB | Facility: HOSPITAL | Age: 49
End: 2019-06-04
Attending: INTERNAL MEDICINE
Payer: MEDICAID

## 2019-06-04 DIAGNOSIS — I50.42 CHRONIC COMBINED SYSTOLIC AND DIASTOLIC CONGESTIVE HEART FAILURE: ICD-10-CM

## 2019-06-04 LAB
ANION GAP SERPL CALC-SCNC: 8 MMOL/L (ref 8–16)
BUN SERPL-MCNC: 19 MG/DL (ref 6–20)
CALCIUM SERPL-MCNC: 9.2 MG/DL (ref 8.7–10.5)
CHLORIDE SERPL-SCNC: 104 MMOL/L (ref 95–110)
CO2 SERPL-SCNC: 26 MMOL/L (ref 23–29)
CREAT SERPL-MCNC: 1.5 MG/DL (ref 0.5–1.4)
EST. GFR  (AFRICAN AMERICAN): 46.8 ML/MIN/1.73 M^2
EST. GFR  (NON AFRICAN AMERICAN): 40.6 ML/MIN/1.73 M^2
GLUCOSE SERPL-MCNC: 198 MG/DL (ref 70–110)
POTASSIUM SERPL-SCNC: 4 MMOL/L (ref 3.5–5.1)
SODIUM SERPL-SCNC: 138 MMOL/L (ref 136–145)

## 2019-06-04 PROCEDURE — 80048 BASIC METABOLIC PNL TOTAL CA: CPT

## 2019-06-04 PROCEDURE — 36415 COLL VENOUS BLD VENIPUNCTURE: CPT

## 2019-06-05 ENCOUNTER — OFFICE VISIT (OUTPATIENT)
Dept: URGENT CARE | Facility: CLINIC | Age: 49
End: 2019-06-05
Payer: MEDICAID

## 2019-06-05 VITALS
HEIGHT: 65 IN | RESPIRATION RATE: 16 BRPM | OXYGEN SATURATION: 98 % | TEMPERATURE: 97 F | HEART RATE: 100 BPM | DIASTOLIC BLOOD PRESSURE: 90 MMHG | BODY MASS INDEX: 42.99 KG/M2 | WEIGHT: 258 LBS | SYSTOLIC BLOOD PRESSURE: 139 MMHG

## 2019-06-05 DIAGNOSIS — N92.1 MENORRHAGIA WITH IRREGULAR CYCLE: ICD-10-CM

## 2019-06-05 DIAGNOSIS — R30.0 DYSURIA: ICD-10-CM

## 2019-06-05 DIAGNOSIS — N92.6 IRREGULAR PERIODS: Primary | ICD-10-CM

## 2019-06-05 LAB
BILIRUB UR QL STRIP: NEGATIVE
GLUCOSE UR QL STRIP: NEGATIVE
KETONES UR QL STRIP: NEGATIVE
LEUKOCYTE ESTERASE UR QL STRIP: NEGATIVE
PH, POC UA: 8 (ref 5–8)
POC BLOOD, URINE: POSITIVE
POC NITRATES, URINE: NEGATIVE
PROT UR QL STRIP: POSITIVE
SP GR UR STRIP: 1 (ref 1–1.03)
UROBILINOGEN UR STRIP-ACNC: ABNORMAL (ref 0.1–1.1)

## 2019-06-05 PROCEDURE — 81003 POCT URINALYSIS, DIPSTICK, AUTOMATED, W/O SCOPE: ICD-10-PCS | Mod: QW,S$GLB,, | Performed by: PHYSICIAN ASSISTANT

## 2019-06-05 PROCEDURE — 99214 PR OFFICE/OUTPT VISIT, EST, LEVL IV, 30-39 MIN: ICD-10-PCS | Mod: S$GLB,,, | Performed by: PHYSICIAN ASSISTANT

## 2019-06-05 PROCEDURE — 81003 URINALYSIS AUTO W/O SCOPE: CPT | Mod: QW,S$GLB,, | Performed by: PHYSICIAN ASSISTANT

## 2019-06-05 PROCEDURE — 99214 OFFICE O/P EST MOD 30 MIN: CPT | Mod: S$GLB,,, | Performed by: PHYSICIAN ASSISTANT

## 2019-06-06 NOTE — PROGRESS NOTES
"Subjective:       Patient ID: Laure Ross is a 48 y.o. female.    Vitals:  height is 5' 5" (1.651 m) and weight is 117 kg (258 lb). Her temperature is 97.3 °F (36.3 °C). Her blood pressure is 139/90 (abnormal) and her pulse is 100. Her respiration is 16 and oxygen saturation is 98%.     Chief Complaint: Vaginal Bleeding    Ms. Ross presents for evaluation of urinary frequency & dark brown vaginal discharge.  She just finished her cycle yesterday, but today she had some dark brown discharge that she is concerned about.  Her cycle lasted 2 weeks this month.  She usually has regular periods & they only last 5 days.  Also, she is having urinary urgency & frequency.  She denies dysuria, abd pain, N/V, fever, or chills.        Vaginal Bleeding   The patient's primary symptoms include vaginal bleeding. The patient's pertinent negatives include no missed menses, pelvic pain or vaginal discharge. This is a new problem. The current episode started today. The problem occurs constantly. The problem has been gradually worsening. The patient is experiencing no pain. She is not pregnant. Pertinent negatives include no abdominal pain, back pain, chills, dysuria, fever, frequency, hematuria, nausea, rash, urgency or vomiting. The vaginal discharge was brown and dark. She has not been passing clots. She has not been passing tissue. Nothing aggravates the symptoms. She has tried nothing for the symptoms. She is sexually active. It is unknown whether or not her partner has an STD. She uses nothing for contraception. Her menstrual history has been regular. There is no history of ovarian cysts.       Constitution: Negative for chills and fever.   Neck: Negative for painful lymph nodes.   Gastrointestinal: Negative for abdominal pain, nausea and vomiting.   Genitourinary: Positive for vaginal bleeding. Negative for dysuria, frequency, urgency, urine decreased, hematuria, history of kidney stones, painful menstruation, irregular " menstruation, missed menses, heavy menstrual bleeding, ovarian cysts, genital trauma, vaginal pain, vaginal discharge, vaginal odor, painful intercourse, genital sore, painful ejaculation and pelvic pain.   Musculoskeletal: Negative for back pain.   Skin: Negative for rash and lesion.   Hematologic/Lymphatic: Negative for swollen lymph nodes.       Objective:      Physical Exam   Constitutional: She is oriented to person, place, and time. She appears well-developed and well-nourished. She is cooperative.  Non-toxic appearance. She does not appear ill. No distress.   HENT:   Head: Normocephalic and atraumatic.   Right Ear: Hearing, tympanic membrane, external ear and ear canal normal.   Left Ear: Hearing, tympanic membrane, external ear and ear canal normal.   Nose: Nose normal. No mucosal edema, rhinorrhea or nasal deformity. No epistaxis. Right sinus exhibits no maxillary sinus tenderness and no frontal sinus tenderness. Left sinus exhibits no maxillary sinus tenderness and no frontal sinus tenderness.   Mouth/Throat: Uvula is midline, oropharynx is clear and moist and mucous membranes are normal. No trismus in the jaw. Normal dentition. No uvula swelling. No posterior oropharyngeal erythema.   Eyes: Conjunctivae and lids are normal. Right eye exhibits no discharge. Left eye exhibits no discharge. No scleral icterus.   Sclera clear bilat   Neck: Trachea normal, normal range of motion, full passive range of motion without pain and phonation normal. Neck supple.   Cardiovascular: Normal rate, regular rhythm, normal heart sounds, intact distal pulses and normal pulses.   Pulmonary/Chest: Effort normal and breath sounds normal. No respiratory distress. She has no decreased breath sounds. She has no wheezes. She has no rhonchi. She has no rales.   Abdominal: Soft. Normal appearance and bowel sounds are normal. She exhibits no distension, no pulsatile midline mass and no mass. There is no tenderness. There is no  rigidity, no rebound, no guarding, no CVA tenderness, no tenderness at McBurney's point and negative Coyne's sign.   Musculoskeletal: Normal range of motion. She exhibits no edema or deformity.   Neurological: She is alert and oriented to person, place, and time. She exhibits normal muscle tone. Coordination normal.   Skin: Skin is warm, dry and intact. She is not diaphoretic. No pallor.   Psychiatric: She has a normal mood and affect. Her speech is normal and behavior is normal. Judgment and thought content normal. Cognition and memory are normal.   Nursing note and vitals reviewed.    Pelvic exam deferred.  Results for orders placed or performed in visit on 06/05/19   POCT Urinalysis, Dipstick, Automated, W/O Scope   Result Value Ref Range    POC Blood, Urine Positive (A) Negative    POC Bilirubin, Urine Negative Negative    POC Urobilinogen, Urine norm 0.1 - 1.1    POC Ketones, Urine Negative Negative    POC Protein, Urine Positive (A) Negative    POC Nitrates, Urine Negative Negative    POC Glucose, Urine Negative Negative    pH, UA 8.0 5 - 8    POC Specific Gravity, Urine 1.005 1.003 - 1.029    POC Leukocytes, Urine Negative Negative       Assessment:       1. Irregular periods    2. Dysuria    3. Menorrhagia with irregular cycle        Plan:         Irregular periods  -     Ambulatory referral to Obstetrics / Gynecology    Dysuria  -     POCT Urinalysis, Dipstick, Automated, W/O Scope  -     Urine culture    Menorrhagia with irregular cycle    Discussed that this may be menopause, referral to obgyn.  UA neg, will send for culture.     Patient Instructions   PLEASE READ YOUR DISCHARGE INSTRUCTIONS ENTIRELY AS IT CONTAINS IMPORTANT INFORMATION.  I have placed a referral for OBGYN.  They should call you this week.  If you do not hear from them by Friday, please call [phone] for an appt.  - Rest.    - Drink plenty of fluids.    - Tylenol or Ibuprofen as directed as needed for fever/pain.    - Follow up with your  PCP or specialty clinic as directed in the next 1-2 weeks if not improved or as needed.  You can call [phone] to schedule an appointment with the appropriate provider.    - If you were prescribed antibiotics, please take them to completion.  - If you were prescribed a narcotic medication, do not drive or operate heavy equipment or machinery while taking these medications.  - If you  smoke, please stop smoking.  -You must understand that you've received an Urgent Care treatment only and that you may be released before all your medical problems are known or treated. You, the patient, will    arrange for follow up care as instructed.  - Please return to Urgent Care or to the Emergency Department if your symptoms worsen.    Patient aware and verbalized understanding.    Heavy Menstrual Bleeding    Heavy menstrual bleeding means that your periods are heavier or longer than usual. You may soak through a pad or tampon every 1 to 3 hours on the heaviest days of your period. You may also pass large, dark clots. And your periods may last longer than 7 days.  If you have heavy periods often, this can cause a problem called anemia. With anemia, your red blood cell count is too low. Red blood cells are needed because they help carry oxygen throughout your body. Severe anemia may cause you to look pale and feel weak or tired. You might also become short of breath easily.  There are many possible causes of heavy menstrual bleeding. Hormonal imbalance is the most common cause. Having benign growths in your uterus, such as fibroids or polyps, is another cause. Taking certain medicines or having certain health problems or bleeding disorders are also causes.  To treat heavy menstrual bleeding, medicines are often tried first. If these dont help, further testing and treatments will likely be needed.  Home care  Medicines  If youre prescribed medicines, be sure to take them as directed.  · To help control heavy bleeding, any of  the following may be used:  ¨ Hormone therapy (this includes all methods of hormonal birth control such as pills, shots, cream, ring, patch, or hormone-releasing IUD)  ¨ Nonsteroidal anti-inflammatory drugs (NSAIDs), such as ibuprofen  ¨ Antifibrinolytic medicines, such as transexamic acid  · To help treat anemia, iron supplements may be prescribed.            General care  · Get plenty of rest if you tire easily. Avoid heavy exertion.  · To help relieve pain or cramping, try using a heating pad on the lower belly or back. A warm bath may also help.  Follow-up care  Follow up with your healthcare provider as directed.  When to seek medical advice  Call your healthcare provider right away if any of these occur:  · Heavier bleeding (soaking 1 pad or tampon every hour for 3 hours)  · Heavy bleeding that lasts longer than 1 week  · Fever of 100.4ºF (38ºC) or higher, or as directed by your provider  · Pain or cramping that gets worse instead of better  · Signs of anemia such as pale skin, extreme fatigue or weakness, or shortness of breath  · Dizziness or fainting  Date Last Reviewed: 6/11/2015  © 9752-0333 [employer]. [address] [place] [address], [place] [address]. All rights reserved. This information is not intended as a substitute for professional medical care. Always follow your healthcare professional's instructions.

## 2019-06-06 NOTE — PATIENT INSTRUCTIONS
PLEASE READ YOUR DISCHARGE INSTRUCTIONS ENTIRELY AS IT CONTAINS IMPORTANT INFORMATION.  I have placed a referral for OBGYN.  They should call you this week.  If you do not hear from them by Friday, please call [phone] for an appt.  - Rest.    - Drink plenty of fluids.    - Tylenol or Ibuprofen as directed as needed for fever/pain.    - Follow up with your PCP or specialty clinic as directed in the next 1-2 weeks if not improved or as needed.  You can call [phone] to schedule an appointment with the appropriate provider.    - If you were prescribed antibiotics, please take them to completion.  - If you were prescribed a narcotic medication, do not drive or operate heavy equipment or machinery while taking these medications.  - If you  smoke, please stop smoking.  -You must understand that you've received an Urgent Care treatment only and that you may be released before all your medical problems are known or treated. You, the patient, will    arrange for follow up care as instructed.  - Please return to Urgent Care or to the Emergency Department if your symptoms worsen.    Patient aware and verbalized understanding.    Heavy Menstrual Bleeding    Heavy menstrual bleeding means that your periods are heavier or longer than usual. You may soak through a pad or tampon every 1 to 3 hours on the heaviest days of your period. You may also pass large, dark clots. And your periods may last longer than 7 days.  If you have heavy periods often, this can cause a problem called anemia. With anemia, your red blood cell count is too low. Red blood cells are needed because they help carry oxygen throughout your body. Severe anemia may cause you to look pale and feel weak or tired. You might also become short of breath easily.  There are many possible causes of heavy menstrual bleeding. Hormonal imbalance is the most common cause. Having benign growths in your uterus, such as fibroids or polyps, is another cause. Taking  certain medicines or having certain health problems or bleeding disorders are also causes.  To treat heavy menstrual bleeding, medicines are often tried first. If these dont help, further testing and treatments will likely be needed.  Home care  Medicines  If youre prescribed medicines, be sure to take them as directed.  · To help control heavy bleeding, any of the following may be used:  ¨ Hormone therapy (this includes all methods of hormonal birth control such as pills, shots, cream, ring, patch, or hormone-releasing IUD)  ¨ Nonsteroidal anti-inflammatory drugs (NSAIDs), such as ibuprofen  ¨ Antifibrinolytic medicines, such as transexamic acid  · To help treat anemia, iron supplements may be prescribed.            General care  · Get plenty of rest if you tire easily. Avoid heavy exertion.  · To help relieve pain or cramping, try using a heating pad on the lower belly or back. A warm bath may also help.  Follow-up care  Follow up with your healthcare provider as directed.  When to seek medical advice  Call your healthcare provider right away if any of these occur:  · Heavier bleeding (soaking 1 pad or tampon every hour for 3 hours)  · Heavy bleeding that lasts longer than 1 week  · Fever of 100.4ºF (38ºC) or higher, or as directed by your provider  · Pain or cramping that gets worse instead of better  · Signs of anemia such as pale skin, extreme fatigue or weakness, or shortness of breath  · Dizziness or fainting  Date Last Reviewed: 6/11/2015  © 4698-5116 [employer]. [address] [place] [address], [place] [address]. All rights reserved. This information is not intended as a substitute for professional medical care. Always follow your healthcare professional's instructions.

## 2019-06-07 ENCOUNTER — TELEPHONE (OUTPATIENT)
Dept: URGENT CARE | Facility: CLINIC | Age: 49
End: 2019-06-07

## 2019-06-08 LAB
BACTERIA UR CULT: NORMAL
BACTERIA UR CULT: NORMAL

## 2019-06-12 ENCOUNTER — TELEPHONE (OUTPATIENT)
Dept: URGENT CARE | Facility: CLINIC | Age: 49
End: 2019-06-12

## 2019-06-12 NOTE — TELEPHONE ENCOUNTER
----- Message from Edis Flores PA-C sent at 6/10/2019  4:33 PM CDT -----  Urine culture negative. Follow up with PCP and OB/GYN as needed and discussed at visit

## 2019-06-14 DIAGNOSIS — J45.21 INTERMITTENT ASTHMA WITH ACUTE EXACERBATION, UNSPECIFIED ASTHMA SEVERITY: ICD-10-CM

## 2019-06-14 RX ORDER — MONTELUKAST SODIUM 10 MG/1
10 TABLET ORAL DAILY
Qty: 30 TABLET | Refills: 3 | Status: CANCELLED | OUTPATIENT
Start: 2019-06-14

## 2019-06-17 DIAGNOSIS — J45.21 INTERMITTENT ASTHMA WITH ACUTE EXACERBATION, UNSPECIFIED ASTHMA SEVERITY: ICD-10-CM

## 2019-06-17 RX ORDER — MONTELUKAST SODIUM 10 MG/1
10 TABLET ORAL DAILY
Qty: 30 TABLET | Refills: 3 | Status: SHIPPED | OUTPATIENT
Start: 2019-06-17 | End: 2020-01-01 | Stop reason: SDUPTHER

## 2019-06-19 ENCOUNTER — OFFICE VISIT (OUTPATIENT)
Dept: OBSTETRICS AND GYNECOLOGY | Facility: CLINIC | Age: 49
End: 2019-06-19
Payer: MEDICAID

## 2019-06-19 ENCOUNTER — LAB VISIT (OUTPATIENT)
Dept: LAB | Facility: HOSPITAL | Age: 49
End: 2019-06-19
Attending: OBSTETRICS & GYNECOLOGY
Payer: MEDICAID

## 2019-06-19 VITALS
BODY MASS INDEX: 43.71 KG/M2 | WEIGHT: 262.38 LBS | HEIGHT: 65 IN | SYSTOLIC BLOOD PRESSURE: 132 MMHG | DIASTOLIC BLOOD PRESSURE: 84 MMHG

## 2019-06-19 DIAGNOSIS — Z12.4 ENCOUNTER FOR PAPANICOLAOU SMEAR FOR CERVICAL CANCER SCREENING: ICD-10-CM

## 2019-06-19 DIAGNOSIS — N93.9 ABNORMAL UTERINE BLEEDING (AUB): ICD-10-CM

## 2019-06-19 DIAGNOSIS — Z12.31 BREAST CANCER SCREENING BY MAMMOGRAM: ICD-10-CM

## 2019-06-19 DIAGNOSIS — Z01.411 ENCOUNTER FOR WELL WOMAN EXAM WITH ABNORMAL FINDINGS: Primary | ICD-10-CM

## 2019-06-19 LAB
BASOPHILS # BLD AUTO: 0.02 K/UL (ref 0–0.2)
BASOPHILS NFR BLD: 0.3 % (ref 0–1.9)
DIFFERENTIAL METHOD: ABNORMAL
EOSINOPHIL # BLD AUTO: 0.1 K/UL (ref 0–0.5)
EOSINOPHIL NFR BLD: 1.4 % (ref 0–8)
ERYTHROCYTE [DISTWIDTH] IN BLOOD BY AUTOMATED COUNT: 15.3 % (ref 11.5–14.5)
HCG INTACT+B SERPL-ACNC: <1.2 MIU/ML
HCT VFR BLD AUTO: 37.9 % (ref 37–48.5)
HGB BLD-MCNC: 11.6 G/DL (ref 12–16)
LYMPHOCYTES # BLD AUTO: 2.1 K/UL (ref 1–4.8)
LYMPHOCYTES NFR BLD: 29 % (ref 18–48)
MCH RBC QN AUTO: 26.1 PG (ref 27–31)
MCHC RBC AUTO-ENTMCNC: 30.6 G/DL (ref 32–36)
MCV RBC AUTO: 85 FL (ref 82–98)
MONOCYTES # BLD AUTO: 0.6 K/UL (ref 0.3–1)
MONOCYTES NFR BLD: 8.2 % (ref 4–15)
NEUTROPHILS # BLD AUTO: 4.5 K/UL (ref 1.8–7.7)
NEUTROPHILS NFR BLD: 61.1 % (ref 38–73)
PLATELET # BLD AUTO: 281 K/UL (ref 150–350)
PMV BLD AUTO: 9.3 FL (ref 9.2–12.9)
RBC # BLD AUTO: 4.44 M/UL (ref 4–5.4)
T4 FREE SERPL-MCNC: 1.08 NG/DL (ref 0.71–1.51)
TSH SERPL DL<=0.005 MIU/L-ACNC: 1.81 UIU/ML (ref 0.4–4)
WBC # BLD AUTO: 7.34 K/UL (ref 3.9–12.7)

## 2019-06-19 PROCEDURE — 85025 COMPLETE CBC W/AUTO DIFF WBC: CPT

## 2019-06-19 PROCEDURE — 99386 PR PREVENTIVE VISIT,NEW,40-64: ICD-10-PCS | Mod: S$PBB,,, | Performed by: OBSTETRICS & GYNECOLOGY

## 2019-06-19 PROCEDURE — 99212 PR OFFICE/OUTPT VISIT, EST, LEVL II, 10-19 MIN: ICD-10-PCS | Mod: S$PBB,25,, | Performed by: OBSTETRICS & GYNECOLOGY

## 2019-06-19 PROCEDURE — 99999 PR PBB SHADOW E&M-EST. PATIENT-LVL V: CPT | Mod: PBBFAC,,, | Performed by: OBSTETRICS & GYNECOLOGY

## 2019-06-19 PROCEDURE — 84443 ASSAY THYROID STIM HORMONE: CPT

## 2019-06-19 PROCEDURE — 36415 COLL VENOUS BLD VENIPUNCTURE: CPT

## 2019-06-19 PROCEDURE — 83001 ASSAY OF GONADOTROPIN (FSH): CPT

## 2019-06-19 PROCEDURE — 99215 OFFICE O/P EST HI 40 MIN: CPT | Mod: PBBFAC | Performed by: OBSTETRICS & GYNECOLOGY

## 2019-06-19 PROCEDURE — 99999 PR PBB SHADOW E&M-EST. PATIENT-LVL V: ICD-10-PCS | Mod: PBBFAC,,, | Performed by: OBSTETRICS & GYNECOLOGY

## 2019-06-19 PROCEDURE — 84146 ASSAY OF PROLACTIN: CPT

## 2019-06-19 PROCEDURE — 99212 OFFICE O/P EST SF 10 MIN: CPT | Mod: S$PBB,25,, | Performed by: OBSTETRICS & GYNECOLOGY

## 2019-06-19 PROCEDURE — 87624 HPV HI-RISK TYP POOLED RSLT: CPT

## 2019-06-19 PROCEDURE — 84439 ASSAY OF FREE THYROXINE: CPT

## 2019-06-19 PROCEDURE — 99386 PREV VISIT NEW AGE 40-64: CPT | Mod: S$PBB,,, | Performed by: OBSTETRICS & GYNECOLOGY

## 2019-06-19 PROCEDURE — 84702 CHORIONIC GONADOTROPIN TEST: CPT

## 2019-06-19 PROCEDURE — 88175 CYTOPATH C/V AUTO FLUID REDO: CPT

## 2019-06-19 NOTE — PROGRESS NOTES
SUBJECTIVE:   Laure Ross is a 48 y.o. female   for annual well woman exam. Patient's last menstrual period was 2019 (approximate)..  She c/o heavy period as below.      Contraception:btl      Past Medical History:   Diagnosis Date    Anticoagulant long-term use     Arthritis     Asthma     Asthma     Atrial fibrillation     Cardiomyopathy     Cardiomyopathy     CHF (congestive heart failure)     CHF (congestive heart failure)     Chronic combined systolic and diastolic heart failure 6/3/2016    COPD (chronic obstructive pulmonary disease) 3/28/2018    GERD (gastroesophageal reflux disease)     H/O tubal ligation     Hypertension     Obesity     Renal disorder     Type 2 diabetes mellitus with hyperglycemia     Type 2 diabetes mellitus with polyneuropathy      Past Surgical History:   Procedure Laterality Date    CARDIAC DEFIBRILLATOR PLACEMENT      CARDIAC DEFIBRILLATOR PLACEMENT      CARDIAC DEFIBRILLATOR PLACEMENT      CARDIOVERSION N/A 2016    Performed by [facility] at [facility] CATH LAB    CHOLECYSTECTOMY      COLONOSCOPY N/A 2016    Performed by Eugene Soto MD at [facility] ENDO (2ND FLR)    ECHOCARDIOGRAM-TRANSESOPHAGEAL N/A 4/15/2016    Performed by Rossana Surgeon at [facility] ROSSANA    ESOPHAGOGASTRODUODENOSCOPY (EGD) N/A 2016    Performed by Joe Magana MD at [facility] ENDO (2ND FLR)    GALLBLADDER SURGERY      iabp  2016    INSERTION-ICD-BIVENTRICULAR Right 2017    Performed by [facility]i [facility]olly [facility] at [facility] CATH LAB    TUBAL LIGATION       Social History     Socioeconomic History    Marital status: Single     Spouse name: Not on file    Number of children: Not on file    Years of education: Not on file    Highest education level: Not on file   Occupational History    Not on file   Social Needs    Financial resource strain: Not on file    Food insecurity:     Worry: Not on file     Inability: Not on file    Transportation needs:      Medical: Not on file     Non-medical: Not on file   Tobacco Use    Smoking status: Never Smoker    Smokeless tobacco: Never Used   Substance and Sexual Activity    Alcohol use: No    Drug use: No    Sexual activity: Yes     Partners: Male   Lifestyle    Physical activity:     Days per week: Not on file     Minutes per session: Not on file    Stress: Not on file   Relationships    Social connections:     Talks on phone: Not on file     Gets together: Not on file     Attends Bahai service: Not on file     Active member of club or organization: Not on file     Attends meetings of clubs or organizations: Not on file     Relationship status: Not on file   Other Topics Concern    Not on file   Social History Narrative    Not on file     Family History   Problem Relation Age of Onset    Heart disease Mother     Heart disease Father      OB History    Para Term  AB Living   3 3 3     3   SAB TAB Ectopic Multiple Live Births           3      # Outcome Date GA Lbr Prashant/2nd Weight Sex Delivery Anes PTL Lv   3 Term            2 Term            1 Term               Obstetric Comments   Cs x 3, preE   Gynhx: reg/4.  Mod to heavy flow   Denies abnl pap, last pap  normal   H/o trichomonas         Current Outpatient Medications   Medication Sig Dispense Refill    acetaminophen (TYLENOL) 650 MG TbSR TAKE 1 TABLET every four hours AS NEEDED for shoulder pain 180 tablet 2    allopurinol (ZYLOPRIM) 100 MG tablet TAKE 1 TABLET BY MOUTH EVERY DAY 90 tablet 5    atorvastatin (LIPITOR) 20 MG tablet Take 1 tablet (20 mg total) by mouth once daily. 90 tablet 1    azelastine (ASTELIN) 137 mcg (0.1 %) nasal spray 2 sprays (274 mcg total) by Nasal route 2 (two) times daily. 30 mL 0    azelastine (ASTELIN) 137 mcg (0.1 %) nasal spray Use 1 to 2 sprays per nostril twice daily as needed. Us in addition to our nasal steroid spray 30 mL 3    baclofen (LIORESAL) 10 MG tablet TAKE 1 TABLET BY MOUTH THREE TIMES  "DAILY AS NEEDED FOR MUSCLE SPASM 30 tablet 0    BD ULTRA-FINE ESSENCE PEN NEEDLES 32 gauge x 5/32" Ndle Takes 5 times  day 200 each 11    blood sugar diagnostic Strp Uses 4 times a day, true metrix meter. 150 each 11    budesonide-formoterol 160-4.5 mcg (SYMBICORT) 160-4.5 mcg/actuation HFAA INHALE 2 PUFFS BY MOUTH TWICE DAILY. RINSE MOUTH AFTER USE. 10.2 g 3    cetirizine (ZYRTEC) 10 MG tablet TAKE 1 TABLET AT BEDTIME for allergy relief 30 tablet 3    ciclopirox (PENLAC) 8 % Soln Apply topically nightly. 6.6 mL 3    cimetidine (TAGAMET ORAL) Take by mouth.      cyclobenzaprine (FEXMID) 7.5 MG Tab TAKE 1 TABLET 3 TIMES DAILY AS NEEDED FOR MUSCLE SPASM. 21 tablet 0    dicyclomine (BENTYL) 10 MG capsule TAKE 1 CAPSULE 4 TIMES DAILY as needed for abdominal pain 30 capsule 0    digoxin (LANOXIN) 125 mcg tablet TAKE 1 TABLET BY MOUTH ONCE DAILY 30 tablet 5    dulaglutide (TRULICITY) 0.75 mg/0.5 mL PnIj Inject 0.5 mLs (0.75 mg total) into the skin every 7 days. 2 mL 6    ergocalciferol (ERGOCALCIFEROL) 50,000 unit Cap TAKE 1 CAPSULE BY MOUTH WEEKLY. 12 capsule 0    fluticasone (FLONASE) 50 mcg/actuation nasal spray USE TWO SPRAYS IN EACH NOSTRIL ONCE DAILY for cold 48 g 1    furosemide (LASIX) 40 MG tablet Take 2 tablets (80 mg total) by mouth 2 (two) times daily. 120 tablet 6    ibuprofen (ADVIL,MOTRIN) 800 MG tablet Take 1 tablet (800 mg total) by mouth every 8 (eight) hours as needed for Pain. 30 tablet 0    insulin (BASAGLAR KWIKPEN U-100 INSULIN) glargine 100 units/mL (3mL) SubQ pen inject 34 under the skin in the morning and 34 units at night 9 mL 6    insulin aspart U-100 (NOVOLOG) 100 unit/mL (3 mL) InPn pen Inject 16 units into the skin with meals plus scale 15 mL 6    fluticasone propionate (FLOVENT HFA) 110 mcg/actuation inhaler INHALE 2 PUFFS TWICE DAILY. RINSE MOUTH AFTER USE. 12 g 3    ketoconazole (NIZORAL) 2 % cream APPLY SPARINGLY TO AFFECTED AREA(S) ONCE DAILY 30 g 2    lancets 30 gauge " "Misc use 4 times a day 150 each 2    levalbuterol (XOPENEX HFA) 45 mcg/actuation inhaler INHALE 1 TO 2 PUFFS EVERY 4 TO 6 HOURS AS NEEDED 15 g 3    levalbuterol (XOPENEX) 0.31 mg/3 mL nebulizer solution Take 3 mLs (0.31 mg total) by nebulization every 4 (four) hours as needed for Wheezing. Rescue 72 mL 3    linaclotide 72 mcg Cap take one tablet daily as needed for constipation 30 capsule 2    lisinopril (PRINIVIL,ZESTRIL) 5 MG tablet TAKE 1 TABLET BY MOUTH TWO TIMES A DAY 60 tablet 8    magnesium oxide (MAG-OX) 400 mg (241.3 mg magnesium) tablet TAKE 1 TABLET BY MOUTH TWICE DAILY 60 tablet 1    montelukast (SINGULAIR) 10 mg tablet TAKE 1 TABLET BY MOUTH DAILY. 30 tablet 3    pen needle, diabetic 32 gauge x 5/32" Ndle USE AS DIRECTED 5 TIMES DAILY 200 each 5    pen needle, diabetic 32 gauge x 5/32" Ndle USE WITH INSULIN PEN TWICE DAILY 100 each 2    potassium chloride (MICRO-K) 10 MEQ CpSR Take 2 capsules (20 mEq total) by mouth once daily. 60 capsule 11    ranitidine (ZANTAC) 300 MG tablet Take 1 tablet by mouth every evening 30 tablet 11    sodium chloride for inhalation (SODIUM CHLORIDE 0.9%) 0.9 % nebulizer solution Use 1 vial via nebulization three times daily. Mix with xopenex solution 90 mL 2    spironolactone (ALDACTONE) 25 MG tablet Take 1 tablet (25 mg total) by mouth once daily. 90 tablet 3    triamcinolone acetonide 0.1% (KENALOG) 0.1 % cream Apply topically 2 (two) times daily. Apply twice a day for 7 days to arms. 45 g 0    warfarin (COUMADIN) 7.5 MG tablet Take ½ tablet by mouth once daily and 1 tablet on thursday (Patient taking differently: Coumadin dose 7.5 mg on Monday and  1/2 tab rest of days) 60 tablet 3     No current facility-administered medications for this visit.      Allergies: Patient has no known allergies.       ROS:  GENERAL: Denies weight gain or weight loss. Feeling well overall.   SKIN: Denies rash or lesions.   HEAD: Denies head injury or headache.   NODES: Denies " "enlarged lymph nodes.   CHEST: Denies chest pain or shortness of breath.   CARDIOVASCULAR: Denies palpitations or left sided chest pain.   ABDOMEN: No abdominal pain, constipation, diarrhea, nausea, vomiting or rectal bleeding.   URINARY: No frequency, dysuria, hematuria, or burning on urination.  REPRODUCTIVE: Denies vaginal discharge, abnormal vaginal bleeding, lesions, pelvic pain  BREASTS: The patient performs breast self-examination and denies pain, lumps, or nipple discharge.   HEMATOLOGIC: No easy bruisability or excessive bleeding.  MUSCULOSKELETAL: Denies joint pain or swelling.   NEUROLOGIC: Denies syncope or weakness.   PSYCHIATRIC: Denies depression, anxiety or mood swings.      OBJECTIVE:   /84   Ht 5' 5" (1.651 m)   Wt 119 kg (262 lb 5.6 oz)   LMP 05/22/2019 (Approximate)   BMI 43.66 kg/m²   The patient appears well, alert, oriented x 3, in no distress.  PSYCH:  Normal, full range of affect  NECK: negative, no thyromegaly, trachea midline  SKIN: normal, good color, good turgor and no acne, striae, hirsutism  BREAST EXAM: breasts appear normal, no suspicious masses, no skin or nipple changes or axillary nodes  ABDOMEN: soft, non-tender; bowel sounds normal; no masses,  no organomegaly and no hernias, masses, or hepatosplenomegaly, obese abdomen. Vertical/midline incision noted  GENITALIA: normal external genitalia, no erythema, no discharge  BLADDER: soft  URETHRA: normal appearing urethra with no masses, tenderness or lesions and normal urethra, normal urethral meatus  VAGINA: Normal  CERVIX: no lesions or cervical motion tenderness  UTERUS: normal size, contour, position, consistency, mobility, non-tender  ADNEXA: no mass, fullness, tenderness    ASSESSMENT:   1. Health maintenance  -pap done. Discussed ASCCP guideline screening every 3 - 5 years.   -screening MMG ordered  -counseled on exercise and healthy diet, weight loss  -bone health:  Discussed Vitamin D and Calcium supplementation, " weight bearing exercises  2.  Discussed safety at home/school/work, healthy and balanced diet, sleep hygiene, as well as violence/weapons exposure.         CC:  Prolonged period    HPI:    Normal menstrual cycles are every month, lasting 4-5 days, moderate to heavy flow  Within the last 3 months, cycles became irregular  Menstrual cycle in Feb was normal, skipped for 2 months  Period resumed end of May, lasted for 3 weeks  Denies IMB, postcoital bleeding  This is first episode  + vasomotor symptoms for the past few years  She has been on coumadin since 2016 for AFIB, INR for the past two months were stable at 2.4-2.6    PE:  ABDOMEN: soft, non-tender; bowel sounds normal; no masses,  no organomegaly and no hernias, masses, or hepatosplenomegaly, obese abdomen. Vertical/midline incision noted  GENITALIA: normal external genitalia, no erythema, no discharge  BLADDER: soft  URETHRA: normal appearing urethra with no masses, tenderness or lesions and normal urethra, normal urethral meatus  VAGINA: Normal  CERVIX: no lesions or cervical motion tenderness  UTERUS: normal size, contour, position, consistency, mobility, non-tender  ADNEXA: no mass, fullness, tenderness    A/P  1.  AUB:  Check TSH, LH, FSH, UPT, Prolactin and pelvic US.  Discussed with patient different etiology of abnormal uterine bleeding and different management approaches depend on the causes. Likely perimenopausal transition.    2. Will monitor for another 4-5 months.

## 2019-06-20 ENCOUNTER — TELEPHONE (OUTPATIENT)
Dept: OBSTETRICS AND GYNECOLOGY | Facility: CLINIC | Age: 49
End: 2019-06-20

## 2019-06-20 DIAGNOSIS — I50.9 CONGESTIVE HEART FAILURE, UNSPECIFIED HF CHRONICITY, UNSPECIFIED HEART FAILURE TYPE: ICD-10-CM

## 2019-06-20 LAB
FSH SERPL-ACNC: 11.4 MIU/ML
PROLACTIN SERPL IA-MCNC: 10.8 NG/ML (ref 5.2–26.5)

## 2019-06-20 RX ORDER — SPIRONOLACTONE 25 MG/1
25 TABLET ORAL DAILY
Qty: 90 TABLET | Refills: 3 | Status: CANCELLED | OUTPATIENT
Start: 2019-06-20 | End: 2020-01-01

## 2019-06-20 NOTE — TELEPHONE ENCOUNTER
----- Message from [facility]-Toribio Leger Mai, MD sent at 6/20/2019 12:12 PM CDT -----  Please inform normal labs

## 2019-06-21 DIAGNOSIS — I50.9 CONGESTIVE HEART FAILURE, UNSPECIFIED HF CHRONICITY, UNSPECIFIED HEART FAILURE TYPE: ICD-10-CM

## 2019-06-21 RX ORDER — SPIRONOLACTONE 25 MG/1
25 TABLET ORAL DAILY
Qty: 90 TABLET | Refills: 3 | Status: CANCELLED | OUTPATIENT
Start: 2019-06-20 | End: 2020-01-01

## 2019-06-24 ENCOUNTER — TELEPHONE (OUTPATIENT)
Dept: PHARMACY | Facility: CLINIC | Age: 49
End: 2019-06-24

## 2019-06-24 DIAGNOSIS — I50.9 CONGESTIVE HEART FAILURE, UNSPECIFIED HF CHRONICITY, UNSPECIFIED HEART FAILURE TYPE: ICD-10-CM

## 2019-06-24 RX ORDER — SPIRONOLACTONE 25 MG/1
25 TABLET ORAL DAILY
Qty: 90 TABLET | Refills: 3 | Status: SHIPPED | OUTPATIENT
Start: 2019-06-24 | End: 2020-01-01 | Stop reason: SDUPTHER

## 2019-06-24 NOTE — TELEPHONE ENCOUNTER
Informed Patient  that [facility] received prescription for Dupixent and benefits investigation is required.  [facility] will be back in touch once insurance determination is received.

## 2019-06-25 ENCOUNTER — TELEPHONE (OUTPATIENT)
Dept: RADIOLOGY | Facility: HOSPITAL | Age: 49
End: 2019-06-25

## 2019-06-26 ENCOUNTER — HOSPITAL ENCOUNTER (OUTPATIENT)
Dept: RADIOLOGY | Facility: HOSPITAL | Age: 49
Discharge: HOME | End: 2019-06-26
Attending: OBSTETRICS & GYNECOLOGY
Payer: MEDICAID

## 2019-06-26 VITALS — BODY MASS INDEX: 43.32 KG/M2 | HEIGHT: 65 IN | WEIGHT: 260 LBS

## 2019-06-26 DIAGNOSIS — Z12.31 BREAST CANCER SCREENING BY MAMMOGRAM: ICD-10-CM

## 2019-06-26 DIAGNOSIS — N93.9 ABNORMAL UTERINE BLEEDING (AUB): ICD-10-CM

## 2019-06-26 DIAGNOSIS — Z01.411 ENCOUNTER FOR WELL WOMAN EXAM WITH ABNORMAL FINDINGS: ICD-10-CM

## 2019-06-26 LAB
HPV HR 12 DNA CVX QL NAA+PROBE: NEGATIVE
HPV16 AG SPEC QL: NEGATIVE
HPV18 DNA SPEC QL NAA+PROBE: NEGATIVE

## 2019-06-26 PROCEDURE — 77063 MAMMO DIGITAL SCREENING BILAT WITH TOMOSYNTHESIS_CAD: ICD-10-PCS | Mod: 26,,, | Performed by: RADIOLOGY

## 2019-06-26 PROCEDURE — 77067 SCR MAMMO BI INCL CAD: CPT | Mod: 26,,, | Performed by: RADIOLOGY

## 2019-06-26 PROCEDURE — 76830 US PELVIS COMP WITH TRANSVAG NON-OB (XPD): ICD-10-PCS | Mod: 26,,, | Performed by: RADIOLOGY

## 2019-06-26 PROCEDURE — 76856 US PELVIS COMP WITH TRANSVAG NON-OB (XPD): ICD-10-PCS | Mod: 26,,, | Performed by: RADIOLOGY

## 2019-06-26 PROCEDURE — 77067 MAMMO DIGITAL SCREENING BILAT WITH TOMOSYNTHESIS_CAD: ICD-10-PCS | Mod: 26,,, | Performed by: RADIOLOGY

## 2019-06-26 PROCEDURE — 76830 TRANSVAGINAL US NON-OB: CPT | Mod: TC

## 2019-06-26 PROCEDURE — 77063 BREAST TOMOSYNTHESIS BI: CPT | Mod: 26,,, | Performed by: RADIOLOGY

## 2019-06-26 PROCEDURE — 76830 TRANSVAGINAL US NON-OB: CPT | Mod: 26,,, | Performed by: RADIOLOGY

## 2019-06-26 PROCEDURE — 77067 SCR MAMMO BI INCL CAD: CPT | Mod: TC

## 2019-06-26 PROCEDURE — 76856 US EXAM PELVIC COMPLETE: CPT | Mod: 26,,, | Performed by: RADIOLOGY

## 2019-06-27 ENCOUNTER — TELEPHONE (OUTPATIENT)
Dept: OBSTETRICS AND GYNECOLOGY | Facility: CLINIC | Age: 49
End: 2019-06-27

## 2019-06-27 NOTE — PROGRESS NOTES
Please inform pt uterus has two fibroids, (4.3 cm and 2.7 cm)  This is very common, as 70-80% women have fibroids  Continue to monitor her period, I would like to see her back in 3 months or sooner if needed

## 2019-06-27 NOTE — TELEPHONE ENCOUNTER
----- Message from [facility]-Toribio Leger Mai, MD sent at 6/27/2019  7:02 AM CDT -----  Please inform normal MMG

## 2019-06-27 NOTE — TELEPHONE ENCOUNTER
----- Message from [facility]-Toribio Leger Mai, MD sent at 6/27/2019  7:06 AM CDT -----  Please inform pt uterus has two fibroids, (4.3 cm and 2.7 cm)  This is very common, as 70-80% women have fibroids  Continue to monitor her period, I would like to see her back in 3 months or sooner if needed

## 2019-07-01 ENCOUNTER — ANTI-COAG VISIT (OUTPATIENT)
Dept: CARDIOLOGY | Facility: CLINIC | Age: 49
End: 2019-07-01
Payer: MEDICAID

## 2019-07-01 DIAGNOSIS — I48.0 PAROXYSMAL ATRIAL FIBRILLATION: ICD-10-CM

## 2019-07-01 DIAGNOSIS — Z79.01 CHRONIC ANTICOAGULATION: Primary | ICD-10-CM

## 2019-07-01 LAB — INR PPP: 1.9 (ref 2–3)

## 2019-07-01 PROCEDURE — 85610 PROTHROMBIN TIME: CPT | Mod: PBBFAC

## 2019-07-01 PROCEDURE — 93793 ANTICOAG MGMT PT WARFARIN: CPT | Mod: ,,,

## 2019-07-01 PROCEDURE — 93793 PR ANTICOAGULANT MGMT FOR PT TAKING WARFARIN: ICD-10-PCS | Mod: ,,,

## 2019-07-01 NOTE — PROGRESS NOTES
INR not at goal. Medications, chart, and patient findings reviewed. See calendar for adjustments to dose and follow up plan.  Pt findings: Pt has congestion, cough, & runny nose; pt tentative to start dupixent for asthma (however, she has not received prescription yet); she denies any other changes.  Pt is close to therapeutic range.  Will maintain current regimen & re-assess in 4 weeks.  Patient was re-educated on situations that would require placing a call to the [facility], including bleeding or unusual bruising issues, changes in health, diet or medications,upcoming procedures that require warfarin interruption, and missed Coumadin dose(s). Patient expressed understanding that avoidance of consistency with these parameters could cause fluctuations in INR, leading to more frequent visits and increase risk of adverse events.

## 2019-07-02 ENCOUNTER — OFFICE VISIT (OUTPATIENT)
Dept: URGENT CARE | Facility: CLINIC | Age: 49
End: 2019-07-02
Payer: MEDICAID

## 2019-07-02 ENCOUNTER — TELEPHONE (OUTPATIENT)
Dept: PHARMACY | Facility: CLINIC | Age: 49
End: 2019-07-02

## 2019-07-02 VITALS
DIASTOLIC BLOOD PRESSURE: 90 MMHG | TEMPERATURE: 101 F | BODY MASS INDEX: 43.32 KG/M2 | RESPIRATION RATE: 18 BRPM | SYSTOLIC BLOOD PRESSURE: 136 MMHG | HEIGHT: 65 IN | OXYGEN SATURATION: 97 % | HEART RATE: 102 BPM | WEIGHT: 260 LBS

## 2019-07-02 DIAGNOSIS — R06.2 BILATERAL WHEEZING: ICD-10-CM

## 2019-07-02 DIAGNOSIS — R50.9 FEVER, UNSPECIFIED FEVER CAUSE: ICD-10-CM

## 2019-07-02 DIAGNOSIS — I50.42 CHRONIC COMBINED SYSTOLIC AND DIASTOLIC CONGESTIVE HEART FAILURE: ICD-10-CM

## 2019-07-02 DIAGNOSIS — J10.1 INFLUENZA B: Primary | ICD-10-CM

## 2019-07-02 LAB
CTP QC/QA: YES
FLUAV AG NPH QL: NEGATIVE
FLUBV AG NPH QL: POSITIVE

## 2019-07-02 PROCEDURE — 87804 POCT INFLUENZA A/B: ICD-10-PCS | Mod: 59,QW,S$GLB, | Performed by: PHYSICIAN ASSISTANT

## 2019-07-02 PROCEDURE — 87804 INFLUENZA ASSAY W/OPTIC: CPT | Mod: QW,S$GLB,, | Performed by: PHYSICIAN ASSISTANT

## 2019-07-02 PROCEDURE — 94640 AIRWAY INHALATION TREATMENT: CPT | Mod: S$GLB,,, | Performed by: PHYSICIAN ASSISTANT

## 2019-07-02 PROCEDURE — 99214 PR OFFICE/OUTPT VISIT, EST, LEVL IV, 30-39 MIN: ICD-10-PCS | Mod: 25,S$GLB,, | Performed by: PHYSICIAN ASSISTANT

## 2019-07-02 PROCEDURE — 94640 PR INHAL RX, AIRWAY OBST/DX SPUTUM INDUCT: ICD-10-PCS | Mod: S$GLB,,, | Performed by: PHYSICIAN ASSISTANT

## 2019-07-02 PROCEDURE — 99214 OFFICE O/P EST MOD 30 MIN: CPT | Mod: 25,S$GLB,, | Performed by: PHYSICIAN ASSISTANT

## 2019-07-02 RX ORDER — LEVALBUTEROL INHALATION SOLUTION 1.25 MG/3ML
1.25 SOLUTION RESPIRATORY (INHALATION)
Status: COMPLETED | OUTPATIENT
Start: 2019-07-02 | End: 2019-07-02

## 2019-07-02 RX ORDER — OSELTAMIVIR PHOSPHATE 75 MG/1
75 CAPSULE ORAL 2 TIMES DAILY
Qty: 10 CAPSULE | Refills: 0 | Status: ON HOLD | OUTPATIENT
Start: 2019-07-02 | End: 2019-07-04 | Stop reason: SDUPTHER

## 2019-07-02 RX ORDER — LANOLIN ALCOHOL/MO/W.PET/CERES
CREAM (GRAM) TOPICAL 2 TIMES DAILY
Qty: 60 TABLET | Refills: 1 | Status: CANCELLED | OUTPATIENT
Start: 2019-07-02 | End: 2020-01-01

## 2019-07-02 RX ORDER — IPRATROPIUM BROMIDE 0.5 MG/2.5ML
0.5 SOLUTION RESPIRATORY (INHALATION)
Status: COMPLETED | OUTPATIENT
Start: 2019-07-02 | End: 2019-07-02

## 2019-07-02 RX ADMIN — IPRATROPIUM BROMIDE 0.5 MG: 0.5 SOLUTION RESPIRATORY (INHALATION) at 08:07

## 2019-07-02 RX ADMIN — LEVALBUTEROL INHALATION SOLUTION 1.25 MG: 1.25 SOLUTION RESPIRATORY (INHALATION) at 08:07

## 2019-07-02 NOTE — TELEPHONE ENCOUNTER
Initial Dupixent consult completed on . Dupixent will be shipped on  to arrive at patient's home on  via [employer]. $0 copay. Patient is going to have the nurse inject the first dose in office; she will call to make an appointment with them and bring medication with her. Address confirmed. Confirmed 2 patient identifiers - name and . Therapy Appropriate.    --Injection experience: Trulicity, Insulin    Counseled patient on administration directions:   Inject 600mg (2 syringes) into the skin once for a loading dose, then 300mg (1 syringe) every 14 days   Take out of the refrigerator 45 minutes prior to injection.   Wash hands before and after injection.   Monthly RX will come with gauze, Band-Aids, and alcohol swabs.   Patient may inject in either the tops of thighs, abdomen- but at least 2 inches away from the belly button, or the outer or back part of his/ her upper arm (if a caregiver administers).   Patient is to wipe down the injection site with the alcohol pad, wait to dry.  Insert the needle at a 45 degree angle straight into the skin.  Hold the syringe steady and slowly push down the plunger, then remove the needle.    Patient will use sharps container; once full, per [place] law, he/she may lock the sharps container and place in the trash. He/ She can then contact the Pharmacy and we will replace the sharps at no additional charge.    Patient was counseled on possible side effects:  · Injection site reaction (10%): redness, soreness, itching, bruising, lipoatrophy, swelling, lumps, pain and rash  · Gastrointestinal: Oral herpes (4%)  · Ophthalmic: Conjunctivitis (10%), eye pruritus (1%)  · Respiratory: Oropharyngeal pain (2%)  · ER precautions advised for signs and symptoms of allergic reaction      Patient did not have any further questions.. Consultation included: indication; goals of treatment; administration; storage and handling; side effects;  the importance of compliance; the importance of  keeping all follow up appointments. Patient understands to report any medication changes to [facility] and provider. All questions answered and addressed to patients satisfaction. Pharmacist will f/u with patient in 1 week from start, [facility] to contact patient in 3 weeks for refills.    Shane Samano, PharmD  Clinical Pharmacist   [facility]   P: [phone]

## 2019-07-03 ENCOUNTER — HOSPITAL ENCOUNTER (OUTPATIENT)
Facility: HOSPITAL | Age: 49
Discharge: HOME | End: 2019-07-04
Attending: EMERGENCY MEDICINE | Admitting: EMERGENCY MEDICINE
Payer: MEDICAID

## 2019-07-03 DIAGNOSIS — I10 ESSENTIAL HYPERTENSION: ICD-10-CM

## 2019-07-03 DIAGNOSIS — I50.42 CHRONIC COMBINED SYSTOLIC AND DIASTOLIC CONGESTIVE HEART FAILURE: ICD-10-CM

## 2019-07-03 DIAGNOSIS — E11.42 TYPE 2 DIABETES MELLITUS WITH DIABETIC POLYNEUROPATHY, UNSPECIFIED WHETHER LONG TERM INSULIN USE: Chronic | ICD-10-CM

## 2019-07-03 DIAGNOSIS — R06.02 SHORTNESS OF BREATH: ICD-10-CM

## 2019-07-03 DIAGNOSIS — R79.1 SUBTHERAPEUTIC INTERNATIONAL NORMALIZED RATIO (INR): ICD-10-CM

## 2019-07-03 DIAGNOSIS — J10.1 INFLUENZA B: ICD-10-CM

## 2019-07-03 DIAGNOSIS — J45.41 MODERATE PERSISTENT ASTHMA WITH EXACERBATION: ICD-10-CM

## 2019-07-03 DIAGNOSIS — J45.901 ASTHMA EXACERBATION: Primary | ICD-10-CM

## 2019-07-03 DIAGNOSIS — N18.30 CKD (CHRONIC KIDNEY DISEASE), STAGE III: ICD-10-CM

## 2019-07-03 DIAGNOSIS — J06.9 ACUTE URI: ICD-10-CM

## 2019-07-03 DIAGNOSIS — Z95.810 BIVENTRICULAR AUTOMATIC IMPLANTABLE CARDIOVERTER DEFIBRILLATOR IN SITU: ICD-10-CM

## 2019-07-03 DIAGNOSIS — G47.33 OSA (OBSTRUCTIVE SLEEP APNEA): ICD-10-CM

## 2019-07-03 PROBLEM — R91.8 ABNORMAL FINDING ON LUNG IMAGING: Status: ACTIVE | Noted: 2019-07-03

## 2019-07-03 LAB
ALBUMIN SERPL BCP-MCNC: 3.5 G/DL (ref 3.5–5.2)
ALP SERPL-CCNC: 67 U/L (ref 55–135)
ALT SERPL W/O P-5'-P-CCNC: 20 U/L (ref 10–44)
ANION GAP SERPL CALC-SCNC: 7 MMOL/L (ref 8–16)
AST SERPL-CCNC: 22 U/L (ref 10–40)
BASOPHILS # BLD AUTO: 0.02 K/UL (ref 0–0.2)
BASOPHILS NFR BLD: 0.4 % (ref 0–1.9)
BILIRUB SERPL-MCNC: 0.4 MG/DL (ref 0.1–1)
BNP SERPL-MCNC: 156 PG/ML (ref 0–99)
BUN SERPL-MCNC: 19 MG/DL (ref 6–20)
CALCIUM SERPL-MCNC: 9.3 MG/DL (ref 8.7–10.5)
CHLORIDE SERPL-SCNC: 100 MMOL/L (ref 95–110)
CO2 SERPL-SCNC: 28 MMOL/L (ref 23–29)
CREAT SERPL-MCNC: 1.5 MG/DL (ref 0.5–1.4)
DIFFERENTIAL METHOD: ABNORMAL
EOSINOPHIL # BLD AUTO: 0.1 K/UL (ref 0–0.5)
EOSINOPHIL NFR BLD: 1.1 % (ref 0–8)
ERYTHROCYTE [DISTWIDTH] IN BLOOD BY AUTOMATED COUNT: 15.5 % (ref 11.5–14.5)
EST. GFR  (AFRICAN AMERICAN): 46.8 ML/MIN/1.73 M^2
EST. GFR  (NON AFRICAN AMERICAN): 40.6 ML/MIN/1.73 M^2
ESTIMATED AVG GLUCOSE: 151 MG/DL (ref 68–131)
GLUCOSE SERPL-MCNC: 112 MG/DL (ref 70–110)
HBA1C MFR BLD HPLC: 6.9 % (ref 4–5.6)
HCT VFR BLD AUTO: 38.9 % (ref 37–48.5)
HGB BLD-MCNC: 11.7 G/DL (ref 12–16)
IMM GRANULOCYTES # BLD AUTO: 0.02 K/UL (ref 0–0.04)
IMM GRANULOCYTES NFR BLD AUTO: 0.4 % (ref 0–0.5)
INR PPP: 1.5 (ref 0.8–1.2)
LACTATE SERPL-SCNC: 1.4 MMOL/L (ref 0.5–2.2)
LYMPHOCYTES # BLD AUTO: 1.4 K/UL (ref 1–4.8)
LYMPHOCYTES NFR BLD: 25 % (ref 18–48)
MCH RBC QN AUTO: 25.8 PG (ref 27–31)
MCHC RBC AUTO-ENTMCNC: 30.1 G/DL (ref 32–36)
MCV RBC AUTO: 86 FL (ref 82–98)
MONOCYTES # BLD AUTO: 0.9 K/UL (ref 0.3–1)
MONOCYTES NFR BLD: 16.6 % (ref 4–15)
NEUTROPHILS # BLD AUTO: 3.1 K/UL (ref 1.8–7.7)
NEUTROPHILS NFR BLD: 56.5 % (ref 38–73)
NRBC BLD-RTO: 0 /100 WBC
PLATELET # BLD AUTO: 252 K/UL (ref 150–350)
PMV BLD AUTO: 10 FL (ref 9.2–12.9)
POCT GLUCOSE: 123 MG/DL (ref 70–110)
POCT GLUCOSE: 126 MG/DL (ref 70–110)
POTASSIUM SERPL-SCNC: 4.4 MMOL/L (ref 3.5–5.1)
PROT SERPL-MCNC: 6.9 G/DL (ref 6–8.4)
PROTHROMBIN TIME: 15 SEC (ref 9–12.5)
RBC # BLD AUTO: 4.53 M/UL (ref 4–5.4)
SODIUM SERPL-SCNC: 135 MMOL/L (ref 136–145)
TROPONIN I SERPL DL<=0.01 NG/ML-MCNC: 0.03 NG/ML (ref 0–0.03)
TROPONIN I SERPL DL<=0.01 NG/ML-MCNC: 0.05 NG/ML (ref 0–0.03)
WBC # BLD AUTO: 5.55 K/UL (ref 3.9–12.7)

## 2019-07-03 PROCEDURE — 93005 ELECTROCARDIOGRAM TRACING: CPT

## 2019-07-03 PROCEDURE — 94640 AIRWAY INHALATION TREATMENT: CPT

## 2019-07-03 PROCEDURE — 93010 ELECTROCARDIOGRAM REPORT: CPT | Mod: 76,,, | Performed by: INTERNAL MEDICINE

## 2019-07-03 PROCEDURE — 25000242 PHARM REV CODE 250 ALT 637 W/ HCPCS: Performed by: PHYSICIAN ASSISTANT

## 2019-07-03 PROCEDURE — 93010 EKG 12-LEAD: ICD-10-PCS | Mod: 76,,, | Performed by: INTERNAL MEDICINE

## 2019-07-03 PROCEDURE — 25000003 PHARM REV CODE 250: Performed by: PHYSICIAN ASSISTANT

## 2019-07-03 PROCEDURE — 83880 ASSAY OF NATRIURETIC PEPTIDE: CPT

## 2019-07-03 PROCEDURE — S5571 INSULIN DISPOS PEN 3 ML: HCPCS | Performed by: NURSE PRACTITIONER

## 2019-07-03 PROCEDURE — 84484 ASSAY OF TROPONIN QUANT: CPT | Mod: 91

## 2019-07-03 PROCEDURE — 99284 EMERGENCY DEPT VISIT MOD MDM: CPT | Mod: ,,, | Performed by: PHYSICIAN ASSISTANT

## 2019-07-03 PROCEDURE — 84484 ASSAY OF TROPONIN QUANT: CPT

## 2019-07-03 PROCEDURE — 87040 BLOOD CULTURE FOR BACTERIA: CPT | Mod: 59

## 2019-07-03 PROCEDURE — 99219 PR INITIAL OBSERVATION CARE,LEVL II: ICD-10-PCS | Mod: ,,, | Performed by: NURSE PRACTITIONER

## 2019-07-03 PROCEDURE — 25000003 PHARM REV CODE 250: Performed by: NURSE PRACTITIONER

## 2019-07-03 PROCEDURE — 85610 PROTHROMBIN TIME: CPT

## 2019-07-03 PROCEDURE — 82962 GLUCOSE BLOOD TEST: CPT

## 2019-07-03 PROCEDURE — 36415 COLL VENOUS BLD VENIPUNCTURE: CPT

## 2019-07-03 PROCEDURE — A4216 STERILE WATER/SALINE, 10 ML: HCPCS | Performed by: NURSE PRACTITIONER

## 2019-07-03 PROCEDURE — 83605 ASSAY OF LACTIC ACID: CPT

## 2019-07-03 PROCEDURE — 80053 COMPREHEN METABOLIC PANEL: CPT

## 2019-07-03 PROCEDURE — 94761 N-INVAS EAR/PLS OXIMETRY MLT: CPT

## 2019-07-03 PROCEDURE — 83036 HEMOGLOBIN GLYCOSYLATED A1C: CPT

## 2019-07-03 PROCEDURE — G0378 HOSPITAL OBSERVATION PER HR: HCPCS

## 2019-07-03 PROCEDURE — 99284 PR EMERGENCY DEPT VISIT,LEVEL IV: ICD-10-PCS | Mod: ,,, | Performed by: PHYSICIAN ASSISTANT

## 2019-07-03 PROCEDURE — 99219 PR INITIAL OBSERVATION CARE,LEVL II: CPT | Mod: ,,, | Performed by: NURSE PRACTITIONER

## 2019-07-03 PROCEDURE — 99285 EMERGENCY DEPT VISIT HI MDM: CPT | Mod: 25

## 2019-07-03 PROCEDURE — 85025 COMPLETE CBC W/AUTO DIFF WBC: CPT

## 2019-07-03 PROCEDURE — 63600175 PHARM REV CODE 636 W HCPCS: Performed by: PHYSICIAN ASSISTANT

## 2019-07-03 PROCEDURE — 93010 ELECTROCARDIOGRAM REPORT: CPT | Mod: ,,, | Performed by: INTERNAL MEDICINE

## 2019-07-03 PROCEDURE — 63600175 PHARM REV CODE 636 W HCPCS: Performed by: NURSE PRACTITIONER

## 2019-07-03 RX ORDER — DICYCLOMINE HYDROCHLORIDE 10 MG/1
10 CAPSULE ORAL 3 TIMES DAILY PRN
Status: DISCONTINUED | OUTPATIENT
Start: 2019-07-03 | End: 2019-07-04 | Stop reason: HOSPADM

## 2019-07-03 RX ORDER — LEVALBUTEROL INHALATION SOLUTION 0.63 MG/3ML
0.63 SOLUTION RESPIRATORY (INHALATION) EVERY 8 HOURS
Status: DISCONTINUED | OUTPATIENT
Start: 2019-07-04 | End: 2019-07-04 | Stop reason: HOSPADM

## 2019-07-03 RX ORDER — ONDANSETRON 8 MG/1
8 TABLET, ORALLY DISINTEGRATING ORAL EVERY 8 HOURS PRN
Status: DISCONTINUED | OUTPATIENT
Start: 2019-07-03 | End: 2019-07-04 | Stop reason: HOSPADM

## 2019-07-03 RX ORDER — ONDANSETRON 2 MG/ML
4 INJECTION INTRAMUSCULAR; INTRAVENOUS EVERY 8 HOURS PRN
Status: DISCONTINUED | OUTPATIENT
Start: 2019-07-03 | End: 2019-07-04 | Stop reason: HOSPADM

## 2019-07-03 RX ORDER — FUROSEMIDE 40 MG/1
80 TABLET ORAL 2 TIMES DAILY
Status: DISCONTINUED | OUTPATIENT
Start: 2019-07-03 | End: 2019-07-04 | Stop reason: HOSPADM

## 2019-07-03 RX ORDER — MONTELUKAST SODIUM 10 MG/1
10 TABLET ORAL DAILY
Status: DISCONTINUED | OUTPATIENT
Start: 2019-07-04 | End: 2019-07-04 | Stop reason: HOSPADM

## 2019-07-03 RX ORDER — FUROSEMIDE 10 MG/ML
80 INJECTION INTRAMUSCULAR; INTRAVENOUS
Status: COMPLETED | OUTPATIENT
Start: 2019-07-03 | End: 2019-07-03

## 2019-07-03 RX ORDER — SODIUM CHLORIDE 0.9 % (FLUSH) 0.9 %
3 SYRINGE (ML) INJECTION EVERY 8 HOURS
Status: DISCONTINUED | OUTPATIENT
Start: 2019-07-03 | End: 2019-07-04 | Stop reason: HOSPADM

## 2019-07-03 RX ORDER — IBUPROFEN 200 MG
16 TABLET ORAL
Status: DISCONTINUED | OUTPATIENT
Start: 2019-07-03 | End: 2019-07-04 | Stop reason: HOSPADM

## 2019-07-03 RX ORDER — DIGOXIN 125 MCG
0.12 TABLET ORAL DAILY
Status: DISCONTINUED | OUTPATIENT
Start: 2019-07-04 | End: 2019-07-04 | Stop reason: HOSPADM

## 2019-07-03 RX ORDER — FAMOTIDINE 20 MG/1
40 TABLET, FILM COATED ORAL 2 TIMES DAILY
Status: DISCONTINUED | OUTPATIENT
Start: 2019-07-03 | End: 2019-07-03

## 2019-07-03 RX ORDER — FLUTICASONE PROPIONATE 50 MCG
1 SPRAY, SUSPENSION (ML) NASAL DAILY
Status: DISCONTINUED | OUTPATIENT
Start: 2019-07-04 | End: 2019-07-04 | Stop reason: HOSPADM

## 2019-07-03 RX ORDER — ALLOPURINOL 100 MG/1
100 TABLET ORAL DAILY
Status: DISCONTINUED | OUTPATIENT
Start: 2019-07-04 | End: 2019-07-04 | Stop reason: HOSPADM

## 2019-07-03 RX ORDER — IPRATROPIUM BROMIDE AND ALBUTEROL SULFATE 2.5; .5 MG/3ML; MG/3ML
3 SOLUTION RESPIRATORY (INHALATION)
Status: COMPLETED | OUTPATIENT
Start: 2019-07-03 | End: 2019-07-03

## 2019-07-03 RX ORDER — GLUCAGON 1 MG
1 KIT INJECTION
Status: DISCONTINUED | OUTPATIENT
Start: 2019-07-03 | End: 2019-07-04 | Stop reason: HOSPADM

## 2019-07-03 RX ORDER — SPIRONOLACTONE 25 MG/1
25 TABLET ORAL DAILY
Status: DISCONTINUED | OUTPATIENT
Start: 2019-07-04 | End: 2019-07-04 | Stop reason: HOSPADM

## 2019-07-03 RX ORDER — LISINOPRIL 5 MG/1
5 TABLET ORAL DAILY
Status: DISCONTINUED | OUTPATIENT
Start: 2019-07-04 | End: 2019-07-04 | Stop reason: HOSPADM

## 2019-07-03 RX ORDER — AZELASTINE 1 MG/ML
1 SPRAY, METERED NASAL 2 TIMES DAILY
Status: DISCONTINUED | OUTPATIENT
Start: 2019-07-03 | End: 2019-07-04 | Stop reason: HOSPADM

## 2019-07-03 RX ORDER — INSULIN ASPART 100 [IU]/ML
11 INJECTION, SOLUTION INTRAVENOUS; SUBCUTANEOUS
Status: DISCONTINUED | OUTPATIENT
Start: 2019-07-04 | End: 2019-07-04 | Stop reason: HOSPADM

## 2019-07-03 RX ORDER — ACETAMINOPHEN 325 MG/1
650 TABLET ORAL EVERY 6 HOURS PRN
Status: DISCONTINUED | OUTPATIENT
Start: 2019-07-03 | End: 2019-07-04 | Stop reason: HOSPADM

## 2019-07-03 RX ORDER — LANOLIN ALCOHOL/MO/W.PET/CERES
CREAM (GRAM) TOPICAL 2 TIMES DAILY
Qty: 60 TABLET | Refills: 1 | Status: CANCELLED | OUTPATIENT
Start: 2019-07-02 | End: 2020-01-01

## 2019-07-03 RX ORDER — OSELTAMIVIR PHOSPHATE 75 MG/1
75 CAPSULE ORAL
Status: COMPLETED | OUTPATIENT
Start: 2019-07-03 | End: 2019-07-03

## 2019-07-03 RX ORDER — ERGOCALCIFEROL 1.25 MG/1
50000 CAPSULE ORAL
Status: DISCONTINUED | OUTPATIENT
Start: 2019-07-04 | End: 2019-07-04 | Stop reason: HOSPADM

## 2019-07-03 RX ORDER — LANOLIN ALCOHOL/MO/W.PET/CERES
400 CREAM (GRAM) TOPICAL 2 TIMES DAILY
Status: DISCONTINUED | OUTPATIENT
Start: 2019-07-03 | End: 2019-07-04 | Stop reason: HOSPADM

## 2019-07-03 RX ORDER — IBUPROFEN 200 MG
24 TABLET ORAL
Status: DISCONTINUED | OUTPATIENT
Start: 2019-07-03 | End: 2019-07-04 | Stop reason: HOSPADM

## 2019-07-03 RX ORDER — FLUTICASONE FUROATE AND VILANTEROL 100; 25 UG/1; UG/1
1 POWDER RESPIRATORY (INHALATION) DAILY
Status: DISCONTINUED | OUTPATIENT
Start: 2019-07-04 | End: 2019-07-04 | Stop reason: HOSPADM

## 2019-07-03 RX ORDER — AZELASTINE 1 MG/ML
2 SPRAY, METERED NASAL 2 TIMES DAILY
Status: DISCONTINUED | OUTPATIENT
Start: 2019-07-03 | End: 2019-07-04 | Stop reason: HOSPADM

## 2019-07-03 RX ORDER — ATORVASTATIN CALCIUM 20 MG/1
20 TABLET, FILM COATED ORAL DAILY
Status: DISCONTINUED | OUTPATIENT
Start: 2019-07-04 | End: 2019-07-04 | Stop reason: HOSPADM

## 2019-07-03 RX ORDER — SODIUM CHLORIDE 0.9 % (FLUSH) 0.9 %
10 SYRINGE (ML) INJECTION
Status: DISCONTINUED | OUTPATIENT
Start: 2019-07-03 | End: 2019-07-04 | Stop reason: HOSPADM

## 2019-07-03 RX ORDER — POTASSIUM CHLORIDE 750 MG/1
20 CAPSULE, EXTENDED RELEASE ORAL DAILY
Status: DISCONTINUED | OUTPATIENT
Start: 2019-07-04 | End: 2019-07-04 | Stop reason: HOSPADM

## 2019-07-03 RX ORDER — INSULIN ASPART 100 [IU]/ML
0-5 INJECTION, SOLUTION INTRAVENOUS; SUBCUTANEOUS
Status: DISCONTINUED | OUTPATIENT
Start: 2019-07-03 | End: 2019-07-04 | Stop reason: HOSPADM

## 2019-07-03 RX ORDER — CETIRIZINE HYDROCHLORIDE 5 MG/1
5 TABLET ORAL DAILY
Status: DISCONTINUED | OUTPATIENT
Start: 2019-07-04 | End: 2019-07-04 | Stop reason: HOSPADM

## 2019-07-03 RX ORDER — OSELTAMIVIR PHOSPHATE 75 MG/1
75 CAPSULE ORAL 2 TIMES DAILY
Status: DISCONTINUED | OUTPATIENT
Start: 2019-07-03 | End: 2019-07-04 | Stop reason: HOSPADM

## 2019-07-03 RX ORDER — ACETAMINOPHEN 325 MG/1
650 TABLET ORAL
Status: COMPLETED | OUTPATIENT
Start: 2019-07-03 | End: 2019-07-03

## 2019-07-03 RX ADMIN — OSELTAMIVIR PHOSPHATE 75 MG: 75 CAPSULE ORAL at 06:07

## 2019-07-03 RX ADMIN — FUROSEMIDE 80 MG: 10 INJECTION, SOLUTION INTRAMUSCULAR; INTRAVENOUS at 06:07

## 2019-07-03 RX ADMIN — Medication 3 ML: at 11:07

## 2019-07-03 RX ADMIN — IPRATROPIUM BROMIDE AND ALBUTEROL SULFATE 3 ML: .5; 3 SOLUTION RESPIRATORY (INHALATION) at 04:07

## 2019-07-03 RX ADMIN — FUROSEMIDE 80 MG: 40 TABLET ORAL at 11:07

## 2019-07-03 RX ADMIN — OSELTAMIVIR PHOSPHATE 75 MG: 75 CAPSULE ORAL at 11:07

## 2019-07-03 RX ADMIN — INSULIN DETEMIR 23 UNITS: 100 INJECTION, SOLUTION SUBCUTANEOUS at 11:07

## 2019-07-03 RX ADMIN — MAGNESIUM OXIDE TAB 400 MG (241.3 MG ELEMENTAL MG) 400 MG: 400 (241.3 MG) TAB at 11:07

## 2019-07-03 RX ADMIN — AZELASTINE HYDROCHLORIDE 137 MCG: 137 SPRAY, METERED NASAL at 11:07

## 2019-07-03 RX ADMIN — ACETAMINOPHEN 650 MG: 325 TABLET ORAL at 04:07

## 2019-07-03 NOTE — PATIENT INSTRUCTIONS
General Instructions for Viral URI:    Below are suggestions for symptomatic relief:              -Tylenol every 4 hours OR ibuprofen every 6 hours as needed for pain/fever.   - Continue nebulizer treatments at home every 4hrs for wheezing and SOB.              -Salt water gargles to soothe throat pain.              -Chloroseptic spray also helps to numb throat pain.              -Nasal saline spray reduces inflammation and dryness.              -Warm face compresses to help with facial sinus pain/pressure.              -Vicks vapor rub at night.              -Flonase OTC or Nasacort OTC for nasal congestion.              -Simple foods like chicken noodle soup.              -Delsym helps with coughing at night              -Zyrtec/Claritin during the day & Benadryl at night may help with allergies.                If you DO NOT have Hypertension or any history of palpitations, it is ok to take over the counter Sudafed or Mucinex D or Allegra-D or Claritin-D or Zyrtec-D.  If you do take one of the above, it is ok to combine that with plain over the counter Mucinex or Allegra or Claritin or Zyrtec. If, for example, you are taking Zyrtec -D, you can combine that with Mucinex, but not Mucinex-D.  If you are taking Mucinex-D, you can combine that with plain Allegra or Claritin or Zyrtec.   If you DO have Hypertension or palpitations, it is safe to take Coricidin HBP for relief of sinus symptoms.     Please follow up with your primary care provider within 2-5 days if your signs and symptoms have not resolved or worsen.      If your condition worsens or fails to improve we recommend that you receive another evaluation at the emergency room immediately or contact your primary medical clinic to discuss your concerns.   You must understand that you have received an Urgent Care treatment only and that you may be released before all of your medical problems are known or treated. You, the patient, will arrange for follow up care  as instructed.       The Flu (Influenza)     The virus that causes the flu spreads through the air in droplets when someone who has the flu coughs, sneezes, laughs, or talks.   The flu (influenza) is an infection that affects your respiratory tract. This tract is made up of your mouth, nose, and lungs, and the passages between them. Unlike a cold, the flu can make you very ill. And it can lead to pneumonia, a serious lung infection. The flu can have serious complications and even cause death.  Who is at risk for the flu?  Anyone can get the flu. But you are more likely to become infected if you:  · Have a weakened immune system  · Work in a healthcare setting where you may be exposed to flu germs  · Live or work with someone who has the flu  · Havent had an annual flu shot  How does the flu spread?  The flu is caused by a virus. The virus spreads through the air in droplets when someone who has the flu coughs, sneezes, laughs, or talks. You can become infected when you inhale these viruses directly. You can also become infected when you touch a surface on which the droplets have landed and then transfer the germs to your eyes, nose, or mouth. Touching used tissues, or sharing utensils, drinking glasses, or a toothbrush from an infected person can expose you to flu viruses, too.  What are the symptoms of the flu?  Flu symptoms tend to come on quickly and may last a few days to a few weeks. They include:  · Fever usually higher than 100.4°F  (38°C) and chills  · Sore throat and headache  · Dry cough  · Runny nose  · Tiredness and weakness  · Muscle aches  Who is at risk for flu complications?  For some people, the flu can be very serious. The risk for complications is greater for:  · Children younger than age 5  · Adults ages 65 and older  · People with a chronic illness such as diabetes or heart, kidney, or lung disease  · People who live in a nursing home or long-term care facility   How is the flu treated?  The  flu usually gets better after 7 days or so. In some cases, your healthcare provider may prescribe an antiviral medicine. This may help you get well a little sooner. For the medicine to help, you need to take it as soon as possible (ideally within 48 hours) after your symptoms start. If you develop pneumonia or other serious illness, you may need to stay in the hospital.  Easing flu symptoms  · Drink lots of fluids such as water, juice, and warm soup. A good rule is to drink enough so that you urinate your normal amount.  · Get plenty of rest.  · Ask your healthcare provider what to take for fever and pain.  · Call your provider if your fever is 100.4°F (38°C) or higher, or you become dizzy, lightheaded, or short of breath.  Taking steps to protect others  · Wash your hands often, especially after coughing or sneezing. Or clean your hands with an alcohol-based hand  containing at least 60% alcohol.  · Cough or sneeze into a tissue. Then throw the tissue away and wash your hands. If you dont have a tissue, cough and sneeze into your elbow.  · Stay home until at least 24 hours after you no longer have a fever or chills. Be sure the fever isnt being hidden by fever-reducing medicine.  · Dont share food, utensils, drinking glasses, or a toothbrush with others.  · Ask your healthcare provider if others in your household should get antiviral medicine to help them avoid infection.  How can the flu be prevented?  · One of the best ways to avoid the flu is to get a flu vaccine each year. The virus that causes the flu changes from year to year. For that reason, healthcare providers recommend getting the flu vaccine each year, as soon as it's available in your area. The vaccine is given as a shot. Your healthcare provider can tell you which vaccine is right for you. A nasal spray is also available but is not recommended for the 4313-3714 flu season. The CDC says this is because the nasal spray did not seem to protect  against the flu over the last several flu seasons. In the past, it was meant for people ages 2 to 49.  · Wash your hands often. Frequent handwashing is a proven way to help prevent infection.  · Carry an alcohol-based hand gel containing at least 60% alcohol. Use it when you can't use soap and water. Then wash your hands as soon as you can.  · Avoid touching your eyes, nose, and mouth.  · At home and work, clean phones, computer keyboards, and toys often with disinfectant wipes.  · If possible, avoid close contact with others who have the flu or symptoms of the flu.  Handwashing tips  Handwashing is one of the best ways to prevent many common infections. If you are caring for or visiting someone with the flu, wash your hands each time you enter and leave the room. Follow these steps:  · Use warm water and plenty of soap. Rub your hands together well.  · Clean the whole hand, including under your nails, between your fingers, and up the wrists.  · Wash for at least 15 seconds.  · Rinse, letting the water run down your fingers, not up your wrists.  · Dry your hands well. Use a paper towel to turn off the faucet and open the door.  Using alcohol-based hand   Alcohol-based hand  are also a good choice. Use them when you can't use soap and water. Follow these steps:  · Squeeze about a tablespoon of gel into the palm of one hand.  · Rub your hands together briskly, cleaning the backs of your hands, the palms, between your fingers, and up the wrists.  · Rub until the gel is gone and your hands are completely dry.  Preventing the flu in healthcare settings  The flu is a special concern for people in hospitals and long-term care facilities. To help prevent the spread of flu, many hospitals and nursing homes take these steps:  · Healthcare providers wash their hands or use an alcohol-based hand  before and after treating each patient.  · People with the flu have private rooms and bathrooms or share a  room with someone with the same infection.  · People who are at high risk for the flu but don't have it are encouraged to get the flu and pneumonia vaccines.  · All healthcare workers are encouraged or required to get flu shots.   Date Last Reviewed: 12/1/2016  © 1738-2464 [employer]. [address] [place] [address], [place] [address]. All rights reserved. This information is not intended as a substitute for professional medical care. Always follow your healthcare professional's instructions.

## 2019-07-03 NOTE — ED PROVIDER NOTES
Encounter Date: 7/3/2019    SCRIBE #1 NOTE: I, Son Kaylan, am scribing for, and in the presence of,  Dr. Boyd . I have scribed the following portions of the note - the APC attestation.       History     Chief Complaint   Patient presents with    Shortness of Breath     Shortness of breath with pain under the left breast. Pt states she tested positive for the flu last night at a clinic.      Patient is a 48 year old female with PMHx of c-CHF with 30%EF, Afib on coumadin, cardiomyopathy, HTN, HLD, DM2, GERD, and COPD. She presents to the ED for SOB. Patient recently seen at urgent care and diagnosed with influenza. Patient reports having SOB for approximately two days. Reports associated dry cough and left rib pain, which is worse with coughing. Denies pain with deep inspiration. Reports last dose of tylenol yesterday at 10:00PM. Denies relief of symptoms with xopenex. Reports being unable to fill prescription of tamiflu. She denies fever,chills, nausea, vomiting, chest pain, abd pain, dysuria, diarrhea, or constipation. She is a non smoker and denies alcohol use.    The history is provided by the patient and medical records. No  was used.     Review of patient's allergies indicates:  No Known Allergies  Past Medical History:   Diagnosis Date    Anticoagulant long-term use     Arthritis     Asthma     Asthma     Atrial fibrillation     Cardiomyopathy     Cardiomyopathy     CHF (congestive heart failure)     CHF (congestive heart failure)     Chronic combined systolic and diastolic heart failure 6/3/2016    COPD (chronic obstructive pulmonary disease) 3/28/2018    GERD (gastroesophageal reflux disease)     H/O tubal ligation     Hypertension     Obesity     Renal disorder     Type 2 diabetes mellitus with hyperglycemia     Type 2 diabetes mellitus with polyneuropathy      Past Surgical History:   Procedure Laterality Date    CARDIAC DEFIBRILLATOR PLACEMENT      CARDIAC  DEFIBRILLATOR PLACEMENT      CARDIAC DEFIBRILLATOR PLACEMENT      CARDIOVERSION N/A 4/19/2016    [facility]    CHOLECYSTECTOMY      COLONOSCOPY N/A 5/2/2016    [facility] ENDO (2ND FLR)    ECHOCARDIOGRAM-TRANSESOPHAGEAL N/A 4/15/2016    [facility]    ESOPHAGOGASTRODUODENOSCOPY (EGD) N/A 11/17/2016    [facility] END[facility] (2ND FLR)    GALLBLADDER SURGERY      iabp  4/2016    INSERTION-ICD-BIVENTRICULAR Right 5/4/2017    [facility] CATH [facility]    TUBAL LIGATION       Family History   Problem Relation Age of Onset    Heart disease Mother     Heart disease Father      Social History     Tobacco Use    Smoking status: Never Smoker    Smokeless tobacco: Never Used   Substance Use Topics    Alcohol use: No    Drug use: No     Review of Systems   Constitutional: Negative for fever.   HENT: Negative for sore throat.    Respiratory: Positive for cough and shortness of breath.    Cardiovascular: Negative for chest pain.   Gastrointestinal: Negative for abdominal pain, nausea and vomiting.   Genitourinary: Negative for dysuria.   Musculoskeletal: Positive for arthralgias (rib pain). Negative for back pain.   Skin: Negative for rash.   Neurological: Negative for weakness.   Hematological: Does not bruise/bleed easily.       Physical Exam     Initial Vitals [07/03/19 1438]   BP Pulse Resp Temp SpO2   130/73 88 19 99.2 °F (37.3 °C) 98 %      MAP       --         Physical Exam    Vitals reviewed.  Constitutional: She appears well-developed and well-nourished. She is Obese . No distress.   HENT:   Head: Normocephalic.   Eyes: Conjunctivae are normal.   Neck: Normal range of motion.   Cardiovascular: Normal rate and regular rhythm.   No murmur heard.  Pulmonary/Chest: No respiratory distress. She has wheezes. She has rhonchi. She has no rales.   TTP over left lateral  chest wall.    Abdominal: Soft. Bowel sounds are normal. She exhibits no distension. There is no tenderness.   Musculoskeletal: Normal range of motion. She exhibits edema (trace).   Neurological: She is alert and oriented to person, place, and time.   Skin: Skin is warm and dry. No erythema.         ED Course   Procedures  Labs Reviewed   CBC W/ AUTO DIFFERENTIAL - Abnormal; Notable for the following components:       Result Value    Hemoglobin 11.7 (*)     Mean Corpuscular Hemoglobin 25.8 (*)     Mean Corpuscular Hemoglobin Conc 30.1 (*)     RDW 15.5 (*)     Mono% 16.6 (*)     All other components within normal limits   COMPREHENSIVE METABOLIC PANEL - Abnormal; Notable for the following components:    Sodium 135 (*)     Glucose 112 (*)     Creatinine 1.5 (*)     Anion Gap 7 (*)     eGFR if  46.8 (*)     eGFR if non  40.6 (*)     All other components within normal limits   TROPONIN I - Abnormal; Notable for the following components:    Troponin I 0.034 (*)     All other components within normal limits   B-TYPE NATRIURETIC PEPTIDE - Abnormal; Notable for the following components:     (*)     All other components within normal limits   PROTIME-INR - Abnormal; Notable for the following components:    Prothrombin Time 15.0 (*)     INR 1.5 (*)     All other components within normal limits   POCT GLUCOSE - Abnormal; Notable for the following components:    POCT Glucose 123 (*)     All other components within normal limits   LACTIC ACID, PLASMA   POCT GLUCOSE MONITORING CONTINUOUS          Imaging Results           X-Ray Chest PA And Lateral (Final result)  Result time 07/03/19 16:49:35    Final result by Amber Romero MD (07/03/19 16:49:35)                 Impression:      Persistently abnormal chest radiograph in this patient with cardiovascular disease and cephalization of pulmonary vascular distribution indicating elevated pulmonary venous pressures, but no flor pulmonary  edema.    The lobulated soft tissue nodule due occupying the posterior segment of the right upper lobe, well seen on prior chest CTs is not well seen on current or prior chest radiographs.  Features of this lesion suggest pulmonary AV malformation placing the patient at risk for paradoxical embolization.    This report was flagged in Epic as abnormal.      Electronically signed by: Amber Romero MD  Date:    07/03/2019  Time:    16:49             Narrative:    EXAMINATION:  XR CHEST PA AND LATERAL    CLINICAL HISTORY:  Asthma;    TECHNIQUE:  PA and lateral views of the chest were performed.    COMPARISON:  03/31/2019.  12/05/2018.  09/30/2018.  08/17/2018.  04/17/2018.    Chest CT: 06/04/2018.  11/21/2017.  05/04/2016.  04/26/2016.    FINDINGS:  AICD in the right anterior chest wall has transvenous leads that extend to the heart.  Generator and leads appear similar to 04/23/2019.  Please note that the generator obscures detail of the right mid hemithorax on current and prior studies.    X-ray beam attenuation and scatter occur in generous overlying soft tissues.  Soft tissues of the patient's arms project over lateral view obscuring some detail of the retrosternal airspace and mediastinal margins.    Cardiac silhouette is moderately enlarged, stable compared with 04/23/2019.  This suggests ventricular dilatation, pericardial fluid or both.    There is mild cephalization of flow compatible with elevated pulmonary venous pressures.  On today's study I detect no flor pulmonary edema.    The patient has a known lobulated soft tissue lesion occupying the posterior segment of the right upper lobe, incompletely visualized on current and recent chest radiographs due to the overlying generator.  On today's study it projects over the anterior aspect of the 2nd rib at the level of the 6th intercostal space.  This lesion is present on earlier studies dating back to at least 04/26/2016 and is also well seen on the intervening  chest CTs of 11/21/2017 and 06/04/2018.  On prior CT examinations the lesion measured up to 2.5 cm.  Differential diagnosis includes indolent neoplasm, mucoid impaction in ectatic subsegmental airways, or vascular malformation.  CT performed 06/04/2018 strongly suggests the presence of feeding and draining vessels, concerning for AV malformation placing the patient at risk for paradoxical embolization.    I detect no other pulmonary disease and no pleural fluid, lymph node enlargement, pneumothorax, pneumomediastinum, pneumoperitoneum or significant osseous abnormality.                                 Medical Decision Making:   History:   Old Medical Records: I decided to obtain old medical records.  Clinical Tests:   Lab Tests: Ordered and Reviewed  Radiological Study: Ordered and Reviewed  Medical Tests: Ordered and Reviewed  Other:   I have discussed this case with another health care provider.       <> Summary of the Discussion: Case discussed with hospital medicine for admission.        APC / Resident Notes:   Patient is a 48 year old female presents to the ED for emergent evaluation of SOB.     Will order labs and imaging. Will order nebulizer and tylenol for symptomatic relief. Will continue to monitor.     Differential diagnoses include, but are not limited to: pneumonia, CHF exacerbation, bronchitis, cardiac arrhythmia, or electrolyte imbalance.     No leukocytosis. Hemodynamically stable. Chronically elevated troponin 0.034. Minimally elevated . Subtherapeutic INR 1.5. Patient reports INR checked yesterday as was 1.9. Lactate WNL. CXR found to have cephalization of pulmonary vascular distribution indicating elevated pulmonary venous pressures, but no flor pulmonary edema.    Patient reassessed, patient ambulated throughout RWR and desat to 90% on room air with  bpm. Will admit to medicine for observation.     I have discussed and reviewed with my supervising physician.       Ayesha  Attestation:   Scribe #1: I performed the above scribed service and the documentation accurately describes the services I performed. I attest to the accuracy of the note.    Attending Attestation:     Physician Attestation Statement for NP/PA:   I discussed this assessment and plan of this patient with the NP/PA, but I did not personally examine the patient. The face to face encounter was performed by the NP/PA.            Clinical Impression:       ICD-10-CM ICD-9-CM   1. Influenza B J10.1 487.1   2. Shortness of breath R06.02 786.05   3. Subtherapeutic international normalized ratio (INR) R79.1 790.92         Disposition:   Disposition: Placed in Observation  Condition: Raudel Humphries PA-C  07/04/19 0036

## 2019-07-03 NOTE — PROGRESS NOTES
"Subjective:       Patient ID: Laure Ross is a 48 y.o. female.    Vitals:  height is 5' 5" (1.651 m) and weight is 117.9 kg (260 lb). Her temperature is 100.6 °F (38.1 °C) (abnormal). Her blood pressure is 136/90 (abnormal) and her pulse is 102. Her respiration is 18 and oxygen saturation is 97%.     Chief Complaint: Influenza    Pt c/o flu like symptoms and also achy LUQ pain. And her right shoulder hurts from unknown reasons, denies trauma to it but thinks that she might have slept on it wrong.  States that she is having a constant dry cough. States that all her grandchildren are currently sick.  States that she has xopenex at home and uses her neb and inhalers often.    Influenza   This is a new problem. The current episode started yesterday. Associated symptoms include coughing and a fever. Pertinent negatives include no arthralgias, chest pain, chills, congestion, fatigue, headaches, joint swelling, myalgias, nausea, rash, sore throat, vertigo, vomiting or weakness. Associated symptoms comments: Body aches. She has tried acetaminophen (tesslon pearls, Flonase, breathing treatments) for the symptoms. The treatment provided no relief.       Constitution: Positive for fever. Negative for chills and fatigue.   HENT: Negative for congestion and sore throat.    Neck: Negative for painful lymph nodes.   Cardiovascular: Negative for chest pain and leg swelling.   Eyes: Negative for double vision and blurred vision.   Respiratory: Positive for cough. Negative for shortness of breath.    Gastrointestinal: Negative for nausea, vomiting and diarrhea.   Genitourinary: Negative for dysuria, frequency, urgency and history of kidney stones.   Musculoskeletal: Negative for joint pain, joint swelling, muscle cramps and muscle ache.   Skin: Negative for color change, pale, rash and bruising.   Allergic/Immunologic: Negative for seasonal allergies.   Neurological: Negative for dizziness, history of vertigo, " light-headedness, passing out and headaches.   Hematologic/Lymphatic: Negative for swollen lymph nodes.   Psychiatric/Behavioral: Negative for nervous/anxious, sleep disturbance and depression. The patient is not nervous/anxious.        Objective:      Physical Exam   Constitutional: She is oriented to person, place, and time. She appears well-developed and well-nourished. She is cooperative.  Non-toxic appearance. She does not appear ill. No distress.   HENT:   Head: Normocephalic and atraumatic.   Right Ear: Hearing, external ear and ear canal normal. No swelling or tenderness. Tympanic membrane is not erythematous and not bulging. No middle ear effusion.   Left Ear: Hearing, tympanic membrane, external ear and ear canal normal. No swelling or tenderness. Tympanic membrane is not erythematous and not bulging.  No middle ear effusion.   Nose: Mucosal edema present. No rhinorrhea or nasal deformity. No epistaxis. Right sinus exhibits maxillary sinus tenderness and frontal sinus tenderness. Left sinus exhibits maxillary sinus tenderness and frontal sinus tenderness.   Mouth/Throat: Uvula is midline and mucous membranes are normal. No trismus in the jaw. Normal dentition. No uvula swelling. Posterior oropharyngeal erythema present. Tonsils are 2+ on the right. Tonsils are 2+ on the left.       Eyes: Conjunctivae and lids are normal. No scleral icterus.   Sclera clear bilat   Neck: Trachea normal, full passive range of motion without pain and phonation normal. Neck supple.   Cardiovascular: Normal rate, regular rhythm, normal heart sounds, intact distal pulses and normal pulses.   Pulmonary/Chest: Effort normal. No respiratory distress. She has no decreased breath sounds. She has wheezes. She has no rhonchi.   Abdominal: Soft. Normal appearance and bowel sounds are normal. She exhibits no distension. There is no tenderness.   Musculoskeletal: Normal range of motion. She exhibits no edema or deformity.    Lymphadenopathy:        Head (right side): Tonsillar adenopathy present. No submental, no submandibular, no preauricular and no posterior auricular adenopathy present.        Head (left side): Tonsillar adenopathy present. No submental, no submandibular, no preauricular and no posterior auricular adenopathy present.     She has cervical adenopathy.        Right cervical: Superficial cervical adenopathy present. No deep cervical adenopathy present.       Left cervical: Superficial cervical adenopathy present. No deep cervical adenopathy present.   Neurological: She is alert and oriented to person, place, and time. She exhibits normal muscle tone. Coordination normal.   Skin: Skin is warm, dry and intact. She is not diaphoretic. No pallor.   Psychiatric: She has a normal mood and affect. Her speech is normal and behavior is normal. Judgment and thought content normal. Cognition and memory are normal.   Nursing note and vitals reviewed.        Recent Results (from the past 48 hour(s))   POCT INR    Collection Time: 07/01/19 11:35 AM   Result Value Ref Range    INR 1.9    POCT Influenza A/B    Collection Time: 07/02/19  8:00 PM   Result Value Ref Range    Rapid Influenza A Ag Negative Negative    Rapid Influenza B Ag Positive (A) Negative     Acceptable Yes    ]    Assessment:       1. Influenza B    2. Fever, unspecified fever cause    3. Bilateral wheezing        Plan:         Influenza B  -     oseltamivir (TAMIFLU) 75 MG capsule; Take 1 capsule (75 mg total) by mouth 2 (two) times daily. for 5 days  Dispense: 10 capsule; Refill: 0    Fever, unspecified fever cause  -     POCT Influenza A/B    Bilateral wheezing  -     levalbuterol nebulizer solution 1.25 mg  -     ipratropium 0.02 % nebulizer solution 0.5 mg      Patient Instructions     General Instructions for Viral URI:    Below are suggestions for symptomatic relief:              -Tylenol every 4 hours OR ibuprofen every 6 hours as needed for  pain/fever.   - Continue nebulizer treatments at home every 4hrs for wheezing and SOB.              -Salt water gargles to soothe throat pain.              -Chloroseptic spray also helps to numb throat pain.              -Nasal saline spray reduces inflammation and dryness.              -Warm face compresses to help with facial sinus pain/pressure.              -Vicks vapor rub at night.              -Flonase OTC or Nasacort OTC for nasal congestion.              -Simple foods like chicken noodle soup.              -Delsym helps with coughing at night              -Zyrtec/Claritin during the day & Benadryl at night may help with allergies.                If you DO NOT have Hypertension or any history of palpitations, it is ok to take over the counter Sudafed or Mucinex D or Allegra-D or Claritin-D or Zyrtec-D.  If you do take one of the above, it is ok to combine that with plain over the counter Mucinex or Allegra or Claritin or Zyrtec. If, for example, you are taking Zyrtec -D, you can combine that with Mucinex, but not Mucinex-D.  If you are taking Mucinex-D, you can combine that with plain Allegra or Claritin or Zyrtec.   If you DO have Hypertension or palpitations, it is safe to take Coricidin HBP for relief of sinus symptoms.     Please follow up with your primary care provider within 2-5 days if your signs and symptoms have not resolved or worsen.      If your condition worsens or fails to improve we recommend that you receive another evaluation at the emergency room immediately or contact your primary medical clinic to discuss your concerns.   You must understand that you have received an Urgent Care treatment only and that you may be released before all of your medical problems are known or treated. You, the patient, will arrange for follow up care as instructed.       The Flu (Influenza)     The virus that causes the flu spreads through the air in droplets when someone who has the flu coughs, sneezes,  laughs, or talks.   The flu (influenza) is an infection that affects your respiratory tract. This tract is made up of your mouth, nose, and lungs, and the passages between them. Unlike a cold, the flu can make you very ill. And it can lead to pneumonia, a serious lung infection. The flu can have serious complications and even cause death.  Who is at risk for the flu?  Anyone can get the flu. But you are more likely to become infected if you:  · Have a weakened immune system  · Work in a healthcare setting where you may be exposed to flu germs  · Live or work with someone who has the flu  · Havent had an annual flu shot  How does the flu spread?  The flu is caused by a virus. The virus spreads through the air in droplets when someone who has the flu coughs, sneezes, laughs, or talks. You can become infected when you inhale these viruses directly. You can also become infected when you touch a surface on which the droplets have landed and then transfer the germs to your eyes, nose, or mouth. Touching used tissues, or sharing utensils, drinking glasses, or a toothbrush from an infected person can expose you to flu viruses, too.  What are the symptoms of the flu?  Flu symptoms tend to come on quickly and may last a few days to a few weeks. They include:  · Fever usually higher than 100.4°F  (38°C) and chills  · Sore throat and headache  · Dry cough  · Runny nose  · Tiredness and weakness  · Muscle aches  Who is at risk for flu complications?  For some people, the flu can be very serious. The risk for complications is greater for:  · Children younger than age 5  · Adults ages 65 and older  · People with a chronic illness such as diabetes or heart, kidney, or lung disease  · People who live in a nursing home or long-term care facility   How is the flu treated?  The flu usually gets better after 7 days or so. In some cases, your healthcare provider may prescribe an antiviral medicine. This may help you get well a little  sooner. For the medicine to help, you need to take it as soon as possible (ideally within 48 hours) after your symptoms start. If you develop pneumonia or other serious illness, you may need to stay in the hospital.  Easing flu symptoms  · Drink lots of fluids such as water, juice, and warm soup. A good rule is to drink enough so that you urinate your normal amount.  · Get plenty of rest.  · Ask your healthcare provider what to take for fever and pain.  · Call your provider if your fever is 100.4°F (38°C) or higher, or you become dizzy, lightheaded, or short of breath.  Taking steps to protect others  · Wash your hands often, especially after coughing or sneezing. Or clean your hands with an alcohol-based hand  containing at least 60% alcohol.  · Cough or sneeze into a tissue. Then throw the tissue away and wash your hands. If you dont have a tissue, cough and sneeze into your elbow.  · Stay home until at least 24 hours after you no longer have a fever or chills. Be sure the fever isnt being hidden by fever-reducing medicine.  · Dont share food, utensils, drinking glasses, or a toothbrush with others.  · Ask your healthcare provider if others in your household should get antiviral medicine to help them avoid infection.  How can the flu be prevented?  · One of the best ways to avoid the flu is to get a flu vaccine each year. The virus that causes the flu changes from year to year. For that reason, healthcare providers recommend getting the flu vaccine each year, as soon as it's available in your area. The vaccine is given as a shot. Your healthcare provider can tell you which vaccine is right for you. A nasal spray is also available but is not recommended for the 9539-8546 flu season. The CDC says this is because the nasal spray did not seem to protect against the flu over the last several flu seasons. In the past, it was meant for people ages 2 to 49.  · Wash your hands often. Frequent handwashing is a  proven way to help prevent infection.  · Carry an alcohol-based hand gel containing at least 60% alcohol. Use it when you can't use soap and water. Then wash your hands as soon as you can.  · Avoid touching your eyes, nose, and mouth.  · At home and work, clean phones, computer keyboards, and toys often with disinfectant wipes.  · If possible, avoid close contact with others who have the flu or symptoms of the flu.  Handwashing tips  Handwashing is one of the best ways to prevent many common infections. If you are caring for or visiting someone with the flu, wash your hands each time you enter and leave the room. Follow these steps:  · Use warm water and plenty of soap. Rub your hands together well.  · Clean the whole hand, including under your nails, between your fingers, and up the wrists.  · Wash for at least 15 seconds.  · Rinse, letting the water run down your fingers, not up your wrists.  · Dry your hands well. Use a paper towel to turn off the faucet and open the door.  Using alcohol-based hand   Alcohol-based hand  are also a good choice. Use them when you can't use soap and water. Follow these steps:  · Squeeze about a tablespoon of gel into the palm of one hand.  · Rub your hands together briskly, cleaning the backs of your hands, the palms, between your fingers, and up the wrists.  · Rub until the gel is gone and your hands are completely dry.  Preventing the flu in healthcare settings  The flu is a special concern for people in hospitals and long-term care facilities. To help prevent the spread of flu, many hospitals and nursing homes take these steps:  · Healthcare providers wash their hands or use an alcohol-based hand  before and after treating each patient.  · People with the flu have private rooms and bathrooms or share a room with someone with the same infection.  · People who are at high risk for the flu but don't have it are encouraged to get the flu and pneumonia  vaccines.  · All healthcare workers are encouraged or required to get flu shots.   Date Last Reviewed: 12/1/2016  © 0770-1586 [employer]. [address] [place] [address], [place] [address]. All rights reserved. This information is not intended as a substitute for professional medical care. Always follow your healthcare professional's instructions.

## 2019-07-03 NOTE — PROVIDER PROGRESS NOTES - EMERGENCY DEPT.
Encounter Date: 7/3/2019    ED Physician Progress Notes         EKG - STEMI Decision  Initial Reading: No STEMI present.    I, Marylu Funez, am scribing for, and in the presence of, Dr. Boyd. I performed the above scribed service and the documentation accurately describes the services I performed. I attest to the accuracy of the note.

## 2019-07-03 NOTE — ED TRIAGE NOTES
Pt arrived POV with  from home. Pt reports having SOB and left sided rib pain. Pt was diagnosed with the flu last night at urgent care. Pt has not been able to get Tamiflu due to pharmacy not having it. Pt getting it filled today at [facility]. Rates left sided rib pain 9/10. + chills and fever with generalized body aches. Symptoms have been since Monday. No emesis, no diarrhea.

## 2019-07-04 VITALS
BODY MASS INDEX: 43.6 KG/M2 | RESPIRATION RATE: 18 BRPM | OXYGEN SATURATION: 96 % | HEART RATE: 67 BPM | TEMPERATURE: 98 F | SYSTOLIC BLOOD PRESSURE: 125 MMHG | DIASTOLIC BLOOD PRESSURE: 80 MMHG | HEIGHT: 65 IN | WEIGHT: 261.69 LBS

## 2019-07-04 LAB
ALBUMIN SERPL BCP-MCNC: 3.4 G/DL (ref 3.5–5.2)
ALP SERPL-CCNC: 64 U/L (ref 55–135)
ALT SERPL W/O P-5'-P-CCNC: 19 U/L (ref 10–44)
ANION GAP SERPL CALC-SCNC: 11 MMOL/L (ref 8–16)
AST SERPL-CCNC: 21 U/L (ref 10–40)
BASOPHILS # BLD AUTO: 0.03 K/UL (ref 0–0.2)
BASOPHILS NFR BLD: 0.6 % (ref 0–1.9)
BILIRUB SERPL-MCNC: 0.3 MG/DL (ref 0.1–1)
BUN SERPL-MCNC: 19 MG/DL (ref 6–20)
CALCIUM SERPL-MCNC: 8.7 MG/DL (ref 8.7–10.5)
CHLORIDE SERPL-SCNC: 102 MMOL/L (ref 95–110)
CO2 SERPL-SCNC: 24 MMOL/L (ref 23–29)
CREAT SERPL-MCNC: 1.5 MG/DL (ref 0.5–1.4)
DIFFERENTIAL METHOD: ABNORMAL
EOSINOPHIL # BLD AUTO: 0.1 K/UL (ref 0–0.5)
EOSINOPHIL NFR BLD: 1.6 % (ref 0–8)
ERYTHROCYTE [DISTWIDTH] IN BLOOD BY AUTOMATED COUNT: 15.5 % (ref 11.5–14.5)
EST. GFR  (AFRICAN AMERICAN): 46.8 ML/MIN/1.73 M^2
EST. GFR  (NON AFRICAN AMERICAN): 40.6 ML/MIN/1.73 M^2
GLUCOSE SERPL-MCNC: 162 MG/DL (ref 70–110)
HCT VFR BLD AUTO: 37.5 % (ref 37–48.5)
HGB BLD-MCNC: 11.3 G/DL (ref 12–16)
IMM GRANULOCYTES # BLD AUTO: 0.02 K/UL (ref 0–0.04)
IMM GRANULOCYTES NFR BLD AUTO: 0.4 % (ref 0–0.5)
INR PPP: 1.6 (ref 0.8–1.2)
LYMPHOCYTES # BLD AUTO: 1.4 K/UL (ref 1–4.8)
LYMPHOCYTES NFR BLD: 26.8 % (ref 18–48)
MAGNESIUM SERPL-MCNC: 2 MG/DL (ref 1.6–2.6)
MCH RBC QN AUTO: 25.9 PG (ref 27–31)
MCHC RBC AUTO-ENTMCNC: 30.1 G/DL (ref 32–36)
MCV RBC AUTO: 86 FL (ref 82–98)
MONOCYTES # BLD AUTO: 1.2 K/UL (ref 0.3–1)
MONOCYTES NFR BLD: 23.7 % (ref 4–15)
NEUTROPHILS # BLD AUTO: 2.4 K/UL (ref 1.8–7.7)
NEUTROPHILS NFR BLD: 46.9 % (ref 38–73)
NRBC BLD-RTO: 0 /100 WBC
PHOSPHATE SERPL-MCNC: 4.7 MG/DL (ref 2.7–4.5)
PLATELET # BLD AUTO: 252 K/UL (ref 150–350)
PMV BLD AUTO: 9.8 FL (ref 9.2–12.9)
POCT GLUCOSE: 170 MG/DL (ref 70–110)
POCT GLUCOSE: 191 MG/DL (ref 70–110)
POTASSIUM SERPL-SCNC: 4.4 MMOL/L (ref 3.5–5.1)
PROT SERPL-MCNC: 6.4 G/DL (ref 6–8.4)
PROTHROMBIN TIME: 15.6 SEC (ref 9–12.5)
RBC # BLD AUTO: 4.37 M/UL (ref 4–5.4)
SODIUM SERPL-SCNC: 137 MMOL/L (ref 136–145)
TROPONIN I SERPL DL<=0.01 NG/ML-MCNC: 0.05 NG/ML (ref 0–0.03)
WBC # BLD AUTO: 5.15 K/UL (ref 3.9–12.7)

## 2019-07-04 PROCEDURE — 80053 COMPREHEN METABOLIC PANEL: CPT

## 2019-07-04 PROCEDURE — 83735 ASSAY OF MAGNESIUM: CPT

## 2019-07-04 PROCEDURE — 63600175 PHARM REV CODE 636 W HCPCS: Performed by: NURSE PRACTITIONER

## 2019-07-04 PROCEDURE — 94660 CPAP INITIATION&MGMT: CPT

## 2019-07-04 PROCEDURE — 36415 COLL VENOUS BLD VENIPUNCTURE: CPT

## 2019-07-04 PROCEDURE — 85610 PROTHROMBIN TIME: CPT

## 2019-07-04 PROCEDURE — 94640 AIRWAY INHALATION TREATMENT: CPT

## 2019-07-04 PROCEDURE — 27000190 HC CPAP FULL FACE MASK W/VALVE

## 2019-07-04 PROCEDURE — 99226 PR SUBSEQUENT OBSERVATION CARE,LEVEL III: CPT | Mod: ,,, | Performed by: PHYSICIAN ASSISTANT

## 2019-07-04 PROCEDURE — A4216 STERILE WATER/SALINE, 10 ML: HCPCS | Performed by: NURSE PRACTITIONER

## 2019-07-04 PROCEDURE — 84100 ASSAY OF PHOSPHORUS: CPT

## 2019-07-04 PROCEDURE — 84484 ASSAY OF TROPONIN QUANT: CPT

## 2019-07-04 PROCEDURE — 25000242 PHARM REV CODE 250 ALT 637 W/ HCPCS: Performed by: NURSE PRACTITIONER

## 2019-07-04 PROCEDURE — S5571 INSULIN DISPOS PEN 3 ML: HCPCS | Performed by: NURSE PRACTITIONER

## 2019-07-04 PROCEDURE — G0378 HOSPITAL OBSERVATION PER HR: HCPCS

## 2019-07-04 PROCEDURE — 85025 COMPLETE CBC W/AUTO DIFF WBC: CPT

## 2019-07-04 PROCEDURE — 99900035 HC TECH TIME PER 15 MIN (STAT)

## 2019-07-04 PROCEDURE — 94761 N-INVAS EAR/PLS OXIMETRY MLT: CPT

## 2019-07-04 PROCEDURE — 99226 PR SUBSEQUENT OBSERVATION CARE,LEVEL III: ICD-10-PCS | Mod: ,,, | Performed by: PHYSICIAN ASSISTANT

## 2019-07-04 PROCEDURE — 25000003 PHARM REV CODE 250: Performed by: NURSE PRACTITIONER

## 2019-07-04 RX ORDER — OSELTAMIVIR PHOSPHATE 75 MG/1
75 CAPSULE ORAL 2 TIMES DAILY
Qty: 10 CAPSULE | Refills: 0 | Status: SHIPPED | OUTPATIENT
Start: 2019-07-04 | End: 2019-07-09

## 2019-07-04 RX ORDER — PREDNISONE 50 MG/1
50 TABLET ORAL DAILY
Qty: 3 TABLET | Refills: 0 | Status: SHIPPED | OUTPATIENT
Start: 2019-07-04 | End: 2019-07-07

## 2019-07-04 RX ADMIN — ALLOPURINOL 100 MG: 100 TABLET ORAL at 10:07

## 2019-07-04 RX ADMIN — FUROSEMIDE 80 MG: 40 TABLET ORAL at 10:07

## 2019-07-04 RX ADMIN — AZELASTINE HYDROCHLORIDE 137 MCG: 137 SPRAY, METERED NASAL at 10:07

## 2019-07-04 RX ADMIN — POTASSIUM CHLORIDE 20 MEQ: 750 CAPSULE, EXTENDED RELEASE ORAL at 10:07

## 2019-07-04 RX ADMIN — LEVALBUTEROL HYDROCHLORIDE 0.63 MG: 0.63 SOLUTION RESPIRATORY (INHALATION) at 07:07

## 2019-07-04 RX ADMIN — ATORVASTATIN CALCIUM 20 MG: 20 TABLET, FILM COATED ORAL at 10:07

## 2019-07-04 RX ADMIN — FLUTICASONE PROPIONATE 50 MCG: 50 SPRAY, METERED NASAL at 10:07

## 2019-07-04 RX ADMIN — WARFARIN SODIUM 3.75 MG: 2.5 TABLET ORAL at 01:07

## 2019-07-04 RX ADMIN — DIGOXIN 0.12 MG: 125 TABLET ORAL at 10:07

## 2019-07-04 RX ADMIN — Medication 3 ML: at 05:07

## 2019-07-04 RX ADMIN — SPIRONOLACTONE 25 MG: 25 TABLET, FILM COATED ORAL at 10:07

## 2019-07-04 RX ADMIN — MONTELUKAST 10 MG: 10 TABLET, FILM COATED ORAL at 10:07

## 2019-07-04 RX ADMIN — INSULIN ASPART 11 UNITS: 100 INJECTION, SOLUTION INTRAVENOUS; SUBCUTANEOUS at 11:07

## 2019-07-04 RX ADMIN — AZELASTINE HYDROCHLORIDE 274 MCG: 137 SPRAY, METERED NASAL at 10:07

## 2019-07-04 RX ADMIN — MAGNESIUM OXIDE TAB 400 MG (241.3 MG ELEMENTAL MG) 400 MG: 400 (241.3 MG) TAB at 10:07

## 2019-07-04 RX ADMIN — OSELTAMIVIR PHOSPHATE 75 MG: 75 CAPSULE ORAL at 10:07

## 2019-07-04 RX ADMIN — CETIRIZINE HYDROCHLORIDE 5 MG: 5 TABLET ORAL at 10:07

## 2019-07-04 RX ADMIN — FLUTICASONE FUROATE AND VILANTEROL TRIFENATATE 1 PUFF: 100; 25 POWDER RESPIRATORY (INHALATION) at 10:07

## 2019-07-04 RX ADMIN — LEVALBUTEROL HYDROCHLORIDE 0.63 MG: 0.63 SOLUTION RESPIRATORY (INHALATION) at 01:07

## 2019-07-04 RX ADMIN — LISINOPRIL 5 MG: 5 TABLET ORAL at 10:07

## 2019-07-04 RX ADMIN — INSULIN DETEMIR 23 UNITS: 100 INJECTION, SOLUTION SUBCUTANEOUS at 10:07

## 2019-07-04 NOTE — ASSESSMENT & PLAN NOTE
- patient does not appear grossly fluid overloaded; patient also expresses she feels her symptoms are asthmatic>CHF  -  (baseline)  - troponin 0.034 (baseline); will check second to ensure flat  - lasix 80mg IVP given in ED; will evaluate in AM if addition IV dosing needed  - continue lasix and spironolactone   - Previous echo 03/20/19 severe left ventricular enlargement, EF 30%, grade II DD, left artrial enlargement

## 2019-07-04 NOTE — PLAN OF CARE
Problem: Adult Inpatient Plan of Care  Goal: Plan of Care Review  Outcome: Ongoing (interventions implemented as appropriate)  Patient  AAOX4 vital signs stable. Safety and infection precautions maintained. Patient is comfortable at this time,bed is locked and in a low position with call light in reach.

## 2019-07-04 NOTE — H&P
Please see progress note 7/3 by Brittney Ruvalcaba MD  [facility]  Cell:  [phone]  Spectra:  [id number]  Pager:  [phone]

## 2019-07-04 NOTE — PROGRESS NOTES
[facility] Hospital Medicine  Progress Note    Patient Name: Laure Ross  MRN: [id number]  Patient Class: OP- Observation   Admission Date: 7/3/2019  Length of Stay: 0 days  Attending Physician: Naya Ruvalcaba MD  Primary Care Provider: Holly Mittal MD    [facility] Medicine Team: [facility] HOSP MED F Brittney Kevin NP    Subjective:     Principal Problem:Asthma exacerbation      HPI:  Patient, PMHx of HFrEF 30%; AICD; a fib on coumadin; cardiomyophaty, HTN, DMII, GERD, asthma/COPD, is admitted to hospital medicine for influenza B. Patient was seen in clinic and diagnosed with influenza yesterday. Patient states she has been short of breath for since Sunday. Patient feels her asthma has been exacerbated by the viral infection. Patient states she has a fever last night. Patient denies sputum production, but states she has a dry cough. Denies abominal pain, nausea, vomiting. Of note, patient does have left sided chest pain, musculoskeletal in nature, worse with coughing, which was what prompted her to be seen in ED.     In ED: CXR concerning for lobulated soft tissue nodule due occupying the posterior segment of the right upper lobe suggestive of pulmonary AV malformation, no flor pulmonary edema; Lasix 80mg and oseltamivir initiated; Tmax 100.6; lactate 1.4; no leukocytosis; trop 0.034 (baseline);  (baseline); creat 1.5 (baseline), coumadin 1.5; EKG paced rhythm     Overview/Hospital Course:  No notes on file    Past Medical History:   Diagnosis Date    Anticoagulant long-term use     Arthritis     Asthma     Asthma     Atrial fibrillation     Cardiomyopathy     Cardiomyopathy     CHF (congestive heart failure)     CHF (congestive heart failure)     Chronic combined systolic and diastolic heart failure 6/3/2016    COPD (chronic obstructive pulmonary disease) 3/28/2018    GERD (gastroesophageal reflux disease)     H/O tubal ligation     Hypertension     Obesity      Renal disorder     Type 2 diabetes mellitus with hyperglycemia     Type 2 diabetes mellitus with polyneuropathy        Past Surgical History:   Procedure Laterality Date    CARDIAC DEFIBRILLATOR PLACEMENT      CARDIAC DEFIBRILLATOR PLACEMENT      CARDIAC DEFIBRILLATOR PLACEMENT      CARDIOVERSION N/A 4/19/2016    Performed by Navi Jolly MD at [facility] CATH LAB    CHOLECYSTECTOMY      COLONOSCOPY N/A 5/2/2016    Performed by Eugene Soto MD at [facility] ENDO (2ND FLR)    ECHOCARDIOGRAM-TRANSESOPHAGEAL N/A 4/15/2016    Performed by Rossana Surgeon at [facility] ROSSANA    ESOPHAGOGASTRODUODENOSCOPY (EGD) N/A 11/17/2016    Performed by Joe Magana MD at [facility] ENDO (2ND FLR)    GALLBLADDER SURGERY      iabp  4/2016    INSERTION-ICD-BIVENTRICULAR Right 5/4/2017    Performed by Navi Jolly MD at [facility] CATH LAB    TUBAL LIGATION         Review of patient's allergies indicates:  No Known Allergies    No current facility-administered medications on file prior to encounter.      Current Outpatient Medications on File Prior to Encounter   Medication Sig    allopurinol (ZYLOPRIM) 100 MG tablet TAKE 1 TABLET BY MOUTH EVERY DAY    atorvastatin (LIPITOR) 20 MG tablet Take 1 tablet (20 mg total) by mouth once daily.    budesonide-formoterol 160-4.5 mcg (SYMBICORT) 160-4.5 mcg/actuation HFAA INHALE 2 PUFFS BY MOUTH TWICE DAILY. RINSE MOUTH AFTER USE.    cetirizine (ZYRTEC) 10 MG tablet TAKE 1 TABLET AT BEDTIME for allergy relief    cyclobenzaprine (FEXMID) 7.5 MG Tab TAKE 1 TABLET 3 TIMES DAILY AS NEEDED FOR MUSCLE SPASM.    dicyclomine (BENTYL) 10 MG capsule TAKE 1 CAPSULE 4 TIMES DAILY as needed for abdominal pain    digoxin (LANOXIN) 125 mcg tablet TAKE 1 TABLET BY MOUTH ONCE DAILY    dulaglutide (TRULICITY) 0.75 mg/0.5 mL PnIj Inject 0.5 mLs (0.75 mg total) into the skin every 7 days.    dupilumab (DUPIXENT) 300 mg/2 mL Syrg Inject 600mg into the skin for 1 dose, then inject 300mg into the skin every 2  weeks.    dupilumab (DUPIXENT) 300 mg/2 mL Syrg Inject 2 mLs (300 mg total) into the skin every 14 (fourteen) days.    ergocalciferol (ERGOCALCIFEROL) 50,000 unit Cap TAKE 1 CAPSULE BY MOUTH WEEKLY.    famotidine (PEPCID) 40 MG tablet TAKE 1 TABLET BY MOUTH TWICE DAILY.    furosemide (LASIX) 40 MG tablet Take 2 tablets (80 mg total) by mouth 2 (two) times daily.    insulin (BASAGLAR KWIKPEN U-100 INSULIN) glargine 100 units/mL (3mL) SubQ pen inject 34 under the skin in the morning and 34 units at night    insulin aspart U-100 (NOVOLOG) 100 unit/mL (3 mL) InPn pen Inject 16 units into the skin with meals plus scale    levalbuterol (XOPENEX HFA) 45 mcg/actuation inhaler INHALE 1 TO 2 PUFFS EVERY 4 TO 6 HOURS AS NEEDED    levalbuterol (XOPENEX) 0.31 mg/3 mL nebulizer solution Take 3 mLs (0.31 mg total) by nebulization every 4 (four) hours as needed for Wheezing. Rescue    linaCLOtide (LINZESS) 72 mcg Cap capsule Take one tablet by mouth daily as needed for constipation    linaclotide 72 mcg Cap take one tablet daily as needed for constipation    lisinopril (PRINIVIL,ZESTRIL) 5 MG tablet TAKE 1 TABLET BY MOUTH TWO TIMES A DAY    magnesium oxide (MAG-OX) 400 mg (241.3 mg magnesium) tablet TAKE 1 TABLET BY MOUTH TWICE DAILY    montelukast (SINGULAIR) 10 mg tablet TAKE 1 TABLET BY MOUTH DAILY.    potassium chloride (MICRO-K) 10 MEQ CpSR Take 2 capsules (20 mEq total) by mouth once daily.    spironolactone (ALDACTONE) 25 MG tablet Take 1 tablet (25 mg total) by mouth once daily.    warfarin (COUMADIN) 7.5 MG tablet Take ½ tablet by mouth once daily and 1 tablet on thursday (Patient taking differently: Coumadin dose 7.5 mg on Monday and  1/2 tab rest of days)    acetaminophen (TYLENOL) 650 MG TbSR TAKE 1 TABLET every four hours AS NEEDED for shoulder pain    azelastine (ASTELIN) 137 mcg (0.1 %) nasal spray 2 sprays (274 mcg total) by Nasal route 2 (two) times daily.    azelastine (ASTELIN) 137 mcg (0.1 %)  "nasal spray Use 1 to 2 sprays per nostril twice daily as needed. Us in addition to our nasal steroid spray    baclofen (LIORESAL) 10 MG tablet TAKE 1 TABLET BY MOUTH THREE TIMES DAILY AS NEEDED FOR MUSCLE SPASM    BD ULTRA-FINE ESSENCE PEN NEEDLES 32 gauge x 5/32" Ndle Takes 5 times  day    blood sugar diagnostic Strp Uses 4 times a day, true metrix meter.    ciclopirox (PENLAC) 8 % Soln Apply topically nightly.    cyclobenzaprine (FEXMID) 7.5 MG Tab TAKE 1 TABLET 3 TIMES DAILY AS NEEDED FOR MUSCLE SPASM.    diclofenac sodium (VOLTAREN) 1 % Gel APPLY 2 grams SPARINGLY TO Knees ONCE DAILY    fluticasone (FLONASE) 50 mcg/actuation nasal spray USE TWO SPRAYS IN EACH NOSTRIL ONCE DAILY for cold    fluticasone propionate (FLOVENT HFA) 110 mcg/actuation inhaler INHALE 2 PUFFS TWICE DAILY. RINSE MOUTH AFTER USE.    hydrOXYzine pamoate (VISTARIL) 25 MG Cap TAKE 1 CAPSULE BY MOUTH EVERY 8 HOURS AS NEEDED FOR ITCHING    ketoconazole (NIZORAL) 2 % cream APPLY SPARINGLY TO AFFECTED AREA(S) ONCE DAILY    ketoconazole (NIZORAL) 2 % cream APPLY SPARINGLY TO AFFECTED AREA(S) ONCE DAILY    ketoconazole (NIZORAL) 2 % shampoo APPLY TO SKIN EVERY DAY FOR 5 MINUTES AND RINSE FOR ABOUT 1 TO 2 WEEKS    lancets 30 gauge Misc use 4 times a day    oseltamivir (TAMIFLU) 75 MG capsule Take 1 capsule (75 mg total) by mouth 2 (two) times daily. for 5 days    pen needle, diabetic 32 gauge x 5/32" Ndle USE AS DIRECTED 5 TIMES DAILY    pen needle, diabetic 32 gauge x 5/32" Ndle USE WITH INSULIN PEN TWICE DAILY    polyethylene glycol (GLYCOLAX) 17 gram/dose powder MIX 1 CAPFUL IN 8 OUNCES OF LIQUID AND DRINK ONCE DAILY as needed for constipation    sodium chloride for inhalation (SODIUM CHLORIDE 0.9%) 0.9 % nebulizer solution Use 1 vial via nebulization three times daily. Mix with xopenex solution    triamcinolone acetonide 0.1% (KENALOG) 0.1 % cream Apply topically 2 (two) times daily. Apply twice a day for 7 days to arms.    " [DISCONTINUED] ranitidine (ZANTAC) 300 MG tablet Take 1 tablet by mouth every evening     Family History     Problem Relation (Age of Onset)    Heart disease Mother, Father        Tobacco Use    Smoking status: Never Smoker    Smokeless tobacco: Never Used   Substance and Sexual Activity    Alcohol use: No    Drug use: No    Sexual activity: Yes     Partners: Male     Review of Systems   Constitutional: Positive for chills and fever. Negative for diaphoresis and fatigue.   HENT: Positive for sinus pressure and sinus pain. Negative for drooling, facial swelling, trouble swallowing and voice change.    Respiratory: Positive for cough, shortness of breath and wheezing. Negative for chest tightness.    Cardiovascular: Positive for chest pain (muculoskeletal; worse with cough). Negative for palpitations and leg swelling.   Gastrointestinal: Negative for abdominal pain, constipation, diarrhea, nausea and vomiting.   Genitourinary: Negative for dysuria, flank pain and hematuria.   Skin: Negative for color change and rash.   Neurological: Negative for dizziness, weakness, light-headedness and headaches.     Objective:     Vital Signs (Most Recent):  Temp: 98 °F (36.7 °C) (07/03/19 2047)  Pulse: 87 (07/03/19 2047)  Resp: 16 (07/03/19 2047)  BP: (!) 118/59 (07/03/19 2047)  SpO2: 95 % (07/03/19 2047) Vital Signs (24h Range):  Temp:  [96.7 °F (35.9 °C)-99.2 °F (37.3 °C)] 98 °F (36.7 °C)  Pulse:  [] 87  Resp:  [16-20] 16  SpO2:  [95 %-99 %] 95 %  BP: (107-130)/(58-75) 118/59     Weight: 117.5 kg (259 lb 0.7 oz)  Body mass index is 43.11 kg/m².    Physical Exam   Constitutional: She is oriented to person, place, and time. She appears well-developed and well-nourished. No distress.   HENT:   Head: Normocephalic and atraumatic.   Mouth/Throat: Mucous membranes are normal.   Eyes: Pupils are equal, round, and reactive to light. Conjunctivae and EOM are normal.   Neck: Trachea normal. No JVD present. No tracheal deviation  present.   Cardiovascular: Normal rate and regular rhythm.   No murmur heard.  Pulmonary/Chest: Effort normal. No stridor. No respiratory distress. She has wheezes (end expiratory wheezes noted in bilat lower lobes). She has no rhonchi. She has no rales. She exhibits tenderness (left sided; TTP).   Abdominal: Soft. Bowel sounds are normal. She exhibits no distension, no ascites and no mass. There is no tenderness. There is no guarding.   Musculoskeletal: She exhibits no edema or deformity.   Neurological: She is alert and oriented to person, place, and time. No cranial nerve deficit.   Skin: Skin is warm, dry and intact. Capillary refill takes less than 2 seconds. No rash noted. She is not diaphoretic. No cyanosis or erythema. Nails show no clubbing.   Psychiatric: She has a normal mood and affect. Her speech is normal and behavior is normal. Judgment and thought content normal. Cognition and memory are normal.   Nursing note and vitals reviewed.        CRANIAL NERVES     CN III, IV, VI   Pupils are equal, round, and reactive to light.  Extraocular motions are normal.        Significant Labs:   CBC:   Recent Labs   Lab 07/03/19  1554   WBC 5.55   HGB 11.7*   HCT 38.9        CMP:   Recent Labs   Lab 07/03/19  1554   *   K 4.4      CO2 28   *   BUN 19   CREATININE 1.5*   CALCIUM 9.3   PROT 6.9   ALBUMIN 3.5   BILITOT 0.4   ALKPHOS 67   AST 22   ALT 20   ANIONGAP 7*   EGFRNONAA 40.6*     Troponin:   Recent Labs   Lab 07/03/19  1554   TROPONINI 0.034*     TSH:   Recent Labs   Lab 06/19/19  1518   TSH 1.809       Significant Imaging: I have reviewed all pertinent imaging results/findings within the past 24 hours.      Assessment/Plan:      * Asthma exacerbation  Acute uri  Influenza B  Fever  - wheezes noted on examination; patient states has improved since arrival  - xopenex every 8 hours  - continue controller inhalers   - blood cultures pending  - CXR without acute infection  - no  "leukocytosis  - Tmax 100.6  - lactate 1.4  - continue tamilfu BID for 5 days   - will monitor overnight; likely discharge in AM if patient continues to improve      Paroxysmal atrial fibrillation  Long term anticoagulation  - INR 1.5   - patient EKG paced  - continue digoxin; patient no longer on amiodarone   - continue coumadin; patient recently seen in coumadin clinic (07/01) and dosage adjusted; followed recommended dosing on admission  - do not think patient needs inpatient pharmacy consult as she is followed closely outpatient with recently adjustments to her subtherapeutic INR  - tele      Type 2 diabetes mellitus with diabetic polyneuropathy  - A1c pending  - last A1c 7.4 in 10/18  - Outpatient regime: Levemir 34 unit BID and aspart 16 units with meals  - Inpatient regime: Levemir 23 units BID and aspart 11 units with meals; low correction sliding scale  - Diabetic diet      Abnormal finding on lung imaging  - CXR "patient has a known lobulated soft tissue lesion occupying the posterior segment of the right upper lobe, incompletely visualized on current and recent chest radiographs due to the overlying generator.  On today's study it projects over the anterior aspect of the 2nd rib at the level of the 6th intercostal space.  This lesion is present on earlier studies dating back to at least 04/26/2016 and is also well seen on the intervening chest CTs of 11/21/2017 and 06/04/2018.  On prior CT examinations the lesion measured up to 2.5 cm. Differential diagnosis includes indolent neoplasm, mucoid impaction in ectatic subsegmental airways, or vascular malformation.  CT performed 06/04/2018 strongly suggests the presence of feeding and draining vessels, concerning for AV malformation placing the patient at risk for paradoxical embolization"  - CT chest outpatient and pulmonology referral on discharge       Essential hypertension  - lisinopril     NICM (nonischemic cardiomyopathy)  -Biventricular automatic " implantable cardioverter defibrillator in situ  - see heart failure above  - patient states she has not been shocked by her AICD since about 2015    CKD (chronic kidney disease), stage III  - stable and at baseline  - renal dose meds if necessary       Chronic combined systolic and diastolic congestive heart failure  - patient does not appear grossly fluid overloaded; patient also expresses she feels her symptoms are asthmatic>CHF  -  (baseline)  - troponin 0.034 (baseline); will check second to ensure flat  - lasix 80mg IVP given in ED; will evaluate in AM if addition IV dosing needed  - continue lasix and spironolactone   - Previous echo 03/20/19 severe left ventricular enlargement, EF 30%, grade II DD, left artrial enlargement       Obesity with body mass index (BMI) of 30.0 to 39.9  - weight loss encouraged      CLARENCE (obstructive sleep apnea)  - CPAP        VTE Risk Mitigation (From admission, onward)        Ordered     warfarin (COUMADIN) split tablet 3.75 mg  Every Saturday 07/03/19 2140     warfarin (COUMADIN) split tablet 3.75 mg  Every Mon, Wed, Fri, Sun      07/03/19 2140     warfarin tablet 7.5 mg  Every Tues, Thurs 07/03/19 2140     IP VTE HIGH RISK PATIENT  Once      07/03/19 2140     Place sequential compression device  Until discontinued      07/03/19 2140     Place sequential compression device  Until discontinued      07/03/19 2914                Brittney Kevin NP  Department of Hospital Medicine   [facility]

## 2019-07-04 NOTE — ASSESSMENT & PLAN NOTE
Acute uri  Influenza B  Fever  - wheezes noted on examination; patient states has improved since arrival  - xopenex every 8 hours  - continue controller inhalers   - blood cultures pending  - CXR without acute infection  - no leukocytosis  - Tmax 100.6  - lactate 1.4  - continue tamilfu BID for 5 days   - will monitor overnight; likely discharge in AM if patient continues to improve

## 2019-07-04 NOTE — SUBJECTIVE & OBJECTIVE
Past Medical History:   Diagnosis Date    Anticoagulant long-term use     Arthritis     Asthma     Asthma     Atrial fibrillation     Cardiomyopathy     Cardiomyopathy     CHF (congestive heart failure)     CHF (congestive heart failure)     Chronic combined systolic and diastolic heart failure 6/3/2016    COPD (chronic obstructive pulmonary disease) 3/28/2018    GERD (gastroesophageal reflux disease)     H/O tubal ligation     Hypertension     Obesity     Renal disorder     Type 2 diabetes mellitus with hyperglycemia     Type 2 diabetes mellitus with polyneuropathy        Past Surgical History:   Procedure Laterality Date    CARDIAC DEFIBRILLATOR PLACEMENT      CARDIAC DEFIBRILLATOR PLACEMENT      CARDIAC DEFIBRILLATOR PLACEMENT      CARDIOVERSION N/A 4/19/2016    Performed by [facility]i [facility]olly [facility] at [facility] CATH LAB    CHOLECYSTECTOMY      COLONOSCOPY N/A 5/2/2016    Performed by Eugene Soto MD at [facility] ENDO (2ND FLR)    ECHOCARDIOGRAM-TRANSESOPHAGEAL N/A 4/15/2016    Performed by Rossana Surgeon at [facility] ROSSANA    ESOPHAGOGASTRODUODENOSCOPY (EGD) N/A 11/17/2016    Performed by Joe Magana MD at [facility] ENDO (2ND FLR)    GALLBLADDER SURGERY      iabp  4/2016    INSERTION-ICD-BIVENTRICULAR Right 5/4/2017    Performed by Navi Jolly MD at [facility] CATH LAB    TUBAL LIGATION         Review of patient's allergies indicates:  No Known Allergies    No current facility-administered medications on file prior to encounter.      Current Outpatient Medications on File Prior to Encounter   Medication Sig    allopurinol (ZYLOPRIM) 100 MG tablet TAKE 1 TABLET BY MOUTH EVERY DAY    atorvastatin (LIPITOR) 20 MG tablet Take 1 tablet (20 mg total) by mouth once daily.    budesonide-formoterol 160-4.5 mcg (SYMBICORT) 160-4.5 mcg/actuation HFAA INHALE 2 PUFFS BY MOUTH TWICE DAILY. RINSE MOUTH AFTER USE.    cetirizine (ZYRTEC) 10 MG tablet TAKE 1 TABLET AT BEDTIME for allergy relief    cyclobenzaprine  (FEXMID) 7.5 MG Tab TAKE 1 TABLET 3 TIMES DAILY AS NEEDED FOR MUSCLE SPASM.    dicyclomine (BENTYL) 10 MG capsule TAKE 1 CAPSULE 4 TIMES DAILY as needed for abdominal pain    digoxin (LANOXIN) 125 mcg tablet TAKE 1 TABLET BY MOUTH ONCE DAILY    dulaglutide (TRULICITY) 0.75 mg/0.5 mL PnIj Inject 0.5 mLs (0.75 mg total) into the skin every 7 days.    dupilumab (DUPIXENT) 300 mg/2 mL Syrg Inject 600mg into the skin for 1 dose, then inject 300mg into the skin every 2 weeks.    dupilumab (DUPIXENT) 300 mg/2 mL Syrg Inject 2 mLs (300 mg total) into the skin every 14 (fourteen) days.    ergocalciferol (ERGOCALCIFEROL) 50,000 unit Cap TAKE 1 CAPSULE BY MOUTH WEEKLY.    famotidine (PEPCID) 40 MG tablet TAKE 1 TABLET BY MOUTH TWICE DAILY.    furosemide (LASIX) 40 MG tablet Take 2 tablets (80 mg total) by mouth 2 (two) times daily.    insulin (BASAGLAR KWIKPEN U-100 INSULIN) glargine 100 units/mL (3mL) SubQ pen inject 34 under the skin in the morning and 34 units at night    insulin aspart U-100 (NOVOLOG) 100 unit/mL (3 mL) InPn pen Inject 16 units into the skin with meals plus scale    levalbuterol (XOPENEX HFA) 45 mcg/actuation inhaler INHALE 1 TO 2 PUFFS EVERY 4 TO 6 HOURS AS NEEDED    levalbuterol (XOPENEX) 0.31 mg/3 mL nebulizer solution Take 3 mLs (0.31 mg total) by nebulization every 4 (four) hours as needed for Wheezing. Rescue    linaCLOtide (LINZESS) 72 mcg Cap capsule Take one tablet by mouth daily as needed for constipation    linaclotide 72 mcg Cap take one tablet daily as needed for constipation    lisinopril (PRINIVIL,ZESTRIL) 5 MG tablet TAKE 1 TABLET BY MOUTH TWO TIMES A DAY    magnesium oxide (MAG-OX) 400 mg (241.3 mg magnesium) tablet TAKE 1 TABLET BY MOUTH TWICE DAILY    montelukast (SINGULAIR) 10 mg tablet TAKE 1 TABLET BY MOUTH DAILY.    potassium chloride (MICRO-K) 10 MEQ CpSR Take 2 capsules (20 mEq total) by mouth once daily.    spironolactone (ALDACTONE) 25 MG tablet Take 1 tablet  "(25 mg total) by mouth once daily.    warfarin (COUMADIN) 7.5 MG tablet Take ½ tablet by mouth once daily and 1 tablet on thursday (Patient taking differently: Coumadin dose 7.5 mg on Monday and  1/2 tab rest of days)    acetaminophen (TYLENOL) 650 MG TbSR TAKE 1 TABLET every four hours AS NEEDED for shoulder pain    azelastine (ASTELIN) 137 mcg (0.1 %) nasal spray 2 sprays (274 mcg total) by Nasal route 2 (two) times daily.    azelastine (ASTELIN) 137 mcg (0.1 %) nasal spray Use 1 to 2 sprays per nostril twice daily as needed. Us in addition to our nasal steroid spray    baclofen (LIORESAL) 10 MG tablet TAKE 1 TABLET BY MOUTH THREE TIMES DAILY AS NEEDED FOR MUSCLE SPASM    BD ULTRA-FINE ESSENCE PEN NEEDLES 32 gauge x 5/32" Ndle Takes 5 times  day    blood sugar diagnostic Strp Uses 4 times a day, true metrix meter.    ciclopirox (PENLAC) 8 % Soln Apply topically nightly.    cyclobenzaprine (FEXMID) 7.5 MG Tab TAKE 1 TABLET 3 TIMES DAILY AS NEEDED FOR MUSCLE SPASM.    diclofenac sodium (VOLTAREN) 1 % Gel APPLY 2 grams SPARINGLY TO Knees ONCE DAILY    fluticasone (FLONASE) 50 mcg/actuation nasal spray USE TWO SPRAYS IN EACH NOSTRIL ONCE DAILY for cold    fluticasone propionate (FLOVENT HFA) 110 mcg/actuation inhaler INHALE 2 PUFFS TWICE DAILY. RINSE MOUTH AFTER USE.    hydrOXYzine pamoate (VISTARIL) 25 MG Cap TAKE 1 CAPSULE BY MOUTH EVERY 8 HOURS AS NEEDED FOR ITCHING    ketoconazole (NIZORAL) 2 % cream APPLY SPARINGLY TO AFFECTED AREA(S) ONCE DAILY    ketoconazole (NIZORAL) 2 % cream APPLY SPARINGLY TO AFFECTED AREA(S) ONCE DAILY    ketoconazole (NIZORAL) 2 % shampoo APPLY TO SKIN EVERY DAY FOR 5 MINUTES AND RINSE FOR ABOUT 1 TO 2 WEEKS    lancets 30 gauge Misc use 4 times a day    oseltamivir (TAMIFLU) 75 MG capsule Take 1 capsule (75 mg total) by mouth 2 (two) times daily. for 5 days    pen needle, diabetic 32 gauge x 5/32" Ndle USE AS DIRECTED 5 TIMES DAILY    pen needle, diabetic 32 gauge x " "5/32" Ndle USE WITH INSULIN PEN TWICE DAILY    polyethylene glycol (GLYCOLAX) 17 gram/dose powder MIX 1 CAPFUL IN 8 OUNCES OF LIQUID AND DRINK ONCE DAILY as needed for constipation    sodium chloride for inhalation (SODIUM CHLORIDE 0.9%) 0.9 % nebulizer solution Use 1 vial via nebulization three times daily. Mix with xopenex solution    triamcinolone acetonide 0.1% (KENALOG) 0.1 % cream Apply topically 2 (two) times daily. Apply twice a day for 7 days to arms.    [DISCONTINUED] ranitidine (ZANTAC) 300 MG tablet Take 1 tablet by mouth every evening     Family History     Problem Relation (Age of Onset)    Heart disease Mother, Father        Tobacco Use    Smoking status: Never Smoker    Smokeless tobacco: Never Used   Substance and Sexual Activity    Alcohol use: No    Drug use: No    Sexual activity: Yes     Partners: Male     Review of Systems   Constitutional: Positive for chills and fever. Negative for diaphoresis and fatigue.   HENT: Positive for sinus pressure and sinus pain. Negative for drooling, facial swelling, trouble swallowing and voice change.    Respiratory: Positive for cough, shortness of breath and wheezing. Negative for chest tightness.    Cardiovascular: Positive for chest pain (muculoskeletal; worse with cough). Negative for palpitations and leg swelling.   Gastrointestinal: Negative for abdominal pain, constipation, diarrhea, nausea and vomiting.   Genitourinary: Negative for dysuria, flank pain and hematuria.   Skin: Negative for color change and rash.   Neurological: Negative for dizziness, weakness, light-headedness and headaches.     Objective:     Vital Signs (Most Recent):  Temp: 98 °F (36.7 °C) (07/03/19 2047)  Pulse: 87 (07/03/19 2047)  Resp: 16 (07/03/19 2047)  BP: (!) 118/59 (07/03/19 2047)  SpO2: 95 % (07/03/19 2047) Vital Signs (24h Range):  Temp:  [96.7 °F (35.9 °C)-99.2 °F (37.3 °C)] 98 °F (36.7 °C)  Pulse:  [] 87  Resp:  [16-20] 16  SpO2:  [95 %-99 %] 95 %  BP: " (107-130)/(58-75) 118/59     Weight: 117.5 kg (259 lb 0.7 oz)  Body mass index is 43.11 kg/m².    Physical Exam   Constitutional: She is oriented to person, place, and time. She appears well-developed and well-nourished. No distress.   HENT:   Head: Normocephalic and atraumatic.   Mouth/Throat: Mucous membranes are normal.   Eyes: Pupils are equal, round, and reactive to light. Conjunctivae and EOM are normal.   Neck: Trachea normal. No JVD present. No tracheal deviation present.   Cardiovascular: Normal rate and regular rhythm.   No murmur heard.  Pulmonary/Chest: Effort normal. No stridor. No respiratory distress. She has wheezes (end expiratory wheezes noted in bilat lower lobes). She has no rhonchi. She has no rales. She exhibits tenderness (left sided; TTP).   Abdominal: Soft. Bowel sounds are normal. She exhibits no distension, no ascites and no mass. There is no tenderness. There is no guarding.   Musculoskeletal: She exhibits no edema or deformity.   Neurological: She is alert and oriented to person, place, and time. No cranial nerve deficit.   Skin: Skin is warm, dry and intact. Capillary refill takes less than 2 seconds. No rash noted. She is not diaphoretic. No cyanosis or erythema. Nails show no clubbing.   Psychiatric: She has a normal mood and affect. Her speech is normal and behavior is normal. Judgment and thought content normal. Cognition and memory are normal.   Nursing note and vitals reviewed.        CRANIAL NERVES     CN III, IV, VI   Pupils are equal, round, and reactive to light.  Extraocular motions are normal.        Significant Labs:   CBC:   Recent Labs   Lab 07/03/19  1554   WBC 5.55   HGB 11.7*   HCT 38.9        CMP:   Recent Labs   Lab 07/03/19  1554   *   K 4.4      CO2 28   *   BUN 19   CREATININE 1.5*   CALCIUM 9.3   PROT 6.9   ALBUMIN 3.5   BILITOT 0.4   ALKPHOS 67   AST 22   ALT 20   ANIONGAP 7*   EGFRNONAA 40.6*     Troponin:   Recent Labs   Lab  07/03/19  1554   TROPONINI 0.034*     TSH:   Recent Labs   Lab 06/19/19  1518   TSH 1.809       Significant Imaging: I have reviewed all pertinent imaging results/findings within the past 24 hours.

## 2019-07-04 NOTE — ASSESSMENT & PLAN NOTE
Long term anticoagulation  - INR 1.5   - patient EKG paced  - continue digoxin; patient no longer on amiodarone   - continue coumadin; patient recently seen in coumadin clinic (07/01) and dosage adjusted; followed recommended dosing on admission  - do not think patient needs inpatient pharmacy consult as she is followed closely outpatient with recently adjustments to her subtherapeutic INR  - tele

## 2019-07-04 NOTE — ASSESSMENT & PLAN NOTE
"- CXR "patient has a known lobulated soft tissue lesion occupying the posterior segment of the right upper lobe, incompletely visualized on current and recent chest radiographs due to the overlying generator.  On today's study it projects over the anterior aspect of the 2nd rib at the level of the 6th intercostal space.  This lesion is present on earlier studies dating back to at least 04/26/2016 and is also well seen on the intervening chest CTs of 11/21/2017 and 06/04/2018.  On prior CT examinations the lesion measured up to 2.5 cm. Differential diagnosis includes indolent neoplasm, mucoid impaction in ectatic subsegmental airways, or vascular malformation.  CT performed 06/04/2018 strongly suggests the presence of feeding and draining vessels, concerning for AV malformation placing the patient at risk for paradoxical embolization"  - CT chest outpatient and pulmonology referral on discharge     "

## 2019-07-04 NOTE — HPI
Patient, PMHx of HFrEF 30%; AICD; a fib on coumadin; cardiomyophaty, HTN, DMII, GERD, asthma/COPD, is admitted to hospital medicine for influenza B. Patient was seen in clinic and diagnosed with influenza yesterday. Patient states she has been short of breath for since Sunday. Patient feels her asthma has been exacerbated by the viral infection. Patient states she has a fever last night. Patient denies sputum production, but states she has a dry cough. Denies abominal pain, nausea, vomiting. Of note, patient does have left sided chest pain, musculoskeletal in nature, worse with coughing, which was what prompted her to be seen in ED.     In ED: CXR concerning for lobulated soft tissue nodule due occupying the posterior segment of the right upper lobe suggestive of pulmonary AV malformation, no flor pulmonary edema; Lasix 80mg and oseltamivir initiated; Tmax 100.6; lactate 1.4; no leukocytosis; trop 0.034 (baseline);  (baseline); creat 1.5 (baseline), coumadin 1.5; EKG paced rhythm

## 2019-07-04 NOTE — PLAN OF CARE
Problem: Adult Inpatient Plan of Care  Goal: Plan of Care Review  Outcome: Ongoing (interventions implemented as appropriate)  Pt. Admitted with influenza type B placed on respiratory isolation,pt. Placed on fall precautions,no falls sustained bed low and locked side rails elevated x 2. Breath sounds clear franklin.,pt. Has not been coughing,remains on tamiflu instructed on need to wear mask if leaving room. Blood sugar checked at hs 123 no coverage needed, continue to monitor.

## 2019-07-04 NOTE — ASSESSMENT & PLAN NOTE
- A1c pending  - last A1c 7.4 in 10/18  - Outpatient regime: Levemir 34 unit BID and aspart 16 units with meals  - Inpatient regime: Levemir 23 units BID and aspart 11 units with meals; low correction sliding scale  - Diabetic diet

## 2019-07-04 NOTE — ASSESSMENT & PLAN NOTE
-Biventricular automatic implantable cardioverter defibrillator in situ  - see heart failure above  - patient states she has not been shocked by her AICD since about 2015

## 2019-07-05 ENCOUNTER — TELEPHONE (OUTPATIENT)
Dept: TRANSPLANT | Facility: CLINIC | Age: 49
End: 2019-07-05

## 2019-07-05 DIAGNOSIS — I50.42 CHRONIC COMBINED SYSTOLIC AND DIASTOLIC CONGESTIVE HEART FAILURE: ICD-10-CM

## 2019-07-05 RX ORDER — LANOLIN ALCOHOL/MO/W.PET/CERES
CREAM (GRAM) TOPICAL 2 TIMES DAILY
Qty: 60 TABLET | Refills: 1 | Status: CANCELLED | OUTPATIENT
Start: 2019-07-02 | End: 2020-01-01

## 2019-07-05 NOTE — DISCHARGE SUMMARY
Hospital Medicine  Discharge Summary      Patient Name: Laure Ross  MRN:  [id number]  [facility] Medicine Team: [facility] F Naya Ruvalcaba MD  Date of Admission:  7/3/2019     Date of Discharge:  07/05/2019  Length of Stay:  LOS: 0 days   Principal Problem:  Asthma exacerbation     Active Hospital Problems    Diagnosis  POA    *Asthma exacerbation [J45.901]  Yes    Influenza B [J10.1]  Yes    Abnormal finding on lung imaging [R91.8]  Yes    Acute URI [J06.9]  Yes    Biventricular automatic implantable cardioverter defibrillator in situ [Z95.810]  Yes    Essential hypertension [I10]  Yes    NICM (nonischemic cardiomyopathy) [I42.8]  Yes    CKD (chronic kidney disease), stage III [N18.3]  Yes    Chronic combined systolic and diastolic congestive heart failure [I50.42]  Yes    Chronic anticoagulation [Z79.01]  Not Applicable    Obesity with body mass index (BMI) of 30.0 to 39.9 [E66.9]  Yes    Paroxysmal atrial fibrillation [I48.0]  Yes    Type 2 diabetes mellitus with diabetic polyneuropathy [E11.42]  Yes     Chronic    CLARENCE (obstructive sleep apnea) [G47.33]  Yes      Resolved Hospital Problems   No resolved problems to display.          History of Present Illness:  Patient, PMHx of HFrEF 30%; AICD; a fib on coumadin; cardiomyophaty, HTN, DMII, GERD, asthma/COPD, is admitted to hospital medicine for influenza B. Patient was seen in clinic and diagnosed with influenza yesterday. Patient states she has been short of breath for since Sunday. Patient feels her asthma has been exacerbated by the viral infection. Patient states she has a fever last night. Patient denies sputum production, but states she has a dry cough. Denies abominal pain, nausea, vomiting. Of note, patient does have left sided chest pain, musculoskeletal in nature, worse with coughing, which was what prompted her to be seen in ED.      In ED: CXR concerning for lobulated soft tissue nodule due occupying the posterior segment of the  right upper lobe suggestive of pulmonary AV malformation, no flor pulmonary edema; Lasix 80mg and oseltamivir initiated; Tmax 100.6; lactate 1.4; no leukocytosis; trop 0.034 (baseline);  (baseline); creat 1.5 (baseline), coumadin 1.5; EKG paced rhythm       Hospital Course of Principle Problem Addressed:  Ms. Ross was admitted for an asthma exacerbation and the flu.  She was given breathing treatments with improvement in her respiratory status.  She was discharged on PO Prednisone as well as Tamiflu for treatment of her Influenza B.      Significant Diagnostic Tests/Imaging:    Recent Labs   Lab 07/03/19  1554 07/04/19  0446   WBC 5.55 5.15   HGB 11.7* 11.3*   HCT 38.9 37.5    252     Recent Labs   Lab 07/03/19  1554 07/04/19  0446   * 137   K 4.4 4.4    102   CO2 28 24   BUN 19 19   CREATININE 1.5* 1.5*   * 162*   CALCIUM 9.3 8.7   MG  --  2.0   PHOS  --  4.7*     Recent Labs   Lab 07/01/19  1135 07/03/19  1554 07/04/19  0446   ALKPHOS  --  67 64   ALT  --  20 19   AST  --  22 21   ALBUMIN  --  3.5 3.4*   PROT  --  6.9 6.4   BILITOT  --  0.4 0.3   INR 1.9 1.5* 1.6*      Recent Labs   Lab 07/03/19  1551 07/03/19  2021 07/04/19  0814 07/04/19  1110   POCTGLUCOSE 123* 126* 170* 191*     Recent Labs     07/03/19  1554 07/03/19  2205 07/04/19  0915   TROPONINI 0.034* 0.049* 0.050*       Recent Labs     07/03/19  1554   LACTATE 1.4          Significant Treatments/Procedures:          Consultants:          Discharge Medications:    Discharge Medication List as of 7/4/2019 12:37 PM      START taking these medications    Details   predniSONE (DELTASONE) 50 MG Tab Take 1 tablet (50 mg total) by mouth once daily. for 3 days, Starting u 7/4/2019, Until Sun 7/7/2019, Print         CONTINUE these medications which have CHANGED    Details   oseltamivir (TAMIFLU) 75 MG capsule Take 1 capsule (75 mg total) by mouth 2 (two) times daily. for 5 days, Starting Thu 7/4/2019, Until Tue 7/9/2019, Print          CONTINUE these medications which have NOT CHANGED    Details   allopurinol (ZYLOPRIM) 100 MG tablet TAKE 1 TABLET BY MOUTH EVERY DAY, Starting Wed 1/23/2019, Normal      atorvastatin (LIPITOR) 20 MG tablet Take 1 tablet (20 mg total) by mouth once daily., Starting Thu 2/7/2019, Until Fri 2/7/2020, Normal      budesonide-formoterol 160-4.5 mcg (SYMBICORT) 160-4.5 mcg/actuation HFAA INHALE 2 PUFFS BY MOUTH TWICE DAILY. RINSE MOUTH AFTER USE., Starting Thu 3/21/2019, Normal      cetirizine (ZYRTEC) 10 MG tablet TAKE 1 TABLET AT BEDTIME for allergy relief, Starting Tue 4/16/2019, Normal      !! cyclobenzaprine (FEXMID) 7.5 MG Tab TAKE 1 TABLET 3 TIMES DAILY AS NEEDED FOR MUSCLE SPASM., Starting Fri 4/26/2019, Normal      dicyclomine (BENTYL) 10 MG capsule TAKE 1 CAPSULE 4 TIMES DAILY as needed for abdominal pain, Starting Wed 8/29/2018, Normal      digoxin (LANOXIN) 125 mcg tablet TAKE 1 TABLET BY MOUTH ONCE DAILY, Starting Thu 2/7/2019, Normal      dulaglutide (TRULICITY) 0.75 mg/0.5 mL PnIj Inject 0.5 mLs (0.75 mg total) into the skin every 7 days., Starting Fri 10/5/2018, Normal      !! dupilumab (DUPIXENT) 300 mg/2 mL Syrg Inject 600mg into the skin for 1 dose, then inject 300mg into the skin every 2 weeks., Starting Mon 6/24/2019, Normal      !! dupilumab (DUPIXENT) 300 mg/2 mL Syrg Inject 2 mLs (300 mg total) into the skin every 14 (fourteen) days., Starting Tue 6/25/2019, Normal      ergocalciferol (ERGOCALCIFEROL) 50,000 unit Cap TAKE 1 CAPSULE BY MOUTH WEEKLY., Starting Wed 4/24/2019, Normal      famotidine (PEPCID) 40 MG tablet TAKE 1 TABLET BY MOUTH TWICE DAILY., Starting Fri 6/28/2019, Normal      furosemide (LASIX) 40 MG tablet Take 2 tablets (80 mg total) by mouth 2 (two) times daily., Starting Wed 8/15/2018, Normal      insulin (BASAGLAR KWIKPEN U-100 INSULIN) glargine 100 units/mL (3mL) SubQ pen inject 34 under the skin in the morning and 34 units at night, Starting Wed 1/16/2019, Until Thu  1/16/2020, Normal      insulin aspart U-100 (NOVOLOG) 100 unit/mL (3 mL) InPn pen Inject 16 units into the skin with meals plus scale, Starting Fri 9/7/2018, Normal      levalbuterol (XOPENEX HFA) 45 mcg/actuation inhaler INHALE 1 TO 2 PUFFS EVERY 4 TO 6 HOURS AS NEEDED, Starting Tue 1/15/2019, Normal      levalbuterol (XOPENEX) 0.31 mg/3 mL nebulizer solution Take 3 mLs (0.31 mg total) by nebulization every 4 (four) hours as needed for Wheezing. Rescue, Starting Fri 9/7/2018, Normal      !! linaCLOtide (LINZESS) 72 mcg Cap capsule Take one tablet by mouth daily as needed for constipation, Starting Fri 6/28/2019, Normal      !! linaclotide 72 mcg Cap take one tablet daily as needed for constipation, Starting Wed 8/29/2018, Normal      lisinopril (PRINIVIL,ZESTRIL) 5 MG tablet TAKE 1 TABLET BY MOUTH TWO TIMES A DAY, Starting Tue 3/26/2019, Normal      magnesium oxide (MAG-OX) 400 mg (241.3 mg magnesium) tablet TAKE 1 TABLET BY MOUTH TWICE DAILY, Starting Tue 4/9/2019, Until Wed 4/8/2020, Normal      montelukast (SINGULAIR) 10 mg tablet TAKE 1 TABLET BY MOUTH DAILY., Starting Mon 6/17/2019, Normal      potassium chloride (MICRO-K) 10 MEQ CpSR Take 2 capsules (20 mEq total) by mouth once daily., Starting Wed 3/20/2019, Normal      spironolactone (ALDACTONE) 25 MG tablet Take 1 tablet (25 mg total) by mouth once daily., Starting Mon 6/24/2019, Until Tue 6/23/2020, Normal      warfarin (COUMADIN) 7.5 MG tablet Take ½ tablet by mouth once daily and 1 tablet on thursday, Starting Mon 11/5/2018, Until Tue 11/5/2019, Normal      acetaminophen (TYLENOL) 650 MG TbSR TAKE 1 TABLET every four hours AS NEEDED for shoulder pain, Starting Fri 4/26/2019, Normal      !! azelastine (ASTELIN) 137 mcg (0.1 %) nasal spray 2 sprays (274 mcg total) by Nasal route 2 (two) times daily., Starting Tue 3/19/2019, Until Wed 3/18/2020, Print      !! azelastine (ASTELIN) 137 mcg (0.1 %) nasal spray Use 1 to 2 sprays per nostril twice daily as  "needed. Us in addition to our nasal steroid spray, Starting Thu 5/23/2019, Normal      baclofen (LIORESAL) 10 MG tablet TAKE 1 TABLET BY MOUTH THREE TIMES DAILY AS NEEDED FOR MUSCLE SPASM, Starting Mon 4/29/2019, Normal      !! BD ULTRA-FINE ESSENCE PEN NEEDLES 32 gauge x 5/32" Ndle Takes 5 times  day, Normal      blood sugar diagnostic Strp Uses 4 times a day, true metrix meter., Normal      ciclopirox (PENLAC) 8 % Soln Apply topically nightly., Starting Wed 1/2/2019, Normal      !! cyclobenzaprine (FEXMID) 7.5 MG Tab TAKE 1 TABLET 3 TIMES DAILY AS NEEDED FOR MUSCLE SPASM., Starting Fri 6/28/2019, Normal      diclofenac sodium (VOLTAREN) 1 % Gel APPLY 2 grams SPARINGLY TO Knees ONCE DAILY, Starting Fri 6/28/2019, Normal      fluticasone (FLONASE) 50 mcg/actuation nasal spray USE TWO SPRAYS IN EACH NOSTRIL ONCE DAILY for cold, Starting Tue 4/16/2019, Normal      fluticasone propionate (FLOVENT HFA) 110 mcg/actuation inhaler INHALE 2 PUFFS TWICE DAILY. RINSE MOUTH AFTER USE., Starting Tue 4/16/2019, Normal      hydrOXYzine pamoate (VISTARIL) 25 MG Cap TAKE 1 CAPSULE BY MOUTH EVERY 8 HOURS AS NEEDED FOR ITCHING, Starting Fri 6/28/2019, Normal      !! ketoconazole (NIZORAL) 2 % cream APPLY SPARINGLY TO AFFECTED AREA(S) ONCE DAILY, Starting Thu 3/21/2019, Normal      !! ketoconazole (NIZORAL) 2 % cream APPLY SPARINGLY TO AFFECTED AREA(S) ONCE DAILY, Starting Fri 6/28/2019, Normal      ketoconazole (NIZORAL) 2 % shampoo APPLY TO SKIN EVERY DAY FOR 5 MINUTES AND RINSE FOR ABOUT 1 TO 2 WEEKS, Starting Fri 6/28/2019, Normal      lancets 30 gauge Misc use 4 times a day, Starting Mon 6/4/2018, Normal      !! pen needle, diabetic 32 gauge x 5/32" Ndle USE AS DIRECTED 5 TIMES DAILY, Starting Wed 4/25/2018, Normal      !! pen needle, diabetic 32 gauge x 5/32" Ndle USE WITH INSULIN PEN TWICE DAILY, Starting Tue 4/30/2019, Normal      polyethylene glycol (GLYCOLAX) 17 gram/dose powder MIX 1 CAPFUL IN 8 OUNCES OF LIQUID AND DRINK ONCE " DAILY as needed for constipation, Starting Fri 6/28/2019, Normal      sodium chloride for inhalation (SODIUM CHLORIDE 0.9%) 0.9 % nebulizer solution Use 1 vial via nebulization three times daily. Mix with xopenex solution, Starting Wed 4/17/2019, Normal      triamcinolone acetonide 0.1% (KENALOG) 0.1 % cream Apply topically 2 (two) times daily. Apply twice a day for 7 days to arms., Starting Mon 5/14/2018, Until Wed 5/1/2019, Print       !! - Potential duplicate medications found. Please discuss with provider.          Discharge Diet:  Diabetic     Activity: activity as tolerated    Discharge Condition: Stable    Disposition: Home or Self Care     Time spent on the discharge of the patient including review of hospital course with the patient. reviewing discharge medications and arranging follow-up care 35 minutes.  Patient was seen and examined on the date of discharge and determined to be suitable for discharge.    Future Appointments   Date Time Provider Department Center   7/15/2019  6:15 PM Serena Gibson DPM [facility]   7/22/2019  8:00 AM HOME MONITOR DEVICE CHECK, [facility] MEGHPRO [facility]   7/24/2019 11:30 AM LAB, APPOINTMENT [facility] LAB VNP [facility]   7/24/2019  1:00 PM Dandre Jules MD [facility] HEARTTX [facility]   7/29/2019 10:30 AM LAB, COUMADIN 2 [facility] COUMAD [facility]   10/18/2019 10:00 AM [facility] Leger [facility], [facility]   10/21/2019  8:00 AM HOME MONITOR DEVICE CHECK, [facility] ARRRO [facility]       Naya Ruvalcaba MD  [facility] Medicine  Cell:  [phone]  Spectra:  [id number]  Pager:  [phone]

## 2019-07-05 NOTE — H&P
[facility] Hospital Medicine  History & Physical    Patient Name: Laure Ross  MRN: [id number]  Admission Date: 7/3/2019  Attending Physician: Naya Ruvalcaba MD  Primary Care Provider: Holly Mittal MD    [facility] Medicine Team: [facility] F Brittney Kevin NP     Patient information was obtained from patient and ER records.     Subjective:     Principal Problem:Asthma exacerbation    Chief Complaint:   Chief Complaint   Patient presents with    Shortness of Breath     Shortness of breath with pain under the left breast. Pt states she tested positive for the flu last night at a clinic.         HPI: Patient, PMHx of HFrEF 30%; AICD; a fib on coumadin; cardiomyophaty, HTN, DMII, GERD, asthma/COPD, is admitted to hospital medicine for influenza B. Patient was seen in clinic and diagnosed with influenza yesterday. Patient states she has been short of breath for since Sunday. Patient feels her asthma has been exacerbated by the viral infection. Patient states she has a fever last night. Patient denies sputum production, but states she has a dry cough. Denies abominal pain, nausea, vomiting. Of note, patient does have left sided chest pain, musculoskeletal in nature, worse with coughing, which was what prompted her to be seen in ED.      In ED: CXR concerning for lobulated soft tissue nodule due occupying the posterior segment of the right upper lobe suggestive of pulmonary AV malformation, no flor pulmonary edema; Lasix 80mg and oseltamivir initiated; Tmax 100.6; lactate 1.4; no leukocytosis; trop 0.034 (baseline);  (baseline); creat 1.5 (baseline), coumadin 1.5; EKG paced rhythm      Active Diagnoses:    Diagnosis Date Noted POA    PRINCIPAL PROBLEM:  Asthma exacerbation [J45.901] 04/20/2018 Yes    Influenza B [J10.1] 07/03/2019 Yes    Paroxysmal atrial fibrillation [I48.0] 04/22/2016 Yes    Type 2 diabetes mellitus with diabetic polyneuropathy [E11.42] 04/20/2016 Yes      Chronic    Abnormal finding on lung imaging [R91.8] 07/03/2019 Yes    Acute URI [J06.9] 03/19/2019 Yes    Biventricular automatic implantable cardioverter defibrillator in situ [Z95.810] 08/03/2018 Yes    Essential hypertension [I10] 03/28/2018 Yes    NICM (nonischemic cardiomyopathy) [I42.8] 05/04/2017 Yes    CKD (chronic kidney disease), stage III [N18.3] 03/17/2017 Yes    Chronic combined systolic and diastolic congestive heart failure [I50.42] 06/03/2016 Yes    Chronic anticoagulation [Z79.01] 05/10/2016 Not Applicable    Obesity with body mass index (BMI) of 30.0 to 39.9 [E66.9] 04/25/2016 Yes    CLARENCE (obstructive sleep apnea) [G47.33]  Yes      Problems Resolved During this Admission:     VTE Risk Mitigation (From admission, onward)        Ordered     IP VTE HIGH RISK PATIENT  Once      07/03/19 2140            Past Medical History:   Diagnosis Date    Anticoagulant long-term use     Arthritis     Asthma     Asthma     Atrial fibrillation     Cardiomyopathy     Cardiomyopathy     CHF (congestive heart failure)     CHF (congestive heart failure)     Chronic combined systolic and diastolic heart failure 6/3/2016    COPD (chronic obstructive pulmonary disease) 3/28/2018    GERD (gastroesophageal reflux disease)     H/O tubal ligation     Hypertension     Obesity     Renal disorder     Type 2 diabetes mellitus with hyperglycemia     Type 2 diabetes mellitus with polyneuropathy        Past Surgical History:   Procedure Laterality Date    CARDIAC DEFIBRILLATOR PLACEMENT      CARDIAC DEFIBRILLATOR PLACEMENT      CARDIAC DEFIBRILLATOR PLACEMENT      CARDIOVERSION N/A 4/19/2016    Performed by Navi Jolly MD at [facility] CATH LAB    CHOLECYSTECTOMY      COLONOSCOPY N/A 5/2/2016    Performed by Eugene Soto MD at [facility] ENDO (2ND FLR)    ECHOCARDIOGRAM-TRANSESOPHAGEAL N/A 4/15/2016    Performed by Rossana Surgeon at [facility] ROSSANA    ESOPHAGOGASTRODUODENOSCOPY (EGD) N/A 11/17/2016     Performed by Joe Magana MD at [facility] END[facility] (2ND FLR)    GALLBLADDER SURGERY      iabp  4/2016    INSERTION-ICD-BIVENTRICULAR Right 5/4/2017    Performed by Navi Jolly MD at [facility] CATH LAB    TUBAL LIGATION         Review of patient's allergies indicates:  No Known Allergies    No current facility-administered medications on file prior to encounter.      Current Outpatient Medications on File Prior to Encounter   Medication Sig    allopurinol (ZYLOPRIM) 100 MG tablet TAKE 1 TABLET BY MOUTH EVERY DAY    atorvastatin (LIPITOR) 20 MG tablet Take 1 tablet (20 mg total) by mouth once daily.    budesonide-formoterol 160-4.5 mcg (SYMBICORT) 160-4.5 mcg/actuation HFAA INHALE 2 PUFFS BY MOUTH TWICE DAILY. RINSE MOUTH AFTER USE.    cetirizine (ZYRTEC) 10 MG tablet TAKE 1 TABLET AT BEDTIME for allergy relief    cyclobenzaprine (FEXMID) 7.5 MG Tab TAKE 1 TABLET 3 TIMES DAILY AS NEEDED FOR MUSCLE SPASM.    dicyclomine (BENTYL) 10 MG capsule TAKE 1 CAPSULE 4 TIMES DAILY as needed for abdominal pain    digoxin (LANOXIN) 125 mcg tablet TAKE 1 TABLET BY MOUTH ONCE DAILY    dulaglutide (TRULICITY) 0.75 mg/0.5 mL PnIj Inject 0.5 mLs (0.75 mg total) into the skin every 7 days.    dupilumab (DUPIXENT) 300 mg/2 mL Syrg Inject 600mg into the skin for 1 dose, then inject 300mg into the skin every 2 weeks.    dupilumab (DUPIXENT) 300 mg/2 mL Syrg Inject 2 mLs (300 mg total) into the skin every 14 (fourteen) days.    ergocalciferol (ERGOCALCIFEROL) 50,000 unit Cap TAKE 1 CAPSULE BY MOUTH WEEKLY.    famotidine (PEPCID) 40 MG tablet TAKE 1 TABLET BY MOUTH TWICE DAILY.    furosemide (LASIX) 40 MG tablet Take 2 tablets (80 mg total) by mouth 2 (two) times daily.    insulin (BASAGLAR KWIKPEN U-100 INSULIN) glargine 100 units/mL (3mL) SubQ pen inject 34 under the skin in the morning and 34 units at night    insulin aspart U-100 (NOVOLOG) 100 unit/mL (3 mL) InPn pen Inject 16 units into the skin with meals plus scale     "levalbuterol (XOPENEX HFA) 45 mcg/actuation inhaler INHALE 1 TO 2 PUFFS EVERY 4 TO 6 HOURS AS NEEDED    levalbuterol (XOPENEX) 0.31 mg/3 mL nebulizer solution Take 3 mLs (0.31 mg total) by nebulization every 4 (four) hours as needed for Wheezing. Rescue    linaCLOtide (LINZESS) 72 mcg Cap capsule Take one tablet by mouth daily as needed for constipation    linaclotide 72 mcg Cap take one tablet daily as needed for constipation    lisinopril (PRINIVIL,ZESTRIL) 5 MG tablet TAKE 1 TABLET BY MOUTH TWO TIMES A DAY    magnesium oxide (MAG-OX) 400 mg (241.3 mg magnesium) tablet TAKE 1 TABLET BY MOUTH TWICE DAILY    montelukast (SINGULAIR) 10 mg tablet TAKE 1 TABLET BY MOUTH DAILY.    potassium chloride (MICRO-K) 10 MEQ CpSR Take 2 capsules (20 mEq total) by mouth once daily.    spironolactone (ALDACTONE) 25 MG tablet Take 1 tablet (25 mg total) by mouth once daily.    warfarin (COUMADIN) 7.5 MG tablet Take ½ tablet by mouth once daily and 1 tablet on thursday (Patient taking differently: Coumadin dose 7.5 mg on Monday and  1/2 tab rest of days)    acetaminophen (TYLENOL) 650 MG TbSR TAKE 1 TABLET every four hours AS NEEDED for shoulder pain    azelastine (ASTELIN) 137 mcg (0.1 %) nasal spray 2 sprays (274 mcg total) by Nasal route 2 (two) times daily.    azelastine (ASTELIN) 137 mcg (0.1 %) nasal spray Use 1 to 2 sprays per nostril twice daily as needed. Us in addition to our nasal steroid spray    baclofen (LIORESAL) 10 MG tablet TAKE 1 TABLET BY MOUTH THREE TIMES DAILY AS NEEDED FOR MUSCLE SPASM    BD ULTRA-FINE ESSENCE PEN NEEDLES 32 gauge x 5/32" Ndle Takes 5 times  day    blood sugar diagnostic Strp Uses 4 times a day, true metrix meter.    ciclopirox (PENLAC) 8 % Soln Apply topically nightly.    cyclobenzaprine (FEXMID) 7.5 MG Tab TAKE 1 TABLET 3 TIMES DAILY AS NEEDED FOR MUSCLE SPASM.    diclofenac sodium (VOLTAREN) 1 % Gel APPLY 2 grams SPARINGLY TO Knees ONCE DAILY    fluticasone (FLONASE) 50 " "mcg/actuation nasal spray USE TWO SPRAYS IN EACH NOSTRIL ONCE DAILY for cold    fluticasone propionate (FLOVENT HFA) 110 mcg/actuation inhaler INHALE 2 PUFFS TWICE DAILY. RINSE MOUTH AFTER USE.    hydrOXYzine pamoate (VISTARIL) 25 MG Cap TAKE 1 CAPSULE BY MOUTH EVERY 8 HOURS AS NEEDED FOR ITCHING    ketoconazole (NIZORAL) 2 % cream APPLY SPARINGLY TO AFFECTED AREA(S) ONCE DAILY    ketoconazole (NIZORAL) 2 % cream APPLY SPARINGLY TO AFFECTED AREA(S) ONCE DAILY    ketoconazole (NIZORAL) 2 % shampoo APPLY TO SKIN EVERY DAY FOR 5 MINUTES AND RINSE FOR ABOUT 1 TO 2 WEEKS    lancets 30 gauge Misc use 4 times a day    pen needle, diabetic 32 gauge x 5/32" Ndle USE AS DIRECTED 5 TIMES DAILY    pen needle, diabetic 32 gauge x 5/32" Ndle USE WITH INSULIN PEN TWICE DAILY    polyethylene glycol (GLYCOLAX) 17 gram/dose powder MIX 1 CAPFUL IN 8 OUNCES OF LIQUID AND DRINK ONCE DAILY as needed for constipation    sodium chloride for inhalation (SODIUM CHLORIDE 0.9%) 0.9 % nebulizer solution Use 1 vial via nebulization three times daily. Mix with xopenex solution    triamcinolone acetonide 0.1% (KENALOG) 0.1 % cream Apply topically 2 (two) times daily. Apply twice a day for 7 days to arms.    [DISCONTINUED] oseltamivir (TAMIFLU) 75 MG capsule Take 1 capsule (75 mg total) by mouth 2 (two) times daily. for 5 days     Family History     Problem Relation (Age of Onset)    Heart disease Mother, Father        Tobacco Use    Smoking status: Never Smoker    Smokeless tobacco: Never Used   Substance and Sexual Activity    Alcohol use: No    Drug use: No    Sexual activity: Yes     Partners: Male     Review of Systems   Constitutional: Positive for chills and fever. Negative for diaphoresis and fatigue.   HENT: Positive for sinus pressure and sinus pain. Negative for drooling, facial swelling, trouble swallowing and voice change.    Respiratory: Positive for cough, shortness of breath and wheezing. Negative for chest " tightness.    Cardiovascular: Positive for chest pain (muculoskeletal; worse with cough). Negative for palpitations and leg swelling.   Gastrointestinal: Negative for abdominal pain, constipation, diarrhea, nausea and vomiting.   Genitourinary: Negative for dysuria, flank pain and hematuria.   Skin: Negative for color change and rash.   Neurological: Negative for dizziness, weakness, light-headedness and headaches.   Objective:     Vital Signs (Most Recent):  Temp: 97.9 °F (36.6 °C) (07/04/19 1222)  Pulse: 67 (07/04/19 1222)  Resp: 18 (07/04/19 1222)  BP: 125/80 (07/04/19 1222)  SpO2: 96 % (07/04/19 1222) Vital Signs (24h Range):  Temp:  [96.7 °F (35.9 °C)-98.5 °F (36.9 °C)] 97.9 °F (36.6 °C)  Pulse:  [67-91] 67  Resp:  [16-18] 18  SpO2:  [93 %-100 %] 96 %  BP: (103-125)/(58-80) 125/80     Weight: 118.7 kg (261 lb 11 oz)  Body mass index is 43.55 kg/m².    Physical Exam    Constitutional: She is oriented to person, place, and time. She appears well-developed and well-nourished. No distress.   HENT:   Head: Normocephalic and atraumatic.   Mouth/Throat: Mucous membranes are normal.   Eyes: Pupils are equal, round, and reactive to light. Conjunctivae and EOM are normal.   Neck: Trachea normal. No JVD present. No tracheal deviation present.   Cardiovascular: Normal rate and regular rhythm.   No murmur heard.  Pulmonary/Chest: Effort normal. No stridor. No respiratory distress. She has wheezes (end expiratory wheezes noted in bilat lower lobes). She has no rhonchi. She has no rales. She exhibits tenderness (left sided; TTP).   Abdominal: Soft. Bowel sounds are normal. She exhibits no distension, no ascites and no mass. There is no tenderness. There is no guarding.   Musculoskeletal: She exhibits no edema or deformity.   Neurological: She is alert and oriented to person, place, and time. No cranial nerve deficit.   Skin: Skin is warm, dry and intact. Capillary refill takes less than 2 seconds. No rash noted. She is not  diaphoretic. No cyanosis or erythema. Nails show no clubbing.   Psychiatric: She has a normal mood and affect. Her speech is normal and behavior is normal. Judgment and thought content normal. Cognition and memory are normal.   Nursing note and vitals reviewed  CRANIAL NERVES      CN III, IV, VI   Pupils are equal, round, and reactive to light.  Extraocular motions are normal.        Significant Labs:   CBC:   Recent Labs   Lab 07/03/19  1554 07/04/19  0446   WBC 5.55 5.15   HGB 11.7* 11.3*   HCT 38.9 37.5    252     CMP:   Recent Labs   Lab 07/03/19  1554 07/04/19  0446   * 137   K 4.4 4.4    102   CO2 28 24   * 162*   BUN 19 19   CREATININE 1.5* 1.5*   CALCIUM 9.3 8.7   PROT 6.9 6.4   ALBUMIN 3.5 3.4*   BILITOT 0.4 0.3   ALKPHOS 67 64   AST 22 21   ALT 20 19   ANIONGAP 7* 11   EGFRNONAA 40.6* 40.6*       Significant Imaging: I have reviewed all pertinent imaging results/findings within the past 24 hours.    Assessment/Plan:   * Asthma exacerbation  Acute uri  Influenza B  Fever  - wheezes noted on examination; patient states has improved since arrival  - xopenex every 8 hours  - continue controller inhalers   - blood cultures pending  - CXR without acute infection  - no leukocytosis  - Tmax 100.6  - lactate 1.4  - continue tamilfu BID for 5 days   - will monitor overnight; likely discharge in AM if patient continues to improve        Paroxysmal atrial fibrillation  Long term anticoagulation  - INR 1.5   - patient EKG paced  - continue digoxin; patient no longer on amiodarone   - continue coumadin; patient recently seen in coumadin clinic (07/01) and dosage adjusted; followed recommended dosing on admission  - do not think patient needs inpatient pharmacy consult as she is followed closely outpatient with recently adjustments to her subtherapeutic INR  - tele        Type 2 diabetes mellitus with diabetic polyneuropathy  - A1c pending  - last A1c 7.4 in 10/18  - Outpatient regime: Levemir  "34 unit BID and aspart 16 units with meals  - Inpatient regime: Levemir 23 units BID and aspart 11 units with meals; low correction sliding scale  - Diabetic diet        Abnormal finding on lung imaging  - CXR "patient has a known lobulated soft tissue lesion occupying the posterior segment of the right upper lobe, incompletely visualized on current and recent chest radiographs due to the overlying generator.  On today's study it projects over the anterior aspect of the 2nd rib at the level of the 6th intercostal space.  This lesion is present on earlier studies dating back to at least 04/26/2016 and is also well seen on the intervening chest CTs of 11/21/2017 and 06/04/2018.  On prior CT examinations the lesion measured up to 2.5 cm. Differential diagnosis includes indolent neoplasm, mucoid impaction in ectatic subsegmental airways, or vascular malformation.  CT performed 06/04/2018 strongly suggests the presence of feeding and draining vessels, concerning for AV malformation placing the patient at risk for paradoxical embolization"  - CT chest outpatient and pulmonology referral on discharge         Essential hypertension  - lisinopril      NICM (nonischemic cardiomyopathy)  -Biventricular automatic implantable cardioverter defibrillator in situ  - see heart failure above  - patient states she has not been shocked by her AICD since about 2015     CKD (chronic kidney disease), stage III  - stable and at baseline  - renal dose meds if necessary         Chronic combined systolic and diastolic congestive heart failure  - patient does not appear grossly fluid overloaded; patient also expresses she feels her symptoms are asthmatic>CHF  -  (baseline)  - troponin 0.034 (baseline); will check second to ensure flat  - lasix 80mg IVP given in ED; will evaluate in AM if addition IV dosing needed  - continue lasix and spironolactone   - Previous echo 03/20/19 severe left ventricular enlargement, EF 30%, grade II DD, " left artrial enlargement         Obesity with body mass index (BMI) of 30.0 to 39.9  - weight loss encouraged        CLARENCE (obstructive sleep apnea)  - CPAP            Brittney Kevin NP  Department of Hospital Medicine   [facility]

## 2019-07-05 NOTE — TELEPHONE ENCOUNTER
----- Message from Rosemary Miller sent at 7/5/2019 12:57 PM CDT -----  Contact: Pt called   Pt calling concha find out if it's okay for her to take prednisone. Please call pt @ [phone]. Thank you.

## 2019-07-09 DIAGNOSIS — I50.42 CHRONIC COMBINED SYSTOLIC AND DIASTOLIC CONGESTIVE HEART FAILURE: ICD-10-CM

## 2019-07-09 LAB
BACTERIA BLD CULT: NORMAL
BACTERIA BLD CULT: NORMAL

## 2019-07-09 RX ORDER — LANOLIN ALCOHOL/MO/W.PET/CERES
CREAM (GRAM) TOPICAL 2 TIMES DAILY
Qty: 60 TABLET | Refills: 1 | Status: CANCELLED | OUTPATIENT
Start: 2019-07-02 | End: 2020-01-01

## 2019-07-09 NOTE — PROGRESS NOTES
Patient called to report that she was in ER 7/03 and discharged 7/04, took Prednisone -50mg for 3 days and Temaflu--finished the Temaflu this am, INR 7/04 was 1.6, next INR is due 7/29, , reports coumadin dose remained the same,  Patient's call back # [phone]

## 2019-07-10 ENCOUNTER — LAB VISIT (OUTPATIENT)
Dept: LAB | Facility: HOSPITAL | Age: 49
End: 2019-07-10
Payer: MEDICAID

## 2019-07-10 ENCOUNTER — ANTI-COAG VISIT (OUTPATIENT)
Dept: CARDIOLOGY | Facility: CLINIC | Age: 49
End: 2019-07-10

## 2019-07-10 DIAGNOSIS — I48.0 PAROXYSMAL ATRIAL FIBRILLATION: ICD-10-CM

## 2019-07-10 DIAGNOSIS — I50.42 CHRONIC COMBINED SYSTOLIC AND DIASTOLIC CONGESTIVE HEART FAILURE: ICD-10-CM

## 2019-07-10 DIAGNOSIS — Z79.01 CHRONIC ANTICOAGULATION: ICD-10-CM

## 2019-07-10 DIAGNOSIS — Z79.01 LONG TERM (CURRENT) USE OF ANTICOAGULANTS: ICD-10-CM

## 2019-07-10 LAB
INR PPP: 2.5 (ref 0.8–1.2)
PROTHROMBIN TIME: 24.5 SEC (ref 9–12.5)

## 2019-07-10 PROCEDURE — 85610 PROTHROMBIN TIME: CPT

## 2019-07-10 PROCEDURE — 36415 COLL VENOUS BLD VENIPUNCTURE: CPT

## 2019-07-10 RX ORDER — LANOLIN ALCOHOL/MO/W.PET/CERES
CREAM (GRAM) TOPICAL 2 TIMES DAILY
Qty: 60 TABLET | Refills: 1 | Status: CANCELLED | OUTPATIENT
Start: 2019-07-02 | End: 2020-01-01

## 2019-07-12 DIAGNOSIS — I50.42 CHRONIC COMBINED SYSTOLIC AND DIASTOLIC CONGESTIVE HEART FAILURE: ICD-10-CM

## 2019-07-12 RX ORDER — LANOLIN ALCOHOL/MO/W.PET/CERES
CREAM (GRAM) TOPICAL 2 TIMES DAILY
Qty: 60 TABLET | Refills: 1 | Status: CANCELLED | OUTPATIENT
Start: 2019-07-02 | End: 2020-01-01

## 2019-07-12 RX ORDER — INSULIN GLARGINE 100 [IU]/ML
INJECTION, SOLUTION SUBCUTANEOUS
Qty: 9 ML | Refills: 6 | Status: CANCELLED | OUTPATIENT
Start: 2019-07-12 | End: 2020-01-01

## 2019-07-15 ENCOUNTER — OFFICE VISIT (OUTPATIENT)
Dept: PODIATRY | Facility: CLINIC | Age: 49
End: 2019-07-15
Payer: MEDICAID

## 2019-07-15 VITALS — BODY MASS INDEX: 43.43 KG/M2 | WEIGHT: 261 LBS

## 2019-07-15 DIAGNOSIS — B35.1 ONYCHOMYCOSIS DUE TO DERMATOPHYTE: ICD-10-CM

## 2019-07-15 DIAGNOSIS — E11.49 TYPE II DIABETES MELLITUS WITH NEUROLOGICAL MANIFESTATIONS: Primary | ICD-10-CM

## 2019-07-15 DIAGNOSIS — L84 CORN OR CALLUS: ICD-10-CM

## 2019-07-15 DIAGNOSIS — M20.41 HAMMER TOES OF BOTH FEET: ICD-10-CM

## 2019-07-15 DIAGNOSIS — M20.42 HAMMER TOES OF BOTH FEET: ICD-10-CM

## 2019-07-15 PROCEDURE — 11056 PR TRIM BENIGN HYPERKERATOTIC SKIN LESION,2-4: ICD-10-PCS | Mod: S$PBB,,, | Performed by: PODIATRIST

## 2019-07-15 PROCEDURE — 11721 DEBRIDE NAIL 6 OR MORE: CPT | Mod: 59,S$PBB,, | Performed by: PODIATRIST

## 2019-07-15 PROCEDURE — 11721 DEBRIDE NAIL 6 OR MORE: CPT | Mod: Q9,PBBFAC,PO | Performed by: PODIATRIST

## 2019-07-15 PROCEDURE — 99999 PR PBB SHADOW E&M-EST. PATIENT-LVL III: CPT | Mod: PBBFAC,,, | Performed by: PODIATRIST

## 2019-07-15 PROCEDURE — 11056 PARNG/CUTG B9 HYPRKR LES 2-4: CPT | Mod: S$PBB,,, | Performed by: PODIATRIST

## 2019-07-15 PROCEDURE — 99999 PR PBB SHADOW E&M-EST. PATIENT-LVL III: ICD-10-PCS | Mod: PBBFAC,,, | Performed by: PODIATRIST

## 2019-07-15 PROCEDURE — 99213 OFFICE O/P EST LOW 20 MIN: CPT | Mod: PBBFAC,PO | Performed by: PODIATRIST

## 2019-07-15 PROCEDURE — 99499 NO LOS: ICD-10-PCS | Mod: S$PBB,,, | Performed by: PODIATRIST

## 2019-07-15 PROCEDURE — 11056 PARNG/CUTG B9 HYPRKR LES 2-4: CPT | Mod: Q9,PBBFAC,PO | Performed by: PODIATRIST

## 2019-07-15 PROCEDURE — 99499 UNLISTED E&M SERVICE: CPT | Mod: S$PBB,,, | Performed by: PODIATRIST

## 2019-07-15 PROCEDURE — 11721 PR DEBRIDEMENT OF NAILS, 6 OR MORE: ICD-10-PCS | Mod: 59,S$PBB,, | Performed by: PODIATRIST

## 2019-07-16 NOTE — PROGRESS NOTES
Subjective:      Patient ID: Laure Ross is a 48 y.o. female.    Chief Complaint: Diabetes Mellitus (Pcp Dr. Mittal  06/28/19); Diabetic Foot Exam; and Nail Care    Laure is a 48 y.o. female who presents to the clinic for evaluation and treatment of high risk feet. Laure has a past medical history of Anticoagulant long-term use, Arthritis, Asthma, Asthma, Atrial fibrillation, Cardiomyopathy, Cardiomyopathy, CHF (congestive heart failure), CHF (congestive heart failure), Chronic combined systolic and diastolic heart failure (6/3/2016), COPD (chronic obstructive pulmonary disease) (3/28/2018), GERD (gastroesophageal reflux disease), H/O tubal ligation, Hypertension, Obesity, Renal disorder, Type 2 diabetes mellitus with hyperglycemia, and Type 2 diabetes mellitus with polyneuropathy. The patient's chief complaint is long, thick toenails. This patient has documented high risk feet requiring routine maintenance secondary to diabetes mellitis and those secondary complications of diabetes, as mentioned..      Previously seen by my colleague, new to me.    PCP: Holly Mittal MD    Date Last Seen by PCP:   Chief Complaint   Patient presents with    Diabetes Mellitus     Pcp Dr. Mittal  06/28/19    Diabetic Foot Exam    Nail Care       Current shoe gear:  Slip-on shoes    Hemoglobin A1C   Date Value Ref Range Status   07/03/2019 6.9 (H) 4.0 - 5.6 % Final     Comment:     ADA Screening Guidelines:  5.7-6.4%  Consistent with prediabetes  >or=6.5%  Consistent with diabetes  High levels of fetal hemoglobin interfere with the HbA1C  assay. Heterozygous hemoglobin variants (HbS, HgC, etc)do  not significantly interfere with this assay.   However, presence of multiple variants may affect accuracy.     10/01/2018 7.4 (H) 4.0 - 5.6 % Final     Comment:     ADA Screening Guidelines:  5.7-6.4%  Consistent with prediabetes  >or=6.5%  Consistent with diabetes  High levels of fetal hemoglobin interfere with the HbA1C  assay.  Heterozygous hemoglobin variants (HbS, HgC, etc)do  not significantly interfere with this assay.   However, presence of multiple variants may affect accuracy.     03/28/2018 6.2 (H) 4.0 - 5.6 % Final     Comment:     According to ADA guidelines, hemoglobin A1c <7.0% represents  optimal control in non-pregnant diabetic patients. Different  metrics may apply to specific patient populations.   Standards of Medical Care in Diabetes-2016.  For the purpose of screening for the presence of diabetes:  <5.7%     Consistent with the absence of diabetes  5.7-6.4%  Consistent with increasing risk for diabetes   (prediabetes)  >or=6.5%  Consistent with diabetes  Currently, no consensus exists for use of hemoglobin A1c  for diagnosis of diabetes for children.  This Hemoglobin A1c assay has significant interference with fetal   hemoglobin   (HbF). The results are invalid for patients with abnormal amounts of   HbF,   including those with known Hereditary Persistence   of Fetal Hemoglobin. Heterozygous hemoglobin variants (HbAS, HbAC,   HbAD, HbAE, HbA2) do not significantly interfere with this assay;   however, presence of multiple variants in a sample may impact the %   interference.             Patient Active Problem List   Diagnosis    Hypertensive heart disease with heart failure    Type 2 diabetes mellitus with diabetic polyneuropathy    CLARENCE (obstructive sleep apnea)    Paroxysmal atrial fibrillation    Obesity with body mass index (BMI) of 30.0 to 39.9    Encounter for pre-transplant evaluation for heart transplant    Palliative care encounter    Fatigue    Breast mass on GYN exam 4/29/16    Left bundle-branch block    Organ transplant candidate    Tuberculosis screening    Vitamin D deficiency disease    Chronic anticoagulation    Muscle weakness    Chronic combined systolic and diastolic congestive heart failure    COCM (congestive cardiomyopathy)    High risk medications (not anticoagulants) long-term  "use    Laryngopharyngeal reflux    Gastroesophageal reflux disease    Chronic rhinitis    Dysphonia    GERD (gastroesophageal reflux disease)    CKD (chronic kidney disease), stage III    Carpal tunnel syndrome, bilateral    NICM (nonischemic cardiomyopathy)    Acute diastolic CHF (congestive heart failure), NYHA class 3    History of diabetic ulcer of foot - Right Foot    Chest pain    Essential hypertension    Asthma exacerbation    Biventricular automatic implantable cardioverter defibrillator in situ    Amiodarone toxicity    Acute URI    Maxillary sinusitis    Influenza B    Abnormal finding on lung imaging       Current Outpatient Medications on File Prior to Visit   Medication Sig Dispense Refill    acetaminophen (TYLENOL) 650 MG TbSR TAKE 1 TABLET every four hours AS NEEDED for shoulder pain 180 tablet 2    allopurinol (ZYLOPRIM) 100 MG tablet TAKE 1 TABLET BY MOUTH EVERY DAY 90 tablet 5    atorvastatin (LIPITOR) 20 MG tablet Take 1 tablet (20 mg total) by mouth once daily. 90 tablet 1    azelastine (ASTELIN) 137 mcg (0.1 %) nasal spray 2 sprays (274 mcg total) by Nasal route 2 (two) times daily. 30 mL 0    azelastine (ASTELIN) 137 mcg (0.1 %) nasal spray Use 1 to 2 sprays per nostril twice daily as needed. Us in addition to our nasal steroid spray 30 mL 3    baclofen (LIORESAL) 10 MG tablet TAKE 1 TABLET BY MOUTH THREE TIMES DAILY AS NEEDED FOR MUSCLE SPASM 30 tablet 0    BD ULTRA-FINE ESSENCE PEN NEEDLES 32 gauge x 5/32" Ndle Takes 5 times  day 200 each 11    blood sugar diagnostic Strp Uses 4 times a day, true metrix meter. 150 each 11    budesonide-formoterol 160-4.5 mcg (SYMBICORT) 160-4.5 mcg/actuation HFAA INHALE 2 PUFFS BY MOUTH TWICE DAILY. RINSE MOUTH AFTER USE. 10.2 g 3    cetirizine (ZYRTEC) 10 MG tablet TAKE 1 TABLET AT BEDTIME for allergy relief 30 tablet 3    ciclopirox (PENLAC) 8 % Soln Apply topically nightly. 6.6 mL 3    cyclobenzaprine (FEXMID) 7.5 MG Tab TAKE 1 " TABLET 3 TIMES DAILY AS NEEDED FOR MUSCLE SPASM. 21 tablet 0    cyclobenzaprine (FEXMID) 7.5 MG Tab TAKE 1 TABLET 3 TIMES DAILY AS NEEDED FOR MUSCLE SPASM. 21 tablet 0    diclofenac sodium (VOLTAREN) 1 % Gel APPLY 2 grams SPARINGLY TO Knees ONCE DAILY 100 g 3    dicyclomine (BENTYL) 10 MG capsule TAKE 1 CAPSULE 4 TIMES DAILY as needed for abdominal pain 30 capsule 0    digoxin (LANOXIN) 125 mcg tablet TAKE 1 TABLET BY MOUTH ONCE DAILY 30 tablet 5    dulaglutide (TRULICITY) 0.75 mg/0.5 mL PnIj Inject 0.5 mLs (0.75 mg total) into the skin every 7 days. 2 mL 6    dupilumab (DUPIXENT) 300 mg/2 mL Syrg Inject 600mg into the skin for 1 dose, then inject 300mg into the skin every 2 weeks. 4 mL 11    dupilumab (DUPIXENT) 300 mg/2 mL Syrg Inject 2 mLs (300 mg total) into the skin every 14 (fourteen) days. 4 mL 11    ergocalciferol (ERGOCALCIFEROL) 50,000 unit Cap TAKE 1 CAPSULE BY MOUTH WEEKLY. 12 capsule 0    famotidine (PEPCID) 40 MG tablet TAKE 1 TABLET BY MOUTH TWICE DAILY. 180 tablet 1    fluticasone (FLONASE) 50 mcg/actuation nasal spray USE TWO SPRAYS IN EACH NOSTRIL ONCE DAILY for cold 48 g 1    fluticasone propionate (FLOVENT HFA) 110 mcg/actuation inhaler INHALE 2 PUFFS TWICE DAILY. RINSE MOUTH AFTER USE. 12 g 3    furosemide (LASIX) 40 MG tablet Take 2 tablets (80 mg total) by mouth 2 (two) times daily. 120 tablet 6    hydrOXYzine pamoate (VISTARIL) 25 MG Cap TAKE 1 CAPSULE BY MOUTH EVERY 8 HOURS AS NEEDED FOR ITCHING 30 capsule 0    insulin (BASAGLAR KWIKPEN U-100 INSULIN) glargine 100 units/mL (3mL) SubQ pen inject 34 under the skin in the morning and 34 units at night 9 mL 6    insulin aspart U-100 (NOVOLOG) 100 unit/mL (3 mL) InPn pen Inject 16 units into the skin with meals plus scale 15 mL 6    ketoconazole (NIZORAL) 2 % cream APPLY SPARINGLY TO AFFECTED AREA(S) ONCE DAILY 30 g 2    ketoconazole (NIZORAL) 2 % cream APPLY SPARINGLY TO AFFECTED AREA(S) ONCE DAILY 30 g 2    ketoconazole  "(NIZORAL) 2 % shampoo APPLY TO SKIN EVERY DAY FOR 5 MINUTES AND RINSE FOR ABOUT 1 TO 2 WEEKS 120 mL 1    lancets 30 gauge Misc use 4 times a day 150 each 2    levalbuterol (XOPENEX HFA) 45 mcg/actuation inhaler INHALE 1 TO 2 PUFFS EVERY 4 TO 6 HOURS AS NEEDED 15 g 3    levalbuterol (XOPENEX) 0.31 mg/3 mL nebulizer solution Take 3 mLs (0.31 mg total) by nebulization every 4 (four) hours as needed for Wheezing. Rescue 72 mL 3    linaCLOtide (LINZESS) 72 mcg Cap capsule Take one tablet by mouth daily as needed for constipation 30 capsule 2    linaclotide 72 mcg Cap take one tablet daily as needed for constipation 30 capsule 2    lisinopril (PRINIVIL,ZESTRIL) 5 MG tablet TAKE 1 TABLET BY MOUTH TWO TIMES A DAY 60 tablet 8    magnesium oxide (MAG-OX) 400 mg (241.3 mg magnesium) tablet TAKE 1 TABLET BY MOUTH TWICE DAILY 60 tablet 1    montelukast (SINGULAIR) 10 mg tablet TAKE 1 TABLET BY MOUTH DAILY. 30 tablet 3    pen needle, diabetic 32 gauge x 5/32" Ndle USE AS DIRECTED 5 TIMES DAILY 200 each 5    pen needle, diabetic 32 gauge x 5/32" Ndle USE WITH INSULIN PEN TWICE DAILY 100 each 2    polyethylene glycol (GLYCOLAX) 17 gram/dose powder MIX 1 CAPFUL IN 8 OUNCES OF LIQUID AND DRINK ONCE DAILY as needed for constipation 510 g 6    potassium chloride (MICRO-K) 10 MEQ CpSR Take 2 capsules (20 mEq total) by mouth once daily. 60 capsule 11    sodium chloride for inhalation (SODIUM CHLORIDE 0.9%) 0.9 % nebulizer solution Use 1 vial via nebulization three times daily. Mix with xopenex solution 90 mL 2    spironolactone (ALDACTONE) 25 MG tablet Take 1 tablet (25 mg total) by mouth once daily. 90 tablet 3    warfarin (COUMADIN) 7.5 MG tablet Take ½ tablet by mouth once daily and 1 tablet on thursday (Patient taking differently: Coumadin dose 7.5 mg on Monday and  1/2 tab rest of days) 60 tablet 3    triamcinolone acetonide 0.1% (KENALOG) 0.1 % cream Apply topically 2 (two) times daily. Apply twice a day for 7 days " to arms. 45 g 0     No current facility-administered medications on file prior to visit.        Review of patient's allergies indicates:  No Known Allergies    Past Surgical History:   Procedure Laterality Date    CARDIAC DEFIBRILLATOR PLACEMENT      CARDIAC DEFIBRILLATOR PLACEMENT      CARDIAC DEFIBRILLATOR PLACEMENT      CARDIOVERSION N/A 4/19/2016    [facility] CATH LAB    CHOLECYSTECTOMY      COLONOSCOPY N/A 5/2/2016    Performed by Eugene Soto [facility] at [facility] ENDO (2ND FLR)    ECHOCARDIOGRAM-TRANSESOPHAGEAL N/A 4/15/2016    Performed by Rossana Surgeon at [facility] ROSSANA    ESOPHAGOGASTRODUODENOSCOPY (EGD) N/A 11/17/2016    Performed by Joe Magana MD at [facility] ENDO (2ND FLR)    GALLBLADDER SURGERY      iabp  4/2016    INSERTION-ICD-BIVENTRICULAR Right 5/4/2017    Per[facility] at [facility] CATH LAB    TUBAL LIGATION         Family History   Problem Relation Age of Onset    Heart disease Mother     Heart disease Father        Social History     Socioeconomic History    Marital status:      Spouse name: Not on file    Number of children: Not on file    Years of education: Not on file    Highest education level: Not on file   Occupational History    Not on file   Social Needs    Financial resource strain: Not on file    Food insecurity:     Worry: Not on file     Inability: Not on file    Transportation needs:     Medical: Not on file     Non-medical: Not on file   Tobacco Use    Smoking status: Never Smoker    Smokeless tobacco: Never Used   Substance and Sexual Activity    Alcohol use: No    Drug use: No    Sexual activity: Yes     Partners: Male   Lifestyle    Physical activity:     Days per week: Not on file     Minutes per session: Not on file    Stress: Not on file   Relationships    Social connections:     Talks on phone: Not on file     Gets together: Not on file     Attends Zoroastrianism service: Not on file     Active member of club  or organization: Not on file     Attends meetings of clubs or organizations: Not on file     Relationship status: Not on file   Other Topics Concern    Not on file   Social History Narrative    Not on file       Review of Systems   Constitution: Negative for chills, fever and malaise/fatigue.   Cardiovascular: Negative for chest pain, claudication, leg swelling, orthopnea and palpitations.   Respiratory: Negative for cough, shortness of breath and wheezing.    Skin: Positive for color change, dry skin, nail changes and suspicious lesions (calluses/corns). Negative for itching, poor wound healing and rash.   Musculoskeletal: Positive for joint pain and myalgias. Negative for arthritis, falls, gout, joint swelling and muscle weakness.   Gastrointestinal: Negative for diarrhea, nausea and vomiting.   Neurological: Positive for numbness, paresthesias and sensory change. Negative for disturbances in coordination, dizziness, focal weakness, tremors and weakness.   Psychiatric/Behavioral: Negative for altered mental status and hallucinations.           Objective:      Vitals:    07/15/19 1822   Weight: 118.4 kg (261 lb)   PainSc: 0-No pain       Physical Exam   Cardiovascular:   Dorsalis pedis and posterior tibial pulses are diminished Bilaterally. Toes are cool to touch. Feet are warm proximally.There is decreased digital hair. Skin is atrophic, slightly hyperpigmented, and mildly edematous       Musculoskeletal:   Musculoskeletal:  Muscle strength is 5/5 in all groups bilaterally.  No pain with ankle, STJ or MTPJ ROM, bilat. Ankle dorsiflexion is limited with knees extended and flexed, bilat.  Semi-reducible hammertoe contractures noted to toes 2-4 b/l-asymptomatic.    Neurological:   [place] 5.07 monofilamant testing is diminished both feet. Sharp/dull sensation diminished Bilaterally. Light touch absent Bilaterally.       Skin: Lesion noted. No bruising and no ecchymosis noted. No erythema.   No open  lesions, lacerations or wounds noted. Toenails 1-5 bilaterally are elongated by 2-3 mm, thickened by 2-3 mm, discolored/yellowed, dystrophic, brittle with subungual debris.  .  Interdigital spaces clean, dry and intact b/l. No erythema noted to b/l foot. Skin texture thin, atrophic, dry. Pedal hair diminished b/l.         Scaling dryness in a moccasin distribution is noted to the bilateral lower extremities.     Focal hyperkeratotic lesion consisting entirely of hyperkeratotic tissue without open skin, drainage, pus, fluctuance, malodor, or signs of infection: B/L submet head 2, right submet head 5                    Assessment:       Encounter Diagnoses   Name Primary?    Type II diabetes mellitus with neurological manifestations Yes    Hammer toes of both feet     Corn or callus     Onychomycosis due to dermatophyte          Plan:     Problem List Items Addressed This Visit     None      Visit Diagnoses     Type II diabetes mellitus with neurological manifestations    -  Primary    Hammer toes of both feet        Corn or callus        Onychomycosis due to dermatophyte             I counseled the patient on her conditions, their implications and medical management.         Greater than 50% of this visit spent on counseling and coordination of care.    Education about the diabetic foot, neuropathy, and prevention of limb loss.    Shoe inspection. Diabetic Foot Education. Patient reminded of the importance of good nutrition/healthy diet/weight management and blood sugar control to help prevent podiatric complications of diabetes. Patient instructed on proper foot hygeine. Wear comfortable, proper fitting shoes. Wash feet daily. Dry well. After drying, apply moisturizer to feet (no lotion to webspaces). Inspect feet daily for skin breaks, blisters, swelling, or redness. Wear cotton socks (preferably white)  Change socks every day. Do NOT walk barefoot. Do NOT use heating pads or hot water soaks. We discussed  wearing proper shoe gear, daily foot inspections, never walking without protective shoe gear.     Discussed edema control and the importance of daily moisturizer to the feet such as Gold bonds diabetic foot cream    Recommend applying vicks vaporub to thick abnormal toenails daily x 6 months to treat fungal nail infection.    With patient's permission, nails were aggressively reduced and debrided x 10 to their soft tissue attachment mechanically and with electric , removing all offending nail and debris. Patient relates relief following the procedure.     After cleansing the  area w/ alcohol prep pad the above mentioned hyperkeratosis was trimmed utilizing No 15 scapel, to a smooth base with out incident. Patient tolerated this  well and reported comfort to the area of B/L submet head 2, right submet head 5     She will continue to monitor the areas daily, inspect her feet, wear protective shoe gear when ambulatory, moisturizer to maintain skin integrity and follow in this office in approximately 3-4 months with Dr Casa julian.

## 2019-07-18 DIAGNOSIS — I50.42 CHRONIC COMBINED SYSTOLIC AND DIASTOLIC CONGESTIVE HEART FAILURE: ICD-10-CM

## 2019-07-18 RX ORDER — LANOLIN ALCOHOL/MO/W.PET/CERES
CREAM (GRAM) TOPICAL 2 TIMES DAILY
Qty: 60 TABLET | Refills: 1 | Status: CANCELLED | OUTPATIENT
Start: 2019-07-02 | End: 2020-01-01

## 2019-07-22 RX ORDER — INSULIN GLARGINE 100 [IU]/ML
INJECTION, SOLUTION SUBCUTANEOUS
Qty: 9 ML | Refills: 6 | Status: CANCELLED | OUTPATIENT
Start: 2019-07-22 | End: 2020-01-01

## 2019-07-23 RX ORDER — INSULIN GLARGINE 100 [IU]/ML
INJECTION, SOLUTION SUBCUTANEOUS
Qty: 30 ML | Refills: 6 | Status: SHIPPED | OUTPATIENT
Start: 2019-07-23 | End: 2020-01-01 | Stop reason: SDUPTHER

## 2019-07-24 ENCOUNTER — OFFICE VISIT (OUTPATIENT)
Dept: TRANSPLANT | Facility: CLINIC | Age: 49
End: 2019-07-24
Payer: MEDICAID

## 2019-07-24 VITALS
SYSTOLIC BLOOD PRESSURE: 134 MMHG | DIASTOLIC BLOOD PRESSURE: 68 MMHG | HEART RATE: 100 BPM | HEIGHT: 65 IN | BODY MASS INDEX: 43.6 KG/M2 | WEIGHT: 261.69 LBS

## 2019-07-24 DIAGNOSIS — Z95.810 BIVENTRICULAR AUTOMATIC IMPLANTABLE CARDIOVERTER DEFIBRILLATOR IN SITU: ICD-10-CM

## 2019-07-24 DIAGNOSIS — I42.0 CARDIOMYOPATHY, DILATED, NONISCHEMIC: ICD-10-CM

## 2019-07-24 DIAGNOSIS — I50.9 CONGESTIVE HEART FAILURE, UNSPECIFIED HF CHRONICITY, UNSPECIFIED HEART FAILURE TYPE: ICD-10-CM

## 2019-07-24 DIAGNOSIS — I50.42 CHRONIC COMBINED SYSTOLIC AND DIASTOLIC CONGESTIVE HEART FAILURE: Primary | ICD-10-CM

## 2019-07-24 PROCEDURE — 99214 PR OFFICE/OUTPT VISIT, EST, LEVL IV, 30-39 MIN: ICD-10-PCS | Mod: S$PBB,,, | Performed by: INTERNAL MEDICINE

## 2019-07-24 PROCEDURE — 99213 OFFICE O/P EST LOW 20 MIN: CPT | Mod: PBBFAC | Performed by: INTERNAL MEDICINE

## 2019-07-24 PROCEDURE — 99214 OFFICE O/P EST MOD 30 MIN: CPT | Mod: S$PBB,,, | Performed by: INTERNAL MEDICINE

## 2019-07-24 PROCEDURE — 99999 PR PBB SHADOW E&M-EST. PATIENT-LVL III: CPT | Mod: PBBFAC,,, | Performed by: INTERNAL MEDICINE

## 2019-07-24 PROCEDURE — 99999 PR PBB SHADOW E&M-EST. PATIENT-LVL III: ICD-10-PCS | Mod: PBBFAC,,, | Performed by: INTERNAL MEDICINE

## 2019-07-24 RX ORDER — DIGOXIN 125 MCG
TABLET ORAL
Qty: 90 TABLET | Refills: 3 | Status: SHIPPED | OUTPATIENT
Start: 2019-07-24 | End: 2020-01-01 | Stop reason: SDUPTHER

## 2019-07-24 RX ORDER — ATORVASTATIN CALCIUM 20 MG/1
20 TABLET, FILM COATED ORAL DAILY
Qty: 90 TABLET | Refills: 1 | Status: SHIPPED | OUTPATIENT
Start: 2019-07-24 | End: 2020-01-01

## 2019-07-24 RX ORDER — FUROSEMIDE 40 MG/1
80 TABLET ORAL 2 TIMES DAILY
Qty: 360 TABLET | Refills: 3 | Status: SHIPPED | OUTPATIENT
Start: 2019-07-24 | End: 2020-01-01 | Stop reason: SDUPTHER

## 2019-07-24 RX ORDER — LANOLIN ALCOHOL/MO/W.PET/CERES
400 CREAM (GRAM) TOPICAL 2 TIMES DAILY
Qty: 180 TABLET | Refills: 3 | Status: SHIPPED | OUTPATIENT
Start: 2019-07-24 | End: 2020-01-01 | Stop reason: SDUPTHER

## 2019-07-24 NOTE — PROGRESS NOTES
Subjective:     HPI:  Ms. Ross is a very pleasant 44 yo F with a history of NICM (EF 10-20%), paroxysmal atrial fibrillation, HTN, DM, asthma, HLD and CLARENCE who comes for a follow-up visit. Continues to do well from a HF standpoint. Today she reports NYHA class II with occasional III symptoms. No  PND or orthopnea.  No HF admissions or ED visits. Reports no issues with her HF regimen. Labs are pending.       Past Medical History:   Diagnosis Date    Anticoagulant long-term use     Arthritis     Asthma     Asthma     Atrial fibrillation     Cardiomyopathy     Cardiomyopathy     CHF (congestive heart failure)     CHF (congestive heart failure)     Chronic combined systolic and diastolic heart failure 6/3/2016    COPD (chronic obstructive pulmonary disease) 3/28/2018    GERD (gastroesophageal reflux disease)     H/O tubal ligation     Hypertension     Obesity     Renal disorder     Type 2 diabetes mellitus with hyperglycemia     Type 2 diabetes mellitus with polyneuropathy      Past Surgical History:   Procedure Laterality Date    CARDIAC DEFIBRILLATOR PLACEMENT      CARDIAC DEFIBRILLATOR PLACEMENT      CARDIAC DEFIBRILLATOR PLACEMENT      CARDIOVERSION N/A 2016    Performed [facility] at [facility] CATH LAB    CHOLECYSTECTOMY      COLONOSCOPY N/A 2016    Performed by Eugene [facility] at [facility] ENDO (2ND FLR)    ECHOCARDIOGRAM-TRANSESOPHAGEAL N/A 4/15/2016    Performed by Rossana Surgeon at [facility] ROSSANA    ESOPHAGOGASTRODUODENOSCOPY (EGD) N/A 2016    Performed by Joe [facility] at [facility] ENDO (2ND FLR)    GALLBLADDER SURGERY      iabp  2016    INSERTION-ICD-BIVENTRICULAR Right 2017    Performed [facility] at [facility] CATH LAB    TUBAL LIGATION       OB History        3    Para   3    Term   3            AB        Living   3       SAB        TAB        Ectopic        Multiple        Live Births   3           Obstetric Comments   Cs x 3,  "preE  Gynhx: reg/4.  Mod to heavy flow  S/p BTL  Denies abnl pap, last pap 2017 normal  H/o trichomonas           Review of Systems   Constitution: Negative. Negative for chills, decreased appetite, diaphoresis, fever, malaise/fatigue, night sweats, weight gain and weight loss.   Eyes: Negative.    Cardiovascular: Negative for chest pain, claudication, cyanosis, dyspnea on exertion, irregular heartbeat, leg swelling, near-syncope, orthopnea, palpitations, paroxysmal nocturnal dyspnea and syncope.   Respiratory: Negative for cough, hemoptysis and shortness of breath.    Endocrine: Negative.    Hematologic/Lymphatic: Negative.    Skin: Negative for color change, dry skin and nail changes.   Musculoskeletal: Negative.    Gastrointestinal: Negative.    Genitourinary: Negative.    Neurological: Negative for weakness.       Objective:   Blood pressure 134/68, pulse 100, height 5' 5" (1.651 m), weight 118.7 kg (261 lb 11 oz).body mass index is 43.55 kg/m².  Physical Exam   Constitutional: She is oriented to person, place, and time. Vital signs are normal. She appears well-developed and well-nourished.   HENT:   Head: Normocephalic.   Eyes: Pupils are equal, round, and reactive to light.   Neck: Neck supple. No JVD present.   Cardiovascular: Normal rate, regular rhythm, normal heart sounds and intact distal pulses. PMI is not displaced. Exam reveals no gallop and no friction rub.   No murmur heard.  Pulmonary/Chest: Effort normal and breath sounds normal. No respiratory distress. She has no wheezes. She has no rales.   Abdominal: Soft. Bowel sounds are normal. She exhibits no distension. There is no tenderness. There is no rebound.   Musculoskeletal: She exhibits no edema.   Neurological: She is alert and oriented to person, place, and time.   Nursing note and vitals reviewed.      Labs:    Chemistry        Component Value Date/Time     07/04/2019 0446    K 4.4 07/04/2019 0446     07/04/2019 0446    CO2 24 " 07/04/2019 0446    BUN 19 07/04/2019 0446    CREATININE 1.5 (H) 07/04/2019 0446     (H) 07/04/2019 0446        Component Value Date/Time    CALCIUM 8.7 07/04/2019 0446    ALKPHOS 64 07/04/2019 0446    AST 21 07/04/2019 0446    ALT 19 07/04/2019 0446    BILITOT 0.3 07/04/2019 0446    ESTGFRAFRICA 46.8 (A) 07/04/2019 0446    EGFRNONAA 40.6 (A) 07/04/2019 0446          Magnesium   Date Value Ref Range Status   07/04/2019 2.0 1.6 - 2.6 mg/dL Final     Lab Results   Component Value Date    WBC 5.15 07/04/2019    HGB 11.3 (L) 07/04/2019    HCT 37.5 07/04/2019     07/04/2019     Lab Results   Component Value Date    INR 2.5 (H) 07/10/2019    INR 1.6 (H) 07/04/2019    INR 1.5 (H) 07/03/2019     BNP   Date Value Ref Range Status   07/24/2019 377 (H) 0 - 99 pg/mL Final     Comment:     Values of less than 100 pg/ml are consistent with non-CHF populations.   07/03/2019 156 (H) 0 - 99 pg/mL Final     Comment:     Values of less than 100 pg/ml are consistent with non-CHF populations.   05/01/2019 191 (H) 0 - 99 pg/mL Final     Comment:     Values of less than 100 pg/ml are consistent with non-CHF populations.     LD   Date Value Ref Range Status   04/14/2016 827 (H) 110 - 260 U/L Final     Comment:     Results are increased in hemolyzed samples.       Assessment:      1. Chronic combined systolic and diastolic congestive heart failure    2. Congestive heart failure, unspecified HF chronicity, unspecified heart failure type    3. Cardiomyopathy, dilated, nonischemic    4. Biventricular automatic implantable cardioverter defibrillator in situ        Plan:   Stable from a HF standpoint.   NYHA class II symptoms. Appears  euvolemic on exam.   Continue current HF regimen  Recommend 2 gram sodium restriction and 1500cc fluid restriction.  Encourage physical activity with graded exercise program.  Requested patient to weigh themselves daily, and to notify us if their weight increases by more than 3 lbs in 1 day or 5 lbs  in 1 week.    RTC in 3 months with labs       Dandre Jules MD

## 2019-07-24 NOTE — LETTER
July 24, 2019        Ayan Olmstead  [address] [place] [address]  Phone: [phone]  Fax: [phone]             [facility] [address] [place] [address]  Phone: [phone]   Patient: Laure Ross   MR Number: [id number]   YOB: 1970   Date of Visit: 7/24/2019       Dear Dr. Ayan Olmstead    Thank you for referring Laure Ross to me for evaluation. Attached you will find relevant portions of my assessment and plan of care.    If you have questions, please do not hesitate to call me. I look forward to following Laure Ross along with you.    Sincerely,    Danrde Jules MD    Enclosure    If you would like to receive this communication electronically, please contact [email or web address] or [phone] to request [employer] access.    [employer] is a tool which provides read-only access to select patient information with whom you have a relationship. Its easy to use and provides real time access to review your patients record including encounter summaries, notes, results, and demographic information.    If you feel you have received this communication in error or would no longer like to receive these types of communications, please e-mail [email or web address]

## 2019-07-29 ENCOUNTER — ANTI-COAG VISIT (OUTPATIENT)
Dept: CARDIOLOGY | Facility: CLINIC | Age: 49
End: 2019-07-29
Payer: MEDICAID

## 2019-07-29 DIAGNOSIS — Z79.01 LONG TERM (CURRENT) USE OF ANTICOAGULANTS: Primary | ICD-10-CM

## 2019-07-29 DIAGNOSIS — Z79.01 CHRONIC ANTICOAGULATION: ICD-10-CM

## 2019-07-29 DIAGNOSIS — I48.0 PAROXYSMAL ATRIAL FIBRILLATION: ICD-10-CM

## 2019-07-29 LAB — INR PPP: 2.8 (ref 2–3)

## 2019-07-29 PROCEDURE — 93793 PR ANTICOAGULANT MGMT FOR PT TAKING WARFARIN: ICD-10-PCS | Mod: ,,,

## 2019-07-29 PROCEDURE — 85610 PROTHROMBIN TIME: CPT | Mod: PBBFAC

## 2019-07-29 PROCEDURE — 93793 ANTICOAG MGMT PT WARFARIN: CPT | Mod: ,,,

## 2019-07-29 NOTE — PROGRESS NOTES
INR at goal. Medications and chart reviewed. No changes noted to necessitate adjustment of warfarin or follow-up plan. See calendar.  Pt findings: Pt went to the hospital overnight for asthma exacerbation secondary to flu; bruising due to use; she still has not started her dupixent; and denies any other changes.  Will maintain current regimen & re-assess pt in 3 weeks.   Patient was re-educated on situations that would require placing a call to the Coumadin Clinic, including bleeding or unusual bruising issues, changes in health, diet or medications,upcoming procedures that require warfarin interruption, and missed Coumadin dose(s). Patient expressed understanding that avoidance of consistency with these parameters could cause fluctuations in INR, leading to more frequent visits and increase risk of adverse events.

## 2019-07-31 ENCOUNTER — CLINICAL SUPPORT (OUTPATIENT)
Dept: CARDIOLOGY | Facility: HOSPITAL | Age: 49
End: 2019-07-31
Attending: INTERNAL MEDICINE
Payer: MEDICAID

## 2019-07-31 ENCOUNTER — TELEPHONE (OUTPATIENT)
Dept: ELECTROPHYSIOLOGY | Facility: CLINIC | Age: 49
End: 2019-07-31

## 2019-07-31 DIAGNOSIS — Z95.810 CARDIAC DEFIBRILLATOR IN SITU: ICD-10-CM

## 2019-07-31 DIAGNOSIS — I49.9 CARDIAC ARRHYTHMIA, UNSPECIFIED CARDIAC ARRHYTHMIA TYPE: Primary | ICD-10-CM

## 2019-07-31 PROCEDURE — 93284 PRGRMG EVAL IMPLANTABLE DFB: CPT

## 2019-07-31 NOTE — TELEPHONE ENCOUNTER
----- Message from Kristyn Espinosa RN sent at 7/30/2019  4:37 PM CDT -----  Yasmin,  Can you schedule this patient with Betty Moody?  It looks like she has seen her before.  Kristyn  ----- Message -----  From: Tatiana Peterson, RT  Sent: 7/23/2019   3:52 PM  To: Shira Mathew RN    #Medicaid patient could not coordinate in clinic appointments#

## 2019-08-01 ENCOUNTER — TELEPHONE (OUTPATIENT)
Dept: PHARMACY | Facility: CLINIC | Age: 49
End: 2019-08-01

## 2019-08-01 NOTE — TELEPHONE ENCOUNTER
"Call attempt 1 to see if patient started Dupixent yet - line rang and rang, then said "your call cannot be completed at this time".  [employer] message sent. Copay $0 at 004.    Shane Samano, PharmD  Clinical Pharmacist   [facility]   P: [phone]    "

## 2019-08-05 ENCOUNTER — HOSPITAL ENCOUNTER (OUTPATIENT)
Dept: CARDIOLOGY | Facility: CLINIC | Age: 49
Discharge: HOME | End: 2019-08-05
Payer: MEDICAID

## 2019-08-05 ENCOUNTER — OFFICE VISIT (OUTPATIENT)
Dept: ELECTROPHYSIOLOGY | Facility: CLINIC | Age: 49
End: 2019-08-05
Payer: MEDICAID

## 2019-08-05 VITALS
DIASTOLIC BLOOD PRESSURE: 64 MMHG | HEIGHT: 65 IN | HEART RATE: 96 BPM | WEIGHT: 259.69 LBS | SYSTOLIC BLOOD PRESSURE: 102 MMHG | BODY MASS INDEX: 43.27 KG/M2

## 2019-08-05 DIAGNOSIS — G47.33 OSA (OBSTRUCTIVE SLEEP APNEA): ICD-10-CM

## 2019-08-05 DIAGNOSIS — I48.0 PAROXYSMAL ATRIAL FIBRILLATION: ICD-10-CM

## 2019-08-05 DIAGNOSIS — Z95.810 CARDIAC DEFIBRILLATOR IN SITU: ICD-10-CM

## 2019-08-05 DIAGNOSIS — I44.7 LEFT BUNDLE-BRANCH BLOCK: ICD-10-CM

## 2019-08-05 DIAGNOSIS — I42.8 NICM (NONISCHEMIC CARDIOMYOPATHY): ICD-10-CM

## 2019-08-05 DIAGNOSIS — Z95.810 BIVENTRICULAR AUTOMATIC IMPLANTABLE CARDIOVERTER DEFIBRILLATOR IN SITU: Primary | ICD-10-CM

## 2019-08-05 DIAGNOSIS — I10 ESSENTIAL HYPERTENSION: ICD-10-CM

## 2019-08-05 PROCEDURE — 99999 PR PBB SHADOW E&M-EST. PATIENT-LVL III: ICD-10-PCS | Mod: PBBFAC,,, | Performed by: NURSE PRACTITIONER

## 2019-08-05 PROCEDURE — 99999 PR PBB SHADOW E&M-EST. PATIENT-LVL III: CPT | Mod: PBBFAC,,, | Performed by: NURSE PRACTITIONER

## 2019-08-05 PROCEDURE — 99214 OFFICE O/P EST MOD 30 MIN: CPT | Mod: S$PBB,,, | Performed by: NURSE PRACTITIONER

## 2019-08-05 PROCEDURE — 93010 RHYTHM STRIP: ICD-10-PCS | Mod: S$PBB,,, | Performed by: INTERNAL MEDICINE

## 2019-08-05 PROCEDURE — 99213 OFFICE O/P EST LOW 20 MIN: CPT | Mod: PBBFAC | Performed by: NURSE PRACTITIONER

## 2019-08-05 PROCEDURE — 93010 ELECTROCARDIOGRAM REPORT: CPT | Mod: S$PBB,,, | Performed by: INTERNAL MEDICINE

## 2019-08-05 PROCEDURE — 99214 PR OFFICE/OUTPT VISIT, EST, LEVL IV, 30-39 MIN: ICD-10-PCS | Mod: S$PBB,,, | Performed by: NURSE PRACTITIONER

## 2019-08-05 PROCEDURE — 93005 ELECTROCARDIOGRAM TRACING: CPT | Mod: PBBFAC | Performed by: INTERNAL MEDICINE

## 2019-08-05 NOTE — PATIENT INSTRUCTIONS
Call Dr. Jules's office if legs are still swollen after 3 days of increased lasix.  Low salt diet.

## 2019-08-05 NOTE — PROGRESS NOTES
Ms. Ross is a patient of Dr. Jolly and was last seen in clinic 8/29/2019.      Subjective:   Patient ID:  Laure Ross is a 48 y.o. female who presents for follow-up of Cardiomyopathy and Atrial Fibrillation  .     HPI:    Ms. Ross is a 48 y.o. female with NICM (EF 10-20%), CRT-D (2017), pAF, HTN, DM, HLD, CLARENCE, asthma here for annual follow up.     Background:    NICM 10-20%. R sided dual-chamber ICD -> biV upgrade 5/17.  pHTN  PAF, on amio since BRITTANEY/DCCV 4/16  HTN DM HL CLARENCE asthma    ADHF 2/16, requiring IABP. Had home  for a while; now off.   OHT noncandidate per Dr Jules. She has continued to have NYHA II sx, but much better since hospital d/c and better yet since biV upgrade 5/17.  8/3/2019: Amio decreased to 200mg daily.  8/29/2019: She was recently in the hospital for RUQ pain with radiation to the Flank. She had a CT renal which showed diffuse attenuation of the liver. Amiodarone discontinued at that point.  8/30/2019: Evaluated by hepatology who diagnosed her with NAFLD and cleared her for amiodarone in the future.    Echo 4/2016: 10%; 5/2016: 20%; 3/2018: 30%; 3/2019: 30%    Update (08/05/2019):    Today she says she has noticed some leg swelling for the past several days. Has increased her lasix twice as instructed by HTS and it has only decreased somewhat. Otherwise no complaints. No significant WEBBER, CP, palps, light-headedness, syncope.     She is currently taking coumadin for stroke prophylaxis and denies significant bleeding episodes. She is currently being treated with digoxin 125mcg daily  for HR control.  Kidney function is stable, with a creatinine of 1.5 on 7/4/2019.    Device Interrogation (8/5/2019) reveals an intrinsic SR with 1st degree AVB with stable lead and device function. ATRx10, longest 8 seconds. She paces 0% in the RA and 98% in the BiV. Estimated battery longevity 7 years.     I have personally reviewed the patient's EKG today, which shows biventricular pacing at 96bpm. KS  interval is 142. QRS is 146. QTc is 523.    Recent Cardiac Tests:    2D Echo (3/20/2019):  · Severe left ventricular enlargement.  · Eccentric left ventricular hypertrophy.  · Moderately decreased left ventricular systolic function. The estimated ejection fraction is 30%  · Grade II (moderate) left ventricular diastolic dysfunction consistent with pseudonormalization.  · Elevated left atrial pressure.  · No wall motion abnormalities.  · Normal right ventricular systolic function.  · Severe left atrial enlargement.  · Mild mitral regurgitation.     Current Outpatient Medications   Medication Sig    acetaminophen (TYLENOL) 650 MG TbSR TAKE 1 TABLET every four hours AS NEEDED for shoulder pain    allopurinol (ZYLOPRIM) 100 MG tablet TAKE 1 TABLET BY MOUTH EVERY DAY    atorvastatin (LIPITOR) 20 MG tablet Take 1 tablet (20 mg total) by mouth once daily.    azelastine (ASTELIN) 137 mcg (0.1 %) nasal spray Use 1 to 2 sprays per nostril twice daily as needed. Us in addition to our nasal steroid spray    baclofen (LIORESAL) 10 MG tablet TAKE 1 TABLET BY MOUTH THREE TIMES DAILY AS NEEDED FOR MUSCLE SPASM    blood sugar diagnostic Strp Uses 4 times a day, true metrix meter.    budesonide-formoterol 160-4.5 mcg (SYMBICORT) 160-4.5 mcg/actuation HFAA INHALE 2 PUFFS BY MOUTH TWICE DAILY. RINSE MOUTH AFTER USE.    cetirizine (ZYRTEC) 10 MG tablet TAKE 1 TABLET AT BEDTIME for allergy relief    ciclopirox (PENLAC) 8 % Soln Apply topically nightly.    cyclobenzaprine (FEXMID) 7.5 MG Tab TAKE 1 TABLET 3 TIMES DAILY AS NEEDED FOR MUSCLE SPASM.    diclofenac sodium (VOLTAREN) 1 % Gel APPLY 2 grams SPARINGLY TO Knees ONCE DAILY    dicyclomine (BENTYL) 10 MG capsule TAKE 1 CAPSULE 4 TIMES DAILY as needed for abdominal pain    digoxin (LANOXIN) 125 mcg tablet TAKE 1 TABLET BY MOUTH ONCE DAILY    dulaglutide (TRULICITY) 0.75 mg/0.5 mL PnIj Inject 0.5 mLs (0.75 mg total) into the skin every 7 days.    dupilumab (DUPIXENT) 300  "mg/2 mL Syrg Inject 2 mLs (300 mg total) into the skin every 14 (fourteen) days.    EPINEPHrine (EPIPEN) 0.3 mg/0.3 mL AtIn INJECT 0.3ML IN THE MUSCLE AS DIRECTED FOR ANAPHYLAXIS    ergocalciferol (ERGOCALCIFEROL) 50,000 unit Cap TAKE 1 CAPSULE BY MOUTH WEEKLY.    famotidine (PEPCID) 40 MG tablet TAKE 1 TABLET BY MOUTH TWICE DAILY.    fluticasone (FLONASE) 50 mcg/actuation nasal spray USE TWO SPRAYS IN EACH NOSTRIL ONCE DAILY for cold    fluticasone propionate (FLOVENT HFA) 110 mcg/actuation inhaler INHALE 2 PUFFS TWICE DAILY. RINSE MOUTH AFTER USE.    furosemide (LASIX) 40 MG tablet Take 2 tablets (80 mg total) by mouth 2 (two) times daily.    hydrOXYzine pamoate (VISTARIL) 25 MG Cap TAKE 1 CAPSULE BY MOUTH EVERY 8 HOURS AS NEEDED FOR ITCHING    insulin (BASAGLAR KWIKPEN U-100 INSULIN) glargine 100 units/mL (3mL) SubQ pen inject 34 under the skin in the morning and 34 units at night    insulin aspart U-100 (NOVOLOG) 100 unit/mL (3 mL) InPn pen Inject 16 units into the skin with meals plus scale    ketoconazole (NIZORAL) 2 % cream APPLY SPARINGLY TO AFFECTED AREA(S) ONCE DAILY    ketoconazole (NIZORAL) 2 % shampoo APPLY TO SKIN EVERY DAY FOR 5 MINUTES AND RINSE FOR ABOUT 1 TO 2 WEEKS    lancets 30 gauge Misc use 4 times a day    levalbuterol (XOPENEX HFA) 45 mcg/actuation inhaler INHALE 1 TO 2 PUFFS EVERY 4 TO 6 HOURS AS NEEDED    levalbuterol (XOPENEX) 0.31 mg/3 mL nebulizer solution Take 3 mLs (0.31 mg total) by nebulization every 4 (four) hours as needed for Wheezing. Rescue    linaCLOtide (LINZESS) 72 mcg Cap capsule Take one tablet by mouth daily as needed for constipation    lisinopril (PRINIVIL,ZESTRIL) 5 MG tablet TAKE 1 TABLET BY MOUTH TWO TIMES A DAY    magnesium oxide (MAG-OX) 400 mg (241.3 mg magnesium) tablet Take 1 tablet (400 mg total) by mouth 2 (two) times daily.    montelukast (SINGULAIR) 10 mg tablet TAKE 1 TABLET BY MOUTH DAILY.    pen needle, diabetic 32 gauge x 5/32" Ndle USE " "AS DIRECTED 5 TIMES DAILY    pen needle, diabetic 32 gauge x 5/32" Ndle USE WITH INSULIN PEN TWICE DAILY    polyethylene glycol (GLYCOLAX) 17 gram/dose powder MIX 1 CAPFUL IN 8 OUNCES OF LIQUID AND DRINK ONCE DAILY as needed for constipation    potassium chloride (MICRO-K) 10 MEQ CpSR Take 2 capsules (20 mEq total) by mouth once daily.    sodium chloride for inhalation (SODIUM CHLORIDE 0.9%) 0.9 % nebulizer solution Use 1 vial via nebulization three times daily. Mix with xopenex solution    spironolactone (ALDACTONE) 25 MG tablet Take 1 tablet (25 mg total) by mouth once daily.    warfarin (COUMADIN) 7.5 MG tablet Take ½ tablet by mouth once daily and 1 tablet on thursday (Patient taking differently: Coumadin dose 7.5 mg on Monday and  1/2 tab rest of days)    BD ULTRA-FINE ESSENCE PEN NEEDLES 32 gauge x 5/32" Ndle Takes 5 times  day    triamcinolone acetonide 0.1% (KENALOG) 0.1 % cream Apply topically 2 (two) times daily. Apply twice a day for 7 days to arms.     No current facility-administered medications for this visit.        Review of Systems   Constitution: Negative for malaise/fatigue.   Cardiovascular: Positive for leg swelling. Negative for chest pain, dyspnea on exertion, irregular heartbeat and palpitations.   Respiratory: Negative for shortness of breath.    Hematologic/Lymphatic: Negative for bleeding problem.   Skin: Negative for rash.   Musculoskeletal: Negative for myalgias.   Gastrointestinal: Negative for hematemesis, hematochezia and nausea.   Genitourinary: Negative for hematuria.   Neurological: Negative for light-headedness.   Psychiatric/Behavioral: Negative for altered mental status.   Allergic/Immunologic: Negative for persistent infections.     Objective:        /64   Pulse 96   Ht 5' 5" (1.651 m)   Wt 117.8 kg (259 lb 11.2 oz)   BMI 43.22 kg/m²     Physical Exam   Constitutional: She is oriented to person, place, and time. She appears well-developed and well-nourished. "   HENT:   Head: Normocephalic.   Nose: Nose normal.   Eyes: Pupils are equal, round, and reactive to light.   Cardiovascular: Normal rate, regular rhythm, S1 normal and S2 normal.   No murmur heard.  Pulses:       Radial pulses are 2+ on the right side, and 2+ on the left side.   Pulmonary/Chest: Breath sounds normal. No respiratory distress.   Device to LUCW.   Abdominal: Normal appearance.   Musculoskeletal: Normal range of motion. She exhibits no edema.   Neurological: She is alert and oriented to person, place, and time.   Skin: Skin is warm and dry. No erythema.   Psychiatric: She has a normal mood and affect. Her speech is normal and behavior is normal.   Nursing note and vitals reviewed.    Lab Results   Component Value Date     07/04/2019    K 4.4 07/04/2019    MG 2.0 07/04/2019    BUN 19 07/04/2019    CREATININE 1.5 (H) 07/04/2019    ALT 19 07/04/2019    AST 21 07/04/2019    HGB 11.3 (L) 07/04/2019    HCT 37.5 07/04/2019    TSH 1.809 06/19/2019    LDLCALC 174.2 (H) 10/01/2018       Recent Labs   Lab 07/03/19  1554 07/04/19  0446 07/10/19  1353 07/29/19  1055   INR 1.5 H 1.6 H 2.5 H 2.8       Assessment:     1. Biventricular automatic implantable cardioverter defibrillator in situ    2. Paroxysmal atrial fibrillation    3. Left bundle-branch block    4. NICM (nonischemic cardiomyopathy)    5. Essential hypertension    6. CLARENCE (obstructive sleep apnea)      Plan:     In summary, Ms. Ross is a 48 y.o. female with NICM (EF 10-20%), CRT-D (2017), pAF, HTN, DM, HLD, CLARENCE, asthma here for annual follow up.   Ms. Ross is doing well from a device perspective with stable lead and device function. No sustained arrhythmia noted and has been off amiodarone for about a year. On coumadin for CVA prophylaxis. 98% biventricular pacing, EF stable at 30% per echo 3/2019. Some leg edema today. I advised her to f/u with HTS if does not resolve after increasing her lasix for 3 days.     Continue current medication regimen  and device settings.   Follow up in device clinic as scheduled.   Follow up in EP clinic in 1 year, sooner as needed.     *A copy of this note has been sent to Dr. Jolly*    Follow up in about 1 year (around 8/5/2020).    ------------------------------------------------------------------    THANIA East, NP-C  Cardiac Electrophysiology

## 2019-08-09 ENCOUNTER — OFFICE VISIT (OUTPATIENT)
Dept: TRANSPLANT | Facility: CLINIC | Age: 49
End: 2019-08-09
Payer: MEDICAID

## 2019-08-09 ENCOUNTER — LAB VISIT (OUTPATIENT)
Dept: LAB | Facility: HOSPITAL | Age: 49
End: 2019-08-09
Attending: INTERNAL MEDICINE
Payer: MEDICAID

## 2019-08-09 VITALS
WEIGHT: 257.5 LBS | BODY MASS INDEX: 42.9 KG/M2 | DIASTOLIC BLOOD PRESSURE: 78 MMHG | SYSTOLIC BLOOD PRESSURE: 125 MMHG | HEART RATE: 99 BPM | HEIGHT: 65 IN

## 2019-08-09 DIAGNOSIS — I50.22 CHRONIC SYSTOLIC HEART FAILURE: ICD-10-CM

## 2019-08-09 DIAGNOSIS — E11.42 TYPE 2 DIABETES MELLITUS WITH POLYNEUROPATHY: ICD-10-CM

## 2019-08-09 DIAGNOSIS — I50.42 CHRONIC COMBINED SYSTOLIC AND DIASTOLIC CONGESTIVE HEART FAILURE: Primary | ICD-10-CM

## 2019-08-09 DIAGNOSIS — I10 ESSENTIAL HYPERTENSION: ICD-10-CM

## 2019-08-09 DIAGNOSIS — I42.8 NICM (NONISCHEMIC CARDIOMYOPATHY): ICD-10-CM

## 2019-08-09 DIAGNOSIS — M79.89 LEG SWELLING: ICD-10-CM

## 2019-08-09 DIAGNOSIS — Z95.810 BIVENTRICULAR AUTOMATIC IMPLANTABLE CARDIOVERTER DEFIBRILLATOR IN SITU: ICD-10-CM

## 2019-08-09 DIAGNOSIS — I50.22 CHRONIC SYSTOLIC HEART FAILURE: Primary | ICD-10-CM

## 2019-08-09 LAB
ANION GAP SERPL CALC-SCNC: 7 MMOL/L (ref 8–16)
BNP SERPL-MCNC: 268 PG/ML (ref 0–99)
BUN SERPL-MCNC: 16 MG/DL (ref 6–20)
CALCIUM SERPL-MCNC: 10.2 MG/DL (ref 8.7–10.5)
CHLORIDE SERPL-SCNC: 99 MMOL/L (ref 95–110)
CO2 SERPL-SCNC: 26 MMOL/L (ref 23–29)
CREAT SERPL-MCNC: 1.2 MG/DL (ref 0.5–1.4)
EST. GFR  (AFRICAN AMERICAN): >60 ML/MIN/1.73 M^2
EST. GFR  (NON AFRICAN AMERICAN): 53.2 ML/MIN/1.73 M^2
GLUCOSE SERPL-MCNC: 88 MG/DL (ref 70–110)
POTASSIUM SERPL-SCNC: 4.3 MMOL/L (ref 3.5–5.1)
SODIUM SERPL-SCNC: 132 MMOL/L (ref 136–145)

## 2019-08-09 PROCEDURE — 99214 PR OFFICE/OUTPT VISIT, EST, LEVL IV, 30-39 MIN: ICD-10-PCS | Mod: S$PBB,,, | Performed by: INTERNAL MEDICINE

## 2019-08-09 PROCEDURE — 99999 PR PBB SHADOW E&M-EST. PATIENT-LVL III: ICD-10-PCS | Mod: PBBFAC,,, | Performed by: INTERNAL MEDICINE

## 2019-08-09 PROCEDURE — 83880 ASSAY OF NATRIURETIC PEPTIDE: CPT

## 2019-08-09 PROCEDURE — 99213 OFFICE O/P EST LOW 20 MIN: CPT | Mod: PBBFAC | Performed by: INTERNAL MEDICINE

## 2019-08-09 PROCEDURE — 36415 COLL VENOUS BLD VENIPUNCTURE: CPT

## 2019-08-09 PROCEDURE — 80048 BASIC METABOLIC PNL TOTAL CA: CPT

## 2019-08-09 PROCEDURE — 99999 PR PBB SHADOW E&M-EST. PATIENT-LVL III: CPT | Mod: PBBFAC,,, | Performed by: INTERNAL MEDICINE

## 2019-08-09 PROCEDURE — 99214 OFFICE O/P EST MOD 30 MIN: CPT | Mod: S$PBB,,, | Performed by: INTERNAL MEDICINE

## 2019-08-09 NOTE — PROGRESS NOTES
Subjective:     HPI:  Ms. Ross is a very pleasant 44 yo F with a history of NICM (EF 10-20%), paroxysmal atrial fibrillation, HTN, DM, asthma, HLD and CLARENCE who comes for a an urgent clinic visit for right lower extremity swelling that started about a week ago. I had seen her about 3 weeks ago. She continues to do well from a HF standpoint. Today she reports NYHA class II with occasional III symptoms. No  PND or orthopnea.  No HF admissions or ED visits. Reports no issues with her HF regimen.     Past Medical History:   Diagnosis Date    Anticoagulant long-term use     Arthritis     Asthma     Asthma     Atrial fibrillation     Cardiomyopathy     Cardiomyopathy     CHF (congestive heart failure)     CHF (congestive heart failure)     Chronic combined systolic and diastolic heart failure 6/3/2016    COPD (chronic obstructive pulmonary disease) 3/28/2018    GERD (gastroesophageal reflux disease)     H/O tubal ligation     Hypertension     Obesity     Renal disorder     Type 2 diabetes mellitus with hyperglycemia     Type 2 diabetes mellitus with polyneuropathy      Past Surgical History:   Procedure Laterality Date    CARDIAC DEFIBRILLATOR PLACEMENT      CARDIAC DEFIBRILLATOR PLACEMENT      CARDIAC DEFIBRILLATOR PLACEMENT      CARDIOVERSION N/A 2016    [facility] CATH LAB    CHOLECYSTECTOMY      COLONOSCOPY N/A 2016    Performed by Eugene Soto MD at [facility] ENDO (2ND FLR)    ECHOCARDIOGRAM-TRANSESOPHAGEAL N/A 4/15/2016    Performed by Rossana Surgeon at [facility] ROSSANA    ESOPHAGOGASTRODUODENOSCOPY (EGD) N/A 2016    Performed by Joe Magana MD at [facility] ENDO (2ND FLR)    GALLBLADDER SURGERY      iabp  2016    INSERTION-ICD-BIVENTRICULAR Right 2017    Performed by Navi Jolly MD at [facility] CATH LAB    TUBAL LIGATION       OB History        3    Para   3    Term   3            AB        Living   3       SAB        TAB         "Ectopic        Multiple        Live Births   3           Obstetric Comments   Cs x 3, preE  Gynhx: reg/4.  Mod to heavy flow  S/p BTL  Denies abnl pap, last pap 2017 normal  H/o trichomonas           Review of Systems   Constitution: Negative. Negative for chills, decreased appetite, diaphoresis, fever, malaise/fatigue, night sweats, weight gain and weight loss.   Eyes: Negative.    Cardiovascular: Positive for dyspnea on exertion and leg swelling. Negative for chest pain, claudication, cyanosis, irregular heartbeat, near-syncope, orthopnea, palpitations, paroxysmal nocturnal dyspnea and syncope.        Right foot    Respiratory: Negative for cough, hemoptysis and shortness of breath.    Endocrine: Negative.    Hematologic/Lymphatic: Negative.    Skin: Negative for color change, dry skin and nail changes.   Musculoskeletal: Negative.    Gastrointestinal: Negative.    Genitourinary: Negative.    Neurological: Negative for weakness.       Objective:   Blood pressure 125/78, pulse 99, height 5' 5" (1.651 m), weight 116.8 kg (257 lb 8 oz).body mass index is 42.85 kg/m².  Physical Exam   Constitutional: She is oriented to person, place, and time. Vital signs are normal. She appears well-developed and well-nourished.   HENT:   Head: Normocephalic.   Eyes: Pupils are equal, round, and reactive to light.   Neck: Neck supple. No JVD present.   Cardiovascular: Normal rate, regular rhythm, normal heart sounds and intact distal pulses. PMI is displaced. Exam reveals no gallop and no friction rub.   No murmur heard.  Pulmonary/Chest: Effort normal and breath sounds normal. No respiratory distress. She has no wheezes. She has no rales.   Abdominal: Soft. Bowel sounds are normal. She exhibits no distension. There is no tenderness. There is no rebound.   Musculoskeletal: She exhibits edema.   Neurological: She is alert and oriented to person, place, and time.   Nursing note and vitals reviewed.      Labs:    Chemistry      "   Component Value Date/Time     07/04/2019 0446    K 4.4 07/04/2019 0446     07/04/2019 0446    CO2 24 07/04/2019 0446    BUN 19 07/04/2019 0446    CREATININE 1.5 (H) 07/04/2019 0446     (H) 07/04/2019 0446        Component Value Date/Time    CALCIUM 8.7 07/04/2019 0446    ALKPHOS 64 07/04/2019 0446    AST 21 07/04/2019 0446    ALT 19 07/04/2019 0446    BILITOT 0.3 07/04/2019 0446    ESTGFRAFRICA 46.8 (A) 07/04/2019 0446    EGFRNONAA 40.6 (A) 07/04/2019 0446          Magnesium   Date Value Ref Range Status   07/04/2019 2.0 1.6 - 2.6 mg/dL Final     Lab Results   Component Value Date    WBC 5.15 07/04/2019    HGB 11.3 (L) 07/04/2019    HCT 37.5 07/04/2019     07/04/2019     Lab Results   Component Value Date    INR 2.8 07/29/2019    INR 2.5 (H) 07/10/2019    INR 1.6 (H) 07/04/2019     BNP   Date Value Ref Range Status   07/24/2019 377 (H) 0 - 99 pg/mL Final     Comment:     Values of less than 100 pg/ml are consistent with non-CHF populations.   07/03/2019 156 (H) 0 - 99 pg/mL Final     Comment:     Values of less than 100 pg/ml are consistent with non-CHF populations.   05/01/2019 191 (H) 0 - 99 pg/mL Final     Comment:     Values of less than 100 pg/ml are consistent with non-CHF populations.     LD   Date Value Ref Range Status   04/14/2016 827 (H) 110 - 260 U/L Final     Comment:     Results are increased in hemolyzed samples.       Assessment:      1. Chronic combined systolic and diastolic congestive heart failure    2. Essential hypertension    3. NICM (nonischemic cardiomyopathy)    4. Biventricular automatic implantable cardioverter defibrillator in situ        Plan:   Stable from a HF standpoint. Cotninue current Lasix dose  Venous Doppler of lower extremities to r/o DVT. She is already on coumadin for atrial fibrillation.   Use compression stockings.  NYHA class II symptoms. Appears  euvolemic on exam.   Continue current HF regimen  Recommend 2 gram sodium restriction and 1500cc  fluid restriction.  Encourage physical activity with graded exercise program.  Requested patient to weigh themselves daily, and to notify us if their weight increases by more than 3 lbs in 1 day or 5 lbs in 1 week.    RTC in 3 months with agnes Jules MD

## 2019-08-09 NOTE — LETTER
August 9, 2019        Ayan Olmstead  [address] [place] [address]  Phone: [phone]  Fax: [phone]             [facility] [address] [place] [address]  Phone: [phone]   Patient: Laure Ross   MR Number: [id number]   YOB: 1970   Date of Visit: 8/9/2019       Dear Dr. Ayan Olmstead    Thank you for referring Laure Ross to me for evaluation. Attached you will find relevant portions of my assessment and plan of care.    If you have questions, please do not hesitate to call me. I look forward to following Laure Ross along with you.    Sincerely,    Dadnre Jules MD    Enclosure    If you would like to receive this communication electronically, please contact [email or web address] or [phone] to request [employer] access.    [employer] is a tool which provides read-only access to select patient information with whom you have a relationship. Its easy to use and provides real time access to review your patients record including encounter summaries, notes, results, and demographic information.    If you feel you have received this communication in error or would no longer like to receive these types of communications, please e-mail [email or web address]

## 2019-08-13 RX ORDER — BLOOD-GLUCOSE CONTROL, NORMAL
EACH MISCELLANEOUS
Qty: 100 EACH | Refills: 6 | Status: SHIPPED | OUTPATIENT
Start: 2019-08-13

## 2019-08-13 RX ORDER — PEN NEEDLE, DIABETIC 31 GX5/16"
NEEDLE, DISPOSABLE MISCELLANEOUS
Qty: 200 EACH | Refills: 11 | Status: SHIPPED | OUTPATIENT
Start: 2019-08-13 | End: 2020-01-01 | Stop reason: SDUPTHER

## 2019-08-14 ENCOUNTER — TELEPHONE (OUTPATIENT)
Dept: PHARMACY | Facility: CLINIC | Age: 49
End: 2019-08-14

## 2019-08-16 ENCOUNTER — CLINICAL SUPPORT (OUTPATIENT)
Dept: CARDIOLOGY | Facility: CLINIC | Age: 49
End: 2019-08-16
Attending: INTERNAL MEDICINE
Payer: MEDICAID

## 2019-08-16 DIAGNOSIS — M79.89 LEG SWELLING: ICD-10-CM

## 2019-08-16 PROCEDURE — 93970 CV US DOPPLER VENOUS LEGS BILATERAL (CUPID ONLY): ICD-10-PCS | Mod: 26,S$PBB,, | Performed by: INTERNAL MEDICINE

## 2019-08-16 PROCEDURE — 93970 EXTREMITY STUDY: CPT | Mod: PBBFAC | Performed by: INTERNAL MEDICINE

## 2019-08-19 ENCOUNTER — ANTI-COAG VISIT (OUTPATIENT)
Dept: CARDIOLOGY | Facility: CLINIC | Age: 49
End: 2019-08-19
Payer: MEDICAID

## 2019-08-19 ENCOUNTER — PATIENT MESSAGE (OUTPATIENT)
Dept: TRANSPLANT | Facility: CLINIC | Age: 49
End: 2019-08-19

## 2019-08-19 DIAGNOSIS — I48.0 PAROXYSMAL ATRIAL FIBRILLATION: ICD-10-CM

## 2019-08-19 DIAGNOSIS — Z79.01 CHRONIC ANTICOAGULATION: ICD-10-CM

## 2019-08-19 DIAGNOSIS — Z79.01 LONG TERM (CURRENT) USE OF ANTICOAGULANTS: Primary | ICD-10-CM

## 2019-08-19 LAB — INR PPP: 2.4 (ref 2–3)

## 2019-08-19 PROCEDURE — 93793 PR ANTICOAGULANT MGMT FOR PT TAKING WARFARIN: ICD-10-PCS | Mod: ,,,

## 2019-08-19 PROCEDURE — 93793 ANTICOAG MGMT PT WARFARIN: CPT | Mod: ,,,

## 2019-08-19 PROCEDURE — 85610 PROTHROMBIN TIME: CPT | Mod: PBBFAC

## 2019-08-19 NOTE — PROGRESS NOTES
INR at goal. Medications and chart reviewed. No changes noted to necessitate adjustment of warfarin or follow-up plan. See calendar.  Pt findings: Pt states she has bleeding & collection of fluid behind the eye (pt to have records sent to [facility] from Dr. Salazar);  pt has pending cataract surgery; & pt denies any changes.  Will maintain current dose & re-assess in 3 weeks.

## 2019-08-26 ENCOUNTER — OFFICE VISIT (OUTPATIENT)
Dept: PODIATRY | Facility: CLINIC | Age: 49
End: 2019-08-26
Payer: MEDICAID

## 2019-08-26 VITALS — HEIGHT: 65 IN | BODY MASS INDEX: 42.82 KG/M2 | WEIGHT: 257 LBS

## 2019-08-26 DIAGNOSIS — M20.41 HAMMER TOES OF BOTH FEET: ICD-10-CM

## 2019-08-26 DIAGNOSIS — L84 PRE-ULCERATIVE CALLUSES: ICD-10-CM

## 2019-08-26 DIAGNOSIS — M20.31 ACQUIRED HALLUX MALLEUS OF BOTH FEET: ICD-10-CM

## 2019-08-26 DIAGNOSIS — M20.5X1 HALLUX LIMITUS, ACQUIRED, RIGHT: ICD-10-CM

## 2019-08-26 DIAGNOSIS — L84 CORN OR CALLUS: ICD-10-CM

## 2019-08-26 DIAGNOSIS — M20.32 ACQUIRED HALLUX MALLEUS OF BOTH FEET: ICD-10-CM

## 2019-08-26 DIAGNOSIS — S93.401A INVERSION SPRAIN OF ANKLE, RIGHT, INITIAL ENCOUNTER: ICD-10-CM

## 2019-08-26 DIAGNOSIS — M20.42 HAMMER TOES OF BOTH FEET: ICD-10-CM

## 2019-08-26 DIAGNOSIS — M20.5X2 HALLUX LIMITUS, ACQUIRED, LEFT: ICD-10-CM

## 2019-08-26 DIAGNOSIS — E11.49 TYPE II DIABETES MELLITUS WITH NEUROLOGICAL MANIFESTATIONS: Primary | ICD-10-CM

## 2019-08-26 PROCEDURE — 99213 OFFICE O/P EST LOW 20 MIN: CPT | Mod: PBBFAC,PO | Performed by: PODIATRIST

## 2019-08-26 PROCEDURE — 99999 PR PBB SHADOW E&M-EST. PATIENT-LVL III: ICD-10-PCS | Mod: PBBFAC,,, | Performed by: PODIATRIST

## 2019-08-26 PROCEDURE — 99999 PR PBB SHADOW E&M-EST. PATIENT-LVL III: CPT | Mod: PBBFAC,,, | Performed by: PODIATRIST

## 2019-08-26 PROCEDURE — 99214 OFFICE O/P EST MOD 30 MIN: CPT | Mod: S$PBB,,, | Performed by: PODIATRIST

## 2019-08-26 PROCEDURE — 99214 PR OFFICE/OUTPT VISIT, EST, LEVL IV, 30-39 MIN: ICD-10-PCS | Mod: S$PBB,,, | Performed by: PODIATRIST

## 2019-08-26 NOTE — PROGRESS NOTES
Subjective:      Patient ID: Laure Ross is a 48 y.o. female.    Chief Complaint: Foot Pain (Pcp Dr. Mittal  06/28/19) and Foot Problem    Laure is a 48 y.o. female who presents to the clinic for evaluation and treatment of high risk feet. Laure has a past medical history of Anticoagulant long-term use, Arthritis, Asthma, Asthma, Atrial fibrillation, Cardiomyopathy, Cardiomyopathy, CHF (congestive heart failure), CHF (congestive heart failure), Chronic combined systolic and diastolic heart failure (6/3/2016), COPD (chronic obstructive pulmonary disease) (3/28/2018), GERD (gastroesophageal reflux disease), H/O tubal ligation, Hypertension, Obesity, Renal disorder, Type 2 diabetes mellitus with hyperglycemia, and Type 2 diabetes mellitus with polyneuropathy. The patient's chief complaint is sudden onset of nonpainful swelling to the right foot several weeks ago. Patient relates that she cannot recall an inciting event she denies changes in shoes or activities she relates that she informed her cardiologist and nephrologist about these changes venous ultrasound negative for DVT she is here today to discuss possible etiology of swelling. She relates the swelling has gone down significantly. This patient has documented high risk feet requiring routine maintenance secondary to diabetes mellitis and those secondary complications of diabetes, as mentioned..    PCP: Holly Mittal MD    Date Last Seen by PCP:   Chief Complaint   Patient presents with    Foot Pain     Pcp Dr. Mittal  06/28/19    Foot Problem       Current shoe gear:  Slip-on shoes    Hemoglobin A1C   Date Value Ref Range Status   07/03/2019 6.9 (H) 4.0 - 5.6 % Final     Comment:     ADA Screening Guidelines:  5.7-6.4%  Consistent with prediabetes  >or=6.5%  Consistent with diabetes  High levels of fetal hemoglobin interfere with the HbA1C  assay. Heterozygous hemoglobin variants (HbS, HgC, etc)do  not significantly interfere with this assay.    However, presence of multiple variants may affect accuracy.     10/01/2018 7.4 (H) 4.0 - 5.6 % Final     Comment:     ADA Screening Guidelines:  5.7-6.4%  Consistent with prediabetes  >or=6.5%  Consistent with diabetes  High levels of fetal hemoglobin interfere with the HbA1C  assay. Heterozygous hemoglobin variants (HbS, HgC, etc)do  not significantly interfere with this assay.   However, presence of multiple variants may affect accuracy.     03/28/2018 6.2 (H) 4.0 - 5.6 % Final     Comment:     According to ADA guidelines, hemoglobin A1c <7.0% represents  optimal control in non-pregnant diabetic patients. Different  metrics may apply to specific patient populations.   Standards of Medical Care in Diabetes-2016.  For the purpose of screening for the presence of diabetes:  <5.7%     Consistent with the absence of diabetes  5.7-6.4%  Consistent with increasing risk for diabetes   (prediabetes)  >or=6.5%  Consistent with diabetes  Currently, no consensus exists for use of hemoglobin A1c  for diagnosis of diabetes for children.  This Hemoglobin A1c assay has significant interference with fetal   hemoglobin   (HbF). The results are invalid for patients with abnormal amounts of   HbF,   including those with known Hereditary Persistence   of Fetal Hemoglobin. Heterozygous hemoglobin variants (HbAS, HbAC,   HbAD, HbAE, HbA2) do not significantly interfere with this assay;   however, presence of multiple variants in a sample may impact the %   interference.             Patient Active Problem List   Diagnosis    Hypertensive heart disease with heart failure    Type 2 diabetes mellitus with diabetic polyneuropathy    CLARENCE (obstructive sleep apnea)    Paroxysmal atrial fibrillation    Obesity with body mass index (BMI) of 30.0 to 39.9    Encounter for pre-transplant evaluation for heart transplant    Palliative care encounter    Fatigue    Breast mass on GYN exam 4/29/16    Left bundle-branch block    Organ  "transplant candidate    Tuberculosis screening    Vitamin D deficiency disease    Chronic anticoagulation    Muscle weakness    Chronic combined systolic and diastolic congestive heart failure    COCM (congestive cardiomyopathy)    High risk medications (not anticoagulants) long-term use    Laryngopharyngeal reflux    Gastroesophageal reflux disease    Chronic rhinitis    Dysphonia    GERD (gastroesophageal reflux disease)    CKD (chronic kidney disease), stage III    Carpal tunnel syndrome, bilateral    NICM (nonischemic cardiomyopathy)    Acute diastolic CHF (congestive heart failure), NYHA class 3    History of diabetic ulcer of foot - Right Foot    Chest pain    Essential hypertension    Asthma exacerbation    Biventricular automatic implantable cardioverter defibrillator in situ    Amiodarone toxicity    Acute URI    Maxillary sinusitis    Influenza B    Abnormal finding on lung imaging       Current Outpatient Medications on File Prior to Visit   Medication Sig Dispense Refill    acetaminophen (TYLENOL) 650 MG TbSR TAKE 1 TABLET every four hours AS NEEDED for shoulder pain 180 tablet 2    allopurinol (ZYLOPRIM) 100 MG tablet TAKE 1 TABLET BY MOUTH EVERY DAY 90 tablet 5    atorvastatin (LIPITOR) 20 MG tablet Take 1 tablet (20 mg total) by mouth once daily. 90 tablet 1    azelastine (ASTELIN) 137 mcg (0.1 %) nasal spray Use 1 to 2 sprays per nostril twice daily as needed. Us in addition to our nasal steroid spray 30 mL 3    baclofen (LIORESAL) 10 MG tablet TAKE 1 TABLET BY MOUTH THREE TIMES DAILY AS NEEDED FOR MUSCLE SPASM 30 tablet 0    BD ULTRA-FINE ESSENCE PEN NEEDLE 32 gauge x 5/32" Ndle Takes 5 times  day 200 each 11    blood sugar diagnostic Strp Uses 4 times a day, true metrix meter. 150 each 11    budesonide-formoterol 160-4.5 mcg (SYMBICORT) 160-4.5 mcg/actuation HFAA INHALE 2 PUFFS BY MOUTH TWICE DAILY. RINSE MOUTH AFTER USE. 10.2 g 3    budesonide-formoterol 160-4.5 mcg " (SYMBICORT) 160-4.5 mcg/actuation HFAA INHALE 2 PUFFS TWICE DAILY. RINSE MOUTH AFTER USE. 10.2 g 6    cetirizine (ZYRTEC) 10 MG tablet TAKE 1 TABLET AT BEDTIME FOR ALLERGY RELIEF 30 tablet 3    ciclopirox (PENLAC) 8 % Soln Apply topically nightly. 6.6 mL 3    clotrimazole (LOTRIMIN) 1 % cream APPLY SPARINGLY TO AFFECTED AREA(S) in groin TWICE DAILY for one to two weeks 45 g 0    cyclobenzaprine (FEXMID) 7.5 MG Tab TAKE 1 TABLET 3 TIMES DAILY AS NEEDED FOR MUSCLE SPASM. 21 tablet 0    diclofenac sodium (VOLTAREN) 1 % Gel APPLY 2 grams SPARINGLY TO Knees ONCE DAILY 100 g 3    dicyclomine (BENTYL) 10 MG capsule TAKE 1 CAPSULE 4 TIMES DAILY as needed for abdominal pain 30 capsule 0    digoxin (LANOXIN) 125 mcg tablet TAKE 1 TABLET BY MOUTH ONCE DAILY 90 tablet 3    dulaglutide (TRULICITY) 0.75 mg/0.5 mL PnIj Inject 0.5 mLs (0.75 mg total) into the skin every 7 days. 2 mL 6    dupilumab (DUPIXENT) 300 mg/2 mL Syrg Inject 2 mLs (300 mg total) into the skin every 14 (fourteen) days. 4 mL 11    EPINEPHrine (EPIPEN) 0.3 mg/0.3 mL AtIn INJECT 0.3ML IN THE MUSCLE AS DIRECTED FOR ANAPHYLAXIS 0.6 mL 1    ergocalciferol (ERGOCALCIFEROL) 50,000 unit Cap TAKE 1 CAPSULE BY MOUTH WEEKLY. 12 capsule 0    famotidine (PEPCID) 40 MG tablet TAKE 1 TABLET BY MOUTH TWICE DAILY. 180 tablet 1    fluticasone (FLONASE) 50 mcg/actuation nasal spray USE TWO SPRAYS IN EACH NOSTRIL ONCE DAILY for cold 48 g 1    fluticasone propionate (FLOVENT HFA) 110 mcg/actuation inhaler INHALE 2 PUFFS TWICE DAILY. RINSE MOUTH AFTER USE. 12 g 3    furosemide (LASIX) 40 MG tablet Take 2 tablets (80 mg total) by mouth 2 (two) times daily. 360 tablet 3    hydrOXYzine pamoate (VISTARIL) 25 MG Cap TAKE 1 CAPSULE BY MOUTH EVERY 8 HOURS AS NEEDED FOR ITCHING 30 capsule 0    insulin (BASAGLAR KWIKPEN U-100 INSULIN) glargine 100 units/mL (3mL) SubQ pen inject 34 under the skin in the morning and 34 units at night 30 mL 6    insulin aspart U-100 (NOVOLOG)  "100 unit/mL (3 mL) InPn pen Inject 16 units into the skin with meals plus scale 15 mL 6    ketoconazole (NIZORAL) 2 % cream APPLY SPARINGLY TO AFFECTED AREA(S) ONCE DAILY 30 g 2    ketoconazole (NIZORAL) 2 % shampoo APPLY TO SKIN EVERY DAY FOR 5 MINUTES AND RINSE FOR ABOUT 1 TO 2 WEEKS 120 mL 1    lancets 30 gauge Misc use 4 times a day 100 each 6    levalbuterol (XOPENEX HFA) 45 mcg/actuation inhaler INHALE 1 TO 2 PUFFS EVERY 4 TO 6 HOURS AS NEEDED 15 g 3    levalbuterol (XOPENEX HFA) 45 mcg/actuation inhaler Inhale 1 - 2 puffs by mouth every 4 - 6 hours as needed 15 g 6    levalbuterol (XOPENEX) 0.31 mg/3 mL nebulizer solution Take 3 mLs (0.31 mg total) by nebulization every 4 (four) hours as needed for Wheezing. Rescue 72 mL 3    linaCLOtide (LINZESS) 72 mcg Cap capsule Take one tablet by mouth daily as needed for constipation 30 capsule 2    lisinopril (PRINIVIL,ZESTRIL) 5 MG tablet TAKE 1 TABLET BY MOUTH TWO TIMES A DAY 60 tablet 8    magnesium oxide (MAG-OX) 400 mg (241.3 mg magnesium) tablet Take 1 tablet (400 mg total) by mouth 2 (two) times daily. 180 tablet 3    montelukast (SINGULAIR) 10 mg tablet TAKE 1 TABLET BY MOUTH DAILY. 30 tablet 3    montelukast (SINGULAIR) 10 mg tablet TAKE 1 TABLET DAILY. 30 tablet 6    pen needle, diabetic 32 gauge x 5/32" Ndle USE AS DIRECTED 5 TIMES DAILY 200 each 5    pen needle, diabetic 32 gauge x 5/32" Ndle use with insulin pen five times daily 100 each 3    polyethylene glycol (GLYCOLAX) 17 gram/dose powder MIX 1 CAPFUL IN 8 OUNCES OF LIQUID AND DRINK ONCE DAILY as needed for constipation 510 g 6    potassium chloride (MICRO-K) 10 MEQ CpSR Take 2 capsules (20 mEq total) by mouth once daily. 60 capsule 11    sodium chloride for inhalation (SODIUM CHLORIDE 0.9%) 0.9 % nebulizer solution Use 1 vial via nebulization three times daily. Mix with xopenex solution 90 mL 2    spironolactone (ALDACTONE) 25 MG tablet Take 1 tablet (25 mg total) by mouth once daily. " 90 tablet 3    triamcinolone acetonide 0.025% (KENALOG) 0.025 % cream APPLY SPARINGLY TO AFFECTED AREA(S) of face 2 TIMES DAILY. 15 g 1    warfarin (COUMADIN) 7.5 MG tablet Take ½ tablet by mouth once daily and 1 tablet on thursday (Patient taking differently: Coumadin dose 7.5 mg on Monday and  1/2 tab rest of days) 60 tablet 3     No current facility-administered medications on file prior to visit.        Review of patient's allergies indicates:  No Known Allergies    Past Surgical History:   Procedure Laterality Date    CARDIAC DEFIBRILLATOR PLACEMENT      CARDIAC DEFIBRILLATOR PLACEMENT      CARDIAC DEFIBRILLATOR PLACEMENT      CARDIOVERSION N/A 4/19/2016    [facility] CATH [facility]    CHOLECYSTECTOMY      COLONOSCOPY N/A 5/2/2016    [facility] at [facility] ENDO (2ND FLR)    ECHOCARDIOGRAM-TRANSESOPHAGEAL N/A 4/15/2016    Per[facility] Rossana Surgeon [facility] ROS[facility]    ESOPHAGOGASTRODUODENOSCOPY (EGD) N/A 11/17/2016    Performed by Joe Magana [facility] at [facility] ENDO (2ND FLR)    GALLBLADDER SURGERY      iabp  4/2016    INSERTION-ICD-BIVENTRICULAR Right 5/4/2017    [facility] CATH LAB    TUBAL LIGATION         Family History   Problem Relation Age of Onset    Heart disease Mother     Heart disease Father        Social History     Socioeconomic History    Marital status:      Spouse name: Not on file    Number of children: Not on file    Years of education: Not on file    Highest education level: Not on file   Occupational History    Not on file   Social Needs    Financial resource strain: Not on file    Food insecurity:     Worry: Not on file     Inability: Not on file    Transportation needs:     Medical: Not on file     Non-medical: Not on file   Tobacco Use    Smoking status: Never Smoker    Smokeless tobacco: Never Used   Substance and Sexual Activity    Alcohol use: No    Drug use: No    Sexual activity: Yes  "    Partners: Male   Lifestyle    Physical activity:     Days per week: Not on file     Minutes per session: Not on file    Stress: Not on file   Relationships    Social connections:     Talks on phone: Not on file     Gets together: Not on file     Attends [employer] service: Not on file     Active member of club or organization: Not on file     Attends meetings of clubs or organizations: Not on file     Relationship status: Not on file   Other Topics Concern    Not on file   Social History Narrative    Not on file       Review of Systems   Constitution: Negative for chills, fever and malaise/fatigue.   Cardiovascular: Negative for chest pain, claudication, leg swelling, orthopnea and palpitations.   Respiratory: Negative for cough, shortness of breath and wheezing.    Skin: Positive for color change, dry skin, nail changes and suspicious lesions (calluses/corns). Negative for itching, poor wound healing and rash.   Musculoskeletal: Positive for joint pain and myalgias. Negative for arthritis, falls, gout, joint swelling and muscle weakness.   Gastrointestinal: Negative for diarrhea, nausea and vomiting.   Neurological: Positive for numbness, paresthesias and sensory change. Negative for disturbances in coordination, dizziness, focal weakness, tremors and weakness.   Psychiatric/Behavioral: Negative for altered mental status and hallucinations.           Objective:      Vitals:    08/26/19 1514   Weight: 116.6 kg (257 lb)   Height: 5' 5" (1.651 m)   PainSc:   5       Physical Exam   Cardiovascular:   Dorsalis pedis and posterior tibial pulses are diminished Bilaterally. Toes are cool to touch. Feet are warm proximally.There is decreased digital hair. Skin is atrophic, slightly hyperpigmented, and mildly edematous       Musculoskeletal:        Right ankle: She exhibits swelling. Tenderness. AITFL tenderness found.   There is equinus deformity bilateral with decreased dorsiflexion at the ankle joint bilateral. No " tenderness with compression of heel. Negative tinels sign. Gait analysis reveals excessive pronation through midstance and propulsion with early heel off. Shoes reveals lateral heel counter wear bilateral     Decreased first MPJ range of motion both weightbearing and nonweightbearing, no crepitus observed the first MP joint, + dorsal flag sign. Mild  bunion deformity is observed .    Patient has hammertoes of digits 2-5 bilateral partially reducible      Neurological:   Bosque-Anya 5.07 monofilamant testing is diminished both feet. Sharp/dull sensation diminished Bilaterally. Light touch absent Bilaterally.       Skin: Lesion noted. No bruising and no ecchymosis noted. No erythema.   No open lesions, lacerations or wounds noted. Toenails 1-5 bilaterally are elongated by 2-3 mm, thickened by 2-3 mm, discolored/yellowed, dystrophic, brittle with subungual debris.  .  Interdigital spaces clean, dry and intact b/l. No erythema noted to b/l foot. Skin texture thin, atrophic, dry. Pedal hair diminished b/l.         Scaling dryness in a moccasin distribution is noted to the bilateral lower extremities.     Focal hyperkeratotic lesion consisting entirely of hyperkeratotic tissue without open skin, drainage, pus, fluctuance, malodor, or signs of infection: B/L submet head 2, right submet head 5                    Assessment:       Encounter Diagnoses   Name Primary?    Type II diabetes mellitus with neurological manifestations Yes    Inversion sprain of ankle, right, initial encounter     Hammer toes of both feet     Acquired hallux malleus of both feet     Hallux limitus, acquired, left     Hallux limitus, acquired, right     Corn or callus     Pre-ulcerative calluses          Plan:     Problem List Items Addressed This Visit     None      Visit Diagnoses     Type II diabetes mellitus with neurological manifestations    -  Primary    Relevant Orders    DIABETIC SHOES FOR HOME USE    Inversion sprain of ankle,  right, initial encounter        Relevant Orders    DIABETIC SHOES FOR HOME USE    Hammer toes of both feet        Relevant Orders    DIABETIC SHOES FOR HOME USE    Acquired hallux malleus of both feet        Relevant Orders    DIABETIC SHOES FOR HOME USE    Hallux limitus, acquired, left        Relevant Orders    DIABETIC SHOES FOR HOME USE    Hallux limitus, acquired, right        Relevant Orders    DIABETIC SHOES FOR HOME USE    Corn or callus        Pre-ulcerative calluses             I counseled the patient on her conditions, their implications and medical management.    Orders Placed This Encounter    DIABETIC SHOES FOR HOME USE       Greater than 50% of this visit spent on counseling and coordination of care.    Education about the diabetic foot, neuropathy, and prevention of limb loss.    Based on clinical exam, swelling is likely secondary to mild lateral ankle sprain.    Discussed ankle sprains and importance of immobilization for healing as well as the lengthy course of treatment for complete resolution.    Recommended over-the-counter ankle bracing or compression stockings as well as RICE    Shoe inspection. Diabetic Foot Education. Patient reminded of the importance of good nutrition/healthy diet/weight management and blood sugar control to help prevent podiatric complications of diabetes. Patient instructed on proper foot hygeine. Wear comfortable, proper fitting shoes. Wash feet daily. Dry well. After drying, apply moisturizer to feet (no lotion to webspaces). Inspect feet daily for skin breaks, blisters, swelling, or redness. Wear cotton socks (preferably white)  Change socks every day. Do NOT walk barefoot. Do NOT use heating pads or hot water soaks. We discussed wearing proper shoe gear, daily foot inspections, never walking without protective shoe gear.     Discussed edema control and the importance of daily moisturizer to the feet such as Gold bonds diabetic foot cream    Rx diabetic shoes for  protection and support    She will continue to monitor the areas daily, inspect her feet, wear protective shoe gear when ambulatory, moisturizer to maintain skin integrity and follow in this office in approximately 3-4 months with Dr Casa rey

## 2019-08-26 NOTE — PATIENT INSTRUCTIONS
Treating Ankle Sprains  Treatment will depend on how bad your sprain is. For a severe sprain, healing may take 3 months or more.  Right after your injury: Use R.I.C.E.  · Rest: At first, keep weight off the ankle as much as you can. You may be given crutches to help you walk without putting weight on the ankle.  · Ice: Put an ice pack on the ankle for 15 minutes. Remove the pack and wait at least 30 minutes. Repeat for up to 3 days. This helps reduce swelling.  · Compression: To reduce swelling and keep the joint stable, you may need to wrap the ankle with an elastic bandage. For more severe sprains, you may need an ankle brace or a cast.  · Elevation: To reduce swelling, keep your ankle raised above your heart when you sit or lie down.  Medicine  Your healthcare provider may suggest oral non-steroidal anti-inflammatory medicine (NSAIDs), such as ibuprofen. This relieves the pain and helps reduce any swelling. Be sure to take your medicine as directed.  Contrast baths  After 3 days, soak your ankle in warm water for 30 seconds, then in cool water for 30 seconds. Go back and forth for 5 minutes. Doing this every 2 hours will help keep the swelling down.  Exercises    After about 2 to 3 weeks, you may be given exercises to strengthen the ligaments and muscles in the ankle. Doing these exercises will help prevent another ankle sprain. Exercises may include standing on your toes and then on your heels and doing ankle curls.  Ankle curls  · Sit on the edge of a sturdy table or lie on your back.  · Pull your toes toward you. Then point them away from you. Repeat for 2 to 3 minutes.   Date Last Reviewed: 9/28/2015  © 0215-4132 [employer]. [address] [place] [address], [place] [address]. All rights reserved. This information is not intended as a substitute for professional medical care. Always follow your healthcare professional's instructions.        Understanding Ankle Sprain    The ankle is the joint where the  leg and foot meet. Bones are held in place by connective tissue called ligaments. When ankle ligaments are stretched to the point of pain and injury, it is called an ankle sprain. A sprain can tear the ligaments. These tears can be very small but still cause pain. Ankle sprains can be mild or severe.  What causes an ankle sprain?  A sprain may occur when you twist your ankle or bend it too far. This can happen when you stumble or fall. Things that can make an ankle sprain more likely include:  · Having had an ankle sprain before  · Playing sports that involve running and jumping. Or playing contact sports such as football or hockey.  · Wearing shoes that dont support your feet and ankles well  · Having ankles with poor strength and flexibility  Symptoms of an ankle sprain  Symptoms may include:  · Pain or soreness in the ankle  · Swelling  · Redness or bruising  · Not being able to walk or put weight on the affected foot  · Reduced range of motion in the ankle  · A popping or tearing feeling at the time the sprain occurs  · An abnormal or dislocated look to the ankle  · Instability or too much range of motion in the ankle  Treatment for an ankle sprain  Treatment focuses on reducing pain and swelling, and avoiding further injury. Treatments may include:  · Resting the ankle. Avoid putting weight on it. This may mean using crutches until the sprain heals.  · Prescription or over-the-counter pain medicines. These help reduce swelling and pain.  · Cold packs. These help reduce pain and swelling.  · Raising your ankle above your heart. This helps reduce swelling.  · Wrapping the ankle with an elastic bandage or ankle brace. This helps reduce swelling and gives some support to the ankle. In rare cases, you may need a cast or boot.  · Stretching and other exercises. These improve flexibility and strength.  · Heat packs. These may be recommended before doing ankle exercises.  Possible complications of an ankle sprain  An  ankle that has been weakened by a sprain can be more likely to have repeated sprains afterward. Doing exercises to strengthen your ankle and improve balance can reduce your risk for repeated sprains. Other possible complications are long-term (chronic) pain or an ankle that remains unstable.  When to call your healthcare provider  Call your healthcare provider right away if you have any of these:  · Fever of 100.4°F (38°C) or higher, or as directed  · Pain, numbness, discoloration, or coldness in the foot or toes  · Pain that gets worse  · Symptoms that dont get better, or get worse  · New symptoms   Date Last Reviewed: 3/10/2016  © 4517-4222 [employer]. [address] [place] [address], [place] [address]. All rights reserved. This information is not intended as a substitute for professional medical care. Always follow your healthcare professional's instructions.

## 2019-09-04 DIAGNOSIS — E56.9 VITAMIN DEFICIENCY: ICD-10-CM

## 2019-09-04 RX ORDER — ERGOCALCIFEROL 1.25 MG/1
50000 CAPSULE ORAL
Qty: 12 CAPSULE | Refills: 0 | Status: CANCELLED | OUTPATIENT
Start: 2019-09-04

## 2019-09-09 ENCOUNTER — ANTI-COAG VISIT (OUTPATIENT)
Dept: CARDIOLOGY | Facility: CLINIC | Age: 49
End: 2019-09-09
Payer: MEDICAID

## 2019-09-09 DIAGNOSIS — Z79.01 LONG TERM (CURRENT) USE OF ANTICOAGULANTS: Primary | ICD-10-CM

## 2019-09-09 DIAGNOSIS — I48.0 PAROXYSMAL ATRIAL FIBRILLATION: ICD-10-CM

## 2019-09-09 DIAGNOSIS — Z79.01 CHRONIC ANTICOAGULATION: ICD-10-CM

## 2019-09-09 LAB — INR PPP: 2.3 (ref 2–3)

## 2019-09-09 PROCEDURE — 85610 PROTHROMBIN TIME: CPT | Mod: PBBFAC

## 2019-09-09 PROCEDURE — 93793 ANTICOAG MGMT PT WARFARIN: CPT | Mod: ,,,

## 2019-09-09 PROCEDURE — 93793 PR ANTICOAGULANT MGMT FOR PT TAKING WARFARIN: ICD-10-PCS | Mod: ,,,

## 2019-09-09 NOTE — PROGRESS NOTES
INR at goal. Medications and chart reviewed. No changes noted to necessitate adjustment of warfarin or follow-up plan. See calendar.  Findings: Pt states she had an asthma exacerbation over the weekend; she has pain in R leg (under the care of Dr. Gibson); & denies any other changes.  Pt has a cataract surgery scheduled 9/20.  Will maintain current regimen & re-assess in 3 weeks.   Patient was re-educated on situations that would require placing a call to the [facility], including bleeding or unusual bruising issues, changes in health, diet or medications,upcoming procedures that require warfarin interruption, and missed Coumadin dose(s). Patient expressed understanding that avoidance of consistency with these parameters could cause fluctuations in INR, leading to more frequent visits and increase risk of adverse events.

## 2019-09-11 ENCOUNTER — DOCUMENTATION ONLY (OUTPATIENT)
Dept: TRANSPLANT | Facility: CLINIC | Age: 49
End: 2019-09-11

## 2019-09-11 ENCOUNTER — LAB VISIT (OUTPATIENT)
Dept: LAB | Facility: HOSPITAL | Age: 49
End: 2019-09-11
Attending: INTERNAL MEDICINE
Payer: MEDICAID

## 2019-09-11 DIAGNOSIS — I50.42 CHRONIC COMBINED SYSTOLIC AND DIASTOLIC CONGESTIVE HEART FAILURE: ICD-10-CM

## 2019-09-11 LAB
ANION GAP SERPL CALC-SCNC: 9 MMOL/L (ref 8–16)
BNP SERPL-MCNC: 286 PG/ML (ref 0–99)
BUN SERPL-MCNC: 20 MG/DL (ref 6–20)
CALCIUM SERPL-MCNC: 9.2 MG/DL (ref 8.7–10.5)
CHLORIDE SERPL-SCNC: 104 MMOL/L (ref 95–110)
CO2 SERPL-SCNC: 26 MMOL/L (ref 23–29)
CREAT SERPL-MCNC: 1.3 MG/DL (ref 0.5–1.4)
EST. GFR  (AFRICAN AMERICAN): 55.7 ML/MIN/1.73 M^2
EST. GFR  (NON AFRICAN AMERICAN): 48.3 ML/MIN/1.73 M^2
GLUCOSE SERPL-MCNC: 101 MG/DL (ref 70–110)
POTASSIUM SERPL-SCNC: 3.9 MMOL/L (ref 3.5–5.1)
SODIUM SERPL-SCNC: 139 MMOL/L (ref 136–145)

## 2019-09-11 PROCEDURE — 36415 COLL VENOUS BLD VENIPUNCTURE: CPT

## 2019-09-11 PROCEDURE — 80048 BASIC METABOLIC PNL TOTAL CA: CPT

## 2019-09-11 PROCEDURE — 83880 ASSAY OF NATRIURETIC PEPTIDE: CPT

## 2019-09-11 NOTE — PROGRESS NOTES
Ms. Ross is a very pleasant 44 yo black female with NICM (EF 10-20%), paroxysmal atrial fibrillation, HTN with NYHA class II symptoms who has been very stable. NO recent HF admissions. She is scheduled for a procedure/surgery that will require local anesthesia. From a cardiac standpoint she has no contraindications and may proceed with her surgery.     Dandre Jules MD

## 2019-09-12 ENCOUNTER — TELEPHONE (OUTPATIENT)
Dept: PHARMACY | Facility: CLINIC | Age: 49
End: 2019-09-12

## 2019-09-12 NOTE — TELEPHONE ENCOUNTER
Refill call regarding Dupixent at [facility]. Will prepare for shipment on  to arrive  with pt consent. Copay 0.00. Patient has not started any new medications, no missed doses/ side effects. Patient did not have any concerns or questions for the pharmacist at this time.  & address verified.  ~ next injection with 0 doses on hand.

## 2019-09-13 ENCOUNTER — TELEPHONE (OUTPATIENT)
Dept: PHARMACY | Facility: CLINIC | Age: 49
End: 2019-09-13

## 2019-09-13 DIAGNOSIS — I50.42 CHRONIC COMBINED SYSTOLIC AND DIASTOLIC CONGESTIVE HEART FAILURE: Primary | ICD-10-CM

## 2019-09-13 RX ORDER — METOLAZONE 2.5 MG/1
TABLET ORAL
Qty: 5 TABLET | Refills: 0 | Status: SHIPPED | OUTPATIENT
Start: 2019-09-13 | End: 2020-01-01 | Stop reason: SDUPTHER

## 2019-09-16 DIAGNOSIS — E11.42 TYPE 2 DIABETES MELLITUS WITH DIABETIC POLYNEUROPATHY: ICD-10-CM

## 2019-09-16 RX ORDER — INSULIN ASPART 100 [IU]/ML
INJECTION, SOLUTION INTRAVENOUS; SUBCUTANEOUS
Qty: 30 ML | Refills: 6 | Status: SHIPPED | OUTPATIENT
Start: 2019-09-16

## 2019-09-30 ENCOUNTER — ANTI-COAG VISIT (OUTPATIENT)
Dept: CARDIOLOGY | Facility: CLINIC | Age: 49
End: 2019-09-30
Payer: MEDICAID

## 2019-09-30 DIAGNOSIS — Z79.01 CHRONIC ANTICOAGULATION: ICD-10-CM

## 2019-09-30 DIAGNOSIS — I48.0 PAROXYSMAL ATRIAL FIBRILLATION: ICD-10-CM

## 2019-09-30 DIAGNOSIS — Z79.01 LONG TERM (CURRENT) USE OF ANTICOAGULANTS: Primary | ICD-10-CM

## 2019-09-30 LAB — INR PPP: 3.7 (ref 2–3)

## 2019-09-30 PROCEDURE — 85610 PROTHROMBIN TIME: CPT | Mod: PBBFAC

## 2019-09-30 PROCEDURE — 93793 ANTICOAG MGMT PT WARFARIN: CPT | Mod: ,,,

## 2019-09-30 PROCEDURE — 93793 PR ANTICOAGULANT MGMT FOR PT TAKING WARFARIN: ICD-10-PCS | Mod: ,,,

## 2019-09-30 NOTE — PROGRESS NOTES
INR not at goal. Medications, chart, and patient findings reviewed. See calendar for adjustments to dose and follow up plan.  Findings: Pt states her appetite has decreased (over the past 2 weeks) & denies any other changes.  Will instruct pt to hold coumadin 9/30 & decrease maintenance dose.  Plan to re-assess in 2 weeks.   Patient was re-educated on situations that would require placing a call to the [facility], including bleeding or unusual bruising issues, changes in health, diet or medications,upcoming procedures that require warfarin interruption, and missed Coumadin dose(s). Patient expressed understanding that avoidance of consistency with these parameters could cause fluctuations in INR, leading to more frequent visits and increase risk of adverse events.

## 2019-10-11 ENCOUNTER — OFFICE VISIT (OUTPATIENT)
Dept: TRANSPLANT | Facility: CLINIC | Age: 49
End: 2019-10-11
Payer: MEDICAID

## 2019-10-11 ENCOUNTER — LAB VISIT (OUTPATIENT)
Dept: LAB | Facility: HOSPITAL | Age: 49
End: 2019-10-11
Attending: INTERNAL MEDICINE
Payer: MEDICAID

## 2019-10-11 VITALS
SYSTOLIC BLOOD PRESSURE: 109 MMHG | DIASTOLIC BLOOD PRESSURE: 71 MMHG | BODY MASS INDEX: 43.44 KG/M2 | HEART RATE: 88 BPM | OXYGEN SATURATION: 99 % | WEIGHT: 254.44 LBS | HEIGHT: 64 IN

## 2019-10-11 DIAGNOSIS — I10 ESSENTIAL HYPERTENSION: ICD-10-CM

## 2019-10-11 DIAGNOSIS — I50.42 CHRONIC COMBINED SYSTOLIC AND DIASTOLIC CONGESTIVE HEART FAILURE: Primary | ICD-10-CM

## 2019-10-11 DIAGNOSIS — E66.9 OBESITY WITH BODY MASS INDEX (BMI) OF 30.0 TO 39.9: ICD-10-CM

## 2019-10-11 DIAGNOSIS — I42.8 NICM (NONISCHEMIC CARDIOMYOPATHY): ICD-10-CM

## 2019-10-11 DIAGNOSIS — I50.42 CHRONIC COMBINED SYSTOLIC AND DIASTOLIC CONGESTIVE HEART FAILURE: ICD-10-CM

## 2019-10-11 LAB — BNP SERPL-MCNC: 227 PG/ML (ref 0–99)

## 2019-10-11 PROCEDURE — 99213 OFFICE O/P EST LOW 20 MIN: CPT | Mod: PBBFAC | Performed by: INTERNAL MEDICINE

## 2019-10-11 PROCEDURE — 99999 PR PBB SHADOW E&M-EST. PATIENT-LVL III: ICD-10-PCS | Mod: PBBFAC,,, | Performed by: INTERNAL MEDICINE

## 2019-10-11 PROCEDURE — 83880 ASSAY OF NATRIURETIC PEPTIDE: CPT

## 2019-10-11 PROCEDURE — 99214 PR OFFICE/OUTPT VISIT, EST, LEVL IV, 30-39 MIN: ICD-10-PCS | Mod: S$PBB,,, | Performed by: INTERNAL MEDICINE

## 2019-10-11 PROCEDURE — 99999 PR PBB SHADOW E&M-EST. PATIENT-LVL III: CPT | Mod: PBBFAC,,, | Performed by: INTERNAL MEDICINE

## 2019-10-11 PROCEDURE — 99214 OFFICE O/P EST MOD 30 MIN: CPT | Mod: S$PBB,,, | Performed by: INTERNAL MEDICINE

## 2019-10-11 PROCEDURE — 36415 COLL VENOUS BLD VENIPUNCTURE: CPT

## 2019-10-11 NOTE — LETTER
October 11, 2019        Ayan Olmstead  [address] [place] [id number]  Phone: [phone]  Fax: [phone]             [facility] [address] [place] [address]  Phone: [phone]   Patient: Laure Ross   MR Number: [id number]   YOB: 1970   Date of Visit: 10/11/2019       Dear Dr. Ayan Olmstead    Thank you for referring Laure Ross to me for evaluation. Attached you will find relevant portions of my assessment and plan of care.    If you have questions, please do not hesitate to call me. I look forward to following Laure Ross along with you.    Sincerely,    Dandre Jules MD    Enclosure    If you would like to receive this communication electronically, please contact [email or web address] or [phone] to request [employer] access.    [employer] is a tool which provides read-only access to select patient information with whom you have a relationship. Its easy to use and provides real time access to review your patients record including encounter summaries, notes, results, and demographic information.    If you feel you have received this communication in error or would no longer like to receive these types of communications, please e-mail [email or web address]

## 2019-10-11 NOTE — PROGRESS NOTES
Subjective:     HPI:  Ms. Ross is a very pleasant 44 yo F with a history of NICM (EF 10-20%), paroxysmal atrial fibrillation, HTN, DM, asthma, HLD and CLARENCE who comes for a regular visit. I had seen her about a month ago. She continues to do well from a HF standpoint. Today she reports NYHA class II with occasional III symptoms. No  PND or orthopnea.  No HF admissions or ED visits. Reports no issues with her HF regimen.     Past Medical History:   Diagnosis Date    Anticoagulant long-term use     Arthritis     Asthma     Asthma     Atrial fibrillation     Cardiomyopathy     Cardiomyopathy     CHF (congestive heart failure)     CHF (congestive heart failure)     Chronic combined systolic and diastolic heart failure 6/3/2016    COPD (chronic obstructive pulmonary disease) 3/28/2018    GERD (gastroesophageal reflux disease)     H/O tubal ligation     Hypertension     Obesity     Renal disorder     Type 2 diabetes mellitus with hyperglycemia     Type 2 diabetes mellitus with polyneuropathy      Past Surgical History:   Procedure Laterality Date    CARDIAC DEFIBRILLATOR PLACEMENT      CARDIAC DEFIBRILLATOR PLACEMENT      CARDIAC DEFIBRILLATOR PLACEMENT      CHOLECYSTECTOMY      COLONOSCOPY N/A 2016    Procedure: COLONOSCOPY;  Surgeon: Eugene Soto MD;  Location: [facility] ([address] [place] [address]);  Service: Endoscopy;  Laterality: N/A;    GALLBLADDER SURGERY      iabp  2016    TUBAL LIGATION       OB History        3    Para   3    Term   3            AB        Living   3       SAB        TAB        Ectopic        Multiple        Live Births   3           Obstetric Comments   Cs x 3, preE  Gynhx: reg/4.  Mod to heavy flow  S/p BTL  Denies abnl pap, last pap  normal  H/o trichomonas           Review of Systems   Constitution: Negative. Negative for chills, decreased appetite, diaphoresis, fever, malaise/fatigue, night sweats, weight gain and weight loss.   Eyes: Negative.   "  Cardiovascular: Positive for dyspnea on exertion. Negative for chest pain, claudication, cyanosis, irregular heartbeat, leg swelling, near-syncope, orthopnea, palpitations, paroxysmal nocturnal dyspnea and syncope.   Respiratory: Negative for cough, hemoptysis and shortness of breath.    Endocrine: Negative.    Hematologic/Lymphatic: Negative.    Skin: Negative for color change, dry skin and nail changes.   Musculoskeletal: Negative.    Gastrointestinal: Negative.    Genitourinary: Negative.    Neurological: Negative for weakness.       Objective:   Blood pressure 109/71, pulse 88, height 5' 4" (1.626 m), weight 115.4 kg (254 lb 6.6 oz), SpO2 99 %.body mass index is 43.67 kg/m².  Physical Exam   Constitutional: She is oriented to person, place, and time. Vital signs are normal. She appears well-developed and well-nourished.   HENT:   Head: Normocephalic.   Eyes: Pupils are equal, round, and reactive to light.   Neck: Neck supple. No JVD present.   Cardiovascular: Normal rate, regular rhythm, normal heart sounds and intact distal pulses. PMI is displaced. Exam reveals no gallop and no friction rub.   No murmur heard.  Pulmonary/Chest: Effort normal and breath sounds normal. No respiratory distress. She has no wheezes. She has no rales.   Abdominal: Soft. Bowel sounds are normal. She exhibits no distension. There is no tenderness. There is no rebound.   Musculoskeletal: She exhibits no edema.   Neurological: She is alert and oriented to person, place, and time.   Nursing note and vitals reviewed.      Labs:    Chemistry        Component Value Date/Time     09/11/2019 1537    K 3.9 09/11/2019 1537     09/11/2019 1537    CO2 26 09/11/2019 1537    BUN 20 09/11/2019 1537    CREATININE 1.3 09/11/2019 1537     09/11/2019 1537        Component Value Date/Time    CALCIUM 9.2 09/11/2019 1537    ALKPHOS 64 07/04/2019 0446    AST 21 07/04/2019 0446    ALT 19 07/04/2019 0446    BILITOT 0.3 07/04/2019 0446    " ESTGFRAFRICA 55.7 (A) 09/11/2019 1537    EGFRNONAA 48.3 (A) 09/11/2019 1537          Magnesium   Date Value Ref Range Status   07/04/2019 2.0 1.6 - 2.6 mg/dL Final     Lab Results   Component Value Date    WBC 5.15 07/04/2019    HGB 11.3 (L) 07/04/2019    HCT 37.5 07/04/2019     07/04/2019     Lab Results   Component Value Date    INR 3.7 09/30/2019    INR 2.3 09/09/2019    INR 2.4 08/19/2019     BNP   Date Value Ref Range Status   10/11/2019 227 (H) 0 - 99 pg/mL Final     Comment:     Values of less than 100 pg/ml are consistent with non-CHF populations.   09/11/2019 286 (H) 0 - 99 pg/mL Final     Comment:     Values of less than 100 pg/ml are consistent with non-CHF populations.   08/09/2019 268 (H) 0 - 99 pg/mL Final     Comment:     Values of less than 100 pg/ml are consistent with non-CHF populations.     LD   Date Value Ref Range Status   04/14/2016 827 (H) 110 - 260 U/L Final     Comment:     Results are increased in hemolyzed samples.       Assessment:      1. Chronic combined systolic and diastolic congestive heart failure    2. NICM (nonischemic cardiomyopathy)    3. Essential hypertension    4. Obesity with body mass index (BMI) of 30.0 to 39.9        Plan:   Stable from a HF standpoint. NYHA class II symptoms. Appears  euvolemic on exam.   Continue current Lasix dose  Use compression stockings.  Continue current HF regimen  She states that her GERD has worsen since she was taken off her Omeprazole (vbecause of CKD?) recommend checking with her PCP to restart PPI   Recommend 2 gram sodium restriction and 1500cc fluid restriction.  Encourage physical activity with graded exercise program.  Requested patient to weigh themselves daily, and to notify us if their weight increases by more than 3 lbs in 1 day or 5 lbs in 1 week.    RTC in 3 months with labs       Dandre Jules MD

## 2019-10-14 ENCOUNTER — ANTI-COAG VISIT (OUTPATIENT)
Dept: CARDIOLOGY | Facility: CLINIC | Age: 49
End: 2019-10-14
Payer: MEDICAID

## 2019-10-14 ENCOUNTER — TELEPHONE (OUTPATIENT)
Dept: PHARMACY | Facility: CLINIC | Age: 49
End: 2019-10-14

## 2019-10-14 DIAGNOSIS — Z79.01 CHRONIC ANTICOAGULATION: Primary | ICD-10-CM

## 2019-10-14 DIAGNOSIS — I48.0 PAROXYSMAL ATRIAL FIBRILLATION: ICD-10-CM

## 2019-10-14 LAB — INR PPP: 2.3 (ref 2–3)

## 2019-10-14 PROCEDURE — 93793 PR ANTICOAGULANT MGMT FOR PT TAKING WARFARIN: ICD-10-PCS | Mod: ,,,

## 2019-10-14 PROCEDURE — 93793 ANTICOAG MGMT PT WARFARIN: CPT | Mod: ,,,

## 2019-10-14 PROCEDURE — 85610 PROTHROMBIN TIME: CPT | Mod: PBBFAC

## 2019-10-14 NOTE — TELEPHONE ENCOUNTER
"Call attempt 1 for Dupixent refill and clinical follow up - line rang and rang, then said "your call cannot be completed at this time". JDF message sent. Copay $0 at 004.    Shane Samano, PharmD  Clinical Pharmacist   [facility]   P: [phone]    "

## 2019-10-20 ENCOUNTER — CLINICAL SUPPORT (OUTPATIENT)
Dept: CARDIOLOGY | Facility: HOSPITAL | Age: 49
End: 2019-10-20
Payer: MEDICAID

## 2019-10-20 DIAGNOSIS — Z95.810 PRESENCE OF AUTOMATIC (IMPLANTABLE) CARDIAC DEFIBRILLATOR: ICD-10-CM

## 2019-10-20 PROCEDURE — 93295 DEV INTERROG REMOTE 1/2/MLT: CPT | Mod: ,,, | Performed by: INTERNAL MEDICINE

## 2019-10-20 PROCEDURE — 93296 REM INTERROG EVL PM/IDS: CPT | Performed by: INTERNAL MEDICINE

## 2019-10-20 PROCEDURE — 93295 CARDIAC DEVICE CHECK - REMOTE: ICD-10-PCS | Mod: ,,, | Performed by: INTERNAL MEDICINE

## 2019-10-21 ENCOUNTER — OFFICE VISIT (OUTPATIENT)
Dept: PODIATRY | Facility: CLINIC | Age: 49
End: 2019-10-21
Payer: MEDICAID

## 2019-10-21 ENCOUNTER — APPOINTMENT (OUTPATIENT)
Dept: CARDIOLOGY | Facility: HOSPITAL | Age: 49
End: 2019-10-21
Attending: INTERNAL MEDICINE
Payer: MEDICAID

## 2019-10-21 VITALS — BODY MASS INDEX: 43.36 KG/M2 | HEIGHT: 64 IN | WEIGHT: 254 LBS

## 2019-10-21 DIAGNOSIS — E11.49 TYPE II DIABETES MELLITUS WITH NEUROLOGICAL MANIFESTATIONS: Primary | ICD-10-CM

## 2019-10-21 DIAGNOSIS — B35.1 ONYCHOMYCOSIS DUE TO DERMATOPHYTE: ICD-10-CM

## 2019-10-21 PROCEDURE — 99999 PR PBB SHADOW E&M-EST. PATIENT-LVL IV: ICD-10-PCS | Mod: PBBFAC,,, | Performed by: PODIATRIST

## 2019-10-21 PROCEDURE — 99214 OFFICE O/P EST MOD 30 MIN: CPT | Mod: PBBFAC,PO,25 | Performed by: PODIATRIST

## 2019-10-21 PROCEDURE — 11721 DEBRIDE NAIL 6 OR MORE: CPT | Mod: S$PBB,Q9,, | Performed by: PODIATRIST

## 2019-10-21 PROCEDURE — 99499 NO LOS: ICD-10-PCS | Mod: S$PBB,,, | Performed by: PODIATRIST

## 2019-10-21 PROCEDURE — 99499 UNLISTED E&M SERVICE: CPT | Mod: S$PBB,,, | Performed by: PODIATRIST

## 2019-10-21 PROCEDURE — 99999 PR PBB SHADOW E&M-EST. PATIENT-LVL IV: CPT | Mod: PBBFAC,,, | Performed by: PODIATRIST

## 2019-10-21 PROCEDURE — 11721 DEBRIDE NAIL 6 OR MORE: CPT | Mod: PBBFAC,PO | Performed by: PODIATRIST

## 2019-10-21 PROCEDURE — 11721 PR DEBRIDEMENT OF NAILS, 6 OR MORE: ICD-10-PCS | Mod: S$PBB,Q9,, | Performed by: PODIATRIST

## 2019-10-25 NOTE — PROGRESS NOTES
Subjective:      Patient ID: Laure Ross is a 49 y.o. female.    Chief Complaint: Diabetes Mellitus (PCP Dr Mittal 9/6/19); Diabetic Foot Exam; and Nail Problem    Laure is a 49 y.o. female who presents to the clinic for evaluation and treatment of high risk feet. Laure has a past medical history of Anticoagulant long-term use, Arthritis, Asthma, Asthma, Atrial fibrillation, Cardiomyopathy, Cardiomyopathy, CHF (congestive heart failure), CHF (congestive heart failure), Chronic combined systolic and diastolic heart failure (6/3/2016), COPD (chronic obstructive pulmonary disease) (3/28/2018), GERD (gastroesophageal reflux disease), H/O tubal ligation, Hypertension, Obesity, Renal disorder, Type 2 diabetes mellitus with hyperglycemia, and Type 2 diabetes mellitus with polyneuropathy. Presents for diabetic foot risk assessment. Reports elongated nails that are difficult to trim.   . This patient has documented high risk feet requiring routine maintenance secondary to diabetes mellitis and those secondary complications of diabetes, as mentioned..    PCP: Holly Mittal MD    Date Last Seen by PCP:   Chief Complaint   Patient presents with    Diabetes Mellitus     PCP Dr Mittal 9/6/19    Diabetic Foot Exam    Nail Problem       Current shoe gear:  Slip-on shoes    Hemoglobin A1C   Date Value Ref Range Status   07/03/2019 6.9 (H) 4.0 - 5.6 % Final     Comment:     ADA Screening Guidelines:  5.7-6.4%  Consistent with prediabetes  >or=6.5%  Consistent with diabetes  High levels of fetal hemoglobin interfere with the HbA1C  assay. Heterozygous hemoglobin variants (HbS, HgC, etc)do  not significantly interfere with this assay.   However, presence of multiple variants may affect accuracy.     10/01/2018 7.4 (H) 4.0 - 5.6 % Final     Comment:     ADA Screening Guidelines:  5.7-6.4%  Consistent with prediabetes  >or=6.5%  Consistent with diabetes  High levels of fetal hemoglobin interfere with the HbA1C  assay.  Heterozygous hemoglobin variants (HbS, HgC, etc)do  not significantly interfere with this assay.   However, presence of multiple variants may affect accuracy.     03/28/2018 6.2 (H) 4.0 - 5.6 % Final     Comment:     According to ADA guidelines, hemoglobin A1c <7.0% represents  optimal control in non-pregnant diabetic patients. Different  metrics may apply to specific patient populations.   Standards of Medical Care in Diabetes-2016.  For the purpose of screening for the presence of diabetes:  <5.7%     Consistent with the absence of diabetes  5.7-6.4%  Consistent with increasing risk for diabetes   (prediabetes)  >or=6.5%  Consistent with diabetes  Currently, no consensus exists for use of hemoglobin A1c  for diagnosis of diabetes for children.  This Hemoglobin A1c assay has significant interference with fetal   hemoglobin   (HbF). The results are invalid for patients with abnormal amounts of   HbF,   including those with known Hereditary Persistence   of Fetal Hemoglobin. Heterozygous hemoglobin variants (HbAS, HbAC,   HbAD, HbAE, HbA2) do not significantly interfere with this assay;   however, presence of multiple variants in a sample may impact the %   interference.             Patient Active Problem List   Diagnosis    Hypertensive heart disease with heart failure    Type 2 diabetes mellitus with diabetic polyneuropathy    CLARENCE (obstructive sleep apnea)    Paroxysmal atrial fibrillation    Obesity with body mass index (BMI) of 30.0 to 39.9    Encounter for pre-transplant evaluation for heart transplant    Palliative care encounter    Fatigue    Breast mass on GYN exam 4/29/16    Left bundle-branch block    Organ transplant candidate    Tuberculosis screening    Vitamin D deficiency disease    Chronic anticoagulation    Muscle weakness    Chronic combined systolic and diastolic congestive heart failure    COCM (congestive cardiomyopathy)    High risk medications (not anticoagulants) long-term  "use    Laryngopharyngeal reflux    Gastroesophageal reflux disease    Chronic rhinitis    Dysphonia    GERD (gastroesophageal reflux disease)    CKD (chronic kidney disease), stage III    Carpal tunnel syndrome, bilateral    NICM (nonischemic cardiomyopathy)    Acute diastolic CHF (congestive heart failure), NYHA class 3    History of diabetic ulcer of foot - Right Foot    Chest pain    Essential hypertension    Asthma exacerbation    Biventricular automatic implantable cardioverter defibrillator in situ    Amiodarone toxicity    Acute URI    Maxillary sinusitis    Influenza B    Abnormal finding on lung imaging       Current Outpatient Medications on File Prior to Visit   Medication Sig Dispense Refill    acetaminophen (TYLENOL) 650 MG TbSR TAKE 1 TABLET every four hours AS NEEDED for shoulder pain 180 tablet 2    allopurinol (ZYLOPRIM) 100 MG tablet TAKE 1 TABLET BY MOUTH EVERY DAY 90 tablet 5    allopurinol (ZYLOPRIM) 100 MG tablet TAKE 2 TABLETS by mouth EVERY  tablet 3    atorvastatin (LIPITOR) 20 MG tablet Take 1 tablet (20 mg total) by mouth once daily. 90 tablet 1    azelastine (ASTELIN) 137 mcg (0.1 %) nasal spray Use 1 to 2 sprays per nostril twice daily as needed. Us in addition to our nasal steroid spray 30 mL 3    baclofen (LIORESAL) 10 MG tablet TAKE 1 TABLET BY MOUTH THREE TIMES DAILY AS NEEDED FOR MUSCLE SPASM 30 tablet 0    BD ULTRA-FINE ESSENCE PEN NEEDLE 32 gauge x 5/32" Ndle Takes 5 times  day 200 each 11    blood sugar diagnostic Strp Uses 4 times a day, true metrix meter. 150 each 11    budesonide-formoterol 160-4.5 mcg (SYMBICORT) 160-4.5 mcg/actuation HFAA INHALE 2 PUFFS BY MOUTH TWICE DAILY. RINSE MOUTH AFTER USE. 10.2 g 3    budesonide-formoterol 160-4.5 mcg (SYMBICORT) 160-4.5 mcg/actuation HFAA INHALE 2 PUFFS TWICE DAILY. RINSE MOUTH AFTER USE. 10.2 g 6    cetirizine (ZYRTEC) 10 MG tablet TAKE 1 TABLET AT BEDTIME FOR ALLERGY RELIEF 30 tablet 3    " ciclopirox (PENLAC) 8 % Soln Apply topically nightly. 6.6 mL 3    clotrimazole (LOTRIMIN) 1 % cream APPLY SPARINGLY TO AFFECTED AREA(S) in groin TWICE DAILY for one to two weeks 45 g 0    cyclobenzaprine (FEXMID) 7.5 MG Tab TAKE 1 TABLET 3 TIMES DAILY AS NEEDED FOR MUSCLE SPASM. 21 tablet 0    diclofenac sodium (VOLTAREN) 1 % Gel APPLY 2 grams SPARINGLY TO Knees ONCE DAILY 100 g 3    dicyclomine (BENTYL) 10 MG capsule TAKE 1 CAPSULE 4 TIMES DAILY as needed for abdominal pain 30 capsule 0    digoxin (LANOXIN) 125 mcg tablet TAKE 1 TABLET BY MOUTH ONCE DAILY 90 tablet 3    dulaglutide (TRULICITY) 0.75 mg/0.5 mL PnIj Inject 0.5 mLs (0.75 mg total) into the skin every 7 days. 2 mL 6    dupilumab (DUPIXENT) 300 mg/2 mL Syrg Inject 2 mLs (300 mg total) into the skin every 14 (fourteen) days. 4 mL 11    EPINEPHrine (EPIPEN) 0.3 mg/0.3 mL AtIn INJECT 0.3ML IN THE MUSCLE AS DIRECTED FOR ANAPHYLAXIS 0.6 mL 1    ergocalciferol (ERGOCALCIFEROL) 50,000 unit Cap TAKE 1 CAPSULE BY MOUTH WEEKLY. 12 capsule 0    famotidine (PEPCID) 40 MG tablet TAKE 1 TABLET BY MOUTH TWICE DAILY. 180 tablet 1    fluticasone propionate (FLONASE) 50 mcg/actuation nasal spray USE TWO SPRAYS IN EACH NOSTRIL ONCE DAILY for cold 48 g 1    fluticasone propionate (FLOVENT HFA) 110 mcg/actuation inhaler INHALE 2 PUFFS TWICE DAILY. RINSE MOUTH AFTER USE. 12 g 3    furosemide (LASIX) 40 MG tablet Take 2 tablets (80 mg total) by mouth 2 (two) times daily. 360 tablet 3    hydrOXYzine pamoate (VISTARIL) 25 MG Cap TAKE 1 CAPSULE BY MOUTH EVERY 8 HOURS AS NEEDED FOR ITCHING 30 capsule 0    insulin (BASAGLAR KWIKPEN U-100 INSULIN) glargine 100 units/mL (3mL) SubQ pen inject 34 under the skin in the morning and 34 units at night 30 mL 6    insulin aspart U-100 (NOVOLOG) 100 unit/mL (3 mL) InPn pen Inject 22 units into the skin with meals plus scale 45 mL 6    insulin aspart U-100 (NOVOLOG) 100 unit/mL (3 mL) InPn pen Inject 16 units under the skin  "with meals plus scale 150-200+2, 201-250+4, 251-300+6, 301-350+8, >350+10, max daily 78 units. 30 mL 6    ketoconazole (NIZORAL) 2 % cream APPLY SPARINGLY TO AFFECTED AREA(S) ONCE DAILY 30 g 2    ketoconazole (NIZORAL) 2 % shampoo APPLY TO SKIN EVERY DAY FOR 5 MINUTES AND RINSE FOR ABOUT 1 TO 2 WEEKS 120 mL 1    ketorolac 0.5% (ACULAR) 0.5 % Drop instill 1 drop into left eye 4 times daily 10 mL 1    lancets 30 gauge Misc use 4 times a day 100 each 6    levalbuterol (XOPENEX HFA) 45 mcg/actuation inhaler INHALE 1 TO 2 PUFFS EVERY 4 TO 6 HOURS AS NEEDED 15 g 3    levalbuterol (XOPENEX HFA) 45 mcg/actuation inhaler Inhale 1 - 2 puffs by mouth every 4 - 6 hours as needed 15 g 6    levalbuterol (XOPENEX) 0.31 mg/3 mL nebulizer solution Take 3 mLs (0.31 mg total) by nebulization every 4 (four) hours as needed for Wheezing. Rescue 72 mL 3    levalbuterol (XOPENEX) 0.31 mg/3 mL nebulizer solution USE 1 UNIT DOSE IN NEBULIZER EVERY 4 HOURS AS NEEDED. 72 mL 6    linaCLOtide (LINZESS) 72 mcg Cap capsule Take one tablet by mouth daily as needed for constipation 30 capsule 2    lisinopril (PRINIVIL,ZESTRIL) 5 MG tablet TAKE 1 TABLET BY MOUTH TWO TIMES A DAY 60 tablet 8    magnesium oxide (MAG-OX) 400 mg (241.3 mg magnesium) tablet Take 1 tablet (400 mg total) by mouth 2 (two) times daily. 180 tablet 3    metOLazone (ZAROXOLYN) 2.5 MG tablet Take 1 tab today 30 minutes before your evening dose of Furosemide. 5 tablet 0    montelukast (SINGULAIR) 10 mg tablet TAKE 1 TABLET BY MOUTH DAILY. 30 tablet 3    montelukast (SINGULAIR) 10 mg tablet TAKE 1 TABLET DAILY. 30 tablet 6    nepafenac (ILEVRO) 0.3 % DrpS instill 1 drop into left eye every day 3 mL 1    pen needle, diabetic 32 gauge x 5/32" Ndle USE AS DIRECTED 5 TIMES DAILY 200 each 5    pen needle, diabetic 32 gauge x 5/32" Ndle use with insulin pen five times daily 100 each 3    polyethylene glycol (GLYCOLAX) 17 gram/dose powder MIX 1 CAPFUL IN 8 OUNCES OF " LIQUID AND DRINK ONCE DAILY as needed for constipation 510 g 6    potassium chloride (MICRO-K) 10 MEQ CpSR Take 2 capsules (20 mEq total) by mouth once daily. 60 capsule 11    sodium chloride for inhalation (SODIUM CHLORIDE 0.9%) 0.9 % nebulizer solution Use 1 vial via nebulization three times daily. Mix with xopenex solution 90 mL 2    spironolactone (ALDACTONE) 25 MG tablet Take 1 tablet (25 mg total) by mouth once daily. 90 tablet 3    tiotropium (SPIRIVA) 18 mcg inhalation capsule INHALE 1 CAPSULE EVERY DAY 30 capsule 6    triamcinolone acetonide 0.025% (KENALOG) 0.025 % cream APPLY SPARINGLY TO AFFECTED AREA(S) of face 2 TIMES DAILY. 15 g 1    warfarin (COUMADIN) 7.5 MG tablet Take ½ tablet by mouth once daily and 1 tablet on thursday (Patient taking differently: Coumadin dose 7.5 mg on Monday and  1/2 tab rest of days) 60 tablet 3     No current facility-administered medications on file prior to visit.        Review of patient's allergies indicates:  No Known Allergies    Past Surgical History:   Procedure Laterality Date    CARDIAC DEFIBRILLATOR PLACEMENT      CARDIAC DEFIBRILLATOR PLACEMENT      CARDIAC DEFIBRILLATOR PLACEMENT      CHOLECYSTECTOMY      COLONOSCOPY N/A 5/2/2016    Procedure: COLONOSCOPY;  Surgeon: Eugene Soto MD;  Location: [facility] ([address] [place] [address]);  Service: Endoscopy;  Laterality: N/A;    GALLBLADDER SURGERY      iabp  4/2016    TUBAL LIGATION         Family History   Problem Relation Age of Onset    Heart disease Mother     Heart disease Father        Social History     Socioeconomic History    Marital status:      Spouse name: Not on file    Number of children: Not on file    Years of education: Not on file    Highest education level: Not on file   Occupational History    Not on file   Social Needs    Financial resource strain: Not on file    Food insecurity:     Worry: Not on file     Inability: Not on file    Transportation needs:     Medical: Not on file  "    Non-medical: Not on file   Tobacco Use    Smoking status: Never Smoker    Smokeless tobacco: Never Used   Substance and Sexual Activity    Alcohol use: No    Drug use: No    Sexual activity: Yes     Partners: Male   Lifestyle    Physical activity:     Days per week: Not on file     Minutes per session: Not on file    Stress: Not on file   Relationships    Social connections:     Talks on phone: Not on file     Gets together: Not on file     Attends Baptism service: Not on file     Active member of club or organization: Not on file     Attends meetings of clubs or organizations: Not on file     Relationship status: Not on file   Other Topics Concern    Not on file   Social History Narrative    Not on file       Review of Systems   Constitution: Negative for chills, fever and malaise/fatigue.   Cardiovascular: Negative for chest pain, claudication, leg swelling, orthopnea and palpitations.   Respiratory: Negative for cough, shortness of breath and wheezing.    Skin: Positive for color change, dry skin, nail changes and suspicious lesions (calluses/corns). Negative for itching, poor wound healing and rash.   Musculoskeletal: Positive for joint pain and myalgias. Negative for arthritis, falls, gout, joint swelling and muscle weakness.   Gastrointestinal: Negative for diarrhea, nausea and vomiting.   Neurological: Positive for numbness, paresthesias and sensory change. Negative for disturbances in coordination, dizziness, focal weakness, tremors and weakness.   Psychiatric/Behavioral: Negative for altered mental status and hallucinations.           Objective:      Vitals:    10/21/19 1120   Weight: 115.2 kg (254 lb)   Height: 5' 4" (1.626 m)   PainSc: 0-No pain       Physical Exam   Cardiovascular:   Dorsalis pedis and posterior tibial pulses are diminished Bilaterally. Toes are cool to touch. Feet are warm proximally.There is decreased digital hair. Skin is atrophic, slightly hyperpigmented, and mildly " edematous       Musculoskeletal:        Right ankle: She exhibits swelling. Tenderness. AITFL tenderness found.   There is equinus deformity bilateral with decreased dorsiflexion at the ankle joint bilateral. No tenderness with compression of heel. Negative tinels sign. Gait analysis reveals excessive pronation through midstance and propulsion with early heel off. Shoes reveals lateral heel counter wear bilateral     Decreased first MPJ range of motion both weightbearing and nonweightbearing, no crepitus observed the first MP joint, + dorsal flag sign. Mild  bunion deformity is observed .    Patient has hammertoes of digits 2-5 bilateral partially reducible      Neurological:   Talmo-Anya 5.07 monofilamant testing is diminished both feet. Sharp/dull sensation diminished Bilaterally. Light touch absent Bilaterally.       Skin: Lesion noted. No bruising and no ecchymosis noted. No erythema.   No open lesions, lacerations or wounds noted. Toenails 1-5 bilaterally are elongated by 2-3 mm, thickened by 2-3 mm, discolored/yellowed, dystrophic, brittle with subungual debris.  .  Interdigital spaces clean, dry and intact b/l. No erythema noted to b/l foot. Skin texture thin, atrophic, dry. Pedal hair diminished b/l.         Scaling dryness in a moccasin distribution is noted to the bilateral lower extremities.     Focal hyperkeratotic lesion consisting entirely of hyperkeratotic tissue without open skin, drainage, pus, fluctuance, malodor, or signs of infection: B/L submet head 2, right submet head 5                    Assessment:       Encounter Diagnoses   Name Primary?    Type II diabetes mellitus with neurological manifestations Yes    Onychomycosis due to dermatophyte          Plan:     Problem List Items Addressed This Visit     None      Visit Diagnoses     Type II diabetes mellitus with neurological manifestations    -  Primary    Onychomycosis due to dermatophyte             I counseled the patient on her  conditions, their implications and medical management.         - Shoe inspection. Diabetic Foot Education. Patient reminded of the importance of good nutrition and blood sugar control to help prevent podiatric complications of diabetes. Patient instructed on proper foot hygeine. We discussed wearing proper shoe gear, daily foot inspections, never walking without protective shoe gear, never putting sharp instruments to feet, routine podiatric nail visits every 2-3 months.   - With patient's permission, nails were aggressively reduced and debrided x 10 to their soft tissue attachment mechanically and with electric , removing all offending nail and debris. Patient relates relief following the procedure. He will continue to monitor the areas daily, inspect his feet, wear protective shoe gear when ambulatory, moisturizer to maintain skin integrity and follow in this office in approximately 2-3 months, sooner p.r.n.

## 2019-10-30 ENCOUNTER — TELEPHONE (OUTPATIENT)
Dept: PHARMACY | Facility: CLINIC | Age: 49
End: 2019-10-30

## 2019-10-30 NOTE — TELEPHONE ENCOUNTER
Refill call for Dupixent. Verified to ship on Tuesday 11/5 for delivery on Wednesday 11/6. $0.00 copay at 004.     Jj Donovan, PharmD  Clinical Pharmacist  [facility]  P: [phone]

## 2019-11-04 ENCOUNTER — ANTI-COAG VISIT (OUTPATIENT)
Dept: CARDIOLOGY | Facility: CLINIC | Age: 49
End: 2019-11-04
Payer: MEDICAID

## 2019-11-04 DIAGNOSIS — Z79.01 CHRONIC ANTICOAGULATION: ICD-10-CM

## 2019-11-04 DIAGNOSIS — Z79.01 LONG TERM (CURRENT) USE OF ANTICOAGULANTS: Primary | ICD-10-CM

## 2019-11-04 DIAGNOSIS — I48.0 PAROXYSMAL ATRIAL FIBRILLATION: ICD-10-CM

## 2019-11-04 LAB — INR PPP: 2.3 (ref 2–3)

## 2019-11-04 PROCEDURE — 93793 ANTICOAG MGMT PT WARFARIN: CPT | Mod: ,,,

## 2019-11-04 PROCEDURE — 85610 PROTHROMBIN TIME: CPT | Mod: PBBFAC

## 2019-11-04 PROCEDURE — 93793 PR ANTICOAGULANT MGMT FOR PT TAKING WARFARIN: ICD-10-PCS | Mod: ,,,

## 2019-11-04 NOTE — PROGRESS NOTES
INR at goal. Medications and chart reviewed. No changes noted to necessitate adjustment of warfarin or follow-up plan. See calendar.  Pt denies any changes. Maintain current regimen & re-assess in 3 weeks.   Patient was re-educated on situations that would require placing a call to the [facility]madin Clinic, including bleeding or unusual bruising issues, changes in health, diet or medications,upcoming procedures that require warfarin interruption, and missed Coumadin dose(s). Patient expressed understanding that avoidance of consistency with these parameters could cause fluctuations in INR, leading to more frequent visits and increase risk of adverse events.

## 2019-11-18 ENCOUNTER — OFFICE VISIT (OUTPATIENT)
Dept: OBSTETRICS AND GYNECOLOGY | Facility: CLINIC | Age: 49
End: 2019-11-18
Payer: MEDICAID

## 2019-11-18 VITALS
BODY MASS INDEX: 43.17 KG/M2 | HEIGHT: 64 IN | WEIGHT: 252.88 LBS | DIASTOLIC BLOOD PRESSURE: 76 MMHG | SYSTOLIC BLOOD PRESSURE: 106 MMHG

## 2019-11-18 DIAGNOSIS — N95.1 PERIMENOPAUSAL VASOMOTOR SYMPTOMS: Primary | ICD-10-CM

## 2019-11-18 PROCEDURE — 99213 OFFICE O/P EST LOW 20 MIN: CPT | Mod: S$PBB,,, | Performed by: OBSTETRICS & GYNECOLOGY

## 2019-11-18 PROCEDURE — 99999 PR PBB SHADOW E&M-EST. PATIENT-LVL IV: CPT | Mod: PBBFAC,,, | Performed by: OBSTETRICS & GYNECOLOGY

## 2019-11-18 PROCEDURE — 99213 PR OFFICE/OUTPT VISIT, EST, LEVL III, 20-29 MIN: ICD-10-PCS | Mod: S$PBB,,, | Performed by: OBSTETRICS & GYNECOLOGY

## 2019-11-18 PROCEDURE — 99214 OFFICE O/P EST MOD 30 MIN: CPT | Mod: PBBFAC | Performed by: OBSTETRICS & GYNECOLOGY

## 2019-11-18 PROCEDURE — 99999 PR PBB SHADOW E&M-EST. PATIENT-LVL IV: ICD-10-PCS | Mod: PBBFAC,,, | Performed by: OBSTETRICS & GYNECOLOGY

## 2019-11-18 RX ORDER — CYCLOSPORINE 0.5 MG/ML
1 EMULSION OPHTHALMIC
COMMUNITY
Start: 2019-08-07

## 2019-11-18 NOTE — PROGRESS NOTES
SUBJECTIVE:   49 y.o. female   for f/u on menstrual cycle    No LMP recorded..  She has no unusual complaints.    Pt with perimenopausal bleeding, irregular and prolonged since onset this year.  She was seen in , no menses in July  LMP was in august and lasted 5 days, denies IMB  Also has rare vasomotor symptoms    EXAMINATION:  US PELVIS COMP WITH TRANSVAG NON-OB (XPD)    CLINICAL HISTORY:  Abnormal uterine and vaginal bleeding, unspecified    TECHNIQUE:  Transabdominal sonography of the pelvis was performed, followed by transvaginal sonography to better evaluate the uterus and ovaries.    COMPARISON:  None available    FINDINGS:  Uterus:    Size: 9.4 x 5.4 x 8.5 cm    Masses: Presumed fibroids measuring 4.3 x 3.2 x 4.3 cm on the left and 2.2 x 2.7 x 1.9 cm on the right.    Endometrium: Normal in this pre menopausal patient, measuring 7 mm.    Nabothian cyst noted.    Right ovary:    Size: 2.8 x 2.9 x 2.6 cm    Appearance: Normal    Vascular flow: Normal.    Left ovary:    Size: 2.7 x 4.1 x 3.1 cm    Appearance: Normal    Vascular Flow: Normal.    Free Fluid:    None.      Impression       Multifibroid uterus.      Electronically signed by: Brenton Catherine  Date: 2019  Time: 16:35             OB History    Para Term  AB Living   3 3 3     3   SAB TAB Ectopic Multiple Live Births           3      # Outcome Date GA Lbr Prashant/2nd Weight Sex Delivery Anes PTL Lv   3 Term            2 Term            1 Term               Obstetric Comments   Cs x 3, preE   Gynhx: reg/4.  Mod to heavy flow   S/p BTL   Denies abnl pap, last pap 2017 normal   H/o trichomonas     Past Medical History:   Diagnosis Date    Anticoagulant long-term use     Arthritis     Asthma     Asthma     Atrial fibrillation     Cardiomyopathy     Cardiomyopathy     CHF (congestive heart failure)     CHF (congestive heart failure)     Chronic combined systolic and diastolic heart failure 6/3/2016    COPD (chronic  obstructive pulmonary disease) 3/28/2018    GERD (gastroesophageal reflux disease)     H/O tubal ligation     Hypertension     Obesity     Renal disorder     Type 2 diabetes mellitus with hyperglycemia     Type 2 diabetes mellitus with polyneuropathy      Past Surgical History:   Procedure Laterality Date    CARDIAC DEFIBRILLATOR PLACEMENT      CARDIAC DEFIBRILLATOR PLACEMENT      CARDIAC DEFIBRILLATOR PLACEMENT      CHOLECYSTECTOMY      COLONOSCOPY N/A 5/2/2016    Procedure: COLONOSCOPY;  Surgeon: Eugene Soto MD;  Location: [address]);  Service: Endoscopy;  Laterality: N/A;    GALLBLADDER SURGERY      iabp  4/2016    TUBAL LIGATION       Social History     Socioeconomic History    Marital status:      Spouse name: Not on file    Number of children: Not on file    Years of education: Not on file    Highest education level: Not on file   Occupational History    Not on file   Social Needs    Financial resource strain: Not on file    Food insecurity:     Worry: Not on file     Inability: Not on file    Transportation needs:     Medical: Not on file     Non-medical: Not on file   Tobacco Use    Smoking status: Never Smoker    Smokeless tobacco: Never Used   Substance and Sexual Activity    Alcohol use: No    Drug use: No    Sexual activity: Yes     Partners: Male   Lifestyle    Physical activity:     Days per week: Not on file     Minutes per session: Not on file    Stress: Not on file   Relationships    Social connections:     Talks on phone: Not on file     Gets together: Not on file     Attends Yarsani service: Not on file     Active member of club or organization: Not on file     Attends meetings of clubs or organizations: Not on file     Relationship status: Not on file   Other Topics Concern    Not on file   Social History Narrative    Not on file     Family History   Problem Relation Age of Onset    Heart disease Mother     Heart disease Father   "        Current Outpatient Medications   Medication Sig Dispense Refill    acetaminophen (TYLENOL) 650 MG TbSR TAKE 1 TABLET every four hours AS NEEDED for shoulder pain 180 tablet 2    allopurinol (ZYLOPRIM) 100 MG tablet TAKE 1 TABLET BY MOUTH EVERY DAY 90 tablet 5    allopurinol (ZYLOPRIM) 100 MG tablet TAKE 2 TABLETS by mouth EVERY  tablet 3    atorvastatin (LIPITOR) 20 MG tablet Take 1 tablet (20 mg total) by mouth once daily. 90 tablet 1    azelastine (ASTELIN) 137 mcg (0.1 %) nasal spray Use 1 to 2 sprays per nostril twice daily as needed. Us in addition to our nasal steroid spray 30 mL 3    baclofen (LIORESAL) 10 MG tablet TAKE 1 TABLET BY MOUTH THREE TIMES DAILY AS NEEDED FOR MUSCLE SPASM 30 tablet 0    BD ULTRA-FINE ESSENCE PEN NEEDLE 32 gauge x 5/32" Ndle Takes 5 times  day 200 each 11    blood sugar diagnostic Strp Uses 4 times a day, true metrix meter. 150 each 11    budesonide-formoterol 160-4.5 mcg (SYMBICORT) 160-4.5 mcg/actuation HFAA INHALE 2 PUFFS BY MOUTH TWICE DAILY. RINSE MOUTH AFTER USE. 10.2 g 3    budesonide-formoterol 160-4.5 mcg (SYMBICORT) 160-4.5 mcg/actuation HFAA INHALE 2 PUFFS TWICE DAILY. RINSE MOUTH AFTER USE. 10.2 g 6    cetirizine (ZYRTEC) 10 MG tablet TAKE 1 TABLET AT BEDTIME FOR ALLERGY RELIEF 30 tablet 3    ciclopirox (PENLAC) 8 % Soln Apply topically nightly. 6.6 mL 3    ciprofloxacin HCl (CILOXAN) 0.3 % ophthalmic solution INSTILL ONE DROP INTO LEFT EYE FOUR TIMES DAILY. 10 mL 2    clotrimazole (LOTRIMIN) 1 % cream APPLY SPARINGLY TO AFFECTED AREA(S) in groin TWICE DAILY for one to two weeks 45 g 0    cyclobenzaprine (FEXMID) 7.5 MG Tab TAKE 1 TABLET 3 TIMES DAILY AS NEEDED FOR MUSCLE SPASM. 21 tablet 0    diclofenac sodium (VOLTAREN) 1 % Gel APPLY 2 grams SPARINGLY TO Knees ONCE DAILY 100 g 3    dicyclomine (BENTYL) 10 MG capsule TAKE 1 CAPSULE 4 TIMES DAILY as needed for abdominal pain 30 capsule 0    digoxin (LANOXIN) 125 mcg tablet TAKE 1 TABLET BY " MOUTH ONCE DAILY 90 tablet 3    dulaglutide (TRULICITY) 0.75 mg/0.5 mL PnIj Inject 0.5 mLs (0.75 mg total) into the skin every 7 days. 2 mL 6    dupilumab (DUPIXENT) 300 mg/2 mL Syrg Inject 2 mLs (300 mg total) into the skin every 14 (fourteen) days. 4 mL 11    EPINEPHrine (EPIPEN) 0.3 mg/0.3 mL AtIn INJECT 0.3ML IN THE MUSCLE AS DIRECTED FOR ANAPHYLAXIS 0.6 mL 1    ergocalciferol (ERGOCALCIFEROL) 50,000 unit Cap TAKE 1 CAPSULE BY MOUTH WEEKLY. 12 capsule 0    famotidine (PEPCID) 40 MG tablet TAKE 1 TABLET BY MOUTH TWICE DAILY. 180 tablet 1    fluticasone propionate (FLONASE) 50 mcg/actuation nasal spray USE TWO SPRAYS IN EACH NOSTRIL ONCE DAILY for cold 48 g 1    fluticasone propionate (FLOVENT HFA) 110 mcg/actuation inhaler INHALE 2 PUFFS TWICE DAILY. RINSE MOUTH AFTER USE. 12 g 3    furosemide (LASIX) 40 MG tablet Take 2 tablets (80 mg total) by mouth 2 (two) times daily. 360 tablet 3    hydrOXYzine pamoate (VISTARIL) 25 MG Cap TAKE 1 CAPSULE BY MOUTH EVERY 8 HOURS AS NEEDED FOR ITCHING 30 capsule 0    insulin (BASAGLAR KWIKPEN U-100 INSULIN) glargine 100 units/mL (3mL) SubQ pen inject 34 under the skin in the morning and 34 units at night 30 mL 6    insulin aspart U-100 (NOVOLOG) 100 unit/mL (3 mL) InPn pen Inject 22 units into the skin with meals plus scale 45 mL 6    insulin aspart U-100 (NOVOLOG) 100 unit/mL (3 mL) InPn pen Inject 16 units under the skin with meals plus scale 150-200+2, 201-250+4, 251-300+6, 301-350+8, >350+10, max daily 78 units. 30 mL 6    ketoconazole (NIZORAL) 2 % cream APPLY SPARINGLY TO AFFECTED AREA(S) ONCE DAILY 30 g 2    ketoconazole (NIZORAL) 2 % shampoo APPLY TO SKIN EVERY DAY FOR 5 MINUTES AND RINSE FOR ABOUT 1 TO 2 WEEKS 120 mL 1    ketorolac 0.5% (ACULAR) 0.5 % Drop instill 1 drop into left eye 4 times daily 10 mL 1    lancets 30 gauge Misc use 4 times a day 100 each 6    levalbuterol (XOPENEX HFA) 45 mcg/actuation inhaler INHALE 1 TO 2 PUFFS EVERY 4 TO 6 HOURS  "AS NEEDED 15 g 3    levalbuterol (XOPENEX HFA) 45 mcg/actuation inhaler Inhale 1 - 2 puffs by mouth every 4 - 6 hours as needed 15 g 6    levalbuterol (XOPENEX) 0.31 mg/3 mL nebulizer solution Take 3 mLs (0.31 mg total) by nebulization every 4 (four) hours as needed for Wheezing. Rescue 72 mL 3    levalbuterol (XOPENEX) 0.31 mg/3 mL nebulizer solution USE 1 UNIT DOSE IN NEBULIZER EVERY 4 HOURS AS NEEDED. 72 mL 6    linaCLOtide (LINZESS) 72 mcg Cap capsule Take one tablet by mouth daily as needed for constipation 30 capsule 2    lisinopril (PRINIVIL,ZESTRIL) 5 MG tablet TAKE 1 TABLET BY MOUTH TWO TIMES A DAY 60 tablet 8    magnesium oxide (MAG-OX) 400 mg (241.3 mg magnesium) tablet Take 1 tablet (400 mg total) by mouth 2 (two) times daily. 180 tablet 3    metOLazone (ZAROXOLYN) 2.5 MG tablet Take 1 tab today 30 minutes before your evening dose of Furosemide. 5 tablet 0    montelukast (SINGULAIR) 10 mg tablet TAKE 1 TABLET BY MOUTH DAILY. 30 tablet 3    montelukast (SINGULAIR) 10 mg tablet TAKE 1 TABLET DAILY. 30 tablet 6    nepafenac (ILEVRO) 0.3 % DrpS INSTILL ONE DROP INTO BOTH EYES DAILY. 3 mL 2    pen needle, diabetic 32 gauge x 5/32" Ndle USE AS DIRECTED 5 TIMES DAILY 200 each 5    pen needle, diabetic 32 gauge x 5/32" Ndle use with insulin pen five times daily 100 each 3    polyethylene glycol (GLYCOLAX) 17 gram/dose powder MIX 1 CAPFUL IN 8 OUNCES OF LIQUID AND DRINK ONCE DAILY as needed for constipation 510 g 6    potassium chloride (MICRO-K) 10 MEQ CpSR Take 2 capsules (20 mEq total) by mouth once daily. 60 capsule 11    prednisoLONE acetate (PRED FORTE) 1 % DrpS INSTILL ONE DROP INTO LEFT EYE FOUR TIMES DAILY. 10 mL 2    sodium chloride for inhalation (SODIUM CHLORIDE 0.9%) 0.9 % nebulizer solution Use 1 vial via nebulization three times daily. Mix with xopenex solution 90 mL 2    spironolactone (ALDACTONE) 25 MG tablet Take 1 tablet (25 mg total) by mouth once daily. 90 tablet 3    " "tiotropium (SPIRIVA) 18 mcg inhalation capsule INHALE 1 CAPSULE EVERY DAY 30 capsule 6    triamcinolone acetonide 0.025% (KENALOG) 0.025 % cream APPLY SPARINGLY TO AFFECTED AREA(S) of face 2 TIMES DAILY. 15 g 1    warfarin (COUMADIN) 7.5 MG tablet Take ½ tablet by mouth once daily and 1 tablet on thursday (Patient taking differently: Coumadin dose 7.5 mg on Monday and  1/2 tab rest of days) 60 tablet 3     No current facility-administered medications for this visit.      Allergies: Patient has no known allergies.     ROS:  GENERAL: hot flashes  SKIN: Denies rash or lesions.   HEAD: Denies head injury or headache.   NODES: Denies enlarged lymph nodes.   CHEST: Denies chest pain or shortness of breath.   CARDIOVASCULAR: Denies palpitations or left sided chest pain.   ABDOMEN: No abdominal pain, constipation, diarrhea, nausea, vomiting or rectal bleeding.   URINARY: No frequency, dysuria, hematuria, or burning on urination.  REPRODUCTIVE: Denies vaginal discharge, abnormal vaginal bleeding, lesions, pelvic pain  BREASTS: The patient performs breast self-examination and denies pain, lumps, or nipple discharge.   HEMATOLOGIC: No easy bruisability or excessive bleeding.  MUSCULOSKELETAL: Denies joint pain or swelling.   NEUROLOGIC: Denies syncope or weakness.   PSYCHIATRIC: Denies depression, anxiety or mood swings.      OBJECTIVE:   /76 (BP Location: Left arm, Patient Position: Sitting, BP Method: Medium (Manual))   Ht 5' 4" (1.626 m)   Wt 114.7 kg (252 lb 13.9 oz)   LMP 08/25/2019 (Approximate)   BMI 43.40 kg/m²   The patient appears well, alert, oriented x 3, in no distress.    Counseling appointment      ASSESSMENT:   1. Perimenopausal:  Continue monitor period. Advised pt menopause is consider as one year of amenorrhea  2. Vasomotor symptoms: not bothersome.  Discussed lifestyle modifications.  3.  rtc in one year for wwe    "

## 2019-11-25 ENCOUNTER — ANTI-COAG VISIT (OUTPATIENT)
Dept: CARDIOLOGY | Facility: CLINIC | Age: 49
End: 2019-11-25
Payer: MEDICAID

## 2019-11-25 DIAGNOSIS — I48.0 PAROXYSMAL ATRIAL FIBRILLATION: ICD-10-CM

## 2019-11-25 DIAGNOSIS — Z79.01 CHRONIC ANTICOAGULATION: ICD-10-CM

## 2019-11-25 DIAGNOSIS — Z79.01 LONG TERM (CURRENT) USE OF ANTICOAGULANTS: Primary | ICD-10-CM

## 2019-11-25 LAB — INR PPP: 2 (ref 2–3)

## 2019-11-25 PROCEDURE — 85610 PROTHROMBIN TIME: CPT | Mod: PBBFAC

## 2019-11-25 PROCEDURE — 93793 PR ANTICOAGULANT MGMT FOR PT TAKING WARFARIN: ICD-10-PCS | Mod: ,,,

## 2019-11-25 PROCEDURE — 93793 ANTICOAG MGMT PT WARFARIN: CPT | Mod: ,,,

## 2019-12-03 DIAGNOSIS — I50.9 ACUTE HEART FAILURE, UNSPECIFIED HEART FAILURE TYPE: ICD-10-CM

## 2019-12-03 RX ORDER — WARFARIN 7.5 MG/1
TABLET ORAL
Qty: 60 TABLET | Refills: 3 | Status: CANCELLED | OUTPATIENT
Start: 2019-12-03 | End: 2020-01-01

## 2019-12-05 ENCOUNTER — TELEPHONE (OUTPATIENT)
Dept: PHARMACY | Facility: CLINIC | Age: 49
End: 2019-12-05

## 2019-12-08 ENCOUNTER — HOSPITAL ENCOUNTER (EMERGENCY)
Facility: HOSPITAL | Age: 49
Discharge: HOME | End: 2019-12-08
Attending: EMERGENCY MEDICINE
Payer: MEDICAID

## 2019-12-08 VITALS
HEIGHT: 64 IN | BODY MASS INDEX: 42.68 KG/M2 | SYSTOLIC BLOOD PRESSURE: 116 MMHG | DIASTOLIC BLOOD PRESSURE: 77 MMHG | WEIGHT: 250 LBS | RESPIRATION RATE: 16 BRPM | OXYGEN SATURATION: 98 % | HEART RATE: 94 BPM | TEMPERATURE: 98 F

## 2019-12-08 DIAGNOSIS — R42 VERTIGO: ICD-10-CM

## 2019-12-08 DIAGNOSIS — I50.9 ACUTE HEART FAILURE, UNSPECIFIED HEART FAILURE TYPE: ICD-10-CM

## 2019-12-08 DIAGNOSIS — R42 DIZZINESS: Primary | ICD-10-CM

## 2019-12-08 DIAGNOSIS — N39.0 URINARY TRACT INFECTION WITHOUT HEMATURIA, SITE UNSPECIFIED: ICD-10-CM

## 2019-12-08 DIAGNOSIS — Z95.810 ICD (IMPLANTABLE CARDIOVERTER-DEFIBRILLATOR) IN PLACE: ICD-10-CM

## 2019-12-08 LAB
ALBUMIN SERPL BCP-MCNC: 3.6 G/DL (ref 3.5–5.2)
ALP SERPL-CCNC: 80 U/L (ref 55–135)
ALT SERPL W/O P-5'-P-CCNC: 16 U/L (ref 10–44)
ANION GAP SERPL CALC-SCNC: 12 MMOL/L (ref 8–16)
APTT BLDCRRT: 22.2 SEC (ref 21–32)
AST SERPL-CCNC: 15 U/L (ref 10–40)
BACTERIA #/AREA URNS AUTO: ABNORMAL /HPF
BASOPHILS # BLD AUTO: 0.04 K/UL (ref 0–0.2)
BASOPHILS NFR BLD: 0.4 % (ref 0–1.9)
BILIRUB SERPL-MCNC: 0.4 MG/DL (ref 0.1–1)
BILIRUB UR QL STRIP: NEGATIVE
BUN SERPL-MCNC: 25 MG/DL (ref 6–20)
CALCIUM SERPL-MCNC: 9.5 MG/DL (ref 8.7–10.5)
CHLORIDE SERPL-SCNC: 104 MMOL/L (ref 95–110)
CLARITY UR REFRACT.AUTO: ABNORMAL
CO2 SERPL-SCNC: 22 MMOL/L (ref 23–29)
COLOR UR AUTO: YELLOW
CREAT SERPL-MCNC: 1.4 MG/DL (ref 0.5–1.4)
DIFFERENTIAL METHOD: ABNORMAL
DIGOXIN SERPL-MCNC: 0.6 NG/ML (ref 0.8–2)
EOSINOPHIL # BLD AUTO: 0.3 K/UL (ref 0–0.5)
EOSINOPHIL NFR BLD: 2.9 % (ref 0–8)
ERYTHROCYTE [DISTWIDTH] IN BLOOD BY AUTOMATED COUNT: 17.5 % (ref 11.5–14.5)
EST. GFR  (AFRICAN AMERICAN): 50.5 ML/MIN/1.73 M^2
EST. GFR  (NON AFRICAN AMERICAN): 43.8 ML/MIN/1.73 M^2
GLUCOSE SERPL-MCNC: 148 MG/DL (ref 70–110)
GLUCOSE UR QL STRIP: NEGATIVE
HCT VFR BLD AUTO: 39.8 % (ref 37–48.5)
HGB BLD-MCNC: 11.8 G/DL (ref 12–16)
HGB UR QL STRIP: ABNORMAL
HYALINE CASTS UR QL AUTO: 1 /LPF
IMM GRANULOCYTES # BLD AUTO: 0.03 K/UL (ref 0–0.04)
IMM GRANULOCYTES NFR BLD AUTO: 0.3 % (ref 0–0.5)
INR PPP: 1.8 (ref 0.8–1.2)
KETONES UR QL STRIP: NEGATIVE
LEUKOCYTE ESTERASE UR QL STRIP: ABNORMAL
LYMPHOCYTES # BLD AUTO: 2.8 K/UL (ref 1–4.8)
LYMPHOCYTES NFR BLD: 27.9 % (ref 18–48)
MAGNESIUM SERPL-MCNC: 1.9 MG/DL (ref 1.6–2.6)
MCH RBC QN AUTO: 25.1 PG (ref 27–31)
MCHC RBC AUTO-ENTMCNC: 29.6 G/DL (ref 32–36)
MCV RBC AUTO: 85 FL (ref 82–98)
MICROSCOPIC COMMENT: ABNORMAL
MONOCYTES # BLD AUTO: 1 K/UL (ref 0.3–1)
MONOCYTES NFR BLD: 9.4 % (ref 4–15)
NEUTROPHILS # BLD AUTO: 5.9 K/UL (ref 1.8–7.7)
NEUTROPHILS NFR BLD: 59.1 % (ref 38–73)
NITRITE UR QL STRIP: NEGATIVE
NRBC BLD-RTO: 0 /100 WBC
PH UR STRIP: 5 [PH] (ref 5–8)
PLATELET # BLD AUTO: 330 K/UL (ref 150–350)
PMV BLD AUTO: 10.6 FL (ref 9.2–12.9)
POTASSIUM SERPL-SCNC: 4.4 MMOL/L (ref 3.5–5.1)
PROT SERPL-MCNC: 7.4 G/DL (ref 6–8.4)
PROT UR QL STRIP: NEGATIVE
PROTHROMBIN TIME: 17.5 SEC (ref 9–12.5)
RBC # BLD AUTO: 4.71 M/UL (ref 4–5.4)
RBC #/AREA URNS AUTO: 3 /HPF (ref 0–4)
SODIUM SERPL-SCNC: 138 MMOL/L (ref 136–145)
SP GR UR STRIP: 1 (ref 1–1.03)
SQUAMOUS #/AREA URNS AUTO: 3 /HPF
URN SPEC COLLECT METH UR: ABNORMAL
WBC # BLD AUTO: 10.06 K/UL (ref 3.9–12.7)
WBC #/AREA URNS AUTO: 8 /HPF (ref 0–5)

## 2019-12-08 PROCEDURE — 25000003 PHARM REV CODE 250: Performed by: EMERGENCY MEDICINE

## 2019-12-08 PROCEDURE — 83735 ASSAY OF MAGNESIUM: CPT

## 2019-12-08 PROCEDURE — 36000 PLACE NEEDLE IN VEIN: CPT

## 2019-12-08 PROCEDURE — 93010 ELECTROCARDIOGRAM REPORT: CPT | Mod: 59,,, | Performed by: INTERNAL MEDICINE

## 2019-12-08 PROCEDURE — 99284 PR EMERGENCY DEPT VISIT,LEVEL IV: ICD-10-PCS | Mod: ,,, | Performed by: EMERGENCY MEDICINE

## 2019-12-08 PROCEDURE — 93005 ELECTROCARDIOGRAM TRACING: CPT

## 2019-12-08 PROCEDURE — 93010 ELECTROCARDIOGRAM REPORT: CPT | Mod: ,,, | Performed by: INTERNAL MEDICINE

## 2019-12-08 PROCEDURE — 80053 COMPREHEN METABOLIC PANEL: CPT

## 2019-12-08 PROCEDURE — 99285 EMERGENCY DEPT VISIT HI MDM: CPT | Mod: 25

## 2019-12-08 PROCEDURE — 85025 COMPLETE CBC W/AUTO DIFF WBC: CPT

## 2019-12-08 PROCEDURE — 80162 ASSAY OF DIGOXIN TOTAL: CPT

## 2019-12-08 PROCEDURE — 85730 THROMBOPLASTIN TIME PARTIAL: CPT

## 2019-12-08 PROCEDURE — 85610 PROTHROMBIN TIME: CPT

## 2019-12-08 PROCEDURE — 81001 URINALYSIS AUTO W/SCOPE: CPT

## 2019-12-08 PROCEDURE — 93010 EKG 12-LEAD: ICD-10-PCS | Mod: 59,,, | Performed by: INTERNAL MEDICINE

## 2019-12-08 PROCEDURE — 99284 EMERGENCY DEPT VISIT MOD MDM: CPT | Mod: ,,, | Performed by: EMERGENCY MEDICINE

## 2019-12-08 RX ORDER — MECLIZINE HYDROCHLORIDE 25 MG/1
25 TABLET ORAL 2 TIMES DAILY PRN
Qty: 10 TABLET | Refills: 0 | Status: SHIPPED | OUTPATIENT
Start: 2019-12-08

## 2019-12-08 RX ORDER — CEPHALEXIN 500 MG/1
500 CAPSULE ORAL EVERY 12 HOURS
Qty: 10 CAPSULE | Refills: 0 | Status: SHIPPED | OUTPATIENT
Start: 2019-12-08 | End: 2019-12-13

## 2019-12-08 RX ORDER — MECLIZINE HCL 12.5 MG 12.5 MG/1
25 TABLET ORAL
Status: DISCONTINUED | OUTPATIENT
Start: 2019-12-08 | End: 2019-12-08

## 2019-12-08 RX ORDER — MECLIZINE HCL 12.5 MG 12.5 MG/1
25 TABLET ORAL
Status: COMPLETED | OUTPATIENT
Start: 2019-12-08 | End: 2019-12-08

## 2019-12-08 RX ADMIN — MECLIZINE 25 MG: 12.5 TABLET ORAL at 05:12

## 2019-12-08 NOTE — ED TRIAGE NOTES
Pt presents to ED5 via triage for dizziness x1day. She states she first noticed it when rising from bed, and afterwards it was intermittent since onset. Motion is sometimes, but not always a trigger factor. Light headache as well. Denies pain, nausea, visual disturbances. Denies recreational drug use and alcohol consumption. Denies any other symptoms.

## 2019-12-08 NOTE — ED PROVIDER NOTES
"Encounter Date: 12/8/2019       History     Chief Complaint   Patient presents with    Dizziness     Pt c/o dizziness as "room spinning" since yesterday, resulting in fall. Pt reports falling to bed with no injury. Denies chest pain, vision changes. Pt also reports BP 90/64 at home and headache.     Patient is a 49-year-old female with past medical history of asthma, atrial fibrillation, cardiomyopathy status post ICD placement, CHF, COPD, GERD, hypertension, obesity, DM 2 who presents with dizziness that started yesterday.  Patient states that when she woke up she moved her head and developed the sensation that the room was spinning.  She reports it was improved when she later had back down.  She states she was feeling okay and she went to reach down to put on shoes when she bent over she had sensation the room was spinning again.  She had to lay on the bed until she improved.  She states her gait is steady and she does not feel off balance.  She reports she has never had anything like this before.  She was seen by the NP at her primary doctor 2 days ago for sore throat, left ear pain, and cough.  She reports she was negative for the flu.  She reports her ear pain and sore throat have resolved.        Review of patient's allergies indicates:  No Known Allergies  Past Medical History:   Diagnosis Date    Anticoagulant long-term use     Arthritis     Asthma     Asthma     Atrial fibrillation     Cardiomyopathy     Cardiomyopathy     CHF (congestive heart failure)     CHF (congestive heart failure)     Chronic combined systolic and diastolic heart failure 6/3/2016    COPD (chronic obstructive pulmonary disease) 3/28/2018    GERD (gastroesophageal reflux disease)     H/O tubal ligation     Hypertension     Obesity     Renal disorder     Type 2 diabetes mellitus with hyperglycemia     Type 2 diabetes mellitus with polyneuropathy      Past Surgical History:   Procedure Laterality Date    CARDIAC " DEFIBRILLATOR PLACEMENT      CARDIAC DEFIBRILLATOR PLACEMENT      CARDIAC DEFIBRILLATOR PLACEMENT      CATARACT EXTRACTION Left     CHOLECYSTECTOMY      COLONOSCOPY N/A 5/2/2016    Procedure: COLONOSCOPY;  Surgeon: Eugene Soto MD;  Location: [facility] ([address] [place] [address]);  Service: Endoscopy;  Laterality: N/A;    GALLBLADDER SURGERY      iabp  4/2016    TUBAL LIGATION       Family History   Problem Relation Age of Onset    Heart disease Mother     Heart disease Father      Social History     Tobacco Use    Smoking status: Never Smoker    Smokeless tobacco: Never Used   Substance Use Topics    Alcohol use: No    Drug use: No     Review of Systems   All other systems reviewed and are negative.  General: No fever.  No chills.  Eyes: No visual changes.  Head: No headache.    Integument: No rashes or lesions.  Chest: No shortness of breath.  Cardiovascular: No chest pain.  Abdomen: No abdominal pain.  No nausea or vomiting.  Urinary: No abnormal urination.  Neurologic: No focal weakness.  No numbness.  Hematologic: No easy bruising.  Endocrine: No excessive thirst or urination.      Physical Exam     Initial Vitals [12/08/19 0310]   BP Pulse Resp Temp SpO2   125/78 86 16 97.6 °F (36.4 °C) 98 %      MAP       --         Physical Exam    Nursing note and vitals reviewed.  Constitutional: She appears well-developed and well-nourished.   HENT:   Head: Normocephalic and atraumatic.   Right Ear: External ear normal.   Left Ear: External ear normal.   Mouth/Throat: Oropharynx is clear and moist.   TMs normal bilaterally   Eyes: Conjunctivae and EOM are normal. Pupils are equal, round, and reactive to light.   No nystagmus   Neck: Normal range of motion. Neck supple. No JVD present.   Cardiovascular: Normal rate, regular rhythm and intact distal pulses.   Pulmonary/Chest: Breath sounds normal. No respiratory distress.   Abdominal: Soft. She exhibits no distension. There is no tenderness.   Musculoskeletal: Normal range  of motion. She exhibits no tenderness.   Neurological: She is alert and oriented to person, place, and time. She has normal strength. No cranial nerve deficit or sensory deficit. GCS score is 15. GCS eye subscore is 4. GCS verbal subscore is 5. GCS motor subscore is 6.   Normal finger to nose bilaterally  No ataxia  No drift   Skin: Skin is warm and dry.   Psychiatric: She has a normal mood and affect. Thought content normal.         ED Course   Procedures  Labs Reviewed   CBC W/ AUTO DIFFERENTIAL - Abnormal; Notable for the following components:       Result Value    Hemoglobin 11.8 (*)     Mean Corpuscular Hemoglobin 25.1 (*)     Mean Corpuscular Hemoglobin Conc 29.6 (*)     RDW 17.5 (*)     All other components within normal limits   COMPREHENSIVE METABOLIC PANEL - Abnormal; Notable for the following components:    CO2 22 (*)     Glucose 148 (*)     BUN, Bld 25 (*)     eGFR if  50.5 (*)     eGFR if non  43.8 (*)     All other components within normal limits   PROTIME-INR - Abnormal; Notable for the following components:    Prothrombin Time 17.5 (*)     INR 1.8 (*)     All other components within normal limits   URINALYSIS, REFLEX TO URINE CULTURE - Abnormal; Notable for the following components:    Appearance, UA Hazy (*)     Occult Blood UA 1+ (*)     Leukocytes, UA Trace (*)     All other components within normal limits    Narrative:     Preferred Collection Type->Urine, Clean Catch   DIGOXIN LEVEL - Abnormal; Notable for the following components:    Digoxin Lvl 0.6 (*)     All other components within normal limits    Narrative:     ADD ON TEST DIGOXIN PER DR PATRICK CHURCH ORDER# [id number]    12/08/2019  06:09    URINALYSIS MICROSCOPIC - Abnormal; Notable for the following components:    WBC, UA 8 (*)     Bacteria Many (*)     All other components within normal limits    Narrative:     Preferred Collection Type->Urine, Clean Catch   MAGNESIUM   APTT   DIGOXIN LEVEL     EKG  Readings: (Independently Interpreted)   Initial Reading: No STEMI. Previous EKG Date: 08/05/2019.   3:21 a.m. v paced rate of 91 left axis deviation she compared with EKG from 08/05/2019 no significant change     ECG Results          EKG 12-lead (In process)  Result time 12/09/19 09:07:49    In process by Interface, Lab In [facility] (12/09/19 09:07:49)                 Narrative:    Test Reason : R42,    Vent. Rate : 089 BPM     Atrial Rate : 089 BPM     P-R Int : 128 ms          QRS Dur : 160 ms      QT Int : 430 ms       P-R-T Axes : 000 263 084 degrees     QTc Int : 523 ms     Suspect arm lead reversal, interpretation assumes no reversal  Normal sinus rhythm  Nonspecific intraventricular block  Right ventricular hypertrophy  Inferior infarct ,age undetermined  Anterolateral infarct ,age undetermined  Abnormal ECG  When compared with ECG of 08-DEC-2019 03:21,  Dena-Parkinson-White is no longer Present    Referred By: [email or web address]   SELF           Confirmed By:                              EKG 12-lead (Final result)  Result time 12/09/19 13:50:55    Final result by Interface, Lab In [facility] (12/09/19 13:50:55)                 Narrative:    Test Reason : R42,    Vent. Rate : 091 BPM     Atrial Rate : 091 BPM     P-R Int : 122 ms          QRS Dur : 166 ms      QT Int : 436 ms       P-R-T Axes : 089 266 080 degrees     QTc Int : 536 ms    Atrial-sensed ventricular-paced rhythm  Abnormal ECG  When compared with ECG of 05-AUG-2019 10:45,  No significant change was found  Confirmed by LATRICE DELGADO MD ([id number]) on 12/9/2019 1:50:46 PM    Referred By: ALFREDO   SELF           Confirmed By:LATRICE DELGADO MD                            Imaging Results          CT Head Without Contrast (Final result)  Result time 12/08/19 04:50:50    Final result by Pedro Luis Armstrong MD (12/08/19 04:50:50)                 Impression:      No acute abnormality.    Electronically signed by resident: Enzo De Jesus  MD  Date:    12/08/2019  Time:    04:35    Electronically signed by: Pedro Luis Armstrong MD  Date:    12/08/2019  Time:    04:50             Narrative:    EXAMINATION:  CT HEAD WITHOUT CONTRAST    CLINICAL HISTORY:  Dizziness;    TECHNIQUE:  Low dose axial CT images obtained throughout the head without intravenous contrast. Sagittal and coronal reconstructions were performed.    COMPARISON:  CT head 11/29/2018.    FINDINGS:  Intracranial compartment:    Ventricles and sulci are normal in size for age without evidence of hydrocephalus. No extra-axial blood or fluid collections.    The brain parenchyma appears normal. No parenchymal mass, hemorrhage, edema or major vascular distribution infarct.    Skull/extracranial contents (limited evaluation): No fracture. Mastoid air cells and paranasal sinuses are essentially clear.                                 Medical Decision Making:   History:   Old Medical Records: I decided to obtain old medical records.  Old Records Summarized: other records.       <> Summary of Records: ECHO 3/20/19  · Severe left ventricular enlargement.  · Eccentric left ventricular hypertrophy.  · Moderately decreased left ventricular systolic function. The estimated ejection fraction is 30%  · Grade II (moderate) left ventricular diastolic dysfunction consistent with pseudonormalization.  · Elevated left atrial pressure.  · No wall motion abnormalities.  · Normal right ventricular systolic function.  · Severe left atrial enlargement.  · Mild mitral regurgitation.  Initial Assessment:   Pt with dizziness that seems peripheral, likely vertigo  Pt does have a complicated cardiac history and has an ICD  Differential Diagnosis:   Vertigo, dehydration, cardiac arrhythmia  Clinical Tests:   Lab Tests: Ordered  Radiological Study: Ordered  Medical Tests: Ordered  ED Management:  6:01 AM  Orthostatics were negative  Patient reports she is feeling better  Plan to ambulate her and make sure she does not have an  ataxic gait  She says she can give a urine sample now  Also waiting on ICD interrogation    Many bacteria in urine, pt now telling me she does have some increased frequency of urination and urine has been cloudy, will treat  ICD was interrogated by [facility]ology fellow and pt had some afib, no vtach  Cleared for discharge from cardiology standpoint    Given pt has a normal neuro exam, no ataxia   dont think pt needs further eval for posterior stroke  Likely peripheral vertigo  Pt given thorough return precautions                   ED Course as of Dec 09 1940   Sun Dec 08, 2019   0703 Waiting on ICD interrogation    [GK]   0706 Called cardiac device clinic and no one answered, may not open until 8 am    [GK]      ED Course User Index  [GK] Yina Dent MD                Clinical Impression:       ICD-10-CM ICD-9-CM   1. Dizziness R42 780.4   2. Urinary tract infection without hematuria, site unspecified N39.0 599.0   3. ICD (implantable cardioverter-defibrillator) in place Z95.810 V45.02   4. Vertigo R42 780.4                             Yina Dent MD  12/09/19 1940       Yina Dent MD  12/09/19 1941

## 2019-12-08 NOTE — DISCHARGE INSTRUCTIONS
I recommend close follow up with your primary care doctor for re-evaluation.   Your ICD was interrogated and showed atrial fibrillation.    I have started you on Keflex for a urinary infection.    I have prescribed Meclizine for dizziness.    Seek immediate medical attention if you have new or worsening symptoms, difficulty ambulating, weakness, numbness, or for any other concern.

## 2019-12-08 NOTE — ED NOTES
Assumed patient care.     Pt resting on stretcher in NAD, respirations even and unlabored. Stretcher locked and in lowest position, side rails x 2, call bell within reach. Cardiac monitor, pulse ox and BP cuff applied cycling Q 30 minute vitals. Pt offered restroom assistance, pt states no needs at this time. Will continue to monitor and update pt on plan of care.    LOC: The patient is awake, alert and aware of environment with an appropriate affect, the patient is oriented x 3 and speaking appropriately.  APPEARANCE: Patient resting comfortably and in no acute distress, patient is clean and well groomed. Pt presents in hospital gown.   SKIN: The skin is warm and dry, color consistent with ethnicity, patient has normal skin turgor and moist mucus membranes, skin intact.  MUSCULOSKELETAL: Patient moving all extremities well, no obvious swelling or deformities noted.   RESPIRATORY: Airway is open and patent; respirations are spontaneous, patient has a normal effort and rate, no accessory muscle use noted.   CARDIAC: Patient has no peripheral edema noted. No complaints of chest pain at this time.   ABDOMEN: Soft and non tender to palpation, no distention noted.   NEUROLOGIC: PERRL, eyes open spontaneously, behavior appropriate to situation, follows commands, facial expression symmetrical, purposeful motor response noted, normal sensation in all extremities.

## 2019-12-09 RX ORDER — WARFARIN 7.5 MG/1
TABLET ORAL
Qty: 60 TABLET | Refills: 3 | Status: SHIPPED | OUTPATIENT
Start: 2019-12-09 | End: 2020-01-01 | Stop reason: SDUPTHER

## 2019-12-11 DIAGNOSIS — J30.9 ALLERGIC RHINITIS: ICD-10-CM

## 2019-12-11 RX ORDER — CETIRIZINE HYDROCHLORIDE 10 MG/1
TABLET ORAL
Qty: 30 TABLET | Refills: 3 | Status: CANCELLED | OUTPATIENT
Start: 2019-12-10

## 2019-12-18 ENCOUNTER — OFFICE VISIT (OUTPATIENT)
Dept: PODIATRY | Facility: CLINIC | Age: 49
End: 2019-12-18
Payer: MEDICAID

## 2019-12-18 ENCOUNTER — TELEPHONE (OUTPATIENT)
Dept: PODIATRY | Facility: CLINIC | Age: 49
End: 2019-12-18

## 2019-12-18 ENCOUNTER — ANTI-COAG VISIT (OUTPATIENT)
Dept: CARDIOLOGY | Facility: CLINIC | Age: 49
End: 2019-12-18
Payer: MEDICAID

## 2019-12-18 VITALS
WEIGHT: 250 LBS | HEART RATE: 96 BPM | BODY MASS INDEX: 42.68 KG/M2 | DIASTOLIC BLOOD PRESSURE: 72 MMHG | SYSTOLIC BLOOD PRESSURE: 112 MMHG | HEIGHT: 64 IN

## 2019-12-18 DIAGNOSIS — Z79.01 LONG TERM (CURRENT) USE OF ANTICOAGULANTS: Primary | ICD-10-CM

## 2019-12-18 DIAGNOSIS — Z79.01 CHRONIC ANTICOAGULATION: ICD-10-CM

## 2019-12-18 DIAGNOSIS — E11.49 TYPE II DIABETES MELLITUS WITH NEUROLOGICAL MANIFESTATIONS: Primary | ICD-10-CM

## 2019-12-18 DIAGNOSIS — I48.0 PAROXYSMAL ATRIAL FIBRILLATION: ICD-10-CM

## 2019-12-18 DIAGNOSIS — I50.9 ACUTE HEART FAILURE, UNSPECIFIED HEART FAILURE TYPE: ICD-10-CM

## 2019-12-18 DIAGNOSIS — L84 PRE-ULCERATIVE CALLUSES: ICD-10-CM

## 2019-12-18 LAB — INR PPP: 2.6 (ref 2–3)

## 2019-12-18 PROCEDURE — 99213 OFFICE O/P EST LOW 20 MIN: CPT | Mod: S$PBB,,, | Performed by: PODIATRIST

## 2019-12-18 PROCEDURE — 99213 PR OFFICE/OUTPT VISIT, EST, LEVL III, 20-29 MIN: ICD-10-PCS | Mod: S$PBB,,, | Performed by: PODIATRIST

## 2019-12-18 PROCEDURE — 85610 PROTHROMBIN TIME: CPT | Mod: PBBFAC

## 2019-12-18 PROCEDURE — 99999 PR PBB SHADOW E&M-EST. PATIENT-LVL V: CPT | Mod: PBBFAC,,, | Performed by: PODIATRIST

## 2019-12-18 PROCEDURE — 99999 PR PBB SHADOW E&M-EST. PATIENT-LVL V: ICD-10-PCS | Mod: PBBFAC,,, | Performed by: PODIATRIST

## 2019-12-18 PROCEDURE — 99215 OFFICE O/P EST HI 40 MIN: CPT | Mod: PBBFAC,PO | Performed by: PODIATRIST

## 2019-12-18 PROCEDURE — 93793 PR ANTICOAGULANT MGMT FOR PT TAKING WARFARIN: ICD-10-PCS | Mod: ,,,

## 2019-12-18 PROCEDURE — 93793 ANTICOAG MGMT PT WARFARIN: CPT | Mod: ,,,

## 2019-12-18 RX ORDER — CEPHALEXIN 500 MG/1
500 CAPSULE ORAL EVERY 12 HOURS
COMMUNITY
End: 2020-01-01

## 2019-12-18 NOTE — PROGRESS NOTES
INR at goal. Medications and chart reviewed. No changes noted to necessitate adjustment of warfarin or follow-up plan. See calendar.  Findings: Pt has bruising due to use; she went to the ED for vertigo where she was placed on antivert; she missed a dose of coumadin while at the ED & she denies any other changes.  Maintain current regimen & re-assess in 3 weeks.   Patient was re-educated on situations that would require placing a call to the [facility]madin Clinic, including bleeding or unusual bruising issues, changes in health, diet or medications,upcoming procedures that require warfarin interruption, and missed Coumadin dose(s). Patient expressed understanding that avoidance of consistency with these parameters could cause fluctuations in INR, leading to more frequent visits and increase risk of adverse events.

## 2019-12-18 NOTE — TELEPHONE ENCOUNTER
----- Message from Devorah Minaya sent at 12/18/2019 10:49 AM CST -----  Contact: pt  Type: Patient Call Back    Who called:pt    What is the request in detail:pt is requesting to be seen today for her right foot second toe is black. Call pt    Can the clinic reply by [facility]?    Would the patient rather a call back or a response via [facility]? Call     Best call back number:[phone]    Additional Information:

## 2019-12-19 DIAGNOSIS — J30.9 ALLERGIC RHINITIS: ICD-10-CM

## 2019-12-19 RX ORDER — CETIRIZINE HYDROCHLORIDE 10 MG/1
TABLET ORAL
Qty: 30 TABLET | Refills: 3 | Status: CANCELLED | OUTPATIENT
Start: 2019-12-10

## 2019-12-20 NOTE — PROGRESS NOTES
Subjective:      Patient ID: Laure Ross is a 49 y.o. female.    Chief Complaint: Diabetes Mellitus (ov 12/12/19 [facility] pcp [facility]- 2nd, 3rd and 5 th toe black with callouses) and Nail Care    Laure is a 49 y.o. female who presents to the clinic for evaluation and treatment of high risk feet. Laure has a past medical history of Anticoagulant long-term use, Arthritis, Asthma, Asthma, Atrial fibrillation, Cardiomyopathy, Cardiomyopathy, CHF (congestive heart failure), CHF (congestive heart failure), Chronic combined systolic and diastolic heart failure (6/3/2016), COPD (chronic obstructive pulmonary disease) (3/28/2018), GERD (gastroesophageal reflux disease), H/O tubal ligation, Hypertension, Obesity, Renal disorder, Type 2 diabetes mellitus with hyperglycemia, and Type 2 diabetes mellitus with polyneuropathy. Presents for diabetic foot risk assessment. Reports callus to right 2nd toe and 5th toe B/L. Currently wearing diabetic shoes.  . This patient has documented high risk feet requiring routine maintenance secondary to diabetes mellitis and those secondary complications of diabetes, as mentioned..    PCP: Holly Mittal MD    Date Last Seen by PCP:   Chief Complaint   Patient presents with    Diabetes Mellitus     ov 12/12/19 [facility] pcp [facility]- 2nd, 3rd and 5 th toe black with callouses    Nail Care       Current shoe gear:  Slip-on shoes    Hemoglobin A1C   Date Value Ref Range Status   07/03/2019 6.9 (H) 4.0 - 5.6 % Final     Comment:     ADA Screening Guidelines:  5.7-6.4%  Consistent with prediabetes  >or=6.5%  Consistent with diabetes  High levels of fetal hemoglobin interfere with the HbA1C  assay. Heterozygous hemoglobin variants (HbS, HgC, etc)do  not significantly interfere with this assay.   However, presence of multiple variants may affect accuracy.     10/01/2018 7.4 (H) 4.0 - 5.6 % Final     Comment:     ADA Screening Guidelines:  5.7-6.4%  Consistent with prediabetes  >or=6.5%   Consistent with diabetes  High levels of fetal hemoglobin interfere with the HbA1C  assay. Heterozygous hemoglobin variants (HbS, HgC, etc)do  not significantly interfere with this assay.   However, presence of multiple variants may affect accuracy.     03/28/2018 6.2 (H) 4.0 - 5.6 % Final     Comment:     According to ADA guidelines, hemoglobin A1c <7.0% represents  optimal control in non-pregnant diabetic patients. Different  metrics may apply to specific patient populations.   Standards of Medical Care in Diabetes-2016.  For the purpose of screening for the presence of diabetes:  <5.7%     Consistent with the absence of diabetes  5.7-6.4%  Consistent with increasing risk for diabetes   (prediabetes)  >or=6.5%  Consistent with diabetes  Currently, no consensus exists for use of hemoglobin A1c  for diagnosis of diabetes for children.  This Hemoglobin A1c assay has significant interference with fetal   hemoglobin   (HbF). The results are invalid for patients with abnormal amounts of   HbF,   including those with known Hereditary Persistence   of Fetal Hemoglobin. Heterozygous hemoglobin variants (HbAS, HbAC,   HbAD, HbAE, HbA2) do not significantly interfere with this assay;   however, presence of multiple variants in a sample may impact the %   interference.             Patient Active Problem List   Diagnosis    Hypertensive heart disease with heart failure    Type 2 diabetes mellitus with diabetic polyneuropathy    CLARENCE (obstructive sleep apnea)    Paroxysmal atrial fibrillation    Obesity with body mass index (BMI) of 30.0 to 39.9    Encounter for pre-transplant evaluation for heart transplant    Palliative care encounter    Fatigue    Breast mass on GYN exam 4/29/16    Left bundle-branch block    Organ transplant candidate    Tuberculosis screening    Vitamin D deficiency disease    Chronic anticoagulation    Muscle weakness    Chronic combined systolic and diastolic congestive heart failure     "COCM (congestive cardiomyopathy)    High risk medications (not anticoagulants) long-term use    Laryngopharyngeal reflux    Gastroesophageal reflux disease    Chronic rhinitis    Dysphonia    GERD (gastroesophageal reflux disease)    CKD (chronic kidney disease), stage III    Carpal tunnel syndrome, bilateral    NICM (nonischemic cardiomyopathy)    Acute diastolic CHF (congestive heart failure), NYHA class 3    History of diabetic ulcer of foot - Right Foot    Chest pain    Essential hypertension    Asthma exacerbation    Biventricular automatic implantable cardioverter defibrillator in situ    Amiodarone toxicity    Acute URI    Maxillary sinusitis    Influenza B    Abnormal finding on lung imaging       Current Outpatient Medications on File Prior to Visit   Medication Sig Dispense Refill    cephALEXin (KEFLEX) 500 MG capsule Take 500 mg by mouth every 12 (twelve) hours.      acetaminophen (TYLENOL) 650 MG TbSR TAKE 1 TABLET every four hours AS NEEDED for shoulder pain 180 tablet 2    allopurinol (ZYLOPRIM) 100 MG tablet TAKE 1 TABLET BY MOUTH EVERY DAY 90 tablet 5    allopurinol (ZYLOPRIM) 100 MG tablet TAKE 2 TABLETS by mouth EVERY  tablet 3    atorvastatin (LIPITOR) 20 MG tablet Take 1 tablet (20 mg total) by mouth once daily. 90 tablet 1    azelastine (ASTELIN) 137 mcg (0.1 %) nasal spray Use 1 to 2 sprays per nostril twice daily as needed. Us in addition to our nasal steroid spray 30 mL 3    BD ULTRA-FINE ESSENCE PEN NEEDLE 32 gauge x 5/32" Ndle Takes 5 times  day 200 each 11    blood sugar diagnostic Strp Uses 4 times a day, true metrix meter. 150 each 11    budesonide-formoterol 160-4.5 mcg (SYMBICORT) 160-4.5 mcg/actuation HFAA INHALE 2 PUFFS BY MOUTH TWICE DAILY. RINSE MOUTH AFTER USE. 10.2 g 3    budesonide-formoterol 160-4.5 mcg (SYMBICORT) 160-4.5 mcg/actuation HFAA INHALE 2 PUFFS TWICE DAILY. RINSE MOUTH AFTER USE. 10.2 g 6    cetirizine (ZYRTEC) 10 MG tablet TAKE 1 " TABLET AT BEDTIME FOR ALLERGY RELIEF 30 tablet 3    ciclopirox (PENLAC) 8 % Soln Apply topically nightly. 6.6 mL 3    ciprofloxacin HCl (CILOXAN) 0.3 % ophthalmic solution INSTILL ONE DROP INTO LEFT EYE FOUR TIMES DAILY. 10 mL 2    clotrimazole (LOTRIMIN) 1 % cream APPLY SPARINGLY TO AFFECTED AREA(S) in groin TWICE DAILY for one to two weeks 45 g 0    cyclobenzaprine (FEXMID) 7.5 MG Tab TAKE 1 TABLET 3 TIMES DAILY AS NEEDED FOR MUSCLE SPASM. 21 tablet 0    cycloSPORINE (RESTASIS) 0.05 % ophthalmic emulsion Apply 1 drop to eye.      diclofenac sodium (VOLTAREN) 1 % Gel APPLY 2 grams SPARINGLY TO Knees ONCE DAILY 100 g 3    digoxin (LANOXIN) 125 mcg tablet TAKE 1 TABLET BY MOUTH ONCE DAILY 90 tablet 3    dulaglutide (TRULICITY) 0.75 mg/0.5 mL PnIj Inject 0.5 mLs (0.75 mg total) into the skin every 7 days. 2 mL 6    dulaglutide (TRULICITY) 1.5 mg/0.5 mL PnIj INJECT 1.5 mg under the skin weekly 2 mL 11    dupilumab (DUPIXENT) 300 mg/2 mL Syrg Inject 2 mLs (300 mg total) into the skin every 14 (fourteen) days. 4 mL 11    EPINEPHrine (EPIPEN) 0.3 mg/0.3 mL AtIn INJECT 0.3ML IN THE MUSCLE AS DIRECTED FOR ANAPHYLAXIS 0.6 mL 1    ergocalciferol (ERGOCALCIFEROL) 50,000 unit Cap TAKE 1 CAPSULE BY MOUTH WEEKLY. 12 capsule 0    famotidine (PEPCID) 40 MG tablet TAKE 1 TABLET BY MOUTH TWICE DAILY. 180 tablet 1    fluticasone propionate (FLONASE) 50 mcg/actuation nasal spray USE TWO SPRAYS IN EACH NOSTRIL ONCE DAILY for cold 48 g 1    fluticasone propionate (FLOVENT HFA) 110 mcg/actuation inhaler INHALE 2 PUFFS TWICE DAILY. RINSE MOUTH AFTER USE. 12 g 3    furosemide (LASIX) 40 MG tablet Take 2 tablets (80 mg total) by mouth 2 (two) times daily. 360 tablet 3    hydrOXYzine pamoate (VISTARIL) 25 MG Cap TAKE 1 CAPSULE BY MOUTH EVERY 8 HOURS AS NEEDED FOR ITCHING 30 capsule 0    insulin (BASAGLAR KWIKPEN U-100 INSULIN) glargine 100 units/mL (3mL) SubQ pen inject 34 under the skin in the morning and 34 units at night  30 mL 6    insulin aspart U-100 (NOVOLOG) 100 unit/mL (3 mL) InPn pen Inject 22 units into the skin with meals plus scale 45 mL 6    insulin aspart U-100 (NOVOLOG) 100 unit/mL (3 mL) InPn pen Inject 16 units under the skin with meals plus scale 150-200+2, 201-250+4, 251-300+6, 301-350+8, >350+10, max daily 78 units. 30 mL 6    ketoconazole (NIZORAL) 2 % cream APPLY SPARINGLY TO AFFECTED AREA(S) ONCE DAILY 30 g 2    ketoconazole (NIZORAL) 2 % shampoo APPLY TO SKIN EVERY DAY FOR 5 MINUTES AND RINSE FOR ABOUT 1 TO 2 WEEKS 120 mL 1    ketorolac 0.5% (ACULAR) 0.5 % Drop instill 1 drop into left eye 4 times daily 10 mL 1    lancets 30 gauge Misc use 4 times a day 100 each 6    levalbuterol (XOPENEX HFA) 45 mcg/actuation inhaler INHALE 1 TO 2 PUFFS EVERY 4 TO 6 HOURS AS NEEDED 15 g 3    levalbuterol (XOPENEX HFA) 45 mcg/actuation inhaler Inhale 1 - 2 puffs by mouth every 4 - 6 hours as needed 15 g 6    levalbuterol (XOPENEX) 0.31 mg/3 mL nebulizer solution Take 3 mLs (0.31 mg total) by nebulization every 4 (four) hours as needed for Wheezing. Rescue 72 mL 3    levalbuterol (XOPENEX) 0.31 mg/3 mL nebulizer solution USE 1 UNIT DOSE IN NEBULIZER EVERY 4 HOURS AS NEEDED. 72 mL 6    linaCLOtide (LINZESS) 72 mcg Cap capsule Take one tablet by mouth daily as needed for constipation 30 capsule 2    lisinopril (PRINIVIL,ZESTRIL) 5 MG tablet TAKE 1 TABLET BY MOUTH TWO TIMES A DAY 60 tablet 8    magnesium oxide (MAG-OX) 400 mg (241.3 mg magnesium) tablet Take 1 tablet (400 mg total) by mouth 2 (two) times daily. 180 tablet 3    meclizine (ANTIVERT) 25 mg tablet Take 1 tablet (25 mg total) by mouth 2 (two) times daily as needed for Dizziness. 10 tablet 0    metOLazone (ZAROXOLYN) 2.5 MG tablet Take 1 tab today 30 minutes before your evening dose of Furosemide. 5 tablet 0    montelukast (SINGULAIR) 10 mg tablet TAKE 1 TABLET BY MOUTH DAILY. 30 tablet 3    montelukast (SINGULAIR) 10 mg tablet TAKE 1 TABLET DAILY. 30  "tablet 6    nepafenac (ILEVRO) 0.3 % DrpS INSTILL ONE DROP INTO BOTH EYES DAILY. 3 mL 2    pen needle, diabetic 32 gauge x 5/32" Ndle USE AS DIRECTED 5 TIMES DAILY 200 each 5    pen needle, diabetic 32 gauge x 5/32" Ndle use with insulin pen five times daily 100 each 3    polyethylene glycol (GLYCOLAX) 17 gram/dose powder MIX 1 CAPFUL IN 8 OUNCES OF LIQUID AND DRINK ONCE DAILY as needed for constipation 510 g 6    potassium chloride (MICRO-K) 10 MEQ CpSR Take 2 capsules (20 mEq total) by mouth once daily. 60 capsule 11    prednisoLONE acetate (PRED FORTE) 1 % DrpS INSTILL ONE DROP INTO LEFT EYE FOUR TIMES DAILY. 10 mL 2    sodium chloride for inhalation (SODIUM CHLORIDE 0.9%) 0.9 % nebulizer solution Use 1 vial via nebulization three times daily. Mix with xopenex solution 90 mL 2    spironolactone (ALDACTONE) 25 MG tablet Take 1 tablet (25 mg total) by mouth once daily. 90 tablet 3    tiotropium (SPIRIVA) 18 mcg inhalation capsule INHALE 1 CAPSULE EVERY DAY 30 capsule 6    triamcinolone acetonide 0.025% (KENALOG) 0.025 % cream APPLY SPARINGLY TO AFFECTED AREA(S) of face 2 TIMES DAILY. 15 g 1    warfarin (COUMADIN) 7.5 MG tablet Take ½ tablet by mouth once daily and take 1 tablet on thursday 60 tablet 3     No current facility-administered medications on file prior to visit.        Review of patient's allergies indicates:  No Known Allergies    Past Surgical History:   Procedure Laterality Date    CARDIAC DEFIBRILLATOR PLACEMENT      CARDIAC DEFIBRILLATOR PLACEMENT      CARDIAC DEFIBRILLATOR PLACEMENT      CATARACT EXTRACTION Left     CHOLECYSTECTOMY      COLONOSCOPY N/A 5/2/2016    Procedure: COLONOSCOPY;  Surgeon: Eugene Soto MD;  Location: [facility] ([address] [place] [address]);  Service: Endoscopy;  Laterality: N/A;    GALLBLADDER SURGERY      iabp  4/2016    TUBAL LIGATION         Family History   Problem Relation Age of Onset    Heart disease Mother     Heart disease Father        Social History "     Socioeconomic History    Marital status:      Spouse name: Not on file    Number of children: Not on file    Years of education: Not on file    Highest education level: Not on file   Occupational History    Not on file   Social Needs    Financial resource strain: Not on file    Food insecurity:     Worry: Not on file     Inability: Not on file    Transportation needs:     Medical: Not on file     Non-medical: Not on file   Tobacco Use    Smoking status: Never Smoker    Smokeless tobacco: Never Used   Substance and Sexual Activity    Alcohol use: No    Drug use: No    Sexual activity: Yes     Partners: Male   Lifestyle    Physical activity:     Days per week: Not on file     Minutes per session: Not on file    Stress: Not on file   Relationships    Social connections:     Talks on phone: Not on file     Gets together: Not on file     Attends Synagogue service: Not on file     Active member of club or organization: Not on file     Attends meetings of clubs or organizations: Not on file     Relationship status: Not on file   Other Topics Concern    Not on file   Social History Narrative    Not on file       Review of Systems   Constitution: Negative for chills, fever and malaise/fatigue.   Cardiovascular: Negative for chest pain, claudication, leg swelling, orthopnea and palpitations.   Respiratory: Negative for cough, shortness of breath and wheezing.    Skin: Positive for color change, dry skin, nail changes and suspicious lesions (calluses/corns). Negative for itching, poor wound healing and rash.   Musculoskeletal: Positive for joint pain and myalgias. Negative for arthritis, falls, gout, joint swelling and muscle weakness.   Gastrointestinal: Negative for diarrhea, nausea and vomiting.   Neurological: Positive for numbness, paresthesias and sensory change. Negative for disturbances in coordination, dizziness, focal weakness, tremors and weakness.   Psychiatric/Behavioral: Negative for  "altered mental status and hallucinations.           Objective:      Vitals:    12/18/19 1522   BP: 112/72   Pulse: 96   Weight: 113.4 kg (250 lb)   Height: 5' 4" (1.626 m)   PainSc: 0-No pain       Physical Exam   Cardiovascular:   Dorsalis pedis and posterior tibial pulses are diminished Bilaterally. Toes are cool to touch. Feet are warm proximally.There is decreased digital hair. Skin is atrophic, slightly hyperpigmented, and mildly edematous       Musculoskeletal:        Right ankle: She exhibits swelling. Tenderness. AITFL tenderness found.   There is equinus deformity bilateral with decreased dorsiflexion at the ankle joint bilateral. No tenderness with compression of heel. Negative tinels sign. Gait analysis reveals excessive pronation through midstance and propulsion with early heel off. Shoes reveals lateral heel counter wear bilateral     Decreased first MPJ range of motion both weightbearing and nonweightbearing, no crepitus observed the first MP joint, + dorsal flag sign. Mild  bunion deformity is observed .    Patient has hammertoes of digits 2-5 bilateral partially reducible      Neurological:   [place] 5.07 monofilamant testing is diminished both feet. Sharp/dull sensation diminished Bilaterally. Light touch absent Bilaterally.       Skin: Lesion noted. No bruising and no ecchymosis noted. No erythema.   No open lesions, lacerations or wounds noted. Toenails 1-5 bilaterally are elongated by 2-3 mm, thickened by 2-3 mm, discolored/yellowed, dystrophic, brittle with subungual debris.  .  Interdigital spaces clean, dry and intact b/l. No erythema noted to b/l foot. Skin texture thin, atrophic, dry. Pedal hair diminished b/l.         Scaling dryness in a moccasin distribution is noted to the bilateral lower extremities.     Pre ulcerative callus noted to right 2nd toe.     Focal hyperkeratotic lesion consisting entirely of hyperkeratotic tissue without open skin, drainage, pus, fluctuance, " malodor, or signs of infection: B/L 5th toe.                    Assessment:       Encounter Diagnoses   Name Primary?    Type II diabetes mellitus with neurological manifestations Yes    Pre-ulcerative calluses          Plan:     Problem List Items Addressed This Visit     None      Visit Diagnoses     Type II diabetes mellitus with neurological manifestations    -  Primary    Pre-ulcerative calluses             I counseled the patient on her conditions, their implications and medical management.         - Shoe inspection. Diabetic Foot Education. Patient reminded of the importance of good nutrition and blood sugar control to help prevent podiatric complications of diabetes. Patient instructed on proper foot hygeine. We discussed wearing proper shoe gear, daily foot inspections, never walking without protective shoe gear, never putting sharp instruments to feet, routine podiatric nail visits every 2-3 months.     With the patient's verbal consent a sterile #15 scalpel was used to trim the hyperkeratotic lesion described above. Trimmed B/L 5th toe callus.  She tolerated the procedure well without complication. Preulcerative lesion to right 2nd toe. Painted toe with betadine, covered with band aid.     F/u 2 weeks

## 2019-12-31 NOTE — PROGRESS NOTES
Subjective:      Patient ID: Laure Ross is a 49 y.o. female.    Chief Complaint: Diabetes Mellitus and Callouses    Laure is a 49 y.o. female who presents to the clinic for evaluation and treatment of high risk feet. Laure has a past medical history of Anticoagulant long-term use, Arthritis, Asthma, Asthma, Atrial fibrillation, Cardiomyopathy, Cardiomyopathy, CHF (congestive heart failure), CHF (congestive heart failure), Chronic combined systolic and diastolic heart failure (6/3/2016), COPD (chronic obstructive pulmonary disease) (3/28/2018), GERD (gastroesophageal reflux disease), H/O tubal ligation, Hypertension, Obesity, Renal disorder, Type 2 diabetes mellitus with hyperglycemia, and Type 2 diabetes mellitus with polyneuropathy. Presents for diabetic foot risk assessment. Reports callus to right 2nd toe and 5th toe B/L. Currently wearing DARCO shoe on the right.   . This patient has documented high risk feet requiring routine maintenance secondary to diabetes mellitis and those secondary complications of diabetes, as mentioned..    PCP: Holly Mittal MD    Date Last Seen by PCP:   Chief Complaint   Patient presents with    Diabetes Mellitus    Elicia       Current shoe gear:  Slip-on shoes    Hemoglobin A1C   Date Value Ref Range Status   07/03/2019 6.9 (H) 4.0 - 5.6 % Final     Comment:     ADA Screening Guidelines:  5.7-6.4%  Consistent with prediabetes  >or=6.5%  Consistent with diabetes  High levels of fetal hemoglobin interfere with the HbA1C  assay. Heterozygous hemoglobin variants (HbS, HgC, etc)do  not significantly interfere with this assay.   However, presence of multiple variants may affect accuracy.     10/01/2018 7.4 (H) 4.0 - 5.6 % Final     Comment:     ADA Screening Guidelines:  5.7-6.4%  Consistent with prediabetes  >or=6.5%  Consistent with diabetes  High levels of fetal hemoglobin interfere with the HbA1C  assay. Heterozygous hemoglobin variants (HbS, HgC, etc)do  not  significantly interfere with this assay.   However, presence of multiple variants may affect accuracy.     03/28/2018 6.2 (H) 4.0 - 5.6 % Final     Comment:     According to ADA guidelines, hemoglobin A1c <7.0% represents  optimal control in non-pregnant diabetic patients. Different  metrics may apply to specific patient populations.   Standards of Medical Care in Diabetes-2016.  For the purpose of screening for the presence of diabetes:  <5.7%     Consistent with the absence of diabetes  5.7-6.4%  Consistent with increasing risk for diabetes   (prediabetes)  >or=6.5%  Consistent with diabetes  Currently, no consensus exists for use of hemoglobin A1c  for diagnosis of diabetes for children.  This Hemoglobin A1c assay has significant interference with fetal   hemoglobin   (HbF). The results are invalid for patients with abnormal amounts of   HbF,   including those with known Hereditary Persistence   of Fetal Hemoglobin. Heterozygous hemoglobin variants (HbAS, HbAC,   HbAD, HbAE, HbA2) do not significantly interfere with this assay;   however, presence of multiple variants in a sample may impact the %   interference.             Patient Active Problem List   Diagnosis    Hypertensive heart disease with heart failure    Type 2 diabetes mellitus with diabetic polyneuropathy    CLARENCE (obstructive sleep apnea)    Paroxysmal atrial fibrillation    Obesity with body mass index (BMI) of 30.0 to 39.9    Encounter for pre-transplant evaluation for heart transplant    Palliative care encounter    Fatigue    Breast mass on GYN exam 4/29/16    Left bundle-branch block    Organ transplant candidate    Tuberculosis screening    Vitamin D deficiency disease    Chronic anticoagulation    Muscle weakness    Chronic combined systolic and diastolic congestive heart failure    COCM (congestive cardiomyopathy)    High risk medications (not anticoagulants) long-term use    Laryngopharyngeal reflux    Gastroesophageal  "reflux disease    Chronic rhinitis    Dysphonia    GERD (gastroesophageal reflux disease)    CKD (chronic kidney disease), stage III    Carpal tunnel syndrome, bilateral    NICM (nonischemic cardiomyopathy)    Acute diastolic CHF (congestive heart failure), NYHA class 3    History of diabetic ulcer of foot - Right Foot    Chest pain    Essential hypertension    Asthma exacerbation    Biventricular automatic implantable cardioverter defibrillator in situ    Amiodarone toxicity    Acute URI    Maxillary sinusitis    Influenza B    Abnormal finding on lung imaging       Current Outpatient Medications on File Prior to Visit   Medication Sig Dispense Refill    acetaminophen (TYLENOL) 650 MG TbSR TAKE 1 TABLET every four hours AS NEEDED for shoulder pain 180 tablet 2    allopurinol (ZYLOPRIM) 100 MG tablet TAKE 1 TABLET BY MOUTH EVERY DAY 90 tablet 5    allopurinol (ZYLOPRIM) 100 MG tablet TAKE 2 TABLETS by mouth EVERY  tablet 3    atorvastatin (LIPITOR) 20 MG tablet Take 1 tablet (20 mg total) by mouth once daily. 90 tablet 1    azelastine (ASTELIN) 137 mcg (0.1 %) nasal spray Use 1 to 2 sprays per nostril twice daily as needed. Us in addition to our nasal steroid spray 30 mL 3    BD ULTRA-FINE ESSENCE PEN NEEDLE 32 gauge x 5/32" Ndle Takes 5 times  day 200 each 11    blood sugar diagnostic Strp Uses 4 times a day, true metrix meter. 150 each 11    budesonide-formoterol 160-4.5 mcg (SYMBICORT) 160-4.5 mcg/actuation HFAA INHALE 2 PUFFS BY MOUTH TWICE DAILY. RINSE MOUTH AFTER USE. 10.2 g 3    budesonide-formoterol 160-4.5 mcg (SYMBICORT) 160-4.5 mcg/actuation HFAA INHALE 2 PUFFS TWICE DAILY. RINSE MOUTH AFTER USE. 10.2 g 6    cephALEXin (KEFLEX) 500 MG capsule Take 500 mg by mouth every 12 (twelve) hours.      cetirizine (ZYRTEC) 10 MG tablet TAKE 1 TABLET AT BEDTIME FOR ALLERGY RELIEF 30 tablet 3    ciclopirox (PENLAC) 8 % Soln Apply topically nightly. 6.6 mL 3    ciprofloxacin HCl " (CILOXAN) 0.3 % ophthalmic solution INSTILL ONE DROP INTO LEFT EYE FOUR TIMES DAILY. 10 mL 2    clotrimazole (LOTRIMIN) 1 % cream APPLY SPARINGLY TO AFFECTED AREA(S) in groin TWICE DAILY for one to two weeks 45 g 0    cyclobenzaprine (FEXMID) 7.5 MG Tab TAKE 1 TABLET 3 TIMES DAILY AS NEEDED FOR MUSCLE SPASM. 21 tablet 0    cycloSPORINE (RESTASIS) 0.05 % ophthalmic emulsion Apply 1 drop to eye.      diclofenac sodium (VOLTAREN) 1 % Gel APPLY 2 grams SPARINGLY TO Knees ONCE DAILY 100 g 3    digoxin (LANOXIN) 125 mcg tablet TAKE 1 TABLET BY MOUTH ONCE DAILY 90 tablet 3    dulaglutide (TRULICITY) 0.75 mg/0.5 mL PnIj Inject 0.5 mLs (0.75 mg total) into the skin every 7 days. 2 mL 6    dulaglutide (TRULICITY) 1.5 mg/0.5 mL PnIj INJECT 1.5 mg under the skin weekly 2 mL 11    dupilumab (DUPIXENT) 300 mg/2 mL Syrg Inject 2 mLs (300 mg total) into the skin every 14 (fourteen) days. 4 mL 11    EPINEPHrine (EPIPEN) 0.3 mg/0.3 mL AtIn INJECT 0.3ML IN THE MUSCLE AS DIRECTED FOR ANAPHYLAXIS 0.6 mL 1    ergocalciferol (ERGOCALCIFEROL) 50,000 unit Cap TAKE 1 CAPSULE BY MOUTH WEEKLY. 12 capsule 0    famotidine (PEPCID) 40 MG tablet TAKE 1 TABLET BY MOUTH TWICE DAILY. 180 tablet 1    fluticasone propionate (FLONASE) 50 mcg/actuation nasal spray USE TWO SPRAYS IN EACH NOSTRIL ONCE DAILY for cold 48 g 1    fluticasone propionate (FLOVENT HFA) 110 mcg/actuation inhaler INHALE 2 PUFFS TWICE DAILY. RINSE MOUTH AFTER USE. 12 g 3    furosemide (LASIX) 40 MG tablet Take 2 tablets (80 mg total) by mouth 2 (two) times daily. 360 tablet 3    hydrOXYzine pamoate (VISTARIL) 25 MG Cap TAKE 1 CAPSULE BY MOUTH EVERY 8 HOURS AS NEEDED FOR ITCHING 30 capsule 0    insulin (BASAGLAR KWIKPEN U-100 INSULIN) glargine 100 units/mL (3mL) SubQ pen inject 34 under the skin in the morning and 34 units at night 30 mL 6    insulin aspart U-100 (NOVOLOG) 100 unit/mL (3 mL) InPn pen Inject 22 units into the skin with meals plus scale 45 mL 6     "insulin aspart U-100 (NOVOLOG) 100 unit/mL (3 mL) InPn pen Inject 16 units under the skin with meals plus scale 150-200+2, 201-250+4, 251-300+6, 301-350+8, >350+10, max daily 78 units. 30 mL 6    ketoconazole (NIZORAL) 2 % cream APPLY SPARINGLY TO AFFECTED AREA(S) ONCE DAILY 30 g 2    ketoconazole (NIZORAL) 2 % shampoo APPLY TO SKIN EVERY DAY FOR 5 MINUTES AND RINSE FOR ABOUT 1 TO 2 WEEKS 120 mL 1    ketorolac 0.5% (ACULAR) 0.5 % Drop instill 1 drop into left eye 4 times daily 10 mL 1    lancets 30 gauge Misc use 4 times a day 100 each 6    levalbuterol (XOPENEX HFA) 45 mcg/actuation inhaler INHALE 1 TO 2 PUFFS EVERY 4 TO 6 HOURS AS NEEDED 15 g 3    levalbuterol (XOPENEX HFA) 45 mcg/actuation inhaler Inhale 1 - 2 puffs by mouth every 4 - 6 hours as needed 15 g 6    levalbuterol (XOPENEX) 0.31 mg/3 mL nebulizer solution Take 3 mLs (0.31 mg total) by nebulization every 4 (four) hours as needed for Wheezing. Rescue 72 mL 3    levalbuterol (XOPENEX) 0.31 mg/3 mL nebulizer solution USE 1 UNIT DOSE IN NEBULIZER EVERY 4 HOURS AS NEEDED. 72 mL 6    linaCLOtide (LINZESS) 72 mcg Cap capsule Take one tablet by mouth daily as needed for constipation 30 capsule 2    lisinopril (PRINIVIL,ZESTRIL) 5 MG tablet TAKE 1 TABLET BY MOUTH TWO TIMES A DAY 60 tablet 8    magnesium oxide (MAG-OX) 400 mg (241.3 mg magnesium) tablet Take 1 tablet (400 mg total) by mouth 2 (two) times daily. 180 tablet 3    meclizine (ANTIVERT) 25 mg tablet Take 1 tablet (25 mg total) by mouth 2 (two) times daily as needed for Dizziness. 10 tablet 0    metOLazone (ZAROXOLYN) 2.5 MG tablet Take 1 tab today 30 minutes before your evening dose of Furosemide. 5 tablet 0    montelukast (SINGULAIR) 10 mg tablet TAKE 1 TABLET BY MOUTH DAILY. 30 tablet 3    montelukast (SINGULAIR) 10 mg tablet TAKE 1 TABLET DAILY. 30 tablet 6    nepafenac (ILEVRO) 0.3 % DrpS INSTILL ONE DROP INTO BOTH EYES DAILY. 3 mL 2    pen needle, diabetic 32 gauge x 5/32" Ndle " "USE AS DIRECTED 5 TIMES DAILY 200 each 5    pen needle, diabetic 32 gauge x 5/32" Ndle use with insulin pen five times daily 100 each 3    polyethylene glycol (GLYCOLAX) 17 gram/dose powder MIX 1 CAPFUL IN 8 OUNCES OF LIQUID AND DRINK ONCE DAILY as needed for constipation 510 g 6    potassium chloride (MICRO-K) 10 MEQ CpSR Take 2 capsules (20 mEq total) by mouth once daily. 60 capsule 11    prednisoLONE acetate (PRED FORTE) 1 % DrpS INSTILL ONE DROP INTO LEFT EYE FOUR TIMES DAILY. 10 mL 2    sodium chloride for inhalation (SODIUM CHLORIDE 0.9%) 0.9 % nebulizer solution Use 1 vial via nebulization three times daily. Mix with xopenex solution 90 mL 2    spironolactone (ALDACTONE) 25 MG tablet Take 1 tablet (25 mg total) by mouth once daily. 90 tablet 3    tiotropium (SPIRIVA) 18 mcg inhalation capsule INHALE 1 CAPSULE EVERY DAY 30 capsule 6    triamcinolone acetonide 0.025% (KENALOG) 0.025 % cream APPLY SPARINGLY TO AFFECTED AREA(S) of face 2 TIMES DAILY. 15 g 1    warfarin (COUMADIN) 7.5 MG tablet Take ½ tablet by mouth once daily and take 1 tablet on thursday 60 tablet 3     No current facility-administered medications on file prior to visit.        Review of patient's allergies indicates:  No Known Allergies    Past Surgical History:   Procedure Laterality Date    CARDIAC DEFIBRILLATOR PLACEMENT      CARDIAC DEFIBRILLATOR PLACEMENT      CARDIAC DEFIBRILLATOR PLACEMENT      CATARACT EXTRACTION Left     CHOLECYSTECTOMY      COLONOSCOPY N/A 5/2/2016    Procedure: COLONOSCOPY;  Surgeon: Eugene Soto MD;  Location: [address]);  Service: Endoscopy;  Laterality: N/A;    GALLBLADDER SURGERY      iabp  4/2016    TUBAL LIGATION         Family History   Problem Relation Age of Onset    Heart disease Mother     Heart disease Father        Social History     Socioeconomic History    Marital status:      Spouse name: Not on file    Number of children: Not on file    Years of education: " Not on file    Highest education level: Not on file   Occupational History    Not on file   Social Needs    Financial resource strain: Not on file    Food insecurity:     Worry: Not on file     Inability: Not on file    Transportation needs:     Medical: Not on file     Non-medical: Not on file   Tobacco Use    Smoking status: Never Smoker    Smokeless tobacco: Never Used   Substance and Sexual Activity    Alcohol use: No    Drug use: No    Sexual activity: Yes     Partners: Male   Lifestyle    Physical activity:     Days per week: Not on file     Minutes per session: Not on file    Stress: Not on file   Relationships    Social connections:     Talks on phone: Not on file     Gets together: Not on file     Attends Advent service: Not on file     Active member of club or organization: Not on file     Attends meetings of clubs or organizations: Not on file     Relationship status: Not on file   Other Topics Concern    Not on file   Social History Narrative    Not on file       Review of Systems   Constitution: Negative for chills, fever and malaise/fatigue.   Cardiovascular: Negative for chest pain, claudication, leg swelling, orthopnea and palpitations.   Respiratory: Negative for cough, shortness of breath and wheezing.    Skin: Positive for color change, dry skin, nail changes and suspicious lesions (calluses/corns). Negative for itching, poor wound healing and rash.   Musculoskeletal: Positive for joint pain and myalgias. Negative for arthritis, falls, gout, joint swelling and muscle weakness.   Gastrointestinal: Negative for diarrhea, nausea and vomiting.   Neurological: Positive for numbness, paresthesias and sensory change. Negative for disturbances in coordination, dizziness, focal weakness, tremors and weakness.   Psychiatric/Behavioral: Negative for altered mental status and hallucinations.           Objective:      Vitals:    12/31/19 1500   BP: 116/74   Weight: 113.4 kg (250 lb)   Height:  "5' 4" (1.626 m)       Physical Exam   Cardiovascular:   Dorsalis pedis and posterior tibial pulses are diminished Bilaterally. Toes are cool to touch. Feet are warm proximally.There is decreased digital hair. Skin is atrophic, slightly hyperpigmented, and mildly edematous       Musculoskeletal:        Right ankle: She exhibits swelling. Tenderness. AITFL tenderness found.   There is equinus deformity bilateral with decreased dorsiflexion at the ankle joint bilateral. No tenderness with compression of heel. Negative tinels sign. Gait analysis reveals excessive pronation through midstance and propulsion with early heel off. Shoes reveals lateral heel counter wear bilateral     Decreased first MPJ range of motion both weightbearing and nonweightbearing, no crepitus observed the first MP joint, + dorsal flag sign. Mild  bunion deformity is observed .    Patient has hammertoes of digits 2-5 bilateral partially reducible      Neurological:   Leoma-Anya 5.07 monofilamant testing is diminished both feet. Sharp/dull sensation diminished Bilaterally. Light touch absent Bilaterally.       Skin: Lesion noted. No bruising and no ecchymosis noted. No erythema.   No open lesions, lacerations or wounds noted. Toenails 1-5 bilaterally are elongated by 2-3 mm, thickened by 2-3 mm, discolored/yellowed, dystrophic, brittle with subungual debris.  .  Interdigital spaces clean, dry and intact b/l. No erythema noted to b/l foot. Skin texture thin, atrophic, dry. Pedal hair diminished b/l.         Scaling dryness in a moccasin distribution is noted to the bilateral lower extremities.     Pre ulcerative callus noted to right 2nd toe.     Focal hyperkeratotic lesion consisting entirely of hyperkeratotic tissue without open skin, drainage, pus, fluctuance, malodor, or signs of infection: B/L 5th toe.                    Assessment:       Encounter Diagnoses   Name Primary?    Type II diabetes mellitus with neurological manifestations " Yes    Pre-ulcerative calluses     Onychomycosis due to dermatophyte          Plan:     Problem List Items Addressed This Visit     None      Visit Diagnoses     Type II diabetes mellitus with neurological manifestations    -  Primary    Pre-ulcerative calluses        Onychomycosis due to dermatophyte             I counseled the patient on her conditions, their implications and medical management.         - Shoe inspection. Diabetic Foot Education. Patient reminded of the importance of good nutrition and blood sugar control to help prevent podiatric complications of diabetes. Patient instructed on proper foot hygeine. We discussed wearing proper shoe gear, daily foot inspections, never walking without protective shoe gear, never putting sharp instruments to feet, routine podiatric nail visits every 2-3 months.     - With patient's permission, nails were aggressively reduced and debrided x 10 to their soft tissue attachment mechanically and with electric , removing all offending nail and debris. Patient relates relief following the procedure. He will continue to monitor the areas daily, inspect his feet, wear protective shoe gear when ambulatory, moisturizer to maintain skin integrity and follow in this office in approximately 2-3 months, sooner p.r.n.   - After cleansing the area w/ alcohol prep pad the above mentioned hyperkeratosis was trimmed utilizing No 15 scapel, to a smooth base with out incident. Patient tolerated this well and reported comfort to the areas. Ambulate with DARCO shoe to right foot for one additional week.     F/u one month.

## 2020-01-01 ENCOUNTER — OFFICE VISIT (OUTPATIENT)
Dept: PODIATRY | Facility: CLINIC | Age: 50
End: 2020-01-01
Payer: MEDICAID

## 2020-01-01 ENCOUNTER — LAB VISIT (OUTPATIENT)
Dept: LAB | Facility: HOSPITAL | Age: 50
End: 2020-01-01
Attending: INTERNAL MEDICINE
Payer: MEDICAID

## 2020-01-01 ENCOUNTER — ANTI-COAG VISIT (OUTPATIENT)
Dept: CARDIOLOGY | Facility: CLINIC | Age: 50
End: 2020-01-01
Payer: MEDICAID

## 2020-01-01 ENCOUNTER — HOSPITAL ENCOUNTER (OUTPATIENT)
Dept: CARDIOLOGY | Facility: CLINIC | Age: 50
Discharge: HOME | End: 2020-05-29
Payer: MEDICAID

## 2020-01-01 ENCOUNTER — DOCUMENTATION ONLY (OUTPATIENT)
Dept: ELECTROPHYSIOLOGY | Facility: CLINIC | Age: 50
End: 2020-01-01

## 2020-01-01 ENCOUNTER — TELEPHONE (OUTPATIENT)
Dept: ELECTROPHYSIOLOGY | Facility: CLINIC | Age: 50
End: 2020-01-01

## 2020-01-01 ENCOUNTER — OFFICE VISIT (OUTPATIENT)
Dept: PULMONOLOGY | Facility: CLINIC | Age: 50
End: 2020-01-01
Payer: MEDICAID

## 2020-01-01 ENCOUNTER — CLINICAL SUPPORT (OUTPATIENT)
Dept: URGENT CARE | Facility: CLINIC | Age: 50
End: 2020-01-01
Payer: MEDICAID

## 2020-01-01 ENCOUNTER — TELEPHONE (OUTPATIENT)
Dept: PHARMACY | Facility: CLINIC | Age: 50
End: 2020-01-01

## 2020-01-01 ENCOUNTER — NURSE TRIAGE (OUTPATIENT)
Dept: ADMINISTRATIVE | Facility: CLINIC | Age: 50
End: 2020-01-01

## 2020-01-01 ENCOUNTER — LAB VISIT (OUTPATIENT)
Dept: LAB | Facility: HOSPITAL | Age: 50
End: 2020-01-01
Payer: MEDICAID

## 2020-01-01 ENCOUNTER — HOSPITAL ENCOUNTER (EMERGENCY)
Facility: HOSPITAL | Age: 50
Discharge: HOME | End: 2020-01-01
Attending: INTERNAL MEDICINE
Payer: MEDICAID

## 2020-01-01 ENCOUNTER — OFFICE VISIT (OUTPATIENT)
Dept: ELECTROPHYSIOLOGY | Facility: CLINIC | Age: 50
End: 2020-01-01
Payer: MEDICAID

## 2020-01-01 ENCOUNTER — PATIENT MESSAGE (OUTPATIENT)
Dept: TRANSPLANT | Facility: CLINIC | Age: 50
End: 2020-01-01

## 2020-01-01 ENCOUNTER — SPECIALTY PHARMACY (OUTPATIENT)
Dept: PHARMACY | Facility: CLINIC | Age: 50
End: 2020-01-01

## 2020-01-01 ENCOUNTER — HOSPITAL ENCOUNTER (OUTPATIENT)
Dept: CARDIOLOGY | Facility: CLINIC | Age: 50
Discharge: HOME | End: 2020-07-21
Payer: MEDICAID

## 2020-01-01 ENCOUNTER — HOSPITAL ENCOUNTER (OUTPATIENT)
Dept: CARDIOLOGY | Facility: CLINIC | Age: 50
Discharge: HOME | End: 2020-08-14
Payer: MEDICAID

## 2020-01-01 ENCOUNTER — LAB VISIT (OUTPATIENT)
Dept: INTERNAL MEDICINE | Facility: CLINIC | Age: 50
End: 2020-01-01
Payer: MEDICAID

## 2020-01-01 ENCOUNTER — TELEPHONE (OUTPATIENT)
Dept: CARDIOLOGY | Facility: HOSPITAL | Age: 50
End: 2020-01-01

## 2020-01-01 ENCOUNTER — TELEPHONE (OUTPATIENT)
Dept: PULMONOLOGY | Facility: CLINIC | Age: 50
End: 2020-01-01

## 2020-01-01 ENCOUNTER — CLINICAL SUPPORT (OUTPATIENT)
Dept: CARDIOLOGY | Facility: HOSPITAL | Age: 50
End: 2020-01-01
Attending: INTERNAL MEDICINE
Payer: MEDICAID

## 2020-01-01 ENCOUNTER — TELEPHONE (OUTPATIENT)
Dept: TRANSPLANT | Facility: CLINIC | Age: 50
End: 2020-01-01

## 2020-01-01 ENCOUNTER — HOSPITAL ENCOUNTER (OUTPATIENT)
Dept: RADIOLOGY | Facility: HOSPITAL | Age: 50
Discharge: HOME | End: 2020-03-16
Attending: ALLERGY & IMMUNOLOGY
Payer: MEDICAID

## 2020-01-01 ENCOUNTER — OFFICE VISIT (OUTPATIENT)
Dept: URGENT CARE | Facility: CLINIC | Age: 50
End: 2020-01-01
Payer: MEDICAID

## 2020-01-01 ENCOUNTER — OFFICE VISIT (OUTPATIENT)
Dept: TRANSPLANT | Facility: CLINIC | Age: 50
End: 2020-01-01
Payer: MEDICAID

## 2020-01-01 ENCOUNTER — HOSPITAL ENCOUNTER (OUTPATIENT)
Facility: HOSPITAL | Age: 50
Discharge: HOME | End: 2020-06-12
Attending: INTERNAL MEDICINE | Admitting: INTERNAL MEDICINE
Payer: MEDICAID

## 2020-01-01 ENCOUNTER — HOSPITAL ENCOUNTER (OUTPATIENT)
Dept: CARDIOLOGY | Facility: CLINIC | Age: 50
Discharge: HOME | End: 2020-06-19
Payer: MEDICAID

## 2020-01-01 ENCOUNTER — PATIENT MESSAGE (OUTPATIENT)
Dept: PHARMACY | Facility: CLINIC | Age: 50
End: 2020-01-01

## 2020-01-01 ENCOUNTER — PATIENT MESSAGE (OUTPATIENT)
Dept: CARDIOLOGY | Facility: HOSPITAL | Age: 50
End: 2020-01-01

## 2020-01-01 ENCOUNTER — HOSPITAL ENCOUNTER (OUTPATIENT)
Facility: HOSPITAL | Age: 50
Discharge: HOME | End: 2020-08-07
Attending: INTERNAL MEDICINE | Admitting: INTERNAL MEDICINE
Payer: MEDICAID

## 2020-01-01 ENCOUNTER — ANESTHESIA EVENT (OUTPATIENT)
Dept: MEDSURG UNIT | Facility: HOSPITAL | Age: 50
End: 2020-01-01
Payer: MEDICAID

## 2020-01-01 ENCOUNTER — TELEPHONE (OUTPATIENT)
Dept: URGENT CARE | Facility: CLINIC | Age: 50
End: 2020-01-01

## 2020-01-01 ENCOUNTER — HOSPITAL ENCOUNTER (OUTPATIENT)
Dept: CARDIOLOGY | Facility: HOSPITAL | Age: 50
Discharge: HOME | End: 2020-06-12
Attending: INTERNAL MEDICINE | Admitting: INTERNAL MEDICINE
Payer: MEDICAID

## 2020-01-01 ENCOUNTER — ANESTHESIA (OUTPATIENT)
Dept: MEDSURG UNIT | Facility: HOSPITAL | Age: 50
End: 2020-01-01
Payer: MEDICAID

## 2020-01-01 ENCOUNTER — TELEPHONE (OUTPATIENT)
Dept: PODIATRY | Facility: CLINIC | Age: 50
End: 2020-01-01

## 2020-01-01 ENCOUNTER — HOSPITAL ENCOUNTER (OUTPATIENT)
Dept: CARDIOLOGY | Facility: CLINIC | Age: 50
Discharge: HOME | End: 2020-05-25
Attending: INTERNAL MEDICINE
Payer: MEDICAID

## 2020-01-01 ENCOUNTER — HOSPITAL ENCOUNTER (INPATIENT)
Facility: HOSPITAL | Age: 50
LOS: 15 days | DRG: 871 | End: 2020-12-21
Attending: EMERGENCY MEDICINE | Admitting: INTERNAL MEDICINE
Payer: MEDICAID

## 2020-01-01 ENCOUNTER — PATIENT MESSAGE (OUTPATIENT)
Dept: ELECTROPHYSIOLOGY | Facility: CLINIC | Age: 50
End: 2020-01-01

## 2020-01-01 ENCOUNTER — HOSPITAL ENCOUNTER (OUTPATIENT)
Dept: CARDIOLOGY | Facility: CLINIC | Age: 50
Discharge: HOME | End: 2020-09-22
Payer: MEDICAID

## 2020-01-01 ENCOUNTER — HOSPITAL ENCOUNTER (EMERGENCY)
Facility: HOSPITAL | Age: 50
Discharge: HOME | End: 2020-01-02
Attending: EMERGENCY MEDICINE
Payer: MEDICAID

## 2020-01-01 ENCOUNTER — CLINICAL SUPPORT (OUTPATIENT)
Dept: CARDIOLOGY | Facility: HOSPITAL | Age: 50
End: 2020-01-01
Attending: EMERGENCY MEDICINE
Payer: MEDICAID

## 2020-01-01 ENCOUNTER — HOSPITAL ENCOUNTER (OUTPATIENT)
Dept: CARDIOLOGY | Facility: HOSPITAL | Age: 50
Discharge: HOME | End: 2020-08-07
Attending: INTERNAL MEDICINE | Admitting: INTERNAL MEDICINE
Payer: MEDICAID

## 2020-01-01 ENCOUNTER — LAB VISIT (OUTPATIENT)
Dept: SURGERY | Facility: CLINIC | Age: 50
End: 2020-01-01
Payer: MEDICAID

## 2020-01-01 VITALS — HEIGHT: 65 IN | BODY MASS INDEX: 37.82 KG/M2 | WEIGHT: 227 LBS

## 2020-01-01 VITALS
RESPIRATION RATE: 15 BRPM | SYSTOLIC BLOOD PRESSURE: 113 MMHG | OXYGEN SATURATION: 95 % | HEART RATE: 65 BPM | DIASTOLIC BLOOD PRESSURE: 77 MMHG | TEMPERATURE: 98 F

## 2020-01-01 VITALS
HEART RATE: 82 BPM | TEMPERATURE: 98 F | RESPIRATION RATE: 16 BRPM | SYSTOLIC BLOOD PRESSURE: 112 MMHG | OXYGEN SATURATION: 98 % | BODY MASS INDEX: 38.32 KG/M2 | WEIGHT: 230 LBS | HEIGHT: 65 IN | DIASTOLIC BLOOD PRESSURE: 59 MMHG

## 2020-01-01 VITALS
TEMPERATURE: 100 F | WEIGHT: 255 LBS | RESPIRATION RATE: 20 BRPM | OXYGEN SATURATION: 95 % | SYSTOLIC BLOOD PRESSURE: 112 MMHG | HEIGHT: 65 IN | BODY MASS INDEX: 42.49 KG/M2 | HEART RATE: 103 BPM | DIASTOLIC BLOOD PRESSURE: 81 MMHG

## 2020-01-01 VITALS
HEIGHT: 65 IN | DIASTOLIC BLOOD PRESSURE: 91 MMHG | BODY MASS INDEX: 41.32 KG/M2 | HEART RATE: 95 BPM | WEIGHT: 248 LBS | SYSTOLIC BLOOD PRESSURE: 118 MMHG

## 2020-01-01 VITALS
HEART RATE: 107 BPM | BODY MASS INDEX: 42.49 KG/M2 | OXYGEN SATURATION: 95 % | SYSTOLIC BLOOD PRESSURE: 109 MMHG | HEIGHT: 65 IN | WEIGHT: 255 LBS | RESPIRATION RATE: 16 BRPM | DIASTOLIC BLOOD PRESSURE: 74 MMHG | TEMPERATURE: 97 F

## 2020-01-01 VITALS
HEIGHT: 65 IN | BODY MASS INDEX: 38.5 KG/M2 | HEART RATE: 73 BPM | DIASTOLIC BLOOD PRESSURE: 62 MMHG | SYSTOLIC BLOOD PRESSURE: 124 MMHG | WEIGHT: 231.06 LBS

## 2020-01-01 VITALS
HEIGHT: 65 IN | BODY MASS INDEX: 37.87 KG/M2 | WEIGHT: 227.31 LBS | SYSTOLIC BLOOD PRESSURE: 110 MMHG | DIASTOLIC BLOOD PRESSURE: 78 MMHG

## 2020-01-01 VITALS
WEIGHT: 227.19 LBS | HEART RATE: 72 BPM | BODY MASS INDEX: 37.85 KG/M2 | HEIGHT: 65 IN | DIASTOLIC BLOOD PRESSURE: 78 MMHG | SYSTOLIC BLOOD PRESSURE: 106 MMHG

## 2020-01-01 VITALS
DIASTOLIC BLOOD PRESSURE: 81 MMHG | OXYGEN SATURATION: 98 % | WEIGHT: 250 LBS | HEART RATE: 76 BPM | RESPIRATION RATE: 20 BRPM | TEMPERATURE: 98 F | SYSTOLIC BLOOD PRESSURE: 133 MMHG | BODY MASS INDEX: 41.65 KG/M2 | HEIGHT: 65 IN

## 2020-01-01 VITALS
HEART RATE: 92 BPM | SYSTOLIC BLOOD PRESSURE: 114 MMHG | RESPIRATION RATE: 16 BRPM | DIASTOLIC BLOOD PRESSURE: 90 MMHG | OXYGEN SATURATION: 94 % | TEMPERATURE: 98 F

## 2020-01-01 VITALS
HEART RATE: 80 BPM | DIASTOLIC BLOOD PRESSURE: 59 MMHG | HEIGHT: 65 IN | WEIGHT: 238.13 LBS | BODY MASS INDEX: 39.67 KG/M2 | SYSTOLIC BLOOD PRESSURE: 114 MMHG

## 2020-01-01 VITALS
WEIGHT: 227.31 LBS | SYSTOLIC BLOOD PRESSURE: 98 MMHG | DIASTOLIC BLOOD PRESSURE: 72 MMHG | HEART RATE: 64 BPM | HEIGHT: 65 IN | BODY MASS INDEX: 37.87 KG/M2

## 2020-01-01 VITALS
HEIGHT: 64 IN | RESPIRATION RATE: 35 BRPM | DIASTOLIC BLOOD PRESSURE: 45 MMHG | OXYGEN SATURATION: 50 % | WEIGHT: 257.94 LBS | BODY MASS INDEX: 44.04 KG/M2 | TEMPERATURE: 91 F | SYSTOLIC BLOOD PRESSURE: 68 MMHG

## 2020-01-01 VITALS
SYSTOLIC BLOOD PRESSURE: 98 MMHG | DIASTOLIC BLOOD PRESSURE: 73 MMHG | WEIGHT: 255.06 LBS | BODY MASS INDEX: 42.49 KG/M2 | HEIGHT: 65 IN | HEART RATE: 50 BPM

## 2020-01-01 VITALS
BODY MASS INDEX: 39.67 KG/M2 | DIASTOLIC BLOOD PRESSURE: 72 MMHG | HEIGHT: 65 IN | SYSTOLIC BLOOD PRESSURE: 118 MMHG | WEIGHT: 238.13 LBS

## 2020-01-01 VITALS
BODY MASS INDEX: 39.63 KG/M2 | SYSTOLIC BLOOD PRESSURE: 117 MMHG | DIASTOLIC BLOOD PRESSURE: 59 MMHG | HEIGHT: 65 IN | HEART RATE: 70 BPM | WEIGHT: 237.88 LBS

## 2020-01-01 VITALS
HEART RATE: 96 BPM | SYSTOLIC BLOOD PRESSURE: 100 MMHG | HEIGHT: 65 IN | HEART RATE: 102 BPM | BODY MASS INDEX: 41.32 KG/M2 | WEIGHT: 248 LBS | SYSTOLIC BLOOD PRESSURE: 110 MMHG | DIASTOLIC BLOOD PRESSURE: 70 MMHG | DIASTOLIC BLOOD PRESSURE: 80 MMHG

## 2020-01-01 VITALS
BODY MASS INDEX: 39.99 KG/M2 | DIASTOLIC BLOOD PRESSURE: 80 MMHG | HEIGHT: 65 IN | SYSTOLIC BLOOD PRESSURE: 132 MMHG | WEIGHT: 240 LBS

## 2020-01-01 VITALS — HEART RATE: 85 BPM | DIASTOLIC BLOOD PRESSURE: 68 MMHG | SYSTOLIC BLOOD PRESSURE: 98 MMHG

## 2020-01-01 VITALS — BODY MASS INDEX: 37.87 KG/M2 | HEIGHT: 65 IN | WEIGHT: 227.31 LBS

## 2020-01-01 DIAGNOSIS — Z79.01 CHRONIC ANTICOAGULATION: ICD-10-CM

## 2020-01-01 DIAGNOSIS — I48.0 PAROXYSMAL ATRIAL FIBRILLATION: ICD-10-CM

## 2020-01-01 DIAGNOSIS — Z79.01 LONG TERM (CURRENT) USE OF ANTICOAGULANTS: Primary | ICD-10-CM

## 2020-01-01 DIAGNOSIS — Z79.01 LONG TERM (CURRENT) USE OF ANTICOAGULANTS: ICD-10-CM

## 2020-01-01 DIAGNOSIS — R49.0 HOARSENESS OF VOICE: ICD-10-CM

## 2020-01-01 DIAGNOSIS — I47.20 VENTRICULAR TACHYCARDIA: ICD-10-CM

## 2020-01-01 DIAGNOSIS — L84 PRE-ULCERATIVE CALLUSES: ICD-10-CM

## 2020-01-01 DIAGNOSIS — J96.01 ACUTE HYPOXEMIC RESPIRATORY FAILURE DUE TO COVID-19: ICD-10-CM

## 2020-01-01 DIAGNOSIS — I50.22 CHRONIC SYSTOLIC CONGESTIVE HEART FAILURE: Primary | ICD-10-CM

## 2020-01-01 DIAGNOSIS — E11.42 TYPE 2 DIABETES MELLITUS WITH DIABETIC POLYNEUROPATHY, UNSPECIFIED WHETHER LONG TERM INSULIN USE: Primary | ICD-10-CM

## 2020-01-01 DIAGNOSIS — Z95.810 CARDIAC DEFIBRILLATOR IN SITU: ICD-10-CM

## 2020-01-01 DIAGNOSIS — Z95.810 BIVENTRICULAR AUTOMATIC IMPLANTABLE CARDIOVERTER DEFIBRILLATOR IN SITU: Primary | ICD-10-CM

## 2020-01-01 DIAGNOSIS — I49.8 OTHER SPECIFIED CARDIAC ARRHYTHMIAS: ICD-10-CM

## 2020-01-01 DIAGNOSIS — I50.42 CHRONIC COMBINED SYSTOLIC AND DIASTOLIC CONGESTIVE HEART FAILURE: ICD-10-CM

## 2020-01-01 DIAGNOSIS — L97.519 DIABETIC ULCER OF RIGHT FOOT DUE TO TYPE 2 DIABETES MELLITUS: ICD-10-CM

## 2020-01-01 DIAGNOSIS — I48.19 PERSISTENT ATRIAL FIBRILLATION: ICD-10-CM

## 2020-01-01 DIAGNOSIS — I10 ESSENTIAL HYPERTENSION: ICD-10-CM

## 2020-01-01 DIAGNOSIS — I11.0 HYPERTENSIVE HEART DISEASE WITH HEART FAILURE: ICD-10-CM

## 2020-01-01 DIAGNOSIS — I42.8 NICM (NONISCHEMIC CARDIOMYOPATHY): ICD-10-CM

## 2020-01-01 DIAGNOSIS — I50.9 ACUTE ON CHRONIC CONGESTIVE HEART FAILURE, UNSPECIFIED CONGESTIVE HEART FAILURE TYPE: ICD-10-CM

## 2020-01-01 DIAGNOSIS — E79.0 HYPERURICEMIA WITHOUT SIGNS OF INFLAMMATORY ARTHRITIS AND TOPHACEOUS DISEASE: ICD-10-CM

## 2020-01-01 DIAGNOSIS — E11.49 TYPE II DIABETES MELLITUS WITH NEUROLOGICAL MANIFESTATIONS: Primary | ICD-10-CM

## 2020-01-01 DIAGNOSIS — I42.0 CARDIOMYOPATHY, DILATED, NONISCHEMIC: ICD-10-CM

## 2020-01-01 DIAGNOSIS — G47.33 OSA (OBSTRUCTIVE SLEEP APNEA): ICD-10-CM

## 2020-01-01 DIAGNOSIS — J02.9 PHARYNGITIS, UNSPECIFIED ETIOLOGY: Primary | ICD-10-CM

## 2020-01-01 DIAGNOSIS — U07.1 PNEUMONIA DUE TO COVID-19 VIRUS: Primary | ICD-10-CM

## 2020-01-01 DIAGNOSIS — I44.7 LEFT BUNDLE-BRANCH BLOCK: ICD-10-CM

## 2020-01-01 DIAGNOSIS — I48.0 PAROXYSMAL ATRIAL FIBRILLATION: Primary | ICD-10-CM

## 2020-01-01 DIAGNOSIS — Z20.822 SUSPECTED COVID-19 VIRUS INFECTION: ICD-10-CM

## 2020-01-01 DIAGNOSIS — R07.9 CHEST PAIN: ICD-10-CM

## 2020-01-01 DIAGNOSIS — B35.1 ONYCHOMYCOSIS DUE TO DERMATOPHYTE: ICD-10-CM

## 2020-01-01 DIAGNOSIS — J02.9 PHARYNGITIS, UNSPECIFIED ETIOLOGY: ICD-10-CM

## 2020-01-01 DIAGNOSIS — M20.41 HAMMER TOES OF BOTH FEET: ICD-10-CM

## 2020-01-01 DIAGNOSIS — I50.9 ACUTE HEART FAILURE, UNSPECIFIED HEART FAILURE TYPE: ICD-10-CM

## 2020-01-01 DIAGNOSIS — I49.8 OTHER SPECIFIED CARDIAC ARRHYTHMIAS: Primary | ICD-10-CM

## 2020-01-01 DIAGNOSIS — I48.91 UNSPECIFIED ATRIAL FIBRILLATION: ICD-10-CM

## 2020-01-01 DIAGNOSIS — I48.91 ATRIAL FIBRILLATION: ICD-10-CM

## 2020-01-01 DIAGNOSIS — U07.1 ACUTE HYPOXEMIC RESPIRATORY FAILURE DUE TO COVID-19: ICD-10-CM

## 2020-01-01 DIAGNOSIS — L84 CORN OR CALLUS: ICD-10-CM

## 2020-01-01 DIAGNOSIS — K21.9 GASTRO-ESOPHAGEAL REFLUX DISEASE WITHOUT ESOPHAGITIS: ICD-10-CM

## 2020-01-01 DIAGNOSIS — I50.31 ACUTE DIASTOLIC CHF (CONGESTIVE HEART FAILURE), NYHA CLASS 3: Primary | ICD-10-CM

## 2020-01-01 DIAGNOSIS — T38.0X5S ADVERSE EFFECT OF ADRENAL CORTICAL STEROIDS, SEQUELA: ICD-10-CM

## 2020-01-01 DIAGNOSIS — J96.01 ACUTE HYPOXEMIC RESPIRATORY FAILURE: ICD-10-CM

## 2020-01-01 DIAGNOSIS — Z01.818 ENCOUNTER FOR PRE-TRANSPLANT EVALUATION FOR HEART TRANSPLANT: ICD-10-CM

## 2020-01-01 DIAGNOSIS — Z01.818 PRE-OP TESTING: ICD-10-CM

## 2020-01-01 DIAGNOSIS — S90.821A BLISTER OF RIGHT FOOT, INITIAL ENCOUNTER: Primary | ICD-10-CM

## 2020-01-01 DIAGNOSIS — I42.9 CARDIOMYOPATHY, UNSPECIFIED: ICD-10-CM

## 2020-01-01 DIAGNOSIS — E11.42 TYPE 2 DIABETES MELLITUS WITH DIABETIC POLYNEUROPATHY, UNSPECIFIED WHETHER LONG TERM INSULIN USE: Chronic | ICD-10-CM

## 2020-01-01 DIAGNOSIS — R35.0 FREQUENT URINATION: ICD-10-CM

## 2020-01-01 DIAGNOSIS — I48.19 PERSISTENT ATRIAL FIBRILLATION: Primary | ICD-10-CM

## 2020-01-01 DIAGNOSIS — S90.422A BLISTER OF LEFT GREAT TOE, INITIAL ENCOUNTER: Primary | ICD-10-CM

## 2020-01-01 DIAGNOSIS — N12 PYELONEPHRITIS: Primary | ICD-10-CM

## 2020-01-01 DIAGNOSIS — E11.621 DIABETIC ULCER OF RIGHT FOOT DUE TO TYPE 2 DIABETES MELLITUS: ICD-10-CM

## 2020-01-01 DIAGNOSIS — I50.42 CHRONIC COMBINED SYSTOLIC AND DIASTOLIC CONGESTIVE HEART FAILURE: Primary | ICD-10-CM

## 2020-01-01 DIAGNOSIS — E87.5 HYPERKALEMIA: ICD-10-CM

## 2020-01-01 DIAGNOSIS — I50.9 ACUTE ON CHRONIC CONGESTIVE HEART FAILURE: ICD-10-CM

## 2020-01-01 DIAGNOSIS — I50.9 HEART FAILURE, UNSPECIFIED: ICD-10-CM

## 2020-01-01 DIAGNOSIS — I46.9 CARDIAC ARREST: ICD-10-CM

## 2020-01-01 DIAGNOSIS — I42.8 CARDIOMYOPATHY, NONISCHEMIC: ICD-10-CM

## 2020-01-01 DIAGNOSIS — E11.49 TYPE II DIABETES MELLITUS WITH NEUROLOGICAL MANIFESTATIONS: ICD-10-CM

## 2020-01-01 DIAGNOSIS — Z95.810 PRESENCE OF CARDIAC RESYNCHRONIZATION THERAPY DEFIBRILLATOR (CRT-D): ICD-10-CM

## 2020-01-01 DIAGNOSIS — R09.1 PLEURISY: ICD-10-CM

## 2020-01-01 DIAGNOSIS — M20.42 HAMMER TOES OF BOTH FEET: ICD-10-CM

## 2020-01-01 DIAGNOSIS — U07.1 COVID-19 VIRUS DETECTED: ICD-10-CM

## 2020-01-01 DIAGNOSIS — J45.40 MODERATE PERSISTENT ASTHMA WITHOUT COMPLICATION: Primary | ICD-10-CM

## 2020-01-01 DIAGNOSIS — I50.9 CONGESTIVE HEART FAILURE, UNSPECIFIED HF CHRONICITY, UNSPECIFIED HEART FAILURE TYPE: ICD-10-CM

## 2020-01-01 DIAGNOSIS — J45.40 MODERATE PERSISTENT ASTHMA WITHOUT COMPLICATION: ICD-10-CM

## 2020-01-01 DIAGNOSIS — N18.30 CKD (CHRONIC KIDNEY DISEASE), STAGE III: ICD-10-CM

## 2020-01-01 DIAGNOSIS — J12.82 PNEUMONIA DUE TO COVID-19 VIRUS: Primary | ICD-10-CM

## 2020-01-01 DIAGNOSIS — R94.31 QT PROLONGATION: ICD-10-CM

## 2020-01-01 DIAGNOSIS — I42.0 COCM (CONGESTIVE CARDIOMYOPATHY): ICD-10-CM

## 2020-01-01 DIAGNOSIS — I50.9 CHF (CONGESTIVE HEART FAILURE): ICD-10-CM

## 2020-01-01 DIAGNOSIS — Z11.9 ENCOUNTER FOR SCREENING EXAMINATION FOR INFECTIOUS DISEASE: Primary | ICD-10-CM

## 2020-01-01 DIAGNOSIS — Z79.899 LONG TERM CURRENT USE OF AMIODARONE: Primary | ICD-10-CM

## 2020-01-01 DIAGNOSIS — E66.9 OBESITY WITH BODY MASS INDEX (BMI) OF 30.0 TO 39.9: ICD-10-CM

## 2020-01-01 DIAGNOSIS — Z71.89 GOALS OF CARE, COUNSELING/DISCUSSION: ICD-10-CM

## 2020-01-01 LAB
ACANTHOCYTES BLD QL SMEAR: PRESENT
ALBUMIN SERPL BCP-MCNC: 1.1 G/DL (ref 3.5–5.2)
ALBUMIN SERPL BCP-MCNC: 1.6 G/DL (ref 3.5–5.2)
ALBUMIN SERPL BCP-MCNC: 1.8 G/DL (ref 3.5–5.2)
ALBUMIN SERPL BCP-MCNC: 2 G/DL (ref 3.5–5.2)
ALBUMIN SERPL BCP-MCNC: 2.3 G/DL (ref 3.5–5.2)
ALBUMIN SERPL BCP-MCNC: 2.5 G/DL (ref 3.5–5.2)
ALBUMIN SERPL BCP-MCNC: 2.6 G/DL (ref 3.5–5.2)
ALBUMIN SERPL BCP-MCNC: 2.7 G/DL (ref 3.5–5.2)
ALBUMIN SERPL BCP-MCNC: 2.8 G/DL (ref 3.5–5.2)
ALBUMIN SERPL BCP-MCNC: 2.9 G/DL (ref 3.5–5.2)
ALBUMIN SERPL BCP-MCNC: 2.9 G/DL (ref 3.5–5.2)
ALBUMIN SERPL BCP-MCNC: 3 G/DL (ref 3.5–5.2)
ALBUMIN SERPL BCP-MCNC: 3.4 G/DL (ref 3.5–5.2)
ALLENS TEST: ABNORMAL
ALP SERPL-CCNC: 103 U/L (ref 55–135)
ALP SERPL-CCNC: 110 U/L (ref 55–135)
ALP SERPL-CCNC: 117 U/L (ref 55–135)
ALP SERPL-CCNC: 135 U/L (ref 55–135)
ALP SERPL-CCNC: 198 U/L (ref 55–135)
ALP SERPL-CCNC: 208 U/L (ref 55–135)
ALP SERPL-CCNC: 210 U/L (ref 55–135)
ALP SERPL-CCNC: 73 U/L (ref 55–135)
ALP SERPL-CCNC: 73 U/L (ref 55–135)
ALP SERPL-CCNC: 75 U/L (ref 55–135)
ALP SERPL-CCNC: 79 U/L (ref 55–135)
ALP SERPL-CCNC: 90 U/L (ref 55–135)
ALP SERPL-CCNC: 91 U/L (ref 55–135)
ALP SERPL-CCNC: 91 U/L (ref 55–135)
ALP SERPL-CCNC: 95 U/L (ref 55–135)
ALP SERPL-CCNC: 97 U/L (ref 55–135)
ALP SERPL-CCNC: 97 U/L (ref 55–135)
ALP SERPL-CCNC: 99 U/L (ref 55–135)
ALT SERPL W/O P-5'-P-CCNC: 12 U/L (ref 10–44)
ALT SERPL W/O P-5'-P-CCNC: 13 U/L (ref 10–44)
ALT SERPL W/O P-5'-P-CCNC: 14 U/L (ref 10–44)
ALT SERPL W/O P-5'-P-CCNC: 15 U/L (ref 10–44)
ALT SERPL W/O P-5'-P-CCNC: 15 U/L (ref 10–44)
ALT SERPL W/O P-5'-P-CCNC: 16 U/L (ref 10–44)
ALT SERPL W/O P-5'-P-CCNC: 17 U/L (ref 10–44)
ALT SERPL W/O P-5'-P-CCNC: 18 U/L (ref 10–44)
ALT SERPL W/O P-5'-P-CCNC: 18 U/L (ref 10–44)
ALT SERPL W/O P-5'-P-CCNC: 21 U/L (ref 10–44)
ALT SERPL W/O P-5'-P-CCNC: 22 U/L (ref 10–44)
ALT SERPL W/O P-5'-P-CCNC: 2246 U/L (ref 10–44)
ALT SERPL W/O P-5'-P-CCNC: 24 U/L (ref 10–44)
ALT SERPL W/O P-5'-P-CCNC: 41 U/L (ref 10–44)
AMORPH CRY UR QL COMP ASSIST: ABNORMAL
AMYLASE SERPL-CCNC: 55 U/L (ref 20–110)
ANION GAP SERPL CALC-SCNC: 10 MMOL/L (ref 8–16)
ANION GAP SERPL CALC-SCNC: 11 MMOL/L (ref 8–16)
ANION GAP SERPL CALC-SCNC: 12 MMOL/L (ref 8–16)
ANION GAP SERPL CALC-SCNC: 14 MMOL/L (ref 8–16)
ANION GAP SERPL CALC-SCNC: 16 MMOL/L (ref 8–16)
ANION GAP SERPL CALC-SCNC: 17 MMOL/L (ref 8–16)
ANION GAP SERPL CALC-SCNC: 18 MMOL/L (ref 8–16)
ANION GAP SERPL CALC-SCNC: 26 MMOL/L (ref 8–16)
ANION GAP SERPL CALC-SCNC: 27 MMOL/L (ref 8–16)
ANION GAP SERPL CALC-SCNC: 28 MMOL/L (ref 8–16)
ANION GAP SERPL CALC-SCNC: 6 MMOL/L (ref 8–16)
ANION GAP SERPL CALC-SCNC: 8 MMOL/L (ref 8–16)
ANION GAP SERPL CALC-SCNC: 9 MMOL/L (ref 8–16)
ANISOCYTOSIS BLD QL SMEAR: SLIGHT
APTT BLDCRRT: 31.5 SEC (ref 21–32)
APTT BLDCRRT: 31.9 SEC (ref 21–32)
APTT BLDCRRT: 32.5 SEC (ref 21–32)
ASCENDING AORTA: 2.7 CM
ASCENDING AORTA: 2.9 CM
ASCENDING AORTA: 3.87 CM
AST SERPL-CCNC: 15 U/L (ref 10–40)
AST SERPL-CCNC: 15 U/L (ref 10–40)
AST SERPL-CCNC: 17 U/L (ref 10–40)
AST SERPL-CCNC: 21 U/L (ref 10–40)
AST SERPL-CCNC: 22 U/L (ref 10–40)
AST SERPL-CCNC: 23 U/L (ref 10–40)
AST SERPL-CCNC: 24 U/L (ref 10–40)
AST SERPL-CCNC: 27 U/L (ref 10–40)
AST SERPL-CCNC: 31 U/L (ref 10–40)
AST SERPL-CCNC: 31 U/L (ref 10–40)
AST SERPL-CCNC: 32 U/L (ref 10–40)
AST SERPL-CCNC: 33 U/L (ref 10–40)
AST SERPL-CCNC: 3326 U/L (ref 10–40)
AST SERPL-CCNC: 34 U/L (ref 10–40)
AST SERPL-CCNC: 36 U/L (ref 10–40)
AST SERPL-CCNC: 63 U/L (ref 10–40)
AV INDEX (PROSTH): 0.57
AV INDEX (PROSTH): 0.66
AV MEAN GRADIENT: 2 MMHG
AV MEAN GRADIENT: 3 MMHG
AV PEAK GRADIENT: 4 MMHG
AV PEAK GRADIENT: 4 MMHG
AV VALVE AREA: 2.06 CM2
AV VALVE AREA: 2.44 CM2
AV VELOCITY RATIO: 0.53
AV VELOCITY RATIO: 0.64
BACTERIA #/AREA URNS AUTO: ABNORMAL /HPF
BACTERIA #/AREA URNS AUTO: ABNORMAL /HPF
BACTERIA BLD CULT: NORMAL
BACTERIA THROAT CULT: NORMAL
BACTERIA UR CULT: ABNORMAL
BACTERIA UR CULT: ABNORMAL
BASOPHILS # BLD AUTO: 0.01 K/UL (ref 0–0.2)
BASOPHILS # BLD AUTO: 0.02 K/UL (ref 0–0.2)
BASOPHILS # BLD AUTO: 0.03 K/UL (ref 0–0.2)
BASOPHILS # BLD AUTO: 0.04 K/UL (ref 0–0.2)
BASOPHILS # BLD AUTO: 0.05 K/UL (ref 0–0.2)
BASOPHILS # BLD AUTO: 0.07 K/UL (ref 0–0.2)
BASOPHILS # BLD AUTO: ABNORMAL K/UL (ref 0–0.2)
BASOPHILS NFR BLD: 0 % (ref 0–1.9)
BASOPHILS NFR BLD: 0.1 % (ref 0–1.9)
BASOPHILS NFR BLD: 0.2 % (ref 0–1.9)
BASOPHILS NFR BLD: 0.3 % (ref 0–1.9)
BASOPHILS NFR BLD: 0.3 % (ref 0–1.9)
BASOPHILS NFR BLD: 0.4 % (ref 0–1.9)
BASOPHILS NFR BLD: 0.5 % (ref 0–1.9)
BASOPHILS NFR BLD: 0.5 % (ref 0–1.9)
BASOPHILS NFR BLD: 0.6 % (ref 0–1.9)
BILIRUB SERPL-MCNC: 0.4 MG/DL (ref 0.1–1)
BILIRUB SERPL-MCNC: 0.5 MG/DL (ref 0.1–1)
BILIRUB SERPL-MCNC: 0.5 MG/DL (ref 0.1–1)
BILIRUB SERPL-MCNC: 0.7 MG/DL (ref 0.1–1)
BILIRUB SERPL-MCNC: 0.7 MG/DL (ref 0.1–1)
BILIRUB SERPL-MCNC: 0.8 MG/DL (ref 0.1–1)
BILIRUB SERPL-MCNC: 0.9 MG/DL (ref 0.1–1)
BILIRUB SERPL-MCNC: 1 MG/DL (ref 0.1–1)
BILIRUB SERPL-MCNC: 1.1 MG/DL (ref 0.1–1)
BILIRUB SERPL-MCNC: 1.1 MG/DL (ref 0.1–1)
BILIRUB SERPL-MCNC: 1.2 MG/DL (ref 0.1–1)
BILIRUB SERPL-MCNC: 1.3 MG/DL (ref 0.1–1)
BILIRUB SERPL-MCNC: 1.4 MG/DL (ref 0.1–1)
BILIRUB SERPL-MCNC: 1.6 MG/DL (ref 0.1–1)
BILIRUB UR QL STRIP: NEGATIVE
BNP SERPL-MCNC: 141 PG/ML (ref 0–99)
BNP SERPL-MCNC: 235 PG/ML (ref 0–99)
BNP SERPL-MCNC: 335 PG/ML (ref 0–99)
BNP SERPL-MCNC: 367 PG/ML (ref 0–99)
BNP SERPL-MCNC: 826 PG/ML (ref 0–99)
BSA FOR ECHO PROCEDURE: 2.17 M2
BSA FOR ECHO PROCEDURE: 2.22 M2
BSA FOR ECHO PROCEDURE: 2.23 M2
BSA FOR ECHO PROCEDURE: 2.27 M2
BUN SERPL-MCNC: 16 MG/DL (ref 6–20)
BUN SERPL-MCNC: 17 MG/DL (ref 6–20)
BUN SERPL-MCNC: 21 MG/DL (ref 6–20)
BUN SERPL-MCNC: 22 MG/DL (ref 6–20)
BUN SERPL-MCNC: 23 MG/DL (ref 6–20)
BUN SERPL-MCNC: 23 MG/DL (ref 6–20)
BUN SERPL-MCNC: 26 MG/DL (ref 6–20)
BUN SERPL-MCNC: 28 MG/DL (ref 6–20)
BUN SERPL-MCNC: 31 MG/DL (ref 6–20)
BUN SERPL-MCNC: 37 MG/DL (ref 6–20)
BUN SERPL-MCNC: 44 MG/DL (ref 6–20)
BUN SERPL-MCNC: 49 MG/DL (ref 6–20)
BUN SERPL-MCNC: 54 MG/DL (ref 6–20)
BUN SERPL-MCNC: 56 MG/DL (ref 6–20)
BUN SERPL-MCNC: 57 MG/DL (ref 6–20)
BUN SERPL-MCNC: 59 MG/DL (ref 6–20)
BUN SERPL-MCNC: 60 MG/DL (ref 6–20)
BUN SERPL-MCNC: 61 MG/DL (ref 6–20)
BUN SERPL-MCNC: 62 MG/DL (ref 6–20)
BUN SERPL-MCNC: 63 MG/DL (ref 6–20)
BUN SERPL-MCNC: 63 MG/DL (ref 6–20)
BUN SERPL-MCNC: 64 MG/DL (ref 6–20)
BUN SERPL-MCNC: 64 MG/DL (ref 6–20)
BUN SERPL-MCNC: 66 MG/DL (ref 6–20)
BUN SERPL-MCNC: 66 MG/DL (ref 6–20)
BUN SERPL-MCNC: 67 MG/DL (ref 6–20)
BUN SERPL-MCNC: 68 MG/DL (ref 6–20)
BUN SERPL-MCNC: 73 MG/DL (ref 6–20)
BURR CELLS BLD QL SMEAR: ABNORMAL
CALCIUM SERPL-MCNC: 6.1 MG/DL (ref 8.7–10.5)
CALCIUM SERPL-MCNC: 6.6 MG/DL (ref 8.7–10.5)
CALCIUM SERPL-MCNC: 7.8 MG/DL (ref 8.7–10.5)
CALCIUM SERPL-MCNC: 8.2 MG/DL (ref 8.7–10.5)
CALCIUM SERPL-MCNC: 8.3 MG/DL (ref 8.7–10.5)
CALCIUM SERPL-MCNC: 8.4 MG/DL (ref 8.7–10.5)
CALCIUM SERPL-MCNC: 8.4 MG/DL (ref 8.7–10.5)
CALCIUM SERPL-MCNC: 8.5 MG/DL (ref 8.7–10.5)
CALCIUM SERPL-MCNC: 8.6 MG/DL (ref 8.7–10.5)
CALCIUM SERPL-MCNC: 8.6 MG/DL (ref 8.7–10.5)
CALCIUM SERPL-MCNC: 8.7 MG/DL (ref 8.7–10.5)
CALCIUM SERPL-MCNC: 8.8 MG/DL (ref 8.7–10.5)
CALCIUM SERPL-MCNC: 8.8 MG/DL (ref 8.7–10.5)
CALCIUM SERPL-MCNC: 8.9 MG/DL (ref 8.7–10.5)
CALCIUM SERPL-MCNC: 9 MG/DL (ref 8.7–10.5)
CALCIUM SERPL-MCNC: 9.2 MG/DL (ref 8.7–10.5)
CALCIUM SERPL-MCNC: 9.2 MG/DL (ref 8.7–10.5)
CALCIUM SERPL-MCNC: 9.6 MG/DL (ref 8.7–10.5)
CALCIUM SERPL-MCNC: 9.7 MG/DL (ref 8.7–10.5)
CALCIUM SERPL-MCNC: 9.9 MG/DL (ref 8.7–10.5)
CHLORIDE SERPL-SCNC: 100 MMOL/L (ref 95–110)
CHLORIDE SERPL-SCNC: 101 MMOL/L (ref 95–110)
CHLORIDE SERPL-SCNC: 102 MMOL/L (ref 95–110)
CHLORIDE SERPL-SCNC: 103 MMOL/L (ref 95–110)
CHLORIDE SERPL-SCNC: 104 MMOL/L (ref 95–110)
CHLORIDE SERPL-SCNC: 105 MMOL/L (ref 95–110)
CHLORIDE SERPL-SCNC: 95 MMOL/L (ref 95–110)
CHLORIDE SERPL-SCNC: 97 MMOL/L (ref 95–110)
CHLORIDE SERPL-SCNC: 98 MMOL/L (ref 95–110)
CHLORIDE SERPL-SCNC: 98 MMOL/L (ref 95–110)
CHLORIDE SERPL-SCNC: 99 MMOL/L (ref 95–110)
CK SERPL-CCNC: 118 U/L (ref 20–180)
CK SERPL-CCNC: 80 U/L (ref 20–180)
CLARITY UR REFRACT.AUTO: ABNORMAL
CLARITY UR REFRACT.AUTO: ABNORMAL
CO2 SERPL-SCNC: 13 MMOL/L (ref 23–29)
CO2 SERPL-SCNC: 14 MMOL/L (ref 23–29)
CO2 SERPL-SCNC: 15 MMOL/L (ref 23–29)
CO2 SERPL-SCNC: 19 MMOL/L (ref 23–29)
CO2 SERPL-SCNC: 20 MMOL/L (ref 23–29)
CO2 SERPL-SCNC: 21 MMOL/L (ref 23–29)
CO2 SERPL-SCNC: 22 MMOL/L (ref 23–29)
CO2 SERPL-SCNC: 23 MMOL/L (ref 23–29)
CO2 SERPL-SCNC: 24 MMOL/L (ref 23–29)
CO2 SERPL-SCNC: 25 MMOL/L (ref 23–29)
CO2 SERPL-SCNC: 26 MMOL/L (ref 23–29)
CO2 SERPL-SCNC: 28 MMOL/L (ref 23–29)
CO2 SERPL-SCNC: 29 MMOL/L (ref 23–29)
CO2 SERPL-SCNC: 29 MMOL/L (ref 23–29)
COLOR UR AUTO: YELLOW
COLOR UR AUTO: YELLOW
CREAT SERPL-MCNC: 1.1 MG/DL (ref 0.5–1.4)
CREAT SERPL-MCNC: 1.2 MG/DL (ref 0.5–1.4)
CREAT SERPL-MCNC: 1.3 MG/DL (ref 0.5–1.4)
CREAT SERPL-MCNC: 1.4 MG/DL (ref 0.5–1.4)
CREAT SERPL-MCNC: 1.4 MG/DL (ref 0.5–1.4)
CREAT SERPL-MCNC: 1.5 MG/DL (ref 0.5–1.4)
CREAT SERPL-MCNC: 1.8 MG/DL (ref 0.5–1.4)
CREAT SERPL-MCNC: 1.8 MG/DL (ref 0.5–1.4)
CREAT SERPL-MCNC: 1.9 MG/DL (ref 0.5–1.4)
CREAT SERPL-MCNC: 1.9 MG/DL (ref 0.5–1.4)
CREAT SERPL-MCNC: 2.6 MG/DL (ref 0.5–1.4)
CREAT SERPL-MCNC: 2.8 MG/DL (ref 0.5–1.4)
CREAT SERPL-MCNC: 2.9 MG/DL (ref 0.5–1.4)
CREAT SERPL-MCNC: 2.9 MG/DL (ref 0.5–1.4)
CREAT UR-MCNC: 55 MG/DL (ref 15–325)
CREAT UR-MCNC: 62 MG/DL (ref 15–325)
CRP SERPL-MCNC: 130 MG/L (ref 0–8.2)
CRP SERPL-MCNC: 154.9 MG/L (ref 0–8.2)
CRP SERPL-MCNC: 173.2 MG/L (ref 0–8.2)
CRP SERPL-MCNC: 193 MG/L (ref 0–8.2)
CRP SERPL-MCNC: 257 MG/L (ref 0–8.2)
CRP SERPL-MCNC: 31.8 MG/L (ref 0–8.2)
CRP SERPL-MCNC: 37.2 MG/L (ref 0–8.2)
CRP SERPL-MCNC: 491.3 MG/L (ref 0–8.2)
CTP QC/QA: YES
CV ECHO LV RWT: 0.27 CM
CV ECHO LV RWT: 0.28 CM
D DIMER PPP IA.FEU-MCNC: 0.78 MG/L FEU
D DIMER PPP IA.FEU-MCNC: 1.01 MG/L FEU
D DIMER PPP IA.FEU-MCNC: 22.65 MG/L FEU
D DIMER PPP IA.FEU-MCNC: 23.17 MG/L FEU
D DIMER PPP IA.FEU-MCNC: 3.29 MG/L FEU
D DIMER PPP IA.FEU-MCNC: >33 MG/L FEU
DACRYOCYTES BLD QL SMEAR: ABNORMAL
DELSYS: ABNORMAL
DIFFERENTIAL METHOD: ABNORMAL
DIGOXIN SERPL-MCNC: 1.1 NG/ML (ref 0.8–2)
DIGOXIN SERPL-MCNC: 1.2 NG/ML (ref 0.8–2)
DOP CALC AO PEAK VEL: 0.99 M/S
DOP CALC AO PEAK VEL: 1.03 M/S
DOP CALC AO VTI: 13.89 CM
DOP CALC AO VTI: 14.64 CM
DOP CALC LVOT AREA: 3.1 CM2
DOP CALC LVOT AREA: 4.3 CM2
DOP CALC LVOT DIAMETER: 2 CM
DOP CALC LVOT DIAMETER: 2.34 CM
DOP CALC LVOT PEAK VEL: 0.52 M/S
DOP CALC LVOT PEAK VEL: 0.66 M/S
DOP CALC LVOT STROKE VOLUME: 28.64 CM3
DOP CALC LVOT STROKE VOLUME: 35.68 CM3
DOP CALCLVOT PEAK VEL VTI: 8.3 CM
DOP CALCLVOT PEAK VEL VTI: 9.12 CM
E/E' RATIO: 10.55 M/S
ECHO LV POSTERIOR WALL: 0.92 CM (ref 0.6–1.1)
ECHO LV POSTERIOR WALL: 0.92 CM (ref 0.6–1.1)
EOSINOPHIL # BLD AUTO: 0 K/UL (ref 0–0.5)
EOSINOPHIL # BLD AUTO: 0.1 K/UL (ref 0–0.5)
EOSINOPHIL # BLD AUTO: 0.1 K/UL (ref 0–0.5)
EOSINOPHIL # BLD AUTO: 0.2 K/UL (ref 0–0.5)
EOSINOPHIL # BLD AUTO: ABNORMAL K/UL (ref 0–0.5)
EOSINOPHIL NFR BLD: 0 % (ref 0–8)
EOSINOPHIL NFR BLD: 0.4 % (ref 0–8)
EOSINOPHIL NFR BLD: 0.6 % (ref 0–8)
EOSINOPHIL NFR BLD: 0.7 % (ref 0–8)
EOSINOPHIL NFR BLD: 0.8 % (ref 0–8)
EOSINOPHIL NFR BLD: 2.5 % (ref 0–8)
EP: 10
EP: 14
EP: 14
ERYTHROCYTE [DISTWIDTH] IN BLOOD BY AUTOMATED COUNT: 14.8 % (ref 11.5–14.5)
ERYTHROCYTE [DISTWIDTH] IN BLOOD BY AUTOMATED COUNT: 14.8 % (ref 11.5–14.5)
ERYTHROCYTE [DISTWIDTH] IN BLOOD BY AUTOMATED COUNT: 14.9 % (ref 11.5–14.5)
ERYTHROCYTE [DISTWIDTH] IN BLOOD BY AUTOMATED COUNT: 14.9 % (ref 11.5–14.5)
ERYTHROCYTE [DISTWIDTH] IN BLOOD BY AUTOMATED COUNT: 15 % (ref 11.5–14.5)
ERYTHROCYTE [DISTWIDTH] IN BLOOD BY AUTOMATED COUNT: 15.1 % (ref 11.5–14.5)
ERYTHROCYTE [DISTWIDTH] IN BLOOD BY AUTOMATED COUNT: 15.2 % (ref 11.5–14.5)
ERYTHROCYTE [DISTWIDTH] IN BLOOD BY AUTOMATED COUNT: 15.3 % (ref 11.5–14.5)
ERYTHROCYTE [DISTWIDTH] IN BLOOD BY AUTOMATED COUNT: 15.4 % (ref 11.5–14.5)
ERYTHROCYTE [DISTWIDTH] IN BLOOD BY AUTOMATED COUNT: 15.4 % (ref 11.5–14.5)
ERYTHROCYTE [DISTWIDTH] IN BLOOD BY AUTOMATED COUNT: 15.5 % (ref 11.5–14.5)
ERYTHROCYTE [DISTWIDTH] IN BLOOD BY AUTOMATED COUNT: 15.7 % (ref 11.5–14.5)
ERYTHROCYTE [DISTWIDTH] IN BLOOD BY AUTOMATED COUNT: 15.8 % (ref 11.5–14.5)
ERYTHROCYTE [DISTWIDTH] IN BLOOD BY AUTOMATED COUNT: 16.2 % (ref 11.5–14.5)
ERYTHROCYTE [DISTWIDTH] IN BLOOD BY AUTOMATED COUNT: 17.7 % (ref 11.5–14.5)
ERYTHROCYTE [DISTWIDTH] IN BLOOD BY AUTOMATED COUNT: 18.8 % (ref 11.5–14.5)
ERYTHROCYTE [DISTWIDTH] IN BLOOD BY AUTOMATED COUNT: 22 % (ref 11.5–14.5)
ERYTHROCYTE [SEDIMENTATION RATE] IN BLOOD BY WESTERGREN METHOD: 12 MM/H
ERYTHROCYTE [SEDIMENTATION RATE] IN BLOOD BY WESTERGREN METHOD: 35 MM/H
ERYTHROCYTE [SEDIMENTATION RATE] IN BLOOD BY WESTERGREN METHOD: >120 MM/HR (ref 0–36)
EST. GFR  (AFRICAN AMERICAN): 20.8 ML/MIN/1.73 M^2
EST. GFR  (AFRICAN AMERICAN): 20.8 ML/MIN/1.73 M^2
EST. GFR  (AFRICAN AMERICAN): 21.7 ML/MIN/1.73 M^2
EST. GFR  (AFRICAN AMERICAN): 23.7 ML/MIN/1.73 M^2
EST. GFR  (AFRICAN AMERICAN): 34.7 ML/MIN/1.73 M^2
EST. GFR  (AFRICAN AMERICAN): 34.7 ML/MIN/1.73 M^2
EST. GFR  (AFRICAN AMERICAN): 37 ML/MIN/1.73 M^2
EST. GFR  (AFRICAN AMERICAN): 37.3 ML/MIN/1.73 M^2
EST. GFR  (AFRICAN AMERICAN): 46.2 ML/MIN/1.73 M^2
EST. GFR  (AFRICAN AMERICAN): 46.5 ML/MIN/1.73 M^2
EST. GFR  (AFRICAN AMERICAN): 50.2 ML/MIN/1.73 M^2
EST. GFR  (AFRICAN AMERICAN): 50.2 ML/MIN/1.73 M^2
EST. GFR  (AFRICAN AMERICAN): 54.9 ML/MIN/1.73 M^2
EST. GFR  (AFRICAN AMERICAN): >60 ML/MIN/1.73 M^2
EST. GFR  (NON AFRICAN AMERICAN): 18 ML/MIN/1.73 M^2
EST. GFR  (NON AFRICAN AMERICAN): 18 ML/MIN/1.73 M^2
EST. GFR  (NON AFRICAN AMERICAN): 18.8 ML/MIN/1.73 M^2
EST. GFR  (NON AFRICAN AMERICAN): 20.6 ML/MIN/1.73 M^2
EST. GFR  (NON AFRICAN AMERICAN): 30.1 ML/MIN/1.73 M^2
EST. GFR  (NON AFRICAN AMERICAN): 30.1 ML/MIN/1.73 M^2
EST. GFR  (NON AFRICAN AMERICAN): 32.1 ML/MIN/1.73 M^2
EST. GFR  (NON AFRICAN AMERICAN): 32.4 ML/MIN/1.73 M^2
EST. GFR  (NON AFRICAN AMERICAN): 40.1 ML/MIN/1.73 M^2
EST. GFR  (NON AFRICAN AMERICAN): 40.3 ML/MIN/1.73 M^2
EST. GFR  (NON AFRICAN AMERICAN): 43.5 ML/MIN/1.73 M^2
EST. GFR  (NON AFRICAN AMERICAN): 43.5 ML/MIN/1.73 M^2
EST. GFR  (NON AFRICAN AMERICAN): 47.6 ML/MIN/1.73 M^2
EST. GFR  (NON AFRICAN AMERICAN): 52.5 ML/MIN/1.73 M^2
EST. GFR  (NON AFRICAN AMERICAN): 52.8 ML/MIN/1.73 M^2
EST. GFR  (NON AFRICAN AMERICAN): 52.8 ML/MIN/1.73 M^2
EST. GFR  (NON AFRICAN AMERICAN): 58.3 ML/MIN/1.73 M^2
ESTIMATED AVG GLUCOSE: 140 MG/DL (ref 68–131)
FACT X PPP CHRO-ACNC: 0.15 IU/ML (ref 0.3–0.7)
FACT X PPP CHRO-ACNC: 0.23 IU/ML (ref 0.3–0.7)
FACT X PPP CHRO-ACNC: 0.23 IU/ML (ref 0.3–0.7)
FACT X PPP CHRO-ACNC: 0.25 IU/ML (ref 0.3–0.7)
FACT X PPP CHRO-ACNC: 0.32 IU/ML (ref 0.3–0.7)
FACT X PPP CHRO-ACNC: 0.36 IU/ML (ref 0.3–0.7)
FACT X PPP CHRO-ACNC: 0.37 IU/ML (ref 0.3–0.7)
FACT X PPP CHRO-ACNC: 0.39 IU/ML (ref 0.3–0.7)
FACT X PPP CHRO-ACNC: 0.41 IU/ML (ref 0.3–0.7)
FACT X PPP CHRO-ACNC: 0.46 IU/ML (ref 0.3–0.7)
FACT X PPP CHRO-ACNC: 0.6 IU/ML (ref 0.3–0.7)
FACT X PPP CHRO-ACNC: 0.68 IU/ML (ref 0.3–0.7)
FACT X PPP CHRO-ACNC: <0.1 IU/ML (ref 0.3–0.7)
FERRITIN SERPL-MCNC: 782 NG/ML (ref 20–300)
FERRITIN SERPL-MCNC: 825 NG/ML (ref 20–300)
FIO2: 100
FIO2: 45
FRACTIONAL SHORTENING: 3 % (ref 28–44)
FRACTIONAL SHORTENING: 9 % (ref 28–44)
GLUCOSE SERPL-MCNC: 125 MG/DL (ref 70–110)
GLUCOSE SERPL-MCNC: 143 MG/DL (ref 70–110)
GLUCOSE SERPL-MCNC: 153 MG/DL (ref 70–110)
GLUCOSE SERPL-MCNC: 180 MG/DL (ref 70–110)
GLUCOSE SERPL-MCNC: 183 MG/DL (ref 70–110)
GLUCOSE SERPL-MCNC: 190 MG/DL (ref 70–110)
GLUCOSE SERPL-MCNC: 197 MG/DL (ref 70–110)
GLUCOSE SERPL-MCNC: 197 MG/DL (ref 70–110)
GLUCOSE SERPL-MCNC: 218 MG/DL (ref 70–110)
GLUCOSE SERPL-MCNC: 220 MG/DL (ref 70–110)
GLUCOSE SERPL-MCNC: 232 MG/DL (ref 70–110)
GLUCOSE SERPL-MCNC: 243 MG/DL (ref 70–110)
GLUCOSE SERPL-MCNC: 246 MG/DL (ref 70–110)
GLUCOSE SERPL-MCNC: 247 MG/DL (ref 70–110)
GLUCOSE SERPL-MCNC: 279 MG/DL (ref 70–110)
GLUCOSE SERPL-MCNC: 281 MG/DL (ref 70–110)
GLUCOSE SERPL-MCNC: 292 MG/DL (ref 70–110)
GLUCOSE SERPL-MCNC: 301 MG/DL (ref 70–110)
GLUCOSE SERPL-MCNC: 317 MG/DL (ref 70–110)
GLUCOSE SERPL-MCNC: 319 MG/DL (ref 70–110)
GLUCOSE SERPL-MCNC: 360 MG/DL (ref 70–110)
GLUCOSE SERPL-MCNC: 386 MG/DL (ref 70–110)
GLUCOSE SERPL-MCNC: 62 MG/DL (ref 70–110)
GLUCOSE SERPL-MCNC: 63 MG/DL (ref 70–110)
GLUCOSE SERPL-MCNC: 74 MG/DL (ref 70–110)
GLUCOSE SERPL-MCNC: 77 MG/DL (ref 70–110)
GLUCOSE SERPL-MCNC: 86 MG/DL (ref 70–110)
GLUCOSE SERPL-MCNC: 99 MG/DL (ref 70–110)
GLUCOSE UR QL STRIP: NEGATIVE
HBA1C MFR BLD HPLC: 6.5 % (ref 4–5.6)
HCO3 UR-SCNC: 18.3 MMOL/L (ref 24–28)
HCO3 UR-SCNC: 18.6 MMOL/L (ref 24–28)
HCO3 UR-SCNC: 22.2 MMOL/L (ref 24–28)
HCO3 UR-SCNC: 23.2 MMOL/L (ref 24–28)
HCO3 UR-SCNC: 23.9 MMOL/L (ref 24–28)
HCO3 UR-SCNC: 24.6 MMOL/L (ref 24–28)
HCO3 UR-SCNC: 28.2 MMOL/L (ref 24–28)
HCT VFR BLD AUTO: 32.5 % (ref 37–48.5)
HCT VFR BLD AUTO: 34.1 % (ref 37–48.5)
HCT VFR BLD AUTO: 34.6 % (ref 37–48.5)
HCT VFR BLD AUTO: 34.7 % (ref 37–48.5)
HCT VFR BLD AUTO: 36.1 % (ref 37–48.5)
HCT VFR BLD AUTO: 36.3 % (ref 37–48.5)
HCT VFR BLD AUTO: 36.4 % (ref 37–48.5)
HCT VFR BLD AUTO: 37 % (ref 37–48.5)
HCT VFR BLD AUTO: 38.1 % (ref 37–48.5)
HCT VFR BLD AUTO: 39.3 % (ref 37–48.5)
HCT VFR BLD AUTO: 39.4 % (ref 37–48.5)
HCT VFR BLD AUTO: 39.5 % (ref 37–48.5)
HCT VFR BLD AUTO: 39.7 % (ref 37–48.5)
HCT VFR BLD AUTO: 39.8 % (ref 37–48.5)
HCT VFR BLD AUTO: 40 % (ref 37–48.5)
HCT VFR BLD AUTO: 40.8 % (ref 37–48.5)
HCT VFR BLD AUTO: 40.8 % (ref 37–48.5)
HCT VFR BLD AUTO: 41.2 % (ref 37–48.5)
HCT VFR BLD AUTO: 41.3 % (ref 37–48.5)
HCT VFR BLD AUTO: 44 % (ref 37–48.5)
HGB BLD-MCNC: 10.4 G/DL (ref 12–16)
HGB BLD-MCNC: 10.4 G/DL (ref 12–16)
HGB BLD-MCNC: 10.8 G/DL (ref 12–16)
HGB BLD-MCNC: 10.8 G/DL (ref 12–16)
HGB BLD-MCNC: 10.9 G/DL (ref 12–16)
HGB BLD-MCNC: 11 G/DL (ref 12–16)
HGB BLD-MCNC: 11.5 G/DL (ref 12–16)
HGB BLD-MCNC: 12 G/DL (ref 12–16)
HGB BLD-MCNC: 12 G/DL (ref 12–16)
HGB BLD-MCNC: 12.1 G/DL (ref 12–16)
HGB BLD-MCNC: 12.2 G/DL (ref 12–16)
HGB BLD-MCNC: 12.3 G/DL (ref 12–16)
HGB BLD-MCNC: 12.3 G/DL (ref 12–16)
HGB BLD-MCNC: 12.6 G/DL (ref 12–16)
HGB BLD-MCNC: 12.6 G/DL (ref 12–16)
HGB BLD-MCNC: 12.7 G/DL (ref 12–16)
HGB BLD-MCNC: 12.9 G/DL (ref 12–16)
HGB BLD-MCNC: 13.2 G/DL (ref 12–16)
HGB BLD-MCNC: 13.9 G/DL (ref 12–16)
HGB BLD-MCNC: 9.2 G/DL (ref 12–16)
HGB UR QL STRIP: ABNORMAL
HGB UR QL STRIP: NEGATIVE
HYALINE CASTS UR QL AUTO: 0 /LPF
HYALINE CASTS UR QL AUTO: 46 /LPF
HYPOCHROMIA BLD QL SMEAR: ABNORMAL
IMM GRANULOCYTES # BLD AUTO: 0.02 K/UL (ref 0–0.04)
IMM GRANULOCYTES # BLD AUTO: 0.02 K/UL (ref 0–0.04)
IMM GRANULOCYTES # BLD AUTO: 0.03 K/UL (ref 0–0.04)
IMM GRANULOCYTES # BLD AUTO: 0.04 K/UL (ref 0–0.04)
IMM GRANULOCYTES # BLD AUTO: 0.05 K/UL (ref 0–0.04)
IMM GRANULOCYTES # BLD AUTO: 0.07 K/UL (ref 0–0.04)
IMM GRANULOCYTES # BLD AUTO: 0.09 K/UL (ref 0–0.04)
IMM GRANULOCYTES # BLD AUTO: 0.2 K/UL (ref 0–0.04)
IMM GRANULOCYTES # BLD AUTO: 0.31 K/UL (ref 0–0.04)
IMM GRANULOCYTES # BLD AUTO: 0.4 K/UL (ref 0–0.04)
IMM GRANULOCYTES # BLD AUTO: 0.48 K/UL (ref 0–0.04)
IMM GRANULOCYTES # BLD AUTO: 0.51 K/UL (ref 0–0.04)
IMM GRANULOCYTES # BLD AUTO: 0.54 K/UL (ref 0–0.04)
IMM GRANULOCYTES # BLD AUTO: 0.61 K/UL (ref 0–0.04)
IMM GRANULOCYTES # BLD AUTO: 0.62 K/UL (ref 0–0.04)
IMM GRANULOCYTES # BLD AUTO: 0.67 K/UL (ref 0–0.04)
IMM GRANULOCYTES # BLD AUTO: 1.4 K/UL (ref 0–0.04)
IMM GRANULOCYTES # BLD AUTO: ABNORMAL K/UL (ref 0–0.04)
IMM GRANULOCYTES NFR BLD AUTO: 0.3 % (ref 0–0.5)
IMM GRANULOCYTES NFR BLD AUTO: 0.4 % (ref 0–0.5)
IMM GRANULOCYTES NFR BLD AUTO: 0.5 % (ref 0–0.5)
IMM GRANULOCYTES NFR BLD AUTO: 0.5 % (ref 0–0.5)
IMM GRANULOCYTES NFR BLD AUTO: 0.6 % (ref 0–0.5)
IMM GRANULOCYTES NFR BLD AUTO: 1.2 % (ref 0–0.5)
IMM GRANULOCYTES NFR BLD AUTO: 1.6 % (ref 0–0.5)
IMM GRANULOCYTES NFR BLD AUTO: 2 % (ref 0–0.5)
IMM GRANULOCYTES NFR BLD AUTO: 2.1 % (ref 0–0.5)
IMM GRANULOCYTES NFR BLD AUTO: 2.1 % (ref 0–0.5)
IMM GRANULOCYTES NFR BLD AUTO: 2.4 % (ref 0–0.5)
IMM GRANULOCYTES NFR BLD AUTO: 3.1 % (ref 0–0.5)
IMM GRANULOCYTES NFR BLD AUTO: 3.2 % (ref 0–0.5)
IMM GRANULOCYTES NFR BLD AUTO: 3.7 % (ref 0–0.5)
IMM GRANULOCYTES NFR BLD AUTO: 4 % (ref 0–0.5)
IMM GRANULOCYTES NFR BLD AUTO: ABNORMAL % (ref 0–0.5)
INR PPP: 1.3 (ref 0.8–1.2)
INR PPP: 1.3 (ref 0.8–1.2)
INR PPP: 1.4 (ref 0.8–1.2)
INR PPP: 1.5 (ref 0.8–1.2)
INR PPP: 1.6 (ref 0.8–1.2)
INR PPP: 1.7 (ref 0.8–1.2)
INR PPP: 1.8 (ref 0.8–1.2)
INR PPP: 1.8 (ref 0.8–1.2)
INR PPP: 2 (ref 0.8–1.2)
INR PPP: 2.1 (ref 0.8–1.2)
INR PPP: 2.1 (ref 0.8–1.2)
INR PPP: 2.1 (ref 2–3)
INR PPP: 2.2 (ref 0.8–1.2)
INR PPP: 2.2 (ref 0.8–1.2)
INR PPP: 2.3 (ref 0.8–1.2)
INR PPP: 2.4 (ref 0.8–1.2)
INR PPP: 2.4 (ref 0.8–1.2)
INR PPP: 2.5 (ref 0.8–1.2)
INR PPP: 2.6 (ref 0.8–1.2)
INR PPP: 2.6 (ref 2–3)
INR PPP: 2.8 (ref 0.8–1.2)
INR PPP: 3 (ref 0.8–1.2)
INR PPP: 3.2 (ref 0.8–1.2)
INR PPP: 3.2 (ref 0.8–1.2)
INR PPP: 3.3 (ref 0.8–1.2)
INR PPP: 3.3 (ref 2–3)
INR PPP: 3.4 (ref 0.8–1.2)
INR PPP: 3.6 (ref 0.8–1.2)
INR PPP: 3.6 (ref 0.8–1.2)
INR PPP: 3.7 (ref 0.8–1.2)
INR PPP: 3.9 (ref 0.8–1.2)
INR PPP: 4.2 (ref 0.8–1.2)
INR PPP: 4.5 (ref 0.8–1.2)
INTERVENTRICULAR SEPTUM: 0.91 CM (ref 0.6–1.1)
INTERVENTRICULAR SEPTUM: 1.03 CM (ref 0.6–1.1)
IP: 15
IP: 18
IP: 18
IP: 30
IT: 0.6
IVRT: 100.86 MSEC
KETONES UR QL STRIP: NEGATIVE
LA MAJOR: 6.54 CM
LA MAJOR: 6.94 CM
LA MINOR: 6.39 CM
LA MINOR: 6.73 CM
LA WIDTH: 3.29 CM
LA WIDTH: 5.27 CM
LACTATE SERPL-SCNC: 1.6 MMOL/L (ref 0.5–2.2)
LACTATE SERPL-SCNC: 3 MMOL/L (ref 0.5–2.2)
LACTATE SERPL-SCNC: 5.1 MMOL/L (ref 0.5–2.2)
LACTATE SERPL-SCNC: >12 MMOL/L (ref 0.5–2.2)
LDH SERPL L TO P-CCNC: 540 U/L (ref 110–260)
LEFT ATRIUM SIZE: 3.73 CM
LEFT ATRIUM SIZE: 4.73 CM
LEFT ATRIUM VOLUME INDEX: 31.9 ML/M2
LEFT ATRIUM VOLUME INDEX: 66.8 ML/M2
LEFT ATRIUM VOLUME: 144.79 CM3
LEFT ATRIUM VOLUME: 67.43 CM3
LEFT INTERNAL DIMENSION IN SYSTOLE: 6.01 CM (ref 2.1–4)
LEFT INTERNAL DIMENSION IN SYSTOLE: 6.7 CM (ref 2.1–4)
LEFT VENTRICLE DIASTOLIC VOLUME INDEX: 105.99 ML/M2
LEFT VENTRICLE DIASTOLIC VOLUME INDEX: 93.22 ML/M2
LEFT VENTRICLE DIASTOLIC VOLUME: 202.05 ML
LEFT VENTRICLE DIASTOLIC VOLUME: 224.02 ML
LEFT VENTRICLE MASS INDEX: 130 G/M2
LEFT VENTRICLE MASS INDEX: 133 G/M2
LEFT VENTRICLE SYSTOLIC VOLUME INDEX: 80.3 ML/M2
LEFT VENTRICLE SYSTOLIC VOLUME INDEX: 85.4 ML/M2
LEFT VENTRICLE SYSTOLIC VOLUME: 173.96 ML
LEFT VENTRICLE SYSTOLIC VOLUME: 180.45 ML
LEFT VENTRICULAR INTERNAL DIMENSION IN DIASTOLE: 6.61 CM (ref 3.5–6)
LEFT VENTRICULAR INTERNAL DIMENSION IN DIASTOLE: 6.9 CM (ref 3.5–6)
LEFT VENTRICULAR MASS: 280.84 G
LEFT VENTRICULAR MASS: 282.16 G
LEUKOCYTE ESTERASE UR QL STRIP: ABNORMAL
LEUKOCYTE ESTERASE UR QL STRIP: NEGATIVE
LEUKOCYTE ESTERASE UR QL STRIP: POSITIVE
LIPASE SERPL-CCNC: 55 U/L (ref 4–60)
LV LATERAL E/E' RATIO: 6.82 M/S
LV SEPTAL E/E' RATIO: 23.2 M/S
LYMPHOCYTES # BLD AUTO: 0.5 K/UL (ref 1–4.8)
LYMPHOCYTES # BLD AUTO: 0.5 K/UL (ref 1–4.8)
LYMPHOCYTES # BLD AUTO: 0.6 K/UL (ref 1–4.8)
LYMPHOCYTES # BLD AUTO: 0.6 K/UL (ref 1–4.8)
LYMPHOCYTES # BLD AUTO: 0.7 K/UL (ref 1–4.8)
LYMPHOCYTES # BLD AUTO: 0.8 K/UL (ref 1–4.8)
LYMPHOCYTES # BLD AUTO: 1 K/UL (ref 1–4.8)
LYMPHOCYTES # BLD AUTO: 1 K/UL (ref 1–4.8)
LYMPHOCYTES # BLD AUTO: 1.1 K/UL (ref 1–4.8)
LYMPHOCYTES # BLD AUTO: 1.8 K/UL (ref 1–4.8)
LYMPHOCYTES # BLD AUTO: 2.7 K/UL (ref 1–4.8)
LYMPHOCYTES # BLD AUTO: 2.8 K/UL (ref 1–4.8)
LYMPHOCYTES # BLD AUTO: ABNORMAL K/UL (ref 1–4.8)
LYMPHOCYTES NFR BLD: 1 % (ref 18–48)
LYMPHOCYTES NFR BLD: 1.9 % (ref 18–48)
LYMPHOCYTES NFR BLD: 12 % (ref 18–48)
LYMPHOCYTES NFR BLD: 14.3 % (ref 18–48)
LYMPHOCYTES NFR BLD: 14.6 % (ref 18–48)
LYMPHOCYTES NFR BLD: 2.1 % (ref 18–48)
LYMPHOCYTES NFR BLD: 2.5 % (ref 18–48)
LYMPHOCYTES NFR BLD: 3.3 % (ref 18–48)
LYMPHOCYTES NFR BLD: 3.4 % (ref 18–48)
LYMPHOCYTES NFR BLD: 3.9 % (ref 18–48)
LYMPHOCYTES NFR BLD: 33.9 % (ref 18–48)
LYMPHOCYTES NFR BLD: 4 % (ref 18–48)
LYMPHOCYTES NFR BLD: 4.4 % (ref 18–48)
LYMPHOCYTES NFR BLD: 4.4 % (ref 18–48)
LYMPHOCYTES NFR BLD: 4.5 % (ref 18–48)
LYMPHOCYTES NFR BLD: 4.6 % (ref 18–48)
LYMPHOCYTES NFR BLD: 5.1 % (ref 18–48)
LYMPHOCYTES NFR BLD: 5.8 % (ref 18–48)
LYMPHOCYTES NFR BLD: 7 % (ref 18–48)
LYMPHOCYTES NFR BLD: 7.6 % (ref 18–48)
MAGNESIUM SERPL-MCNC: 2 MG/DL (ref 1.6–2.6)
MAGNESIUM SERPL-MCNC: 2.2 MG/DL (ref 1.6–2.6)
MAGNESIUM SERPL-MCNC: 2.3 MG/DL (ref 1.6–2.6)
MAGNESIUM SERPL-MCNC: 2.4 MG/DL (ref 1.6–2.6)
MAGNESIUM SERPL-MCNC: 2.6 MG/DL (ref 1.6–2.6)
MAGNESIUM SERPL-MCNC: 2.6 MG/DL (ref 1.6–2.6)
MAGNESIUM SERPL-MCNC: 2.7 MG/DL (ref 1.6–2.6)
MAGNESIUM SERPL-MCNC: 2.9 MG/DL (ref 1.6–2.6)
MAP: 31
MCH RBC QN AUTO: 24.3 PG (ref 27–31)
MCH RBC QN AUTO: 24.4 PG (ref 27–31)
MCH RBC QN AUTO: 24.9 PG (ref 27–31)
MCH RBC QN AUTO: 26.1 PG (ref 27–31)
MCH RBC QN AUTO: 26.3 PG (ref 27–31)
MCH RBC QN AUTO: 26.3 PG (ref 27–31)
MCH RBC QN AUTO: 26.4 PG (ref 27–31)
MCH RBC QN AUTO: 26.5 PG (ref 27–31)
MCH RBC QN AUTO: 26.6 PG (ref 27–31)
MCH RBC QN AUTO: 26.7 PG (ref 27–31)
MCH RBC QN AUTO: 26.8 PG (ref 27–31)
MCH RBC QN AUTO: 26.9 PG (ref 27–31)
MCH RBC QN AUTO: 27.1 PG (ref 27–31)
MCH RBC QN AUTO: 27.2 PG (ref 27–31)
MCHC RBC AUTO-ENTMCNC: 28.3 G/DL (ref 32–36)
MCHC RBC AUTO-ENTMCNC: 28.7 G/DL (ref 32–36)
MCHC RBC AUTO-ENTMCNC: 29.1 G/DL (ref 32–36)
MCHC RBC AUTO-ENTMCNC: 30 G/DL (ref 32–36)
MCHC RBC AUTO-ENTMCNC: 30.1 G/DL (ref 32–36)
MCHC RBC AUTO-ENTMCNC: 30.2 G/DL (ref 32–36)
MCHC RBC AUTO-ENTMCNC: 30.2 G/DL (ref 32–36)
MCHC RBC AUTO-ENTMCNC: 30.4 G/DL (ref 32–36)
MCHC RBC AUTO-ENTMCNC: 31 G/DL (ref 32–36)
MCHC RBC AUTO-ENTMCNC: 31.1 G/DL (ref 32–36)
MCHC RBC AUTO-ENTMCNC: 31.2 G/DL (ref 32–36)
MCHC RBC AUTO-ENTMCNC: 31.2 G/DL (ref 32–36)
MCHC RBC AUTO-ENTMCNC: 31.6 G/DL (ref 32–36)
MCHC RBC AUTO-ENTMCNC: 31.6 G/DL (ref 32–36)
MCHC RBC AUTO-ENTMCNC: 31.7 G/DL (ref 32–36)
MCHC RBC AUTO-ENTMCNC: 31.8 G/DL (ref 32–36)
MCHC RBC AUTO-ENTMCNC: 31.8 G/DL (ref 32–36)
MCHC RBC AUTO-ENTMCNC: 32 G/DL (ref 32–36)
MCV RBC AUTO: 81 FL (ref 82–98)
MCV RBC AUTO: 82 FL (ref 82–98)
MCV RBC AUTO: 83 FL (ref 82–98)
MCV RBC AUTO: 84 FL (ref 82–98)
MCV RBC AUTO: 85 FL (ref 82–98)
MCV RBC AUTO: 86 FL (ref 82–98)
MCV RBC AUTO: 87 FL (ref 82–98)
MCV RBC AUTO: 88 FL (ref 82–98)
MCV RBC AUTO: 89 FL (ref 82–98)
MCV RBC AUTO: 92 FL (ref 82–98)
MCV RBC AUTO: 93 FL (ref 82–98)
METAMYELOCYTES NFR BLD MANUAL: 0.5 %
METAMYELOCYTES NFR BLD MANUAL: 1 %
MICROSCOPIC COMMENT: ABNORMAL
MICROSCOPIC COMMENT: ABNORMAL
MIN VOL: 19.5
MIN VOL: 26
MIN VOL: 33.6
MODE: ABNORMAL
MONOCYTES # BLD AUTO: 0.4 K/UL (ref 0.3–1)
MONOCYTES # BLD AUTO: 0.4 K/UL (ref 0.3–1)
MONOCYTES # BLD AUTO: 0.7 K/UL (ref 0.3–1)
MONOCYTES # BLD AUTO: 0.9 K/UL (ref 0.3–1)
MONOCYTES # BLD AUTO: 1 K/UL (ref 0.3–1)
MONOCYTES # BLD AUTO: 1 K/UL (ref 0.3–1)
MONOCYTES # BLD AUTO: 1.1 K/UL (ref 0.3–1)
MONOCYTES # BLD AUTO: 1.2 K/UL (ref 0.3–1)
MONOCYTES # BLD AUTO: 1.5 K/UL (ref 0.3–1)
MONOCYTES # BLD AUTO: 1.5 K/UL (ref 0.3–1)
MONOCYTES # BLD AUTO: 1.6 K/UL (ref 0.3–1)
MONOCYTES # BLD AUTO: 1.9 K/UL (ref 0.3–1)
MONOCYTES # BLD AUTO: 1.9 K/UL (ref 0.3–1)
MONOCYTES # BLD AUTO: 2 K/UL (ref 0.3–1)
MONOCYTES # BLD AUTO: 2.5 K/UL (ref 0.3–1)
MONOCYTES # BLD AUTO: ABNORMAL K/UL (ref 0.3–1)
MONOCYTES NFR BLD: 11.8 % (ref 4–15)
MONOCYTES NFR BLD: 3 % (ref 4–15)
MONOCYTES NFR BLD: 4 % (ref 4–15)
MONOCYTES NFR BLD: 4 % (ref 4–15)
MONOCYTES NFR BLD: 5.2 % (ref 4–15)
MONOCYTES NFR BLD: 5.5 % (ref 4–15)
MONOCYTES NFR BLD: 5.8 % (ref 4–15)
MONOCYTES NFR BLD: 5.8 % (ref 4–15)
MONOCYTES NFR BLD: 6 % (ref 4–15)
MONOCYTES NFR BLD: 6.3 % (ref 4–15)
MONOCYTES NFR BLD: 6.8 % (ref 4–15)
MONOCYTES NFR BLD: 7.4 % (ref 4–15)
MONOCYTES NFR BLD: 7.6 % (ref 4–15)
MONOCYTES NFR BLD: 7.9 % (ref 4–15)
MONOCYTES NFR BLD: 8.2 % (ref 4–15)
MONOCYTES NFR BLD: 8.2 % (ref 4–15)
MONOCYTES NFR BLD: 8.5 % (ref 4–15)
MONOCYTES NFR BLD: 9.9 % (ref 4–15)
MV PEAK E VEL: 1.16 M/S
MYELOCYTES NFR BLD MANUAL: 0.5 %
MYELOCYTES NFR BLD MANUAL: 1 %
MYELOCYTES NFR BLD MANUAL: 1 %
NEUTROPHILS # BLD AUTO: 10.6 K/UL (ref 1.8–7.7)
NEUTROPHILS # BLD AUTO: 11.7 K/UL (ref 1.8–7.7)
NEUTROPHILS # BLD AUTO: 12.8 K/UL (ref 1.8–7.7)
NEUTROPHILS # BLD AUTO: 12.9 K/UL (ref 1.8–7.7)
NEUTROPHILS # BLD AUTO: 14.3 K/UL (ref 1.8–7.7)
NEUTROPHILS # BLD AUTO: 14.3 K/UL (ref 1.8–7.7)
NEUTROPHILS # BLD AUTO: 14.4 K/UL (ref 1.8–7.7)
NEUTROPHILS # BLD AUTO: 14.8 K/UL (ref 1.8–7.7)
NEUTROPHILS # BLD AUTO: 17.7 K/UL (ref 1.8–7.7)
NEUTROPHILS # BLD AUTO: 20.6 K/UL (ref 1.8–7.7)
NEUTROPHILS # BLD AUTO: 26.4 K/UL (ref 1.8–7.7)
NEUTROPHILS # BLD AUTO: 26.6 K/UL (ref 1.8–7.7)
NEUTROPHILS # BLD AUTO: 34.2 K/UL (ref 1.8–7.7)
NEUTROPHILS # BLD AUTO: 4.1 K/UL (ref 1.8–7.7)
NEUTROPHILS # BLD AUTO: 5.5 K/UL (ref 1.8–7.7)
NEUTROPHILS # BLD AUTO: 5.5 K/UL (ref 1.8–7.7)
NEUTROPHILS # BLD AUTO: 9.4 K/UL (ref 1.8–7.7)
NEUTROPHILS NFR BLD: 50.9 % (ref 38–73)
NEUTROPHILS NFR BLD: 73.9 % (ref 38–73)
NEUTROPHILS NFR BLD: 76.1 % (ref 38–73)
NEUTROPHILS NFR BLD: 82.4 % (ref 38–73)
NEUTROPHILS NFR BLD: 83.7 % (ref 38–73)
NEUTROPHILS NFR BLD: 84.3 % (ref 38–73)
NEUTROPHILS NFR BLD: 84.6 % (ref 38–73)
NEUTROPHILS NFR BLD: 84.8 % (ref 38–73)
NEUTROPHILS NFR BLD: 85.5 % (ref 38–73)
NEUTROPHILS NFR BLD: 85.8 % (ref 38–73)
NEUTROPHILS NFR BLD: 86.2 % (ref 38–73)
NEUTROPHILS NFR BLD: 87.7 % (ref 38–73)
NEUTROPHILS NFR BLD: 87.7 % (ref 38–73)
NEUTROPHILS NFR BLD: 88 % (ref 38–73)
NEUTROPHILS NFR BLD: 88.2 % (ref 38–73)
NEUTROPHILS NFR BLD: 88.5 % (ref 38–73)
NEUTROPHILS NFR BLD: 89 % (ref 38–73)
NEUTROPHILS NFR BLD: 89.1 % (ref 38–73)
NEUTROPHILS NFR BLD: 89.6 % (ref 38–73)
NEUTROPHILS NFR BLD: 95 % (ref 38–73)
NEUTS BAND NFR BLD MANUAL: 1.5 %
NITRITE UR QL STRIP: NEGATIVE
NITRITE UR QL STRIP: NEGATIVE
NRBC BLD-RTO: 0 /100 WBC
OSMOLALITY SERPL: 273 MOSM/KG (ref 275–295)
OSMOLALITY UR: 183 MOSM/KG (ref 50–1200)
OTHER ANTIBIOTIC SUSC ISLT: ABNORMAL
OVALOCYTES BLD QL SMEAR: ABNORMAL
PCO2 BLDA: 36.6 MMHG (ref 35–45)
PCO2 BLDA: 42.3 MMHG (ref 35–45)
PCO2 BLDA: 43.8 MMHG (ref 35–45)
PCO2 BLDA: 47.5 MMHG (ref 35–45)
PCO2 BLDA: 64.5 MMHG (ref 35–45)
PCO2 BLDA: 82.1 MMHG (ref 35–45)
PCO2 BLDA: 83.6 MMHG (ref 35–45)
PEEP: 12
PEEP: 16
PEEP: 16
PEEP: 20
PH SMN: 6.96 [PH] (ref 7.35–7.45)
PH SMN: 7.03 [PH] (ref 7.35–7.45)
PH SMN: 7.07 [PH] (ref 7.35–7.45)
PH SMN: 7.35 [PH] (ref 7.35–7.45)
PH SMN: 7.36 [PH] (ref 7.35–7.45)
PH SMN: 7.38 [PH] (ref 7.35–7.45)
PH SMN: 7.42 [PH] (ref 7.35–7.45)
PH UR STRIP: 5 [PH] (ref 5–8)
PH UR STRIP: 6 [PH] (ref 5–8)
PH, POC UA: 7 (ref 5–8)
PHOSPHATE SERPL-MCNC: 12.2 MG/DL (ref 2.7–4.5)
PHOSPHATE SERPL-MCNC: 2.4 MG/DL (ref 2.7–4.5)
PHOSPHATE SERPL-MCNC: 2.7 MG/DL (ref 2.7–4.5)
PHOSPHATE SERPL-MCNC: 2.7 MG/DL (ref 2.7–4.5)
PHOSPHATE SERPL-MCNC: 2.8 MG/DL (ref 2.7–4.5)
PHOSPHATE SERPL-MCNC: 3.2 MG/DL (ref 2.7–4.5)
PHOSPHATE SERPL-MCNC: 3.3 MG/DL (ref 2.7–4.5)
PHOSPHATE SERPL-MCNC: 3.4 MG/DL (ref 2.7–4.5)
PHOSPHATE SERPL-MCNC: 3.4 MG/DL (ref 2.7–4.5)
PHOSPHATE SERPL-MCNC: 3.5 MG/DL (ref 2.7–4.5)
PHOSPHATE SERPL-MCNC: 3.6 MG/DL (ref 2.7–4.5)
PHOSPHATE SERPL-MCNC: 3.7 MG/DL (ref 2.7–4.5)
PHOSPHATE SERPL-MCNC: 3.8 MG/DL (ref 2.7–4.5)
PHOSPHATE SERPL-MCNC: 4 MG/DL (ref 2.7–4.5)
PHOSPHATE SERPL-MCNC: 4 MG/DL (ref 2.7–4.5)
PHOSPHATE SERPL-MCNC: 7.4 MG/DL (ref 2.7–4.5)
PIP: 51
PISA TR MAX VEL: 3.65 M/S
PISA TR MAX VEL: 4.12 M/S
PLATELET # BLD AUTO: 193 K/UL (ref 150–350)
PLATELET # BLD AUTO: 203 K/UL (ref 150–350)
PLATELET # BLD AUTO: 208 K/UL (ref 150–350)
PLATELET # BLD AUTO: 220 K/UL (ref 150–350)
PLATELET # BLD AUTO: 226 K/UL (ref 150–350)
PLATELET # BLD AUTO: 228 K/UL (ref 150–350)
PLATELET # BLD AUTO: 240 K/UL (ref 150–350)
PLATELET # BLD AUTO: 265 K/UL (ref 150–350)
PLATELET # BLD AUTO: 270 K/UL (ref 150–350)
PLATELET # BLD AUTO: 272 K/UL (ref 150–350)
PLATELET # BLD AUTO: 281 K/UL (ref 150–350)
PLATELET # BLD AUTO: 295 K/UL (ref 150–350)
PLATELET # BLD AUTO: 315 K/UL (ref 150–350)
PLATELET # BLD AUTO: 326 K/UL (ref 150–350)
PLATELET # BLD AUTO: 339 K/UL (ref 150–350)
PLATELET # BLD AUTO: 343 K/UL (ref 150–350)
PLATELET # BLD AUTO: 360 K/UL (ref 150–350)
PLATELET # BLD AUTO: 389 K/UL (ref 150–350)
PLATELET # BLD AUTO: 395 K/UL (ref 150–350)
PLATELET # BLD AUTO: 413 K/UL (ref 150–350)
PLATELET BLD QL SMEAR: ABNORMAL
PMV BLD AUTO: 10.1 FL (ref 9.2–12.9)
PMV BLD AUTO: 10.1 FL (ref 9.2–12.9)
PMV BLD AUTO: 10.3 FL (ref 9.2–12.9)
PMV BLD AUTO: 10.4 FL (ref 9.2–12.9)
PMV BLD AUTO: 10.5 FL (ref 9.2–12.9)
PMV BLD AUTO: 10.5 FL (ref 9.2–12.9)
PMV BLD AUTO: 10.7 FL (ref 9.2–12.9)
PMV BLD AUTO: 10.9 FL (ref 9.2–12.9)
PMV BLD AUTO: 11.2 FL (ref 9.2–12.9)
PMV BLD AUTO: 11.7 FL (ref 9.2–12.9)
PMV BLD AUTO: 11.8 FL (ref 9.2–12.9)
PMV BLD AUTO: 11.9 FL (ref 9.2–12.9)
PMV BLD AUTO: 11.9 FL (ref 9.2–12.9)
PMV BLD AUTO: 12 FL (ref 9.2–12.9)
PMV BLD AUTO: 12.2 FL (ref 9.2–12.9)
PMV BLD AUTO: 9.2 FL (ref 9.2–12.9)
PMV BLD AUTO: 9.7 FL (ref 9.2–12.9)
PMV BLD AUTO: 9.8 FL (ref 9.2–12.9)
PMV BLD AUTO: 9.9 FL (ref 9.2–12.9)
PMV BLD AUTO: 9.9 FL (ref 9.2–12.9)
PO2 BLDA: 118 MMHG (ref 80–100)
PO2 BLDA: 24 MMHG (ref 40–60)
PO2 BLDA: 28 MMHG (ref 40–60)
PO2 BLDA: 40 MMHG (ref 40–60)
PO2 BLDA: 44 MMHG (ref 40–60)
PO2 BLDA: 77 MMHG (ref 80–100)
PO2 BLDA: 90 MMHG (ref 80–100)
POC BE: -1 MMOL/L
POC BE: -1 MMOL/L
POC BE: -12 MMOL/L
POC BE: -14 MMOL/L
POC BE: -2 MMOL/L
POC BE: -9 MMOL/L
POC BE: 3 MMOL/L
POC BLOOD, URINE: POSITIVE
POC MOLECULAR INFLUENZA A AGN: NEGATIVE
POC MOLECULAR INFLUENZA B AGN: NEGATIVE
POC NITRATES, URINE: NEGATIVE
POC SATURATED O2: 42 % (ref 95–100)
POC SATURATED O2: 50 % (ref 95–100)
POC SATURATED O2: 72 % (ref 95–100)
POC SATURATED O2: 81 % (ref 95–100)
POC SATURATED O2: 84 % (ref 95–100)
POC SATURATED O2: 91 % (ref 95–100)
POC SATURATED O2: 96 % (ref 95–100)
POC TCO2: 21 MMOL/L (ref 23–27)
POC TCO2: 21 MMOL/L (ref 23–27)
POC TCO2: 24 MMOL/L (ref 24–29)
POC TCO2: 25 MMOL/L (ref 23–27)
POC TCO2: 25 MMOL/L (ref 24–29)
POC TCO2: 26 MMOL/L (ref 24–29)
POC TCO2: 30 MMOL/L (ref 24–29)
POCT GLUCOSE: 110 MG/DL (ref 70–110)
POCT GLUCOSE: 110 MG/DL (ref 70–110)
POCT GLUCOSE: 115 MG/DL (ref 70–110)
POCT GLUCOSE: 126 MG/DL (ref 70–110)
POCT GLUCOSE: 134 MG/DL (ref 70–110)
POCT GLUCOSE: 138 MG/DL (ref 70–110)
POCT GLUCOSE: 141 MG/DL (ref 70–110)
POCT GLUCOSE: 145 MG/DL (ref 70–110)
POCT GLUCOSE: 145 MG/DL (ref 70–110)
POCT GLUCOSE: 149 MG/DL (ref 70–110)
POCT GLUCOSE: 151 MG/DL (ref 70–110)
POCT GLUCOSE: 154 MG/DL (ref 70–110)
POCT GLUCOSE: 165 MG/DL (ref 70–110)
POCT GLUCOSE: 168 MG/DL (ref 70–110)
POCT GLUCOSE: 174 MG/DL (ref 70–110)
POCT GLUCOSE: 183 MG/DL (ref 70–110)
POCT GLUCOSE: 186 MG/DL (ref 70–110)
POCT GLUCOSE: 186 MG/DL (ref 70–110)
POCT GLUCOSE: 192 MG/DL (ref 70–110)
POCT GLUCOSE: 193 MG/DL (ref 70–110)
POCT GLUCOSE: 195 MG/DL (ref 70–110)
POCT GLUCOSE: 197 MG/DL (ref 70–110)
POCT GLUCOSE: 199 MG/DL (ref 70–110)
POCT GLUCOSE: 201 MG/DL (ref 70–110)
POCT GLUCOSE: 203 MG/DL (ref 70–110)
POCT GLUCOSE: 206 MG/DL (ref 70–110)
POCT GLUCOSE: 208 MG/DL (ref 70–110)
POCT GLUCOSE: 208 MG/DL (ref 70–110)
POCT GLUCOSE: 210 MG/DL (ref 70–110)
POCT GLUCOSE: 215 MG/DL (ref 70–110)
POCT GLUCOSE: 217 MG/DL (ref 70–110)
POCT GLUCOSE: 218 MG/DL (ref 70–110)
POCT GLUCOSE: 219 MG/DL (ref 70–110)
POCT GLUCOSE: 220 MG/DL (ref 70–110)
POCT GLUCOSE: 222 MG/DL (ref 70–110)
POCT GLUCOSE: 224 MG/DL (ref 70–110)
POCT GLUCOSE: 227 MG/DL (ref 70–110)
POCT GLUCOSE: 234 MG/DL (ref 70–110)
POCT GLUCOSE: 236 MG/DL (ref 70–110)
POCT GLUCOSE: 239 MG/DL (ref 70–110)
POCT GLUCOSE: 240 MG/DL (ref 70–110)
POCT GLUCOSE: 243 MG/DL (ref 70–110)
POCT GLUCOSE: 248 MG/DL (ref 70–110)
POCT GLUCOSE: 255 MG/DL (ref 70–110)
POCT GLUCOSE: 258 MG/DL (ref 70–110)
POCT GLUCOSE: 259 MG/DL (ref 70–110)
POCT GLUCOSE: 265 MG/DL (ref 70–110)
POCT GLUCOSE: 266 MG/DL (ref 70–110)
POCT GLUCOSE: 271 MG/DL (ref 70–110)
POCT GLUCOSE: 272 MG/DL (ref 70–110)
POCT GLUCOSE: 275 MG/DL (ref 70–110)
POCT GLUCOSE: 279 MG/DL (ref 70–110)
POCT GLUCOSE: 282 MG/DL (ref 70–110)
POCT GLUCOSE: 283 MG/DL (ref 70–110)
POCT GLUCOSE: 286 MG/DL (ref 70–110)
POCT GLUCOSE: 290 MG/DL (ref 70–110)
POCT GLUCOSE: 294 MG/DL (ref 70–110)
POCT GLUCOSE: 296 MG/DL (ref 70–110)
POCT GLUCOSE: 300 MG/DL (ref 70–110)
POCT GLUCOSE: 301 MG/DL (ref 70–110)
POCT GLUCOSE: 301 MG/DL (ref 70–110)
POCT GLUCOSE: 311 MG/DL (ref 70–110)
POCT GLUCOSE: 312 MG/DL (ref 70–110)
POCT GLUCOSE: 317 MG/DL (ref 70–110)
POCT GLUCOSE: 320 MG/DL (ref 70–110)
POCT GLUCOSE: 326 MG/DL (ref 70–110)
POCT GLUCOSE: 328 MG/DL (ref 70–110)
POCT GLUCOSE: 329 MG/DL (ref 70–110)
POCT GLUCOSE: 335 MG/DL (ref 70–110)
POCT GLUCOSE: 335 MG/DL (ref 70–110)
POCT GLUCOSE: 336 MG/DL (ref 70–110)
POCT GLUCOSE: 337 MG/DL (ref 70–110)
POCT GLUCOSE: 340 MG/DL (ref 70–110)
POCT GLUCOSE: 341 MG/DL (ref 70–110)
POCT GLUCOSE: 341 MG/DL (ref 70–110)
POCT GLUCOSE: 342 MG/DL (ref 70–110)
POCT GLUCOSE: 344 MG/DL (ref 70–110)
POCT GLUCOSE: 347 MG/DL (ref 70–110)
POCT GLUCOSE: 348 MG/DL (ref 70–110)
POCT GLUCOSE: 352 MG/DL (ref 70–110)
POCT GLUCOSE: 354 MG/DL (ref 70–110)
POCT GLUCOSE: 356 MG/DL (ref 70–110)
POCT GLUCOSE: 361 MG/DL (ref 70–110)
POCT GLUCOSE: 366 MG/DL (ref 70–110)
POCT GLUCOSE: 377 MG/DL (ref 70–110)
POCT GLUCOSE: 384 MG/DL (ref 70–110)
POCT GLUCOSE: 389 MG/DL (ref 70–110)
POCT GLUCOSE: 392 MG/DL (ref 70–110)
POCT GLUCOSE: 395 MG/DL (ref 70–110)
POCT GLUCOSE: 402 MG/DL (ref 70–110)
POCT GLUCOSE: 406 MG/DL (ref 70–110)
POCT GLUCOSE: 413 MG/DL (ref 70–110)
POCT GLUCOSE: 74 MG/DL (ref 70–110)
POCT GLUCOSE: 88 MG/DL (ref 70–110)
POCT GLUCOSE: 91 MG/DL (ref 70–110)
POCT GLUCOSE: 95 MG/DL (ref 70–110)
POCT GLUCOSE: 99 MG/DL (ref 70–110)
POIKILOCYTOSIS BLD QL SMEAR: ABNORMAL
POIKILOCYTOSIS BLD QL SMEAR: ABNORMAL
POIKILOCYTOSIS BLD QL SMEAR: SLIGHT
POLYCHROMASIA BLD QL SMEAR: ABNORMAL
POTASSIUM SERPL-SCNC: 4.3 MMOL/L (ref 3.5–5.1)
POTASSIUM SERPL-SCNC: 4.4 MMOL/L (ref 3.5–5.1)
POTASSIUM SERPL-SCNC: 4.5 MMOL/L (ref 3.5–5.1)
POTASSIUM SERPL-SCNC: 4.6 MMOL/L (ref 3.5–5.1)
POTASSIUM SERPL-SCNC: 4.6 MMOL/L (ref 3.5–5.1)
POTASSIUM SERPL-SCNC: 4.7 MMOL/L (ref 3.5–5.1)
POTASSIUM SERPL-SCNC: 4.8 MMOL/L (ref 3.5–5.1)
POTASSIUM SERPL-SCNC: 4.9 MMOL/L (ref 3.5–5.1)
POTASSIUM SERPL-SCNC: 5 MMOL/L (ref 3.5–5.1)
POTASSIUM SERPL-SCNC: 5.1 MMOL/L (ref 3.5–5.1)
POTASSIUM SERPL-SCNC: 5.1 MMOL/L (ref 3.5–5.1)
POTASSIUM SERPL-SCNC: 5.2 MMOL/L (ref 3.5–5.1)
POTASSIUM SERPL-SCNC: 5.2 MMOL/L (ref 3.5–5.1)
POTASSIUM SERPL-SCNC: 5.3 MMOL/L (ref 3.5–5.1)
POTASSIUM SERPL-SCNC: 5.5 MMOL/L (ref 3.5–5.1)
PROCALCITONIN SERPL IA-MCNC: 0.1 NG/ML
PROCALCITONIN SERPL IA-MCNC: 0.18 NG/ML
PROCALCITONIN SERPL IA-MCNC: 0.29 NG/ML
PROCALCITONIN SERPL IA-MCNC: 1.24 NG/ML
PROCALCITONIN SERPL IA-MCNC: 5.28 NG/ML
PROT SERPL-MCNC: 4.9 G/DL (ref 6–8.4)
PROT SERPL-MCNC: 6.2 G/DL (ref 6–8.4)
PROT SERPL-MCNC: 6.3 G/DL (ref 6–8.4)
PROT SERPL-MCNC: 6.3 G/DL (ref 6–8.4)
PROT SERPL-MCNC: 6.5 G/DL (ref 6–8.4)
PROT SERPL-MCNC: 6.6 G/DL (ref 6–8.4)
PROT SERPL-MCNC: 6.6 G/DL (ref 6–8.4)
PROT SERPL-MCNC: 6.8 G/DL (ref 6–8.4)
PROT SERPL-MCNC: 6.8 G/DL (ref 6–8.4)
PROT SERPL-MCNC: 6.9 G/DL (ref 6–8.4)
PROT SERPL-MCNC: 6.9 G/DL (ref 6–8.4)
PROT SERPL-MCNC: 7 G/DL (ref 6–8.4)
PROT SERPL-MCNC: 7.2 G/DL (ref 6–8.4)
PROT SERPL-MCNC: 7.9 G/DL (ref 6–8.4)
PROT UR QL STRIP: ABNORMAL
PROT UR QL STRIP: NEGATIVE
PROT UR QL STRIP: POSITIVE
PROTHROMBIN TIME: 14.4 SEC (ref 9–12.5)
PROTHROMBIN TIME: 14.4 SEC (ref 9–12.5)
PROTHROMBIN TIME: 15.3 SEC (ref 9–12.5)
PROTHROMBIN TIME: 15.6 SEC (ref 9–12.5)
PROTHROMBIN TIME: 16.6 SEC (ref 9–12.5)
PROTHROMBIN TIME: 17.1 SEC (ref 9–12.5)
PROTHROMBIN TIME: 17.5 SEC (ref 9–12.5)
PROTHROMBIN TIME: 17.6 SEC (ref 9–12.5)
PROTHROMBIN TIME: 18.6 SEC (ref 9–12.5)
PROTHROMBIN TIME: 18.8 SEC (ref 9–12.5)
PROTHROMBIN TIME: 19 SEC (ref 9–12.5)
PROTHROMBIN TIME: 19.4 SEC (ref 9–12.5)
PROTHROMBIN TIME: 21.7 SEC (ref 9–12.5)
PROTHROMBIN TIME: 22.1 SEC (ref 9–12.5)
PROTHROMBIN TIME: 22.3 SEC (ref 9–12.5)
PROTHROMBIN TIME: 22.3 SEC (ref 9–12.5)
PROTHROMBIN TIME: 23 SEC (ref 9–12.5)
PROTHROMBIN TIME: 23.7 SEC (ref 9–12.5)
PROTHROMBIN TIME: 24 SEC (ref 9–12.5)
PROTHROMBIN TIME: 24.3 SEC (ref 9–12.5)
PROTHROMBIN TIME: 25.8 SEC (ref 9–12.5)
PROTHROMBIN TIME: 26.2 SEC (ref 9–12.5)
PROTHROMBIN TIME: 26.3 SEC (ref 9–12.5)
PROTHROMBIN TIME: 27.6 SEC (ref 9–12.5)
PROTHROMBIN TIME: 27.9 SEC (ref 9–12.5)
PROTHROMBIN TIME: 33.5 SEC (ref 9–12.5)
PROTHROMBIN TIME: 33.8 SEC (ref 9–12.5)
PROTHROMBIN TIME: 34.7 SEC (ref 9–12.5)
PROTHROMBIN TIME: 35.7 SEC (ref 9–12.5)
PROTHROMBIN TIME: 37.1 SEC (ref 9–12.5)
PROTHROMBIN TIME: 37.2 SEC (ref 9–12.5)
PROTHROMBIN TIME: 38.5 SEC (ref 9–12.5)
PROTHROMBIN TIME: 40.1 SEC (ref 9–12.5)
PROTHROMBIN TIME: 43.4 SEC (ref 9–12.5)
PROTHROMBIN TIME: 46 SEC (ref 9–12.5)
RA MAJOR: 4.5 CM
RA MAJOR: 5.14 CM
RA PRESSURE: 3 MMHG
RA PRESSURE: 8 MMHG
RA WIDTH: 4.39 CM
RA WIDTH: 4.47 CM
RBC # BLD AUTO: 3.48 M/UL (ref 4–5.4)
RBC # BLD AUTO: 3.91 M/UL (ref 4–5.4)
RBC # BLD AUTO: 3.95 M/UL (ref 4–5.4)
RBC # BLD AUTO: 4.05 M/UL (ref 4–5.4)
RBC # BLD AUTO: 4.06 M/UL (ref 4–5.4)
RBC # BLD AUTO: 4.17 M/UL (ref 4–5.4)
RBC # BLD AUTO: 4.28 M/UL (ref 4–5.4)
RBC # BLD AUTO: 4.42 M/UL (ref 4–5.4)
RBC # BLD AUTO: 4.5 M/UL (ref 4–5.4)
RBC # BLD AUTO: 4.56 M/UL (ref 4–5.4)
RBC # BLD AUTO: 4.59 M/UL (ref 4–5.4)
RBC # BLD AUTO: 4.6 M/UL (ref 4–5.4)
RBC # BLD AUTO: 4.71 M/UL (ref 4–5.4)
RBC # BLD AUTO: 4.75 M/UL (ref 4–5.4)
RBC # BLD AUTO: 4.77 M/UL (ref 4–5.4)
RBC # BLD AUTO: 4.8 M/UL (ref 4–5.4)
RBC # BLD AUTO: 4.93 M/UL (ref 4–5.4)
RBC # BLD AUTO: 4.94 M/UL (ref 4–5.4)
RBC # BLD AUTO: 5.04 M/UL (ref 4–5.4)
RBC # BLD AUTO: 5.12 M/UL (ref 4–5.4)
RBC #/AREA URNS AUTO: 14 /HPF (ref 0–4)
RBC #/AREA URNS AUTO: 2 /HPF (ref 0–4)
RIGHT VENTRICULAR END-DIASTOLIC DIMENSION: 3.46 CM
RIGHT VENTRICULAR END-DIASTOLIC DIMENSION: 4.05 CM
RV TISSUE DOPPLER FREE WALL SYSTOLIC VELOCITY 1 (APICAL 4 CHAMBER VIEW): 8.81 CM/S
S PYO RRNA THROAT QL PROBE: NEGATIVE
SAMPLE: ABNORMAL
SARS-COV-2 RDRP RESP QL NAA+PROBE: POSITIVE
SARS-COV-2 RNA RESP QL NAA+PROBE: NOT DETECTED
SARS-COV-2 RNA RESP QL NAA+PROBE: NOT DETECTED
SCHISTOCYTES BLD QL SMEAR: ABNORMAL
SCHISTOCYTES BLD QL SMEAR: PRESENT
SINUS: 2.92 CM
SINUS: 3.13 CM
SINUS: 3.3 CM
SITE: ABNORMAL
SODIUM SERPL-SCNC: 130 MMOL/L (ref 136–145)
SODIUM SERPL-SCNC: 132 MMOL/L (ref 136–145)
SODIUM SERPL-SCNC: 132 MMOL/L (ref 136–145)
SODIUM SERPL-SCNC: 134 MMOL/L (ref 136–145)
SODIUM SERPL-SCNC: 135 MMOL/L (ref 136–145)
SODIUM SERPL-SCNC: 136 MMOL/L (ref 136–145)
SODIUM SERPL-SCNC: 137 MMOL/L (ref 136–145)
SODIUM SERPL-SCNC: 138 MMOL/L (ref 136–145)
SODIUM SERPL-SCNC: 139 MMOL/L (ref 136–145)
SODIUM SERPL-SCNC: 140 MMOL/L (ref 136–145)
SODIUM SERPL-SCNC: 140 MMOL/L (ref 136–145)
SODIUM UR-SCNC: 20 MMOL/L (ref 20–250)
SODIUM UR-SCNC: 23 MMOL/L (ref 20–250)
SP GR UR STRIP: 1.01 (ref 1–1.03)
SP02: 72
SP02: 89
SP02: 91
SP02: 94
SP02: 95
SPONT RATE: 30
SPONT RATE: 35
SPONT RATE: 35
SPONT RATE: 41
SQUAMOUS #/AREA URNS AUTO: 1 /HPF
SQUAMOUS #/AREA URNS AUTO: 5 /HPF
STJ: 2.52 CM
STJ: 3 CM
STJ: 3.11 CM
TDI LATERAL: 0.06 M/S
TDI LATERAL: 0.17 M/S
TDI SEPTAL: 0.05 M/S
TDI: 0.11 M/S
TR MAX PG: 53 MMHG
TR MAX PG: 68 MMHG
TRICUSPID ANNULAR PLANE SYSTOLIC EXCURSION: 1.65 CM
TROPONIN I SERPL DL<=0.01 NG/ML-MCNC: 0.06 NG/ML (ref 0–0.03)
TROPONIN I SERPL DL<=0.01 NG/ML-MCNC: 0.06 NG/ML (ref 0–0.03)
TROPONIN I SERPL DL<=0.01 NG/ML-MCNC: 0.07 NG/ML (ref 0–0.03)
TROPONIN I SERPL DL<=0.01 NG/ML-MCNC: 0.14 NG/ML (ref 0–0.03)
TSH SERPL DL<=0.005 MIU/L-ACNC: 1.74 UIU/ML (ref 0.4–4)
TV REST PULMONARY ARTERY PRESSURE: 56 MMHG
TV REST PULMONARY ARTERY PRESSURE: 76 MMHG
URN SPEC COLLECT METH UR: ABNORMAL
URN SPEC COLLECT METH UR: ABNORMAL
UROBILINOGEN UR STRIP-ACNC: 1 (ref 0.1–1.1)
UUN UR-MCNC: 255 MG/DL (ref 140–1050)
VANCOMYCIN SERPL-MCNC: 17 UG/ML
VANCOMYCIN SERPL-MCNC: 27.3 UG/ML
VANCOMYCIN TROUGH SERPL-MCNC: 18 UG/ML (ref 10–22)
VANCOMYCIN TROUGH SERPL-MCNC: 20 UG/ML (ref 10–22)
VT: 303
VT: 305
VT: 305
WBC # BLD AUTO: 11.91 K/UL (ref 3.9–12.7)
WBC # BLD AUTO: 12.32 K/UL (ref 3.9–12.7)
WBC # BLD AUTO: 13.38 K/UL (ref 3.9–12.7)
WBC # BLD AUTO: 14.7 K/UL (ref 3.9–12.7)
WBC # BLD AUTO: 15.07 K/UL (ref 3.9–12.7)
WBC # BLD AUTO: 15.37 K/UL (ref 3.9–12.7)
WBC # BLD AUTO: 16.43 K/UL (ref 3.9–12.7)
WBC # BLD AUTO: 16.87 K/UL (ref 3.9–12.7)
WBC # BLD AUTO: 17.6 K/UL (ref 3.9–12.7)
WBC # BLD AUTO: 19.28 K/UL (ref 3.9–12.7)
WBC # BLD AUTO: 20.74 K/UL (ref 3.9–12.7)
WBC # BLD AUTO: 23.94 K/UL (ref 3.9–12.7)
WBC # BLD AUTO: 23.99 K/UL (ref 3.9–12.7)
WBC # BLD AUTO: 28.92 K/UL (ref 3.9–12.7)
WBC # BLD AUTO: 29.84 K/UL (ref 3.9–12.7)
WBC # BLD AUTO: 30.13 K/UL (ref 3.9–12.7)
WBC # BLD AUTO: 38.12 K/UL (ref 3.9–12.7)
WBC # BLD AUTO: 6.34 K/UL (ref 3.9–12.7)
WBC # BLD AUTO: 6.68 K/UL (ref 3.9–12.7)
WBC # BLD AUTO: 7.99 K/UL (ref 3.9–12.7)
WBC #/AREA URNS AUTO: 2 /HPF (ref 0–5)
WBC #/AREA URNS AUTO: 3 /HPF (ref 0–5)
WBC TOXIC VACUOLES BLD QL SMEAR: PRESENT
WBC TOXIC VACUOLES BLD QL SMEAR: PRESENT
YEAST UR QL AUTO: ABNORMAL

## 2020-01-01 PROCEDURE — 99284 EMERGENCY DEPT VISIT MOD MDM: CPT | Mod: 25,27

## 2020-01-01 PROCEDURE — 93005 ELECTROCARDIOGRAM TRACING: CPT | Mod: PBBFAC | Performed by: INTERNAL MEDICINE

## 2020-01-01 PROCEDURE — 25000003 PHARM REV CODE 250: Performed by: NURSE ANESTHETIST, CERTIFIED REGISTERED

## 2020-01-01 PROCEDURE — 63600175 PHARM REV CODE 636 W HCPCS: Performed by: INTERNAL MEDICINE

## 2020-01-01 PROCEDURE — 99233 SBSQ HOSP IP/OBS HIGH 50: CPT | Mod: ,,, | Performed by: EMERGENCY MEDICINE

## 2020-01-01 PROCEDURE — 83735 ASSAY OF MAGNESIUM: CPT

## 2020-01-01 PROCEDURE — 37000008 HC ANESTHESIA 1ST 15 MINUTES: Performed by: INTERNAL MEDICINE

## 2020-01-01 PROCEDURE — 84100 ASSAY OF PHOSPHORUS: CPT

## 2020-01-01 PROCEDURE — 94761 N-INVAS EAR/PLS OXIMETRY MLT: CPT

## 2020-01-01 PROCEDURE — 99443 PR PHYSICIAN TELEPHONE EVALUATION 21-30 MIN: ICD-10-PCS | Mod: 95,,, | Performed by: INTERNAL MEDICINE

## 2020-01-01 PROCEDURE — 99232 PR SUBSEQUENT HOSPITAL CARE,LEVL II: ICD-10-PCS | Mod: ,,, | Performed by: NURSE PRACTITIONER

## 2020-01-01 PROCEDURE — 84145 PROCALCITONIN (PCT): CPT

## 2020-01-01 PROCEDURE — 99214 PR OFFICE/OUTPT VISIT, EST, LEVL IV, 30-39 MIN: ICD-10-PCS | Mod: S$PBB,,, | Performed by: INTERNAL MEDICINE

## 2020-01-01 PROCEDURE — 85610 PROTHROMBIN TIME: CPT

## 2020-01-01 PROCEDURE — 99900035 HC TECH TIME PER 15 MIN (STAT)

## 2020-01-01 PROCEDURE — 93793 PR ANTICOAGULANT MGMT FOR PT TAKING WARFARIN: ICD-10-PCS | Mod: ,,,

## 2020-01-01 PROCEDURE — 93005 ELECTROCARDIOGRAM TRACING: CPT

## 2020-01-01 PROCEDURE — 20000000 HC ICU ROOM

## 2020-01-01 PROCEDURE — 93010 ELECTROCARDIOGRAM REPORT: CPT | Mod: ,,, | Performed by: INTERNAL MEDICINE

## 2020-01-01 PROCEDURE — 82803 BLOOD GASES ANY COMBINATION: CPT

## 2020-01-01 PROCEDURE — 85027 COMPLETE CBC AUTOMATED: CPT

## 2020-01-01 PROCEDURE — 99213 OFFICE O/P EST LOW 20 MIN: CPT | Mod: S$PBB,,, | Performed by: PODIATRIST

## 2020-01-01 PROCEDURE — 36415 COLL VENOUS BLD VENIPUNCTURE: CPT

## 2020-01-01 PROCEDURE — 93010 RHYTHM STRIP: ICD-10-PCS | Mod: S$PBB,,, | Performed by: INTERNAL MEDICINE

## 2020-01-01 PROCEDURE — 80053 COMPREHEN METABOLIC PANEL: CPT

## 2020-01-01 PROCEDURE — 86140 C-REACTIVE PROTEIN: CPT

## 2020-01-01 PROCEDURE — 25000003 PHARM REV CODE 250: Performed by: STUDENT IN AN ORGANIZED HEALTH CARE EDUCATION/TRAINING PROGRAM

## 2020-01-01 PROCEDURE — 00410 ANES INTEG SYS CONV ARRHYT: CPT | Performed by: INTERNAL MEDICINE

## 2020-01-01 PROCEDURE — 27100171 HC OXYGEN HIGH FLOW UP TO 24 HOURS

## 2020-01-01 PROCEDURE — 92960 CARDIOVERSION ELECTRIC EXT: CPT | Mod: ,,, | Performed by: INTERNAL MEDICINE

## 2020-01-01 PROCEDURE — 25000003 PHARM REV CODE 250: Performed by: NURSE PRACTITIONER

## 2020-01-01 PROCEDURE — 63600175 PHARM REV CODE 636 W HCPCS: Performed by: STUDENT IN AN ORGANIZED HEALTH CARE EDUCATION/TRAINING PROGRAM

## 2020-01-01 PROCEDURE — 97110 THERAPEUTIC EXERCISES: CPT

## 2020-01-01 PROCEDURE — 99999 PR PBB SHADOW E&M-EST. PATIENT-LVL IV: ICD-10-PCS | Mod: PBBFAC,,, | Performed by: PODIATRIST

## 2020-01-01 PROCEDURE — 85025 COMPLETE CBC W/AUTO DIFF WBC: CPT

## 2020-01-01 PROCEDURE — 85520 HEPARIN ASSAY: CPT | Mod: 91

## 2020-01-01 PROCEDURE — D9220A PRA ANESTHESIA: ICD-10-PCS | Mod: CRNA,,, | Performed by: NURSE ANESTHETIST, CERTIFIED REGISTERED

## 2020-01-01 PROCEDURE — 99233 PR SUBSEQUENT HOSPITAL CARE,LEVL III: ICD-10-PCS | Mod: ,,, | Performed by: EMERGENCY MEDICINE

## 2020-01-01 PROCEDURE — 99233 PR SUBSEQUENT HOSPITAL CARE,LEVL III: ICD-10-PCS | Mod: ,,, | Performed by: INTERNAL MEDICINE

## 2020-01-01 PROCEDURE — U0003 INFECTIOUS AGENT DETECTION BY NUCLEIC ACID (DNA OR RNA); SEVERE ACUTE RESPIRATORY SYNDROME CORONAVIRUS 2 (SARS-COV-2) (CORONAVIRUS DISEASE [COVID-19]), AMPLIFIED PROBE TECHNIQUE, MAKING USE OF HIGH THROUGHPUT TECHNOLOGIES AS DESCRIBED BY CMS-2020-01-R: HCPCS

## 2020-01-01 PROCEDURE — 25000003 PHARM REV CODE 250: Performed by: INTERNAL MEDICINE

## 2020-01-01 PROCEDURE — 99291 PR CRITICAL CARE, E/M 30-74 MINUTES: ICD-10-PCS | Mod: ,,, | Performed by: EMERGENCY MEDICINE

## 2020-01-01 PROCEDURE — 93793 ANTICOAG MGMT PT WARFARIN: CPT | Mod: ,,,

## 2020-01-01 PROCEDURE — 83880 ASSAY OF NATRIURETIC PEPTIDE: CPT

## 2020-01-01 PROCEDURE — 93296 REM INTERROG EVL PM/IDS: CPT | Performed by: INTERNAL MEDICINE

## 2020-01-01 PROCEDURE — 99232 SBSQ HOSP IP/OBS MODERATE 35: CPT | Mod: ,,, | Performed by: NURSE PRACTITIONER

## 2020-01-01 PROCEDURE — 99291 PR CRITICAL CARE, E/M 30-74 MINUTES: ICD-10-PCS | Mod: ,,, | Performed by: PHYSICIAN ASSISTANT

## 2020-01-01 PROCEDURE — 94660 CPAP INITIATION&MGMT: CPT

## 2020-01-01 PROCEDURE — 25000242 PHARM REV CODE 250 ALT 637 W/ HCPCS: Performed by: STUDENT IN AN ORGANIZED HEALTH CARE EDUCATION/TRAINING PROGRAM

## 2020-01-01 PROCEDURE — 93295 CARDIAC DEVICE CHECK - REMOTE: ICD-10-PCS | Mod: ,,, | Performed by: INTERNAL MEDICINE

## 2020-01-01 PROCEDURE — 85610 PROTHROMBIN TIME: CPT | Mod: PBBFAC

## 2020-01-01 PROCEDURE — 99999 PR PBB SHADOW E&M-EST. PATIENT-LVL V: CPT | Mod: PBBFAC,,, | Performed by: PODIATRIST

## 2020-01-01 PROCEDURE — 25000242 PHARM REV CODE 250 ALT 637 W/ HCPCS: Performed by: EMERGENCY MEDICINE

## 2020-01-01 PROCEDURE — 11056 PARNG/CUTG B9 HYPRKR LES 2-4: CPT | Mod: S$PBB,Q9,, | Performed by: PODIATRIST

## 2020-01-01 PROCEDURE — 92960 CARDIOVERSION ELECTRIC EXT: CPT | Performed by: INTERNAL MEDICINE

## 2020-01-01 PROCEDURE — 84540 ASSAY OF URINE/UREA-N: CPT

## 2020-01-01 PROCEDURE — 99999 PR PBB SHADOW E&M-EST. PATIENT-LVL IV: CPT | Mod: PBBFAC,,, | Performed by: PODIATRIST

## 2020-01-01 PROCEDURE — 25000003 PHARM REV CODE 250: Performed by: CLINICAL NURSE SPECIALIST

## 2020-01-01 PROCEDURE — A4216 STERILE WATER/SALINE, 10 ML: HCPCS | Performed by: INTERNAL MEDICINE

## 2020-01-01 PROCEDURE — 99214 PR OFFICE/OUTPT VISIT, EST, LEVL IV, 30-39 MIN: ICD-10-PCS | Mod: S$PBB,,, | Performed by: NURSE PRACTITIONER

## 2020-01-01 PROCEDURE — 97168 OT RE-EVAL EST PLAN CARE: CPT

## 2020-01-01 PROCEDURE — 93295 DEV INTERROG REMOTE 1/2/MLT: CPT | Mod: ,,, | Performed by: INTERNAL MEDICINE

## 2020-01-01 PROCEDURE — 93312 TRANSESOPHAGEAL ECHO (TEE) (CUPID ONLY): ICD-10-PCS | Mod: 26,,, | Performed by: INTERNAL MEDICINE

## 2020-01-01 PROCEDURE — 83615 LACTATE (LD) (LDH) ENZYME: CPT

## 2020-01-01 PROCEDURE — 80202 ASSAY OF VANCOMYCIN: CPT

## 2020-01-01 PROCEDURE — 85379 FIBRIN DEGRADATION QUANT: CPT

## 2020-01-01 PROCEDURE — 99291 PR CRITICAL CARE, E/M 30-74 MINUTES: ICD-10-PCS | Mod: ,,, | Performed by: INTERNAL MEDICINE

## 2020-01-01 PROCEDURE — 99238 HOSP IP/OBS DSCHRG MGMT 30/<: CPT | Mod: 25,,, | Performed by: PHYSICIAN ASSISTANT

## 2020-01-01 PROCEDURE — 63600175 PHARM REV CODE 636 W HCPCS: Performed by: NURSE PRACTITIONER

## 2020-01-01 PROCEDURE — 99497 PR ADVNCD CARE PLAN 30 MIN: ICD-10-PCS | Mod: ,,, | Performed by: EMERGENCY MEDICINE

## 2020-01-01 PROCEDURE — 27000221 HC OXYGEN, UP TO 24 HOURS

## 2020-01-01 PROCEDURE — 27000190 HC CPAP FULL FACE MASK W/VALVE

## 2020-01-01 PROCEDURE — 99291 CRITICAL CARE FIRST HOUR: CPT | Mod: ,,, | Performed by: NURSE PRACTITIONER

## 2020-01-01 PROCEDURE — 85520 HEPARIN ASSAY: CPT

## 2020-01-01 PROCEDURE — 99291 CRITICAL CARE FIRST HOUR: CPT | Mod: ,,, | Performed by: INTERNAL MEDICINE

## 2020-01-01 PROCEDURE — 80048 BASIC METABOLIC PNL TOTAL CA: CPT

## 2020-01-01 PROCEDURE — 84484 ASSAY OF TROPONIN QUANT: CPT

## 2020-01-01 PROCEDURE — S0077 INJECTION, CLINDAMYCIN PHOSP: HCPCS | Performed by: EMERGENCY MEDICINE

## 2020-01-01 PROCEDURE — 99233 SBSQ HOSP IP/OBS HIGH 50: CPT | Mod: ,,, | Performed by: INTERNAL MEDICINE

## 2020-01-01 PROCEDURE — 71046 X-RAY EXAM CHEST 2 VIEWS: CPT | Mod: 26,,, | Performed by: RADIOLOGY

## 2020-01-01 PROCEDURE — 99214 OFFICE O/P EST MOD 30 MIN: CPT | Mod: S$PBB,,, | Performed by: INTERNAL MEDICINE

## 2020-01-01 PROCEDURE — 93325 TRANSESOPHAGEAL ECHO (TEE) (CUPID ONLY): ICD-10-PCS | Mod: 26,,, | Performed by: INTERNAL MEDICINE

## 2020-01-01 PROCEDURE — 99213 OFFICE O/P EST LOW 20 MIN: CPT | Mod: PBBFAC,25 | Performed by: INTERNAL MEDICINE

## 2020-01-01 PROCEDURE — 99291 CRITICAL CARE FIRST HOUR: CPT | Mod: ,,, | Performed by: PHYSICIAN ASSISTANT

## 2020-01-01 PROCEDURE — 96374 THER/PROPH/DIAG INJ IV PUSH: CPT

## 2020-01-01 PROCEDURE — 99291 CRITICAL CARE FIRST HOUR: CPT | Mod: ,,, | Performed by: CLINICAL NURSE SPECIALIST

## 2020-01-01 PROCEDURE — 99215 OFFICE O/P EST HI 40 MIN: CPT | Mod: PBBFAC,PO | Performed by: PODIATRIST

## 2020-01-01 PROCEDURE — 97164 PT RE-EVAL EST PLAN CARE: CPT

## 2020-01-01 PROCEDURE — 87880 STREP A ASSAY W/OPTIC: CPT | Mod: ER

## 2020-01-01 PROCEDURE — 71046 X-RAY EXAM CHEST 2 VIEWS: CPT | Mod: TC

## 2020-01-01 PROCEDURE — 99291 CRITICAL CARE FIRST HOUR: CPT | Mod: 25

## 2020-01-01 PROCEDURE — 31500 PR INSERT, EMERGENCY ENDOTRACH AIRWAY: ICD-10-PCS | Mod: ,,, | Performed by: INTERNAL MEDICINE

## 2020-01-01 PROCEDURE — 63600175 PHARM REV CODE 636 W HCPCS: Performed by: EMERGENCY MEDICINE

## 2020-01-01 PROCEDURE — 11056 PR TRIM BENIGN HYPERKERATOTIC SKIN LESION,2-4: ICD-10-PCS | Mod: S$PBB,Q9,, | Performed by: PODIATRIST

## 2020-01-01 PROCEDURE — 93010 ELECTROCARDIOGRAM REPORT: CPT | Mod: S$PBB,,, | Performed by: INTERNAL MEDICINE

## 2020-01-01 PROCEDURE — 11000001 HC ACUTE MED/SURG PRIVATE ROOM

## 2020-01-01 PROCEDURE — 82728 ASSAY OF FERRITIN: CPT

## 2020-01-01 PROCEDURE — 99215 OFFICE O/P EST HI 40 MIN: CPT | Mod: PBBFAC,PN | Performed by: PODIATRIST

## 2020-01-01 PROCEDURE — 93010 EKG 12-LEAD: ICD-10-PCS | Mod: ,,, | Performed by: INTERNAL MEDICINE

## 2020-01-01 PROCEDURE — 87635: ICD-10-PCS | Mod: QW,S$GLB,, | Performed by: PHYSICIAN ASSISTANT

## 2020-01-01 PROCEDURE — 11721 DEBRIDE NAIL 6 OR MORE: CPT | Mod: S$PBB,59,Q9, | Performed by: PODIATRIST

## 2020-01-01 PROCEDURE — C9399 UNCLASSIFIED DRUGS OR BIOLOG: HCPCS | Performed by: INTERNAL MEDICINE

## 2020-01-01 PROCEDURE — 99213 PR OFFICE/OUTPT VISIT, EST, LEVL III, 20-29 MIN: ICD-10-PCS | Mod: S$PBB,,, | Performed by: PODIATRIST

## 2020-01-01 PROCEDURE — 97530 THERAPEUTIC ACTIVITIES: CPT

## 2020-01-01 PROCEDURE — 80053 COMPREHEN METABOLIC PANEL: CPT | Mod: 91

## 2020-01-01 PROCEDURE — 83036 HEMOGLOBIN GLYCOSYLATED A1C: CPT

## 2020-01-01 PROCEDURE — 99291 CRITICAL CARE FIRST HOUR: CPT | Mod: ,,, | Performed by: EMERGENCY MEDICINE

## 2020-01-01 PROCEDURE — 99999 PR PBB SHADOW E&M-EST. PATIENT-LVL V: ICD-10-PCS | Mod: PBBFAC,,, | Performed by: PODIATRIST

## 2020-01-01 PROCEDURE — 84443 ASSAY THYROID STIM HORMONE: CPT

## 2020-01-01 PROCEDURE — 99999 PR PBB SHADOW E&M-EST. PATIENT-LVL V: ICD-10-PCS | Mod: PBBFAC,,, | Performed by: NURSE PRACTITIONER

## 2020-01-01 PROCEDURE — 99215 OFFICE O/P EST HI 40 MIN: CPT | Mod: PBBFAC | Performed by: INTERNAL MEDICINE

## 2020-01-01 PROCEDURE — 93005 ELECTROCARDIOGRAM TRACING: CPT | Mod: 59

## 2020-01-01 PROCEDURE — D9220A PRA ANESTHESIA: ICD-10-PCS | Mod: ANES,,, | Performed by: ANESTHESIOLOGY

## 2020-01-01 PROCEDURE — 99999 PR PBB SHADOW E&M-EST. PATIENT-LVL V: CPT | Mod: PBBFAC,,, | Performed by: INTERNAL MEDICINE

## 2020-01-01 PROCEDURE — 93284 PRGRMG EVAL IMPLANTABLE DFB: CPT

## 2020-01-01 PROCEDURE — 25000003 PHARM REV CODE 250: Performed by: EMERGENCY MEDICINE

## 2020-01-01 PROCEDURE — 87635 SARS-COV-2 COVID-19 AMP PRB: CPT | Mod: QW,S$GLB,, | Performed by: PHYSICIAN ASSISTANT

## 2020-01-01 PROCEDURE — 92960 PR CARDIOVERSION, ELECTIVE;EXTERN: ICD-10-PCS | Mod: ,,, | Performed by: INTERNAL MEDICINE

## 2020-01-01 PROCEDURE — 94640 AIRWAY INHALATION TREATMENT: CPT

## 2020-01-01 PROCEDURE — 80162 ASSAY OF DIGOXIN TOTAL: CPT

## 2020-01-01 PROCEDURE — 63600175 PHARM REV CODE 636 W HCPCS: Performed by: NURSE ANESTHETIST, CERTIFIED REGISTERED

## 2020-01-01 PROCEDURE — 93325 DOPPLER ECHO COLOR FLOW MAPG: CPT

## 2020-01-01 PROCEDURE — 97165 OT EVAL LOW COMPLEX 30 MIN: CPT

## 2020-01-01 PROCEDURE — 85007 BL SMEAR W/DIFF WBC COUNT: CPT

## 2020-01-01 PROCEDURE — 11056 PARNG/CUTG B9 HYPRKR LES 2-4: CPT | Mod: PBBFAC,PO | Performed by: PODIATRIST

## 2020-01-01 PROCEDURE — 83690 ASSAY OF LIPASE: CPT

## 2020-01-01 PROCEDURE — 83605 ASSAY OF LACTIC ACID: CPT

## 2020-01-01 PROCEDURE — 99285 EMERGENCY DEPT VISIT HI MDM: CPT | Mod: 25

## 2020-01-01 PROCEDURE — 99443 PR PHYSICIAN TELEPHONE EVALUATION 21-30 MIN: CPT | Mod: 95,,, | Performed by: INTERNAL MEDICINE

## 2020-01-01 PROCEDURE — 99999 PR PBB SHADOW E&M-EST. PATIENT-LVL II: ICD-10-PCS | Mod: PBBFAC,,, | Performed by: INTERNAL MEDICINE

## 2020-01-01 PROCEDURE — 11721 DEBRIDE NAIL 6 OR MORE: CPT | Mod: PBBFAC,PO | Performed by: PODIATRIST

## 2020-01-01 PROCEDURE — 99213 PR OFFICE/OUTPT VISIT, EST, LEVL III, 20-29 MIN: ICD-10-PCS | Mod: 25,S$PBB,, | Performed by: PODIATRIST

## 2020-01-01 PROCEDURE — 99214 PR OFFICE/OUTPT VISIT, EST, LEVL IV, 30-39 MIN: ICD-10-PCS | Mod: S$GLB,,, | Performed by: PHYSICIAN ASSISTANT

## 2020-01-01 PROCEDURE — 99291 PR CRITICAL CARE, E/M 30-74 MINUTES: ICD-10-PCS | Mod: 25,,, | Performed by: CLINICAL NURSE SPECIALIST

## 2020-01-01 PROCEDURE — 31500 INSERT EMERGENCY AIRWAY: CPT | Mod: ,,, | Performed by: INTERNAL MEDICINE

## 2020-01-01 PROCEDURE — 82570 ASSAY OF URINE CREATININE: CPT

## 2020-01-01 PROCEDURE — 99212 OFFICE O/P EST SF 10 MIN: CPT | Mod: S$PBB,,, | Performed by: PODIATRIST

## 2020-01-01 PROCEDURE — 83935 ASSAY OF URINE OSMOLALITY: CPT

## 2020-01-01 PROCEDURE — 96372 THER/PROPH/DIAG INJ SC/IM: CPT | Mod: ER

## 2020-01-01 PROCEDURE — 25000003 PHARM REV CODE 250

## 2020-01-01 PROCEDURE — C9399 UNCLASSIFIED DRUGS OR BIOLOG: HCPCS | Performed by: STUDENT IN AN ORGANIZED HEALTH CARE EDUCATION/TRAINING PROGRAM

## 2020-01-01 PROCEDURE — 93306 ECHO (CUPID ONLY): ICD-10-PCS | Mod: 26,S$PBB,, | Performed by: INTERNAL MEDICINE

## 2020-01-01 PROCEDURE — 93320 TRANSESOPHAGEAL ECHO (TEE) (CUPID ONLY): ICD-10-PCS | Mod: 26,,, | Performed by: INTERNAL MEDICINE

## 2020-01-01 PROCEDURE — 92960 CARDIOVERSION ELECTRIC EXT: CPT | Mod: 59 | Performed by: INTERNAL MEDICINE

## 2020-01-01 PROCEDURE — 82800 BLOOD PH: CPT

## 2020-01-01 PROCEDURE — 11721 PR DEBRIDEMENT OF NAILS, 6 OR MORE: ICD-10-PCS | Mod: S$PBB,59,Q9, | Performed by: PODIATRIST

## 2020-01-01 PROCEDURE — 99999 PR PBB SHADOW E&M-EST. PATIENT-LVL V: CPT | Mod: PBBFAC,,, | Performed by: NURSE PRACTITIONER

## 2020-01-01 PROCEDURE — 93325 DOPPLER ECHO COLOR FLOW MAPG: CPT | Mod: 26,,, | Performed by: INTERNAL MEDICINE

## 2020-01-01 PROCEDURE — 99999 PR PBB SHADOW E&M-EST. PATIENT-LVL II: CPT | Mod: PBBFAC,,, | Performed by: INTERNAL MEDICINE

## 2020-01-01 PROCEDURE — 82962 GLUCOSE BLOOD TEST: CPT | Performed by: INTERNAL MEDICINE

## 2020-01-01 PROCEDURE — 84300 ASSAY OF URINE SODIUM: CPT

## 2020-01-01 PROCEDURE — 99215 PR OFFICE/OUTPT VISIT, EST, LEVL V, 40-54 MIN: ICD-10-PCS | Mod: S$PBB,,, | Performed by: INTERNAL MEDICINE

## 2020-01-01 PROCEDURE — C9399 UNCLASSIFIED DRUGS OR BIOLOG: HCPCS | Performed by: NURSE PRACTITIONER

## 2020-01-01 PROCEDURE — 99497 ADVNCD CARE PLAN 30 MIN: CPT | Mod: ,,, | Performed by: EMERGENCY MEDICINE

## 2020-01-01 PROCEDURE — 99999 PR PBB SHADOW E&M-EST. PATIENT-LVL III: CPT | Mod: PBBFAC,,, | Performed by: INTERNAL MEDICINE

## 2020-01-01 PROCEDURE — 82550 ASSAY OF CK (CPK): CPT

## 2020-01-01 PROCEDURE — 85652 RBC SED RATE AUTOMATED: CPT

## 2020-01-01 PROCEDURE — 63600175 PHARM REV CODE 636 W HCPCS: Mod: ER | Performed by: INTERNAL MEDICINE

## 2020-01-01 PROCEDURE — 99292 PR CRITICAL CARE, ADDL 30 MIN: ICD-10-PCS | Mod: 25,,, | Performed by: CLINICAL NURSE SPECIALIST

## 2020-01-01 PROCEDURE — 63600175 PHARM REV CODE 636 W HCPCS: Performed by: PHYSICIAN ASSISTANT

## 2020-01-01 PROCEDURE — 81001 URINALYSIS AUTO W/SCOPE: CPT

## 2020-01-01 PROCEDURE — 99212 PR OFFICE/OUTPT VISIT, EST, LEVL II, 10-19 MIN: ICD-10-PCS | Mod: S$PBB,,, | Performed by: PODIATRIST

## 2020-01-01 PROCEDURE — 87081 CULTURE SCREEN ONLY: CPT

## 2020-01-01 PROCEDURE — 81003 URINALYSIS AUTO W/O SCOPE: CPT | Mod: QW,S$GLB,, | Performed by: PHYSICIAN ASSISTANT

## 2020-01-01 PROCEDURE — 36410 VNPNXR 3YR/> PHY/QHP DX/THER: CPT

## 2020-01-01 PROCEDURE — 76937 US GUIDE VASCULAR ACCESS: CPT

## 2020-01-01 PROCEDURE — 99498 ADVNCD CARE PLAN ADDL 30 MIN: CPT | Mod: ,,, | Performed by: EMERGENCY MEDICINE

## 2020-01-01 PROCEDURE — 99215 OFFICE O/P EST HI 40 MIN: CPT | Mod: PBBFAC,25 | Performed by: NURSE PRACTITIONER

## 2020-01-01 PROCEDURE — 99212 OFFICE O/P EST SF 10 MIN: CPT | Mod: PBBFAC,25 | Performed by: INTERNAL MEDICINE

## 2020-01-01 PROCEDURE — 37000009 HC ANESTHESIA EA ADD 15 MINS: Performed by: INTERNAL MEDICINE

## 2020-01-01 PROCEDURE — 01922 ANES N-INVAS IMG/RADJ THER: CPT | Performed by: INTERNAL MEDICINE

## 2020-01-01 PROCEDURE — 99214 OFFICE O/P EST MOD 30 MIN: CPT | Mod: S$PBB,,, | Performed by: NURSE PRACTITIONER

## 2020-01-01 PROCEDURE — 37799 UNLISTED PX VASCULAR SURGERY: CPT

## 2020-01-01 PROCEDURE — 93312 ECHO TRANSESOPHAGEAL: CPT | Mod: 26,,, | Performed by: INTERNAL MEDICINE

## 2020-01-01 PROCEDURE — 94002 VENT MGMT INPAT INIT DAY: CPT

## 2020-01-01 PROCEDURE — 99291 PR CRITICAL CARE, E/M 30-74 MINUTES: ICD-10-PCS | Mod: ,,, | Performed by: CLINICAL NURSE SPECIALIST

## 2020-01-01 PROCEDURE — 25500020 PHARM REV CODE 255: Performed by: INTERNAL MEDICINE

## 2020-01-01 PROCEDURE — C1751 CATH, INF, PER/CENT/MIDLINE: HCPCS

## 2020-01-01 PROCEDURE — 87040 BLOOD CULTURE FOR BACTERIA: CPT | Mod: 59

## 2020-01-01 PROCEDURE — 82150 ASSAY OF AMYLASE: CPT

## 2020-01-01 PROCEDURE — 99233 SBSQ HOSP IP/OBS HIGH 50: CPT | Mod: ,,, | Performed by: HOSPITALIST

## 2020-01-01 PROCEDURE — 93306 TTE W/DOPPLER COMPLETE: CPT | Mod: PBBFAC | Performed by: INTERNAL MEDICINE

## 2020-01-01 PROCEDURE — 85730 THROMBOPLASTIN TIME PARTIAL: CPT

## 2020-01-01 PROCEDURE — 84484 ASSAY OF TROPONIN QUANT: CPT | Mod: 91

## 2020-01-01 PROCEDURE — 71046 XR CHEST PA AND LATERAL: ICD-10-PCS | Mod: 26,,, | Performed by: RADIOLOGY

## 2020-01-01 PROCEDURE — 93320 DOPPLER ECHO COMPLETE: CPT | Mod: 26,,, | Performed by: INTERNAL MEDICINE

## 2020-01-01 PROCEDURE — 99214 OFFICE O/P EST MOD 30 MIN: CPT | Mod: PBBFAC,PO | Performed by: PODIATRIST

## 2020-01-01 PROCEDURE — 93010 EKG 12-LEAD: ICD-10-PCS | Mod: 59,,, | Performed by: INTERNAL MEDICINE

## 2020-01-01 PROCEDURE — 99999 PR PBB SHADOW E&M-EST. PATIENT-LVL V: ICD-10-PCS | Mod: PBBFAC,,, | Performed by: INTERNAL MEDICINE

## 2020-01-01 PROCEDURE — 99214 OFFICE O/P EST MOD 30 MIN: CPT | Mod: 25,S$GLB,, | Performed by: PHYSICIAN ASSISTANT

## 2020-01-01 PROCEDURE — 87040 BLOOD CULTURE FOR BACTERIA: CPT

## 2020-01-01 PROCEDURE — 97161 PT EVAL LOW COMPLEX 20 MIN: CPT

## 2020-01-01 PROCEDURE — 80069 RENAL FUNCTION PANEL: CPT

## 2020-01-01 PROCEDURE — 99238 PR HOSPITAL DISCHARGE DAY,<30 MIN: ICD-10-PCS | Mod: 25,,, | Performed by: PHYSICIAN ASSISTANT

## 2020-01-01 PROCEDURE — 81003 POCT URINALYSIS, DIPSTICK, AUTOMATED, W/O SCOPE: ICD-10-PCS | Mod: QW,S$GLB,, | Performed by: PHYSICIAN ASSISTANT

## 2020-01-01 PROCEDURE — 99291 PR CRITICAL CARE, E/M 30-74 MINUTES: ICD-10-PCS | Mod: ,,, | Performed by: NURSE PRACTITIONER

## 2020-01-01 PROCEDURE — 99233 PR SUBSEQUENT HOSPITAL CARE,LEVL III: ICD-10-PCS | Mod: ,,, | Performed by: HOSPITALIST

## 2020-01-01 PROCEDURE — 99291 CRITICAL CARE FIRST HOUR: CPT | Mod: 25,,, | Performed by: CLINICAL NURSE SPECIALIST

## 2020-01-01 PROCEDURE — 63600175 PHARM REV CODE 636 W HCPCS: Performed by: CLINICAL NURSE SPECIALIST

## 2020-01-01 PROCEDURE — 99498 PR ADVNCD CARE PLAN ADDL 30 MIN: ICD-10-PCS | Mod: ,,, | Performed by: EMERGENCY MEDICINE

## 2020-01-01 PROCEDURE — 99223 1ST HOSP IP/OBS HIGH 75: CPT | Mod: ,,, | Performed by: INTERNAL MEDICINE

## 2020-01-01 PROCEDURE — 99215 OFFICE O/P EST HI 40 MIN: CPT | Mod: S$PBB,,, | Performed by: INTERNAL MEDICINE

## 2020-01-01 PROCEDURE — 99284 PR EMERGENCY DEPT VISIT,LEVEL IV: ICD-10-PCS | Mod: ,,, | Performed by: EMERGENCY MEDICINE

## 2020-01-01 PROCEDURE — 96375 TX/PRO/DX INJ NEW DRUG ADDON: CPT

## 2020-01-01 PROCEDURE — 80048 BASIC METABOLIC PNL TOTAL CA: CPT | Mod: 91

## 2020-01-01 PROCEDURE — 84100 ASSAY OF PHOSPHORUS: CPT | Mod: 91

## 2020-01-01 PROCEDURE — 99213 OFFICE O/P EST LOW 20 MIN: CPT | Mod: 25,S$PBB,, | Performed by: PODIATRIST

## 2020-01-01 PROCEDURE — 87502 INFLUENZA DNA AMP PROBE: CPT

## 2020-01-01 PROCEDURE — 99499 UNLISTED E&M SERVICE: CPT | Mod: S$PBB,,, | Performed by: PODIATRIST

## 2020-01-01 PROCEDURE — 99499 NO LOS: ICD-10-PCS | Mod: S$PBB,,, | Performed by: PODIATRIST

## 2020-01-01 PROCEDURE — 97112 NEUROMUSCULAR REEDUCATION: CPT

## 2020-01-01 PROCEDURE — 27100098 HC SPACER

## 2020-01-01 PROCEDURE — 36573 INSJ PICC RS&I 5 YR+: CPT

## 2020-01-01 PROCEDURE — 83930 ASSAY OF BLOOD OSMOLALITY: CPT

## 2020-01-01 PROCEDURE — 97116 GAIT TRAINING THERAPY: CPT

## 2020-01-01 PROCEDURE — 99999 PR PBB SHADOW E&M-EST. PATIENT-LVL III: ICD-10-PCS | Mod: PBBFAC,,, | Performed by: INTERNAL MEDICINE

## 2020-01-01 PROCEDURE — 83735 ASSAY OF MAGNESIUM: CPT | Mod: 91

## 2020-01-01 PROCEDURE — 99214 OFFICE O/P EST MOD 30 MIN: CPT | Mod: S$GLB,,, | Performed by: PHYSICIAN ASSISTANT

## 2020-01-01 PROCEDURE — 93010 ELECTROCARDIOGRAM REPORT: CPT | Mod: 59,,, | Performed by: INTERNAL MEDICINE

## 2020-01-01 PROCEDURE — D9220A PRA ANESTHESIA: Mod: ANES,,, | Performed by: ANESTHESIOLOGY

## 2020-01-01 PROCEDURE — 99292 CRITICAL CARE ADDL 30 MIN: CPT | Mod: 25,,, | Performed by: CLINICAL NURSE SPECIALIST

## 2020-01-01 PROCEDURE — 99214 PR OFFICE/OUTPT VISIT, EST, LEVL IV, 30-39 MIN: ICD-10-PCS | Mod: 25,S$GLB,, | Performed by: PHYSICIAN ASSISTANT

## 2020-01-01 PROCEDURE — 99223 PR INITIAL HOSPITAL CARE,LEVL III: ICD-10-PCS | Mod: ,,, | Performed by: INTERNAL MEDICINE

## 2020-01-01 PROCEDURE — 99284 EMERGENCY DEPT VISIT MOD MDM: CPT | Mod: 25,ER

## 2020-01-01 PROCEDURE — 97535 SELF CARE MNGMENT TRAINING: CPT

## 2020-01-01 PROCEDURE — 99284 EMERGENCY DEPT VISIT MOD MDM: CPT | Mod: ,,, | Performed by: EMERGENCY MEDICINE

## 2020-01-01 PROCEDURE — D9220A PRA ANESTHESIA: Mod: CRNA,,, | Performed by: NURSE ANESTHETIST, CERTIFIED REGISTERED

## 2020-01-01 PROCEDURE — 36415 COLL VENOUS BLD VENIPUNCTURE: CPT | Mod: PO

## 2020-01-01 RX ORDER — MIDAZOLAM HYDROCHLORIDE 1 MG/ML
INJECTION, SOLUTION INTRAMUSCULAR; INTRAVENOUS
Status: DISCONTINUED | OUTPATIENT
Start: 2020-01-01 | End: 2020-01-01

## 2020-01-01 RX ORDER — SPIRONOLACTONE 25 MG/1
25 TABLET ORAL DAILY
Qty: 90 TABLET | Refills: 3 | Status: SHIPPED | OUTPATIENT
Start: 2020-01-01 | End: 2021-08-04

## 2020-01-01 RX ORDER — INSULIN ASPART 100 [IU]/ML
3 INJECTION, SOLUTION INTRAVENOUS; SUBCUTANEOUS
Status: DISCONTINUED | OUTPATIENT
Start: 2020-01-01 | End: 2020-01-01

## 2020-01-01 RX ORDER — VANCOMYCIN HCL IN 5 % DEXTROSE 1G/250ML
1000 PLASTIC BAG, INJECTION (ML) INTRAVENOUS
Status: DISCONTINUED | OUTPATIENT
Start: 2020-01-01 | End: 2020-01-01

## 2020-01-01 RX ORDER — INSULIN ASPART 100 [IU]/ML
10 INJECTION, SOLUTION INTRAVENOUS; SUBCUTANEOUS
Status: DISCONTINUED | OUTPATIENT
Start: 2020-01-01 | End: 2020-01-01

## 2020-01-01 RX ORDER — FUROSEMIDE 20 MG/1
80 TABLET ORAL 2 TIMES DAILY
Status: DISCONTINUED | OUTPATIENT
Start: 2020-01-01 | End: 2020-01-01

## 2020-01-01 RX ORDER — LIDOCAINE HYDROCHLORIDE 20 MG/ML
SOLUTION OROPHARYNGEAL
Status: DISCONTINUED | OUTPATIENT
Start: 2020-01-01 | End: 2020-01-01

## 2020-01-01 RX ORDER — FAMOTIDINE 40 MG/1
40 TABLET, FILM COATED ORAL 2 TIMES DAILY
Qty: 180 TABLET | Refills: 1 | Status: CANCELLED | OUTPATIENT
Start: 2020-01-01

## 2020-01-01 RX ORDER — FUROSEMIDE 10 MG/ML
80 INJECTION INTRAMUSCULAR; INTRAVENOUS
Status: DISCONTINUED | OUTPATIENT
Start: 2020-01-01 | End: 2020-01-01

## 2020-01-01 RX ORDER — DIPHENHYDRAMINE HYDROCHLORIDE 50 MG/ML
25 INJECTION INTRAMUSCULAR; INTRAVENOUS EVERY 6 HOURS PRN
Status: DISCONTINUED | OUTPATIENT
Start: 2020-01-01 | End: 2020-01-01 | Stop reason: HOSPADM

## 2020-01-01 RX ORDER — KETAMINE HCL IN 0.9 % NACL 50 MG/5 ML
SYRINGE (ML) INTRAVENOUS
Status: DISCONTINUED | OUTPATIENT
Start: 2020-01-01 | End: 2020-01-01

## 2020-01-01 RX ORDER — ENOXAPARIN SODIUM 100 MG/ML
100 INJECTION SUBCUTANEOUS 2 TIMES DAILY
Qty: 14 ML | Refills: 0 | Status: SHIPPED | OUTPATIENT
Start: 2020-01-01 | End: 2020-01-01 | Stop reason: SDUPTHER

## 2020-01-01 RX ORDER — INSULIN ASPART 100 [IU]/ML
0-5 INJECTION, SOLUTION INTRAVENOUS; SUBCUTANEOUS
Status: DISCONTINUED | OUTPATIENT
Start: 2020-01-01 | End: 2020-01-01

## 2020-01-01 RX ORDER — ACETAMINOPHEN 325 MG/1
650 TABLET ORAL ONCE AS NEEDED
Status: COMPLETED | OUTPATIENT
Start: 2020-01-01 | End: 2020-01-01

## 2020-01-01 RX ORDER — TALC
6 POWDER (GRAM) TOPICAL NIGHTLY PRN
Status: DISCONTINUED | OUTPATIENT
Start: 2020-01-01 | End: 2020-01-01

## 2020-01-01 RX ORDER — CLINDAMYCIN HYDROCHLORIDE 300 MG/1
300 CAPSULE ORAL 4 TIMES DAILY
Qty: 40 CAPSULE | Refills: 0 | Status: SHIPPED | OUTPATIENT
Start: 2020-01-01 | End: 2020-01-01

## 2020-01-01 RX ORDER — FUROSEMIDE 40 MG/1
80 TABLET ORAL 2 TIMES DAILY
Status: DISCONTINUED | OUTPATIENT
Start: 2020-01-01 | End: 2020-01-01

## 2020-01-01 RX ORDER — ATORVASTATIN CALCIUM 20 MG/1
20 TABLET, FILM COATED ORAL DAILY
Qty: 90 TABLET | Refills: 1 | Status: CANCELLED | OUTPATIENT
Start: 2020-01-01 | End: 2021-08-11

## 2020-01-01 RX ORDER — LEVALBUTEROL TARTRATE 45 UG/1
AEROSOL, METERED ORAL
Qty: 15 G | Refills: 3 | Status: CANCELLED | OUTPATIENT
Start: 2020-01-01

## 2020-01-01 RX ORDER — FENTANYL CITRATE 50 UG/ML
25 INJECTION, SOLUTION INTRAMUSCULAR; INTRAVENOUS EVERY 5 MIN PRN
Status: DISCONTINUED | OUTPATIENT
Start: 2020-01-01 | End: 2020-01-01 | Stop reason: HOSPADM

## 2020-01-01 RX ORDER — CEFEPIME HYDROCHLORIDE 2 G/1
2 INJECTION, POWDER, FOR SOLUTION INTRAVENOUS
Status: DISCONTINUED | OUTPATIENT
Start: 2020-01-01 | End: 2020-01-01

## 2020-01-01 RX ORDER — LISINOPRIL 5 MG/1
TABLET ORAL
Qty: 60 TABLET | Refills: 8 | Status: SHIPPED | OUTPATIENT
Start: 2020-01-01 | End: 2020-01-01 | Stop reason: SDUPTHER

## 2020-01-01 RX ORDER — INSULIN ASPART 100 [IU]/ML
1-10 INJECTION, SOLUTION INTRAVENOUS; SUBCUTANEOUS
Status: DISCONTINUED | OUTPATIENT
Start: 2020-01-01 | End: 2020-01-01

## 2020-01-01 RX ORDER — FUROSEMIDE 10 MG/ML
80 INJECTION INTRAMUSCULAR; INTRAVENOUS ONCE
Status: COMPLETED | OUTPATIENT
Start: 2020-01-01 | End: 2020-01-01

## 2020-01-01 RX ORDER — IBUPROFEN 200 MG
24 TABLET ORAL
Status: DISCONTINUED | OUTPATIENT
Start: 2020-01-01 | End: 2020-01-01

## 2020-01-01 RX ORDER — AMIODARONE HYDROCHLORIDE 200 MG/1
200 TABLET ORAL DAILY
Status: DISCONTINUED | OUTPATIENT
Start: 2020-01-01 | End: 2020-01-01

## 2020-01-01 RX ORDER — INDOMETHACIN 25 MG/1
CAPSULE ORAL
Status: COMPLETED
Start: 2020-01-01 | End: 2020-01-01

## 2020-01-01 RX ORDER — SPIRONOLACTONE 25 MG/1
25 TABLET ORAL DAILY
Qty: 90 TABLET | Refills: 3 | Status: SHIPPED | OUTPATIENT
Start: 2020-01-01 | End: 2020-01-01 | Stop reason: SDUPTHER

## 2020-01-01 RX ORDER — SODIUM BICARBONATE 1 MEQ/ML
100 SYRINGE (ML) INTRAVENOUS ONCE
Status: DISCONTINUED | OUTPATIENT
Start: 2020-01-01 | End: 2020-01-01 | Stop reason: HOSPADM

## 2020-01-01 RX ORDER — DOXYCYCLINE HYCLATE 100 MG
100 TABLET ORAL EVERY 12 HOURS
Status: DISCONTINUED | OUTPATIENT
Start: 2020-01-01 | End: 2020-01-01

## 2020-01-01 RX ORDER — NOREPINEPHRINE BITARTRATE/D5W 4MG/250ML
PLASTIC BAG, INJECTION (ML) INTRAVENOUS
Status: COMPLETED
Start: 2020-01-01 | End: 2020-01-01

## 2020-01-01 RX ORDER — CEFTRIAXONE 1 G/1
1 INJECTION, POWDER, FOR SOLUTION INTRAMUSCULAR; INTRAVENOUS
Status: DISCONTINUED | OUTPATIENT
Start: 2020-01-01 | End: 2020-01-01

## 2020-01-01 RX ORDER — SODIUM CHLORIDE 0.9 % (FLUSH) 0.9 %
10 SYRINGE (ML) INJECTION
Status: DISCONTINUED | OUTPATIENT
Start: 2020-01-01 | End: 2020-01-01 | Stop reason: HOSPADM

## 2020-01-01 RX ORDER — ENOXAPARIN SODIUM 100 MG/ML
1 INJECTION SUBCUTANEOUS ONCE
Status: COMPLETED | OUTPATIENT
Start: 2020-01-01 | End: 2020-01-01

## 2020-01-01 RX ORDER — SILVER SULFADIAZINE 10 G/1000G
CREAM TOPICAL
Status: DISCONTINUED | OUTPATIENT
Start: 2020-01-01 | End: 2020-01-01 | Stop reason: HOSPADM

## 2020-01-01 RX ORDER — ATORVASTATIN CALCIUM 20 MG/1
20 TABLET, FILM COATED ORAL DAILY
Qty: 90 TABLET | Refills: 1 | Status: SHIPPED | OUTPATIENT
Start: 2020-01-01 | End: 2021-08-12

## 2020-01-01 RX ORDER — VASOPRESSIN 20 [USP'U]/ML
INJECTION, SOLUTION INTRAMUSCULAR; SUBCUTANEOUS
Status: COMPLETED
Start: 2020-01-01 | End: 2020-01-01

## 2020-01-01 RX ORDER — ACETAMINOPHEN 325 MG/1
650 TABLET ORAL ONCE
Status: DISCONTINUED | OUTPATIENT
Start: 2020-01-01 | End: 2020-01-01

## 2020-01-01 RX ORDER — INSULIN ASPART 100 [IU]/ML
10 INJECTION, SOLUTION INTRAVENOUS; SUBCUTANEOUS 3 TIMES DAILY
Status: DISCONTINUED | OUTPATIENT
Start: 2020-01-01 | End: 2020-01-01

## 2020-01-01 RX ORDER — FLUTICASONE PROPIONATE 110 UG/1
2 AEROSOL, METERED RESPIRATORY (INHALATION) 2 TIMES DAILY
Status: DISCONTINUED | OUTPATIENT
Start: 2020-01-01 | End: 2020-01-01

## 2020-01-01 RX ORDER — GLYCOPYRROLATE 0.2 MG/ML
INJECTION INTRAMUSCULAR; INTRAVENOUS
Status: DISCONTINUED | OUTPATIENT
Start: 2020-01-01 | End: 2020-01-01

## 2020-01-01 RX ORDER — PROPOFOL 10 MG/ML
0-50 INJECTION, EMULSION INTRAVENOUS CONTINUOUS
Status: DISCONTINUED | OUTPATIENT
Start: 2020-01-01 | End: 2020-01-01 | Stop reason: HOSPADM

## 2020-01-01 RX ORDER — FAMOTIDINE 20 MG/1
20 TABLET, FILM COATED ORAL 2 TIMES DAILY
Status: DISCONTINUED | OUTPATIENT
Start: 2020-01-01 | End: 2020-01-01

## 2020-01-01 RX ORDER — ACETAMINOPHEN 325 MG/1
650 TABLET ORAL EVERY 6 HOURS PRN
Status: DISCONTINUED | OUTPATIENT
Start: 2020-01-01 | End: 2020-01-01 | Stop reason: HOSPADM

## 2020-01-01 RX ORDER — PHENYLEPHRINE HYDROCHLORIDE 10 MG/ML
100 INJECTION INTRAVENOUS
Status: DISCONTINUED | OUTPATIENT
Start: 2020-01-01 | End: 2020-01-01

## 2020-01-01 RX ORDER — WARFARIN 7.5 MG/1
TABLET ORAL
Qty: 60 TABLET | Refills: 3 | Status: SHIPPED | OUTPATIENT
Start: 2020-01-01

## 2020-01-01 RX ORDER — BISACODYL 10 MG
10 SUPPOSITORY, RECTAL RECTAL DAILY PRN
Status: DISCONTINUED | OUTPATIENT
Start: 2020-01-01 | End: 2020-01-01 | Stop reason: HOSPADM

## 2020-01-01 RX ORDER — EPHEDRINE SULFATE 50 MG/ML
INJECTION, SOLUTION INTRAVENOUS
Status: DISCONTINUED | OUTPATIENT
Start: 2020-01-01 | End: 2020-01-01

## 2020-01-01 RX ORDER — MONTELUKAST SODIUM 10 MG/1
10 TABLET ORAL DAILY
Status: DISCONTINUED | OUTPATIENT
Start: 2020-01-01 | End: 2020-01-01

## 2020-01-01 RX ORDER — INSULIN ASPART 100 [IU]/ML
12 INJECTION, SOLUTION INTRAVENOUS; SUBCUTANEOUS
Status: DISCONTINUED | OUTPATIENT
Start: 2020-01-01 | End: 2020-01-01

## 2020-01-01 RX ORDER — WARFARIN 7.5 MG/1
TABLET ORAL
Qty: 60 TABLET | Refills: 3 | Status: SHIPPED | OUTPATIENT
Start: 2020-01-01 | End: 2020-01-01 | Stop reason: SDUPTHER

## 2020-01-01 RX ORDER — IBUPROFEN 200 MG
200 TABLET ORAL ONCE
Status: DISCONTINUED | OUTPATIENT
Start: 2020-01-01 | End: 2020-01-01

## 2020-01-01 RX ORDER — ENOXAPARIN SODIUM 100 MG/ML
100 INJECTION SUBCUTANEOUS 2 TIMES DAILY
Qty: 14 ML | Refills: 0 | Status: SHIPPED | OUTPATIENT
Start: 2020-01-01 | End: 2020-01-01

## 2020-01-01 RX ORDER — FENTANYL CITRATE 50 UG/ML
25 INJECTION, SOLUTION INTRAMUSCULAR; INTRAVENOUS EVERY 5 MIN PRN
Status: CANCELLED | OUTPATIENT
Start: 2020-01-01

## 2020-01-01 RX ORDER — DEXAMETHASONE SODIUM PHOSPHATE 4 MG/ML
8 INJECTION, SOLUTION INTRA-ARTICULAR; INTRALESIONAL; INTRAMUSCULAR; INTRAVENOUS; SOFT TISSUE
Status: COMPLETED | OUTPATIENT
Start: 2020-01-01 | End: 2020-01-01

## 2020-01-01 RX ORDER — INSULIN ASPART 100 [IU]/ML
5 INJECTION, SOLUTION INTRAVENOUS; SUBCUTANEOUS
Status: DISCONTINUED | OUTPATIENT
Start: 2020-01-01 | End: 2020-01-01

## 2020-01-01 RX ORDER — SODIUM BICARBONATE 1 MEQ/ML
SYRINGE (ML) INTRAVENOUS
Status: DISCONTINUED
Start: 2020-01-01 | End: 2020-01-01 | Stop reason: HOSPADM

## 2020-01-01 RX ORDER — DIGOXIN 0.25 MG/ML
100 INJECTION INTRAMUSCULAR; INTRAVENOUS DAILY
Status: DISCONTINUED | OUTPATIENT
Start: 2020-01-01 | End: 2020-01-01 | Stop reason: HOSPADM

## 2020-01-01 RX ORDER — SUCCINYLCHOLINE CHLORIDE 20 MG/ML
INJECTION INTRAMUSCULAR; INTRAVENOUS
Status: DISPENSED
Start: 2020-01-01 | End: 2020-01-01

## 2020-01-01 RX ORDER — SODIUM,POTASSIUM PHOSPHATES 280-250MG
1 POWDER IN PACKET (EA) ORAL ONCE
Status: COMPLETED | OUTPATIENT
Start: 2020-01-01 | End: 2020-01-01

## 2020-01-01 RX ORDER — ALLOPURINOL 100 MG/1
200 TABLET ORAL DAILY
Qty: 180 TABLET | Refills: 3 | Status: SHIPPED | OUTPATIENT
Start: 2020-01-01

## 2020-01-01 RX ORDER — ASCORBIC ACID 500 MG
500 TABLET ORAL 2 TIMES DAILY
Status: DISCONTINUED | OUTPATIENT
Start: 2020-01-01 | End: 2020-01-01

## 2020-01-01 RX ORDER — ACETAMINOPHEN 325 MG/1
650 TABLET ORAL ONCE AS NEEDED
Status: DISCONTINUED | OUTPATIENT
Start: 2020-01-01 | End: 2020-01-01

## 2020-01-01 RX ORDER — INSULIN ASPART 100 [IU]/ML
12-24 INJECTION, SOLUTION INTRAVENOUS; SUBCUTANEOUS
Status: DISCONTINUED | OUTPATIENT
Start: 2020-01-01 | End: 2020-01-01

## 2020-01-01 RX ORDER — ROCURONIUM BROMIDE 10 MG/ML
INJECTION, SOLUTION INTRAVENOUS
Status: DISPENSED
Start: 2020-01-01 | End: 2020-01-01

## 2020-01-01 RX ORDER — LANOLIN ALCOHOL/MO/W.PET/CERES
400 CREAM (GRAM) TOPICAL 2 TIMES DAILY
Qty: 180 TABLET | Refills: 3 | Status: SHIPPED | OUTPATIENT
Start: 2020-01-01 | End: 2021-08-20

## 2020-01-01 RX ORDER — FLUTICASONE PROPIONATE 50 MCG
2 SPRAY, SUSPENSION (ML) NASAL DAILY
Status: DISCONTINUED | OUTPATIENT
Start: 2020-01-01 | End: 2020-01-01

## 2020-01-01 RX ORDER — ATORVASTATIN CALCIUM 20 MG/1
20 TABLET, FILM COATED ORAL DAILY
Qty: 90 TABLET | Refills: 1 | Status: CANCELLED | OUTPATIENT
Start: 2020-01-01 | End: 2021-02-06

## 2020-01-01 RX ORDER — ATORVASTATIN CALCIUM 20 MG/1
20 TABLET, FILM COATED ORAL DAILY
Qty: 90 TABLET | Refills: 1 | Status: SHIPPED | OUTPATIENT
Start: 2020-01-01 | End: 2020-01-01 | Stop reason: SDUPTHER

## 2020-01-01 RX ORDER — INSULIN ASPART 100 [IU]/ML
4 INJECTION, SOLUTION INTRAVENOUS; SUBCUTANEOUS
Status: DISCONTINUED | OUTPATIENT
Start: 2020-01-01 | End: 2020-01-01

## 2020-01-01 RX ORDER — INSULIN GLARGINE 100 [IU]/ML
INJECTION, SOLUTION SUBCUTANEOUS
Qty: 30 ML | Refills: 6 | Status: SHIPPED | OUTPATIENT
Start: 2020-01-01 | End: 2021-08-19

## 2020-01-01 RX ORDER — FUROSEMIDE 10 MG/ML
80 INJECTION INTRAMUSCULAR; INTRAVENOUS 3 TIMES DAILY
Status: DISCONTINUED | OUTPATIENT
Start: 2020-01-01 | End: 2020-01-01

## 2020-01-01 RX ORDER — FUROSEMIDE 10 MG/ML
80 INJECTION INTRAMUSCULAR; INTRAVENOUS ONCE
Status: DISCONTINUED | OUTPATIENT
Start: 2020-01-01 | End: 2020-01-01

## 2020-01-01 RX ORDER — AZITHROMYCIN 250 MG/1
500 TABLET, FILM COATED ORAL
Status: DISCONTINUED | OUTPATIENT
Start: 2020-01-01 | End: 2020-01-01

## 2020-01-01 RX ORDER — FENTANYL CITRATE 50 UG/ML
50 INJECTION, SOLUTION INTRAMUSCULAR; INTRAVENOUS
Status: DISCONTINUED | OUTPATIENT
Start: 2020-01-01 | End: 2020-01-01 | Stop reason: HOSPADM

## 2020-01-01 RX ORDER — GLUCAGON 1 MG
1 KIT INJECTION
Status: DISCONTINUED | OUTPATIENT
Start: 2020-01-01 | End: 2020-01-01

## 2020-01-01 RX ORDER — NITROGLYCERIN 0.4 MG/1
0.4 TABLET SUBLINGUAL EVERY 5 MIN PRN
Status: DISCONTINUED | OUTPATIENT
Start: 2020-01-01 | End: 2020-01-01 | Stop reason: HOSPADM

## 2020-01-01 RX ORDER — FENTANYL CITRATE 50 UG/ML
INJECTION, SOLUTION INTRAMUSCULAR; INTRAVENOUS
Status: DISCONTINUED | OUTPATIENT
Start: 2020-01-01 | End: 2020-01-01

## 2020-01-01 RX ORDER — SODIUM CHLORIDE 0.9 % (FLUSH) 0.9 %
10 SYRINGE (ML) INJECTION
Status: DISCONTINUED | OUTPATIENT
Start: 2020-01-01 | End: 2020-01-01

## 2020-01-01 RX ORDER — DIGOXIN 125 MCG
125 TABLET ORAL DAILY
Status: DISCONTINUED | OUTPATIENT
Start: 2020-01-01 | End: 2020-01-01

## 2020-01-01 RX ORDER — ONDANSETRON 4 MG/1
4 TABLET, ORALLY DISINTEGRATING ORAL EVERY 6 HOURS PRN
Qty: 15 TABLET | Refills: 0 | Status: SHIPPED | OUTPATIENT
Start: 2020-01-01

## 2020-01-01 RX ORDER — LISINOPRIL 5 MG/1
TABLET ORAL
Qty: 60 TABLET | Refills: 8 | Status: CANCELLED | OUTPATIENT
Start: 2020-01-01

## 2020-01-01 RX ORDER — FAMOTIDINE 10 MG/ML
20 INJECTION INTRAVENOUS NIGHTLY
Status: DISCONTINUED | OUTPATIENT
Start: 2020-01-01 | End: 2020-01-01 | Stop reason: HOSPADM

## 2020-01-01 RX ORDER — CHLORHEXIDINE GLUCONATE ORAL RINSE 1.2 MG/ML
15 SOLUTION DENTAL 2 TIMES DAILY
Status: DISCONTINUED | OUTPATIENT
Start: 2020-01-01 | End: 2020-01-01 | Stop reason: HOSPADM

## 2020-01-01 RX ORDER — PROMETHAZINE HYDROCHLORIDE 6.25 MG/5ML
6.25 SYRUP ORAL EVERY 6 HOURS PRN
Qty: 118 ML | Refills: 0 | Status: CANCELLED | OUTPATIENT
Start: 2020-01-01

## 2020-01-01 RX ORDER — ALBUTEROL SULFATE 90 UG/1
2 AEROSOL, METERED RESPIRATORY (INHALATION)
Status: DISCONTINUED | OUTPATIENT
Start: 2020-01-01 | End: 2020-01-01

## 2020-01-01 RX ORDER — TRAMADOL HYDROCHLORIDE 50 MG/1
50 TABLET ORAL EVERY 6 HOURS PRN
Status: DISCONTINUED | OUTPATIENT
Start: 2020-01-01 | End: 2020-01-01

## 2020-01-01 RX ORDER — EPINEPHRINE 1 MG/ML
INJECTION, SOLUTION INTRACARDIAC; INTRAMUSCULAR; INTRAVENOUS; SUBCUTANEOUS
Status: DISPENSED
Start: 2020-01-01 | End: 2020-01-01

## 2020-01-01 RX ORDER — ZINC SULFATE 50(220)MG
220 CAPSULE ORAL DAILY
Status: DISCONTINUED | OUTPATIENT
Start: 2020-01-01 | End: 2020-01-01

## 2020-01-01 RX ORDER — ETOMIDATE 2 MG/ML
INJECTION INTRAVENOUS
Status: DISPENSED
Start: 2020-01-01 | End: 2020-01-01

## 2020-01-01 RX ORDER — AZELASTINE 1 MG/ML
2 SPRAY, METERED NASAL 2 TIMES DAILY
Qty: 30 ML | Refills: 0 | Status: SHIPPED | OUTPATIENT
Start: 2020-01-01 | End: 2020-01-01

## 2020-01-01 RX ORDER — MAG HYDROX/ALUMINUM HYD/SIMETH 200-200-20
30 SUSPENSION, ORAL (FINAL DOSE FORM) ORAL
Status: DISCONTINUED | OUTPATIENT
Start: 2020-01-01 | End: 2020-01-01

## 2020-01-01 RX ORDER — POLYETHYLENE GLYCOL 3350 17 G/17G
17 POWDER, FOR SOLUTION ORAL 2 TIMES DAILY
Status: DISCONTINUED | OUTPATIENT
Start: 2020-01-01 | End: 2020-01-01

## 2020-01-01 RX ORDER — DIGOXIN 0.25 MG/ML
125 INJECTION INTRAMUSCULAR; INTRAVENOUS DAILY
Status: DISCONTINUED | OUTPATIENT
Start: 2020-01-01 | End: 2020-01-01

## 2020-01-01 RX ORDER — LOPERAMIDE HYDROCHLORIDE 2 MG/1
4 CAPSULE ORAL EVERY 6 HOURS PRN
Status: DISCONTINUED | OUTPATIENT
Start: 2020-01-01 | End: 2020-01-01

## 2020-01-01 RX ORDER — INDOMETHACIN 25 MG/1
100 CAPSULE ORAL ONCE
Status: COMPLETED | OUTPATIENT
Start: 2020-01-01 | End: 2020-01-01

## 2020-01-01 RX ORDER — POTASSIUM CHLORIDE 750 MG/1
20 CAPSULE, EXTENDED RELEASE ORAL DAILY
Qty: 60 CAPSULE | Refills: 11 | Status: CANCELLED | OUTPATIENT
Start: 2020-01-01

## 2020-01-01 RX ORDER — IBUPROFEN 200 MG
16 TABLET ORAL
Status: DISCONTINUED | OUTPATIENT
Start: 2020-01-01 | End: 2020-01-01

## 2020-01-01 RX ORDER — PEN NEEDLE, DIABETIC 31 GX5/16"
NEEDLE, DISPOSABLE MISCELLANEOUS
Qty: 200 EACH | Refills: 11 | Status: SHIPPED | OUTPATIENT
Start: 2020-01-01

## 2020-01-01 RX ORDER — SENNOSIDES 8.6 MG/1
8.6 TABLET ORAL DAILY
Status: DISCONTINUED | OUTPATIENT
Start: 2020-01-01 | End: 2020-01-01

## 2020-01-01 RX ORDER — DIGOXIN 125 MCG
125 TABLET ORAL DAILY
Qty: 90 TABLET | Refills: 3 | Status: SHIPPED | OUTPATIENT
Start: 2020-01-01

## 2020-01-01 RX ORDER — BUTALBITAL, ACETAMINOPHEN AND CAFFEINE 50; 325; 40 MG/1; MG/1; MG/1
1 TABLET ORAL EVERY 6 HOURS PRN
Status: DISCONTINUED | OUTPATIENT
Start: 2020-01-01 | End: 2020-01-01 | Stop reason: HOSPADM

## 2020-01-01 RX ORDER — AMOXICILLIN 250 MG
2 CAPSULE ORAL 2 TIMES DAILY
Status: DISCONTINUED | OUTPATIENT
Start: 2020-01-01 | End: 2020-01-01

## 2020-01-01 RX ORDER — CLINDAMYCIN PHOSPHATE 900 MG/50ML
900 INJECTION, SOLUTION INTRAVENOUS
Status: COMPLETED | OUTPATIENT
Start: 2020-01-01 | End: 2020-01-01

## 2020-01-01 RX ORDER — POTASSIUM CHLORIDE 750 MG/1
20 CAPSULE, EXTENDED RELEASE ORAL DAILY
Qty: 60 CAPSULE | Refills: 11 | Status: SHIPPED | OUTPATIENT
Start: 2020-01-01 | End: 2020-01-01 | Stop reason: SDUPTHER

## 2020-01-01 RX ORDER — MUPIROCIN 20 MG/G
OINTMENT TOPICAL 2 TIMES DAILY
Status: COMPLETED | OUTPATIENT
Start: 2020-01-01 | End: 2020-01-01

## 2020-01-01 RX ORDER — INSULIN ASPART 100 [IU]/ML
14 INJECTION, SOLUTION INTRAVENOUS; SUBCUTANEOUS
Status: DISCONTINUED | OUTPATIENT
Start: 2020-01-01 | End: 2020-01-01

## 2020-01-01 RX ORDER — INSULIN ASPART 100 [IU]/ML
0-10 INJECTION, SOLUTION INTRAVENOUS; SUBCUTANEOUS
Status: DISCONTINUED | OUTPATIENT
Start: 2020-01-01 | End: 2020-01-01

## 2020-01-01 RX ORDER — INSULIN ASPART 100 [IU]/ML
6 INJECTION, SOLUTION INTRAVENOUS; SUBCUTANEOUS
Status: DISCONTINUED | OUTPATIENT
Start: 2020-01-01 | End: 2020-01-01

## 2020-01-01 RX ORDER — PREDNISONE 10 MG/1
10 TABLET ORAL DAILY
Qty: 5 TABLET | Refills: 0 | Status: SHIPPED | OUTPATIENT
Start: 2020-01-01 | End: 2020-01-01

## 2020-01-01 RX ORDER — FLUTICASONE FUROATE AND VILANTEROL 100; 25 UG/1; UG/1
1 POWDER RESPIRATORY (INHALATION) DAILY
Status: DISCONTINUED | OUTPATIENT
Start: 2020-01-01 | End: 2020-01-01

## 2020-01-01 RX ORDER — BENZONATATE 100 MG/1
100 CAPSULE ORAL 3 TIMES DAILY PRN
Status: DISCONTINUED | OUTPATIENT
Start: 2020-01-01 | End: 2020-01-01 | Stop reason: HOSPADM

## 2020-01-01 RX ORDER — HYDROMORPHONE HYDROCHLORIDE 1 MG/ML
0.2 INJECTION, SOLUTION INTRAMUSCULAR; INTRAVENOUS; SUBCUTANEOUS EVERY 5 MIN PRN
Status: CANCELLED | OUTPATIENT
Start: 2020-01-01

## 2020-01-01 RX ORDER — MORPHINE SULFATE 2 MG/ML
2 INJECTION, SOLUTION INTRAMUSCULAR; INTRAVENOUS EVERY 6 HOURS PRN
Status: COMPLETED | OUTPATIENT
Start: 2020-01-01 | End: 2020-01-01

## 2020-01-01 RX ORDER — LORAZEPAM 2 MG/ML
2 INJECTION INTRAMUSCULAR ONCE
Status: DISCONTINUED | OUTPATIENT
Start: 2020-01-01 | End: 2020-01-01

## 2020-01-01 RX ORDER — SODIUM CHLORIDE 0.9 % (FLUSH) 0.9 %
10 SYRINGE (ML) INJECTION EVERY 6 HOURS
Status: DISCONTINUED | OUTPATIENT
Start: 2020-01-01 | End: 2020-01-01 | Stop reason: HOSPADM

## 2020-01-01 RX ORDER — ALLOPURINOL 100 MG/1
200 TABLET ORAL DAILY
Qty: 180 TABLET | Refills: 3 | Status: CANCELLED | OUTPATIENT
Start: 2020-01-01

## 2020-01-01 RX ORDER — INSULIN ASPART 100 [IU]/ML
20 INJECTION, SOLUTION INTRAVENOUS; SUBCUTANEOUS
Status: DISCONTINUED | OUTPATIENT
Start: 2020-01-01 | End: 2020-01-01

## 2020-01-01 RX ORDER — SODIUM CHLORIDE 0.9 % (FLUSH) 0.9 %
3 SYRINGE (ML) INJECTION
Status: DISCONTINUED | OUTPATIENT
Start: 2020-01-01 | End: 2020-01-01 | Stop reason: HOSPADM

## 2020-01-01 RX ORDER — AMIODARONE HYDROCHLORIDE 200 MG/1
TABLET ORAL
Qty: 60 TABLET | Refills: 6 | Status: SHIPPED | OUTPATIENT
Start: 2020-01-01

## 2020-01-01 RX ORDER — ALBUTEROL SULFATE 2.5 MG/.5ML
2.5 SOLUTION RESPIRATORY (INHALATION)
Status: COMPLETED | OUTPATIENT
Start: 2020-01-01 | End: 2020-01-01

## 2020-01-01 RX ORDER — PROPOFOL 10 MG/ML
INJECTION, EMULSION INTRAVENOUS
Status: DISPENSED
Start: 2020-01-01 | End: 2020-01-01

## 2020-01-01 RX ORDER — POTASSIUM CHLORIDE 750 MG/1
20 CAPSULE, EXTENDED RELEASE ORAL DAILY
Qty: 60 CAPSULE | Refills: 11 | Status: SHIPPED | OUTPATIENT
Start: 2020-01-01

## 2020-01-01 RX ORDER — HYDROMORPHONE HYDROCHLORIDE 1 MG/ML
0.2 INJECTION, SOLUTION INTRAMUSCULAR; INTRAVENOUS; SUBCUTANEOUS EVERY 5 MIN PRN
Status: DISCONTINUED | OUTPATIENT
Start: 2020-01-01 | End: 2020-01-01 | Stop reason: HOSPADM

## 2020-01-01 RX ORDER — CEFTRIAXONE 1 G/1
1 INJECTION, POWDER, FOR SOLUTION INTRAMUSCULAR; INTRAVENOUS
Status: COMPLETED | OUTPATIENT
Start: 2020-01-01 | End: 2020-01-01

## 2020-01-01 RX ORDER — HYDROXYZINE HYDROCHLORIDE 25 MG/1
25 TABLET, FILM COATED ORAL ONCE AS NEEDED
Status: COMPLETED | OUTPATIENT
Start: 2020-01-01 | End: 2020-01-01

## 2020-01-01 RX ORDER — LISINOPRIL 5 MG/1
TABLET ORAL
Qty: 60 TABLET | Refills: 8 | Status: SHIPPED | OUTPATIENT
Start: 2020-01-01

## 2020-01-01 RX ORDER — INSULIN ASPART 100 [IU]/ML
7 INJECTION, SOLUTION INTRAVENOUS; SUBCUTANEOUS
Status: DISCONTINUED | OUTPATIENT
Start: 2020-01-01 | End: 2020-01-01

## 2020-01-01 RX ORDER — BENZONATATE 100 MG/1
200 CAPSULE ORAL 3 TIMES DAILY PRN
Qty: 20 CAPSULE | Refills: 0 | Status: SHIPPED | OUTPATIENT
Start: 2020-01-01 | End: 2020-01-01

## 2020-01-01 RX ORDER — DIPHENHYDRAMINE HYDROCHLORIDE 50 MG/ML
25 INJECTION INTRAMUSCULAR; INTRAVENOUS EVERY 6 HOURS PRN
Status: CANCELLED | OUTPATIENT
Start: 2020-01-01

## 2020-01-01 RX ORDER — WARFARIN 2.5 MG/1
2.5 TABLET ORAL DAILY
Status: DISCONTINUED | OUTPATIENT
Start: 2020-01-01 | End: 2020-01-01

## 2020-01-01 RX ORDER — INSULIN ASPART 100 [IU]/ML
7 INJECTION, SOLUTION INTRAVENOUS; SUBCUTANEOUS 3 TIMES DAILY
Status: DISCONTINUED | OUTPATIENT
Start: 2020-01-01 | End: 2020-01-01

## 2020-01-01 RX ORDER — CHOLECALCIFEROL (VITAMIN D3) 25 MCG
1000 TABLET ORAL DAILY
Status: DISCONTINUED | OUTPATIENT
Start: 2020-01-01 | End: 2020-01-01

## 2020-01-01 RX ORDER — ALBUTEROL SULFATE 90 UG/1
2 AEROSOL, METERED RESPIRATORY (INHALATION) EVERY 6 HOURS PRN
Status: DISCONTINUED | OUTPATIENT
Start: 2020-01-01 | End: 2020-01-01 | Stop reason: HOSPADM

## 2020-01-01 RX ORDER — CEPHALEXIN 500 MG/1
500 CAPSULE ORAL EVERY 8 HOURS
Qty: 21 CAPSULE | Refills: 0 | Status: SHIPPED | OUTPATIENT
Start: 2020-01-01 | End: 2020-01-01

## 2020-01-01 RX ORDER — ACETAMINOPHEN 325 MG/1
650 TABLET ORAL EVERY 8 HOURS PRN
Status: DISCONTINUED | OUTPATIENT
Start: 2020-01-01 | End: 2020-01-01

## 2020-01-01 RX ORDER — NOREPINEPHRINE BITARTRATE/D5W 4MG/250ML
PLASTIC BAG, INJECTION (ML) INTRAVENOUS
Status: DISPENSED
Start: 2020-01-01 | End: 2020-01-01

## 2020-01-01 RX ORDER — INSULIN ASPART 100 [IU]/ML
12 INJECTION, SOLUTION INTRAVENOUS; SUBCUTANEOUS 3 TIMES DAILY
Status: DISCONTINUED | OUTPATIENT
Start: 2020-01-01 | End: 2020-01-01

## 2020-01-01 RX ORDER — EPINEPHRINE 0.1 MG/ML
INJECTION INTRAVENOUS CODE/TRAUMA/SEDATION MEDICATION
Status: COMPLETED | OUTPATIENT
Start: 2020-01-01 | End: 2020-01-01

## 2020-01-01 RX ORDER — LISINOPRIL 5 MG/1
5 TABLET ORAL 2 TIMES DAILY
Status: DISCONTINUED | OUTPATIENT
Start: 2020-01-01 | End: 2020-01-01

## 2020-01-01 RX ORDER — PROMETHAZINE HYDROCHLORIDE 6.25 MG/5ML
6.25 SYRUP ORAL EVERY 6 HOURS PRN
Qty: 118 ML | Refills: 0 | Status: SHIPPED | OUTPATIENT
Start: 2020-01-01 | End: 2020-01-01

## 2020-01-01 RX ORDER — PHENYLEPHRINE HYDROCHLORIDE 10 MG/ML
100 INJECTION INTRAVENOUS
Status: DISCONTINUED | OUTPATIENT
Start: 2020-01-01 | End: 2020-01-01 | Stop reason: HOSPADM

## 2020-01-01 RX ORDER — NOREPINEPHRINE BITARTRATE/D5W 4MG/250ML
0-3 PLASTIC BAG, INJECTION (ML) INTRAVENOUS CONTINUOUS
Status: DISCONTINUED | OUTPATIENT
Start: 2020-01-01 | End: 2020-01-01

## 2020-01-01 RX ORDER — IPRATROPIUM BROMIDE AND ALBUTEROL SULFATE 2.5; .5 MG/3ML; MG/3ML
3 SOLUTION RESPIRATORY (INHALATION)
Status: DISCONTINUED | OUTPATIENT
Start: 2020-01-01 | End: 2020-01-01

## 2020-01-01 RX ORDER — DOBUTAMINE HYDROCHLORIDE 400 MG/100ML
7.5 INJECTION INTRAVENOUS CONTINUOUS
Status: DISCONTINUED | OUTPATIENT
Start: 2020-01-01 | End: 2020-01-01

## 2020-01-01 RX ORDER — MORPHINE SULFATE 2 MG/ML
1 INJECTION, SOLUTION INTRAMUSCULAR; INTRAVENOUS EVERY 6 HOURS PRN
Status: DISCONTINUED | OUTPATIENT
Start: 2020-01-01 | End: 2020-01-01 | Stop reason: HOSPADM

## 2020-01-01 RX ORDER — HYDROCODONE BITARTRATE AND ACETAMINOPHEN 5; 325 MG/1; MG/1
1 TABLET ORAL EVERY 6 HOURS PRN
Status: DISCONTINUED | OUTPATIENT
Start: 2020-01-01 | End: 2020-01-01

## 2020-01-01 RX ORDER — ETOMIDATE 2 MG/ML
INJECTION INTRAVENOUS
Status: DISCONTINUED | OUTPATIENT
Start: 2020-01-01 | End: 2020-01-01

## 2020-01-01 RX ORDER — FENTANYL CITRATE 50 UG/ML
50 INJECTION, SOLUTION INTRAMUSCULAR; INTRAVENOUS
Status: DISCONTINUED | OUTPATIENT
Start: 2021-01-01 | End: 2020-01-01 | Stop reason: HOSPADM

## 2020-01-01 RX ORDER — INDOMETHACIN 25 MG/1
CAPSULE ORAL
Status: DISCONTINUED
Start: 2020-01-01 | End: 2020-01-01 | Stop reason: HOSPADM

## 2020-01-01 RX ORDER — HEPARIN SODIUM,PORCINE/D5W 25000/250
0-40 INTRAVENOUS SOLUTION INTRAVENOUS CONTINUOUS
Status: DISCONTINUED | OUTPATIENT
Start: 2020-01-01 | End: 2020-01-01 | Stop reason: HOSPADM

## 2020-01-01 RX ORDER — FLUTICASONE PROPIONATE 50 MCG
2 SPRAY, SUSPENSION (ML) NASAL DAILY
Qty: 15 G | Refills: 0 | Status: SHIPPED | OUTPATIENT
Start: 2020-01-01

## 2020-01-01 RX ORDER — ATORVASTATIN CALCIUM 20 MG/1
20 TABLET, FILM COATED ORAL DAILY
Status: DISCONTINUED | OUTPATIENT
Start: 2020-01-01 | End: 2020-01-01

## 2020-01-01 RX ORDER — FUROSEMIDE 40 MG/1
80 TABLET ORAL 2 TIMES DAILY
Qty: 360 TABLET | Refills: 3 | Status: SHIPPED | OUTPATIENT
Start: 2020-01-01 | End: 2021-04-14

## 2020-01-01 RX ORDER — SODIUM CHLORIDE 9 MG/ML
INJECTION, SOLUTION INTRAVENOUS CONTINUOUS PRN
Status: DISCONTINUED | OUTPATIENT
Start: 2020-01-01 | End: 2020-01-01

## 2020-01-01 RX ORDER — FUROSEMIDE 10 MG/ML
120 INJECTION INTRAMUSCULAR; INTRAVENOUS ONCE
Status: COMPLETED | OUTPATIENT
Start: 2020-01-01 | End: 2020-01-01

## 2020-01-01 RX ADMIN — Medication 500 MG: at 08:12

## 2020-01-01 RX ADMIN — CHOLECALCIFEROL (VITAMIN D3) 25 MCG (1,000 UNIT) TABLET 1000 UNITS: TABLET at 09:12

## 2020-01-01 RX ADMIN — ACETAMINOPHEN 650 MG: 325 TABLET ORAL at 11:12

## 2020-01-01 RX ADMIN — AMIODARONE HYDROCHLORIDE 200 MG: 200 TABLET ORAL at 08:12

## 2020-01-01 RX ADMIN — INSULIN DETEMIR 23 UNITS: 100 INJECTION, SOLUTION SUBCUTANEOUS at 10:12

## 2020-01-01 RX ADMIN — INSULIN ASPART 2 UNITS: 100 INJECTION, SOLUTION INTRAVENOUS; SUBCUTANEOUS at 09:12

## 2020-01-01 RX ADMIN — INSULIN ASPART 7 UNITS: 100 INJECTION, SOLUTION INTRAVENOUS; SUBCUTANEOUS at 04:12

## 2020-01-01 RX ADMIN — DEXAMETHASONE 6 MG: 4 TABLET ORAL at 08:12

## 2020-01-01 RX ADMIN — INSULIN ASPART 10 UNITS: 100 INJECTION, SOLUTION INTRAVENOUS; SUBCUTANEOUS at 08:12

## 2020-01-01 RX ADMIN — DEXAMETHASONE 6 MG: 4 TABLET ORAL at 09:12

## 2020-01-01 RX ADMIN — ZINC SULFATE 220 MG (50 MG) CAPSULE 220 MG: CAPSULE at 08:12

## 2020-01-01 RX ADMIN — MICONAZOLE NITRATE: 20 OINTMENT TOPICAL at 09:12

## 2020-01-01 RX ADMIN — EPINEPHRINE 2 MCG/KG/MIN: 1 INJECTION INTRAMUSCULAR; INTRAVENOUS; SUBCUTANEOUS at 07:12

## 2020-01-01 RX ADMIN — BENZONATATE 100 MG: 100 CAPSULE ORAL at 04:12

## 2020-01-01 RX ADMIN — FLUTICASONE FUROATE AND VILANTEROL TRIFENATATE 1 PUFF: 100; 25 POWDER RESPIRATORY (INHALATION) at 09:12

## 2020-01-01 RX ADMIN — PENICILLIN G BENZATHINE 1.2 MILLION UNITS: 1200000 INJECTION, SUSPENSION INTRAMUSCULAR at 03:01

## 2020-01-01 RX ADMIN — LISINOPRIL 5 MG: 5 TABLET ORAL at 12:12

## 2020-01-01 RX ADMIN — Medication 0.8 MCG/KG/MIN: at 07:12

## 2020-01-01 RX ADMIN — VASOPRESSIN 20 UNITS: 20 INJECTION INTRAVENOUS at 08:12

## 2020-01-01 RX ADMIN — ALUMINUM HYDROXIDE, MAGNESIUM HYDROXIDE, AND SIMETHICONE 30 ML: 200; 200; 20 SUSPENSION ORAL at 09:12

## 2020-01-01 RX ADMIN — DIGOXIN 125 MCG: 125 TABLET ORAL at 09:12

## 2020-01-01 RX ADMIN — INSULIN ASPART 2 UNITS: 100 INJECTION, SOLUTION INTRAVENOUS; SUBCUTANEOUS at 05:12

## 2020-01-01 RX ADMIN — FLUTICASONE FUROATE AND VILANTEROL TRIFENATATE 1 PUFF: 100; 25 POWDER RESPIRATORY (INHALATION) at 08:12

## 2020-01-01 RX ADMIN — CHOLECALCIFEROL (VITAMIN D3) 25 MCG (1,000 UNIT) TABLET 1000 UNITS: TABLET at 08:12

## 2020-01-01 RX ADMIN — MUPIROCIN: 20 OINTMENT TOPICAL at 08:12

## 2020-01-01 RX ADMIN — SODIUM BICARBONATE 100 MEQ: 84 INJECTION, SOLUTION INTRAVENOUS at 08:12

## 2020-01-01 RX ADMIN — MUPIROCIN: 20 OINTMENT TOPICAL at 10:12

## 2020-01-01 RX ADMIN — EPINEPHRINE 1.2 MCG/KG/MIN: 1 INJECTION INTRAMUSCULAR; INTRAVENOUS; SUBCUTANEOUS at 09:12

## 2020-01-01 RX ADMIN — FUROSEMIDE 80 MG: 20 TABLET ORAL at 01:12

## 2020-01-01 RX ADMIN — INSULIN DETEMIR 17 UNITS: 100 INJECTION, SOLUTION SUBCUTANEOUS at 08:12

## 2020-01-01 RX ADMIN — HEPARIN SODIUM AND DEXTROSE 12 UNITS/KG/HR: 10000; 5 INJECTION INTRAVENOUS at 02:12

## 2020-01-01 RX ADMIN — INSULIN HUMAN 3 UNITS/HR: 100 INJECTION, SOLUTION PARENTERAL at 07:12

## 2020-01-01 RX ADMIN — INSULIN ASPART 8 UNITS: 100 INJECTION, SOLUTION INTRAVENOUS; SUBCUTANEOUS at 11:12

## 2020-01-01 RX ADMIN — DEXAMETHASONE SODIUM PHOSPHATE 20 MG: 4 INJECTION, SOLUTION INTRA-ARTICULAR; INTRALESIONAL; INTRAMUSCULAR; INTRAVENOUS; SOFT TISSUE at 09:12

## 2020-01-01 RX ADMIN — FUROSEMIDE 10 MG/HR: 10 INJECTION, SOLUTION INTRAMUSCULAR; INTRAVENOUS at 08:12

## 2020-01-01 RX ADMIN — INSULIN ASPART 12 UNITS: 100 INJECTION, SOLUTION INTRAVENOUS; SUBCUTANEOUS at 09:12

## 2020-01-01 RX ADMIN — FAMOTIDINE 20 MG: 20 TABLET, FILM COATED ORAL at 09:12

## 2020-01-01 RX ADMIN — EPINEPHRINE 2 MCG/KG/MIN: 1 INJECTION INTRAMUSCULAR; INTRAVENOUS; SUBCUTANEOUS at 04:12

## 2020-01-01 RX ADMIN — INSULIN ASPART 8 UNITS: 100 INJECTION, SOLUTION INTRAVENOUS; SUBCUTANEOUS at 04:12

## 2020-01-01 RX ADMIN — CEFEPIME 2 G: 2 INJECTION, POWDER, FOR SOLUTION INTRAVENOUS at 08:12

## 2020-01-01 RX ADMIN — POLYETHYLENE GLYCOL 3350 17 G: 17 POWDER, FOR SOLUTION ORAL at 09:12

## 2020-01-01 RX ADMIN — DIGOXIN 100 MCG: 0.25 INJECTION INTRAMUSCULAR; INTRAVENOUS at 09:12

## 2020-01-01 RX ADMIN — DIGOXIN 125 MCG: 125 TABLET ORAL at 08:12

## 2020-01-01 RX ADMIN — Medication 500 MG: at 10:12

## 2020-01-01 RX ADMIN — INSULIN ASPART 7 UNITS: 100 INJECTION, SOLUTION INTRAVENOUS; SUBCUTANEOUS at 05:12

## 2020-01-01 RX ADMIN — CEFEPIME 2 G: 2 INJECTION, POWDER, FOR SOLUTION INTRAVENOUS at 12:12

## 2020-01-01 RX ADMIN — NOREPINEPHRINE BITARTRATE 3 MCG/KG/MIN: 1 INJECTION, SOLUTION, CONCENTRATE INTRAVENOUS at 03:12

## 2020-01-01 RX ADMIN — ZINC SULFATE 220 MG (50 MG) CAPSULE 220 MG: CAPSULE at 09:12

## 2020-01-01 RX ADMIN — MUPIROCIN: 20 OINTMENT TOPICAL at 09:12

## 2020-01-01 RX ADMIN — INSULIN ASPART 20 UNITS: 100 INJECTION, SOLUTION INTRAVENOUS; SUBCUTANEOUS at 05:12

## 2020-01-01 RX ADMIN — SENNOSIDES 8.6 MG: 8.6 TABLET, FILM COATED ORAL at 09:12

## 2020-01-01 RX ADMIN — FUROSEMIDE 80 MG: 10 INJECTION, SOLUTION INTRAMUSCULAR; INTRAVENOUS at 05:12

## 2020-01-01 RX ADMIN — FLUTICASONE PROPIONATE 100 MCG: 50 SPRAY, METERED NASAL at 09:12

## 2020-01-01 RX ADMIN — AMIODARONE HYDROCHLORIDE 200 MG: 200 TABLET ORAL at 09:12

## 2020-01-01 RX ADMIN — EPINEPHRINE 1 MG: 0.1 INJECTION, SOLUTION ENDOTRACHEAL; INTRACARDIAC; INTRAVENOUS at 05:12

## 2020-01-01 RX ADMIN — MONTELUKAST 10 MG: 10 TABLET, FILM COATED ORAL at 09:12

## 2020-01-01 RX ADMIN — Medication 0.35 MCG/KG/MIN: at 06:12

## 2020-01-01 RX ADMIN — BISACODYL 10 MG: 10 SUPPOSITORY RECTAL at 06:12

## 2020-01-01 RX ADMIN — IPRATROPIUM BROMIDE AND ALBUTEROL SULFATE 3 ML: .5; 2.5 SOLUTION RESPIRATORY (INHALATION) at 01:12

## 2020-01-01 RX ADMIN — IPRATROPIUM BROMIDE AND ALBUTEROL SULFATE 3 ML: .5; 2.5 SOLUTION RESPIRATORY (INHALATION) at 09:12

## 2020-01-01 RX ADMIN — EPINEPHRINE 2 MCG/KG/MIN: 1 INJECTION INTRAMUSCULAR; INTRAVENOUS; SUBCUTANEOUS at 05:12

## 2020-01-01 RX ADMIN — REMDESIVIR 100 MG: 100 INJECTION, POWDER, LYOPHILIZED, FOR SOLUTION INTRAVENOUS at 06:12

## 2020-01-01 RX ADMIN — INSULIN ASPART 2 UNITS: 100 INJECTION, SOLUTION INTRAVENOUS; SUBCUTANEOUS at 08:12

## 2020-01-01 RX ADMIN — IPRATROPIUM BROMIDE AND ALBUTEROL SULFATE 3 ML: .5; 2.5 SOLUTION RESPIRATORY (INHALATION) at 07:12

## 2020-01-01 RX ADMIN — POLYETHYLENE GLYCOL 3350 17 G: 17 POWDER, FOR SOLUTION ORAL at 08:12

## 2020-01-01 RX ADMIN — INSULIN ASPART 5 UNITS: 100 INJECTION, SOLUTION INTRAVENOUS; SUBCUTANEOUS at 08:12

## 2020-01-01 RX ADMIN — INSULIN DETEMIR 30 UNITS: 100 INJECTION, SOLUTION SUBCUTANEOUS at 09:12

## 2020-01-01 RX ADMIN — INSULIN ASPART 12 UNITS: 100 INJECTION, SOLUTION INTRAVENOUS; SUBCUTANEOUS at 11:12

## 2020-01-01 RX ADMIN — FUROSEMIDE 80 MG: 10 INJECTION, SOLUTION INTRAMUSCULAR; INTRAVENOUS at 08:12

## 2020-01-01 RX ADMIN — ETOMIDATE 2 MG: 2 INJECTION, SOLUTION INTRAVENOUS at 10:08

## 2020-01-01 RX ADMIN — HYDROCODONE BITARTRATE AND ACETAMINOPHEN 1 TABLET: 5; 325 TABLET ORAL at 09:12

## 2020-01-01 RX ADMIN — DOXYCYCLINE HYCLATE 100 MG: 100 TABLET, COATED ORAL at 10:12

## 2020-01-01 RX ADMIN — FENTANYL CITRATE 25 MCG: 50 INJECTION, SOLUTION INTRAMUSCULAR; INTRAVENOUS at 01:06

## 2020-01-01 RX ADMIN — ALUMINUM HYDROXIDE, MAGNESIUM HYDROXIDE, AND SIMETHICONE 30 ML: 200; 200; 20 SUSPENSION ORAL at 06:12

## 2020-01-01 RX ADMIN — EPINEPHRINE 1.6 MCG/KG/MIN: 1 INJECTION PARENTERAL at 07:12

## 2020-01-01 RX ADMIN — INSULIN ASPART 24 UNITS: 100 INJECTION, SOLUTION INTRAVENOUS; SUBCUTANEOUS at 06:12

## 2020-01-01 RX ADMIN — IPRATROPIUM BROMIDE AND ALBUTEROL SULFATE 3 ML: .5; 2.5 SOLUTION RESPIRATORY (INHALATION) at 03:12

## 2020-01-01 RX ADMIN — HUMAN ALBUMIN MICROSPHERES AND PERFLUTREN 0.66 MG: 10; .22 INJECTION, SOLUTION INTRAVENOUS at 02:12

## 2020-01-01 RX ADMIN — FUROSEMIDE 80 MG: 10 INJECTION, SOLUTION INTRAMUSCULAR; INTRAVENOUS at 02:12

## 2020-01-01 RX ADMIN — MICONAZOLE NITRATE: 20 OINTMENT TOPICAL at 10:12

## 2020-01-01 RX ADMIN — INSULIN ASPART 10 UNITS: 100 INJECTION, SOLUTION INTRAVENOUS; SUBCUTANEOUS at 09:12

## 2020-01-01 RX ADMIN — DOCUSATE SODIUM 50MG AND SENNOSIDES 8.6MG 2 TABLET: 8.6; 5 TABLET, FILM COATED ORAL at 08:12

## 2020-01-01 RX ADMIN — Medication 500 MG: at 09:12

## 2020-01-01 RX ADMIN — EPINEPHRINE 0.6 MCG/KG/MIN: 1 INJECTION PARENTERAL at 06:12

## 2020-01-01 RX ADMIN — THERA TABS 1 TABLET: TAB at 08:12

## 2020-01-01 RX ADMIN — Medication 20 MG: at 01:06

## 2020-01-01 RX ADMIN — LISINOPRIL 5 MG: 5 TABLET ORAL at 08:12

## 2020-01-01 RX ADMIN — NOREPINEPHRINE BITARTRATE 3 MCG/KG/MIN: 1 INJECTION, SOLUTION, CONCENTRATE INTRAVENOUS at 08:12

## 2020-01-01 RX ADMIN — INSULIN ASPART 6 UNITS: 100 INJECTION, SOLUTION INTRAVENOUS; SUBCUTANEOUS at 10:12

## 2020-01-01 RX ADMIN — EPINEPHRINE 1.5 MCG/KG/MIN: 1 INJECTION PARENTERAL at 07:12

## 2020-01-01 RX ADMIN — FUROSEMIDE 120 MG: 10 INJECTION, SOLUTION INTRAMUSCULAR; INTRAVENOUS at 09:12

## 2020-01-01 RX ADMIN — EPINEPHRINE 2 MCG/KG/MIN: 1 INJECTION INTRAMUSCULAR; INTRAVENOUS; SUBCUTANEOUS at 01:12

## 2020-01-01 RX ADMIN — ATORVASTATIN CALCIUM 20 MG: 20 TABLET, FILM COATED ORAL at 08:12

## 2020-01-01 RX ADMIN — INSULIN DETEMIR 20 UNITS: 100 INJECTION, SOLUTION SUBCUTANEOUS at 09:12

## 2020-01-01 RX ADMIN — FAMOTIDINE 20 MG: 20 TABLET, FILM COATED ORAL at 08:12

## 2020-01-01 RX ADMIN — PIPERACILLIN AND TAZOBACTAM 4.5 G: 4; .5 INJECTION, POWDER, LYOPHILIZED, FOR SOLUTION INTRAVENOUS; PARENTERAL at 10:12

## 2020-01-01 RX ADMIN — ATORVASTATIN CALCIUM 20 MG: 20 TABLET, FILM COATED ORAL at 09:12

## 2020-01-01 RX ADMIN — VASOPRESSIN 0.04 UNITS/MIN: 20 INJECTION INTRAVENOUS at 01:12

## 2020-01-01 RX ADMIN — EPINEPHRINE 1.2 MCG/KG/MIN: 1 INJECTION INTRAMUSCULAR; INTRAVENOUS; SUBCUTANEOUS at 11:12

## 2020-01-01 RX ADMIN — INSULIN HUMAN 3 UNITS/HR: 100 INJECTION, SOLUTION PARENTERAL at 02:12

## 2020-01-01 RX ADMIN — MORPHINE SULFATE 1 MG: 2 INJECTION, SOLUTION INTRAMUSCULAR; INTRAVENOUS at 12:12

## 2020-01-01 RX ADMIN — INSULIN ASPART 6 UNITS: 100 INJECTION, SOLUTION INTRAVENOUS; SUBCUTANEOUS at 12:12

## 2020-01-01 RX ADMIN — NOREPINEPHRINE BITARTRATE 3 MCG/KG/MIN: 1 INJECTION, SOLUTION, CONCENTRATE INTRAVENOUS at 01:12

## 2020-01-01 RX ADMIN — VANCOMYCIN HYDROCHLORIDE 1000 MG: 1 INJECTION, POWDER, LYOPHILIZED, FOR SOLUTION INTRAVENOUS at 11:12

## 2020-01-01 RX ADMIN — CEFTRIAXONE SODIUM 1 G: 1 INJECTION, POWDER, FOR SOLUTION INTRAMUSCULAR; INTRAVENOUS at 10:12

## 2020-01-01 RX ADMIN — VANCOMYCIN HYDROCHLORIDE 2000 MG: 100 INJECTION, POWDER, LYOPHILIZED, FOR SOLUTION INTRAVENOUS at 09:12

## 2020-01-01 RX ADMIN — MIDAZOLAM HYDROCHLORIDE 1 MG: 1 INJECTION, SOLUTION INTRAMUSCULAR; INTRAVENOUS at 10:08

## 2020-01-01 RX ADMIN — IPRATROPIUM BROMIDE 2 PUFF: 17 AEROSOL, METERED RESPIRATORY (INHALATION) at 05:12

## 2020-01-01 RX ADMIN — INSULIN ASPART 4 UNITS: 100 INJECTION, SOLUTION INTRAVENOUS; SUBCUTANEOUS at 01:12

## 2020-01-01 RX ADMIN — Medication 10 ML: at 06:12

## 2020-01-01 RX ADMIN — LINACLOTIDE 72 MCG: 72 CAPSULE, GELATIN COATED ORAL at 08:12

## 2020-01-01 RX ADMIN — CHLOROTHIAZIDE SODIUM 500 MG: 500 INJECTION, POWDER, LYOPHILIZED, FOR SOLUTION INTRAVENOUS at 01:12

## 2020-01-01 RX ADMIN — PIPERACILLIN AND TAZOBACTAM 4.5 G: 4; .5 INJECTION, POWDER, LYOPHILIZED, FOR SOLUTION INTRAVENOUS; PARENTERAL at 09:12

## 2020-01-01 RX ADMIN — BUTALBITAL, ACETAMINOPHEN, AND CAFFEINE 1 TABLET: 50; 325; 40 TABLET ORAL at 04:12

## 2020-01-01 RX ADMIN — EPINEPHRINE 2 MCG/KG/MIN: 1 INJECTION INTRAMUSCULAR; INTRAVENOUS; SUBCUTANEOUS at 03:12

## 2020-01-01 RX ADMIN — FUROSEMIDE 80 MG: 40 TABLET ORAL at 09:12

## 2020-01-01 RX ADMIN — WARFARIN SODIUM 3.75 MG: 2.5 TABLET ORAL at 06:12

## 2020-01-01 RX ADMIN — INSULIN ASPART 7 UNITS: 100 INJECTION, SOLUTION INTRAVENOUS; SUBCUTANEOUS at 09:12

## 2020-01-01 RX ADMIN — INSULIN ASPART 8 UNITS: 100 INJECTION, SOLUTION INTRAVENOUS; SUBCUTANEOUS at 06:12

## 2020-01-01 RX ADMIN — CEFTRIAXONE SODIUM 1 G: 1 INJECTION, POWDER, FOR SOLUTION INTRAMUSCULAR; INTRAVENOUS at 08:12

## 2020-01-01 RX ADMIN — PROPOFOL 20 MCG/KG/MIN: 10 INJECTION, EMULSION INTRAVENOUS at 06:12

## 2020-01-01 RX ADMIN — IPRATROPIUM BROMIDE AND ALBUTEROL SULFATE 3 ML: .5; 2.5 SOLUTION RESPIRATORY (INHALATION) at 02:12

## 2020-01-01 RX ADMIN — INSULIN ASPART 12 UNITS: 100 INJECTION, SOLUTION INTRAVENOUS; SUBCUTANEOUS at 08:12

## 2020-01-01 RX ADMIN — FLUTICASONE PROPIONATE 100 MCG: 50 SPRAY, METERED NASAL at 01:12

## 2020-01-01 RX ADMIN — SODIUM BICARBONATE: 84 INJECTION, SOLUTION INTRAVENOUS at 05:12

## 2020-01-01 RX ADMIN — REMDESIVIR 100 MG: 100 INJECTION, POWDER, LYOPHILIZED, FOR SOLUTION INTRAVENOUS at 04:12

## 2020-01-01 RX ADMIN — FUROSEMIDE 80 MG: 10 INJECTION, SOLUTION INTRAMUSCULAR; INTRAVENOUS at 10:12

## 2020-01-01 RX ADMIN — DOBUTAMINE HYDROCHLORIDE 7.5 MCG/KG/MIN: 400 INJECTION INTRAVENOUS at 04:12

## 2020-01-01 RX ADMIN — IPRATROPIUM BROMIDE AND ALBUTEROL SULFATE 3 ML: .5; 2.5 SOLUTION RESPIRATORY (INHALATION) at 08:12

## 2020-01-01 RX ADMIN — FAMOTIDINE 20 MG: 20 TABLET, FILM COATED ORAL at 10:12

## 2020-01-01 RX ADMIN — TRAMADOL HYDROCHLORIDE 50 MG: 50 TABLET, FILM COATED ORAL at 05:12

## 2020-01-01 RX ADMIN — PIPERACILLIN AND TAZOBACTAM 4.5 G: 4; .5 INJECTION, POWDER, LYOPHILIZED, FOR SOLUTION INTRAVENOUS; PARENTERAL at 02:12

## 2020-01-01 RX ADMIN — ALBUTEROL SULFATE 2 PUFF: 90 AEROSOL, METERED RESPIRATORY (INHALATION) at 08:12

## 2020-01-01 RX ADMIN — FLUTICASONE FUROATE AND VILANTEROL TRIFENATATE 1 PUFF: 100; 25 POWDER RESPIRATORY (INHALATION) at 11:12

## 2020-01-01 RX ADMIN — FUROSEMIDE 80 MG: 40 TABLET ORAL at 08:12

## 2020-01-01 RX ADMIN — VANCOMYCIN HYDROCHLORIDE 2250 MG: 1 INJECTION, POWDER, LYOPHILIZED, FOR SOLUTION INTRAVENOUS at 09:12

## 2020-01-01 RX ADMIN — INDOMETHACIN 100 MEQ: 25 CAPSULE ORAL at 08:12

## 2020-01-01 RX ADMIN — ETOMIDATE 5 MG: 2 INJECTION, SOLUTION INTRAVENOUS at 10:08

## 2020-01-01 RX ADMIN — FUROSEMIDE 80 MG: 10 INJECTION, SOLUTION INTRAMUSCULAR; INTRAVENOUS at 03:12

## 2020-01-01 RX ADMIN — VANCOMYCIN HYDROCHLORIDE 1000 MG: 1 INJECTION, POWDER, LYOPHILIZED, FOR SOLUTION INTRAVENOUS at 12:12

## 2020-01-01 RX ADMIN — FUROSEMIDE 5 MG/HR: 10 INJECTION, SOLUTION INTRAMUSCULAR; INTRAVENOUS at 03:12

## 2020-01-01 RX ADMIN — DIPHENHYDRAMINE HYDROCHLORIDE 5 ML: 25 SOLUTION ORAL at 10:12

## 2020-01-01 RX ADMIN — HEPARIN SODIUM AND DEXTROSE 16 UNITS/KG/HR: 10000; 5 INJECTION INTRAVENOUS at 11:12

## 2020-01-01 RX ADMIN — INSULIN ASPART 6 UNITS: 100 INJECTION, SOLUTION INTRAVENOUS; SUBCUTANEOUS at 07:12

## 2020-01-01 RX ADMIN — INSULIN ASPART 12 UNITS: 100 INJECTION, SOLUTION INTRAVENOUS; SUBCUTANEOUS at 02:12

## 2020-01-01 RX ADMIN — MELATONIN TAB 3 MG 6 MG: 3 TAB at 09:12

## 2020-01-01 RX ADMIN — HEPARIN SODIUM AND DEXTROSE 21 UNITS/KG/HR: 10000; 5 INJECTION INTRAVENOUS at 08:12

## 2020-01-01 RX ADMIN — CHOLECALCIFEROL (VITAMIN D3) 25 MCG (1,000 UNIT) TABLET 1000 UNITS: TABLET at 10:12

## 2020-01-01 RX ADMIN — HEPARIN SODIUM AND DEXTROSE 14 UNITS/KG/HR: 10000; 5 INJECTION INTRAVENOUS at 05:12

## 2020-01-01 RX ADMIN — DOXYCYCLINE HYCLATE 100 MG: 100 TABLET, COATED ORAL at 08:12

## 2020-01-01 RX ADMIN — VANCOMYCIN HYDROCHLORIDE 1750 MG: 750 INJECTION, POWDER, LYOPHILIZED, FOR SOLUTION INTRAVENOUS at 01:12

## 2020-01-01 RX ADMIN — INSULIN ASPART 2 UNITS: 100 INJECTION, SOLUTION INTRAVENOUS; SUBCUTANEOUS at 02:12

## 2020-01-01 RX ADMIN — HEPARIN SODIUM AND DEXTROSE 16 UNITS/KG/HR: 10000; 5 INJECTION INTRAVENOUS at 03:12

## 2020-01-01 RX ADMIN — FUROSEMIDE 80 MG: 20 TABLET ORAL at 06:12

## 2020-01-01 RX ADMIN — INSULIN DETEMIR 23 UNITS: 100 INJECTION, SOLUTION SUBCUTANEOUS at 09:12

## 2020-01-01 RX ADMIN — CALCIUM GLUCONATE 1000 MG: 98 INJECTION, SOLUTION INTRAVENOUS at 08:12

## 2020-01-01 RX ADMIN — DOXYCYCLINE HYCLATE 100 MG: 100 TABLET, COATED ORAL at 09:12

## 2020-01-01 RX ADMIN — INDOMETHACIN 100 MEQ: 25 CAPSULE ORAL at 12:12

## 2020-01-01 RX ADMIN — ALBUTEROL SULFATE 2 PUFF: 90 AEROSOL, METERED RESPIRATORY (INHALATION) at 01:12

## 2020-01-01 RX ADMIN — CLINDAMYCIN IN 5 PERCENT DEXTROSE 900 MG: 18 INJECTION, SOLUTION INTRAVENOUS at 12:01

## 2020-01-01 RX ADMIN — DEXAMETHASONE SODIUM PHOSPHATE 8 MG: 4 INJECTION, SOLUTION INTRA-ARTICULAR; INTRALESIONAL; INTRAMUSCULAR; INTRAVENOUS; SOFT TISSUE at 11:01

## 2020-01-01 RX ADMIN — VASOPRESSIN 0.04 UNITS/MIN: 20 INJECTION INTRAVENOUS at 08:12

## 2020-01-01 RX ADMIN — SENNOSIDES 8.6 MG: 8.6 TABLET, FILM COATED ORAL at 08:12

## 2020-01-01 RX ADMIN — EPINEPHRINE 2 MCG/KG/MIN: 1 INJECTION INTRAMUSCULAR; INTRAVENOUS; SUBCUTANEOUS at 06:12

## 2020-01-01 RX ADMIN — HEPARIN SODIUM AND DEXTROSE 21 UNITS/KG/HR: 10000; 5 INJECTION INTRAVENOUS at 04:12

## 2020-01-01 RX ADMIN — INSULIN DETEMIR 32 UNITS: 100 INJECTION, SOLUTION SUBCUTANEOUS at 08:12

## 2020-01-01 RX ADMIN — EPINEPHRINE 2 MCG/KG/MIN: 1 INJECTION INTRAMUSCULAR; INTRAVENOUS; SUBCUTANEOUS at 08:12

## 2020-01-01 RX ADMIN — AMIODARONE HYDROCHLORIDE 1 MG/MIN: 1.8 INJECTION, SOLUTION INTRAVENOUS at 08:12

## 2020-01-01 RX ADMIN — SODIUM CHLORIDE, SODIUM LACTATE, POTASSIUM CHLORIDE, AND CALCIUM CHLORIDE 1000 ML: .6; .31; .03; .02 INJECTION, SOLUTION INTRAVENOUS at 11:01

## 2020-01-01 RX ADMIN — ATORVASTATIN CALCIUM 20 MG: 20 TABLET, FILM COATED ORAL at 10:12

## 2020-01-01 RX ADMIN — ALUMINUM HYDROXIDE, MAGNESIUM HYDROXIDE, AND SIMETHICONE 30 ML: 200; 200; 20 SUSPENSION ORAL at 11:12

## 2020-01-01 RX ADMIN — INSULIN DETEMIR 23 UNITS: 100 INJECTION, SOLUTION SUBCUTANEOUS at 08:12

## 2020-01-01 RX ADMIN — AMIODARONE HYDROCHLORIDE 200 MG: 200 TABLET ORAL at 10:12

## 2020-01-01 RX ADMIN — ALUMINUM HYDROXIDE, MAGNESIUM HYDROXIDE, AND SIMETHICONE 30 ML: 200; 200; 20 SUSPENSION ORAL at 05:12

## 2020-01-01 RX ADMIN — INSULIN DETEMIR 16 UNITS: 100 INJECTION, SOLUTION SUBCUTANEOUS at 02:12

## 2020-01-01 RX ADMIN — NOREPINEPHRINE BITARTRATE 3 MCG/KG/MIN: 1 INJECTION, SOLUTION, CONCENTRATE INTRAVENOUS at 04:12

## 2020-01-01 RX ADMIN — INSULIN DETEMIR 20 UNITS: 100 INJECTION, SOLUTION SUBCUTANEOUS at 10:12

## 2020-01-01 RX ADMIN — FAMOTIDINE 20 MG: 10 INJECTION INTRAVENOUS at 09:12

## 2020-01-01 RX ADMIN — MONTELUKAST 10 MG: 10 TABLET, FILM COATED ORAL at 08:12

## 2020-01-01 RX ADMIN — FLUTICASONE FUROATE AND VILANTEROL TRIFENATATE 1 PUFF: 100; 25 POWDER RESPIRATORY (INHALATION) at 01:12

## 2020-01-01 RX ADMIN — FUROSEMIDE 80 MG: 10 INJECTION, SOLUTION INTRAMUSCULAR; INTRAVENOUS at 09:12

## 2020-01-01 RX ADMIN — FUROSEMIDE 80 MG: 20 TABLET ORAL at 05:12

## 2020-01-01 RX ADMIN — SODIUM BICARBONATE 100 MEQ: 84 INJECTION, SOLUTION INTRAVENOUS at 12:12

## 2020-01-01 RX ADMIN — DIGOXIN 125 MCG: 125 TABLET ORAL at 10:12

## 2020-01-01 RX ADMIN — LIDOCAINE HYDROCHLORIDE 15 ML: 20 SOLUTION OROPHARYNGEAL at 10:08

## 2020-01-01 RX ADMIN — INSULIN ASPART 12 UNITS: 100 INJECTION, SOLUTION INTRAVENOUS; SUBCUTANEOUS at 07:12

## 2020-01-01 RX ADMIN — INSULIN ASPART 2 UNITS: 100 INJECTION, SOLUTION INTRAVENOUS; SUBCUTANEOUS at 10:12

## 2020-01-01 RX ADMIN — INSULIN ASPART 12 UNITS: 100 INJECTION, SOLUTION INTRAVENOUS; SUBCUTANEOUS at 01:12

## 2020-01-01 RX ADMIN — Medication 0.7 MCG/KG/MIN: at 06:12

## 2020-01-01 RX ADMIN — INSULIN DETEMIR 30 UNITS: 100 INJECTION, SOLUTION SUBCUTANEOUS at 08:12

## 2020-01-01 RX ADMIN — FUROSEMIDE 80 MG: 20 TABLET ORAL at 08:12

## 2020-01-01 RX ADMIN — SODIUM BICARBONATE 50 MEQ: 84 INJECTION, SOLUTION INTRAVENOUS at 01:12

## 2020-01-01 RX ADMIN — EPINEPHRINE 1.3 MCG/KG/MIN: 1 INJECTION INTRAMUSCULAR; INTRAVENOUS; SUBCUTANEOUS at 08:12

## 2020-01-01 RX ADMIN — SODIUM CHLORIDE 65.6 UNITS/HR: 9 INJECTION, SOLUTION INTRAVENOUS at 06:12

## 2020-01-01 RX ADMIN — NOREPINEPHRINE BITARTRATE 1.7 MCG/KG/MIN: 1 INJECTION, SOLUTION, CONCENTRATE INTRAVENOUS at 12:12

## 2020-01-01 RX ADMIN — FENTANYL CITRATE 25 MCG: 50 INJECTION, SOLUTION INTRAMUSCULAR; INTRAVENOUS at 10:08

## 2020-01-01 RX ADMIN — EPINEPHRINE 2 MCG/KG/MIN: 1 INJECTION INTRAMUSCULAR; INTRAVENOUS; SUBCUTANEOUS at 09:12

## 2020-01-01 RX ADMIN — HYDROCODONE BITARTRATE AND ACETAMINOPHEN 1 TABLET: 5; 325 TABLET ORAL at 12:12

## 2020-01-01 RX ADMIN — INSULIN ASPART 12 UNITS: 100 INJECTION, SOLUTION INTRAVENOUS; SUBCUTANEOUS at 05:12

## 2020-01-01 RX ADMIN — EPINEPHRINE 1 MCG/KG/MIN: 1 INJECTION PARENTERAL at 06:12

## 2020-01-01 RX ADMIN — INSULIN ASPART 2 UNITS: 100 INJECTION, SOLUTION INTRAVENOUS; SUBCUTANEOUS at 12:12

## 2020-01-01 RX ADMIN — DOBUTAMINE HYDROCHLORIDE 5 MCG/KG/MIN: 400 INJECTION INTRAVENOUS at 09:12

## 2020-01-01 RX ADMIN — MONTELUKAST 10 MG: 10 TABLET, FILM COATED ORAL at 10:12

## 2020-01-01 RX ADMIN — HEPARIN SODIUM AND DEXTROSE 18 UNITS/KG/HR: 10000; 5 INJECTION INTRAVENOUS at 04:12

## 2020-01-01 RX ADMIN — SODIUM CHLORIDE 55 UNITS/HR: 9 INJECTION, SOLUTION INTRAVENOUS at 07:12

## 2020-01-01 RX ADMIN — SODIUM CHLORIDE: 0.9 INJECTION, SOLUTION INTRAVENOUS at 01:06

## 2020-01-01 RX ADMIN — SODIUM CHLORIDE: 0.9 INJECTION, SOLUTION INTRAVENOUS at 10:08

## 2020-01-01 RX ADMIN — FLUTICASONE PROPIONATE 100 MCG: 50 SPRAY, METERED NASAL at 12:12

## 2020-01-01 RX ADMIN — CHLORHEXIDINE GLUCONATE 15 ML: 1.2 RINSE ORAL at 08:12

## 2020-01-01 RX ADMIN — ETOMIDATE 6 MG: 2 INJECTION, SOLUTION INTRAVENOUS at 10:08

## 2020-01-01 RX ADMIN — INSULIN ASPART 2 UNITS: 100 INJECTION, SOLUTION INTRAVENOUS; SUBCUTANEOUS at 04:12

## 2020-01-01 RX ADMIN — INSULIN ASPART 10 UNITS: 100 INJECTION, SOLUTION INTRAVENOUS; SUBCUTANEOUS at 05:12

## 2020-01-01 RX ADMIN — INSULIN ASPART 1 UNITS: 100 INJECTION, SOLUTION INTRAVENOUS; SUBCUTANEOUS at 10:12

## 2020-01-01 RX ADMIN — INSULIN ASPART 4 UNITS: 100 INJECTION, SOLUTION INTRAVENOUS; SUBCUTANEOUS at 06:12

## 2020-01-01 RX ADMIN — EPINEPHRINE 1.3 MCG/KG/MIN: 1 INJECTION INTRAMUSCULAR; INTRAVENOUS; SUBCUTANEOUS at 12:12

## 2020-01-01 RX ADMIN — NOREPINEPHRINE BITARTRATE 3 MCG/KG/MIN: 1 INJECTION, SOLUTION, CONCENTRATE INTRAVENOUS at 06:12

## 2020-01-01 RX ADMIN — GLYCOPYRROLATE 0.2 MCG: 0.2 INJECTION, SOLUTION INTRAMUSCULAR; INTRAVENOUS at 10:08

## 2020-01-01 RX ADMIN — FUROSEMIDE 80 MG: 40 TABLET ORAL at 04:12

## 2020-01-01 RX ADMIN — PIPERACILLIN AND TAZOBACTAM 4.5 G: 4; .5 INJECTION, POWDER, LYOPHILIZED, FOR SOLUTION INTRAVENOUS; PARENTERAL at 05:12

## 2020-01-01 RX ADMIN — INSULIN ASPART 6 UNITS: 100 INJECTION, SOLUTION INTRAVENOUS; SUBCUTANEOUS at 09:12

## 2020-01-01 RX ADMIN — INSULIN ASPART 4 UNITS: 100 INJECTION, SOLUTION INTRAVENOUS; SUBCUTANEOUS at 07:12

## 2020-01-01 RX ADMIN — INSULIN DETEMIR 32 UNITS: 100 INJECTION, SOLUTION SUBCUTANEOUS at 09:12

## 2020-01-01 RX ADMIN — INSULIN ASPART 10 UNITS: 100 INJECTION, SOLUTION INTRAVENOUS; SUBCUTANEOUS at 11:12

## 2020-01-01 RX ADMIN — Medication 10 ML: at 12:12

## 2020-01-01 RX ADMIN — INSULIN ASPART 7 UNITS: 100 INJECTION, SOLUTION INTRAVENOUS; SUBCUTANEOUS at 12:12

## 2020-01-01 RX ADMIN — MORPHINE SULFATE 2 MG: 2 INJECTION, SOLUTION INTRAMUSCULAR; INTRAVENOUS at 10:12

## 2020-01-01 RX ADMIN — EPINEPHRINE 2 MCG/KG/MIN: 1 INJECTION INTRAMUSCULAR; INTRAVENOUS; SUBCUTANEOUS at 02:12

## 2020-01-01 RX ADMIN — AMIODARONE HYDROCHLORIDE 1 MG/MIN: 1.8 INJECTION, SOLUTION INTRAVENOUS at 01:12

## 2020-01-01 RX ADMIN — INSULIN ASPART 10 UNITS: 100 INJECTION, SOLUTION INTRAVENOUS; SUBCUTANEOUS at 04:12

## 2020-01-01 RX ADMIN — CEFEPIME 2 G: 2 INJECTION, POWDER, FOR SOLUTION INTRAVENOUS at 01:12

## 2020-01-01 RX ADMIN — SODIUM BICARBONATE: 84 INJECTION, SOLUTION INTRAVENOUS at 08:12

## 2020-01-01 RX ADMIN — INSULIN ASPART 12 UNITS: 100 INJECTION, SOLUTION INTRAVENOUS; SUBCUTANEOUS at 06:12

## 2020-01-01 RX ADMIN — ZINC SULFATE 220 MG (50 MG) CAPSULE 220 MG: CAPSULE at 10:12

## 2020-01-01 RX ADMIN — NITROGLYCERIN 0.4 MG: 0.4 TABLET SUBLINGUAL at 11:12

## 2020-01-01 RX ADMIN — ACETAMINOPHEN 650 MG: 325 TABLET ORAL at 04:12

## 2020-01-01 RX ADMIN — Medication 20 MG: at 10:08

## 2020-01-01 RX ADMIN — INSULIN ASPART 3 UNITS: 100 INJECTION, SOLUTION INTRAVENOUS; SUBCUTANEOUS at 09:12

## 2020-01-01 RX ADMIN — POTASSIUM & SODIUM PHOSPHATES POWDER PACK 280-160-250 MG 1 PACKET: 280-160-250 PACK at 06:12

## 2020-01-01 RX ADMIN — INSULIN ASPART 4 UNITS: 100 INJECTION, SOLUTION INTRAVENOUS; SUBCUTANEOUS at 04:12

## 2020-01-01 RX ADMIN — FUROSEMIDE 160 MG: 10 INJECTION, SOLUTION INTRAMUSCULAR; INTRAVENOUS at 02:12

## 2020-01-01 RX ADMIN — VANCOMYCIN HYDROCHLORIDE 1750 MG: 750 INJECTION, POWDER, LYOPHILIZED, FOR SOLUTION INTRAVENOUS at 11:12

## 2020-01-01 RX ADMIN — BUTALBITAL, ACETAMINOPHEN, AND CAFFEINE 1 TABLET: 50; 325; 40 TABLET ORAL at 10:12

## 2020-01-01 RX ADMIN — LINACLOTIDE 72 MCG: 72 CAPSULE, GELATIN COATED ORAL at 05:12

## 2020-01-01 RX ADMIN — ALBUTEROL SULFATE 2.5 MG: 2.5 SOLUTION RESPIRATORY (INHALATION) at 10:01

## 2020-01-01 RX ADMIN — FUROSEMIDE 80 MG: 40 TABLET ORAL at 05:12

## 2020-01-01 RX ADMIN — INSULIN ASPART 10 UNITS: 100 INJECTION, SOLUTION INTRAVENOUS; SUBCUTANEOUS at 02:12

## 2020-01-01 RX ADMIN — FUROSEMIDE 80 MG: 40 TABLET ORAL at 06:12

## 2020-01-01 RX ADMIN — FLUTICASONE PROPIONATE 100 MCG: 50 SPRAY, METERED NASAL at 10:12

## 2020-01-01 RX ADMIN — REMDESIVIR 200 MG: 100 INJECTION, POWDER, LYOPHILIZED, FOR SOLUTION INTRAVENOUS at 05:12

## 2020-01-01 RX ADMIN — INSULIN ASPART 24 UNITS: 100 INJECTION, SOLUTION INTRAVENOUS; SUBCUTANEOUS at 10:12

## 2020-01-01 RX ADMIN — SODIUM CHLORIDE 1 UNITS/HR: 9 INJECTION, SOLUTION INTRAVENOUS at 03:12

## 2020-01-01 RX ADMIN — ALBUTEROL SULFATE 2 PUFF: 90 AEROSOL, METERED RESPIRATORY (INHALATION) at 02:12

## 2020-01-01 RX ADMIN — HYDROXYZINE HYDROCHLORIDE 25 MG: 25 TABLET, FILM COATED ORAL at 02:12

## 2020-01-01 RX ADMIN — INSULIN ASPART 7 UNITS: 100 INJECTION, SOLUTION INTRAVENOUS; SUBCUTANEOUS at 06:12

## 2020-01-01 RX ADMIN — CEFEPIME 2 G: 2 INJECTION, POWDER, FOR SOLUTION INTRAVENOUS at 03:12

## 2020-01-01 RX ADMIN — INSULIN ASPART 8 UNITS: 100 INJECTION, SOLUTION INTRAVENOUS; SUBCUTANEOUS at 05:12

## 2020-01-01 RX ADMIN — NOREPINEPHRINE BITARTRATE 1.4 MCG/KG/MIN: 1 INJECTION, SOLUTION, CONCENTRATE INTRAVENOUS at 08:12

## 2020-01-01 RX ADMIN — MINERAL OIL AND WHITE PETROLATUM: 150; 830 OINTMENT OPHTHALMIC at 08:12

## 2020-01-01 RX ADMIN — FLUTICASONE FUROATE AND VILANTEROL TRIFENATATE 1 PUFF: 100; 25 POWDER RESPIRATORY (INHALATION) at 10:12

## 2020-01-01 RX ADMIN — DEXMEDETOMIDINE HYDROCHLORIDE 0.2 MCG/KG/HR: 100 INJECTION, SOLUTION, CONCENTRATE INTRAVENOUS at 01:06

## 2020-01-01 RX ADMIN — MELATONIN TAB 3 MG 6 MG: 3 TAB at 08:12

## 2020-01-01 RX ADMIN — Medication 10 ML: at 11:12

## 2020-01-01 RX ADMIN — INSULIN ASPART 4 UNITS: 100 INJECTION, SOLUTION INTRAVENOUS; SUBCUTANEOUS at 10:12

## 2020-01-01 RX ADMIN — CHLORHEXIDINE GLUCONATE 15 ML: 1.2 RINSE ORAL at 09:12

## 2020-01-01 RX ADMIN — INSULIN ASPART 6 UNITS: 100 INJECTION, SOLUTION INTRAVENOUS; SUBCUTANEOUS at 04:12

## 2020-01-01 RX ADMIN — SODIUM CHLORIDE 3 UNITS/HR: 9 INJECTION, SOLUTION INTRAVENOUS at 09:12

## 2020-01-01 RX ADMIN — SODIUM CHLORIDE 40.6 UNITS/HR: 9 INJECTION, SOLUTION INTRAVENOUS at 03:12

## 2020-01-01 RX ADMIN — ENOXAPARIN SODIUM 100 MG: 100 INJECTION SUBCUTANEOUS at 09:08

## 2020-01-01 RX ADMIN — INSULIN ASPART 4 UNITS: 100 INJECTION, SOLUTION INTRAVENOUS; SUBCUTANEOUS at 11:12

## 2020-01-01 RX ADMIN — Medication 5 MG: at 10:08

## 2020-01-01 RX ADMIN — DEXAMETHASONE 6 MG: 4 TABLET ORAL at 10:12

## 2020-01-01 RX ADMIN — INSULIN ASPART 12 UNITS: 100 INJECTION, SOLUTION INTRAVENOUS; SUBCUTANEOUS at 04:12

## 2020-01-01 RX ADMIN — INSULIN ASPART 3 UNITS: 100 INJECTION, SOLUTION INTRAVENOUS; SUBCUTANEOUS at 05:12

## 2020-01-01 RX ADMIN — FUROSEMIDE 10 MG/HR: 10 INJECTION, SOLUTION INTRAMUSCULAR; INTRAVENOUS at 05:12

## 2020-01-01 RX ADMIN — INSULIN ASPART 1 UNITS: 100 INJECTION, SOLUTION INTRAVENOUS; SUBCUTANEOUS at 06:12

## 2020-01-01 RX ADMIN — REMDESIVIR 100 MG: 100 INJECTION, POWDER, LYOPHILIZED, FOR SOLUTION INTRAVENOUS at 05:12

## 2020-01-01 RX ADMIN — INDOMETHACIN 50 MEQ: 25 CAPSULE ORAL at 01:12

## 2020-01-01 RX ADMIN — EPHEDRINE SULFATE 10 MG: 50 INJECTION, SOLUTION INTRAMUSCULAR; INTRAVENOUS; SUBCUTANEOUS at 01:06

## 2020-01-01 RX ADMIN — INSULIN DETEMIR 17 UNITS: 100 INJECTION, SOLUTION SUBCUTANEOUS at 09:12

## 2020-01-01 RX ADMIN — SODIUM CHLORIDE 10.6 UNITS/HR: 9 INJECTION, SOLUTION INTRAVENOUS at 11:12

## 2020-01-01 RX ADMIN — DEXAMETHASONE 6 MG: 4 TABLET ORAL at 05:12

## 2020-01-01 RX ADMIN — ACETAMINOPHEN 650 MG: 325 TABLET ORAL at 12:12

## 2020-01-01 RX ADMIN — INSULIN ASPART 4 UNITS: 100 INJECTION, SOLUTION INTRAVENOUS; SUBCUTANEOUS at 12:12

## 2020-01-01 RX ADMIN — PIPERACILLIN AND TAZOBACTAM 4.5 G: 4; .5 INJECTION, POWDER, LYOPHILIZED, FOR SOLUTION INTRAVENOUS; PARENTERAL at 06:12

## 2020-01-01 RX ADMIN — INSULIN ASPART 4 UNITS: 100 INJECTION, SOLUTION INTRAVENOUS; SUBCUTANEOUS at 05:12

## 2020-01-01 NOTE — ED PROVIDER NOTES
Encounter Date: 1/1/2020       History     Chief Complaint   Patient presents with    Sore Throat     PT C/O SORE THROAT FOR 2 DAYS, EXUDATE NOTED TO RIGHT TONSIL      49-year-old female presents to the emergency department complaining sore throat x2 days.  She denies fever/chills /nausea / vomiting.    The history is provided by the patient. No  was used.     Review of patient's allergies indicates:  No Known Allergies  Past Medical History:   Diagnosis Date    Anticoagulant long-term use     Arthritis     Asthma     Asthma     Atrial fibrillation     Cardiomyopathy     Cardiomyopathy     CHF (congestive heart failure)     CHF (congestive heart failure)     Chronic combined systolic and diastolic heart failure 6/3/2016    COPD (chronic obstructive pulmonary disease) 3/28/2018    GERD (gastroesophageal reflux disease)     H/O tubal ligation     Hypertension     Obesity     Renal disorder     Type 2 diabetes mellitus with hyperglycemia     Type 2 diabetes mellitus with polyneuropathy      Past Surgical History:   Procedure Laterality Date    CARDIAC DEFIBRILLATOR PLACEMENT      CARDIAC DEFIBRILLATOR PLACEMENT      CARDIAC DEFIBRILLATOR PLACEMENT      CATARACT EXTRACTION Left     CHOLECYSTECTOMY      COLONOSCOPY N/A 5/2/2016    Procedure: COLONOSCOPY;  Surgeon: Eugene Soto MD;  Location: [address]);  Service: Endoscopy;  Laterality: N/A;    GALLBLADDER SURGERY      iabp  4/2016    TUBAL LIGATION       Family History   Problem Relation Age of Onset    Heart disease Mother     Heart disease Father      Social History     Tobacco Use    Smoking status: Never Smoker    Smokeless tobacco: Never Used   Substance Use Topics    Alcohol use: No    Drug use: No     Review of Systems   Constitutional: Negative for chills and fever.   HENT: Positive for congestion, postnasal drip and sore throat.    All other systems reviewed and are negative.      Physical Exam      Initial Vitals [01/01/20 0245]   BP Pulse Resp Temp SpO2   125/84 68 20 98 °F (36.7 °C) 98 %      MAP       --         Physical Exam    Nursing note and vitals reviewed.  Constitutional: She appears well-developed and well-nourished. No distress.   HENT:   Head: Normocephalic and atraumatic.   Right Ear: External ear normal.   Left Ear: External ear normal.   Posterior oropharyngeal erythema with scant exudate and no edema.   Eyes: Conjunctivae are normal.   Neck: Normal range of motion. Neck supple.   Cardiovascular: Normal rate and regular rhythm.   Pulmonary/Chest: Breath sounds normal. No respiratory distress.   Abdominal: Soft.   Musculoskeletal: Normal range of motion.   Neurological: She is alert.   Skin: Skin is warm and dry.   Psychiatric: She has a normal mood and affect.         ED Course   Procedures  Labs Reviewed   CULTURE, STREP A,  THROAT   POCT RAPID STREP A          Imaging Results    None          Medical Decision Making:   Initial Assessment:    49-year-old female presents to the emergency department complaining sore throat x2 days.  She denies fever/chills /nausea / vomiting.  ED Management:   Patient was given Bicillin emergency department and prescription for Astelin/fluticasone.  Instructions for pharyngitis and URI were given and patient was advised to follow up with her primary care physician within the next week for re-evaluation /return to the emergency department if condition worsens.                                 Clinical Impression:       ICD-10-CM ICD-9-CM   1. Pharyngitis, unspecified etiology J02.9 462         Disposition:   Disposition: Discharged  Condition: Stable                     Dwight Walsh MD  01/01/20 0327

## 2020-01-02 NOTE — ED TRIAGE NOTES
"Patient reports sore throat, body aches, fever, chills, wet cough, nasal congestion, SOB with ambulation, and "my asthma trying to act up." Denies CP, n/v.   "

## 2020-01-02 NOTE — ED PROVIDER NOTES
Encounter Date: 1/1/2020    SCRIBE #1 NOTE: I, Jose Dorsey, am scribing for, and in the presence of,  Dr. Boyd. I have scribed the following portions of the note - Other sections scribed: HPI, ROS, PE, MDM.       History     Chief Complaint   Patient presents with    Sore Throat     chills and body aches since monday     Patient is a 49 year old female presenting with a sore throat. Since Monday, patient has had a large amount of pain to her throat. Per , her voice sounds more hoarse than normal. She states she has been able to drink a minimal amount of fluid and cannot take in any food. Associated symptoms include fever, chills, body aches, and cough.  She went to the [facility] at 2:00 a.m. this morning and was given a shot of Bicillin and discharged.    The history is provided by medical records and the patient.     Review of patient's allergies indicates:  No Known Allergies  Past Medical History:   Diagnosis Date    Anticoagulant long-term use     Arthritis     Asthma     Asthma     Atrial fibrillation     Cardiomyopathy     Cardiomyopathy     CHF (congestive heart failure)     CHF (congestive heart failure)     Chronic combined systolic and diastolic heart failure 6/3/2016    COPD (chronic obstructive pulmonary disease) 3/28/2018    GERD (gastroesophageal reflux disease)     H/O tubal ligation     Hypertension     Obesity     Renal disorder     Type 2 diabetes mellitus with hyperglycemia     Type 2 diabetes mellitus with polyneuropathy      Past Surgical History:   Procedure Laterality Date    CARDIAC DEFIBRILLATOR PLACEMENT      CARDIAC DEFIBRILLATOR PLACEMENT      CARDIAC DEFIBRILLATOR PLACEMENT      CATARACT EXTRACTION Left     CHOLECYSTECTOMY      COLONOSCOPY N/A 5/2/2016    Procedure: COLONOSCOPY;  Surgeon: Eugene Soto MD;  Location: [address]);  Service: Endoscopy;  Laterality: N/A;    GALLBLADDER SURGERY      iabp  4/2016    TUBAL  LIGATION       Family History   Problem Relation Age of Onset    Heart disease Mother     Heart disease Father      Social History     Tobacco Use    Smoking status: Never Smoker    Smokeless tobacco: Never Used   Substance Use Topics    Alcohol use: No    Drug use: No     Review of Systems  General: Fever, chills.  Eyes: No visual changes.  Head: No headache. Sore throat    Integument: No rashes or lesions.  Chest: Cough  Cardiovascular: No chest pain.  Abdomen: No abdominal pain.  No nausea or vomiting.  Urinary: No abnormal urination.  Neurologic: No focal weakness.  No numbness.  Hematologic: No easy bruising.  Endocrine: No excessive thirst or urination.    Physical Exam     Initial Vitals [01/01/20 2218]   BP Pulse Resp Temp SpO2   112/81 (!) 112 18 99.9 °F (37.7 °C) (!) 94 %      MAP       --         Physical Exam  Appearance: No acute distress.  Skin: No rashes seen.  Good turgor.  No abrasions.  No ecchymoses.  Eyes: No conjunctival injection.  ENT: Oropharynx with swollen tonsils bilaterally, erythema, no exudates.  Minimal anterior cervical lymphadenopathy.  No masses, no submandibular swelling or tenderness.    Chest: Clear to auscultation bilaterally.  Good air movement.  No wheezes.  No rhonchi.  Cardiovascular: Tachycardic, regular rhythm.  No murmurs. No gallops. No rubs.  Abdomen: Soft.  Not distended.  Nontender.  No guarding.  No rebound.  Musculoskeletal: Good range of motion all joints.  No deformities.  Neck supple.  No meningismus.  Neurologic: Motor intact.  Sensation intact.  Cerebellar intact.  Cranial nerves intact.  Mental Status:  Alert and oriented x 3.  Appropriate, conversant.    ED Course   Procedures  Labs Reviewed   CBC W/ AUTO DIFFERENTIAL - Abnormal; Notable for the following components:       Result Value    WBC 19.28 (*)     Hemoglobin 11.0 (*)     Hematocrit 36.4 (*)     Mean Corpuscular Hemoglobin 24.9 (*)     Mean Corpuscular Hemoglobin Conc 30.2 (*)     RDW 17.7 (*)      Gran # (ANC) 14.3 (*)     Immature Grans (Abs) 0.09 (*)     Mono # 1.9 (*)     Gran% 73.9 (*)     Lymph% 14.6 (*)     All other components within normal limits   COMPREHENSIVE METABOLIC PANEL - Abnormal; Notable for the following components:    Glucose 63 (*)     Albumin 3.4 (*)     Total Bilirubin 1.1 (*)     eGFR if non  52.8 (*)     All other components within normal limits   POCT INFLUENZA A/B MOLECULAR          Imaging Results          X-Ray Neck Soft Tissue (Final result)  Result time 01/02/20 00:16:44    Final result by Pedro Luis Armstrong MD (01/02/20 00:16:44)                 Impression:      No retropharyngeal soft tissue thickening.    No enlargement of the epiglottis or thickening of the aryepiglottic folds.      Electronically signed by: Pedro Luis Armstrong MD  Date:    01/02/2020  Time:    00:16             Narrative:    EXAMINATION:  XR NECK SOFT TISSUE    CLINICAL HISTORY:  Dysphonia    TECHNIQUE:  AP and lateral soft tissue views the neck were performed.    COMPARISON:  None.    FINDINGS:  No retropharyngeal soft tissue thickening.  No enlargement of the epiglottis or thickening of the aryepiglottic folds.  Cervical airway is midline and appears patent.  No radiopaque foreign body seen.                                 Medical Decision Making:   History:   Old Medical Records: I decided to obtain old medical records.  Initial Assessment:   Patient presenting with a sore throat for few days. Previously at an ER 24 hours where she was prescribed Bicillin. She is now having worse throat pain and is mildly tachycardic.  On exam it looks to be a bad pharyngitis and I do not see any sign of Gian's angina, peritonsillar abscess or pharyngeal abscess.  She is not having any trouble with her secretions.  I think epiglottitis is less likely as the patient does not look particularly sick.  Nonetheless, I am going to check a soft tissue x-ray.  Will check labs, get fluids, give breathing  treatment and re-assess.     Soft tissue neck xray shows no prevertebral swelling and a normal epiglottis per my interpretation.    Turned over to Dr. Eaton fluids/abx given.  Plan d/c if clinically improved.          Scribe Attestation:   Scribe #1: I performed the above scribed service and the documentation accurately describes the services I performed. I attest to the accuracy of the note.            ED Course as of Jan 02 1651   Wed Jan 01, 2020   2339 WBC(!): 19.28 [DC]   2340 POC Molecular Influenza A Ag: Negative [DC]   2340 POC Molecular Influenza B Ag: Negative [DC]      ED Course User Index  [DC] Mirza Boyd MD                Clinical Impression:       ICD-10-CM ICD-9-CM   1. Pharyngitis, unspecified etiology J02.9 462   2. Hoarseness of voice R49.0 784.42                             Mirza Boyd MD  01/02/20 2196

## 2020-01-04 NOTE — PROGRESS NOTES
"Subjective:       Patient ID: Laure Ross is a 49 y.o. female.    Vitals:  height is 5' 5" (1.651 m) and weight is 115.7 kg (255 lb). Her temperature is 97.4 °F (36.3 °C). Her blood pressure is 109/74 and her pulse is 107. Her respiration is 16 and oxygen saturation is 95%.     Chief Complaint: Otalgia    Pt c/o sore throat for over a week and bilateral ear pain that started yesterday. She was seen at [facility] and was given a Bicillin shot. Rapid strep and culture negative. She went back the next day because she felt like she was getting worse. Flu negative. She was given IV clindamycin and a steroid shot. States that she felt better that night and was discharged home with clindamycin and tessalon. States that she feels like it's not getting any better despite being compliant with her antibiotics.    Otalgia    There is pain in both ears. This is a new problem. The current episode started yesterday. The problem occurs constantly. The problem has been unchanged. There has been no fever. The pain is mild. Associated symptoms include coughing and a sore throat. Pertinent negatives include no diarrhea, headaches, rash or vomiting.       Constitution: Negative for chills, fatigue and fever.   HENT: Positive for ear pain and sore throat. Negative for congestion.    Neck: Negative for painful lymph nodes.   Cardiovascular: Negative for chest pain and leg swelling.   Eyes: Negative for double vision and blurred vision.   Respiratory: Positive for cough. Negative for shortness of breath.    Gastrointestinal: Negative for nausea, vomiting and diarrhea.   Genitourinary: Negative for dysuria, frequency, urgency and history of kidney stones.   Musculoskeletal: Negative for joint pain, joint swelling, muscle cramps and muscle ache.   Skin: Negative for color change, pale, rash and bruising.   Allergic/Immunologic: Negative for seasonal allergies.   Neurological: Negative for dizziness, history of vertigo, " Mandie Murphy is a 25[address] [facility] y.o. male here today for follow up of adenocarcinoma of unknown primary. light-headedness, passing out and headaches.   Hematologic/Lymphatic: Negative for swollen lymph nodes.   Psychiatric/Behavioral: Negative for nervous/anxious, sleep disturbance and depression. The patient is not nervous/anxious.        Objective:      Physical Exam   Constitutional: She is oriented to person, place, and time. She appears well-developed and well-nourished. She is cooperative.  Non-toxic appearance. She does not have a sickly appearance. She does not appear ill. No distress.   HENT:   Head: Normocephalic and atraumatic.   Right Ear: Hearing, external ear and ear canal normal. There is tenderness. A middle ear effusion is present.   Left Ear: Hearing, external ear and ear canal normal. There is tenderness. A middle ear effusion is present.   Nose: Nose normal. No mucosal edema, rhinorrhea or nasal deformity. No epistaxis. Right sinus exhibits no maxillary sinus tenderness and no frontal sinus tenderness. Left sinus exhibits no maxillary sinus tenderness and no frontal sinus tenderness.   Mouth/Throat: Uvula is midline and mucous membranes are normal. No trismus in the jaw. Normal dentition. No uvula swelling. Posterior oropharyngeal erythema present. No oropharyngeal exudate or posterior oropharyngeal edema. Tonsils are 2+ on the right. Tonsils are 2+ on the left. Tonsillar exudate.   Eyes: Conjunctivae and lids are normal. No scleral icterus.   Neck: Trachea normal, full passive range of motion without pain and phonation normal. Neck supple. No neck rigidity. No edema and no erythema present.   Cardiovascular: Normal rate, regular rhythm, normal heart sounds, intact distal pulses and normal pulses.   Pulmonary/Chest: Effort normal and breath sounds normal. No respiratory distress. She has no decreased breath sounds. She has no rhonchi.   Abdominal: Normal appearance.   Musculoskeletal: Normal range of motion. She exhibits no edema or deformity.   Neurological: She is alert and oriented to person,  place, and time. She exhibits normal muscle tone. Coordination normal.   Skin: Skin is warm, dry, intact, not diaphoretic and not pale.   Psychiatric: She has a normal mood and affect. Her speech is normal and behavior is normal. Judgment and thought content normal. Cognition and memory are normal.   Nursing note and vitals reviewed.        Assessment:       1. Pharyngitis, unspecified etiology        Plan:         Pharyngitis, unspecified etiology  -     predniSONE (DELTASONE) 10 MG tablet; Take 1 tablet (10 mg total) by mouth once daily. for 5 days  Dispense: 5 tablet; Refill: 0  -     promethazine (PHENERGAN) 6.25 mg/5 mL syrup; Take 5 mLs (6.25 mg total) by mouth every 6 (six) hours as needed (cough).  Dispense: 118 mL; Refill: 0  -     (Magic mouthwash) 1:1:1 Benadryl 12.5mg/5ml liq, aluminum & magnesium hydroxide-simehticone (Maalox), lidocaine viscous 2%; Swish and spit 5 mLs every 4 (four) hours as needed. for mouth/throat pain  Dispense: 90 mL; Refill: 0          Patient Instructions     General Discharge Instructions   If you were prescribed a narcotic or controlled medication, do not drive or operate heavy equipment or machinery while taking these medications.  If you were prescribed antibiotics, please take them to completion.  You must understand that you've received an Urgent Care treatment only and that you may be released before all your medical problems are known or treated. You, the patient, will arrange for follow up care as instructed.  Follow up with your PCP or specialty clinic as directed in the next 1-2 weeks if not improved or as needed.  You can call [phone] to schedule an appointment with the appropriate provider.  If your condition worsens we recommend that you receive another evaluation at the emergency room immediately or contact your primary medical clinics after hours call service to discuss your concerns.  Please return here or go to the Emergency Department for any concerns  or worsening of condition.      Self-Care for Sore Throats    Sore throats happen for many reasons, such as colds, allergies, and infections caused by viruses or bacteria. In any case, your throat becomes red and sore. Your goal for self-care is to reduce your discomfort while giving your throat a chance to heal.  Moisten and soothe your throat  Tips include the following:  · Try a sip of water first thing after waking up.  · Keep your throat moist by drinking 6 or more glasses of clear liquids every day.  · Run a cool-air humidifier in your room overnight.  · Avoid cigarette smoke.   · Suck on throat lozenges, cough drops, hard candy, ice chips, or frozen fruit-juice bars. Use the sugar-free versions if your diet or medical condition requires them.  Gargle to ease irritation  Gargling every hour or 2 can ease irritation. Try gargling with 1 of these solutions:  · 1/4 teaspoon of salt in 1/2 cup of warm water  · An over-the-counter anesthetic gargle  Use medicine for more relief  Over-the-counter medicine can reduce sore throat symptoms. Ask your pharmacist if you have questions about which medicine to use:  · Ease pain with anesthetic sprays. Aspirin or an aspirin substitute also helps. Remember, never give aspirin to anyone 18 or younger, or if you are already taking blood thinners.   · For sore throats caused by allergies, try antihistamines to block the allergic reaction.  · Remember: unless a sore throat is caused by a bacterial infection, antibiotics wont help you.  Prevent future sore throats  Prevention tips include the following:  · Stop smoking or reduce contact with secondhand smoke. Smoke irritates the tender throat lining.  · Limit contact with pets and with allergy-causing substances, such as pollen and mold.  · When youre around someone with a sore throat or cold, wash your hands often to keep viruses or bacteria from spreading.  · Dont strain your vocal cords.  Call your healthcare  provider  Contact your healthcare provider if you have:  · A temperature over 101°F (38.3°C)  · White spots on the throat  · Great difficulty swallowing  · Trouble breathing  · A skin rash  · Recent exposure to someone else with strep bacteria  · Severe hoarseness and swollen glands in the neck or jaw   Date Last Reviewed: 8/1/2016  © 7735-3375 [employer] [address] [place] [address]. All rights reserved. This information is not intended as a substitute for professional medical care. Always follow your healthcare professional's instructions.

## 2020-01-04 NOTE — PATIENT INSTRUCTIONS
General Discharge Instructions   If you were prescribed a narcotic or controlled medication, do not drive or operate heavy equipment or machinery while taking these medications.  If you were prescribed antibiotics, please take them to completion.  You must understand that you've received an Urgent Care treatment only and that you may be released before all your medical problems are known or treated. You, the patient, will arrange for follow up care as instructed.  Follow up with your PCP or specialty clinic as directed in the next 1-2 weeks if not improved or as needed.  You can call [phone] to schedule an appointment with the appropriate provider.  If your condition worsens we recommend that you receive another evaluation at the emergency room immediately or contact your primary medical clinics after hours call service to discuss your concerns.  Please return here or go to the Emergency Department for any concerns or worsening of condition.      Self-Care for Sore Throats    Sore throats happen for many reasons, such as colds, allergies, and infections caused by viruses or bacteria. In any case, your throat becomes red and sore. Your goal for self-care is to reduce your discomfort while giving your throat a chance to heal.  Moisten and soothe your throat  Tips include the following:  · Try a sip of water first thing after waking up.  · Keep your throat moist by drinking 6 or more glasses of clear liquids every day.  · Run a cool-air humidifier in your room overnight.  · Avoid cigarette smoke.   · Suck on throat lozenges, cough drops, hard candy, ice chips, or frozen fruit-juice bars. Use the sugar-free versions if your diet or medical condition requires them.  Gargle to ease irritation  Gargling every hour or 2 can ease irritation. Try gargling with 1 of these solutions:  · 1/4 teaspoon of salt in 1/2 cup of warm water  · An over-the-counter anesthetic gargle  Use medicine for more relief  Over-the-counter  medicine can reduce sore throat symptoms. Ask your pharmacist if you have questions about which medicine to use:  · Ease pain with anesthetic sprays. Aspirin or an aspirin substitute also helps. Remember, never give aspirin to anyone 18 or younger, or if you are already taking blood thinners.   · For sore throats caused by allergies, try antihistamines to block the allergic reaction.  · Remember: unless a sore throat is caused by a bacterial infection, antibiotics wont help you.  Prevent future sore throats  Prevention tips include the following:  · Stop smoking or reduce contact with secondhand smoke. Smoke irritates the tender throat lining.  · Limit contact with pets and with allergy-causing substances, such as pollen and mold.  · When youre around someone with a sore throat or cold, wash your hands often to keep viruses or bacteria from spreading.  · Dont strain your vocal cords.  Call your healthcare provider  Contact your healthcare provider if you have:  · A temperature over 101°F (38.3°C)  · White spots on the throat  · Great difficulty swallowing  · Trouble breathing  · A skin rash  · Recent exposure to someone else with strep bacteria  · Severe hoarseness and swollen glands in the neck or jaw   Date Last Reviewed: 8/1/2016  © 6729-7152 [employer]. [address] [place] [address], [place] [address]. All rights reserved. This information is not intended as a substitute for professional medical care. Always follow your healthcare professional's instructions.

## 2020-01-08 NOTE — PROGRESS NOTES
INR not at goal. Medications, chart, and patient findings reviewed. See calendar for adjustments to dose and follow up plan.  Findings: Pt states that had pharyngitis; she has been taking cleocin & prednisone; she has had a decrease in her appetite as a result; she has had pain, nausea, sour throat & fever; and she denies any other changes.  Pt slightly elevated due to changes in her health.  Will instruct her have any extra serving of greens this week & maintain current regimen.  Plan to re-assess in 3 weeks.   Patient was re-educated on situations that would require placing a call to the [facility], including bleeding or unusual bruising issues, changes in health, diet or medications,upcoming procedures that require warfarin interruption, and missed Coumadin dose(s). Patient expressed understanding that avoidance of consistency with these parameters could cause fluctuations in INR, leading to more frequent visits and increase risk of adverse events.

## 2020-01-22 NOTE — PROGRESS NOTES
Subjective:      Patient ID: Laure Ross is a 49 y.o. female.    Chief Complaint: Wound Check (ov 2 weeks ago [facility]- PCP- RIGHT FOOT 2ND AND PINKY TOE) and Diabetes Mellitus    Laure is a 49 y.o. female who presents to the clinic for evaluation and treatment of high risk feet. Laure has a past medical history of Anticoagulant long-term use, Arthritis, Asthma, Asthma, Atrial fibrillation, Cardiomyopathy, Cardiomyopathy, CHF (congestive heart failure), CHF (congestive heart failure), Chronic combined systolic and diastolic heart failure (6/3/2016), COPD (chronic obstructive pulmonary disease) (3/28/2018), GERD (gastroesophageal reflux disease), H/O tubal ligation, Hypertension, Obesity, Renal disorder, Type 2 diabetes mellitus with hyperglycemia, and Type 2 diabetes mellitus with polyneuropathy. Presents for diabetic foot risk assessment. Reports callus to right 2nd toe and 5th toe B/L. Currently wearing diabetic shoe.   . This patient has documented high risk feet requiring routine maintenance secondary to diabetes mellitis and those secondary complications of diabetes, as mentioned..    PCP: Holly Mittal MD    Date Last Seen by PCP:   Chief Complaint   Patient presents with    Wound Check     ov 2 weeks ago [facility]- PCP- RIGHT FOOT 2ND AND PINKY TOE    Diabetes Mellitus       Current shoe gear:  Slip-on shoes    Hemoglobin A1C   Date Value Ref Range Status   07/03/2019 6.9 (H) 4.0 - 5.6 % Final     Comment:     ADA Screening Guidelines:  5.7-6.4%  Consistent with prediabetes  >or=6.5%  Consistent with diabetes  High levels of fetal hemoglobin interfere with the HbA1C  assay. Heterozygous hemoglobin variants (HbS, HgC, etc)do  not significantly interfere with this assay.   However, presence of multiple variants may affect accuracy.     10/01/2018 7.4 (H) 4.0 - 5.6 % Final     Comment:     ADA Screening Guidelines:  5.7-6.4%  Consistent with prediabetes  >or=6.5%  Consistent with diabetes  High levels of fetal  hemoglobin interfere with the HbA1C  assay. Heterozygous hemoglobin variants (HbS, HgC, etc)do  not significantly interfere with this assay.   However, presence of multiple variants may affect accuracy.     03/28/2018 6.2 (H) 4.0 - 5.6 % Final     Comment:     According to ADA guidelines, hemoglobin A1c <7.0% represents  optimal control in non-pregnant diabetic patients. Different  metrics may apply to specific patient populations.   Standards of Medical Care in Diabetes-2016.  For the purpose of screening for the presence of diabetes:  <5.7%     Consistent with the absence of diabetes  5.7-6.4%  Consistent with increasing risk for diabetes   (prediabetes)  >or=6.5%  Consistent with diabetes  Currently, no consensus exists for use of hemoglobin A1c  for diagnosis of diabetes for children.  This Hemoglobin A1c assay has significant interference with fetal   hemoglobin   (HbF). The results are invalid for patients with abnormal amounts of   HbF,   including those with known Hereditary Persistence   of Fetal Hemoglobin. Heterozygous hemoglobin variants (HbAS, HbAC,   HbAD, HbAE, HbA2) do not significantly interfere with this assay;   however, presence of multiple variants in a sample may impact the %   interference.             Patient Active Problem List   Diagnosis    Hypertensive heart disease with heart failure    Type 2 diabetes mellitus with diabetic polyneuropathy    CLARENCE (obstructive sleep apnea)    Paroxysmal atrial fibrillation    Obesity with body mass index (BMI) of 30.0 to 39.9    Encounter for pre-transplant evaluation for heart transplant    Palliative care encounter    Fatigue    Breast mass on GYN exam 4/29/16    Left bundle-branch block    Organ transplant candidate    Tuberculosis screening    Vitamin D deficiency disease    Chronic anticoagulation    Muscle weakness    Chronic combined systolic and diastolic congestive heart failure    COCM (congestive cardiomyopathy)    High risk  "medications (not anticoagulants) long-term use    Laryngopharyngeal reflux    Gastroesophageal reflux disease    Chronic rhinitis    Dysphonia    GERD (gastroesophageal reflux disease)    CKD (chronic kidney disease), stage III    Carpal tunnel syndrome, bilateral    NICM (nonischemic cardiomyopathy)    Acute diastolic CHF (congestive heart failure), NYHA class 3    History of diabetic ulcer of foot - Right Foot    Chest pain    Essential hypertension    Asthma exacerbation    Biventricular automatic implantable cardioverter defibrillator in situ    Amiodarone toxicity    Acute URI    Maxillary sinusitis    Influenza B    Abnormal finding on lung imaging    Pharyngitis       Current Outpatient Medications on File Prior to Visit   Medication Sig Dispense Refill    magic mouthwash diphen/antac/lidoc Swish and spit 5 mLs every 4 (four) hours as needed. for mouth/throat pain 90 mL 0    acetaminophen (TYLENOL) 650 MG TbSR TAKE 1 TABLET every four hours AS NEEDED for shoulder pain 180 tablet 2    allopurinol (ZYLOPRIM) 100 MG tablet TAKE 1 TABLET BY MOUTH EVERY DAY 90 tablet 5    allopurinol (ZYLOPRIM) 100 MG tablet TAKE 2 TABLETS by mouth EVERY  tablet 3    atorvastatin (LIPITOR) 20 MG tablet Take 1 tablet (20 mg total) by mouth once daily. 90 tablet 1    azelastine (ASTELIN) 137 mcg (0.1 %) nasal spray 2 sprays (274 mcg total) by Nasal route 2 (two) times daily. 30 mL 0    BD ULTRA-FINE ESSENCE PEN NEEDLE 32 gauge x 5/32" Ndle Takes 5 times  day 200 each 11    blood sugar diagnostic Strp Uses 4 times a day, true metrix meter. 150 each 11    budesonide-formoterol 160-4.5 mcg (SYMBICORT) 160-4.5 mcg/actuation HFAA INHALE 2 PUFFS BY MOUTH TWICE DAILY. RINSE MOUTH AFTER USE. 10.2 g 3    budesonide-formoterol 160-4.5 mcg (SYMBICORT) 160-4.5 mcg/actuation HFAA INHALE 2 PUFFS TWICE DAILY. RINSE MOUTH AFTER USE. 10.2 g 6    cephALEXin (KEFLEX) 500 MG capsule Take 500 mg by mouth every 12 " (twelve) hours.      cetirizine (ZYRTEC) 10 MG tablet TAKE 1 TABLET AT BEDTIME for allergy relief 30 tablet 3    ciclopirox (PENLAC) 8 % Soln Apply topically nightly. 6.6 mL 3    ciprofloxacin HCl (CILOXAN) 0.3 % ophthalmic solution INSTILL ONE DROP INTO LEFT EYE FOUR TIMES DAILY. 10 mL 2    clotrimazole (LOTRIMIN) 1 % cream APPLY SPARINGLY TO AFFECTED AREA(S) in groin TWICE DAILY for one to two weeks 45 g 0    cyclobenzaprine (FEXMID) 7.5 MG Tab TAKE 1 TABLET 3 TIMES DAILY AS NEEDED FOR MUSCLE SPASM. 21 tablet 0    cycloSPORINE (RESTASIS) 0.05 % ophthalmic emulsion Apply 1 drop to eye.      diclofenac sodium (VOLTAREN) 1 % Gel APPLY 2 grams SPARINGLY TO Knees ONCE DAILY 100 g 3    digoxin (LANOXIN) 125 mcg tablet TAKE 1 TABLET BY MOUTH ONCE DAILY 90 tablet 3    dulaglutide (TRULICITY) 0.75 mg/0.5 mL PnIj Inject 0.5 mLs (0.75 mg total) into the skin every 7 days. 2 mL 6    dulaglutide (TRULICITY) 1.5 mg/0.5 mL PnIj INJECT 1.5 mg under the skin weekly 2 mL 11    dupilumab (DUPIXENT) 300 mg/2 mL Syrg Inject 2 mLs (300 mg total) into the skin every 14 (fourteen) days. 4 mL 11    EPINEPHrine (EPIPEN) 0.3 mg/0.3 mL AtIn INJECT 0.3ML IN THE MUSCLE AS DIRECTED FOR ANAPHYLAXIS 0.6 mL 1    ergocalciferol (ERGOCALCIFEROL) 50,000 unit Cap TAKE 1 CAPSULE BY MOUTH WEEKLY. 12 capsule 0    famotidine (PEPCID) 40 MG tablet TAKE 1 TABLET BY MOUTH TWICE DAILY. 180 tablet 1    fluticasone propionate (FLONASE) 50 mcg/actuation nasal spray 2 sprays (100 mcg total) by Each Nostril route once daily. 15 g 0    furosemide (LASIX) 40 MG tablet Take 2 tablets (80 mg total) by mouth 2 (two) times daily. 360 tablet 3    hydrOXYzine pamoate (VISTARIL) 25 MG Cap TAKE 1 CAPSULE BY MOUTH EVERY 8 HOURS AS NEEDED FOR ITCHING 30 capsule 0    insulin (BASAGLAR KWIKPEN U-100 INSULIN) glargine 100 units/mL (3mL) SubQ pen inject 34 under the skin in the morning and 34 units at night 30 mL 6    insulin aspart U-100 (NOVOLOG) 100 unit/mL  (3 mL) InPn pen Inject 22 units into the skin with meals plus scale 45 mL 6    insulin aspart U-100 (NOVOLOG) 100 unit/mL (3 mL) InPn pen Inject 16 units under the skin with meals plus scale 150-200+2, 201-250+4, 251-300+6, 301-350+8, >350+10, max daily 78 units. 30 mL 6    ketoconazole (NIZORAL) 2 % cream APPLY SPARINGLY TO AFFECTED AREA(S) ONCE DAILY 30 g 2    ketoconazole (NIZORAL) 2 % shampoo APPLY TO SKIN EVERY DAY FOR 5 MINUTES AND RINSE FOR ABOUT 1 TO 2 WEEKS 120 mL 1    ketorolac 0.5% (ACULAR) 0.5 % Drop instill 1 drop into left eye 4 times daily 10 mL 1    lancets 30 gauge Misc use 4 times a day 100 each 6    levalbuterol (XOPENEX HFA) 45 mcg/actuation inhaler INHALE 1 TO 2 PUFFS EVERY 4 TO 6 HOURS AS NEEDED 15 g 3    levalbuterol (XOPENEX HFA) 45 mcg/actuation inhaler Inhale 1 - 2 puffs by mouth every 4 - 6 hours as needed 15 g 6    levalbuterol (XOPENEX) 0.31 mg/3 mL nebulizer solution Take 3 mLs (0.31 mg total) by nebulization every 4 (four) hours as needed for Wheezing. Rescue 72 mL 3    levalbuterol (XOPENEX) 0.31 mg/3 mL nebulizer solution USE 1 UNIT DOSE IN NEBULIZER EVERY 4 HOURS AS NEEDED. 72 mL 6    linaCLOtide (LINZESS) 72 mcg Cap capsule Take one tablet by mouth daily as needed for constipation 30 capsule 2    lisinopril (PRINIVIL,ZESTRIL) 5 MG tablet TAKE 1 TABLET BY MOUTH TWO TIMES A DAY 60 tablet 8    magnesium oxide (MAG-OX) 400 mg (241.3 mg magnesium) tablet Take 1 tablet (400 mg total) by mouth 2 (two) times daily. 180 tablet 3    meclizine (ANTIVERT) 25 mg tablet Take 1 tablet (25 mg total) by mouth 2 (two) times daily as needed for Dizziness. 10 tablet 0    metOLazone (ZAROXOLYN) 2.5 MG tablet Take 1 tab today 30 minutes before your evening dose of Furosemide. 5 tablet 0    montelukast (SINGULAIR) 10 mg tablet TAKE 1 TABLET BY MOUTH DAILY. 30 tablet 3    montelukast (SINGULAIR) 10 mg tablet TAKE 1 TABLET DAILY. 30 tablet 6    nepafenac (ILEVRO) 0.3 % DrpS INSTILL ONE  "DROP INTO BOTH EYES DAILY. 3 mL 2    pen needle, diabetic 32 gauge x 5/32" Ndle USE AS DIRECTED 5 TIMES DAILY 200 each 5    pen needle, diabetic 32 gauge x 5/32" Ndle use with insulin pen five times daily 100 each 3    polyethylene glycol (GLYCOLAX) 17 gram/dose powder MIX 1 CAPFUL IN 8 OUNCES OF LIQUID AND DRINK ONCE DAILY as needed for constipation 510 g 6    potassium chloride (MICRO-K) 10 MEQ CpSR Take 2 capsules (20 mEq total) by mouth once daily. 60 capsule 11    prednisoLONE acetate (PRED FORTE) 1 % DrpS INSTILL ONE DROP INTO LEFT EYE FOUR TIMES DAILY. 10 mL 2    promethazine (PHENERGAN) 6.25 mg/5 mL syrup Take 5 mLs (6.25 mg total) by mouth every 6 (six) hours as needed (cough). 118 mL 0    sodium chloride for inhalation (SODIUM CHLORIDE 0.9%) 0.9 % nebulizer solution Use 1 vial via nebulization three times daily. Mix with xopenex solution 90 mL 2    spironolactone (ALDACTONE) 25 MG tablet Take 1 tablet (25 mg total) by mouth once daily. 90 tablet 3    tiotropium (SPIRIVA) 18 mcg inhalation capsule INHALE 1 CAPSULE EVERY DAY 30 capsule 6    triamcinolone acetonide 0.025% (KENALOG) 0.025 % cream APPLY SPARINGLY TO AFFECTED AREA(S) of face 2 TIMES DAILY. 15 g 1    warfarin (COUMADIN) 7.5 MG tablet Take 1 tablet by mouth every tuesday and take 1/2 tablet by mouth dailly on all other days - OR AS DIRECTED BY COUMADIN CLINIC 60 tablet 3     No current facility-administered medications on file prior to visit.        Review of patient's allergies indicates:  No Known Allergies    Past Surgical History:   Procedure Laterality Date    CARDIAC DEFIBRILLATOR PLACEMENT      CARDIAC DEFIBRILLATOR PLACEMENT      CARDIAC DEFIBRILLATOR PLACEMENT      CATARACT EXTRACTION Left     CHOLECYSTECTOMY      COLONOSCOPY N/A 5/2/2016    Procedure: COLONOSCOPY;  Surgeon: Eugene Soto MD;  Location: [address];  Service: Endoscopy;  Laterality: N/A;    GALLBLADDER SURGERY      iabp  4/2016    TUBAL " LIGATION         Family History   Problem Relation Age of Onset    Heart disease Mother     Heart disease Father        Social History     Socioeconomic History    Marital status:      Spouse name: Not on file    Number of children: Not on file    Years of education: Not on file    Highest education level: Not on file   Occupational History    Not on file   Social Needs    Financial resource strain: Not on file    Food insecurity:     Worry: Not on file     Inability: Not on file    Transportation needs:     Medical: Not on file     Non-medical: Not on file   Tobacco Use    Smoking status: Never Smoker    Smokeless tobacco: Never Used   Substance and Sexual Activity    Alcohol use: No    Drug use: No    Sexual activity: Yes     Partners: Male   Lifestyle    Physical activity:     Days per week: Not on file     Minutes per session: Not on file    Stress: Not on file   Relationships    Social connections:     Talks on phone: Not on file     Gets together: Not on file     Attends Anabaptism service: Not on file     Active member of club or organization: Not on file     Attends meetings of clubs or organizations: Not on file     Relationship status: Not on file   Other Topics Concern    Not on file   Social History Narrative    Not on file       Review of Systems   Constitution: Negative for chills, fever and malaise/fatigue.   Cardiovascular: Negative for chest pain, claudication, leg swelling, orthopnea and palpitations.   Respiratory: Negative for cough, shortness of breath and wheezing.    Skin: Positive for color change, dry skin, nail changes and suspicious lesions (calluses/corns). Negative for itching, poor wound healing and rash.   Musculoskeletal: Positive for joint pain and myalgias. Negative for arthritis, falls, gout, joint swelling and muscle weakness.   Gastrointestinal: Negative for diarrhea, nausea and vomiting.   Neurological: Positive for numbness, paresthesias and sensory  "change. Negative for disturbances in coordination, dizziness, focal weakness, tremors and weakness.   Psychiatric/Behavioral: Negative for altered mental status and hallucinations.           Objective:      Vitals:    01/22/20 0941   BP: 98/73   Pulse: (!) 50   Weight: 115.7 kg (255 lb 1.2 oz)   Height: 5' 5" (1.651 m)   PainSc: 0-No pain       Physical Exam   Cardiovascular:   Dorsalis pedis and posterior tibial pulses are diminished Bilaterally. Toes are cool to touch. Feet are warm proximally.There is decreased digital hair. Skin is atrophic, slightly hyperpigmented, and mildly edematous       Musculoskeletal:        Right ankle: She exhibits swelling. Tenderness. AITFL tenderness found.   There is equinus deformity bilateral with decreased dorsiflexion at the ankle joint bilateral. No tenderness with compression of heel. Negative tinels sign. Gait analysis reveals excessive pronation through midstance and propulsion with early heel off. Shoes reveals lateral heel counter wear bilateral     Decreased first MPJ range of motion both weightbearing and nonweightbearing, no crepitus observed the first MP joint, + dorsal flag sign. Mild  bunion deformity is observed .    Patient has hammertoes of digits 2-5 bilateral partially reducible      Neurological:   Friendship-Anya 5.07 monofilamant testing is diminished both feet. Sharp/dull sensation diminished Bilaterally. Light touch absent Bilaterally.       Skin: Lesion noted. No bruising and no ecchymosis noted. No erythema.   No open lesions, lacerations or wounds noted. Toenails 1-5 bilaterally are elongated by 2-3 mm, thickened by 2-3 mm, discolored/yellowed, dystrophic, brittle with subungual debris.  .  Interdigital spaces clean, dry and intact b/l. No erythema noted to b/l foot. Skin texture thin, atrophic, dry. Pedal hair diminished b/l.         Scaling dryness in a moccasin distribution is noted to the bilateral lower extremities.     Pre ulcerative callus noted " to right 2nd toe.     Focal hyperkeratotic lesion consisting entirely of hyperkeratotic tissue without open skin, drainage, pus, fluctuance, malodor, or signs of infection: B/L 5th toe.                    Assessment:       Encounter Diagnoses   Name Primary?    Type II diabetes mellitus with neurological manifestations Yes    Pre-ulcerative calluses          Plan:     Problem List Items Addressed This Visit     None      Visit Diagnoses     Type II diabetes mellitus with neurological manifestations    -  Primary    Pre-ulcerative calluses             I counseled the patient on her conditions, their implications and medical management.         - Shoe inspection. Diabetic Foot Education. Patient reminded of the importance of good nutrition and blood sugar control to help prevent podiatric complications of diabetes. Patient instructed on proper foot hygeine. We discussed wearing proper shoe gear, daily foot inspections, never walking without protective shoe gear, never putting sharp instruments to feet, routine podiatric nail visits every 2-3 months.     -   - After cleansing the area w/ alcohol prep pad the above mentioned hyperkeratosis was trimmed utilizing No 15 scapel, to a smooth base with out incident. Patient tolerated this well and reported comfort to the areas.  OK to ambulate with Diabetic shoe.         F/u 3 months.

## 2020-01-29 NOTE — PROGRESS NOTES
INR at goal. Medications and chart reviewed. No changes noted to necessitate adjustment of warfarin or follow-up plan. See calendar.  Findings: Pt states she is having some chest pain (she is going to see transplant today) & denies any other changes.  Maintain current regimen & re-assess in 4 weeks.   Patient was re-educated on situations that would require placing a call to the Coumadin Clinic, including bleeding or unusual bruising issues, changes in health, diet or medications,upcoming procedures that require warfarin interruption, and missed Coumadin dose(s). Patient expressed understanding that avoidance of consistency with these parameters could cause fluctuations in INR, leading to more frequent visits and increase risk of adverse events.

## 2020-01-31 NOTE — TELEPHONE ENCOUNTER
Rx call for Dupixent refill pt reached picking up monday 2/3 copay 0.00 @004 no supplies is needed at this time, next inj is 2/3. Address and  confirmed. Patient has 0 doses on hand at this time. Patient has not started any new medications, has had no missed doses and no side effects present. Patient is currently taking the medication as directed by doctors instruction Inject 2 mLs (300 mg total) into the skin every 14 (fourteen) days. Patient does have a safe place in their residence to keep medication at desired temperature away from small children and pets. Patient also does have the capability of contacting 911 in the event of an emergency. Patient states they do not have any questions or concerns at this time.

## 2020-02-21 NOTE — LETTER
February 21, 2020        Ayan Olmstead  [address] [place] [id number]  Phone: [phone]  Fax: [phone]             [facility] [address] [place] [address]  Phone: [phone]   Patient: Laure Ross   MR Number: [id number]   YOB: 1970   Date of Visit: 2/21/2020       Dear Dr. Ayan Olmstead    Thank you for referring Laure Ross to me for evaluation. Attached you will find relevant portions of my assessment and plan of care.    If you have questions, please do not hesitate to call me. I look forward to following Laure Ross along with you.    Sincerely,    Dandre Jules MD    Enclosure    If you would like to receive this communication electronically, please contact [email or web address] or [phone] to request [employer] access.    [employer] is a tool which provides read-only access to select patient information with whom you have a relationship. Its easy to use and provides real time access to review your patients record including encounter summaries, notes, results, and demographic information.    If you feel you have received this communication in error or would no longer like to receive these types of communications, please e-mail [email or web address]

## 2020-02-21 NOTE — PROGRESS NOTES
Subjective:   Transplant status: declined    HPI:  Ms. Ross is a very pleasant 44 yo F with a history of NICM (EF 10-20%), paroxysmal atrial fibrillation, HTN, DM, asthma, HLD and CLARENCE who comes for a regular visit. I had seen her about a month ago. She continues to do well from a HF standpoint. Today she reports NYHA class II with occasional III symptoms. No  PND or orthopnea.  No HF admissions or ED visits. Reports no issues with her HF regimen.     Past Medical History:   Diagnosis Date    Anticoagulant long-term use     Arthritis     Asthma     Asthma     Atrial fibrillation     Cardiomyopathy     Cardiomyopathy     CHF (congestive heart failure)     CHF (congestive heart failure)     Chronic combined systolic and diastolic heart failure 6/3/2016    COPD (chronic obstructive pulmonary disease) 3/28/2018    GERD (gastroesophageal reflux disease)     H/O tubal ligation     Hypertension     Obesity     Renal disorder     Type 2 diabetes mellitus with hyperglycemia     Type 2 diabetes mellitus with polyneuropathy      Past Surgical History:   Procedure Laterality Date    CARDIAC DEFIBRILLATOR PLACEMENT      CARDIAC DEFIBRILLATOR PLACEMENT      CARDIAC DEFIBRILLATOR PLACEMENT      CATARACT EXTRACTION Left     CHOLECYSTECTOMY      COLONOSCOPY N/A 2016    Procedure: COLONOSCOPY;  Surgeon: Eugene Soto MD;  Location: [facility] ([address] [place] [address]);  Service: Endoscopy;  Laterality: N/A;    GALLBLADDER SURGERY      iabp  2016    TUBAL LIGATION       OB History        3    Para   3    Term   3            AB        Living   3       SAB        TAB        Ectopic        Multiple        Live Births   3           Obstetric Comments   Cs x 3, preE  Gynhx: reg/4.  Mod to heavy flow  S/p BTL  Denies abnl pap, last pap  normal  H/o trichomonas           Review of Systems   Constitution: Negative. Negative for chills, decreased appetite, diaphoresis, fever, malaise/fatigue, night sweats,  weight gain and weight loss.   Eyes: Negative.    Cardiovascular: Negative for chest pain, claudication, cyanosis, dyspnea on exertion, irregular heartbeat, leg swelling, near-syncope, orthopnea, palpitations, paroxysmal nocturnal dyspnea and syncope.   Respiratory: Negative for cough, hemoptysis and shortness of breath.    Endocrine: Negative.    Hematologic/Lymphatic: Negative.    Skin: Negative for color change, dry skin and nail changes.   Musculoskeletal: Negative.    Gastrointestinal: Negative.    Genitourinary: Negative.    Neurological: Negative for weakness.       Objective:   Last menstrual period 08/25/2019.body mass index is unknown because there is no height or weight on file.  Physical Exam   Constitutional: She is oriented to person, place, and time. Vital signs are normal. She appears well-developed and well-nourished.   HENT:   Head: Normocephalic.   Eyes: Pupils are equal, round, and reactive to light.   Neck: Neck supple. No JVD present.   Cardiovascular: Normal rate, regular rhythm and normal heart sounds. PMI is displaced. Exam reveals no gallop and no friction rub.   No murmur heard.  Normal right and left Kash test    Pulmonary/Chest: Effort normal and breath sounds normal. No respiratory distress. She has no wheezes. She has no rales.   Abdominal: Soft. Bowel sounds are normal. She exhibits no distension. There is no tenderness.   Musculoskeletal: She exhibits no edema.   Neurological: She is alert and oriented to person, place, and time.   Nursing note and vitals reviewed.      Labs:    Chemistry        Component Value Date/Time     02/21/2020 1426    K 4.5 02/21/2020 1426     02/21/2020 1426    CO2 28 02/21/2020 1426    BUN 16 02/21/2020 1426    CREATININE 1.2 02/21/2020 1426    GLU 77 02/21/2020 1426        Component Value Date/Time    CALCIUM 9.2 02/21/2020 1426    ALKPHOS 75 01/01/2020 2309    AST 22 01/01/2020 2309    ALT 12 01/01/2020 2309    BILITOT 1.1 (H) 01/01/2020  2309    ESTGFRAFRICA >60.0 02/21/2020 1426    EGFRNONAA 52.8 (A) 02/21/2020 1426          Magnesium   Date Value Ref Range Status   12/08/2019 1.9 1.6 - 2.6 mg/dL Final     Lab Results   Component Value Date    WBC 19.28 (H) 01/01/2020    HGB 11.0 (L) 01/01/2020    HCT 36.4 (L) 01/01/2020     01/01/2020     Lab Results   Component Value Date    INR 2.1 01/29/2020    INR 3.3 01/08/2020    INR 2.6 12/18/2019     BNP   Date Value Ref Range Status   02/21/2020 367 (H) 0 - 99 pg/mL Final     Comment:     Values of less than 100 pg/ml are consistent with non-CHF populations.   10/11/2019 227 (H) 0 - 99 pg/mL Final     Comment:     Values of less than 100 pg/ml are consistent with non-CHF populations.   09/11/2019 286 (H) 0 - 99 pg/mL Final     Comment:     Values of less than 100 pg/ml are consistent with non-CHF populations.     LD   Date Value Ref Range Status   04/14/2016 827 (H) 110 - 260 U/L Final     Comment:     Results are increased in hemolyzed samples.       Assessment:      1. Chronic combined systolic and diastolic congestive heart failure    2. Cardiomyopathy, nonischemic    3. Essential hypertension        Plan:   Stable from a HF standpoint. NYHA class II symptoms. Appears  euvolemic on exam.   Continue current Lasix dose  Use compression stockings.  Continue current HF regimen  Recommend 2 gram sodium restriction and 1500cc fluid restriction.  Encourage physical activity with graded exercise program.  Requested patient to weigh themselves daily, and to notify us if their weight increases by more than 3 lbs in 1 day or 5 lbs in 1 week.    RTC in 3 months with labs and Echo      Dandre Jules MD

## 2020-02-26 NOTE — PROGRESS NOTES
INR at goal. Medications and chart reviewed. No changes noted to necessitate adjustment of warfarin or follow-up plan. See calendar.  Pt states she has a headache, some dizziness, & denies any other changes.  Maintain current regimen & re-assess in 4 weeks.   Patient was re-educated on situations that would require placing a call to the [facility], including bleeding or unusual bruising issues, changes in health, diet or medications,upcoming procedures that require warfarin interruption, and missed Coumadin dose(s). Patient expressed understanding that avoidance of consistency with these parameters could cause fluctuations in INR, leading to more frequent visits and increase risk of adverse events.

## 2020-03-10 NOTE — TELEPHONE ENCOUNTER
----- Message from Navi Jolly MD sent at 3/9/2020  4:18 PM CDT -----  yes    ----- Message -----  From: Anita Head RN  Sent: 3/9/2020   9:58 AM CDT  To: Navi Jolly MD    Pt has been in AF for 5 days. Pt is not sure if she is symptomatic to the AF bc she states that she has had cold/flu symptoms for about 1 wk and has been upset because it was the 1 year anniversary of her brother's death. She has known PAF and is taking Coumadin. Should we continue to monitor and f/u next wk after her cold/flu symptoms have subsided?

## 2020-03-17 NOTE — PROGRESS NOTES
Received alert from [facility] for AF since 3/14/20.  Called and spoke with patient.  States still with cold symptoms from last week.  Saw doctor for her cough yesterday.  Rx'd steroids, nebulizer, and cough meds.  States asymptomatic to AF.  Compliant with coumadin.  AF burden 18%, V rates controlled.  Will cont to monitor.

## 2020-03-24 NOTE — TELEPHONE ENCOUNTER
Rx call for Dupixent refill pt reached shipping 3/26 with 3/27 arrival with pt consent, copay 0.00 @004. No supplies is needed at this time, next inj is 3/30. Address and  confirmed. Patient has 0 doses on hand at this time. Patient has not started any new medications, has had no missed doses and no side effects present. Patient is currently taking the medication as directed by doctors instruction Inject 2 mLs (300 mg total) into the skin every 14 (fourteen) days. Patient does have a safe place in their residence to keep medication at desired temperature away from small children and pets. Patient also does have the capability of contacting 911 in the event of an emergency. Patient states they do not have any questions or concerns at this time.

## 2020-03-25 NOTE — PROGRESS NOTES
INR not at goal - curious level is so low without significant changes reported. Medications, chart, and patient findings reviewed. See calendar for adjustments to dose and follow up plan.

## 2020-04-01 NOTE — TELEPHONE ENCOUNTER
AF >3 days   Received: Today   Message Contents   Marti Lopez, RN  Navi Jolly MD             Pt has been in AF since 3/24/2020. [place] 20% since July 2019.     BiV pacing 77%.   RVR 10% while in AF.     Pt prev was reported to you in AF while having cold symptoms.     Reports intermittent palpitations and some SOB but has asthma. Pt states nothing abnormal that she would call the doctor about.     She is taking dig and coumadin and just completed 3 days of prednisone.     Please advise,   Marti

## 2020-04-13 NOTE — TELEPHONE ENCOUNTER
Patient reached for regular Dupixent followup. Dosage remains appropriate for treatment of Asthma. Pt reports no recent asthma exacerbations. Hasn't had to use nebulizer recently. Xopenex inhaler - last used yesterday. Uses it twice a day currently due to anxiety over COVID19; always carries with her when she leaves the house. Takes Dupixent injection into the office for the nurse to inject every 2 weeks (declines review of injection technique) - no missed doses; still going to clinic during COVID19 stay at home mandate. Stores medication properly in fridge. No missed doses. Denies side effects. Pain 0; Health QoL 5.5 (10-best) due to heart 'has been in afib' - patient following with cardiologist. Med rec completed and allergies reviewed - removed prednisone and cough syrup - both treatments completed. No DDIs with Dupixent encountered. Pt not currently working. No trips to Urgent Care or Emergency Room in past month. Patient denies any further questions or concerns. Verified 2 patient identifiers - Name and .

## 2020-04-21 NOTE — TELEPHONE ENCOUNTER
Refill call regarding Dupixent from [facility]. Shipping out Dupixent on  for  arrival with patients consent. Copay of $0 @ 004. Address and  confirmed.

## 2020-04-28 PROBLEM — J45.40 MODERATE PERSISTENT ASTHMA WITHOUT COMPLICATION: Status: ACTIVE | Noted: 2020-01-01

## 2020-04-28 PROBLEM — J45.901 ASTHMA EXACERBATION: Status: RESOLVED | Noted: 2018-04-20 | Resolved: 2020-01-01

## 2020-04-28 NOTE — PATIENT INSTRUCTIONS
Call [facility] at [phone] if you don't get a call in 5 working days.

## 2020-04-28 NOTE — ASSESSMENT & PLAN NOTE
Doing well with apap with good compliance.  93%>4  Hours.  Residual ahi of 3.4. Patient is benefiting from apap.

## 2020-04-28 NOTE — PROGRESS NOTES
Established Patient - Audio Only Telehealth Visit     The patient location is: home  The chief complaint leading to consultation is: adilia  Visit type: Virtual visit with audio only (telephone)     The reason for the audio only service rather than synchronous audio and video virtual visit was related to technical difficulties or patient preference/necessity.     Each patient to whom I provide medical services by telemedicine is:  (1) informed of the relationship between the physician and patient and the respective role of any other health care provider with respect to management of the patient; and (2) notified that they may decline to receive medical services by telemedicine and may withdraw from such care at any time. Patient verbally consented to receive this service via voice-only telephone call.       Laure Ross  was seen as a follow up.    CHIEF COMPLAINT:    No chief complaint on file.      HISTORY OF PRESENT ILLNESS: Laure Ross is a 49 y.o. female is here for sleep evaluation.   Patient with NICM (EF 10-20%), paroxysmal atrial fibrillation, HTN, DM, asthma, HLD is here for ADILIA evaluation.  Our first encounter was 7/26/20.    Patient was hospitalized 3/16 for hypercapnic respiratory failure.  Patient was started on BPAP and diuresed.  Patient tolerated BPAP well.  No prior sleep study.  +snoring.  No witnessed sleep apnea.  +feeling rested in am.  No parasomnia.  No cataplexy.      +numbness in left lateral thigh since IABP.      [place] Sleepiness Scale score during initial sleep evaluation was 12.    S/p sleep study 8/24/16.  Patient was set up with apap in 2016.  Patient has been using apap nightly.  Feeling rested upon awake.  Occasional headache.  Once per week.  No snoring apap.  Waking up less.  Sleep latency improve.  Patient is requesting new supply for apap.      SLEEP ROUTINE:  Activity the hour prior to sleep: watch tv and playing on the phone in bed    Bed partner:  daughter  Time to bed:   12 am   Lights off:  tv is on   Sleep onset latency:  20-30 minutes        Disruptions or awakenings:   1 time  Wakeup time:      7-9 am   Perceived sleep quality:  rested       Daytime naps:      90 minutes in afternoon  Weekend sleep routine:      same  Caffeine use: none  exercise habit:   Therapy 3 times per week      PAST MEDICAL HISTORY:    Active Ambulatory Problems     Diagnosis Date Noted    Hypertensive heart disease with heart failure 04/13/2016    Type 2 diabetes mellitus with diabetic polyneuropathy 04/20/2016    CLARENCE (obstructive sleep apnea)     Paroxysmal atrial fibrillation 04/22/2016    Obesity with body mass index (BMI) of 30.0 to 39.9 04/25/2016    Encounter for pre-transplant evaluation for heart transplant 04/27/2016    Palliative care encounter 04/29/2016    Fatigue 04/29/2016    Breast mass on GYN exam 4/29/16 04/30/2016    Left bundle-branch block 05/01/2016    Organ transplant candidate     Tuberculosis screening     Vitamin D deficiency disease 05/04/2016    Chronic anticoagulation 05/10/2016    Muscle weakness 05/27/2016    Chronic combined systolic and diastolic congestive heart failure 06/03/2016    COCM (congestive cardiomyopathy) 06/05/2016    High risk medications (not anticoagulants) long-term use     Laryngopharyngeal reflux 09/28/2016    Gastroesophageal reflux disease 09/28/2016    Chronic rhinitis 09/28/2016    Dysphonia 09/28/2016    GERD (gastroesophageal reflux disease) 11/17/2016    CKD (chronic kidney disease), stage III 03/17/2017    Carpal tunnel syndrome, bilateral 03/29/2017    NICM (nonischemic cardiomyopathy) 05/04/2017    Acute diastolic CHF (congestive heart failure), NYHA class 3 05/04/2017    History of diabetic ulcer of foot - Right Foot 08/29/2017    Chest pain 03/28/2018    Essential hypertension 03/28/2018    Biventricular automatic implantable cardioverter defibrillator in situ 08/03/2018    Amiodarone toxicity 08/25/2018     Acute URI 03/19/2019    Maxillary sinusitis 03/19/2019    Influenza B 07/03/2019    Abnormal finding on lung imaging 07/03/2019    Pharyngitis 01/01/2020    Moderate persistent asthma without complication 04/28/2020     Resolved Ambulatory Problems     Diagnosis Date Noted    Renal failure 04/16/2016    Type 2 diabetes mellitus with hyperglycemia 04/20/2016    Diabetes mellitus due to underlying condition with diabetic nephropathy 04/22/2016    Uncontrolled diabetes mellitus     Physical deconditioning 05/17/2016    Decreased strength, endurance, and mobility 05/17/2016    Morbid obesity due to excess calories 06/05/2016    Throat clearing 09/28/2016    Other chest pain 03/28/2018    Asthma exacerbation 04/20/2018     Past Medical History:   Diagnosis Date    Anticoagulant long-term use     Arthritis     Asthma     Asthma     Atrial fibrillation     Cardiomyopathy     Cardiomyopathy     CHF (congestive heart failure)     CHF (congestive heart failure)     Chronic combined systolic and diastolic heart failure 6/3/2016    COPD (chronic obstructive pulmonary disease) 3/28/2018    H/O tubal ligation     Hypertension     Obesity     Renal disorder     Type 2 diabetes mellitus with polyneuropathy                 PAST SURGICAL HISTORY:    Past Surgical History:   Procedure Laterality Date    CARDIAC DEFIBRILLATOR PLACEMENT      CARDIAC DEFIBRILLATOR PLACEMENT      CARDIAC DEFIBRILLATOR PLACEMENT      CATARACT EXTRACTION Left     CHOLECYSTECTOMY      COLONOSCOPY N/A 5/2/2016    Procedure: COLONOSCOPY;  Surgeon: Eugene Soto MD;  Location: [facility] ([address] [place] [address]);  Service: Endoscopy;  Laterality: N/A;    GALLBLADDER SURGERY      iabp  4/2016    TUBAL LIGATION           FAMILY HISTORY:                Family History   Problem Relation Age of Onset    Heart disease Mother     Heart disease Father        SOCIAL HISTORY:          Tobacco:   Social History     Tobacco Use   Smoking Status  "Never Smoker   Smokeless Tobacco Never Used       alcohol use:    Social History     Substance and Sexual Activity   Alcohol Use No                 Occupation:  disable    ALLERGIES:  Review of patient's allergies indicates:  No Known Allergies    CURRENT MEDICATIONS:    Current Outpatient Medications   Medication Sig Dispense Refill    acetaminophen (TYLENOL) 650 MG TbSR TAKE 1 TABLET 3  TIMES DAILY for chest wall pain (Patient not taking: Reported on 4/13/2020) 30 tablet 0    allopurinoL (ZYLOPRIM) 100 MG tablet TAKE 2 TABLETS by mouth EVERY  tablet 3    atorvastatin (LIPITOR) 20 MG tablet Take 1 tablet (20 mg total) by mouth once daily. 90 tablet 1    azelastine (ASTELIN) 137 mcg (0.1 %) nasal spray 2 sprays (274 mcg total) by Nasal route 2 (two) times daily. 30 mL 0    BD ULTRA-FINE ESSENCE PEN NEEDLE 32 gauge x 5/32" Ndle Takes 5 times  day 200 each 11    blood sugar diagnostic Strp Uses 4 times a day, true metrix meter. 150 each 11    budesonide-formoterol 160-4.5 mcg (SYMBICORT) 160-4.5 mcg/actuation HFAA INHALE 2 PUFFS TWICE DAILY. RINSE MOUTH AFTER USE. 10.2 g 6    budesonide-formoterol 160-4.5 mcg (SYMBICORT) 160-4.5 mcg/actuation HFAA INHALE 2 PUFFS TWICE DAILY. RINSE MOUTH AFTER USE. 10.2 g 6    cetirizine (ZYRTEC) 10 MG tablet TAKE 1 TABLET AT BEDTIME for allergy relief 30 tablet 3    clotrimazole (LOTRIMIN) 1 % cream APPLY SPARINGLY TO AFFECTED AREA(S) in groin TWICE DAILY for one to two weeks 45 g 0    cyclobenzaprine (FLEXERIL) 10 MG tablet Take 1 tablet (10 mg total) by mouth nightly. 14 tablet 0    cycloSPORINE (RESTASIS) 0.05 % ophthalmic emulsion Apply 1 drop to eye.      diclofenac sodium (VOLTAREN) 1 % Gel APPLY 2 grams SPARINGLY TO Knees ONCE DAILY 100 g 3    digoxin (LANOXIN) 125 mcg tablet TAKE 1 TABLET BY MOUTH ONCE DAILY 90 tablet 3    dulaglutide (TRULICITY) 1.5 mg/0.5 mL PnIj INJECT 1.5 mg under the skin weekly 2 mL 11    dupilumab (DUPIXENT) 300 mg/2 mL Syrg Inject 2 " mLs (300 mg total) into the skin every 14 (fourteen) days. 4 mL 11    EPINEPHrine (EPIPEN) 0.3 mg/0.3 mL AtIn INJECT 0.3ML IN THE MUSCLE AS DIRECTED FOR ANAPHYLAXIS (Patient not taking: Reported on 4/13/2020) 0.6 mL 1    famotidine (PEPCID) 40 MG tablet TAKE 1 TABLET BY MOUTH TWICE DAILY. 180 tablet 1    fluticasone propionate (FLONASE) 50 mcg/actuation nasal spray 2 sprays (100 mcg total) by Each Nostril route once daily. 15 g 0    fluticasone propionate (FLOVENT HFA) 110 mcg/actuation inhaler Inhale 2 puffs into the lungs 2 (two) times daily. Rinse mouth after use 12 g 3    furosemide (LASIX) 40 MG tablet Take 2 tablets (80 mg total) by mouth 2 (two) times daily. 360 tablet 3    guaiFENesin 1,200 mg Ta12 Take 1 tablet by every 12 hours as needed for congestion 28 tablet 0    insulin (BASAGLAR KWIKPEN U-100 INSULIN) glargine 100 units/mL (3mL) SubQ pen inject 34 under the skin in the morning and 34 units at night 30 mL 6    insulin aspart U-100 (NOVOLOG) 100 unit/mL (3 mL) InPn pen Inject 22 units into the skin with meals plus scale 45 mL 6    insulin aspart U-100 (NOVOLOG) 100 unit/mL (3 mL) InPn pen Inject 16 units under the skin with meals plus scale 150-200+2, 201-250+4, 251-300+6, 301-350+8, >350+10, max daily 78 units. 30 mL 6    ketoconazole (NIZORAL) 2 % cream APPLY SPARINGLY TO AFFECTED AREA(S) ONCE DAILY 30 g 2    ketoconazole (NIZORAL) 2 % shampoo APPLY TO SKIN EVERY DAY FOR 5 MINUTES AND RINSE FOR ABOUT 1 TO 2 WEEKS 120 mL 1    lancets 30 gauge Misc use 4 times a day 100 each 6    levalbuterol (XOPENEX HFA) 45 mcg/actuation inhaler Inhale 1 - 2 puffs by mouth every 4 - 6 hours as needed 15 g 6    levalbuterol (XOPENEX HFA) 45 mcg/actuation inhaler Inhale 1 to 2 puffs into the lungs every 4 to 6 hours as needed 15 g 6    levalbuterol (XOPENEX) 0.31 mg/3 mL nebulizer solution Take 3 mLs (0.31 mg total) by nebulization every 4 (four) hours as needed for Wheezing. Rescue 72 mL 3     "levalbuterol (XOPENEX) 0.31 mg/3 mL nebulizer solution USE 1 UNIT DOSE IN NEBULIZER EVERY 4 HOURS AS NEEDED. 72 mL 6    levalbuterol (XOPENEX) 0.31 mg/3 mL nebulizer solution USE 1 UNIT DOSE IN NEBULIZER EVERY 4 HOURS AS NEEDED. 72 mL 6    linaCLOtide (LINZESS) 72 mcg Cap capsule Take one tablet by mouth daily as needed for constipation 30 capsule 2    lisinopriL (PRINIVIL,ZESTRIL) 5 MG tablet TAKE 1 TABLET BY MOUTH TWO TIMES A DAY 60 tablet 8    lisinopriL (PRINIVIL,ZESTRIL) 5 MG tablet TAKE 1 TABLET BY MOUTH TWO TIMES A DAY 60 tablet 8    magnesium oxide (MAG-OX) 400 mg (241.3 mg magnesium) tablet Take 1 tablet (400 mg total) by mouth 2 (two) times daily. 180 tablet 3    meclizine (ANTIVERT) 25 mg tablet Take 1 tablet (25 mg total) by mouth 2 (two) times daily as needed for Dizziness. 10 tablet 0    metOLazone (ZAROXOLYN) 2.5 MG tablet TAKE 1 TABLET DAILY AS DIRECTED. 60 tablet 0    montelukast (SINGULAIR) 10 mg tablet TAKE 1 TABLET BY MOUTH DAILY. 30 tablet 3    montelukast (SINGULAIR) 10 mg tablet TAKE 1 TABLET DAILY. 30 tablet 6    pen needle, diabetic 32 gauge x 5/32" Ndle USE AS DIRECTED 5 TIMES DAILY 200 each 5    pen needle, diabetic 32 gauge x 5/32" Ndle use with insulin pen five times daily 100 each 3    polyethylene glycol (GLYCOLAX) 17 gram/dose powder MIX 1 CAPFUL IN 8 OUNCES OF LIQUID AND DRINK ONCE DAILY as needed for constipation 510 g 6    potassium chloride (MICRO-K) 10 MEQ CpSR Take 2 capsules (20 mEq total) by mouth once daily. 60 capsule 11    potassium chloride (MICRO-K) 10 MEQ CpSR Take 2 capsules (20 mEq total) by mouth once daily. 60 capsule 11    predniSONE (DELTASONE) 20 MG tablet TAKE 1 TABLET TWICE DAILY. 6 tablet 0    promethazine (PHENERGAN) 6.25 mg/5 mL syrup Take 5 mLs (6.25 mg total) by mouth every 6 (six) hours as needed (cough). 118 mL 0    spironolactone (ALDACTONE) 25 MG tablet Take 1 tablet (25 mg total) by mouth once daily. 90 tablet 3    tiotropium " (SPIRIVA) 18 mcg inhalation capsule INHALE 1 CAPSULE EVERY DAY 30 capsule 6    triamcinolone acetonide 0.025% (KENALOG) 0.025 % cream APPLY SPARINGLY TO AFFECTED AREA(S) of face 2 TIMES DAILY. 15 g 1    warfarin (COUMADIN) 7.5 MG tablet Take 1 tablet by mouth every tuesday and take 1/2 tablet by mouth dailly on all other days - OR AS DIRECTED BY COUMADIN CLINIC 60 tablet 3     No current facility-administered medications for this visit.                   REVIEW OF SYSTEMS:     Sleep related symptoms as per HPI.  CONST:Denies weight gain    HEENT: Denies sinus congestion  PULM: Denies dyspnea  CARD:  Denies palpitations   GI:  Denies acid reflux  : Denies polyuria  NEURO: no headaches  PSYCH: Denies mood disturbance  HEME: Denies anemia   Otherwise, a balance of systems reviewed is negative.             PHYSICAL EXAM:  There were no vitals filed for this visit.  There is no height or weight on file to calculate BMI.     n/a                                            DATA   Echo 5/3/16    1 - Eccentric hypertrophy.     2 - Severely depressed left ventricular systolic function (EF 20-25%).     3 - Left ventricular diastolic dysfunction.     4 - Biatrial enlargement.     5 - Normal right ventricular systolic function .     6 - Pulmonary hypertension. The estimated PA systolic pressure is 63 mmHg.     7 - Moderate mitral regurgitation.     8 - Trivial pulmonic regurgitation.     9 - Trivial pericardial effusion.     10 - Intermediate central venous pressure.     8/29/16 ahi of 13 with supine ahi of 19 and rem ahi of 33    pft 7/30/18 ratio 70%; fvc 59%; fev1 50%; dlco 85%    Ct chest 6/4/18- lobular enhancing lesion in rul.?avm.  unchange    Lab Results   Component Value Date    TSH 1.809 06/19/2019     ASSESSMENT  Problem List Items Addressed This Visit     Moderate persistent asthma without complication    Overview     xopenex, spiriva, singulair and symbicort.  Also on dupixent by Dr. Comer at [facility]          CLARENCE (obstructive sleep apnea)    Overview     ahi of 13.  Currently on apap         Current Assessment & Plan     Doing well with apap with good compliance.  93%>4  Hours.  Residual ahi of 3.4. Patient is benefiting from apap.           Relevant Orders    CPAP/BIPAP SUPPLIES           obestiy - lost weight with diuresis.    Nasal congestion - currently on flonase.  Recommend sinus irrigation.      Education: During our discussion today, we talked about the etiology of obstructive sleep apnea as well as the potential ramifications of untreated sleep apnea, which could include daytime sleepiness, hypertension, heart disease and/or stroke.     Precautions: The patient was advised to abstain from driving should they feel sleepy or drowsy.       Patient will Follow up in about 6 months (around 10/28/2020).       This is 25 minutes visit, over 50% of time spent in direct consultation with patient.        This service was not originating from a related E/M service provided within the previous 7 days nor will  to an E/M service or procedure within the next 24 hours or my soonest available appointment.  Prevailing standard of care was able to be met in this audio-only visit.

## 2020-05-25 NOTE — LETTER
May 25, 2020        Ayan Olmstead  [address] [place] [id number]  Phone: [phone]  Fax: [phone]             [facility] [address] [place] [address]  Phone: [phone]   Patient: Laure Ross   MR Number: [id number]   YOB: 1970   Date of Visit: 5/25/2020       Dear Dr. Ayan Olmstead    Thank you for referring Laure Ross to me for evaluation. Attached you will find relevant portions of my assessment and plan of care.    If you have questions, please do not hesitate to call me. I look forward to following Laure Ross along with you.    Sincerely,    Dandre Jules MD    Enclosure    If you would like to receive this communication electronically, please contact [email or web address] or [phone] to request [employer] Link access.    [employer] Link is a tool which provides read-only access to select patient information with whom you have a relationship. Its easy to use and provides real time access to review your patients record including encounter summaries, notes, results, and demographic information.    If you feel you have received this communication in error or would no longer like to receive these types of communications, please e-mail [email or web address]

## 2020-05-25 NOTE — PROGRESS NOTES
Subjective:     HPI:  Ms. Ross is a very pleasant 46 yo F with a history of NICM (EF 10-20%), paroxysmal atrial fibrillation, HTN, DM, asthma, HLD and CLARENCE who comes for a regular visit. She continues to do well from a HF standpoint. Today she reports NYHA class II with occasional III symptoms. No  PND or orthopnea.  No HF admissions or ED visits. Reports no issues with her HF regimen. Had a 2D echo done today.     Past Medical History:   Diagnosis Date    Anticoagulant long-term use     Arthritis     Asthma     Asthma     Atrial fibrillation     Cardiomyopathy     Cardiomyopathy     CHF (congestive heart failure)     CHF (congestive heart failure)     Chronic combined systolic and diastolic heart failure 6/3/2016    COPD (chronic obstructive pulmonary disease) 3/28/2018    GERD (gastroesophageal reflux disease)     H/O tubal ligation     Hypertension     Obesity     Renal disorder     Type 2 diabetes mellitus with hyperglycemia     Type 2 diabetes mellitus with polyneuropathy      Past Surgical History:   Procedure Laterality Date    CARDIAC DEFIBRILLATOR PLACEMENT      CARDIAC DEFIBRILLATOR PLACEMENT      CARDIAC DEFIBRILLATOR PLACEMENT      CATARACT EXTRACTION Left     CHOLECYSTECTOMY      COLONOSCOPY N/A 2016    Procedure: COLONOSCOPY;  Surgeon: Eugene Soto MD;  Location: [facility] ([address] [place] [address]);  Service: Endoscopy;  Laterality: N/A;    GALLBLADDER SURGERY      iabp  2016    TUBAL LIGATION       OB History        3    Para   3    Term   3            AB        Living   3       SAB        TAB        Ectopic        Multiple        Live Births   3           Obstetric Comments   Cs x 3, preE  Gynhx: reg/4.  Mod to heavy flow  S/p BTL  Denies abnl pap, last pap 2017 normal  H/o trichomonas           Review of Systems   Constitution: Negative. Negative for chills, decreased appetite, diaphoresis, fever, malaise/fatigue, night sweats, weight gain and weight loss.   Eyes:  "Negative.    Cardiovascular: Positive for dyspnea on exertion. Negative for chest pain, claudication, cyanosis, irregular heartbeat, leg swelling, near-syncope, orthopnea, palpitations, paroxysmal nocturnal dyspnea and syncope.   Respiratory: Negative for cough, hemoptysis and shortness of breath.    Endocrine: Negative.    Hematologic/Lymphatic: Negative.    Skin: Negative for color change, dry skin and nail changes.   Musculoskeletal: Negative.    Gastrointestinal: Negative.    Genitourinary: Negative.    Neurological: Negative for weakness.       Objective:   Height 5' 5" (1.651 m), weight 107.9 kg (237 lb 14 oz), last menstrual period 08/25/2019.body mass index is 39.58 kg/m².  Physical Exam   Constitutional: She is oriented to person, place, and time. Vital signs are normal. She appears well-developed and well-nourished.   HENT:   Head: Normocephalic.   Eyes: Pupils are equal, round, and reactive to light.   Neck: Neck supple. No JVD present.   Cardiovascular: Normal rate, regular rhythm and normal heart sounds. PMI is displaced. Exam reveals no gallop and no friction rub.   No murmur heard.  Pulmonary/Chest: Effort normal and breath sounds normal. No respiratory distress. She has no wheezes. She has no rales.   Abdominal: Soft. Bowel sounds are normal. She exhibits no distension. There is no tenderness. There is no rebound.   Musculoskeletal: She exhibits no edema.   Neurological: She is alert and oriented to person, place, and time.   Nursing note and vitals reviewed.      Labs:    Chemistry        Component Value Date/Time     05/25/2020 0957    K 4.4 05/25/2020 0957     05/25/2020 0957    CO2 22 (L) 05/25/2020 0957    BUN 20 05/25/2020 0957    CREATININE 1.3 05/25/2020 0957     (H) 05/25/2020 0957        Component Value Date/Time    CALCIUM 9.6 05/25/2020 0957    ALKPHOS 95 05/25/2020 0957    AST 16 05/25/2020 0957    ALT 14 05/25/2020 0957    BILITOT 1.2 (H) 05/25/2020 0957    " ESTGFRAFRICA 55.3 (A) 05/25/2020 0957    EGFRNONAA 48.0 (A) 05/25/2020 0957          Magnesium   Date Value Ref Range Status   12/08/2019 1.9 1.6 - 2.6 mg/dL Final     Lab Results   Component Value Date    WBC 19.28 (H) 01/01/2020    HGB 11.0 (L) 01/01/2020    HCT 36.4 (L) 01/01/2020     01/01/2020     Lab Results   Component Value Date    INR 2.7 (H) 05/25/2020    INR 2.0 (H) 04/08/2020    INR 1.6 (H) 03/25/2020     BNP   Date Value Ref Range Status   02/21/2020 367 (H) 0 - 99 pg/mL Final     Comment:     Values of less than 100 pg/ml are consistent with non-CHF populations.   10/11/2019 227 (H) 0 - 99 pg/mL Final     Comment:     Values of less than 100 pg/ml are consistent with non-CHF populations.   09/11/2019 286 (H) 0 - 99 pg/mL Final     Comment:     Values of less than 100 pg/ml are consistent with non-CHF populations.     LD   Date Value Ref Range Status   04/14/2016 827 (H) 110 - 260 U/L Final     Comment:     Results are increased in hemolyzed samples.       Assessment:      1. Chronic combined systolic and diastolic congestive heart failure    2. NICM (nonischemic cardiomyopathy)    3. Essential hypertension    4. CKD (chronic kidney disease), stage III        Plan:   Stable from a HF standpoint. NYHA class II symptoms. Appears  euvolemic on exam but her Echo shows increased RAP by IVC and worsening MR.  Take metolazone 2.5 mg 30 min before evening dose of lasix tonight x1  Take lasix 80 mg TID tomorrow and after tomorrow then resume your regular dose of Lasix  Use compression stockings.  Continue current HF regimen  Recommend 2 gram sodium restriction and 1500cc fluid restriction.  Encourage physical activity with graded exercise program.  Requested patient to weigh themselves daily, and to notify us if their weight increases by more than 3 lbs in 1 day or 5 lbs in 1 week.    RTC in 6 months with labs       Dandre Jules MD

## 2020-05-25 NOTE — TELEPHONE ENCOUNTER
RX call attempt 1 regarding Dupixent refill from [facility]. Patient reached-- shipping out  for  arrival with patients consent. Copay of 0.00 @ 004. Patient stated she was not in need of any supplies with this refill shipment. Address and  confirmed. Patient has 0 doses of medication on hand at this time. Patient has not started any new medications, has had no missed doses and no side effects present. Patient is currently taking the medication as directed by doctors instruction. Patient does have a safe place in their residence to keep medication at desired temperature away from small children and pets. Patient also does have the capability of contacting 911 in the event of an emergency. Patient states they do not have any questions or concerns at this time. Patients next needed injection is scheduled for Monday, .

## 2020-05-27 NOTE — TELEPHONE ENCOUNTER
Attempted to contact patient regarding appt with either Betty Moody NP or Dr. Jolly re: increased AF and decreased Bi-V pacing. Unable to leave a voicemail. No answer at number.

## 2020-05-28 NOTE — TELEPHONE ENCOUNTER
----- Message from Marti Lopez RN sent at 5/26/2020 12:01 PM CDT -----  Can you please schedule an appt with either  or Betty to discuss increase AF.    Thanks,  Marti  ----- Message -----  From: Roberto Carlos Munoz MD  Sent: 5/20/2020   4:30 PM CDT  To: Navi Jolly MD, Marti Lopez, RN, #    No worries. She is protected with OAC. I would recommend you schedule her an appointment with Dr. Jolly in a few weeks to discuss next steps.    Thanks    Roberto Carlos  ----- Message -----  From: Kenzie Najera  Sent: 5/20/2020   4:15 PM CDT  To: Navi Jolly MD, Roberto Carlos Munoz MD, #    See below.     Dr. Munoz--I have to look in the charts better than I do. I scrutinize my work before I usually send it. I am sorry.  ----- Message -----  From: Roberto Carlos Munoz MD  Sent: 5/20/2020   4:10 PM CDT  To: Marti Lopez RN, Kenzie Najera    Isn't this a Dr. Jolly patient?    ----- Message -----  From: Kenzie Najera  Sent: 5/20/2020  12:47 PM CDT  To: Roberto Carlos Munoz MD, Marti Lopez, RN    100% AT/AF burden since end of March. She was PAF. She is on warfarin for OAC. But CRT pacing has decreased to 69%.     Report is in [place].    Please advise.     Linda

## 2020-05-28 NOTE — TELEPHONE ENCOUNTER
Ms. Ross,  This is the Arrhythmia clinic.  Your home monitor has been showing some atrial fibrillation that  would like to discuss with you in clinic.  We would like to arrange a device check and a visit in clinic with his Nurse Practitioner Betty Moody.  We have availability on June 8, 2020 for a device check at 2:20pm and office visit at 3:00pm or June 9, 2020 for a device check at 2:20 pm and an office visit at 3:00 pm or 3:30pm.      Please call us at [phone] to let us know which date works for you.  Thank you,  Arrhythmia clinic

## 2020-05-28 NOTE — TELEPHONE ENCOUNTER
Spoke with patient and she has agreed to come in to be seen by Dr. Jolly for increase Afib burden and decrease BiV pacing. I tried to book an appointment but was not able to. I informed Laure Ross that Dr. Mina Nguyen Medical Assistant would be calling her in the next 10-20 minutes to schedule this appointment. Patient stated she would wait for the call.   ----- Message from Kenzie Najera sent at 5/27/2020  4:29 PM CDT -----  I need someone to call her tomorrow to make an appointment as soon as possible. She did not return my call today.    Thanks,  Linda  ----- Message -----  From: Navi Jolly MD  Sent: 5/27/2020   4:14 PM CDT  To: Kenzie lawrence or Betty Fransisco HOROWITZ    ----- Message -----  From: Kenzie Najera  Sent: 5/27/2020   9:24 AM CDT  To: Navi Jolly MD    See below please. You were out last week. When can we put her in to see you?    Linda  ----- Message -----  From: Kenzie Najera  Sent: 5/20/2020   4:15 PM CDT  To: Navi Jolly MD, Roberto Carlos Munoz MD, #    See below.     Dr. Munoz--I have to look in the charts better than I do. I scrutinize my work before I usually send it. I am sorry.  ----- Message -----  From: Roberto Carlos Munoz MD  Sent: 5/20/2020   4:10 PM CDT  To: Marti Lopez, RN, Kenzie Najera    Isn't this a Dr. Jolly patient?    ----- Message -----  From: Kenzie Najera  Sent: 5/20/2020  12:47 PM CDT  To: Roberto Carlos Munoz MD, Marti Lopez, RN    100% AT/AF burden since end of March. She was PAF. She is on warfarin for OAC. But CRT pacing has decreased to 69%.     Report is in [place].    Please advise.     Linda

## 2020-05-29 PROBLEM — T46.2X1A AMIODARONE TOXICITY: Status: RESOLVED | Noted: 2018-08-25 | Resolved: 2020-01-01

## 2020-05-29 PROBLEM — I48.19 PERSISTENT ATRIAL FIBRILLATION: Status: ACTIVE | Noted: 2020-01-01

## 2020-05-29 NOTE — PROGRESS NOTES
"Ms. Ross is a patient of Dr. Jolly and was last seen in clinic 9/6/2017.      Subjective:   Patient ID:  Laure Ross is a 49 y.o. female who presents for follow-up of Atrial Fibrillation  HPI:  Ms. Ross is a 49 y.o. female with NICM (EF 10-20%), CRT-D (2017), pAF, HTN, DM, HLD, CLARENCE, asthma here for annual follow up.     NICM 10-20%. R sided dual-chamber ICD -> biV upgrade 5/17.  pHTN  PAF, on amio since BRITTANEY/DCCV 4/16  HTN DM HL CLARENCE asthma    ADHF 2/16, requiring IABP. Had home  for a while; now off.   OHT noncandidate per Dr Jules. She has continued to have NYHA II-III sx, but much better since hospital d/c and better yet since biV upgrade 5/17.    Amio OK'd by GI.  ICD shows % since 3/2020. RVP only 69%.      Recent Cardiac Tests:  2D Echo (3/28/2018):  CONCLUSIONS     1 - Severe left atrial enlargement.     2 - Eccentric hypertrophy.     3 - Moderately depressed left ventricular systolic function (EF 30-35%).     4 - Quantitatively measured LV function is 33%.     5 - Impaired LV relaxation, elevated LAP (grade 2 diastolic dysfunction).     6 - Normal right ventricular systolic function .     7 - The estimated PA systolic pressure is 36 mmHg.     8 - Mild mitral regurgitation.     Current Outpatient Prescriptions   Medication Sig    amiodarone (PACERONE) 200 MG Tab TAKE TWO TABLETS BY MOUTH EVERY DAY    BD ULTRA-FINE ESSENCE PEN NEEDLES 32 gauge x 5/32" Ndle Takes 5 times  day    blood sugar diagnostic (TRUE METRIX GLUCOSE TEST STRIP) Strp Use to test two times a day    blood sugar diagnostic Strp Uses 4 times a day, true metrix meter.    cetirizine (ZYRTEC) 10 MG tablet Take 1 tablet (10 mg total) by mouth once daily.    digoxin (LANOXIN) 125 mcg tablet TAKE 1 TABLET BY MOUTH ONCE DAILY    furosemide (LASIX) 40 MG tablet TAKE 2 TABLETS BY MOUTH TWO TIMES A DAY    insulin aspart U-100 (NOVOLOG FLEXPEN U-100 INSULIN) 100 unit/mL InPn pen Inject 16 units w/ meals plus scale 150-200 +2, 201-250 " "+4, 251-300 +6, 301-350 +8.    insulin aspart U-100 (NOVOLOG FLEXPEN U-100 INSULIN) 100 unit/mL InPn pen inject 16 units subcutaneously with meals    insulin glargine (BASAGLAR KWIKPEN U-100 INSULIN) 100 unit/mL (3 mL) InPn pen Inject 34 Units into the skin 2 (two) times daily.    ketoconazole (NIZORAL) 2 % cream Apply topically once daily.    lancets 30 gauge Misc use 4 times a day    levalbuterol (XOPENEX HFA) 45 mcg/actuation inhaler INHALE 1 TO 2 PUFFS BY MOUTH EVERY 4 TO 6 HOURS AS NEEDED.    levalbuterol (XOPENEX) 1.25 mg/3 mL nebulizer solution INHALE 1 VIAL PER NEBULIZER EVERY 6 TO 8 HOURS AS NEEDED    lisinopril (PRINIVIL,ZESTRIL) 5 MG tablet TAKE 1 TABLET BY MOUTH TWO TIMES A DAY    magnesium oxide (MAG-OX) 400 mg tablet TAKE 1 TABLET BY MOUTH TWICE DAILY    montelukast (SINGULAIR) 10 mg tablet Take 1 tablet (10 mg total) by mouth once daily.    omeprazole (PRILOSEC) 40 MG capsule TAKE 1 CAPSULE BY MOUTH EVERY MORNING    pen needle, diabetic 32 gauge x 5/32" Ndle USE AS DIRECTED 5 TIMES DAILY    potassium chloride (MICRO-K) 10 MEQ CpSR Take 2 capsules (20 mEq total) by mouth once daily.    ranitidine (ZANTAC) 300 MG tablet Take 1 tablet by mouth every evening    spironolactone (ALDACTONE) 25 MG tablet Take 1 tablet (25 mg total) by mouth once daily.    triamcinolone acetonide 0.1% (KENALOG) 0.1 % cream Apply topically 2 (two) times daily. Apply twice a day for 7 days to arms.    vitamin D 1000 units Tab Take 2 tablets (2,000 Units total) by mouth once daily.    warfarin (COUMADIN) 7.5 MG tablet Take ½ tablet by mouth once daily and 1 tablet on thursday    azelastine (ASTELIN) 137 mcg (0.1 %) nasal spray 2 sprays (274 mcg total) by Nasal route 2 (two) times daily.    budesonide-formoterol 160-4.5 mcg (SYMBICORT) 160-4.5 mcg/actuation HFAA Inhale 2 puffs into the lungs every 12 (twelve) hours.    levalbuterol (XOPENEX HFA) 45 mcg/actuation inhaler INHALE 1 TO 2 PUFFS BY MOUTH EVERY 4 - 6 " HOURS AS NEEDED     No current facility-administered medications for this visit.        Review of Systems   Constitution: Negative for malaise/fatigue.   Cardiovascular: Positive for dyspnea on exertion. Negative for chest pain, irregular heartbeat, leg swelling and palpitations.   Respiratory: Positive for shortness of breath.    Hematologic/Lymphatic: Negative for bleeding problem.   Skin: Negative for rash.   Musculoskeletal: Negative for myalgias.   Gastrointestinal: Negative for hematemesis, hematochezia and nausea.   Genitourinary: Negative for hematuria.   Neurological: Negative for light-headedness.   Psychiatric/Behavioral: Negative for altered mental status.   Allergic/Immunologic: Negative for persistent infections.     Objective:        /70   Pulse 96   LMP 08/25/2019 (Approximate)     Physical Exam   Constitutional: She is oriented to person, place, and time. Vital signs are normal. She appears well-developed and well-nourished. She is active and cooperative.   HENT:   Head: Normocephalic and atraumatic.   Nose: Nose normal.   Eyes: Pupils are equal, round, and reactive to light. Conjunctivae and EOM are normal.   Neck: Normal range of motion. Carotid bruit is not present. No tracheal deviation and no edema present. No thyroid mass and no thyromegaly present.   Cardiovascular: Normal rate, regular rhythm and normal pulses.  No extrasystoles are present. PMI is not displaced. Exam reveals no gallop and no friction rub.   No murmur heard.  Pulses:       Radial pulses are 2+ on the right side, and 2+ on the left side.   Pulmonary/Chest: Effort normal and breath sounds normal. No respiratory distress. She has no wheezes.   Device to RUCW   Abdominal: Soft. Normal appearance. She exhibits no distension. There is no hepatosplenomegaly.   Musculoskeletal: Normal range of motion. She exhibits no edema.   Neurological: She is alert and oriented to person, place, and time. Coordination normal.   Skin:  Skin is warm and dry. No rash noted. No erythema.   Psychiatric: She has a normal mood and affect. Her speech is normal and behavior is normal. Thought content normal. Cognition and memory are normal.   Nursing note and vitals reviewed.    Lab Results   Component Value Date     05/25/2020    K 4.4 05/25/2020    MG 1.9 12/08/2019    BUN 20 05/25/2020    CREATININE 1.3 05/25/2020    ALT 14 05/25/2020    AST 16 05/25/2020    HGB 11.0 (L) 01/01/2020    HCT 36.4 (L) 01/01/2020    TSH 1.809 06/19/2019    LDLCALC 174.2 (H) 10/01/2018       Recent Labs   Lab 02/26/20  1125 03/25/20  1101 04/08/20  1139 05/25/20  0957   INR 2.6 1.6 H 2.0 H 2.7 H           Assessment:       1. Acute diastolic CHF (congestive heart failure), NYHA class 3    2. Encounter for pre-transplant evaluation for heart transplant    3. Essential hypertension    4. Hypertensive heart disease with heart failure    5. Left bundle-branch block    6. NICM (nonischemic cardiomyopathy)    7. CLARENCE (obstructive sleep apnea)    8. Persistent atrial fibrillation      Plan:     In summary, Ms. Ross is a 49 y.o. female with NICM (EF 10-20%), CRT-D (2017), pAF, HTN, DM, HLD, CLARENCE, asthma here for annual follow up. Ms. Ross is doing well from a device perspective with stable lead and device function. AF burden <1% . While long term use of amiodarone is not idea in a patient her age, her history of CHF and her pre-CRT LBBB limit her options - will decrease amiodarone to 200mg daily.     Cleared by GI to restart amio. LFTs normal at last check.    Plan DCCV and start amio.  To consider restarting BB if % V pacing remains low in NSR. Of note, did have problem with BB due to asthma in past.    I discussed with patient risks, indications, benefits, and alternatives of the planned procedure. All questions were answered. Patient understands and wishes to proceed.

## 2020-06-02 NOTE — PROGRESS NOTES
Subjective:      Patient ID: Laure Ross is a 49 y.o. female.    Chief Complaint: Diabetes Mellitus (ov 5/20 Ky[facility] pcp); Nail Care; and Callouses (bilateral posterior)    Laure is a 49 y.o. female who presents to the clinic for evaluation and treatment of high risk feet. Laure has a past medical history of Anticoagulant long-term use, Arthritis, Asthma, Asthma, Atrial fibrillation, Cardiomyopathy, Cardiomyopathy, CHF (congestive heart failure), CHF (congestive heart failure), Chronic combined systolic and diastolic heart failure (6/3/2016), COPD (chronic obstructive pulmonary disease) (3/28/2018), GERD (gastroesophageal reflux disease), H/O tubal ligation, Hypertension, Obesity, Renal disorder, Type 2 diabetes mellitus with hyperglycemia, and Type 2 diabetes mellitus with polyneuropathy. Presents for diabetic foot risk assessment. Reports elongated nails that are difficult to trim. Currently wearing diabetic shoe.   . This patient has documented high risk feet requiring routine maintenance secondary to diabetes mellitis and those secondary complications of diabetes, as mentioned..    PCP: Holly Mittal MD    Date Last Seen by PCP:   Chief Complaint   Patient presents with    Diabetes Mellitus     ov 5/20 Kyrie pcp    Nail Care    Callouses     bilateral posterior       Current shoe gear:  Slip-on shoes    Hemoglobin A1C   Date Value Ref Range Status   07/03/2019 6.9 (H) 4.0 - 5.6 % Final     Comment:     ADA Screening Guidelines:  5.7-6.4%  Consistent with prediabetes  >or=6.5%  Consistent with diabetes  High levels of fetal hemoglobin interfere with the HbA1C  assay. Heterozygous hemoglobin variants (HbS, HgC, etc)do  not significantly interfere with this assay.   However, presence of multiple variants may affect accuracy.     10/01/2018 7.4 (H) 4.0 - 5.6 % Final     Comment:     ADA Screening Guidelines:  5.7-6.4%  Consistent with prediabetes  >or=6.5%  Consistent with diabetes  High levels of fetal  hemoglobin interfere with the HbA1C  assay. Heterozygous hemoglobin variants (HbS, HgC, etc)do  not significantly interfere with this assay.   However, presence of multiple variants may affect accuracy.     03/28/2018 6.2 (H) 4.0 - 5.6 % Final     Comment:     According to ADA guidelines, hemoglobin A1c <7.0% represents  optimal control in non-pregnant diabetic patients. Different  metrics may apply to specific patient populations.   Standards of Medical Care in Diabetes-2016.  For the purpose of screening for the presence of diabetes:  <5.7%     Consistent with the absence of diabetes  5.7-6.4%  Consistent with increasing risk for diabetes   (prediabetes)  >or=6.5%  Consistent with diabetes  Currently, no consensus exists for use of hemoglobin A1c  for diagnosis of diabetes for children.  This Hemoglobin A1c assay has significant interference with fetal   hemoglobin   (HbF). The results are invalid for patients with abnormal amounts of   HbF,   including those with known Hereditary Persistence   of Fetal Hemoglobin. Heterozygous hemoglobin variants (HbAS, HbAC,   HbAD, HbAE, HbA2) do not significantly interfere with this assay;   however, presence of multiple variants in a sample may impact the %   interference.             Patient Active Problem List   Diagnosis    Hypertensive heart disease with heart failure    Type 2 diabetes mellitus with diabetic polyneuropathy    CLARENCE (obstructive sleep apnea)    Paroxysmal atrial fibrillation    Obesity with body mass index (BMI) of 30.0 to 39.9    Encounter for pre-transplant evaluation for heart transplant    Palliative care encounter    Fatigue    Breast mass on GYN exam 4/29/16    Left bundle-branch block    Organ transplant candidate    Tuberculosis screening    Vitamin D deficiency disease    Chronic anticoagulation    Muscle weakness    Chronic combined systolic and diastolic congestive heart failure    COCM (congestive cardiomyopathy)    High risk  "medications (not anticoagulants) long-term use    Laryngopharyngeal reflux    Gastroesophageal reflux disease    Chronic rhinitis    Dysphonia    GERD (gastroesophageal reflux disease)    CKD (chronic kidney disease), stage III    Carpal tunnel syndrome, bilateral    NICM (nonischemic cardiomyopathy)    Acute diastolic CHF (congestive heart failure), NYHA class 3    History of diabetic ulcer of foot - Right Foot    Chest pain    Essential hypertension    Biventricular automatic implantable cardioverter defibrillator in situ    Acute URI    Maxillary sinusitis    Influenza B    Abnormal finding on lung imaging    Pharyngitis    Moderate persistent asthma without complication    Persistent atrial fibrillation       Current Outpatient Medications on File Prior to Visit   Medication Sig Dispense Refill    acetaminophen (TYLENOL) 650 MG TbSR TAKE 1 TABLET 3  TIMES DAILY for chest wall pain 30 tablet 0    allopurinoL (ZYLOPRIM) 100 MG tablet TAKE 2 TABLETS by mouth EVERY  tablet 3    atorvastatin (LIPITOR) 20 MG tablet Take 1 tablet (20 mg total) by mouth once daily. 90 tablet 1    azelastine (ASTELIN) 137 mcg (0.1 %) nasal spray 2 sprays (274 mcg total) by Nasal route 2 (two) times daily. 30 mL 0    BD ULTRA-FINE ESSENCE PEN NEEDLE 32 gauge x 5/32" Ndle Takes 5 times  day 200 each 11    blood sugar diagnostic Strp Uses 4 times a day, true metrix meter. 150 each 11    budesonide-formoterol 160-4.5 mcg (SYMBICORT) 160-4.5 mcg/actuation HFAA INHALE 2 PUFFS TWICE DAILY. RINSE MOUTH AFTER USE. 10.2 g 6    cetirizine (ZYRTEC) 10 MG tablet Take 1 tablet (10 mg total) by mouth at bedtime for allergy relief 30 tablet 3    clotrimazole (LOTRIMIN) 1 % cream APPLY SPARINGLY TO AFFECTED AREA(S) in groin TWICE DAILY for one to two weeks 45 g 0    cycloSPORINE (RESTASIS) 0.05 % ophthalmic emulsion Apply 1 drop to eye.      diclofenac sodium (VOLTAREN) 1 % Gel APPLY 2 grams SPARINGLY TO Knees ONCE " DAILY 100 g 3    digoxin (LANOXIN) 125 mcg tablet TAKE 1 TABLET BY MOUTH ONCE DAILY 90 tablet 3    dulaglutide (TRULICITY) 1.5 mg/0.5 mL PnIj INJECT 1.5 mg under the skin weekly 2 mL 11    dupilumab (DUPIXENT) 300 mg/2 mL Syrg Inject 2 mLs (300 mg total) into the skin every 14 (fourteen) days. 4 mL 11    EPINEPHrine (EPIPEN) 0.3 mg/0.3 mL AtIn INJECT 0.3ML IN THE MUSCLE AS DIRECTED FOR ANAPHYLAXIS 0.6 mL 1    famotidine (PEPCID) 40 MG tablet TAKE 1 TABLET BY MOUTH TWICE DAILY. 180 tablet 1    fluticasone propionate (FLONASE) 50 mcg/actuation nasal spray 2 sprays (100 mcg total) by Each Nostril route once daily. 15 g 0    fluticasone propionate (FLOVENT HFA) 110 mcg/actuation inhaler Inhale 2 puffs into the lungs 2 (two) times daily. Rinse mouth after use 12 g 3    furosemide (LASIX) 40 MG tablet Take 2 tablets (80 mg total) by mouth 2 (two) times daily. 360 tablet 3    insulin (BASAGLAR KWIKPEN U-100 INSULIN) glargine 100 units/mL (3mL) SubQ pen inject 34 under the skin in the morning and 34 units at night 30 mL 6    insulin aspart U-100 (NOVOLOG) 100 unit/mL (3 mL) InPn pen Inject 22 units into the skin with meals plus scale 45 mL 6    insulin aspart U-100 (NOVOLOG) 100 unit/mL (3 mL) InPn pen Inject 16 units under the skin with meals plus scale 150-200+2, 201-250+4, 251-300+6, 301-350+8, >350+10, max daily 78 units. 30 mL 6    ketoconazole (NIZORAL) 2 % cream APPLY SPARINGLY TO AFFECTED AREA(S) ONCE DAILY 30 g 2    ketoconazole (NIZORAL) 2 % shampoo APPLY TO SKIN EVERY DAY FOR 5 MINUTES AND RINSE FOR ABOUT 1 TO 2 WEEKS 120 mL 1    lancets 30 gauge Misc use 4 times a day 100 each 6    levalbuterol (XOPENEX HFA) 45 mcg/actuation inhaler Inhale 1 to 2 puffs into the lungs every 4 to 6 hours as needed 15 g 6    levalbuterol (XOPENEX) 0.31 mg/3 mL nebulizer solution Take 3 mLs (0.31 mg total) by nebulization every 4 (four) hours as needed for Wheezing. Rescue 72 mL 3    levalbuterol (XOPENEX) 0.31 mg/3  "mL nebulizer solution USE 1 UNIT DOSE IN NEBULIZER EVERY 4 HOURS AS NEEDED. 72 mL 6    linaCLOtide (LINZESS) 72 mcg Cap capsule Take one tablet by mouth daily as needed for constipation 30 capsule 2    lisinopriL (PRINIVIL,ZESTRIL) 5 MG tablet TAKE 1 TABLET BY MOUTH TWO TIMES A DAY 60 tablet 8    magnesium oxide (MAG-OX) 400 mg (241.3 mg magnesium) tablet Take 1 tablet (400 mg total) by mouth 2 (two) times daily. 180 tablet 3    meclizine (ANTIVERT) 25 mg tablet Take 1 tablet (25 mg total) by mouth 2 (two) times daily as needed for Dizziness. 10 tablet 0    methocarbamoL (ROBAXIN) 750 MG Tab TAKE 1 TABLET BY MOUTH 3 TIMES DAILY. 30 tablet 0    metOLazone (ZAROXOLYN) 2.5 MG tablet TAKE 1 TABLET DAILY AS DIRECTED. 60 tablet 0    montelukast (SINGULAIR) 10 mg tablet TAKE ONE TABLET BY MOUTH DAILY. 30 tablet 6    pen needle, diabetic 32 gauge x 5/32" Ndle USE AS DIRECTED 5 TIMES DAILY 200 each 5    polyethylene glycol (GLYCOLAX) 17 gram/dose powder MIX 1 CAPFUL IN 8 OUNCES OF LIQUID AND DRINK ONCE DAILY as needed for constipation 510 g 6    potassium chloride (MICRO-K) 10 MEQ CpSR Take 2 capsules (20 mEq total) by mouth once daily. 60 capsule 11    spironolactone (ALDACTONE) 25 MG tablet Take 1 tablet (25 mg total) by mouth once daily. 90 tablet 3    tiotropium (SPIRIVA) 18 mcg inhalation capsule INHALE 1 CAPSULE EVERY DAY 30 capsule 6    triamcinolone acetonide 0.025% (KENALOG) 0.025 % cream APPLY SPARINGLY TO AFFECTED AREA(S) of face 2 TIMES DAILY. 15 g 1    warfarin (COUMADIN) 7.5 MG tablet Take 1 tablet by mouth every tuesday and take 1/2 tablet by mouth dailly on all other days - OR AS DIRECTED BY COUMADIN CLINIC 60 tablet 3     No current facility-administered medications on file prior to visit.        Review of patient's allergies indicates:  No Known Allergies    Past Surgical History:   Procedure Laterality Date    CARDIAC DEFIBRILLATOR PLACEMENT      CARDIAC DEFIBRILLATOR PLACEMENT      " CARDIAC DEFIBRILLATOR PLACEMENT      CATARACT EXTRACTION Left     CHOLECYSTECTOMY      COLONOSCOPY N/A 5/2/2016    Procedure: COLONOSCOPY;  Surgeon: Eugene Soto MD;  Location: [facility] ([address] [place] [address]);  Service: Endoscopy;  Laterality: N/A;    GALLBLADDER SURGERY      iabp  4/2016    TUBAL LIGATION         Family History   Problem Relation Age of Onset    Heart disease Mother     Heart disease Father        Social History     Socioeconomic History    Marital status:      Spouse name: Not on file    Number of children: Not on file    Years of education: Not on file    Highest education level: Not on file   Occupational History    Not on file   Social Needs    Financial resource strain: Not on file    Food insecurity:     Worry: Not on file     Inability: Not on file    Transportation needs:     Medical: Not on file     Non-medical: Not on file   Tobacco Use    Smoking status: Never Smoker    Smokeless tobacco: Never Used   Substance and Sexual Activity    Alcohol use: No    Drug use: No    Sexual activity: Yes     Partners: Male   Lifestyle    Physical activity:     Days per week: Not on file     Minutes per session: Not on file    Stress: Not on file   Relationships    Social connections:     Talks on phone: Not on file     Gets together: Not on file     Attends [employer] service: Not on file     Active member of club or organization: Not on file     Attends meetings of clubs or organizations: Not on file     Relationship status: Not on file   Other Topics Concern    Not on file   Social History Narrative    Not on file       Review of Systems   Constitution: Negative for chills, fever and malaise/fatigue.   Cardiovascular: Negative for chest pain, claudication, leg swelling, orthopnea and palpitations.   Respiratory: Negative for cough, shortness of breath and wheezing.    Skin: Positive for color change, dry skin, nail changes and suspicious lesions (calluses/corns). Negative for  itching, poor wound healing and rash.   Musculoskeletal: Positive for joint pain and myalgias. Negative for arthritis, falls, gout, joint swelling and muscle weakness.   Gastrointestinal: Negative for diarrhea, nausea and vomiting.   Neurological: Positive for numbness, paresthesias and sensory change. Negative for disturbances in coordination, dizziness, focal weakness, tremors and weakness.   Psychiatric/Behavioral: Negative for altered mental status and hallucinations.           Objective:      Vitals:    06/02/20 1052   BP: 98/68   Pulse: 85   PainSc: 0-No pain       Physical Exam   Cardiovascular:   Dorsalis pedis and posterior tibial pulses are diminished Bilaterally. Toes are cool to touch. Feet are warm proximally.There is decreased digital hair. Skin is atrophic, slightly hyperpigmented, and mildly edematous       Musculoskeletal:        Right ankle: She exhibits swelling. Tenderness. AITFL tenderness found.   There is equinus deformity bilateral with decreased dorsiflexion at the ankle joint bilateral. No tenderness with compression of heel. Negative tinels sign. Gait analysis reveals excessive pronation through midstance and propulsion with early heel off. Shoes reveals lateral heel counter wear bilateral     Decreased first MPJ range of motion both weightbearing and nonweightbearing, no crepitus observed the first MP joint, + dorsal flag sign. Mild  bunion deformity is observed .    Patient has hammertoes of digits 2-5 bilateral partially reducible      Neurological:   [place] 5.07 monofilamant testing is diminished both feet. Sharp/dull sensation diminished Bilaterally. Light touch absent Bilaterally.       Skin: Lesion noted. No bruising and no ecchymosis noted. No erythema.   No open lesions, lacerations or wounds noted. Toenails 1-5 bilaterally are elongated by 2-3 mm, thickened by 2-3 mm, discolored/yellowed, dystrophic, brittle with subungual debris.  .  Interdigital spaces clean, dry and  intact b/l. No erythema noted to b/l foot. Skin texture thin, atrophic, dry. Pedal hair diminished b/l.         Scaling dryness in a moccasin distribution is noted to the bilateral lower extremities.         Focal hyperkeratotic lesion consisting entirely of hyperkeratotic tissue without open skin, drainage, pus, fluctuance, malodor, or signs of infection: B/L plantar forefoot.                    Assessment:       Encounter Diagnoses   Name Primary?    Type II diabetes mellitus with neurological manifestations Yes    Onychomycosis due to dermatophyte     Corn or callus          Plan:     Problem List Items Addressed This Visit     None      Visit Diagnoses     Type II diabetes mellitus with neurological manifestations    -  Primary    Onychomycosis due to dermatophyte        Corn or callus             I counseled the patient on her conditions, their implications and medical management.         - Shoe inspection. Diabetic Foot Education. Patient reminded of the importance of good nutrition and blood sugar control to help prevent podiatric complications of diabetes. Patient instructed on proper foot hygeine. We discussed wearing proper shoe gear, daily foot inspections, never walking without protective shoe gear, never putting sharp instruments to feet, routine podiatric nail visits every 2-3 months.     -   - After cleansing the area w/ alcohol prep pad the above mentioned hyperkeratosis was trimmed utilizing No 15 scapel, to a smooth base with out incident. Patient tolerated this well and reported comfort to the areas.  OK to ambulate with Diabetic shoe.     - With patient's permission, nails were aggressively reduced and debrided x 10 to their soft tissue attachment mechanically and with electric , removing all offending nail and debris. Patient relates relief following the procedure. He will continue to monitor the areas daily, inspect his feet, wear protective shoe gear when ambulatory, moisturizer to  maintain skin integrity and follow in this office in approximately 2-3 months, sooner p.r.n.       F/u 3 months.

## 2020-06-11 NOTE — TELEPHONE ENCOUNTER
Spoke to patient     CONFIRMED procedure arrival time of 9 am     Reiterated instructions including:  -Directions to check in desk  -NPO after midnight night prior to procedure  -High importance of HOLDING Novolog the morning of procedure  -Pre-procedure LABS on arrival. COVID test completed. -Confirmed no recent fever, bleeding, infection or skin rash in the past 30 days  --Do not wear mascara day of procedure     Pt verbalizes understanding of above and appreciates call.

## 2020-06-11 NOTE — TELEPHONE ENCOUNTER
----- Message from Belén Jennings sent at 6/11/2020  3:15 PM CDT -----  Contact: Self  Pt asking if can get her results from yesterday. Thanks    [phone]

## 2020-06-12 NOTE — SUBJECTIVE & OBJECTIVE
"Past Medical History:   Diagnosis Date    Anticoagulant long-term use     Arthritis     Asthma     Asthma     Atrial fibrillation     Cardiomyopathy     Cardiomyopathy     CHF (congestive heart failure)     CHF (congestive heart failure)     Chronic combined systolic and diastolic heart failure 6/3/2016    COPD (chronic obstructive pulmonary disease) 3/28/2018    GERD (gastroesophageal reflux disease)     H/O tubal ligation     Hypertension     Obesity     Renal disorder     Type 2 diabetes mellitus with hyperglycemia     Type 2 diabetes mellitus with polyneuropathy        Past Surgical History:   Procedure Laterality Date    CARDIAC DEFIBRILLATOR PLACEMENT      CARDIAC DEFIBRILLATOR PLACEMENT      CARDIAC DEFIBRILLATOR PLACEMENT      CATARACT EXTRACTION Left     CHOLECYSTECTOMY      COLONOSCOPY N/A 5/2/2016    Procedure: COLONOSCOPY;  Surgeon: Eugene Soto MD;  Location: [facility] ([address] [place] [address]);  Service: Endoscopy;  Laterality: N/A;    GALLBLADDER SURGERY      iabp  4/2016    TUBAL LIGATION         Review of patient's allergies indicates:  No Known Allergies    No current facility-administered medications on file prior to encounter.      Current Outpatient Medications on File Prior to Encounter   Medication Sig    acetaminophen (TYLENOL) 650 MG TbSR TAKE 1 TABLET 3  TIMES DAILY for chest wall pain    allopurinoL (ZYLOPRIM) 100 MG tablet TAKE 2 TABLETS by mouth EVERY DAY    atorvastatin (LIPITOR) 20 MG tablet Take 1 tablet (20 mg total) by mouth once daily.    azelastine (ASTELIN) 137 mcg (0.1 %) nasal spray 2 sprays (274 mcg total) by Nasal route 2 (two) times daily.    BD ULTRA-FINE ESSENCE PEN NEEDLE 32 gauge x 5/32" Ndle Takes 5 times  day    blood sugar diagnostic Strp Uses 4 times a day, true metrix meter.    budesonide-formoterol 160-4.5 mcg (SYMBICORT) 160-4.5 mcg/actuation HFAA INHALE 2 PUFFS TWICE DAILY. RINSE MOUTH AFTER USE.    cetirizine (ZYRTEC) 10 MG tablet Take 1 " tablet (10 mg total) by mouth at bedtime for allergy relief    clotrimazole (LOTRIMIN) 1 % cream APPLY SPARINGLY TO AFFECTED AREA(S) in groin TWICE DAILY for one to two weeks    cycloSPORINE (RESTASIS) 0.05 % ophthalmic emulsion Apply 1 drop to eye.    diclofenac sodium (VOLTAREN) 1 % Gel APPLY 2 grams SPARINGLY TO Knees ONCE DAILY    digoxin (LANOXIN) 125 mcg tablet TAKE 1 TABLET BY MOUTH ONCE DAILY    dulaglutide (TRULICITY) 1.5 mg/0.5 mL PnIj INJECT 1.5 mg under the skin weekly    dupilumab (DUPIXENT) 300 mg/2 mL Syrg Inject 2 mLs (300 mg total) into the skin every 14 (fourteen) days.    EPINEPHrine (EPIPEN) 0.3 mg/0.3 mL AtIn INJECT 0.3ML IN THE MUSCLE AS DIRECTED FOR ANAPHYLAXIS    famotidine (PEPCID) 40 MG tablet TAKE 1 TABLET BY MOUTH TWICE DAILY.    fluticasone propionate (FLONASE) 50 mcg/actuation nasal spray 2 sprays (100 mcg total) by Each Nostril route once daily.    fluticasone propionate (FLOVENT HFA) 110 mcg/actuation inhaler Inhale 2 puffs into the lungs 2 (two) times daily. Rinse mouth after use    furosemide (LASIX) 40 MG tablet Take 2 tablets (80 mg total) by mouth 2 (two) times daily.    insulin (BASAGLAR KWIKPEN U-100 INSULIN) glargine 100 units/mL (3mL) SubQ pen inject 34 under the skin in the morning and 34 units at night    insulin aspart U-100 (NOVOLOG) 100 unit/mL (3 mL) InPn pen Inject 22 units into the skin with meals plus scale    insulin aspart U-100 (NOVOLOG) 100 unit/mL (3 mL) InPn pen Inject 16 units under the skin with meals plus scale 150-200+2, 201-250+4, 251-300+6, 301-350+8, >350+10, max daily 78 units.    ketoconazole (NIZORAL) 2 % cream APPLY SPARINGLY TO AFFECTED AREA(S) ONCE DAILY    ketoconazole (NIZORAL) 2 % shampoo APPLY TO SKIN EVERY DAY FOR 5 MINUTES AND RINSE FOR ABOUT 1 TO 2 WEEKS    lancets 30 gauge Misc use 4 times a day    levalbuterol (XOPENEX HFA) 45 mcg/actuation inhaler Inhale 1 to 2 puffs into the lungs every 4 to 6 hours as needed     "levalbuterol (XOPENEX) 0.31 mg/3 mL nebulizer solution Take 3 mLs (0.31 mg total) by nebulization every 4 (four) hours as needed for Wheezing. Rescue    levalbuterol (XOPENEX) 0.31 mg/3 mL nebulizer solution USE 1 UNIT DOSE IN NEBULIZER EVERY 4 HOURS AS NEEDED.    linaCLOtide (LINZESS) 72 mcg Cap capsule Take one tablet by mouth daily as needed for constipation    lisinopriL (PRINIVIL,ZESTRIL) 5 MG tablet TAKE 1 TABLET BY MOUTH TWO TIMES A DAY    magnesium oxide (MAG-OX) 400 mg (241.3 mg magnesium) tablet Take 1 tablet (400 mg total) by mouth 2 (two) times daily.    meclizine (ANTIVERT) 25 mg tablet Take 1 tablet (25 mg total) by mouth 2 (two) times daily as needed for Dizziness.    methocarbamoL (ROBAXIN) 750 MG Tab TAKE 1 TABLET BY MOUTH 3 TIMES DAILY.    metOLazone (ZAROXOLYN) 2.5 MG tablet TAKE 1 TABLET DAILY AS DIRECTED.    montelukast (SINGULAIR) 10 mg tablet TAKE ONE TABLET BY MOUTH DAILY.    pen needle, diabetic 32 gauge x 5/32" Ndle USE AS DIRECTED 5 TIMES DAILY    polyethylene glycol (GLYCOLAX) 17 gram/dose powder MIX 1 CAPFUL IN 8 OUNCES OF LIQUID AND DRINK ONCE DAILY as needed for constipation    potassium chloride (MICRO-K) 10 MEQ CpSR Take 2 capsules (20 mEq total) by mouth once daily.    spironolactone (ALDACTONE) 25 MG tablet Take 1 tablet (25 mg total) by mouth once daily.    tiotropium (SPIRIVA) 18 mcg inhalation capsule INHALE 1 CAPSULE EVERY DAY    triamcinolone acetonide 0.025% (KENALOG) 0.025 % cream APPLY SPARINGLY TO AFFECTED AREA(S) of face 2 TIMES DAILY.    warfarin (COUMADIN) 7.5 MG tablet Take 1 tablet by mouth every tuesday and take 1/2 tablet by mouth dailly on all other days - OR AS DIRECTED BY COUMADIN CLINIC     Family History     Problem Relation (Age of Onset)    Heart disease Mother, Father        Tobacco Use    Smoking status: Never Smoker    Smokeless tobacco: Never Used   Substance and Sexual Activity    Alcohol use: No    Drug use: No    Sexual activity: " Yes     Partners: Male     Review of Systems   Constitution: Negative for chills, decreased appetite, diaphoresis, fever, weight gain and weight loss.   Eyes: Negative for blurred vision.   Cardiovascular: Negative for chest pain, dyspnea on exertion, irregular heartbeat, leg swelling, near-syncope, orthopnea and palpitations.   Respiratory: Negative for cough, shortness of breath, snoring and wheezing.    Gastrointestinal: Negative for abdominal pain, nausea and vomiting.   Genitourinary: Negative for bladder incontinence and urgency.     Objective:     Vital Signs (Most Recent):    Vital Signs (24h Range):           There is no height or weight on file to calculate BMI.            No intake or output data in the 24 hours ending 06/12/20 0956    Lines/Drains/Airways     None                 Physical Exam   Constitutional: She is oriented to person, place, and time. She appears well-developed and well-nourished. No distress.   HENT:   Head: Normocephalic and atraumatic.   Eyes: Pupils are equal, round, and reactive to light. Conjunctivae are normal.   Neck: Normal range of motion. Neck supple. No thyromegaly present.   Cardiovascular: Normal rate, regular rhythm, normal heart sounds and intact distal pulses. Exam reveals no gallop and no friction rub.   No murmur heard.  Pulses:       Carotid pulses are 2+ on the right side, and 2+ on the left side.       Radial pulses are 2+ on the right side, and 2+ on the left side.   Pulmonary/Chest: Effort normal and breath sounds normal. No respiratory distress. She has no wheezes.   Abdominal: Soft. Bowel sounds are normal. She exhibits no distension. There is no tenderness.   Neurological: She is alert and oriented to person, place, and time.   Skin: She is not diaphoretic.       Significant Labs: CMP No results for input(s): NA, K, CL, CO2, GLU, BUN, CREATININE, CALCIUM, PROT, ALBUMIN, BILITOT, ALKPHOS, AST, ALT, ANIONGAP, ESTGFRAFRICA, EGFRNONAA in the last 48 hours., CBC  No results for input(s): WBC, HGB, HCT, PLT in the last 48 hours., INR No results for input(s): INR, PROTIME in the last 48 hours. and Lipid Panel No results for input(s): CHOL, HDL, LDLCALC, TRIG, CHOLHDL in the last 48 hours.    Significant Imaging: Echocardiogram:   Transthoracic echo (TTE) complete (Cupid Only):   Results for orders placed or performed during the hospital encounter of 05/25/20   Echo Color Flow Doppler? Yes   Result Value Ref Range    Ascending aorta 2.70 cm    STJ 2.52 cm    AV mean gradient 3 mmHg    Ao peak juan david 0.99 m/s    Ao VTI 14.64 cm    IVRT 100.86 msec    IVS 0.91 0.6 - 1.1 cm    LA size 4.73 cm    Left Atrium Major Axis 6.94 cm    Left Atrium Minor Axis 6.73 cm    LVIDD 6.90 (A) 3.5 - 6.0 cm    LVIDS 6.70 (A) 2.1 - 4.0 cm    LVOT diameter 2.34 cm    LVOT peak VTI 8.30 cm    PW 0.92 0.6 - 1.1 cm    MV Peak E Juan David 1.16 m/s    RA Major Axis 5.14 cm    RA Width 4.39 cm    RVDD 4.05 cm    Sinus 3.13 cm    TAPSE 1.65 cm    TR Max Juan David 4.12 m/s    TDI LATERAL 0.17 m/s    TDI SEPTAL 0.05 m/s    LA WIDTH 5.27 cm    LV Diastolic Volume 202.05 mL    LV Systolic Volume 173.96 mL    RV S' 8.81 cm/s    LVOT peak juan david 0.52 m/s    LV LATERAL E/E' RATIO 6.82 m/s    LV SEPTAL E/E' RATIO 23.20 m/s    FS 3 %    LA volume 144.79 cm3    LV mass 280.84 g    Left Ventricle Relative Wall Thickness 0.27 cm    AV valve area 2.44 cm2    AV Velocity Ratio 0.53     AV index (prosthetic) 0.57     Mean e' 0.11 m/s    LVOT area 4.3 cm2    LVOT stroke volume 35.68 cm3    AV peak gradient 4 mmHg    E/E' ratio 10.55 m/s    Triscuspid Valve Regurgitation Peak Gradient 68 mmHg    BSA 2.27 m2    LV Systolic Volume Index 80.3 mL/m2    LV Diastolic Volume Index 93.22 mL/m2    LA Volume Index 66.8 mL/m2    LV Mass Index 130 g/m2    Right Atrial Pressure (from IVC) 8 mmHg    TV rest pulmonary artery pressure 76 mmHg    Narrative    · Severe left ventricular enlargement.  · Severely decreased left ventricular systolic function. The  estimated   ejection fraction is 10%.  · Moderately reduced right ventricular systolic function. (RV:LV ratio is   0.59)  · Left ventricular diastolic dysfunction.  · Biatrial enlargement.  · Moderate to severe mitral regurgitation.  · Moderate to severe tricuspid regurgitation.  · Trivial pericardial effusion.  · The estimated PA systolic pressure is 76 mmHg.  · Intermediate central venous pressure (8 mmHg).

## 2020-06-12 NOTE — HPI
Ms. Ross is a 49 y.o. female with NICM (EF 10-20%), CRT-D (2017), pAF, HTN, DM, HLD, CLARENCE, asthma here for annual follow up. Ms. Ross is doing well from a device perspective with stable lead and device function. AF burden <1% . While long term use of amiodarone is not ideal in a patient her age, her history of CHF and her pre-CRT LBBB limit her options - will decrease amiodarone to 200mg daily. Cleared by GI to restart amio. LFTs normal at last check. INR 2.7. Compliant with coumadin, last dose last night. No contraindications for BRITTANEY / DCCV and plan to start Amio.     Dysphagia or odynophagia:  no  Liver Disease, esophageal disease, or known varices:  no  Upper GI Bleeding:no  Snoring:  no  Sleep Apnea:  no  Prior neck surgery or radiation:  no  History of anesthetic difficulties:  no  Family history of anesthetic difficulties:  no  Last oral intake:  12 hours ago  Able to move neck in all directions:  Yes  Anticoagulation/Antiplatelets: 2.7  Platelet count: wnl  INR: wnl    TTE 5/2020  · Severe left ventricular enlargement.  · Severely decreased left ventricular systolic function. The estimated ejection fraction is 10%.  · Moderately reduced right ventricular systolic function. (RV:LV ratio is 0.59)  · Left ventricular diastolic dysfunction.  · Biatrial enlargement.  · Moderate to severe mitral regurgitation.  · Moderate to severe tricuspid regurgitation.  · Trivial pericardial effusion.  · The estimated PA systolic pressure is 76 mmHg.  · Intermediate central venous pressure (8 mmHg).

## 2020-06-12 NOTE — TRANSFER OF CARE
Anesthesia Transfer of Care Note    Patient: Laure Ross    Procedure(s) Performed: Procedure(s) (LRB):  CARDIOVERSION (N/A)  Transesophageal echo (BRITTANEY) intra-procedure log documentation (N/A)    Patient location: PACU    Anesthesia Type: general    Transport from OR: Transported from OR on 6-10 L/min O2 by face mask with adequate spontaneous ventilation    Post pain: adequate analgesia    Post assessment: no apparent anesthetic complications    Post vital signs: stable    Level of consciousness: sedated    Nausea/Vomiting: no nausea/vomiting    Complications: none    Transfer of care protocol was followed      Last vitals:   Visit Vitals  /80 (BP Location: Left arm, Patient Position: Lying)   Pulse 89   Temp 36.1 °C (97 °F) (Oral)   Resp 18   LMP 08/25/2019 (Approximate)   SpO2 95%   Breastfeeding? No

## 2020-06-12 NOTE — DISCHARGE SUMMARY
[facility]  Cardiology  Discharge Summary      Patient Name: Laure Ross  MRN: [id number]  Admission Date: 6/12/2020  Hospital Length of Stay: 0 days  Discharge Date and Time:  06/12/2020 2:58 PM  Attending Physician: Navi Jolly MD  Discharging Provider: He Wang NP  Primary Care Physician: Holly Mittal MD      HPI:  Ms. Ross is a 49 year old  female with NICM (EF 10-20%), CRT-D (2017), pAF, HTN, DM, HLD, CLARENCE, asthma presented for outpatient BRITTANEY/ Cardioversion      EP Background from Dr Jolly note 5/29/2020:    NICM 10-20%. R sided dual-chamber ICD -> biV upgrade 5/17.  pHTN  PAF, on amio since BRITTANEY/DCCV 4/16  HTN DM HL CLARENCE asthma     ADHF 2/16, requiring IABP. Had home  for a while; now off.   OHT noncandidate per Dr Jules. She has continued to have NYHA II-III sx, but much better since hospital d/c and better yet since biV upgrade 5/17.     Amio OK'd by GI.  ICD shows % since 3/2020. RVP only 69%.     Since EP clinic visit patient have elected to proceed for planned BRITTANEY/ CV with plans of starting amiodarone ( patient states has bee off amiodarone  for at least 1 year).        Patient presented to short stay cardiac unit on  6/12 /2020  for planned BRITTANEY/ cardioversion.  Patient denies any  noted bleeding,  overt shortness of breath, chest pains, rash , fevers, chills, or any other acute symptoms.  My interpretation ECG is Atrial fibrillation   Patient is taking coumadin , denies any noted bleeding    negative covid test on 6/10 /2020 , denies fevers or cough.  LFTs on [facility]  on 5/25/2020 acceptable        Procedure(s) (LRB):  CARDIOVERSION (N/A)  ECHOCARDIOGRAM, TRANSESOPHAGEAL (N/A)      Hospital Course:  INR 2.8 hence patient underwent BRITTANEY done showed no EDUARDO thrombus, hence proceeded to DCCV which converted patient from AF  to sinus rhythm.   Patient tolerated procedure. Pt denies any acute symptoms    Post procedure  EKG showed  Atrial sensed V paced rhythm.    Plan  to continue home medications including  coumadin  for CVA prophylaxis.   For rhythm  control will initiate amiodarone with loading doses as discussed with MD Shanae Mcelroy .  Pt to follow up with EKG in 1 week, and will follow up in clinic in 4 weeks with Navi Jolly .   Follow up with coumadin clinic     Discharge plans/instructions discussed with patient and  Daughter Ashley Erazo   who verbalized understanding  and agreement of plans of care. No further questions or concerns  voiced at this time.        Consults: Anesthesia      Significant Diagnostic Studies: BRITTANEY         Final Active Diagnoses:    Diagnosis Date Noted POA    PRINCIPAL PROBLEM:  Persistent atrial fibrillation [I48.19] 05/29/2020 Yes      Problems Resolved During this Admission:       Discharged Condition: good    Follow Up:  Follow-up Information     [facility] In 1 week.    Specialty:  Arrhythmia  Contact information:  [address] [place] [address] [id number] [phone]           Navi Jolly MD In 4 weeks.    Specialties:  Electrophysiology, Cardiology  Contact information:  [address] [place] [id number]  [phone]                 Patient Instructions:      Diet Cardiac     No driving until:   Order Comments: No driving for 24 hours post procedure     Other restrictions (specify):   Order Comments: Medications:  -Continue blood thinner coumadin  For stroke prevention close follow up with coumadin clinic   -If you have problems or side effects caused by your medications, call the clinic.        Diet  -You may resume oral intake after you are discharged, as long you have no swallowing difficulties.     Side effects:  -You may be drowsy for the remainder of the day from the sedation.     Because you have received sedation for this procedure:  -Limit activity for the remainder of the day.     -Do not smoke for at least 6 hours and until you are fully awake and alert.  -Do not drink alcoholic beverage for 24  hours.  -Defer important decision making until the following day.     Go to the Emergency Department if you develop:   -Bleeding  -Weakness or numbness  -Visual, gait or speech disturbance  -New chest pain, palpitations, shortness of breath, rapid heart beat, or fainting  -Fever     Notify your health care provider if you experience any of the following:  temperature >100.4     Notify your health care provider if you experience any of the following:  persistent nausea and vomiting or diarrhea     Notify your health care provider if you experience any of the following:  severe uncontrolled pain     Notify your health care provider if you experience any of the following:  redness, tenderness, or signs of infection (pain, swelling, redness, odor or green/yellow discharge around incision site)     Notify your health care provider if you experience any of the following:  difficulty breathing or increased cough     Notify your health care provider if you experience any of the following:  severe persistent headache     Notify your health care provider if you experience any of the following:  worsening rash     Notify your health care provider if you experience any of the following:  persistent dizziness, light-headedness, or visual disturbances     Notify your health care provider if you experience any of the following:  increased confusion or weakness     Notify your health care provider if you experience any of the following:   Order Comments: For any concerning medical symptoms     Medications:  Reconciled Home Medications:      Medication List      START taking these medications    amiodarone 200 MG Tab  Commonly known as:  PACERONE  Take 400 mg ( 2 tablets ) twice daily for 2 weeks , then 400 mg ( 2 tablets ) once daily for 2 weeks  ,  then reduce to 200 mg  ( 1 tablet) once daily thereafter.        CONTINUE taking these medications    acetaminophen 650 MG Tbsr  Commonly known as:  TYLENOL  TAKE 1 TABLET 3  TIMES  "DAILY for chest wall pain     allopurinoL 100 MG tablet  Commonly known as:  ZYLOPRIM  TAKE 2 TABLETS by mouth EVERY DAY     amoxicillin-clavulanate 875-125mg 875-125 mg per tablet  Commonly known as:  AUGMENTIN  TAKE 1 TABLET TWICE DAILY UNTIL FINISHED.     atorvastatin 20 MG tablet  Commonly known as:  LIPITOR  Take 1 tablet (20 mg total) by mouth once daily.     azelastine 137 mcg (0.1 %) nasal spray  Commonly known as:  ASTELIN  2 sprays (274 mcg total) by Nasal route 2 (two) times daily.     BASAGLAR KWIKPEN U-100 INSULIN glargine 100 units/mL (3mL) SubQ pen  Generic drug:  insulin  inject 34 under the skin in the morning and 34 units at night     * BD ULTRA-FINE ESSENCE PEN NEEDLE 32 gauge x 5/32" Ndle  Generic drug:  pen needle, diabetic  USE AS DIRECTED 5 TIMES DAILY     * BD ULTRA-FINE ESSENCE PEN NEEDLE 32 gauge x 5/32" Ndle  Generic drug:  pen needle, diabetic  Takes 5 times  day     cetirizine 10 MG tablet  Commonly known as:  ZYRTEC  Take 1 tablet (10 mg total) by mouth at bedtime for allergy relief     clotrimazole 1 % cream  Commonly known as:  LOTRIMIN  APPLY SPARINGLY TO AFFECTED AREA(S) in groin TWICE DAILY for one to two weeks     diclofenac sodium 1 % Gel  Commonly known as:  VOLTAREN  APPLY 2 grams SPARINGLY TO Knees ONCE DAILY     digoxin 125 mcg tablet  Commonly known as:  LANOXIN  TAKE 1 TABLET BY MOUTH ONCE DAILY     DUPIXENT 300 mg/2 mL Syrg  Generic drug:  dupilumab  Inject 2 mLs (300 mg total) into the skin every 14 (fourteen) days.     EPINEPHrine 0.3 mg/0.3 mL Atin  Commonly known as:  EPIPEN  INJECT 0.3ML IN THE MUSCLE AS DIRECTED FOR ANAPHYLAXIS     famotidine 40 MG tablet  Commonly known as:  PEPCID  TAKE 1 TABLET BY MOUTH TWICE DAILY.     * fluticasone propionate 50 mcg/actuation nasal spray  Commonly known as:  FLONASE  2 sprays (100 mcg total) by Each Nostril route once daily.     * FLOVENT  mcg/actuation inhaler  Generic drug:  fluticasone propionate  Inhale 2 puffs into the " lungs 2 (two) times daily. Rinse mouth after use     furosemide 40 MG tablet  Commonly known as:  LASIX  Take 2 tablets (80 mg total) by mouth 2 (two) times daily.     * ketoconazole 2 % cream  Commonly known as:  NIZORAL  APPLY SPARINGLY TO AFFECTED AREA(S) ONCE DAILY     * ketoconazole 2 % shampoo  Commonly known as:  NIZORAL  APPLY TO SKIN EVERY DAY FOR 5 MINUTES AND RINSE FOR ABOUT 1 TO 2 WEEKS     * levalbuterol 0.31 mg/3 mL nebulizer solution  Commonly known as:  XOPENEX  Take 3 mLs (0.31 mg total) by nebulization every 4 (four) hours as needed for Wheezing. Rescue     * levalbuterol 0.31 mg/3 mL nebulizer solution  Commonly known as:  XOPENEX  USE 1 UNIT DOSE IN NEBULIZER EVERY 4 HOURS AS NEEDED.     levalbuterol 45 mcg/actuation inhaler  Commonly known as:  XOPENEX HFA  Inhale 1 to 2 puffs into the lungs every 4 to 6 hours as needed     LINZESS 72 mcg Cap capsule  Generic drug:  linaCLOtide  Take one tablet by mouth daily as needed for constipation     lisinopriL 5 MG tablet  Commonly known as:  PRINIVIL,ZESTRIL  TAKE 1 TABLET BY MOUTH TWO TIMES A DAY     magnesium oxide 400 mg (241.3 mg magnesium) tablet  Commonly known as:  MAG-OX  Take 1 tablet (400 mg total) by mouth 2 (two) times daily.     meclizine 25 mg tablet  Commonly known as:  ANTIVERT  Take 1 tablet (25 mg total) by mouth 2 (two) times daily as needed for Dizziness.     methocarbamoL 750 MG Tab  Commonly known as:  ROBAXIN  TAKE 1 TABLET BY MOUTH 3 TIMES DAILY.     metOLazone 2.5 MG tablet  Commonly known as:  ZAROXOLYN  TAKE 1 TABLET DAILY AS DIRECTED.     montelukast 10 mg tablet  Commonly known as:  SINGULAIR  TAKE ONE TABLET BY MOUTH DAILY.     * NovoLOG Flexpen U-100 Insulin 100 unit/mL (3 mL) Inpn pen  Generic drug:  insulin aspart U-100  Inject 22 units into the skin with meals plus scale     * NovoLOG Flexpen U-100 Insulin 100 unit/mL (3 mL) Inpn pen  Generic drug:  insulin aspart U-100  Inject 16 units under the skin with meals plus  scale 150-200+2, 201-250+4, 251-300+6, 301-350+8, >350+10, max daily 78 units.     polyethylene glycol 17 gram/dose powder  Commonly known as:  GLYCOLAX  MIX 1 CAPFUL IN 8 OUNCES OF LIQUID AND DRINK ONCE DAILY as needed for constipation     potassium chloride 10 MEQ Cpsr  Commonly known as:  MICRO-K  Take 2 capsules (20 mEq total) by mouth once daily.     RESTASIS 0.05 % ophthalmic emulsion  Generic drug:  cycloSPORINE  Apply 1 drop to eye.     SPIRIVA WITH HANDIHALER 18 mcg inhalation capsule  Generic drug:  tiotropium  INHALE 1 CAPSULE EVERY DAY     spironolactone 25 MG tablet  Commonly known as:  ALDACTONE  Take 1 tablet (25 mg total) by mouth once daily.     SYMBICORT 160-4.5 mcg/actuation Hfaa  Generic drug:  budesonide-formoterol 160-4.5 mcg  INHALE 2 PUFFS TWICE DAILY. RINSE MOUTH AFTER USE.     triamcinolone acetonide 0.025% 0.025 % cream  Commonly known as:  KENALOG  APPLY SPARINGLY TO AFFECTED AREA(S) of face 2 TIMES DAILY.     TRUE METRIX GLUCOSE TEST STRIP Strp  Generic drug:  blood sugar diagnostic  Uses 4 times a day, true metrix meter.     TRUEPLUS LANCETS 30 gauge Misc  Generic drug:  lancets  use 4 times a day     TRULICITY 1.5 mg/0.5 mL Pnij  Generic drug:  dulaglutide  INJECT 1.5 mg under the skin weekly     warfarin 7.5 MG tablet  Commonly known as:  COUMADIN  Take 1 tablet by mouth every tuesday and take 1/2 tablet by mouth dailly on all other days - OR AS DIRECTED BY COUMADIN CLINIC         * This list has 10 medication(s) that are the same as other medications prescribed for you. Read the directions carefully, and ask your doctor or other care provider to review them with you.                Time spent on the discharge of patient: 20 minutes    He Wang NP  Cardiology  [facility]    STAFF MD Navi Jolly

## 2020-06-12 NOTE — PLAN OF CARE
Pt is AAOx3 and in no apparent distress.  Provided a copy of discharge instructions.  Teaching performed.  Pt verbalized understanding and denied any questions.  Provided pt with prescriptions information.  PIV d/c catheter tip intact.  2x2 applied and no active bleeding noted.  Pt waiting for wheelchair to escort to garage.  Family at the bedside.

## 2020-06-12 NOTE — PROGRESS NOTES
Patient admitted to recovery see [facility] for complete assessment pacu bcg's maintained safety measures verified called for ekg and it was done. Also called patient's daughter and updated on patient location. ekg done and in chart.

## 2020-06-12 NOTE — ANESTHESIA PREPROCEDURE EVALUATION
06/12/2020  Laure Ross is a 49 y.o., female   Patient Active Problem List   Diagnosis    Hypertensive heart disease with heart failure    Type 2 diabetes mellitus with diabetic polyneuropathy    CLARENCE (obstructive sleep apnea)    Paroxysmal atrial fibrillation    Obesity with body mass index (BMI) of 30.0 to 39.9    Encounter for pre-transplant evaluation for heart transplant    Palliative care encounter    Fatigue    Breast mass on GYN exam 4/29/16    Left bundle-branch block    Organ transplant candidate    Tuberculosis screening    Vitamin D deficiency disease    Chronic anticoagulation    Muscle weakness    Chronic combined systolic and diastolic congestive heart failure    COCM (congestive cardiomyopathy)    High risk medications (not anticoagulants) long-term use    Laryngopharyngeal reflux    Gastroesophageal reflux disease    Chronic rhinitis    Dysphonia    GERD (gastroesophageal reflux disease)    CKD (chronic kidney disease), stage III    Carpal tunnel syndrome, bilateral    NICM (nonischemic cardiomyopathy)    Acute diastolic CHF (congestive heart failure), NYHA class 3    History of diabetic ulcer of foot - Right Foot    Chest pain    Essential hypertension    Biventricular automatic implantable cardioverter defibrillator in situ    Acute URI    Maxillary sinusitis    Influenza B    Abnormal finding on lung imaging    Pharyngitis    Moderate persistent asthma without complication    Persistent atrial fibrillation     .    Anesthesia Evaluation          Review of Systems         Anesthesia Plan  Type of Anesthesia, risks & benefits discussed:  Anesthesia Type:  general  Patient's Preference: General  Intra-op Monitoring Plan: standard ASA monitors  Intra-op Monitoring Plan Comments: Standard ASA monitors.   Post Op Pain Control Plan: per primary service  following discharge from PACU  Post Op Pain Control Plan Comments: Per primary service.     Induction:   IV  Beta Blocker:  Patient is not currently on a Beta-Blocker (No further documentation required).       Informed Consent: Patient understands risks and agrees with Anesthesia plan.  Questions answered. Anesthesia consent signed with patient.  ASA Score: 4     Day of Surgery Review of History & Physical:    H&P update referred to the surgeon.     Anesthesia Plan Notes: Chart reviewed, patient interviewed and examined.  The plan for general anesthesia was explained.  Questions were answered and the consent was signed.  [facility] From Anesthesia Perspective.

## 2020-06-12 NOTE — NURSING TRANSFER
Nursing Transfer Note      6/12/2020     Transfer To: [facility]    Transfer via stretcher    Transfer with bobby rn    Transported by bobby rn    Medicines sent: silvadene cream    Chart send with patient: Yes    Notified: reported to secret rn    Patient reassessed at: see epic (date, time)    Upon arrival to floor: to room no complaints no distress noted.

## 2020-06-12 NOTE — H&P
[facility]  Cardiology  History and Physical     Patient Name: Laure Ross  MRN: [id number]  Admission Date: 6/12/2020  Code Status: Prior   Attending Provider: Navi Jolly MD   Primary Care Physician: Holly Mittal MD  Principal Problem:<principal problem not specified>    Patient information was obtained from patient and ER records.     Subjective:     Chief Complaint:  afib     HPI:  Ms. Ross is a 49 y.o. female with NICM (EF 10-20%), CRT-D (2017), pAF, HTN, DM, HLD, CLARENCE, asthma here for annual follow up. Ms. Ross is doing well from a device perspective with stable lead and device function. AF burden <1% . While long term use of amiodarone is not ideal in a patient her age, her history of CHF and her pre-CRT LBBB limit her options - will decrease amiodarone to 200mg daily. Cleared by GI to restart amio. LFTs normal at last check. INR 2.7. Compliant with coumadin, last dose last night. No contraindications for BRITTANEY / DCCV and plan to start Amio.     Dysphagia or odynophagia:   no  Liver Disease, esophageal disease, or known varices:   no  Upper GI Bleeding:no  Snoring:   no  Sleep Apnea:   no  Prior neck surgery or radiation:   no  History of anesthetic difficulties:   no  Family history of anesthetic difficulties:   no  Last oral intake:  12 hours ago  Able to move neck in all directions:  Yes  Anticoagulation/Antiplatelets: 2.7  Platelet count: wnl  INR: wnl    TTE 5/2020  · Severe left ventricular enlargement.  · Severely decreased left ventricular systolic function. The estimated ejection fraction is 10%.  · Moderately reduced right ventricular systolic function. (RV:LV ratio is 0.59)  · Left ventricular diastolic dysfunction.  · Biatrial enlargement.  · Moderate to severe mitral regurgitation.  · Moderate to severe tricuspid regurgitation.  · Trivial pericardial effusion.  · The estimated PA systolic pressure is 76 mmHg.  · Intermediate central venous  "pressure (8 mmHg).          Past Medical History:   Diagnosis Date    Anticoagulant long-term use     Arthritis     Asthma     Asthma     Atrial fibrillation     Cardiomyopathy     Cardiomyopathy     CHF (congestive heart failure)     CHF (congestive heart failure)     Chronic combined systolic and diastolic heart failure 6/3/2016    COPD (chronic obstructive pulmonary disease) 3/28/2018    GERD (gastroesophageal reflux disease)     H/O tubal ligation     Hypertension     Obesity     Renal disorder     Type 2 diabetes mellitus with hyperglycemia     Type 2 diabetes mellitus with polyneuropathy        Past Surgical History:   Procedure Laterality Date    CARDIAC DEFIBRILLATOR PLACEMENT      CARDIAC DEFIBRILLATOR PLACEMENT      CARDIAC DEFIBRILLATOR PLACEMENT      CATARACT EXTRACTION Left     CHOLECYSTECTOMY      COLONOSCOPY N/A 5/2/2016    Procedure: COLONOSCOPY;  Surgeon: Eugene Soto MD;  Location: [facility] ([address] [place] [address]);  Service: Endoscopy;  Laterality: N/A;    GALLBLADDER SURGERY      iabp  4/2016    TUBAL LIGATION         Review of patient's allergies indicates:  No Known Allergies    No current facility-administered medications on file prior to encounter.      Current Outpatient Medications on File Prior to Encounter   Medication Sig    acetaminophen (TYLENOL) 650 MG TbSR TAKE 1 TABLET 3  TIMES DAILY for chest wall pain    allopurinoL (ZYLOPRIM) 100 MG tablet TAKE 2 TABLETS by mouth EVERY DAY    atorvastatin (LIPITOR) 20 MG tablet Take 1 tablet (20 mg total) by mouth once daily.    azelastine (ASTELIN) 137 mcg (0.1 %) nasal spray 2 sprays (274 mcg total) by Nasal route 2 (two) times daily.    BD ULTRA-FINE ESSENCE PEN NEEDLE 32 gauge x 5/32" Ndle Takes 5 times  day    blood sugar diagnostic Strp Uses 4 times a day, true metrix meter.    budesonide-formoterol 160-4.5 mcg (SYMBICORT) 160-4.5 mcg/actuation HFAA INHALE 2 PUFFS TWICE DAILY. RINSE MOUTH AFTER USE.    cetirizine " (ZYRTEC) 10 MG tablet Take 1 tablet (10 mg total) by mouth at bedtime for allergy relief    clotrimazole (LOTRIMIN) 1 % cream APPLY SPARINGLY TO AFFECTED AREA(S) in groin TWICE DAILY for one to two weeks    cycloSPORINE (RESTASIS) 0.05 % ophthalmic emulsion Apply 1 drop to eye.    diclofenac sodium (VOLTAREN) 1 % Gel APPLY 2 grams SPARINGLY TO Knees ONCE DAILY    digoxin (LANOXIN) 125 mcg tablet TAKE 1 TABLET BY MOUTH ONCE DAILY    dulaglutide (TRULICITY) 1.5 mg/0.5 mL PnIj INJECT 1.5 mg under the skin weekly    dupilumab (DUPIXENT) 300 mg/2 mL Syrg Inject 2 mLs (300 mg total) into the skin every 14 (fourteen) days.    EPINEPHrine (EPIPEN) 0.3 mg/0.3 mL AtIn INJECT 0.3ML IN THE MUSCLE AS DIRECTED FOR ANAPHYLAXIS    famotidine (PEPCID) 40 MG tablet TAKE 1 TABLET BY MOUTH TWICE DAILY.    fluticasone propionate (FLONASE) 50 mcg/actuation nasal spray 2 sprays (100 mcg total) by Each Nostril route once daily.    fluticasone propionate (FLOVENT HFA) 110 mcg/actuation inhaler Inhale 2 puffs into the lungs 2 (two) times daily. Rinse mouth after use    furosemide (LASIX) 40 MG tablet Take 2 tablets (80 mg total) by mouth 2 (two) times daily.    insulin (BASAGLAR KWIKPEN U-100 INSULIN) glargine 100 units/mL (3mL) SubQ pen inject 34 under the skin in the morning and 34 units at night    insulin aspart U-100 (NOVOLOG) 100 unit/mL (3 mL) InPn pen Inject 22 units into the skin with meals plus scale    insulin aspart U-100 (NOVOLOG) 100 unit/mL (3 mL) InPn pen Inject 16 units under the skin with meals plus scale 150-200+2, 201-250+4, 251-300+6, 301-350+8, >350+10, max daily 78 units.    ketoconazole (NIZORAL) 2 % cream APPLY SPARINGLY TO AFFECTED AREA(S) ONCE DAILY    ketoconazole (NIZORAL) 2 % shampoo APPLY TO SKIN EVERY DAY FOR 5 MINUTES AND RINSE FOR ABOUT 1 TO 2 WEEKS    lancets 30 gauge Misc use 4 times a day    levalbuterol (XOPENEX HFA) 45 mcg/actuation inhaler Inhale 1 to 2 puffs into the lungs every 4 to  "6 hours as needed    levalbuterol (XOPENEX) 0.31 mg/3 mL nebulizer solution Take 3 mLs (0.31 mg total) by nebulization every 4 (four) hours as needed for Wheezing. Rescue    levalbuterol (XOPENEX) 0.31 mg/3 mL nebulizer solution USE 1 UNIT DOSE IN NEBULIZER EVERY 4 HOURS AS NEEDED.    linaCLOtide (LINZESS) 72 mcg Cap capsule Take one tablet by mouth daily as needed for constipation    lisinopriL (PRINIVIL,ZESTRIL) 5 MG tablet TAKE 1 TABLET BY MOUTH TWO TIMES A DAY    magnesium oxide (MAG-OX) 400 mg (241.3 mg magnesium) tablet Take 1 tablet (400 mg total) by mouth 2 (two) times daily.    meclizine (ANTIVERT) 25 mg tablet Take 1 tablet (25 mg total) by mouth 2 (two) times daily as needed for Dizziness.    methocarbamoL (ROBAXIN) 750 MG Tab TAKE 1 TABLET BY MOUTH 3 TIMES DAILY.    metOLazone (ZAROXOLYN) 2.5 MG tablet TAKE 1 TABLET DAILY AS DIRECTED.    montelukast (SINGULAIR) 10 mg tablet TAKE ONE TABLET BY MOUTH DAILY.    pen needle, diabetic 32 gauge x 5/32" Ndle USE AS DIRECTED 5 TIMES DAILY    polyethylene glycol (GLYCOLAX) 17 gram/dose powder MIX 1 CAPFUL IN 8 OUNCES OF LIQUID AND DRINK ONCE DAILY as needed for constipation    potassium chloride (MICRO-K) 10 MEQ CpSR Take 2 capsules (20 mEq total) by mouth once daily.    spironolactone (ALDACTONE) 25 MG tablet Take 1 tablet (25 mg total) by mouth once daily.    tiotropium (SPIRIVA) 18 mcg inhalation capsule INHALE 1 CAPSULE EVERY DAY    triamcinolone acetonide 0.025% (KENALOG) 0.025 % cream APPLY SPARINGLY TO AFFECTED AREA(S) of face 2 TIMES DAILY.    warfarin (COUMADIN) 7.5 MG tablet Take 1 tablet by mouth every tuesday and take 1/2 tablet by mouth dailly on all other days - OR AS DIRECTED BY COUMADIN CLINIC     Family History     Problem Relation (Age of Onset)    Heart disease Mother, Father        Tobacco Use    Smoking status: Never Smoker    Smokeless tobacco: Never Used   Substance and Sexual Activity    Alcohol use: No    Drug use: No "    Sexual activity: Yes     Partners: Male     Review of Systems   Constitution: Negative for chills, decreased appetite, diaphoresis, fever, weight gain and weight loss.   Eyes: Negative for blurred vision.   Cardiovascular: Negative for chest pain, dyspnea on exertion, irregular heartbeat, leg swelling, near-syncope, orthopnea and palpitations.   Respiratory: Negative for cough, shortness of breath, snoring and wheezing.    Gastrointestinal: Negative for abdominal pain, nausea and vomiting.   Genitourinary: Negative for bladder incontinence and urgency.     Objective:     Vital Signs (Most Recent):    Vital Signs (24h Range):           There is no height or weight on file to calculate BMI.            No intake or output data in the 24 hours ending 06/12/20 0956    Lines/Drains/Airways     None                 Physical Exam   Constitutional: She is oriented to person, place, and time. She appears well-developed and well-nourished. No distress.   HENT:   Head: Normocephalic and atraumatic.   Eyes: Pupils are equal, round, and reactive to light. Conjunctivae are normal.   Neck: Normal range of motion. Neck supple. No thyromegaly present.   Cardiovascular: Normal rate, regular rhythm, normal heart sounds and intact distal pulses. Exam reveals no gallop and no friction rub.   No murmur heard.  Pulses:       Carotid pulses are 2+ on the right side, and 2+ on the left side.       Radial pulses are 2+ on the right side, and 2+ on the left side.   Pulmonary/Chest: Effort normal and breath sounds normal. No respiratory distress. She has no wheezes.   Abdominal: Soft. Bowel sounds are normal. She exhibits no distension. There is no tenderness.   Neurological: She is alert and oriented to person, place, and time.   Skin: She is not diaphoretic.       Significant Labs: CMP No results for input(s): NA, K, CL, CO2, GLU, BUN, CREATININE, CALCIUM, PROT, ALBUMIN, BILITOT, ALKPHOS, AST, ALT, ANIONGAP, ESTGFRAFRICA, EGFRNONAA in the  last 48 hours., CBC No results for input(s): WBC, HGB, HCT, PLT in the last 48 hours., INR No results for input(s): INR, PROTIME in the last 48 hours. and Lipid Panel No results for input(s): CHOL, HDL, LDLCALC, TRIG, CHOLHDL in the last 48 hours.    Significant Imaging: Echocardiogram:   Transthoracic echo (TTE) complete (Cupid Only):   Results for orders placed or performed during the hospital encounter of 05/25/20   Echo Color Flow Doppler? Yes   Result Value Ref Range    Ascending aorta 2.70 cm    STJ 2.52 cm    AV mean gradient 3 mmHg    Ao peak juan david 0.99 m/s    Ao VTI 14.64 cm    IVRT 100.86 msec    IVS 0.91 0.6 - 1.1 cm    LA size 4.73 cm    Left Atrium Major Axis 6.94 cm    Left Atrium Minor Axis 6.73 cm    LVIDD 6.90 (A) 3.5 - 6.0 cm    LVIDS 6.70 (A) 2.1 - 4.0 cm    LVOT diameter 2.34 cm    LVOT peak VTI 8.30 cm    PW 0.92 0.6 - 1.1 cm    MV Peak E Juan David 1.16 m/s    RA Major Axis 5.14 cm    RA Width 4.39 cm    RVDD 4.05 cm    Sinus 3.13 cm    TAPSE 1.65 cm    TR Max Juan David 4.12 m/s    TDI LATERAL 0.17 m/s    TDI SEPTAL 0.05 m/s    LA WIDTH 5.27 cm    LV Diastolic Volume 202.05 mL    LV Systolic Volume 173.96 mL    RV S' 8.81 cm/s    LVOT peak juan david 0.52 m/s    LV LATERAL E/E' RATIO 6.82 m/s    LV SEPTAL E/E' RATIO 23.20 m/s    FS 3 %    LA volume 144.79 cm3    LV mass 280.84 g    Left Ventricle Relative Wall Thickness 0.27 cm    AV valve area 2.44 cm2    AV Velocity Ratio 0.53     AV index (prosthetic) 0.57     Mean e' 0.11 m/s    LVOT area 4.3 cm2    LVOT stroke volume 35.68 cm3    AV peak gradient 4 mmHg    E/E' ratio 10.55 m/s    Triscuspid Valve Regurgitation Peak Gradient 68 mmHg    BSA 2.27 m2    LV Systolic Volume Index 80.3 mL/m2    LV Diastolic Volume Index 93.22 mL/m2    LA Volume Index 66.8 mL/m2    LV Mass Index 130 g/m2    Right Atrial Pressure (from IVC) 8 mmHg    TV rest pulmonary artery pressure 76 mmHg    Narrative    · Severe left ventricular enlargement.  · Severely decreased left ventricular  systolic function. The estimated   ejection fraction is 10%.  · Moderately reduced right ventricular systolic function. (RV:LV ratio is   0.59)  · Left ventricular diastolic dysfunction.  · Biatrial enlargement.  · Moderate to severe mitral regurgitation.  · Moderate to severe tricuspid regurgitation.  · Trivial pericardial effusion.  · The estimated PA systolic pressure is 76 mmHg.  · Intermediate central venous pressure (8 mmHg).        Assessment and Plan:     Persistent atrial fibrillation  1. BRITTANEY for evaluation of DCCV / BRITTANEY for afib  -No absolute contraindications of esophageal stricture, tumor, perforation, laceration,or diverticulum and/or active GI bleed  -The risks, benefits & alternatives of the procedure were explained to the patient.   -The risks of transesophageal echo include but are not limited to:  Dental trauma, esophageal trauma/perforation, bleeding, laryngospasm/brochospasm, aspiration, sore throat/hoarseness, & dislodgement of the endotracheal tube/nasogastric tube (where applicable).    -The risks of moderate sedation include hypotension, respiratory depression, arrhythmias, bronchospasm, & death.    -Informed consent was obtained. The patient is agreeable to proceed with the procedure and all questions and concerns addressed.    Case discussed with an attending in echocardiography lab.     Further recommendations per attending addendum          VTE Risk Mitigation (From admission, onward)    None          Vinay Woods MD  Cardiology   [facility]

## 2020-06-12 NOTE — PLAN OF CARE
Pt is AAOx4 and complains of pain to nose from CPAP machine.  VSS.  Admit questions completed. Will continue to monitor.

## 2020-06-12 NOTE — ASSESSMENT & PLAN NOTE
1. BRITTANEY for evaluation of DCCV / BRITTANEY for afib  -No absolute contraindications of esophageal stricture, tumor, perforation, laceration,or diverticulum and/or active GI bleed  -The risks, benefits & alternatives of the procedure were explained to the patient.   -The risks of transesophageal echo include but are not limited to:  Dental trauma, esophageal trauma/perforation, bleeding, laryngospasm/brochospasm, aspiration, sore throat/hoarseness, & dislodgement of the endotracheal tube/nasogastric tube (where applicable).    -The risks of moderate sedation include hypotension, respiratory depression, arrhythmias, bronchospasm, & death.    -Informed consent was obtained. The patient is agreeable to proceed with the procedure and all questions and concerns addressed.    Case discussed with an attending in echocardiography lab.     Further recommendations per attending addendum

## 2020-06-19 NOTE — PROGRESS NOTES
INR not at goal - likely elevated 2/2 amiodarone load. Medications, chart, and patient findings reviewed. See calendar for adjustments to dose and follow up plan.

## 2020-06-23 NOTE — TELEPHONE ENCOUNTER
F/u appointments made and mailed to patient. Attempted to reach patient. Voice mail not available.

## 2020-06-29 NOTE — TELEPHONE ENCOUNTER
----- Message from Betty Moody NP sent at 6/23/2020  4:03 PM CDT -----  Thx. She is following up with me next month. We will probably have to repeat cardioversion once she has been on amiodarone a month. - J  ----- Message -----  From: Anita Head RN  Sent: 6/23/2020   3:21 PM CDT  To: Navi Jolly MD    Alert transmission received today for AF. Pt is s/p successful DCCV on 6/12/2020. Presenting egram c/w AF that appears to have started on 6/17/2020.

## 2020-07-07 NOTE — TELEPHONE ENCOUNTER
----- Message from Leandra Barrera sent at 7/7/2020  9:01 AM CDT -----  Type: Patient Call Back    Who called: Self     What is the request in detail: patient states last night her Cpap started making a funny nose and got louder as she breath in and out she says she need another one ASAP. Please call     Can the clinic reply by [facility]? No     Would the patient rather a call back or a response via [facility]? Call     Best call back number:[phone] (home)

## 2020-07-07 NOTE — TELEPHONE ENCOUNTER
Spoke with patient and instructed patient to contact [facility] for troubleshooting with her CPAP Machine. Patient verbalized understanding with no additional questions at this time.

## 2020-07-10 NOTE — PROGRESS NOTES
Subjective:       Patient ID: Laure Ross is a 49 y.o. female.    Vitals:  temperature is 97.7 °F (36.5 °C). Her blood pressure is 113/77 and her pulse is 65. Her respiration is 15 and oxygen saturation is 95%.     Chief Complaint: Urinary Tract Infection    Patient presenting with malodorous, cloudy urine, suprapubic pressure, and left-sided back/flank pain x 3 days. States that she's been taking Tylenol, robaxin, and using biofreeze with only temporary relief. States that she contacted her PCP today who stated that she could come drop off a urine sample however results would not be available for a few weeks. Tramadol sent to help with the pain however patient is concerned about her kidneys. H/o CKDII.    Urinary Tract Infection   This is a new problem. The current episode started in the past 7 days. The problem occurs every urination. The problem has been unchanged. The pain is mild. There has been no fever. She is sexually active. There is no history of pyelonephritis. Associated symptoms include flank pain. Pertinent negatives include no chills, frequency, hematuria, nausea, urgency, vomiting or rash.       Constitution: Negative for chills, fatigue and fever.   HENT: Negative for congestion and sore throat.    Neck: Negative for painful lymph nodes.   Cardiovascular: Negative for chest pain and leg swelling.   Eyes: Negative for double vision and blurred vision.   Respiratory: Negative for cough and shortness of breath.    Gastrointestinal: Negative for abdominal pain, nausea, vomiting and diarrhea.   Genitourinary: Positive for urine decreased, flank pain and pelvic pain. Negative for dysuria, frequency, urgency, hematuria and history of kidney stones.   Musculoskeletal: Negative for joint pain, joint swelling, muscle cramps and muscle ache.   Skin: Negative for color change, pale, rash and bruising.   Allergic/Immunologic: Negative for seasonal allergies.   Neurological: Negative for dizziness, history  of vertigo, light-headedness, passing out and headaches.   Hematologic/Lymphatic: Negative for swollen lymph nodes.   Psychiatric/Behavioral: Negative for nervous/anxious, sleep disturbance and depression. The patient is not nervous/anxious.        Objective:      Physical Exam   Constitutional: She is oriented to person, place, and time. She appears well-developed.   HENT:   Head: Normocephalic and atraumatic.   Right Ear: External ear normal.   Left Ear: External ear normal.   Nose: Nose normal. No nasal deformity. No epistaxis.   Mouth/Throat: Oropharynx is clear and moist and mucous membranes are normal.   Eyes: Lids are normal.   Neck: Trachea normal, normal range of motion and phonation normal. Neck supple.   Cardiovascular: Normal pulses.   Pulmonary/Chest: Effort normal.   Abdominal: Soft. Normal appearance and bowel sounds are normal. She exhibits no distension. There is abdominal tenderness in the suprapubic area. There is no rebound and no guarding.   Neurological: She is alert and oriented to person, place, and time.   Skin: Skin is warm, dry and intact.   Psychiatric: Her speech is normal and behavior is normal.   Nursing note and vitals reviewed.        Recent Results (from the past 48 hour(s))   POCT Urinalysis, Dipstick, Automated, W/O Scope    Collection Time: 07/10/20  4:22 PM   Result Value Ref Range    POC Blood, Urine Positive (A) Negative    POC Bilirubin, Urine Negative Negative    POC Urobilinogen, Urine 1.0 0.1 - 1.1    POC Ketones, Urine Negative Negative    POC Protein, Urine Positive (A) Negative    POC Nitrates, Urine Negative Negative    POC Glucose, Urine Negative Negative    pH, UA 7.0 5 - 8    POC Specific Gravity, Urine 1.015 1.003 - 1.029    POC Leukocytes, Urine Positive (A) Negative   ]    Assessment:       1. Pyelonephritis    2. Frequent urination        Plan:         Pyelonephritis  -     Culture, Urine  -     cephALEXin (KEFLEX) 500 MG capsule; Take 1 capsule (500 mg total)  "by mouth every 8 (eight) hours. for 7 days  Dispense: 21 capsule; Refill: 0  -     ondansetron (ZOFRAN-ODT) 4 MG TbDL; Take 1 tablet (4 mg total) by mouth every 6 (six) hours as needed (nausea).  Dispense: 15 tablet; Refill: 0    Frequent urination  -     POCT Urinalysis, Dipstick, Automated, W/O Scope          Patient Instructions   General Discharge Instructions   If you were prescribed a narcotic or controlled medication, do not drive or operate heavy equipment or machinery while taking these medications.  If you were prescribed antibiotics, please take them to completion.  You must understand that you've received an Urgent Care treatment only and that you may be released before all your medical problems are known or treated. You, the patient, will arrange for follow up care as instructed.  Follow up with your PCP or specialty clinic as directed in the next 1-2 weeks if not improved or as needed.  You can call [phone] to schedule an appointment with the appropriate provider.  If your condition worsens we recommend that you receive another evaluation at the emergency room immediately or contact your primary medical clinics after hours call service to discuss your concerns.  Please return here or go to the Emergency Department for any concerns or worsening of condition.      Kidney Infection (Adult, Female)    An infection in one or both kidneys is called "pyelonephritis." It usually happens when bacteria (or rarely, viruses, fungi, or other disease-causing organisms) get into the kidney. The bacteria (or other disease-causing organisms) can enter the kidneys from the bladder or blood traveling from other parts of the body. A kidney infection can become serious. It can cause severe illness, scarring of the kidneys, or kidney failure if not treated properly.  Common causes for this problem include:  · Not keeping the genital area clean and dry, which promotes the growth of bacteria  · Wiping back to front " which drags bacteria from the rectum toward the urinary opening (urethra)  · Wearing tight pants or underwear (this lets moisture build up in the genital area, which helps bacteria grow)  · Holding urine in for long periods of time  · Dehydration  Kidney infections can cause symptoms similar to a bladder infection. Symptoms include:  · Pain (or burning) when urinating  · Having to urinate more often than usual  · Blood in the urine (pink or red)  · Abdominal pain or discomfort, usually in the lower abdomen  · Pain in the side or back  · Pain above the pubic bone  · Fever or chills  · Vomiting  · Loss of appetite  Treatment is oral antibiotics, or in more severe cases, intramuscular or IV antibiotics. These are started right away and may be changed once urine culture results determine the infecting organisms. Treatment helps prevent a more serious kidney infection.  Medicines  Medicines can help in the treatment of a bladder infection:  · Take antibiotics until they are used up, even if you feel better. It is important to finish them to make sure the infection is gone.  · Unless another medicine was prescribed, you can use over-the-counter medicines for pain, fever, or discomfort. If you have chronic liver of kidney disease, talk with your healthcare provider before using these medicines. Also talk with your provider if you've ever had a stomach ulcer or gastrointestinal (GI) bleeding, or are taking blood thinners.  Home care  The following are general care guidelines:  · Stay home from work or school. Rest in bed until your fever breaks and you are feeling better, or as advised by your healthcare provider.  · Drink lots of fluid unless you must restrict fluids for other medical reasons. This will force the medicine into your urinary system and flush the bacteria out of your body. Ask your healthcare provider how much you should drink.  · Avoid sex until you have finished all of your medicine and your symptoms are  gone.  · Avoid caffeine, alcohol, and spicy foods. These foods may irritate the kidneys and bladder.  · Avoid taking bubble baths. Sensitivity to the chemicals in bubble baths can irritate the urethra.  · Make sure you wipe from front to back after using the toilet.  · Wear loose cloths and cotton underwear.  Prevention  These self-care steps can help prevent future infections:  · Drink plenty of fluids to prevent dehydration and flush out the bladder. Do this unless you must restrict fluids for other health reasons, or your healthcare provider told you not to.  · Proper cleaning after going to the bathroom in important. Make sure you wipe from front to back after using the toilet.  · Urinate more often. Don't try to hold urine in for a long time.  · Avoid tight-fitting pants and underwear.  · Improve your diet to prevent constipation. Eat more fruits, vegetables, and fiber. Eat less junk and fatty foods. Constipation can make a urinary tract infection more likely. Talk with your healthcare provider if you have trouble with bowel movements.  · Urinate right after intercourse to flush out the bladder.  Follow-up care  Follow up with your healthcare provider, or as advised. Additional testing may be needed to make sure the infection has cleared. Close follow-up and further testing is very important to find the cause and to prevent future infections.  If a urine culture was done, you will be contacted if your treatment needs to be changed. If directed, you may call to find out the results.  If you had an X-ray, CT scan, or other diagnostic test, you will be notified of any new findings that may affect your care.  Call 911  Call 911 if any of the following occur:  · Trouble breathing  · Fainting or loss of consciousness  · Rapid or very slow heart rate  · Weakness, dizziness, or fainting  · Difficulty arousing or confusion  When to seek medical advice  Call your healthcare provider right away if any of these  occur:  · Fever 100.4°F (38°C) or higher after 48 hours of treatment, or as advised by your healthcare provider  · Not feeling better within 1 to 2 days after starting antibiotics  · Any symptom that continues after 3 days of treatment  · Increasing pain in the stomach, back, side, or groin area  · Repeated vomiting  · Not able to take prescribed medicine due to nausea or another reason  · Bloody, dark-colored, or foul smelling urine  · Trouble urinating or decreased urine output  · No urine for 8 hours, no tears when crying, sunken eyes, or dry mouth  Date Last Reviewed: 10/1/2016  © 5668-3260 [employer]. [address] [place] [address], [place] [address]. All rights reserved. This information is not intended as a substitute for professional medical care. Always follow your healthcare professional's instructions.

## 2020-07-10 NOTE — PATIENT INSTRUCTIONS
"General Discharge Instructions   If you were prescribed a narcotic or controlled medication, do not drive or operate heavy equipment or machinery while taking these medications.  If you were prescribed antibiotics, please take them to completion.  You must understand that you've received an Urgent Care treatment only and that you may be released before all your medical problems are known or treated. You, the patient, will arrange for follow up care as instructed.  Follow up with your PCP or specialty clinic as directed in the next 1-2 weeks if not improved or as needed.  You can call [phone] to schedule an appointment with the appropriate provider.  If your condition worsens we recommend that you receive another evaluation at the emergency room immediately or contact your primary medical clinics after hours call service to discuss your concerns.  Please return here or go to the Emergency Department for any concerns or worsening of condition.      Kidney Infection (Adult, Female)    An infection in one or both kidneys is called "pyelonephritis." It usually happens when bacteria (or rarely, viruses, fungi, or other disease-causing organisms) get into the kidney. The bacteria (or other disease-causing organisms) can enter the kidneys from the bladder or blood traveling from other parts of the body. A kidney infection can become serious. It can cause severe illness, scarring of the kidneys, or kidney failure if not treated properly.  Common causes for this problem include:  · Not keeping the genital area clean and dry, which promotes the growth of bacteria  · Wiping back to front which drags bacteria from the rectum toward the urinary opening (urethra)  · Wearing tight pants or underwear (this lets moisture build up in the genital area, which helps bacteria grow)  · Holding urine in for long periods of time  · Dehydration  Kidney infections can cause symptoms similar to a bladder infection. Symptoms " include:  · Pain (or burning) when urinating  · Having to urinate more often than usual  · Blood in the urine (pink or red)  · Abdominal pain or discomfort, usually in the lower abdomen  · Pain in the side or back  · Pain above the pubic bone  · Fever or chills  · Vomiting  · Loss of appetite  Treatment is oral antibiotics, or in more severe cases, intramuscular or IV antibiotics. These are started right away and may be changed once urine culture results determine the infecting organisms. Treatment helps prevent a more serious kidney infection.  Medicines  Medicines can help in the treatment of a bladder infection:  · Take antibiotics until they are used up, even if you feel better. It is important to finish them to make sure the infection is gone.  · Unless another medicine was prescribed, you can use over-the-counter medicines for pain, fever, or discomfort. If you have chronic liver of kidney disease, talk with your healthcare provider before using these medicines. Also talk with your provider if you've ever had a stomach ulcer or gastrointestinal (GI) bleeding, or are taking blood thinners.  Home care  The following are general care guidelines:  · Stay home from work or school. Rest in bed until your fever breaks and you are feeling better, or as advised by your healthcare provider.  · Drink lots of fluid unless you must restrict fluids for other medical reasons. This will force the medicine into your urinary system and flush the bacteria out of your body. Ask your healthcare provider how much you should drink.  · Avoid sex until you have finished all of your medicine and your symptoms are gone.  · Avoid caffeine, alcohol, and spicy foods. These foods may irritate the kidneys and bladder.  · Avoid taking bubble baths. Sensitivity to the chemicals in bubble baths can irritate the urethra.  · Make sure you wipe from front to back after using the toilet.  · Wear loose cloths and cotton underwear.  Prevention  These  self-care steps can help prevent future infections:  · Drink plenty of fluids to prevent dehydration and flush out the bladder. Do this unless you must restrict fluids for other health reasons, or your healthcare provider told you not to.  · Proper cleaning after going to the bathroom in important. Make sure you wipe from front to back after using the toilet.  · Urinate more often. Don't try to hold urine in for a long time.  · Avoid tight-fitting pants and underwear.  · Improve your diet to prevent constipation. Eat more fruits, vegetables, and fiber. Eat less junk and fatty foods. Constipation can make a urinary tract infection more likely. Talk with your healthcare provider if you have trouble with bowel movements.  · Urinate right after intercourse to flush out the bladder.  Follow-up care  Follow up with your healthcare provider, or as advised. Additional testing may be needed to make sure the infection has cleared. Close follow-up and further testing is very important to find the cause and to prevent future infections.  If a urine culture was done, you will be contacted if your treatment needs to be changed. If directed, you may call to find out the results.  If you had an X-ray, CT scan, or other diagnostic test, you will be notified of any new findings that may affect your care.  Call 911  Call 911 if any of the following occur:  · Trouble breathing  · Fainting or loss of consciousness  · Rapid or very slow heart rate  · Weakness, dizziness, or fainting  · Difficulty arousing or confusion  When to seek medical advice  Call your healthcare provider right away if any of these occur:  · Fever 100.4°F (38°C) or higher after 48 hours of treatment, or as advised by your healthcare provider  · Not feeling better within 1 to 2 days after starting antibiotics  · Any symptom that continues after 3 days of treatment  · Increasing pain in the stomach, back, side, or groin area  · Repeated vomiting  · Not able to take  prescribed medicine due to nausea or another reason  · Bloody, dark-colored, or foul smelling urine  · Trouble urinating or decreased urine output  · No urine for 8 hours, no tears when crying, sunken eyes, or dry mouth  Date Last Reviewed: 10/1/2016  © 1888-1926 [employer]. [address] [place] [address], [place] [address]. All rights reserved. This information is not intended as a substitute for professional medical care. Always follow your healthcare professional's instructions.

## 2020-07-13 NOTE — TELEPHONE ENCOUNTER
Call for Dupixent followup. No answer, not able to leave . Sent Dexetra message.     Rene Magana, PharmD  Clinical Pharmacist  [facility]  P: [phone]

## 2020-07-16 NOTE — PROGRESS NOTES
Ms. Ross is a patient of Dr. Jolly and was last seen in clinic 5/29/2020.      Subjective:   Patient ID:  Laure Ross is a 49 y.o. female who presents for follow-up of Atrial Fibrillation  .     HPI:    Ms. Ross is a 49 y.o. female with NICM (EF 10-20%), CRT-D (2017), pAF, HTN, DM, HLD, CLARENCE, asthma here for follow up after cardioversion.    Background:    NICM 10-20%. R sided dual-chamber ICD -> biV upgrade 5/17.  pHTN  PAF, on amio since BRITTANEY/DCCV 4/16  HTN DM HL CLARENCE asthma    ADHF 2/16, requiring IABP. Had home  for a while; now off.   OHT noncandidate per Dr Jules. She has continued to have NYHA II-III sx, but much better since hospital d/c and better yet since biV upgrade 5/17.  While long term use of amiodarone is not idea in a patient her age, her history of CHF and her pre-CRT LBBB limit her options.  Cleared by GI to restart amio. LFTs normal at last check.    Plan DCCV and start amio.  To consider restarting BB if % V pacing remains low in NSR. Of note, did have problem with BB due to asthma in past.    Update (07/21/2020):    6/12/2020 Cardioversion was successfully performed with restoration of normal sinus rhythm. Amiodarone load started.    Today she says she reports fatigue. Ms. Ross denies chest pain with exertion or at rest, palpitations, SOB, WEBBER, dizziness, or syncope.    She is currently taking warfarin for stroke prophylaxis and denies significant bleeding episodes. She is currently being treated with amiodarone 200mg daily for rhythm control. (Pt intolerant of BB due to asthma).  Kidney function is low, with a creatinine of 1.8 on 6/12/2020 (baseline is 1.2-1.3). LFTs and TSH WNL 5/2020.     Device Interrogation (7/21/2020) reveals an intrinsic AF with stable lead and device function. In AF with SVR since 6/17/2020. She paces 0% in the RA and 63% in the BiV. Estimated battery longevity 6 years.     I have personally reviewed the patient's EKG today, which shows AF with intermittent V  "pacing at 73bpm. QRS is 162. QTc is 469.    Recent Cardiac Tests:    BRITTANEY (6/12/2020):  · BRITTANEY to evaluate for LA/EDUARDO thrombus prior to DCCV.  · Normal appearing left atrial appendage. No thrombus is present in the appendage. Abnormal appendage velocities.  · No thrombus is present in the left atrium.  · Severely decreased left ventricular systolic function. The estimated ejection fraction is 15%. Global hypokinetic wall motion.  · Mildly to moderately reduced right ventricular systolic function.  · This study was abbreviated due to high risk for decompensation.    Current Outpatient Medications   Medication Sig    acetaminophen (TYLENOL) 650 MG TbSR TAKE 1 TABLET 3  TIMES DAILY for chest wall pain    allopurinoL (ZYLOPRIM) 100 MG tablet TAKE 2 TABLETS by mouth EVERY DAY    amiodarone (PACERONE) 200 MG Tab Take 400 mg ( 2 tablets ) twice daily for 2 weeks , then 400 mg ( 2 tablets ) once daily for 2 weeks ,  then reduce to 200 mg  ( 1 tablet) once daily thereafter.       atorvastatin (LIPITOR) 20 MG tablet Take 1 tablet (20 mg total) by mouth once daily.    azelastine (ASTELIN) 137 mcg (0.1 %) nasal spray 2 sprays (274 mcg total) by Nasal route 2 (two) times daily.    BD ULTRA-FINE ESSENEC PEN NEEDLE 32 gauge x 5/32" Ndle Takes 5 times  day    blood sugar diagnostic Strp Uses 4 times a day, true metrix meter.    budesonide-formoterol 160-4.5 mcg (SYMBICORT) 160-4.5 mcg/actuation HFAA INHALE 2 PUFFS TWICE DAILY. RINSE MOUTH AFTER USE.    cetirizine (ZYRTEC) 10 MG tablet Take 1 tablet (10 mg total) by mouth at bedtime for allergy relief    clotrimazole (LOTRIMIN) 1 % cream APPLY SPARINGLY TO AFFECTED AREA(S) in groin TWICE DAILY for one to two weeks    cycloSPORINE (RESTASIS) 0.05 % ophthalmic emulsion Apply 1 drop to eye.    diclofenac sodium (VOLTAREN) 1 % Gel APPLY 2 grams SPARINGLY TO Knees ONCE DAILY    digoxin (LANOXIN) 125 mcg tablet TAKE 1 TABLET BY MOUTH ONCE DAILY    dulaglutide (TRULICITY) 1.5 " mg/0.5 mL pen injector INJECT 1.5 mg under the skin weekly    dupilumab (DUPIXENT) 300 mg/2 mL Syrg Inject 2 mLs (300 mg total) into the skin every 14 (fourteen) days.    EPINEPHrine (EPIPEN) 0.3 mg/0.3 mL AtIn INJECT 0.3ML IN THE MUSCLE AS DIRECTED FOR ANAPHYLAXIS    famotidine (PEPCID) 40 MG tablet TAKE 1 TABLET TWICE DAILY.    fluticasone propionate (FLONASE) 50 mcg/actuation nasal spray 2 sprays (100 mcg total) by Each Nostril route once daily.    fluticasone propionate (FLOVENT HFA) 110 mcg/actuation inhaler Inhale 2 puffs into the lungs 2 (two) times daily. Rinse mouth after use    furosemide (LASIX) 40 MG tablet Take 2 tablets (80 mg total) by mouth 2 (two) times daily.    insulin (BASAGLAR KWIKPEN U-100 INSULIN) glargine 100 units/mL (3mL) SubQ pen inject 34 under the skin in the morning and 34 units at night    insulin aspart U-100 (NOVOLOG) 100 unit/mL (3 mL) InPn pen Inject 22 units into the skin with meals plus scale    insulin aspart U-100 (NOVOLOG) 100 unit/mL (3 mL) InPn pen Inject 16 units under the skin with meals plus scale 150-200+2, 201-250+4, 251-300+6, 301-350+8, >350+10, max daily 78 units.    ketoconazole (NIZORAL) 2 % cream APPLY SPARINGLY TO AFFECTED AREA(S) ONCE DAILY    ketoconazole (NIZORAL) 2 % shampoo APPLY TO SKIN EVERY DAY FOR 5 MINUTES AND RINSE FOR ABOUT 1 TO 2 WEEKS    lancets 30 gauge Misc use 4 times a day    levalbuterol (XOPENEX HFA) 45 mcg/actuation inhaler Inhale 1 to 2 puffs into the lungs every 4 to 6 hours as needed    levalbuterol (XOPENEX) 0.31 mg/3 mL nebulizer solution Take 3 mLs (0.31 mg total) by nebulization every 4 (four) hours as needed for Wheezing. Rescue    levalbuterol (XOPENEX) 0.31 mg/3 mL nebulizer solution USE 1 UNIT DOSE IN NEBULIZER EVERY 4 HOURS AS NEEDED.    linaCLOtide (LINZESS) 72 mcg Cap capsule Take one tablet by mouth daily as needed for constipation    lisinopriL (PRINIVIL,ZESTRIL) 5 MG tablet TAKE 1 TABLET BY MOUTH TWO TIMES A  "DAY    magnesium oxide (MAG-OX) 400 mg (241.3 mg magnesium) tablet Take 1 tablet (400 mg total) by mouth 2 (two) times daily.    meclizine (ANTIVERT) 25 mg tablet Take 1 tablet (25 mg total) by mouth 2 (two) times daily as needed for Dizziness.    methocarbamoL (ROBAXIN) 750 MG Tab TAKE 1 TABLET BY MOUTH 3 TIMES DAILY.    metOLazone (ZAROXOLYN) 2.5 MG tablet TAKE 1 TABLET DAILY AS DIRECTED.    montelukast (SINGULAIR) 10 mg tablet TAKE ONE TABLET BY MOUTH DAILY.    ondansetron (ZOFRAN-ODT) 4 MG TbDL Take 1 tablet (4 mg total) by mouth every 6 (six) hours as needed (nausea).    polyethylene glycol (GLYCOLAX) 17 gram/dose powder MIX 1 CAPFUL IN 8 OUNCES OF LIQUID AND DRINK ONCE DAILY as needed for constipation    potassium chloride (MICRO-K) 10 MEQ CpSR Take 2 capsules (20 mEq total) by mouth once daily.    spironolactone (ALDACTONE) 25 MG tablet Take 1 tablet (25 mg total) by mouth once daily.    tiotropium (SPIRIVA) 18 mcg inhalation capsule INHALE 1 CAPSULE EVERY DAY    traMADoL (ULTRAM) 50 mg tablet TAKE 1 TABLET EVERY 8 HOURS AS NEEDED.    triamcinolone acetonide 0.025% (KENALOG) 0.025 % cream APPLY SPARINGLY TO AFFECTED AREA(S) of face 2 TIMES DAILY.    warfarin (COUMADIN) 7.5 MG tablet Take 1 tablet by mouth every tuesday and take 1/2 tablet by mouth dailly on all other days - OR AS DIRECTED BY COUMADIN CLINIC    pen needle, diabetic 32 gauge x 5/32" Ndle USE AS DIRECTED 5 TIMES DAILY     No current facility-administered medications for this visit.        Review of Systems   Constitution: Positive for malaise/fatigue.   Cardiovascular: Negative for chest pain, dyspnea on exertion, irregular heartbeat, leg swelling and palpitations.   Respiratory: Negative for shortness of breath.    Hematologic/Lymphatic: Negative for bleeding problem.   Skin: Negative for rash.   Musculoskeletal: Negative for myalgias.   Gastrointestinal: Negative for hematemesis, hematochezia and nausea.   Genitourinary: Negative " "for hematuria.   Neurological: Negative for light-headedness.   Psychiatric/Behavioral: Negative for altered mental status.   Allergic/Immunologic: Negative for persistent infections.     Objective:        /62   Pulse 73   Ht 5' 5" (1.651 m)   Wt 104.8 kg (231 lb 0.7 oz)   LMP 08/25/2019 (Approximate)   BMI 38.45 kg/m²     Physical Exam   Constitutional: She is oriented to person, place, and time. She appears well-developed and well-nourished.   HENT:   Head: Normocephalic.   Nose: Nose normal.   Eyes: Pupils are equal, round, and reactive to light.   Cardiovascular: Normal rate, regular rhythm, S1 normal and S2 normal.   No murmur heard.  Pulses:       Radial pulses are 2+ on the right side and 2+ on the left side.   Pulmonary/Chest: Breath sounds normal. No respiratory distress.   Device to LUCW.   Abdominal: Normal appearance.   Musculoskeletal: Normal range of motion.         General: No edema.   Neurological: She is alert and oriented to person, place, and time.   Skin: Skin is warm and dry. No erythema.   Psychiatric: She has a normal mood and affect. Her speech is normal and behavior is normal.   Nursing note and vitals reviewed.        Lab Results   Component Value Date     06/12/2020    K 4.5 06/12/2020    MG 1.9 12/08/2019    BUN 28 (H) 06/12/2020    CREATININE 1.8 (H) 06/12/2020    ALT 14 05/25/2020    AST 16 05/25/2020    HGB 12.3 06/12/2020    HCT 40.8 06/12/2020    TSH 1.744 06/12/2020    LDLCALC 174.2 (H) 10/01/2018       Recent Labs   Lab 06/24/20  1109 07/01/20  0826 07/15/20  0907 07/20/20  1001   INR 2.4 H 2.3 H 2.3 H 3.0 H       Assessment:     1. Biventricular automatic implantable cardioverter defibrillator in situ    2. COCM (congestive cardiomyopathy)    3. Essential hypertension    4. Hypertensive heart disease with heart failure    5. Left bundle-branch block    6. Persistent atrial fibrillation    7. CLARENCE (obstructive sleep apnea)    8. Obesity with body mass index (BMI) of " 30.0 to 39.9      Plan:     In summary, Ms. Ross is a 49 y.o. female with NICM (EF 10-20%), CRT-D (2017), pAF, HTN, DM, HLD, CLARENCE, asthma here for follow up after cardioversion.  She is 6 weeks s/p cardioversion and initiation of amiodarone. Unfortunately has been in AF since one week after procedure. Plan to repeat cardioversion now that patient has been loaded on amiodarone. BiV pacing down to 63%, EF down to 13%. Patient is fatigued. On coumadin. INRs have been in range.    Schedule DCCV. (UPDATE: Ok to forgo BRITTANEY only if INRs have been done every week and they've all been therapeutic. )  Continue other medications.   RTC as scheduled following procedure.      *A copy of this note has been sent to Dr. Jolly*    Follow up as scheduled following procedure.    ------------------------------------------------------------------    THANIA East, NP-C  Cardiac Electrophysiology

## 2020-07-17 NOTE — TELEPHONE ENCOUNTER
----- Message from Hanh Kimbrough PA-C sent at 7/16/2020 11:12 AM CDT -----  Please call the patient regarding her positive UCx for E.coli appropriately treated with Keflex at this visit.

## 2020-07-20 NOTE — TELEPHONE ENCOUNTER
Refill and followup call for Dupixent. Patient confirmed need of the refill. Prescription is currently out of refills, requesting new prescription. Patient has 0 doses remaining (0 day supply)/ next injection due . Patient states that she uses her Xopenex inhaler 2-3 times weekly, with she feels is much less than before. She states that she has not used her Xopenex nebulizer in about 3 weeks. She states that she is still taking all other medications for her asthma. She states that her provider has told her to wean off of Flovent. She states that she takes her Dupixent to her nurses office for administration, as she is scared to do it herself. She reports no pain associated with her condition. Patient denies missed doses and no side effects. Labs are stable. No ER/Urgent care visits in past month. Patient taking the medication as directed. Patient denies any further questions. Confirmed 2 patient identifiers - Name and . Will reach out to patient once new prescription is received.     Rene Magana, PharmD  Clinical Pharmacist  [facility]  P: [phone]

## 2020-07-20 NOTE — TELEPHONE ENCOUNTER
Refill call for Dupixent. Patient confirmed need of the refill. Will deliver via [employer] on  to arrive on  with patient consent. Copay $0.00 at 004. Address confirmed. Patient has 0 doses remaining (7 day supply)/ next injection due . Patient denies missed doses and no side effects.  No new medications/allergies/medical conditions. Labs are stable. No ER/Urgent care visits in past month. No Sharps container needed. Patient taking the medication as directed. Patient denies any further questions. Confirmed 2 patient identifiers - Name and .    Rene Magana, PharmD  Clinical Pharmacist  [facility]  P: [phone]

## 2020-07-21 NOTE — Clinical Note
Back in AF one week after DCCV and amio load. OK to reschedule DCCV now that she is on amio x 6 weeks? Also, can she skip BRITTANEY if her INRs have been in range? THERESE Rubin

## 2020-07-21 NOTE — Clinical Note
Can we schedule her for a cardioversion? Ok to forgo BRITTANEY only if INRs have been done every week and they've all been therapeutic. Thanks!

## 2020-07-22 NOTE — TELEPHONE ENCOUNTER
----- Message from Marti Ware sent at 7/22/2020  4:04 PM CDT -----  Type: Patient Call Back    Who called:Self     What is the request in detail: calling in regards to scheduling a sooner appt. Due to a painful blister on pt. Toe. Pt. Stated she is a diabetic and is very concern     Can the clinic reply by [facility]? Call back     Would the patient rather a call back or a response via [facility]? Call back     Best call back number: [phone]

## 2020-07-23 NOTE — PROGRESS NOTES
Subjective:      Patient ID: Laure Ross is a 49 y.o. female.    Chief Complaint: Blister (left foot big toe), PCP Visit (Dr. Mittal virtual visit 7/10/2020 called on 7/21/2020 for lab resullts ), and Diabetes Mellitus      49 y.o. female presenting with left hallux healed blsiter. Known to Dr. Cormier at . Sees her every three months for DFC. Was not able to see her in her office due to her schedule. Denies pain. Some underlying numbness and tingling on b/l feet.         Hemoglobin A1C   Date Value Ref Range Status   07/03/2019 6.9 (H) 4.0 - 5.6 % Final     Comment:     ADA Screening Guidelines:  5.7-6.4%  Consistent with prediabetes  >or=6.5%  Consistent with diabetes  High levels of fetal hemoglobin interfere with the HbA1C  assay. Heterozygous hemoglobin variants (HbS, HgC, etc)do  not significantly interfere with this assay.   However, presence of multiple variants may affect accuracy.     10/01/2018 7.4 (H) 4.0 - 5.6 % Final     Comment:     ADA Screening Guidelines:  5.7-6.4%  Consistent with prediabetes  >or=6.5%  Consistent with diabetes  High levels of fetal hemoglobin interfere with the HbA1C  assay. Heterozygous hemoglobin variants (HbS, HgC, etc)do  not significantly interfere with this assay.   However, presence of multiple variants may affect accuracy.     03/28/2018 6.2 (H) 4.0 - 5.6 % Final     Comment:     According to ADA guidelines, hemoglobin A1c <7.0% represents  optimal control in non-pregnant diabetic patients. Different  metrics may apply to specific patient populations.   Standards of Medical Care in Diabetes-2016.  For the purpose of screening for the presence of diabetes:  <5.7%     Consistent with the absence of diabetes  5.7-6.4%  Consistent with increasing risk for diabetes   (prediabetes)  >or=6.5%  Consistent with diabetes  Currently, no consensus exists for use of hemoglobin A1c  for diagnosis of diabetes for children.  This Hemoglobin A1c assay has significant interference  with fetal   hemoglobin   (HbF). The results are invalid for patients with abnormal amounts of   HbF,   including those with known Hereditary Persistence   of Fetal Hemoglobin. Heterozygous hemoglobin variants (HbAS, HbAC,   HbAD, HbAE, HbA2) do not significantly interfere with this assay;   however, presence of multiple variants in a sample may impact the %   interference.         Review of Systems   Constitution: Negative for chills, decreased appetite, fever and malaise/fatigue.   HENT: Negative for congestion, ear discharge and sore throat.    Eyes: Negative for discharge and pain.   Cardiovascular: Negative for chest pain, claudication and leg swelling.   Respiratory: Negative for cough and shortness of breath.    Skin: Negative for color change, nail changes and rash.   Musculoskeletal: Negative for arthritis, joint pain, joint swelling and muscle weakness.   Gastrointestinal: Negative for bloating, abdominal pain, diarrhea, nausea and vomiting.   Genitourinary: Negative for flank pain and hematuria.   Neurological: Positive for numbness. Negative for headaches and weakness.   Psychiatric/Behavioral: Negative for altered mental status.             Past Medical History:   Diagnosis Date    Anticoagulant long-term use     Arthritis     Asthma     Asthma     Atrial fibrillation     Cardiomyopathy     Cardiomyopathy     CHF (congestive heart failure)     CHF (congestive heart failure)     Chronic combined systolic and diastolic heart failure 6/3/2016    COPD (chronic obstructive pulmonary disease) 3/28/2018    GERD (gastroesophageal reflux disease)     H/O tubal ligation     Hypertension     Obesity     Renal disorder     Type 2 diabetes mellitus with hyperglycemia     Type 2 diabetes mellitus with polyneuropathy        Past Surgical History:   Procedure Laterality Date    CARDIAC DEFIBRILLATOR PLACEMENT      CARDIAC DEFIBRILLATOR PLACEMENT      CARDIAC DEFIBRILLATOR PLACEMENT      CATARACT  EXTRACTION Left     CHOLECYSTECTOMY      COLONOSCOPY N/A 5/2/2016    Procedure: COLONOSCOPY;  Surgeon: Eugene Soto MD;  [facility]  Service: Endoscopy;  Laterality: N/A;    GALLBLADDER SURGERY      iabp  4/2016    TREATMENT OF CARDIAC ARRHYTHMIA N/A 6/12/2020    Procedure: CARDIOVERSION;  Surgeon: Navi Jolly MD;  Location: [facility] EP LAB;  Service: Cardiology;  Laterality: N/A;  AF, BRITTANEY, DCCV, MAC, DM, 3 Prep    TUBAL LIGATION         Family History   Problem Relation Age of Onset    Heart disease Mother     Heart disease Father        Social History     Socioeconomic History    Marital status:      Spouse name: Not on file    Number of children: Not on file    Years of education: Not on file    Highest education level: Not on file   Occupational History    Not on file   Social Needs    Financial resource strain: Not on file    Food insecurity     Worry: Not on file     Inability: Not on file    Transportation needs     Medical: Not on file     Non-medical: Not on file   Tobacco Use    Smoking status: Never Smoker    Smokeless tobacco: Never Used   Substance and Sexual Activity    Alcohol use: No    Drug use: No    Sexual activity: Yes     Partners: Male   Lifestyle    Physical activity     Days per week: Not on file     Minutes per session: Not on file    Stress: Not on file   Relationships    Social connections     Talks on phone: Not on file     Gets together: Not on file     Attends [employer] service: Not on file     Active member of club or organization: Not on file     Attends meetings of clubs or organizations: Not on file     Relationship status: Not on file   Other Topics Concern    Not on file   Social History Narrative    Not on file       Current Outpatient Medications   Medication Sig Dispense Refill    acetaminophen (TYLENOL) 650 MG TbSR TAKE 1 TABLET 3  TIMES DAILY for chest wall pain 30 tablet 0    allopurinoL (ZYLOPRIM) 100 MG tablet  "TAKE 2 TABLETS by mouth EVERY  tablet 3    amiodarone (PACERONE) 200 MG Tab Take 400 mg ( 2 tablets ) twice daily for 2 weeks , then 400 mg ( 2 tablets ) once daily for 2 weeks ,  then reduce to 200 mg  ( 1 tablet) once daily thereafter.    60 tablet 6    atorvastatin (LIPITOR) 20 MG tablet Take 1 tablet (20 mg total) by mouth once daily. 90 tablet 1    BD ULTRA-FINE ESSENCE PEN NEEDLE 32 gauge x 5/32" Ndle Takes 5 times  day 200 each 11    blood sugar diagnostic Strp Uses 4 times a day, true metrix meter. 150 each 11    budesonide-formoterol 160-4.5 mcg (SYMBICORT) 160-4.5 mcg/actuation HFAA INHALE 2 PUFFS TWICE DAILY. RINSE MOUTH AFTER USE. 10.2 g 6    cetirizine (ZYRTEC) 10 MG tablet Take 1 tablet (10 mg total) by mouth at bedtime for allergy relief 30 tablet 3    clotrimazole (LOTRIMIN) 1 % cream APPLY SPARINGLY TO AFFECTED AREA(S) in groin TWICE DAILY for one to two weeks 45 g 0    cycloSPORINE (RESTASIS) 0.05 % ophthalmic emulsion Apply 1 drop to eye.      diclofenac sodium (VOLTAREN) 1 % Gel APPLY 2 grams SPARINGLY TO Knees ONCE DAILY 100 g 3    digoxin (LANOXIN) 125 mcg tablet TAKE 1 TABLET BY MOUTH ONCE DAILY 90 tablet 3    dulaglutide (TRULICITY) 1.5 mg/0.5 mL pen injector INJECT 1.5 mg under the skin weekly 2 mL 11    dupilumab (DUPIXENT) 300 mg/2 mL Syrg Inject 2 mLs (300 mg total) into the skin every 14 (fourteen) days. 4 mL 11    EPINEPHrine (EPIPEN) 0.3 mg/0.3 mL AtIn INJECT 0.3ML IN THE MUSCLE AS DIRECTED FOR ANAPHYLAXIS 0.6 mL 1    famotidine (PEPCID) 40 MG tablet TAKE 1 TABLET TWICE DAILY. 180 tablet 3    fluticasone propionate (FLONASE) 50 mcg/actuation nasal spray 2 sprays (100 mcg total) by Each Nostril route once daily. 15 g 0    fluticasone propionate (FLOVENT HFA) 110 mcg/actuation inhaler Inhale 2 puffs into the lungs 2 (two) times daily. Rinse mouth after use 12 g 3    furosemide (LASIX) 40 MG tablet Take 2 tablets (80 mg total) by mouth 2 (two) times daily. 360 tablet " 3    insulin (BASAGLAR KWIKPEN U-100 INSULIN) glargine 100 units/mL (3mL) SubQ pen inject 34 under the skin in the morning and 34 units at night 30 mL 6    insulin aspart U-100 (NOVOLOG) 100 unit/mL (3 mL) InPn pen Inject 22 units into the skin with meals plus scale 45 mL 6    insulin aspart U-100 (NOVOLOG) 100 unit/mL (3 mL) InPn pen Inject 16 units under the skin with meals plus scale 150-200+2, 201-250+4, 251-300+6, 301-350+8, >350+10, max daily 78 units. 30 mL 6    ketoconazole (NIZORAL) 2 % cream APPLY SPARINGLY TO AFFECTED AREA(S) ONCE DAILY 30 g 2    ketoconazole (NIZORAL) 2 % shampoo APPLY TO SKIN EVERY DAY FOR 5 MINUTES AND RINSE FOR ABOUT 1 TO 2 WEEKS 120 mL 1    lancets 30 gauge Misc use 4 times a day 100 each 6    levalbuterol (XOPENEX HFA) 45 mcg/actuation inhaler Inhale 1 to 2 puffs into the lungs every 4 to 6 hours as needed 15 g 6    levalbuterol (XOPENEX) 0.31 mg/3 mL nebulizer solution Take 3 mLs (0.31 mg total) by nebulization every 4 (four) hours as needed for Wheezing. Rescue 72 mL 3    levalbuterol (XOPENEX) 0.31 mg/3 mL nebulizer solution USE 1 UNIT DOSE IN NEBULIZER EVERY 4 HOURS AS NEEDED. 72 mL 6    linaCLOtide (LINZESS) 72 mcg Cap capsule Take one tablet by mouth daily as needed for constipation 30 capsule 2    lisinopriL (PRINIVIL,ZESTRIL) 5 MG tablet TAKE 1 TABLET BY MOUTH TWO TIMES A DAY 60 tablet 8    magnesium oxide (MAG-OX) 400 mg (241.3 mg magnesium) tablet Take 1 tablet (400 mg total) by mouth 2 (two) times daily. 180 tablet 3    meclizine (ANTIVERT) 25 mg tablet Take 1 tablet (25 mg total) by mouth 2 (two) times daily as needed for Dizziness. 10 tablet 0    methocarbamoL (ROBAXIN) 750 MG Tab TAKE 1 TABLET BY MOUTH 3 TIMES DAILY. 30 tablet 0    metOLazone (ZAROXOLYN) 2.5 MG tablet TAKE 1 TABLET DAILY AS DIRECTED. 60 tablet 0    montelukast (SINGULAIR) 10 mg tablet TAKE ONE TABLET BY MOUTH DAILY. 30 tablet 6    ondansetron (ZOFRAN-ODT) 4 MG TbDL Take 1 tablet (4 mg  "total) by mouth every 6 (six) hours as needed (nausea). 15 tablet 0    polyethylene glycol (GLYCOLAX) 17 gram/dose powder MIX 1 CAPFUL IN 8 OUNCES OF LIQUID AND DRINK ONCE DAILY as needed for constipation 510 g 6    potassium chloride (MICRO-K) 10 MEQ CpSR Take 2 capsules (20 mEq total) by mouth once daily. 60 capsule 11    spironolactone (ALDACTONE) 25 MG tablet Take 1 tablet (25 mg total) by mouth once daily. 90 tablet 3    tiotropium (SPIRIVA) 18 mcg inhalation capsule INHALE 1 CAPSULE EVERY DAY 30 capsule 6    traMADoL (ULTRAM) 50 mg tablet TAKE 1 TABLET EVERY 8 HOURS AS NEEDED. 12 tablet 0    triamcinolone acetonide 0.025% (KENALOG) 0.025 % cream APPLY SPARINGLY TO AFFECTED AREA(S) of face 2 TIMES DAILY. 15 g 1    warfarin (COUMADIN) 7.5 MG tablet Take 1 tablet by mouth every tuesday and take 1/2 tablet by mouth dailly on all other days - OR AS DIRECTED BY COUMADIN CLINIC 60 tablet 3    azelastine (ASTELIN) 137 mcg (0.1 %) nasal spray 2 sprays (274 mcg total) by Nasal route 2 (two) times daily. 30 mL 0    pen needle, diabetic 32 gauge x 5/32" Ndle USE AS DIRECTED 5 TIMES DAILY 200 each 5     No current facility-administered medications for this visit.        Review of patient's allergies indicates:  No Known Allergies    Vitals:    07/23/20 1006   BP: 98/72   Pulse: 64   Weight: 103.1 kg (227 lb 4.8 oz)   Height: 5' 5" (1.651 m)   PainSc: 0-No pain       Objective:      Physical Exam  Constitutional:       General: She is not in acute distress.     Appearance: She is well-developed.   HENT:      Nose: Nose normal.   Eyes:      Conjunctiva/sclera: Conjunctivae normal.   Neck:      Musculoskeletal: Normal range of motion.   Pulmonary:      Effort: Pulmonary effort is normal.   Chest:      Chest wall: No tenderness.   Abdominal:      Tenderness: There is no abdominal tenderness.   Neurological:      Mental Status: She is alert and oriented to person, place, and time.   Psychiatric:         Behavior: " Behavior normal.         Vascular: Distal DP/PT pulses palpable 1/4. CRT < 3 sec to tips of toes. Hair growth present LE, warm to touch LE, No edema noted to LE.    Dermatologic: No open lesions, lacerations or wounds. Interdigital spaces clean, dry and intact.   L foot: healed blister dorsal hallux. No rubor, no signs of infection.     Musculoskeletal: MMT 5/5 in DF/PF/Inv/Ev resistance with no reproduction of pain in any direction. Passive range of motion of ankle and pedal joints is painless. No calf tenderness LE, Compartments soft/compressible.    Neurological: Light touch, proprioception, and sharp/dull sensation are all intact. Protective threshold with the [place] monofilament is intact. Vibratory sensation intact.         Assessment:       Encounter Diagnoses   Name Primary?    Blister of left great toe, initial encounter Yes    Type II diabetes mellitus with neurological manifestations          Plan:       Laure was seen today for blister, pcp visit and diabetes mellitus.    Diagnoses and all orders for this visit:    Blister of left great toe, initial encounter    Type II diabetes mellitus with neurological manifestations      I counseled the patient on her conditions, their implications and medical management.    49 y.o. female with L healed blister.    -L hallux blister completely healed. No open wounds.    -Shoe inspection. General Foot Education. Patient reminded of the importance of good nutrition. Patient instructed on proper foot hygeine. We discussed wearing proper shoe gear, daily foot inspections, never walking without protective shoe gear, caution putting sharp instruments to feet. Discussed general foot care:  Wear comfortable, proper fitting shoes. Wash feet daily. Dry well. After drying, apply moisturizer to feet (no lotion to webspaces). Inspect feet daily for skin breaks, blisters, swelling, or redness. Wear cotton socks (preferably white)  Change socks every day. Do NOT walk  barefoot. Do NOT use heating pads or warm/hot water soaks   -It was discussed the importance of wearing shoes with adequate room in toe box to accommodate toe deformities. Recommended New Balance/[employer]ics shoe brands with adequate arch supports to alleviate abnormal pressure and improve stability of foot while walking. Avoid flat shoes and barefoot walking as these will exacerbate or worsen symptoms.   -The nature of the condition, options for management, as well as potential risks and complications were discussed in detail with patient. Patient was amenable to my recommendations and left my office fully informed and will follow up as instructed or sooner if necessary.    -Patient was advised of signs and symptoms of infection including redness, drainage, purulence, odor, streaking, fever, chills and I advised patient to seek medical attention (ER or urgent care) if these symptoms arise.   -f/u 3 months with Dr. Cormier      Note dictated with voice recognition software, please excuse any grammatical errors.

## 2020-08-07 NOTE — DISCHARGE SUMMARY
[facility]  Cardiology  Discharge Summary      Patient Name: Laure Ross  MRN: [id number]  Admission Date: 8/7/2020  Hospital Length of Stay: 0 days  Discharge Date and Time:  08/07/2020 10:49 AM  Attending Physician: Navi Jolly MD  Discharging Provider: Danika Lang MD  Primary Care Physician: Holly Mittal MD    HPI: Ms. Ross is a 49 y.o. female with NICM (EF 10-20%), CRT-D (2017), pAF, HTN, DM, HLD, CLARENCE, asthma. NICM 10-20%. R sided dual-chamber ICD -> biV upgrade 5/17. pHTN, PAF, on amio since BRITTANEY/DCCV 4/16. HTN DM HL CLARENCE asthma. ADHF 2/16, requiring IABP. Had home  for a while; now off. OHT noncandidate per Dr Jules. She has continued to have NYHA II-III sx, but much better since hospital d/c and better yet since biV upgrade 5/17. She presents today for BRITTANEY/DCCV and started amio. Of note, she had prior EGD 11/17/2016 which showed esophagus was normal. Patchy mildly erythematous mucosa without bleeding was found in the gastric body.    Card Meds: Warfarin, Aldactone, Lisinopril 5, Amiodarone, Dig    BRITTANEY 6/12/2020  Left Ventricle  Severe decreased ejection fraction at 15%. Left ventricular diastolic dysfunction. Global hypokinesis.  Right Ventricle  The systolic function is mildly to moderately reduced.  Left Atrium  There is left atrial enlargement. No thrombus present. No spontaneous echo contrast. Normal appearing left atrial appendage. No thrombus is present in the appendage. Abnormal appendage velocities.  Right Atrium  There is right atrial enlargement.  Aortic Valve  The aortic valve appears structurally normal. There is normal leaflet mobility.  Mitral Valve  The mitral valve appears structurally normal. There is normal leaflet mobility.  Tricuspid Valve  The tricuspid valve appears structurally normal. There is normal leaflet mobility.  Pulmonic Valve  The pulmonic valve was not well visualized.  IVC/SVC  Inferior vena cava is not well visualized.  Ascending Aorta  The  aortic root and ascending aorta are normal in size.  Pericardium  No pericardial effusion.        Procedure(s) (LRB):  Cardioversion or Defibrillation (N/A)  ECHOCARDIOGRAM,TRANSESOPHAGEAL (N/A)     Indwelling Lines/Drains at time of discharge:  Lines/Drains/Airways     None                 Hospital Course: BRITTANEY performed prior to DCCV. No clot noted in EDUARDO. Patient successfully cardioverted to sinus rhythm.       Consults: none     Significant Diagnostic Studies: Labs:   INR   Lab Results   Component Value Date    INR 1.7 (H) 08/07/2020    INR 1.6 (H) 08/05/2020    INR 3.6 (H) 07/31/2020       Pending Diagnostic Studies:     Procedure Component Value Units Date/Time    EKG 12-LEAD [[id number]]     Order Status: Sent Lab Status: No result           Final Active Diagnoses:    Diagnosis Date Noted POA    PRINCIPAL PROBLEM:  Persistent atrial fibrillation [I48.19] 05/29/2020 Yes      Problems Resolved During this Admission:       Discharged Condition: good    Follow Up: Coumadin clinic on 8/12/2020    Patient Instructions: Take 3.75 of Coumadin daily until you follow up in coumadin clinic on 8/12/2020. Give yourself the shots as prescribed.     Medications:  Reconciled Home Medications:      Medication List      START taking these medications    enoxaparin 100 mg/mL Syrg  Commonly known as: LOVENOX  Inject 1 mL (100 mg total) into the skin 2 (two) times a day. for 7 days        CONTINUE taking these medications    acetaminophen 650 MG Tbsr  Commonly known as: TYLENOL  TAKE 1 TABLET 3  TIMES DAILY for chest wall pain     allopurinoL 100 MG tablet  Commonly known as: ZYLOPRIM  TAKE 2 TABLETS by mouth EVERY DAY     amiodarone 200 MG Tab  Commonly known as: PACERONE  Take 400 mg ( 2 tablets ) twice daily for 2 weeks , then 400 mg ( 2 tablets ) once daily for 2 weeks ,  then reduce to 200 mg  ( 1 tablet) once daily thereafter.     atorvastatin 20 MG tablet  Commonly known as: LIPITOR  Take 1 tablet (20 mg total) by mouth  "once daily.     azelastine 137 mcg (0.1 %) nasal spray  Commonly known as: ASTELIN  2 sprays (274 mcg total) by Nasal route 2 (two) times daily.     BASAGLAR KWIKPEN U-100 INSULIN glargine 100 units/mL (3mL) SubQ pen  Generic drug: insulin  inject 34 under the skin in the morning and 34 units at night     * BD ULTRA-FINE ESSENCE PEN NEEDLE 32 gauge x 5/32" Ndle  Generic drug: pen needle, diabetic  USE AS DIRECTED 5 TIMES DAILY     * BD ULTRA-FINE ESSENCE PEN NEEDLE 32 gauge x 5/32" Ndle  Generic drug: pen needle, diabetic  Takes 5 times  day     cetirizine 10 MG tablet  Commonly known as: ZYRTEC  Take 1 tablet (10 mg total) by mouth at bedtime for allergy relief     clotrimazole 1 % cream  Commonly known as: LOTRIMIN  APPLY SPARINGLY TO AFFECTED AREA(S) in groin TWICE DAILY for one to two weeks     diclofenac sodium 1 % Gel  Commonly known as: VOLTAREN  APPLY 2 grams SPARINGLY TO Knees ONCE DAILY     digoxin 125 mcg tablet  Commonly known as: LANOXIN  Take 1 tablet (125 mcg total) by mouth once daily.     DUPIXENT 300 mg/2 mL Syrg  Generic drug: dupilumab  Inject 2 mLs (300 mg total) into the skin every 14 (fourteen) days.     EPINEPHrine 0.3 mg/0.3 mL Atin  Commonly known as: EPIPEN  INJECT 0.3ML IN THE MUSCLE AS DIRECTED FOR ANAPHYLAXIS     famotidine 40 MG tablet  Commonly known as: PEPCID  TAKE 1 TABLET TWICE DAILY.     * fluticasone propionate 50 mcg/actuation nasal spray  Commonly known as: FLONASE  2 sprays (100 mcg total) by Each Nostril route once daily.     * FLOVENT  mcg/actuation inhaler  Generic drug: fluticasone propionate  Inhale 2 puffs into the lungs 2 (two) times daily. Rinse mouth after use     furosemide 40 MG tablet  Commonly known as: LASIX  Take 2 tablets (80 mg total) by mouth 2 (two) times daily.     hydrocortisone 2.5 % ointment  Apply to affected area on the face two times daily as neeeded.     * ketoconazole 2 % cream  Commonly known as: NIZORAL  APPLY SPARINGLY TO AFFECTED AREA(S) ONCE " DAILY     * ketoconazole 2 % shampoo  Commonly known as: NIZORAL  APPLY TO SKIN EVERY DAY FOR 5 MINUTES AND RINSE FOR ABOUT 1 TO 2 WEEKS     * levalbuterol 0.31 mg/3 mL nebulizer solution  Commonly known as: XOPENEX  Take 3 mLs (0.31 mg total) by nebulization every 4 (four) hours as needed for Wheezing. Rescue     * levalbuterol 0.31 mg/3 mL nebulizer solution  Commonly known as: XOPENEX  USE 1 UNIT DOSE IN NEBULIZER EVERY 4 HOURS AS NEEDED.     levalbuterol 45 mcg/actuation inhaler  Commonly known as: XOPENEX HFA  Inhale 1 to 2 puffs into the lungs every 4 to 6 hours as needed     LINZESS 72 mcg Cap capsule  Generic drug: linaCLOtide  Take one tablet by mouth daily as needed for constipation     lisinopriL 5 MG tablet  Commonly known as: PRINIVIL,ZESTRIL  TAKE 1 TABLET BY MOUTH TWO TIMES A DAY     magnesium oxide 400 mg (241.3 mg magnesium) tablet  Commonly known as: MAG-OX  Take 1 tablet (400 mg total) by mouth 2 (two) times daily.     meclizine 25 mg tablet  Commonly known as: ANTIVERT  Take 1 tablet (25 mg total) by mouth 2 (two) times daily as needed for Dizziness.     methocarbamoL 750 MG Tab  Commonly known as: ROBAXIN  TAKE 1 TABLET BY MOUTH 3 TIMES DAILY.     metOLazone 2.5 MG tablet  Commonly known as: ZAROXOLYN  TAKE 1 TABLET DAILY AS DIRECTED.     montelukast 10 mg tablet  Commonly known as: SINGULAIR  TAKE ONE TABLET BY MOUTH DAILY.     * NovoLOG Flexpen U-100 Insulin 100 unit/mL (3 mL) Inpn pen  Generic drug: insulin aspart U-100  Inject 22 units into the skin with meals plus scale     * NovoLOG Flexpen U-100 Insulin 100 unit/mL (3 mL) Inpn pen  Generic drug: insulin aspart U-100  Inject 16 units under the skin with meals plus scale 150-200+2, 201-250+4, 251-300+6, 301-350+8, >350+10, max daily 78 units.     ondansetron 4 MG Tbdl  Commonly known as: ZOFRAN-ODT  Take 1 tablet (4 mg total) by mouth every 6 (six) hours as needed (nausea).     polyethylene glycol 17 gram/dose powder  Commonly known as:  GLYCOLAX  MIX 1 CAPFUL IN 8 OUNCES OF LIQUID AND DRINK ONCE DAILY as needed for constipation     potassium chloride 10 MEQ Cpsr  Commonly known as: MICRO-K  Take 2 capsules (20 mEq total) by mouth once daily.     RESTASIS 0.05 % ophthalmic emulsion  Generic drug: cycloSPORINE  Apply 1 drop to eye.     SPIRIVA WITH HANDIHALER 18 mcg inhalation capsule  Generic drug: tiotropium  INHALE 1 CAPSULE EVERY DAY     spironolactone 25 MG tablet  Commonly known as: ALDACTONE  Take 1 tablet (25 mg total) by mouth once daily.     SYMBICORT 160-4.5 mcg/actuation Hfaa  Generic drug: budesonide-formoterol 160-4.5 mcg  INHALE 2 PUFFS TWICE DAILY. RINSE MOUTH AFTER USE.     traMADoL 50 mg tablet  Commonly known as: ULTRAM  TAKE 1 TABLET EVERY 8 HOURS AS NEEDED.     triamcinolone acetonide 0.025% 0.025 % cream  Commonly known as: KENALOG  APPLY SPARINGLY TO AFFECTED AREA(S) of face 2 TIMES DAILY.     TRUE METRIX GLUCOSE TEST STRIP Strp  Generic drug: blood sugar diagnostic  Uses 4 times a day, true metrix meter.     TRUEPLUS LANCETS 30 gauge Misc  Generic drug: lancets  use 4 times a day     TRULICITY 1.5 mg/0.5 mL pen injector  Generic drug: dulaglutide  INJECT 1.5 mg under the skin weekly     warfarin 7.5 MG tablet  Commonly known as: COUMADIN  Take 1 tablet by mouth every tuesday and take 1/2 tablet by mouth dailly on all other days - OR AS DIRECTED BY COUMADIN CLINIC         * This list has 10 medication(s) that are the same as other medications prescribed for you. Read the directions carefully, and ask your doctor or other care provider to review them with you.                Time spent on the discharge of patient: 20 minutes    Danika Lang MD  Cardiology  [facility]

## 2020-08-07 NOTE — HOSPITAL COURSE
Ms. Ross underwent successful BRITTANEY/DCCV today. INR 1.7. She was bridged with Lovenox and sent out with a prescription of 1mg/kg BID until INR is therapeutic. She is followed by coumadin clinic who adjusted dosing today.

## 2020-08-07 NOTE — PLAN OF CARE
Patient arrived to PACU s/p BRITTANEY/DCCV. Patient drowsy, but arouses on calling. Patient denies pain, vital signs stable. CBG 88. Daughter updated on patient's status via telephone. Will continue to monitor.

## 2020-08-07 NOTE — PLAN OF CARE
Report received from Vale Grijalva RN.  Received to [place] [id number]. Patient is sleepy, but arousable.  Daughter notified.

## 2020-08-07 NOTE — DISCHARGE INSTRUCTIONS
Enoxaparin injection  What is this medicine?  ENOXAPARIN (ee nox a PA rin) is used after knee, hip, or abdominal surgeries to prevent blood clotting. It is also used to treat existing blood clots in the lungs or in the veins.  How should I use this medicine?  This medicine is for injection under the skin. It is usually given by a health-care professional. You or a family member may be trained on how to give the injections. If you are to give yourself injections, make sure you understand how to use the syringe, measure the dose if necessary, and give the injection. To avoid bruising, do not rub the site where this medicine has been injected. Do not take your medicine more often than directed. Do not stop taking except on the advice of your doctor or health care professional.  Make sure you receive a puncture-resistant container to dispose of the needles and syringes once you have finished with them. Do not reuse these items. Return the container to your doctor or health care professional for proper disposal.  Talk to your pediatrician regarding the use of this medicine in children. Special care may be needed.  What side effects may I notice from receiving this medicine?  Side effects that you should report to your doctor or health care professional as soon as possible:  · allergic reactions like skin rash, itching or hives, swelling of the face, lips, or tongue  · feeling faint or lightheaded, falls  · signs and symptoms of bleeding such as bloody or black, tarry stools; red or dark-brown urine; spitting up blood or brown material that looks like coffee grounds; red spots on the skin; unusual bruising or bleeding from the eye, gums, or nose  Side effects that usually do not require medical attention (report to your doctor or health care professional if they continue or are bothersome):  · pain, redness, or irritation at site where injected  What may interact with this medicine?  · aspirin and aspirin-like  medicines  · certain medicines that treat or prevent blood clots  · dipyridamole  · NSAIDs, medicines for pain and inflammation, like ibuprofen or naproxen  What if I miss a dose?  If you miss a dose, take it as soon as you can. If it is almost time for your next dose, take only that dose. Do not take double or extra doses.  Where should I keep my medicine?  Keep out of the reach of children.  Store at room temperature between 15 and 30 degrees C (59 and 86 degrees F). Do not freeze. If your injections have been specially prepared, you may need to store them in the refrigerator. Ask your pharmacist. Throw away any unused medicine after the expiration date.  What should I tell my health care provider before I take this medicine?  They need to know if you have any of these conditions:  · bleeding disorders, hemorrhage, or hemophilia  · infection of the heart or heart valves  · kidney or liver disease  · previous stroke  · prosthetic heart valve  · recent surgery or delivery of a baby  · ulcer in the stomach or intestine, diverticulitis, or other bowel disease  · an unusual or allergic reaction to enoxaparin, heparin, pork or pork products, other medicines, foods, dyes, or preservatives  · pregnant or trying to get pregnant  · breast-feeding  What should I watch for while using this medicine?  Visit your doctor or health care professional for regular checks on your progress. Your condition will be monitored carefully while you are receiving this medicine.  Notify your doctor or health care professional and seek emergency treatment if you develop breathing problems; changes in vision; chest pain; severe, sudden headache; pain, swelling, warmth in the leg; trouble speaking; sudden numbness or weakness of the face, arm, or leg. These can be signs that your condition has gotten worse.  If you are going to have surgery, tell your doctor or health care professional that you are taking this medicine.  Do not stop taking this  medicine without first talking to your doctor. Be sure to refill your prescription before you run out of medicine.  Avoid sports and activities that might cause injury while you are using this medicine. Severe falls or injuries can cause unseen bleeding. Be careful when using sharp tools or knives. Consider using an electric razor. Take special care brushing or flossing your teeth. Report any injuries, bruising, or red spots on the skin to your doctor or health care professional.  NOTE:This sheet is a summary. It may not cover all possible information. If you have questions about this medicine, talk to your doctor, pharmacist, or health care provider. Copyright© 2017 [employer]

## 2020-08-07 NOTE — SUBJECTIVE & OBJECTIVE
Past Medical History:   Diagnosis Date    Anticoagulant long-term use     Arthritis     Asthma     Asthma     Atrial fibrillation     Cardiomyopathy     Cardiomyopathy     CHF (congestive heart failure)     CHF (congestive heart failure)     Chronic combined systolic and diastolic heart failure 6/3/2016    COPD (chronic obstructive pulmonary disease) 3/28/2018    GERD (gastroesophageal reflux disease)     H/O tubal ligation     Hypertension     Obesity     Renal disorder     Type 2 diabetes mellitus with hyperglycemia     Type 2 diabetes mellitus with polyneuropathy        Past Surgical History:   Procedure Laterality Date    CARDIAC DEFIBRILLATOR PLACEMENT      CARDIAC DEFIBRILLATOR PLACEMENT      CARDIAC DEFIBRILLATOR PLACEMENT      CATARACT EXTRACTION Left     CHOLECYSTECTOMY      COLONOSCOPY N/A 5/2/2016    Procedure: COLONOSCOPY;  Surgeon: Eugene Soto MD;  Location: [facility] ENDO ([facility] FLR);  Service: Endoscopy;  Laterality: N/A;    GALLBLADDER SURGERY      iabp  4/2016    TREATMENT OF CARDIAC ARRHYTHMIA N/A 6/12/2020    Procedure: CARDIOVERSION;  Surgeon: Navi Jolly MD;  Location: [facility] EP LAB;  Service: Cardiology;  Laterality: N/A;  AF, BRITTANEY, DCCV, MAC, DM, 3 Prep    TUBAL LIGATION         Review of patient's allergies indicates:  No Known Allergies    No current facility-administered medications on file prior to encounter.      Current Outpatient Medications on File Prior to Encounter   Medication Sig    acetaminophen (TYLENOL) 650 MG TbSR TAKE 1 TABLET 3  TIMES DAILY for chest wall pain    allopurinoL (ZYLOPRIM) 100 MG tablet TAKE 2 TABLETS by mouth EVERY DAY    amiodarone (PACERONE) 200 MG Tab Take 400 mg ( 2 tablets ) twice daily for 2 weeks , then 400 mg ( 2 tablets ) once daily for 2 weeks ,  then reduce to 200 mg  ( 1 tablet) once daily thereafter.       atorvastatin (LIPITOR) 20 MG tablet Take 1 tablet (20 mg total) by mouth once daily.    azelastine (ASTELIN) 137  "mcg (0.1 %) nasal spray 2 sprays (274 mcg total) by Nasal route 2 (two) times daily.    BD ULTRA-FINE ESSENCE PEN NEEDLE 32 gauge x 5/32" Ndle Takes 5 times  day    blood sugar diagnostic Strp Uses 4 times a day, true metrix meter.    budesonide-formoterol 160-4.5 mcg (SYMBICORT) 160-4.5 mcg/actuation HFAA INHALE 2 PUFFS TWICE DAILY. RINSE MOUTH AFTER USE.    cetirizine (ZYRTEC) 10 MG tablet Take 1 tablet (10 mg total) by mouth at bedtime for allergy relief    clotrimazole (LOTRIMIN) 1 % cream APPLY SPARINGLY TO AFFECTED AREA(S) in groin TWICE DAILY for one to two weeks    cycloSPORINE (RESTASIS) 0.05 % ophthalmic emulsion Apply 1 drop to eye.    diclofenac sodium (VOLTAREN) 1 % Gel APPLY 2 grams SPARINGLY TO Knees ONCE DAILY    dulaglutide (TRULICITY) 1.5 mg/0.5 mL pen injector INJECT 1.5 mg under the skin weekly    dupilumab (DUPIXENT) 300 mg/2 mL Syrg Inject 2 mLs (300 mg total) into the skin every 14 (fourteen) days.    EPINEPHrine (EPIPEN) 0.3 mg/0.3 mL AtIn INJECT 0.3ML IN THE MUSCLE AS DIRECTED FOR ANAPHYLAXIS    famotidine (PEPCID) 40 MG tablet TAKE 1 TABLET TWICE DAILY.    fluticasone propionate (FLONASE) 50 mcg/actuation nasal spray 2 sprays (100 mcg total) by Each Nostril route once daily.    fluticasone propionate (FLOVENT HFA) 110 mcg/actuation inhaler Inhale 2 puffs into the lungs 2 (two) times daily. Rinse mouth after use    furosemide (LASIX) 40 MG tablet Take 2 tablets (80 mg total) by mouth 2 (two) times daily.    insulin (BASAGLAR KWIKPEN U-100 INSULIN) glargine 100 units/mL (3mL) SubQ pen inject 34 under the skin in the morning and 34 units at night    insulin aspart U-100 (NOVOLOG) 100 unit/mL (3 mL) InPn pen Inject 22 units into the skin with meals plus scale    insulin aspart U-100 (NOVOLOG) 100 unit/mL (3 mL) InPn pen Inject 16 units under the skin with meals plus scale 150-200+2, 201-250+4, 251-300+6, 301-350+8, >350+10, max daily 78 units.    ketoconazole (NIZORAL) 2 % cream " "APPLY SPARINGLY TO AFFECTED AREA(S) ONCE DAILY    ketoconazole (NIZORAL) 2 % shampoo APPLY TO SKIN EVERY DAY FOR 5 MINUTES AND RINSE FOR ABOUT 1 TO 2 WEEKS    lancets 30 gauge Misc use 4 times a day    levalbuterol (XOPENEX HFA) 45 mcg/actuation inhaler Inhale 1 to 2 puffs into the lungs every 4 to 6 hours as needed    levalbuterol (XOPENEX) 0.31 mg/3 mL nebulizer solution Take 3 mLs (0.31 mg total) by nebulization every 4 (four) hours as needed for Wheezing. Rescue    levalbuterol (XOPENEX) 0.31 mg/3 mL nebulizer solution USE 1 UNIT DOSE IN NEBULIZER EVERY 4 HOURS AS NEEDED.    linaCLOtide (LINZESS) 72 mcg Cap capsule Take one tablet by mouth daily as needed for constipation    lisinopriL (PRINIVIL,ZESTRIL) 5 MG tablet TAKE 1 TABLET BY MOUTH TWO TIMES A DAY    magnesium oxide (MAG-OX) 400 mg (241.3 mg magnesium) tablet Take 1 tablet (400 mg total) by mouth 2 (two) times daily.    meclizine (ANTIVERT) 25 mg tablet Take 1 tablet (25 mg total) by mouth 2 (two) times daily as needed for Dizziness.    methocarbamoL (ROBAXIN) 750 MG Tab TAKE 1 TABLET BY MOUTH 3 TIMES DAILY.    metOLazone (ZAROXOLYN) 2.5 MG tablet TAKE 1 TABLET DAILY AS DIRECTED.    montelukast (SINGULAIR) 10 mg tablet TAKE ONE TABLET BY MOUTH DAILY.    ondansetron (ZOFRAN-ODT) 4 MG TbDL Take 1 tablet (4 mg total) by mouth every 6 (six) hours as needed (nausea).    pen needle, diabetic 32 gauge x 5/32" Ndle USE AS DIRECTED 5 TIMES DAILY    polyethylene glycol (GLYCOLAX) 17 gram/dose powder MIX 1 CAPFUL IN 8 OUNCES OF LIQUID AND DRINK ONCE DAILY as needed for constipation    potassium chloride (MICRO-K) 10 MEQ CpSR Take 2 capsules (20 mEq total) by mouth once daily.    tiotropium (SPIRIVA) 18 mcg inhalation capsule INHALE 1 CAPSULE EVERY DAY    traMADoL (ULTRAM) 50 mg tablet TAKE 1 TABLET EVERY 8 HOURS AS NEEDED.    triamcinolone acetonide 0.025% (KENALOG) 0.025 % cream APPLY SPARINGLY TO AFFECTED AREA(S) of face 2 TIMES DAILY.    " warfarin (COUMADIN) 7.5 MG tablet Take 1 tablet by mouth every tuesday and take 1/2 tablet by mouth dailly on all other days - OR AS DIRECTED BY COUMADIN CLINIC     Family History     Problem Relation (Age of Onset)    Heart disease Mother, Father        Tobacco Use    Smoking status: Never Smoker    Smokeless tobacco: Never Used   Substance and Sexual Activity    Alcohol use: No    Drug use: No    Sexual activity: Yes     Partners: Male       Objective:     Vital Signs (Most Recent):    Vital Signs (24h Range):           There is no height or weight on file to calculate BMI.            No intake or output data in the 24 hours ending 08/07/20 0641    Lines/Drains/Airways     None               Physical Exam  Vital signs reviewed  Constitutional: Oriented to person, place, and time. Appears  well-developed and well-nourished.   HENT:   Head: Normocephalic and atraumatic.   Eyes: EOM are normal.   Oral: No loose teeth  Neck: Normal range of motion. No JVD present.   Cardiovascular: Normal rate, regular rhythm, normal heart sounds and intact distal pulses.   Exam reveals no gallop and no friction rub.   No murmur heard.  Pulmonary/Chest: Effort normal and breath sounds normal. No wheeze or rales present. No chest wall tenderness  Abdominal: Soft. Bowel sounds are normal. There is no tenderness. There is no guarding.   Musculoskeletal: There is no edema present   Skin: Skin is warm and dry.         Significant Labs:   CMP   Recent Labs   Lab 08/05/20  0917      K 4.5      CO2 29   GLU 74   BUN 23*   CREATININE 1.5*   CALCIUM 9.7   ANIONGAP 8   ESTGFRAFRICA 46.5*   EGFRNONAA 40.3*   , CBC   Recent Labs   Lab 08/05/20  0917   WBC 7.99   HGB 12.0   HCT 41.2       and INR   Recent Labs   Lab 08/05/20  0917   INR 1.6*       Significant Imaging: Echocardiogram:   Transthoracic echo (TTE) complete (Cupid Only):   Results for orders placed or performed during the hospital encounter of 05/25/20   Echo  Color Flow Doppler? Yes   Result Value Ref Range    Ascending aorta 2.70 cm    STJ 2.52 cm    AV mean gradient 3 mmHg    Ao peak juan david 0.99 m/s    Ao VTI 14.64 cm    IVRT 100.86 msec    IVS 0.91 0.6 - 1.1 cm    LA size 4.73 cm    Left Atrium Major Axis 6.94 cm    Left Atrium Minor Axis 6.73 cm    LVIDD 6.90 (A) 3.5 - 6.0 cm    LVIDS 6.70 (A) 2.1 - 4.0 cm    LVOT diameter 2.34 cm    LVOT peak VTI 8.30 cm    PW 0.92 0.6 - 1.1 cm    MV Peak E Juan David 1.16 m/s    RA Major Axis 5.14 cm    RA Width 4.39 cm    RVDD 4.05 cm    Sinus 3.13 cm    TAPSE 1.65 cm    TR Max Juan David 4.12 m/s    TDI LATERAL 0.17 m/s    TDI SEPTAL 0.05 m/s    LA WIDTH 5.27 cm    LV Diastolic Volume 202.05 mL    LV Systolic Volume 173.96 mL    RV S' 8.81 cm/s    LVOT peak juan david 0.52 m/s    LV LATERAL E/E' RATIO 6.82 m/s    LV SEPTAL E/E' RATIO 23.20 m/s    FS 3 %    LA volume 144.79 cm3    LV mass 280.84 g    Left Ventricle Relative Wall Thickness 0.27 cm    AV valve area 2.44 cm2    AV Velocity Ratio 0.53     AV index (prosthetic) 0.57     Mean e' 0.11 m/s    LVOT area 4.3 cm2    LVOT stroke volume 35.68 cm3    AV peak gradient 4 mmHg    E/E' ratio 10.55 m/s    Triscuspid Valve Regurgitation Peak Gradient 68 mmHg    BSA 2.27 m2    LV Systolic Volume Index 80.3 mL/m2    LV Diastolic Volume Index 93.22 mL/m2    LA Volume Index 66.8 mL/m2    LV Mass Index 130 g/m2    Right Atrial Pressure (from IVC) 8 mmHg    TV rest pulmonary artery pressure 76 mmHg    Narrative    · Severe left ventricular enlargement.  · Severely decreased left ventricular systolic function. The estimated   ejection fraction is 10%.  · Moderately reduced right ventricular systolic function. (RV:LV ratio is   0.59)  · Left ventricular diastolic dysfunction.  · Biatrial enlargement.  · Moderate to severe mitral regurgitation.  · Moderate to severe tricuspid regurgitation.  · Trivial pericardial effusion.  · The estimated PA systolic pressure is 76 mmHg.  · Intermediate central venous pressure (8  mmHg).

## 2020-08-07 NOTE — PLAN OF CARE
AVS printed and discharge reviewed with patient. Iv heplock removed. Instructions provided for lovenox. Patient is on  Home insulin injections and states she is familiar. Lovenox syringes  delivered to bedside. Demonstration provided. Patient verbalize understanding. Patient education handout provided. Patient aaox3. Daughter at bedside. Wheelchair provided for discharge.

## 2020-08-07 NOTE — H&P
[facility] Cardiac [facility]  Cardiology  History and Physical     Patient Name: Laure Ross  MRN: [id number]  Admission Date: 8/7/2020  Code Status: Prior   Attending Provider: Navi Jolly MD   Primary Care Physician: Holly Mittal MD  Principal Problem:<principal problem not specified>    Patient information was obtained from patient and past medical records.     Subjective:     Chief Complaint:  afib     HPI:  Ms. Ross is a 49 y.o. female with NICM (EF 10-20%), CRT-D (2017), pAF, HTN, DM, HLD, CLARENCE, asthma. NICM 10-20%. R sided dual-chamber ICD -> biV upgrade 5/17. pHTN, PAF, on amio since BRITTANEY/DCCV 4/16. HTN DM HL CLARENCE asthma. ADHF 2/16, requiring IABP. Had home  for a while; now off. OHT noncandidate per Dr Jules. She has continued to have NYHA II-III sx, but much better since hospital d/c and better yet since biV upgrade 5/17. She presents today for BRITTANEY/DCCV and started amio. Of note, she had prior EGD 11/17/2016 which showed esophagus was normal. Patchy mildly erythematous mucosa without bleeding was found in the gastric body.    Card Meds: Warfarin, Aldactone, Lisinopril 5, Amiodarone, Dig    BRITTANEY 6/12/2020  Left Ventricle  Severe decreased ejection fraction at 15%. Left ventricular diastolic dysfunction. Global hypokinesis.  Right Ventricle  The systolic function is mildly to moderately reduced.  Left Atrium  There is left atrial enlargement. No thrombus present. No spontaneous echo contrast. Normal appearing left atrial appendage. No thrombus is present in the appendage. Abnormal appendage velocities.  Right Atrium  There is right atrial enlargement.  Aortic Valve  The aortic valve appears structurally normal. There is normal leaflet mobility.  Mitral Valve  The mitral valve appears structurally normal. There is normal leaflet mobility.  Tricuspid Valve  The tricuspid valve appears structurally normal. There is normal leaflet mobility.  Pulmonic Valve  The pulmonic valve was  not well visualized.  IVC/SVC  Inferior vena cava is not well visualized.  Ascending Aorta  The aortic root and ascending aorta are normal in size.  Pericardium  No pericardial effusion.      BRITTANEY ROS:  Dysphagia or odynophagia:  No  Liver Disease, esophageal disease, or known varices:  No  Upper GI Bleeding: No  Snoring:  No  Sleep Apnea:  Yes  Prior neck surgery or radiation:  No  History of anesthetic difficulties:  No  Family history of anesthetic difficulties:  No  Last oral intake:  12 hours ago  Able to move neck in all directions:  Yes          Past Medical History:   Diagnosis Date    Anticoagulant long-term use     Arthritis     Asthma     Asthma     Atrial fibrillation     Cardiomyopathy     Cardiomyopathy     CHF (congestive heart failure)     CHF (congestive heart failure)     Chronic combined systolic and diastolic heart failure 6/3/2016    COPD (chronic obstructive pulmonary disease) 3/28/2018    GERD (gastroesophageal reflux disease)     H/O tubal ligation     Hypertension     Obesity     Renal disorder     Type 2 diabetes mellitus with hyperglycemia     Type 2 diabetes mellitus with polyneuropathy        Past Surgical History:   Procedure Laterality Date    CARDIAC DEFIBRILLATOR PLACEMENT      CARDIAC DEFIBRILLATOR PLACEMENT      CARDIAC DEFIBRILLATOR PLACEMENT      CATARACT EXTRACTION Left     CHOLECYSTECTOMY      COLONOSCOPY N/A 5/2/2016    Procedure: COLONOSCOPY;  Surgeon: Eugene Soto MD;  Location: [facility] ENDO ([address] [place] [address]);  Service: Endoscopy;  Laterality: N/A;    GALLBLADDER SURGERY      iabp  4/2016    TREATMENT OF CARDIAC ARRHYTHMIA N/A 6/12/2020    Procedure: CARDIOVERSION;  Surgeon: Navi Jolly MD;  Location: [facility] EP LAB;  Service: Cardiology;  Laterality: N/A;  AF, BRITTANEY, DCCV, MAC, DM, 3 Prep    TUBAL LIGATION         Review of patient's allergies indicates:  No Known Allergies    No current facility-administered medications on file prior to encounter.   "    Current Outpatient Medications on File Prior to Encounter   Medication Sig    acetaminophen (TYLENOL) 650 MG TbSR TAKE 1 TABLET 3  TIMES DAILY for chest wall pain    allopurinoL (ZYLOPRIM) 100 MG tablet TAKE 2 TABLETS by mouth EVERY DAY    amiodarone (PACERONE) 200 MG Tab Take 400 mg ( 2 tablets ) twice daily for 2 weeks , then 400 mg ( 2 tablets ) once daily for 2 weeks ,  then reduce to 200 mg  ( 1 tablet) once daily thereafter.       atorvastatin (LIPITOR) 20 MG tablet Take 1 tablet (20 mg total) by mouth once daily.    azelastine (ASTELIN) 137 mcg (0.1 %) nasal spray 2 sprays (274 mcg total) by Nasal route 2 (two) times daily.    BD ULTRA-FINE ESSENCE PEN NEEDLE 32 gauge x 5/32" Ndle Takes 5 times  day    blood sugar diagnostic Strp Uses 4 times a day, true metrix meter.    budesonide-formoterol 160-4.5 mcg (SYMBICORT) 160-4.5 mcg/actuation HFAA INHALE 2 PUFFS TWICE DAILY. RINSE MOUTH AFTER USE.    cetirizine (ZYRTEC) 10 MG tablet Take 1 tablet (10 mg total) by mouth at bedtime for allergy relief    clotrimazole (LOTRIMIN) 1 % cream APPLY SPARINGLY TO AFFECTED AREA(S) in groin TWICE DAILY for one to two weeks    cycloSPORINE (RESTASIS) 0.05 % ophthalmic emulsion Apply 1 drop to eye.    diclofenac sodium (VOLTAREN) 1 % Gel APPLY 2 grams SPARINGLY TO Knees ONCE DAILY    dulaglutide (TRULICITY) 1.5 mg/0.5 mL pen injector INJECT 1.5 mg under the skin weekly    dupilumab (DUPIXENT) 300 mg/2 mL Syrg Inject 2 mLs (300 mg total) into the skin every 14 (fourteen) days.    EPINEPHrine (EPIPEN) 0.3 mg/0.3 mL AtIn INJECT 0.3ML IN THE MUSCLE AS DIRECTED FOR ANAPHYLAXIS    famotidine (PEPCID) 40 MG tablet TAKE 1 TABLET TWICE DAILY.    fluticasone propionate (FLONASE) 50 mcg/actuation nasal spray 2 sprays (100 mcg total) by Each Nostril route once daily.    fluticasone propionate (FLOVENT HFA) 110 mcg/actuation inhaler Inhale 2 puffs into the lungs 2 (two) times daily. Rinse mouth after use    furosemide " (LASIX) 40 MG tablet Take 2 tablets (80 mg total) by mouth 2 (two) times daily.    insulin (BASAGLAR KWIKPEN U-100 INSULIN) glargine 100 units/mL (3mL) SubQ pen inject 34 under the skin in the morning and 34 units at night    insulin aspart U-100 (NOVOLOG) 100 unit/mL (3 mL) InPn pen Inject 22 units into the skin with meals plus scale    insulin aspart U-100 (NOVOLOG) 100 unit/mL (3 mL) InPn pen Inject 16 units under the skin with meals plus scale 150-200+2, 201-250+4, 251-300+6, 301-350+8, >350+10, max daily 78 units.    ketoconazole (NIZORAL) 2 % cream APPLY SPARINGLY TO AFFECTED AREA(S) ONCE DAILY    ketoconazole (NIZORAL) 2 % shampoo APPLY TO SKIN EVERY DAY FOR 5 MINUTES AND RINSE FOR ABOUT 1 TO 2 WEEKS    lancets 30 gauge Misc use 4 times a day    levalbuterol (XOPENEX HFA) 45 mcg/actuation inhaler Inhale 1 to 2 puffs into the lungs every 4 to 6 hours as needed    levalbuterol (XOPENEX) 0.31 mg/3 mL nebulizer solution Take 3 mLs (0.31 mg total) by nebulization every 4 (four) hours as needed for Wheezing. Rescue    levalbuterol (XOPENEX) 0.31 mg/3 mL nebulizer solution USE 1 UNIT DOSE IN NEBULIZER EVERY 4 HOURS AS NEEDED.    linaCLOtide (LINZESS) 72 mcg Cap capsule Take one tablet by mouth daily as needed for constipation    lisinopriL (PRINIVIL,ZESTRIL) 5 MG tablet TAKE 1 TABLET BY MOUTH TWO TIMES A DAY    magnesium oxide (MAG-OX) 400 mg (241.3 mg magnesium) tablet Take 1 tablet (400 mg total) by mouth 2 (two) times daily.    meclizine (ANTIVERT) 25 mg tablet Take 1 tablet (25 mg total) by mouth 2 (two) times daily as needed for Dizziness.    methocarbamoL (ROBAXIN) 750 MG Tab TAKE 1 TABLET BY MOUTH 3 TIMES DAILY.    metOLazone (ZAROXOLYN) 2.5 MG tablet TAKE 1 TABLET DAILY AS DIRECTED.    montelukast (SINGULAIR) 10 mg tablet TAKE ONE TABLET BY MOUTH DAILY.    ondansetron (ZOFRAN-ODT) 4 MG TbDL Take 1 tablet (4 mg total) by mouth every 6 (six) hours as needed (nausea).    pen needle, diabetic  "32 gauge x 5/32" Ndle USE AS DIRECTED 5 TIMES DAILY    polyethylene glycol (GLYCOLAX) 17 gram/dose powder MIX 1 CAPFUL IN 8 OUNCES OF LIQUID AND DRINK ONCE DAILY as needed for constipation    potassium chloride (MICRO-K) 10 MEQ CpSR Take 2 capsules (20 mEq total) by mouth once daily.    tiotropium (SPIRIVA) 18 mcg inhalation capsule INHALE 1 CAPSULE EVERY DAY    traMADoL (ULTRAM) 50 mg tablet TAKE 1 TABLET EVERY 8 HOURS AS NEEDED.    triamcinolone acetonide 0.025% (KENALOG) 0.025 % cream APPLY SPARINGLY TO AFFECTED AREA(S) of face 2 TIMES DAILY.    warfarin (COUMADIN) 7.5 MG tablet Take 1 tablet by mouth every tuesday and take 1/2 tablet by mouth dailly on all other days - OR AS DIRECTED BY COUMADIN CLINIC     Family History     Problem Relation (Age of Onset)    Heart disease Mother, Father        Tobacco Use    Smoking status: Never Smoker    Smokeless tobacco: Never Used   Substance and Sexual Activity    Alcohol use: No    Drug use: No    Sexual activity: Yes     Partners: Male       Objective:     Vital Signs (Most Recent):    Vital Signs (24h Range):           There is no height or weight on file to calculate BMI.            No intake or output data in the 24 hours ending 08/07/20 0641    Lines/Drains/Airways     None               Physical Exam  Vital signs reviewed  Constitutional: Oriented to person, place, and time. Appears  well-developed and well-nourished.   HENT:   Head: Normocephalic and atraumatic.   Eyes: EOM are normal.   Oral: No loose teeth  Neck: Normal range of motion. No JVD present.   Cardiovascular: Normal rate, regular rhythm, normal heart sounds and intact distal pulses.   Exam reveals no gallop and no friction rub.   No murmur heard.  Pulmonary/Chest: Effort normal and breath sounds normal. No wheeze or rales present. No chest wall tenderness  Abdominal: Soft. Bowel sounds are normal. There is no tenderness. There is no guarding.   Musculoskeletal: There is no edema present "   Skin: Skin is warm and dry.         Significant Labs:   CMP   Recent Labs   Lab 08/05/20 0917      K 4.5      CO2 29   GLU 74   BUN 23*   CREATININE 1.5*   CALCIUM 9.7   ANIONGAP 8   ESTGFRAFRICA 46.5*   EGFRNONAA 40.3*   , CBC   Recent Labs   Lab 08/05/20 0917   WBC 7.99   HGB 12.0   HCT 41.2       and INR   Recent Labs   Lab 08/05/20 0917   INR 1.6*       Significant Imaging: Echocardiogram:   Transthoracic echo (TTE) complete (Cupid Only):   Results for orders placed or performed during the hospital encounter of 05/25/20   Echo Color Flow Doppler? Yes   Result Value Ref Range    Ascending aorta 2.70 cm    STJ 2.52 cm    AV mean gradient 3 mmHg    Ao peak juan david 0.99 m/s    Ao VTI 14.64 cm    IVRT 100.86 msec    IVS 0.91 0.6 - 1.1 cm    LA size 4.73 cm    Left Atrium Major Axis 6.94 cm    Left Atrium Minor Axis 6.73 cm    LVIDD 6.90 (A) 3.5 - 6.0 cm    LVIDS 6.70 (A) 2.1 - 4.0 cm    LVOT diameter 2.34 cm    LVOT peak VTI 8.30 cm    PW 0.92 0.6 - 1.1 cm    MV Peak E Juan David 1.16 m/s    RA Major Axis 5.14 cm    RA Width 4.39 cm    RVDD 4.05 cm    Sinus 3.13 cm    TAPSE 1.65 cm    TR Max Juan David 4.12 m/s    TDI LATERAL 0.17 m/s    TDI SEPTAL 0.05 m/s    LA WIDTH 5.27 cm    LV Diastolic Volume 202.05 mL    LV Systolic Volume 173.96 mL    RV S' 8.81 cm/s    LVOT peak juan david 0.52 m/s    LV LATERAL E/E' RATIO 6.82 m/s    LV SEPTAL E/E' RATIO 23.20 m/s    FS 3 %    LA volume 144.79 cm3    LV mass 280.84 g    Left Ventricle Relative Wall Thickness 0.27 cm    AV valve area 2.44 cm2    AV Velocity Ratio 0.53     AV index (prosthetic) 0.57     Mean e' 0.11 m/s    LVOT area 4.3 cm2    LVOT stroke volume 35.68 cm3    AV peak gradient 4 mmHg    E/E' ratio 10.55 m/s    Triscuspid Valve Regurgitation Peak Gradient 68 mmHg    BSA 2.27 m2    LV Systolic Volume Index 80.3 mL/m2    LV Diastolic Volume Index 93.22 mL/m2    LA Volume Index 66.8 mL/m2    LV Mass Index 130 g/m2    Right Atrial Pressure (from IVC) 8 mmHg    TV  rest pulmonary artery pressure 76 mmHg    Narrative    · Severe left ventricular enlargement.  · Severely decreased left ventricular systolic function. The estimated   ejection fraction is 10%.  · Moderately reduced right ventricular systolic function. (RV:LV ratio is   0.59)  · Left ventricular diastolic dysfunction.  · Biatrial enlargement.  · Moderate to severe mitral regurgitation.  · Moderate to severe tricuspid regurgitation.  · Trivial pericardial effusion.  · The estimated PA systolic pressure is 76 mmHg.  · Intermediate central venous pressure (8 mmHg).        Assessment and Plan:     Persistent atrial fibrillation  1. BRITTANEY for evaluation of EDUARDO and LA prior to cardioversion.   -No absolute contraindications of esophageal stricture, tumor, perforation, laceration,or diverticulum and/or active GI bleed  -The risks, benefits & alternatives of the procedure were explained to the patient.   -The risks of transesophageal echo include but are not limited to:  Dental trauma, esophageal trauma/perforation, bleeding, laryngospasm/brochospasm, aspiration, sore throat/hoarseness, & dislodgement of the endotracheal tube/nasogastric tube (where applicable).    -The risks of moderate sedation include hypotension, respiratory depression, arrhythmias, bronchospasm, & death.    -Informed consent was obtained. The patient is agreeable to proceed with the procedure and all questions and concerns addressed.    Case discussed with an attending in echocardiography lab.     Further recommendations per attending addendum          VTE Risk Mitigation (From admission, onward)    None          Danika Lang MD  Cardiology   [facility]

## 2020-08-07 NOTE — NURSING TRANSFER
Nursing Transfer Note      8/7/2020     [facility] [facility]    Transfer via stretcher    Transfer with report given to ROZ Clifton    Transported by ROZ Collazo    Medicines sent: lovenox, silvadene    Chart send with patient: Yes    Notified: daughter    Patient reassessed at: see epic (date, time)    Upon arrival to floor: patient oriented to room and bed in lowest position, RN at bedside

## 2020-08-07 NOTE — HPI
Ms. Ross is a 49 y.o. female with NICM (EF 10-20%), CRT-D (2017), pAF, HTN, DM, HLD, CLARENCE, asthma. NICM 10-20%. R sided dual-chamber ICD -> biV upgrade 5/17. pHTN, PAF, on amio since BRITTANEY/DCCV 4/16. HTN DM HL CLARENCE asthma. ADHF 2/16, requiring IABP. Had home  for a while; now off. OHT noncandidate per Dr Jules. She has continued to have NYHA II-III sx, but much better since hospital d/c and better yet since biV upgrade 5/17. She presents today for BRITTANEY/DCCV and started amio. Of note, she had prior EGD 11/17/2016 which showed esophagus was normal. Patchy mildly erythematous mucosa without bleeding was found in the gastric body.    Card Meds: Warfarin, Aldactone, Lisinopril 5, Amiodarone, Dig    BRITTANEY 6/12/2020  Left Ventricle  Severe decreased ejection fraction at 15%. Left ventricular diastolic dysfunction. Global hypokinesis.  Right Ventricle  The systolic function is mildly to moderately reduced.  Left Atrium  There is left atrial enlargement. No thrombus present. No spontaneous echo contrast. Normal appearing left atrial appendage. No thrombus is present in the appendage. Abnormal appendage velocities.  Right Atrium  There is right atrial enlargement.  Aortic Valve  The aortic valve appears structurally normal. There is normal leaflet mobility.  Mitral Valve  The mitral valve appears structurally normal. There is normal leaflet mobility.  Tricuspid Valve  The tricuspid valve appears structurally normal. There is normal leaflet mobility.  Pulmonic Valve  The pulmonic valve was not well visualized.  IVC/SVC  Inferior vena cava is not well visualized.  Ascending Aorta  The aortic root and ascending aorta are normal in size.  Pericardium  No pericardial effusion.      BRITTANEY ROS:  Dysphagia or odynophagia:  No  Liver Disease, esophageal disease, or known varices:  No  Upper GI Bleeding: No  Snoring:  No  Sleep Apnea:  Yes  Prior neck surgery or radiation:  No  History of anesthetic difficulties:  No  Family history of  anesthetic difficulties:  No  Last oral intake:  12 hours ago  Able to move neck in all directions:  Yes

## 2020-08-07 NOTE — PLAN OF CARE
Ambulated on unit this morning with daughter at pt side.  Verbalized understanding of procedure.  Oriented to unit and call bell provided.  Denies pain or SOB at present.  Will continue to monitor.

## 2020-08-07 NOTE — ANESTHESIA PREPROCEDURE EVALUATION
08/07/2020  Pre-operative evaluation for Procedure(s) (LRB):  Cardioversion or Defibrillation (N/A)  ECHOCARDIOGRAM,TRANSESOPHAGEAL (N/A)    Laure Ross is a 49 y.o. female NICM EF 15%/mod RV dysfunction, COPD (mild airway restriction), CLARENCE, obesity, DM2.    Patient Active Problem List   Diagnosis    Hypertensive heart disease with heart failure    Type 2 diabetes mellitus with diabetic polyneuropathy    CLARENCE (obstructive sleep apnea)    Paroxysmal atrial fibrillation    Obesity with body mass index (BMI) of 30.0 to 39.9    Encounter for pre-transplant evaluation for heart transplant    Palliative care encounter    Fatigue    Breast mass on GYN exam 4/29/16    Left bundle-branch block    Organ transplant candidate    Tuberculosis screening    Vitamin D deficiency disease    Chronic anticoagulation    Muscle weakness    Chronic combined systolic and diastolic congestive heart failure    COCM (congestive cardiomyopathy)    High risk medications (not anticoagulants) long-term use    Laryngopharyngeal reflux    Gastroesophageal reflux disease    Chronic rhinitis    Dysphonia    GERD (gastroesophageal reflux disease)    CKD (chronic kidney disease), stage III    Carpal tunnel syndrome, bilateral    NICM (nonischemic cardiomyopathy)    Acute diastolic CHF (congestive heart failure), NYHA class 3    History of diabetic ulcer of foot - Right Foot    Chest pain    Essential hypertension    Biventricular automatic implantable cardioverter defibrillator in situ    Acute URI    Maxillary sinusitis    Influenza B    Abnormal finding on lung imaging    Pharyngitis    Moderate persistent asthma without complication    Persistent atrial fibrillation       Review of patient's allergies indicates:  No Known Allergies    No current facility-administered medications on file prior to  "encounter.      Current Outpatient Medications on File Prior to Encounter   Medication Sig Dispense Refill    acetaminophen (TYLENOL) 650 MG TbSR TAKE 1 TABLET 3  TIMES DAILY for chest wall pain 30 tablet 0    allopurinoL (ZYLOPRIM) 100 MG tablet TAKE 2 TABLETS by mouth EVERY  tablet 3    amiodarone (PACERONE) 200 MG Tab Take 400 mg ( 2 tablets ) twice daily for 2 weeks , then 400 mg ( 2 tablets ) once daily for 2 weeks ,  then reduce to 200 mg  ( 1 tablet) once daily thereafter.    60 tablet 6    atorvastatin (LIPITOR) 20 MG tablet Take 1 tablet (20 mg total) by mouth once daily. 90 tablet 1    BD ULTRA-FINE ESSENCE PEN NEEDLE 32 gauge x 5/32" Ndle Takes 5 times  day 200 each 11    budesonide-formoterol 160-4.5 mcg (SYMBICORT) 160-4.5 mcg/actuation HFAA INHALE 2 PUFFS TWICE DAILY. RINSE MOUTH AFTER USE. 10.2 g 6    cetirizine (ZYRTEC) 10 MG tablet Take 1 tablet (10 mg total) by mouth at bedtime for allergy relief 30 tablet 3    clotrimazole (LOTRIMIN) 1 % cream APPLY SPARINGLY TO AFFECTED AREA(S) in groin TWICE DAILY for one to two weeks 45 g 0    cycloSPORINE (RESTASIS) 0.05 % ophthalmic emulsion Apply 1 drop to eye.      diclofenac sodium (VOLTAREN) 1 % Gel APPLY 2 grams SPARINGLY TO Knees ONCE DAILY 100 g 3    dulaglutide (TRULICITY) 1.5 mg/0.5 mL pen injector INJECT 1.5 mg under the skin weekly 2 mL 11    dupilumab (DUPIXENT) 300 mg/2 mL Syrg Inject 2 mLs (300 mg total) into the skin every 14 (fourteen) days. 4 mL 11    famotidine (PEPCID) 40 MG tablet TAKE 1 TABLET TWICE DAILY. 180 tablet 3    fluticasone propionate (FLONASE) 50 mcg/actuation nasal spray 2 sprays (100 mcg total) by Each Nostril route once daily. 15 g 0    fluticasone propionate (FLOVENT HFA) 110 mcg/actuation inhaler Inhale 2 puffs into the lungs 2 (two) times daily. Rinse mouth after use 12 g 3    furosemide (LASIX) 40 MG tablet Take 2 tablets (80 mg total) by mouth 2 (two) times daily. 360 tablet 3    insulin aspart " U-100 (NOVOLOG) 100 unit/mL (3 mL) InPn pen Inject 22 units into the skin with meals plus scale 45 mL 6    insulin aspart U-100 (NOVOLOG) 100 unit/mL (3 mL) InPn pen Inject 16 units under the skin with meals plus scale 150-200+2, 201-250+4, 251-300+6, 301-350+8, >350+10, max daily 78 units. 30 mL 6    ketoconazole (NIZORAL) 2 % cream APPLY SPARINGLY TO AFFECTED AREA(S) ONCE DAILY 30 g 2    ketoconazole (NIZORAL) 2 % shampoo APPLY TO SKIN EVERY DAY FOR 5 MINUTES AND RINSE FOR ABOUT 1 TO 2 WEEKS 120 mL 1    lancets 30 gauge Misc use 4 times a day 100 each 6    levalbuterol (XOPENEX HFA) 45 mcg/actuation inhaler Inhale 1 to 2 puffs into the lungs every 4 to 6 hours as needed 15 g 6    levalbuterol (XOPENEX) 0.31 mg/3 mL nebulizer solution Take 3 mLs (0.31 mg total) by nebulization every 4 (four) hours as needed for Wheezing. Rescue 72 mL 3    levalbuterol (XOPENEX) 0.31 mg/3 mL nebulizer solution USE 1 UNIT DOSE IN NEBULIZER EVERY 4 HOURS AS NEEDED. 72 mL 6    linaCLOtide (LINZESS) 72 mcg Cap capsule Take one tablet by mouth daily as needed for constipation 30 capsule 2    lisinopriL (PRINIVIL,ZESTRIL) 5 MG tablet TAKE 1 TABLET BY MOUTH TWO TIMES A DAY 60 tablet 8    magnesium oxide (MAG-OX) 400 mg (241.3 mg magnesium) tablet Take 1 tablet (400 mg total) by mouth 2 (two) times daily. 180 tablet 3    methocarbamoL (ROBAXIN) 750 MG Tab TAKE 1 TABLET BY MOUTH 3 TIMES DAILY. 30 tablet 0    metOLazone (ZAROXOLYN) 2.5 MG tablet TAKE 1 TABLET DAILY AS DIRECTED. 60 tablet 0    montelukast (SINGULAIR) 10 mg tablet TAKE ONE TABLET BY MOUTH DAILY. 30 tablet 6    polyethylene glycol (GLYCOLAX) 17 gram/dose powder MIX 1 CAPFUL IN 8 OUNCES OF LIQUID AND DRINK ONCE DAILY as needed for constipation 510 g 6    potassium chloride (MICRO-K) 10 MEQ CpSR Take 2 capsules (20 mEq total) by mouth once daily. 60 capsule 11    tiotropium (SPIRIVA) 18 mcg inhalation capsule INHALE 1 CAPSULE EVERY DAY 30 capsule 6    traMADoL  "(ULTRAM) 50 mg tablet TAKE 1 TABLET EVERY 8 HOURS AS NEEDED. 12 tablet 0    warfarin (COUMADIN) 7.5 MG tablet Take 1 tablet by mouth every tuesday and take 1/2 tablet by mouth dailly on all other days - OR AS DIRECTED BY COUMADIN CLINIC 60 tablet 3    azelastine (ASTELIN) 137 mcg (0.1 %) nasal spray 2 sprays (274 mcg total) by Nasal route 2 (two) times daily. 30 mL 0    blood sugar diagnostic Strp Uses 4 times a day, true metrix meter. 150 each 11    EPINEPHrine (EPIPEN) 0.3 mg/0.3 mL AtIn INJECT 0.3ML IN THE MUSCLE AS DIRECTED FOR ANAPHYLAXIS 0.6 mL 1    insulin (BASAGLAR KWIKPEN U-100 INSULIN) glargine 100 units/mL (3mL) SubQ pen inject 34 under the skin in the morning and 34 units at night 30 mL 6    meclizine (ANTIVERT) 25 mg tablet Take 1 tablet (25 mg total) by mouth 2 (two) times daily as needed for Dizziness. 10 tablet 0    ondansetron (ZOFRAN-ODT) 4 MG TbDL Take 1 tablet (4 mg total) by mouth every 6 (six) hours as needed (nausea). 15 tablet 0    pen needle, diabetic 32 gauge x 5/32" Ndle USE AS DIRECTED 5 TIMES DAILY 200 each 5    triamcinolone acetonide 0.025% (KENALOG) 0.025 % cream APPLY SPARINGLY TO AFFECTED AREA(S) of face 2 TIMES DAILY. 15 g 1       Past Surgical History:   Procedure Laterality Date    CARDIAC DEFIBRILLATOR PLACEMENT      CARDIAC DEFIBRILLATOR PLACEMENT      CARDIAC DEFIBRILLATOR PLACEMENT      CATARACT EXTRACTION Left     CHOLECYSTECTOMY      COLONOSCOPY N/A 5/2/2016    Procedure: COLONOSCOPY;  Surgeon: Eugene Soto MD;  Location: [facility] ENDO (2ND FLR);  Service: Endoscopy;  Laterality: N/A;    GALLBLADDER SURGERY      iabp  4/2016    TREATMENT OF CARDIAC ARRHYTHMIA N/A 6/12/2020    Procedure: CARDIOVERSION;  Surgeon: Navi Jolly MD;  Location: [facility] EP LAB;  Service: Cardiology;  Laterality: N/A;  AF, BRITTANEY, DCCV, MAC, DM, 3 Prep    TUBAL LIGATION         Social History     Socioeconomic History    Marital status:      Spouse name: Not on file    " Number of children: Not on file    Years of education: Not on file    Highest education level: Not on file   Occupational History    Not on file   Social Needs    Financial resource strain: Not on file    Food insecurity     Worry: Not on file     Inability: Not on file    Transportation needs     Medical: Not on file     Non-medical: Not on file   Tobacco Use    Smoking status: Never Smoker    Smokeless tobacco: Never Used   Substance and Sexual Activity    Alcohol use: No    Drug use: No    Sexual activity: Yes     Partners: Male   Lifestyle    Physical activity     Days per week: Not on file     Minutes per session: Not on file    Stress: Not on file   Relationships    Social connections     Talks on phone: Not on file     Gets together: Not on file     Attends Bahai service: Not on file     Active member of club or organization: Not on file     Attends meetings of clubs or organizations: Not on file     Relationship status: Not on file   Other Topics Concern    Not on file   Social History Narrative    Not on file         CBC:   Recent Labs     20   WBC 7.99   RBC 4.93   HGB 12.0   HCT 41.2      MCV 84   MCH 24.3*   MCHC 29.1*       CMP:   Recent Labs     20      K 4.5      CO2 29   BUN 23*   CREATININE 1.5*   GLU 74   CALCIUM 9.7       INR  Recent Labs     20  0917 20  0621   INR 1.6* 1.7*   APTT 31.5 32.5*           Diagnostic Studies:      EKD Echo:  Results for orders placed or performed during the hospital encounter of 18   2D echo with color flow doppler   Result Value Ref Range    QEF 30 (A) 55 - 65    Mitral Valve Regurgitation MILD     Diastolic Dysfunction Yes (A)     Est. PA Systolic Pressure 36.41     Mitral Valve Mobility NORMAL          Anesthesia Evaluation          Review of Systems      Physical Exam  General:  Obesity    Airway/Jaw/Neck:  Airway Findings: Mouth Opening: Normal Tongue: Normal  General  Airway Assessment: Adult  Mallampati: II  TM Distance: Normal, at least 6 cm       Chest/Lungs:  Chest/Lungs Findings: Clear to auscultation, Normal Respiratory Rate         Mental Status:  Mental Status Findings:  Cooperative, Alert and Oriented         Anesthesia Plan  Type of Anesthesia, risks & benefits discussed:  Anesthesia Type:  general  Patient's Preference:   Intra-op Monitoring Plan: standard ASA monitors  Intra-op Monitoring Plan Comments:   Post Op Pain Control Plan: multimodal analgesia  Post Op Pain Control Plan Comments:   Induction:   IV  Beta Blocker:         Informed Consent: Patient understands risks and agrees with Anesthesia plan.  Questions answered. Anesthesia consent signed with patient.  ASA Score: 4     Day of Surgery Review of History & Physical:    H&P update referred to the provider.         Ready For Surgery From Anesthesia Perspective.

## 2020-08-07 NOTE — TRANSFER OF CARE
"Anesthesia Transfer of Care Note    Patient: Laure Ross    Procedure(s) Performed: Procedure(s) (LRB):  Cardioversion or Defibrillation (N/A)  ECHOCARDIOGRAM,TRANSESOPHAGEAL (N/A)    Patient location: PACU    Anesthesia Type: general    Transport from OR: Transported from OR on 2-3 L/min O2 by NC with adequate spontaneous ventilation    Post pain: adequate analgesia    Post assessment: no apparent anesthetic complications    Post vital signs: stable    Level of consciousness: sedated and responds to stimulation    Nausea/Vomiting: no nausea/vomiting    Complications: none    Transfer of care protocol was followed      Last vitals:   Visit Vitals  /61 (BP Location: Left arm, Patient Position: Lying)   Pulse 68   Temp 36.1 °C (97 °F)   Resp 18   Ht 5' 5" (1.651 m)   Wt 104.3 kg (230 lb)   LMP 08/25/2019 (Approximate)   SpO2 98%   BMI 38.27 kg/m²     "

## 2020-08-07 NOTE — ASSESSMENT & PLAN NOTE
1. BRITTANEY for evaluation of EDUARDO and LA prior to cardioversion.   -No absolute contraindications of esophageal stricture, tumor, perforation, laceration,or diverticulum and/or active GI bleed  -The risks, benefits & alternatives of the procedure were explained to the patient.   -The risks of transesophageal echo include but are not limited to:  Dental trauma, esophageal trauma/perforation, bleeding, laryngospasm/brochospasm, aspiration, sore throat/hoarseness, & dislodgement of the endotracheal tube/nasogastric tube (where applicable).    -The risks of moderate sedation include hypotension, respiratory depression, arrhythmias, bronchospasm, & death.    -Informed consent was obtained. The patient is agreeable to proceed with the procedure and all questions and concerns addressed.    Case discussed with an attending in echocardiography lab.     Further recommendations per attending addendum

## 2020-08-10 NOTE — PROGRESS NOTES
INR now at goal. Medications, chart, and patient findings reviewed. See calendar for adjustments to dose and follow up plan.

## 2020-08-10 NOTE — ANESTHESIA POSTPROCEDURE EVALUATION
Anesthesia Post Evaluation    Patient: Laure Ross    Procedure(s) Performed: Procedure(s) (LRB):  Cardioversion or Defibrillation (N/A)  ECHOCARDIOGRAM,TRANSESOPHAGEAL (N/A)    Final Anesthesia Type: general    Patient location during evaluation: PACU  Patient participation: Yes- Able to Participate  Level of consciousness: awake and alert  Post-procedure vital signs: reviewed and stable  Pain management: adequate  Airway patency: patent    PONV status at discharge: No PONV  Anesthetic complications: no      Cardiovascular status: blood pressure returned to baseline  Respiratory status: unassisted  Hydration status: euvolemic  Follow-up not needed.          Vitals Value Taken Time   /59 08/07/20 1217   Temp 36.4 °C (97.5 °F) 08/07/20 1217   Pulse 82 08/07/20 1217   Resp 16 08/07/20 1130   SpO2 98 % 08/07/20 1217         Event Time   Out of Recovery 11:30:00         Pain/Jannet Score: No data recorded

## 2020-08-14 NOTE — PROGRESS NOTES
INR at goal but trending down. Will increase weekly dose since recently had DCCV and want to avoid resuming LMWH. See calendar for plan. Continue weekly INRs.

## 2020-08-17 NOTE — PROGRESS NOTES
MA notes reviewed. Bruises/knots are to be expected with LMWH injections. Patient should be advised to monitor for worsening. If areas not healing, please let us know, though full healing may take a couple weeks.

## 2020-08-17 NOTE — PROGRESS NOTES
Patient reports having 2 large bruises on stomach, one has a knot in it.   Recently stopped Lovenox injections

## 2020-08-19 NOTE — TELEPHONE ENCOUNTER
----- Message from Mercedes Covarrubias sent at 8/19/2020  8:34 AM CDT -----  Regarding: Self/  [phone]  Type: Patient Call Back    Who called:  Patient    What is the request in detail:  Patient would like staff to give her a call regarding a blister on the bottom of her foot and she stated it's swollen.  Thank you    Would the patient rather a call back or a response via [facility]?   Call back    Best call back number:  [phone]             denies pain/discomfort

## 2020-08-20 NOTE — PROGRESS NOTES
Subjective:      Patient ID: Laure Ross is a 49 y.o. female.    Chief Complaint: Foot Problem (right foot, under 2nd and 3rd toe, mass that is discolored, popped up on 8/18/2020) and PCP Visit (Dr. Mittal last virtual visit in july 2020 does not remember date)      49 y.o. female presenting with left hallux healed blsiter. Known to Dr. Cormier at . Sees her every three months for DFC. Was not able to see her in her office due to her schedule. Denies pain. Some underlying numbness and tingling on b/l feet.     8/20/20 noticed blister on right foot. Noticed blister on Tuesday. Denies injury. Denies pain. Ambulating in open toe shoe. No previous treatments.       Hemoglobin A1C   Date Value Ref Range Status   07/03/2019 6.9 (H) 4.0 - 5.6 % Final     Comment:     ADA Screening Guidelines:  5.7-6.4%  Consistent with prediabetes  >or=6.5%  Consistent with diabetes  High levels of fetal hemoglobin interfere with the HbA1C  assay. Heterozygous hemoglobin variants (HbS, HgC, etc)do  not significantly interfere with this assay.   However, presence of multiple variants may affect accuracy.     10/01/2018 7.4 (H) 4.0 - 5.6 % Final     Comment:     ADA Screening Guidelines:  5.7-6.4%  Consistent with prediabetes  >or=6.5%  Consistent with diabetes  High levels of fetal hemoglobin interfere with the HbA1C  assay. Heterozygous hemoglobin variants (HbS, HgC, etc)do  not significantly interfere with this assay.   However, presence of multiple variants may affect accuracy.     03/28/2018 6.2 (H) 4.0 - 5.6 % Final     Comment:     According to ADA guidelines, hemoglobin A1c <7.0% represents  optimal control in non-pregnant diabetic patients. Different  metrics may apply to specific patient populations.   Standards of Medical Care in Diabetes-2016.  For the purpose of screening for the presence of diabetes:  <5.7%     Consistent with the absence of diabetes  5.7-6.4%  Consistent with increasing risk for diabetes    (prediabetes)  >or=6.5%  Consistent with diabetes  Currently, no consensus exists for use of hemoglobin A1c  for diagnosis of diabetes for children.  This Hemoglobin A1c assay has significant interference with fetal   hemoglobin   (HbF). The results are invalid for patients with abnormal amounts of   HbF,   including those with known Hereditary Persistence   of Fetal Hemoglobin. Heterozygous hemoglobin variants (HbAS, HbAC,   HbAD, HbAE, HbA2) do not significantly interfere with this assay;   however, presence of multiple variants in a sample may impact the %   interference.         Review of Systems   Constitution: Negative for chills, decreased appetite, fever and malaise/fatigue.   HENT: Negative for congestion, ear discharge and sore throat.    Eyes: Negative for discharge and pain.   Cardiovascular: Negative for chest pain, claudication and leg swelling.   Respiratory: Negative for cough and shortness of breath.    Skin: Positive for suspicious lesions. Negative for color change, nail changes and rash.   Musculoskeletal: Negative for arthritis, joint pain, joint swelling and muscle weakness.   Gastrointestinal: Negative for bloating, abdominal pain, diarrhea, nausea and vomiting.   Genitourinary: Negative for flank pain and hematuria.   Neurological: Positive for numbness. Negative for headaches and weakness.   Psychiatric/Behavioral: Negative for altered mental status.             Past Medical History:   Diagnosis Date    Anticoagulant long-term use     Arthritis     Asthma     Asthma     Atrial fibrillation     Cardiomyopathy     Cardiomyopathy     CHF (congestive heart failure)     CHF (congestive heart failure)     Chronic combined systolic and diastolic heart failure 6/3/2016    COPD (chronic obstructive pulmonary disease) 3/28/2018    GERD (gastroesophageal reflux disease)     H/O tubal ligation     Hypertension     Obesity     Renal disorder     Type 2 diabetes mellitus with  hyperglycemia     Type 2 diabetes mellitus with polyneuropathy        Past Surgical History:   Procedure Laterality Date    CARDIAC DEFIBRILLATOR PLACEMENT      CARDIAC DEFIBRILLATOR PLACEMENT      CARDIAC DEFIBRILLATOR PLACEMENT      CATARACT EXTRACTION Left     CHOLECYSTECTOMY      COLONOSCOPY N/A 5/2/2016    Procedure: COLONOSCOPY;  Surgeon: Eugene Soto MD;  Location: [facility] ENDO ([address] [place] [address]);  Service: Endoscopy;  Laterality: N/A;    GALLBLADDER SURGERY      iabp  4/2016    TREATMENT OF CARDIAC ARRHYTHMIA N/A 6/12/2020    Procedure: CARDIOVERSION;  Surgeon: Navi Jolly MD;  Location: [facility] EP LAB;  Service: Cardiology;  Laterality: N/A;  AF, BRITTANEY, DCCV, MAC, DM, 3 Prep    TREATMENT OF CARDIAC ARRHYTHMIA N/A 8/7/2020    Procedure: Cardioversion or Defibrillation;  Surgeon: Navi Jolly MD;  Location: [facility] EP LAB;  Service: Cardiology;  Laterality: N/A;  AF, DCCV, MAC, DM, 3 Prep    TUBAL LIGATION         Family History   Problem Relation Age of Onset    Heart disease Mother     Heart disease Father        Social History     Socioeconomic History    Marital status:      Spouse name: Not on file    Number of children: Not on file    Years of education: Not on file    Highest education level: Not on file   Occupational History    Not on file   Social Needs    Financial resource strain: Not on file    Food insecurity     Worry: Not on file     Inability: Not on file    Transportation needs     Medical: Not on file     Non-medical: Not on file   Tobacco Use    Smoking status: Never Smoker    Smokeless tobacco: Never Used   Substance and Sexual Activity    Alcohol use: No    Drug use: No    Sexual activity: Yes     Partners: Male   Lifestyle    Physical activity     Days per week: Not on file     Minutes per session: Not on file    Stress: Not on file   Relationships    Social connections     Talks on phone: Not on file     Gets together: Not on file     Attends Church  "service: Not on file     Active member of club or organization: Not on file     Attends meetings of clubs or organizations: Not on file     Relationship status: Not on file   Other Topics Concern    Not on file   Social History Narrative    Not on file       Current Outpatient Medications   Medication Sig Dispense Refill    acetaminophen (TYLENOL) 650 MG TbSR TAKE 1 TABLET 3  TIMES DAILY for chest wall pain 30 tablet 0    allopurinoL (ZYLOPRIM) 100 MG tablet TAKE 2 TABLETS by mouth EVERY  tablet 3    amiodarone (PACERONE) 200 MG Tab Take 400 mg ( 2 tablets ) twice daily for 2 weeks , then 400 mg ( 2 tablets ) once daily for 2 weeks ,  then reduce to 200 mg  ( 1 tablet) once daily thereafter.    60 tablet 6    atorvastatin (LIPITOR) 20 MG tablet Take 1 tablet (20 mg total) by mouth once daily. 90 tablet 1    BD ULTRA-FINE ESSENCE PEN NEEDLE 32 gauge x 5/32" Ndle Use 5 times per day as directed 200 each 11    blood sugar diagnostic Strp Uses 4 times a day, true metrix meter. 150 each 11    budesonide-formoterol 160-4.5 mcg (SYMBICORT) 160-4.5 mcg/actuation HFAA INHALE 2 PUFFS TWICE DAILY. RINSE MOUTH AFTER USE. 10.2 g 6    cetirizine (ZYRTEC) 10 MG tablet Take 1 tablet (10 mg total) by mouth at bedtime for allergy relief 30 tablet 3    clotrimazole (LOTRIMIN) 1 % cream APPLY SPARINGLY TO AFFECTED AREA(S) in groin TWICE DAILY for one to two weeks 45 g 0    cycloSPORINE (RESTASIS) 0.05 % ophthalmic emulsion Apply 1 drop to eye.      diclofenac sodium (VOLTAREN) 1 % Gel APPLY 2 grams SPARINGLY TO Knees ONCE DAILY 100 g 3    digoxin (LANOXIN) 125 mcg tablet Take 1 tablet (125 mcg total) by mouth once daily. 90 tablet 3    dulaglutide (TRULICITY) 1.5 mg/0.5 mL pen injector INJECT 1.5 mg under the skin weekly 2 mL 11    dupilumab (DUPIXENT) 300 mg/2 mL Syrg Inject 2 mLs (300 mg total) into the skin every 14 (fourteen) days. 4 mL 11    EPINEPHrine (EPIPEN) 0.3 mg/0.3 mL AtIn INJECT 0.3ML IN THE MUSCLE AS " DIRECTED FOR ANAPHYLAXIS 0.6 mL 1    famotidine (PEPCID) 40 MG tablet TAKE 1 TABLET TWICE DAILY. 180 tablet 3    fluticasone propionate (FLONASE) 50 mcg/actuation nasal spray 2 sprays (100 mcg total) by Each Nostril route once daily. 15 g 0    fluticasone propionate (FLOVENT HFA) 110 mcg/actuation inhaler Inhale 2 puffs into the lungs 2 (two) times daily. Rinse mouth after use 12 g 3    furosemide (LASIX) 40 MG tablet Take 2 tablets (80 mg total) by mouth 2 (two) times daily. 360 tablet 3    hydrocortisone 2.5 % ointment Apply to affected area on the face two times daily as neeeded. 28.35 g 1    insulin (BASAGLAR KWIKPEN U-100 INSULIN) glargine 100 units/mL (3mL) SubQ pen inject 34 under the skin in the morning and 34 units at night. 30 mL 6    insulin aspart U-100 (NOVOLOG) 100 unit/mL (3 mL) InPn pen Inject 22 units into the skin with meals plus scale 45 mL 6    insulin aspart U-100 (NOVOLOG) 100 unit/mL (3 mL) InPn pen Inject 16 units under the skin with meals plus scale 150-200+2, 201-250+4, 251-300+6, 301-350+8, >350+10, max daily 78 units. 30 mL 6    ketoconazole (NIZORAL) 2 % cream APPLY SPARINGLY TO AFFECTED AREA(S) ONCE DAILY 30 g 2    ketoconazole (NIZORAL) 2 % shampoo APPLY TO SKIN EVERY DAY FOR 5 MINUTES AND RINSE FOR ABOUT 1 TO 2 WEEKS 120 mL 1    lancets 30 gauge Misc use 4 times a day 100 each 6    levalbuterol (XOPENEX HFA) 45 mcg/actuation inhaler Inhale 1 to 2 puffs into the lungs every 4 to 6 hours as needed 15 g 6    levalbuterol (XOPENEX) 0.31 mg/3 mL nebulizer solution Take 3 mLs (0.31 mg total) by nebulization every 4 (four) hours as needed for Wheezing. Rescue 72 mL 3    levalbuterol (XOPENEX) 0.31 mg/3 mL nebulizer solution USE 1 UNIT DOSE IN NEBULIZER EVERY 4 HOURS AS NEEDED. 72 mL 6    linaCLOtide (LINZESS) 72 mcg Cap capsule Take one tablet by mouth daily as needed for constipation 30 capsule 2    lisinopriL (PRINIVIL,ZESTRIL) 5 MG tablet TAKE 1 TABLET BY MOUTH TWO TIMES A  "DAY 60 tablet 8    meclizine (ANTIVERT) 25 mg tablet Take 1 tablet (25 mg total) by mouth 2 (two) times daily as needed for Dizziness. 10 tablet 0    methocarbamoL (ROBAXIN) 750 MG Tab TAKE 1 TABLET BY MOUTH 3 TIMES DAILY. 30 tablet 0    metOLazone (ZAROXOLYN) 2.5 MG tablet TAKE 1 TABLET DAILY AS DIRECTED. 60 tablet 0    mometasone-formoterol (DULERA) 200-5 mcg/actuation inhaler INHALE 2 PUFFS AT 12 HOUR INTERVALS (MORNING AND EVENING). 13 g 3    montelukast (SINGULAIR) 10 mg tablet TAKE ONE TABLET BY MOUTH DAILY. 30 tablet 6    ondansetron (ZOFRAN-ODT) 4 MG TbDL Take 1 tablet (4 mg total) by mouth every 6 (six) hours as needed (nausea). 15 tablet 0    polyethylene glycol (GLYCOLAX) 17 gram/dose powder MIX 1 CAPFUL IN 8 OUNCES OF LIQUID AND DRINK ONCE DAILY as needed for constipation 510 g 6    potassium chloride (MICRO-K) 10 MEQ CpSR Take 2 capsules (20 mEq total) by mouth once daily. 60 capsule 11    spironolactone (ALDACTONE) 25 MG tablet Take 1 tablet (25 mg total) by mouth once daily. 90 tablet 3    tiotropium (SPIRIVA) 18 mcg inhalation capsule INHALE 1 CAPSULE EVERY DAY 30 capsule 6    traMADoL (ULTRAM) 50 mg tablet TAKE 1 TABLET EVERY 8 HOURS AS NEEDED. 12 tablet 0    triamcinolone acetonide 0.025% (KENALOG) 0.025 % cream APPLY SPARINGLY TO AFFECTED AREA(S) of face 2 TIMES DAILY. 15 g 1    warfarin (COUMADIN) 7.5 MG tablet Take 1 tablet by mouth every tuesday and take 1/2 tablet by mouth dailly on all other days - OR AS DIRECTED BY COUMADIN CLINIC 60 tablet 3    azelastine (ASTELIN) 137 mcg (0.1 %) nasal spray 2 sprays (274 mcg total) by Nasal route 2 (two) times daily. 30 mL 0    magnesium oxide (MAG-OX) 400 mg (241.3 mg magnesium) tablet Take 1 tablet (400 mg total) by mouth 2 (two) times daily. 180 tablet 3    pen needle, diabetic 32 gauge x 5/32" Ndle USE AS DIRECTED 5 TIMES DAILY 200 each 5     No current facility-administered medications for this visit.        Review of patient's " "allergies indicates:  No Known Allergies    Vitals:    08/20/20 1326   BP: 106/78   Pulse: 72   Weight: 103.1 kg (227 lb 3.2 oz)   Height: 5' 5" (1.651 m)   PainSc: 0-No pain       Objective:      Physical Exam  Constitutional:       General: She is not in acute distress.     Appearance: She is well-developed.   HENT:      Nose: Nose normal.   Eyes:      Conjunctiva/sclera: Conjunctivae normal.   Neck:      Musculoskeletal: Normal range of motion.   Pulmonary:      Effort: Pulmonary effort is normal.   Chest:      Chest wall: No tenderness.   Abdominal:      Tenderness: There is no abdominal tenderness.   Neurological:      Mental Status: She is alert and oriented to person, place, and time.   Psychiatric:         Behavior: Behavior normal.         Vascular: Distal DP/PT pulses palpable 1/4. CRT < 3 sec to tips of toes. Hair growth present LE, warm to touch LE  R foot: edema forefoot    Dermatologic: No open lesions, lacerations or wounds. Interdigital spaces clean, dry and intact.   R foot: serosanguinous fluid filled blister submet2 and plantar sulcus of 2nd IS. S/p wound debridement, deroofing. No deep wound/ulcer noted. Fibrotic wound base. Mild undermining, no sinus tract. No pus, no malodor.     Musculoskeletal: MMT 5/5 in DF/PF/Inv/Ev resistance with no reproduction of pain in any direction. Passive range of motion of ankle and pedal joints is painless. No calf tenderness LE, Compartments soft/compressible.    Neurological: Light touch, proprioception, and sharp/dull sensation are all intact. Protective threshold with the [place] monofilament is intact. Vibratory sensation intact.               Assessment:       Encounter Diagnoses   Name Primary?    Diabetic ulcer of right foot due to type 2 diabetes mellitus     Blister of right foot, initial encounter Yes    Type 2 diabetes mellitus with diabetic polyneuropathy, unspecified whether long term insulin use          Plan:       Luare was seen " today for foot problem and pcp visit.    Diagnoses and all orders for this visit:    Blister of right foot, initial encounter    Diabetic ulcer of right foot due to type 2 diabetes mellitus  -     X-Ray Foot Complete Right; Future    Type 2 diabetes mellitus with diabetic polyneuropathy, unspecified whether long term insulin use      I counseled the patient on her conditions, their implications and medical management.    49 y.o. female with R foot blister with open wound.    -rx. R foot xray: negative for infection.   -s/p R foot I and D. See procedure note. Tolerated well. No complication noted.  -recommend triple topical ointment. No sinus tract noted. No oral abx needed since there is no acute signs of infection.   -WBAt in sx shoe (partial WB to heel)  -The nature of the condition, options for management, as well as potential risks and complications were discussed in detail with patient. Patient was amenable to my recommendations and left my office fully informed and will follow up as instructed or sooner if necessary.    -Patient was advised of signs and symptoms of infection including redness, drainage, purulence, odor, streaking, fever, chills and I advised patient to seek medical attention (ER or urgent care) if these symptoms arise.   -f/u with Dr. Cormier (she already has an appointment made with Dr. Cormier in 2 weeks)    Note dictated with voice recognition software, please excuse any grammatical errors.

## 2020-08-20 NOTE — PROGRESS NOTES
INR not at goal - low and upon further questioning patient has been taking old dose with no warfarin on Friday. She c/o bruising and knots from LMWH injections but is willing to resume after explanation that she is still less than 4 weeks since DCCV thus higher risk of stroke high during this period. See calendar for adjustments to dose and follow up plan.

## 2020-08-24 NOTE — PROCEDURES
"Incision and Drainage    Date/Time: 8/20/2020 8:45 AM  Performed by: Kylee Whitehead DPM  Authorized by: Kylee Whitehead DPM     Time out: Immediately prior to procedure a "time out" was called to verify the correct patient, procedure, equipment, support staff and site/side marked as required.    Consent Done?:  Yes (Verbal)    Type:  Bulla  Body area:  Lower extremity  Location details:  Right foot  Risk factor:  Underlying major vessel  Scalpel size:  11  Incision type:  Single straight  Complexity:  Simple  Drainage:  Serosanguinous  Drainage amount:  Moderate  Wound treatment:  Incision and wound left open  Patient tolerance:  Patient tolerated the procedure well with no immediate complications    Timeout was performed w/ pt in the room       "

## 2020-08-26 NOTE — TELEPHONE ENCOUNTER
----- Message from Navi Jolly MD sent at 8/25/2020  4:32 PM CDT -----  Regarding: RE: Return to AF  Thanks.  Further treatment will depend on whether there was any symptomatic change while in [facility]R. She' seeing Monalisa Moody soon.  DPM  ----- Message -----  From: Claudia Freeman  Sent: 8/25/2020  12:33 PM CDT  To: Navi Jolly MD, Lashell Moore RN  Subject: Return to AF                                     Pt is post DCCV on 8/7/2020.  Recurrence of persistent atrial fibrillation is noted starting 8/23/2020.  Ventricular rates appear controlled.  BiV pacing 75%.    Thanks,  Claudia

## 2020-09-03 NOTE — PROGRESS NOTES
Subjective:      Patient ID: Laure Ross is a 49 y.o. female.    Chief Complaint: Diabetes Mellitus (Dr Mittal PCP) and Wound Check (right foot)      49 y.o. female presenting with left hallux healed blsiter. Known to Dr. Cormier at . Sees her every three months for DFC. Was not able to see her in her office due to her schedule. Denies pain. Some underlying numbness and tingling on b/l feet.     8/20/20 noticed blister on right foot. Noticed blister on Tuesday. Denies injury. Denies pain. Ambulating in open toe shoe. No previous treatments.     9/2/20: F/u right foot blister. Has been applying neosporin to area daily wrapping with bandage. No new issues.       Hemoglobin A1C   Date Value Ref Range Status   07/03/2019 6.9 (H) 4.0 - 5.6 % Final     Comment:     ADA Screening Guidelines:  5.7-6.4%  Consistent with prediabetes  >or=6.5%  Consistent with diabetes  High levels of fetal hemoglobin interfere with the HbA1C  assay. Heterozygous hemoglobin variants (HbS, HgC, etc)do  not significantly interfere with this assay.   However, presence of multiple variants may affect accuracy.     10/01/2018 7.4 (H) 4.0 - 5.6 % Final     Comment:     ADA Screening Guidelines:  5.7-6.4%  Consistent with prediabetes  >or=6.5%  Consistent with diabetes  High levels of fetal hemoglobin interfere with the HbA1C  assay. Heterozygous hemoglobin variants (HbS, HgC, etc)do  not significantly interfere with this assay.   However, presence of multiple variants may affect accuracy.     03/28/2018 6.2 (H) 4.0 - 5.6 % Final     Comment:     According to ADA guidelines, hemoglobin A1c <7.0% represents  optimal control in non-pregnant diabetic patients. Different  metrics may apply to specific patient populations.   Standards of Medical Care in Diabetes-2016.  For the purpose of screening for the presence of diabetes:  <5.7%     Consistent with the absence of diabetes  5.7-6.4%  Consistent with increasing risk for diabetes    (prediabetes)  >or=6.5%  Consistent with diabetes  Currently, no consensus exists for use of hemoglobin A1c  for diagnosis of diabetes for children.  This Hemoglobin A1c assay has significant interference with fetal   hemoglobin   (HbF). The results are invalid for patients with abnormal amounts of   HbF,   including those with known Hereditary Persistence   of Fetal Hemoglobin. Heterozygous hemoglobin variants (HbAS, HbAC,   HbAD, HbAE, HbA2) do not significantly interfere with this assay;   however, presence of multiple variants in a sample may impact the %   interference.         Review of Systems   Constitution: Negative for chills, decreased appetite, fever and malaise/fatigue.   HENT: Negative for congestion, ear discharge and sore throat.    Eyes: Negative for discharge and pain.   Cardiovascular: Negative for chest pain, claudication and leg swelling.   Respiratory: Negative for cough and shortness of breath.    Skin: Positive for dry skin and suspicious lesions. Negative for color change, nail changes and rash.   Musculoskeletal: Negative for arthritis, joint pain, joint swelling and muscle weakness.   Gastrointestinal: Negative for bloating, abdominal pain, diarrhea, nausea and vomiting.   Genitourinary: Negative for flank pain and hematuria.   Neurological: Positive for numbness and paresthesias. Negative for headaches and weakness.   Psychiatric/Behavioral: Negative for altered mental status.             Past Medical History:   Diagnosis Date    Anticoagulant long-term use     Arthritis     Asthma     Asthma     Atrial fibrillation     Cardiomyopathy     Cardiomyopathy     CHF (congestive heart failure)     CHF (congestive heart failure)     Chronic combined systolic and diastolic heart failure 6/3/2016    COPD (chronic obstructive pulmonary disease) 3/28/2018    GERD (gastroesophageal reflux disease)     H/O tubal ligation     Hypertension     Obesity     Renal disorder     Type 2  diabetes mellitus with hyperglycemia     Type 2 diabetes mellitus with polyneuropathy        Past Surgical History:   Procedure Laterality Date    CARDIAC DEFIBRILLATOR PLACEMENT      CARDIAC DEFIBRILLATOR PLACEMENT      CARDIAC DEFIBRILLATOR PLACEMENT      CATARACT EXTRACTION Left     CHOLECYSTECTOMY      COLONOSCOPY N/A 5/2/2016    Procedure: COLONOSCOPY;  Surgeon: Eugene Soto MD;  Location: [facility] ENDO ([address] [place] [address]);  Service: Endoscopy;  Laterality: N/A;    GALLBLADDER SURGERY      iabp  4/2016    TREATMENT OF CARDIAC ARRHYTHMIA N/A 6/12/2020    Procedure: CARDIOVERSION;  Surgeon: Navi Jolly MD;  Location: [facility] EP LAB;  Service: Cardiology;  Laterality: N/A;  AF, BRITTANEY, DCCV, MAC, DM, 3 Prep    TREATMENT OF CARDIAC ARRHYTHMIA N/A 8/7/2020    Procedure: Cardioversion or Defibrillation;  Surgeon: Navi Jolly MD;  Location: [facility] EP LAB;  Service: Cardiology;  Laterality: N/A;  AF, DCCV, MAC, DM, 3 Prep    TUBAL LIGATION         Family History   Problem Relation Age of Onset    Heart disease Mother     Heart disease Father        Social History     Socioeconomic History    Marital status:      Spouse name: Not on file    Number of children: Not on file    Years of education: Not on file    Highest education level: Not on file   Occupational History    Not on file   Social Needs    Financial resource strain: Not on file    Food insecurity     Worry: Not on file     Inability: Not on file    Transportation needs     Medical: Not on file     Non-medical: Not on file   Tobacco Use    Smoking status: Never Smoker    Smokeless tobacco: Never Used   Substance and Sexual Activity    Alcohol use: No    Drug use: No    Sexual activity: Yes     Partners: Male   Lifestyle    Physical activity     Days per week: Not on file     Minutes per session: Not on file    Stress: Not on file   Relationships    Social connections     Talks on phone: Not on file     Gets together: Not on file     " Attends [employer] service: Not on file     Active member of club or organization: Not on file     Attends meetings of clubs or organizations: Not on file     Relationship status: Not on file   Other Topics Concern    Not on file   Social History Narrative    Not on file       Current Outpatient Medications   Medication Sig Dispense Refill    acetaminophen (TYLENOL) 650 MG TbSR TAKE 1 TABLET 3  TIMES DAILY for chest wall pain 30 tablet 0    allopurinoL (ZYLOPRIM) 100 MG tablet TAKE 2 TABLETS by mouth EVERY  tablet 3    amiodarone (PACERONE) 200 MG Tab Take 400 mg ( 2 tablets ) twice daily for 2 weeks , then 400 mg ( 2 tablets ) once daily for 2 weeks ,  then reduce to 200 mg  ( 1 tablet) once daily thereafter.    60 tablet 6    atorvastatin (LIPITOR) 20 MG tablet Take 1 tablet (20 mg total) by mouth once daily. 90 tablet 1    BD ULTRA-FINE ESSENCE PEN NEEDLE 32 gauge x 5/32" Ndle Use 5 times per day as directed 200 each 11    blood sugar diagnostic Strp Uses 4 times a day, true metrix meter. 150 each 11    budesonide-formoterol 160-4.5 mcg (SYMBICORT) 160-4.5 mcg/actuation HFAA INHALE 2 PUFFS TWICE DAILY. RINSE MOUTH AFTER USE. 10.2 g 6    cetirizine (ZYRTEC) 10 MG tablet Take 1 tablet (10 mg total) by mouth at bedtime for allergy relief 30 tablet 3    clotrimazole (LOTRIMIN) 1 % cream APPLY SPARINGLY TO AFFECTED AREA(S) in groin TWICE DAILY for one to two weeks 45 g 0    cycloSPORINE (RESTASIS) 0.05 % ophthalmic emulsion Apply 1 drop to eye.      diclofenac sodium (VOLTAREN) 1 % Gel APPLY 2 grams SPARINGLY TO Knees ONCE DAILY 100 g 3    digoxin (LANOXIN) 125 mcg tablet Take 1 tablet (125 mcg total) by mouth once daily. 90 tablet 3    dulaglutide (TRULICITY) 1.5 mg/0.5 mL pen injector INJECT 1.5 mg under the skin weekly 2 mL 11    dupilumab (DUPIXENT) 300 mg/2 mL Syrg Inject 2 mLs (300 mg total) into the skin every 14 (fourteen) days. 4 mL 11    EPINEPHrine (EPIPEN) 0.3 mg/0.3 mL AtIn INJECT " 0.3ML IN THE MUSCLE AS DIRECTED FOR ANAPHYLAXIS 0.6 mL 1    famotidine (PEPCID) 40 MG tablet TAKE 1 TABLET TWICE DAILY. 180 tablet 3    fluticasone propionate (FLONASE) 50 mcg/actuation nasal spray 2 sprays (100 mcg total) by Each Nostril route once daily. 15 g 0    fluticasone propionate (FLOVENT HFA) 110 mcg/actuation inhaler Inhale 2 puffs into the lungs 2 (two) times daily. Rinse mouth after use 12 g 3    furosemide (LASIX) 40 MG tablet Take 2 tablets (80 mg total) by mouth 2 (two) times daily. 360 tablet 3    hydrocortisone 2.5 % ointment Apply to affected area on the face two times daily as neeeded. 28.35 g 1    insulin (BASAGLAR KWIKPEN U-100 INSULIN) glargine 100 units/mL (3mL) SubQ pen inject 34 under the skin in the morning and 34 units at night. 30 mL 6    insulin aspart U-100 (NOVOLOG) 100 unit/mL (3 mL) InPn pen Inject 22 units into the skin with meals plus scale 45 mL 6    insulin aspart U-100 (NOVOLOG) 100 unit/mL (3 mL) InPn pen Inject 16 units under the skin with meals plus scale 150-200+2, 201-250+4, 251-300+6, 301-350+8, >350+10, max daily 78 units. 30 mL 6    ketoconazole (NIZORAL) 2 % cream APPLY SPARINGLY TO AFFECTED AREA(S) ONCE DAILY 30 g 2    ketoconazole (NIZORAL) 2 % shampoo APPLY TO SKIN EVERY DAY FOR 5 MINUTES AND RINSE FOR ABOUT 1 TO 2 WEEKS 120 mL 1    lancets 30 gauge Misc use 4 times a day 100 each 6    levalbuterol (XOPENEX HFA) 45 mcg/actuation inhaler Inhale 1 to 2 puffs into the lungs every 4 to 6 hours as needed 15 g 6    levalbuterol (XOPENEX) 0.31 mg/3 mL nebulizer solution Take 3 mLs (0.31 mg total) by nebulization every 4 (four) hours as needed for Wheezing. Rescue 72 mL 3    levalbuterol (XOPENEX) 0.31 mg/3 mL nebulizer solution USE 1 UNIT DOSE IN NEBULIZER EVERY 4 HOURS AS NEEDED. 72 mL 6    linaCLOtide (LINZESS) 72 mcg Cap capsule Take one tablet by mouth daily as needed for constipation 30 capsule 2    lisinopriL (PRINIVIL,ZESTRIL) 5 MG tablet TAKE 1  "TABLET BY MOUTH TWO TIMES A DAY 60 tablet 8    magnesium oxide (MAG-OX) 400 mg (241.3 mg magnesium) tablet Take 1 tablet (400 mg total) by mouth 2 (two) times daily. 180 tablet 3    meclizine (ANTIVERT) 25 mg tablet Take 1 tablet (25 mg total) by mouth 2 (two) times daily as needed for Dizziness. 10 tablet 0    methocarbamoL (ROBAXIN) 750 MG Tab TAKE 1 TABLET BY MOUTH 3 TIMES DAILY. 30 tablet 0    metOLazone (ZAROXOLYN) 2.5 MG tablet TAKE 1 TABLET DAILY AS DIRECTED. 60 tablet 0    mometasone-formoterol (DULERA) 200-5 mcg/actuation inhaler INHALE 2 PUFFS AT 12 HOUR INTERVALS (MORNING AND EVENING). 13 g 3    montelukast (SINGULAIR) 10 mg tablet TAKE ONE TABLET BY MOUTH DAILY. 30 tablet 6    ondansetron (ZOFRAN-ODT) 4 MG TbDL Take 1 tablet (4 mg total) by mouth every 6 (six) hours as needed (nausea). 15 tablet 0    polyethylene glycol (GLYCOLAX) 17 gram/dose powder MIX 1 CAPFUL IN 8 OUNCES OF LIQUID AND DRINK ONCE DAILY as needed for constipation 510 g 6    potassium chloride (MICRO-K) 10 MEQ CpSR Take 2 capsules (20 mEq total) by mouth once daily. 60 capsule 11    spironolactone (ALDACTONE) 25 MG tablet Take 1 tablet (25 mg total) by mouth once daily. 90 tablet 3    tiotropium (SPIRIVA) 18 mcg inhalation capsule INHALE 1 CAPSULE EVERY DAY 30 capsule 6    traMADoL (ULTRAM) 50 mg tablet TAKE 1 TABLET EVERY 8 HOURS AS NEEDED. 12 tablet 0    triamcinolone acetonide 0.025% (KENALOG) 0.025 % cream APPLY SPARINGLY TO AFFECTED AREA(S) of face 2 TIMES DAILY. 15 g 1    warfarin (COUMADIN) 7.5 MG tablet Take 1 tablet by mouth every tuesday and take 1/2 tablet by mouth dailly on all other days - OR AS DIRECTED BY COUMADIN CLINIC 60 tablet 3    azelastine (ASTELIN) 137 mcg (0.1 %) nasal spray 2 sprays (274 mcg total) by Nasal route 2 (two) times daily. 30 mL 0    pen needle, diabetic 32 gauge x 5/32" Ndle USE AS DIRECTED 5 TIMES DAILY 200 each 5     No current facility-administered medications for this visit.  " "      Review of patient's allergies indicates:  No Known Allergies    Vitals:    09/02/20 0948   BP: 110/78   Weight: 103.1 kg (227 lb 4.7 oz)   Height: 5' 5" (1.651 m)   PainSc: 0-No pain       Objective:      Physical Exam  Constitutional:       General: She is not in acute distress.     Appearance: She is well-developed.   HENT:      Nose: Nose normal.   Eyes:      Conjunctiva/sclera: Conjunctivae normal.   Neck:      Musculoskeletal: Normal range of motion.   Pulmonary:      Effort: Pulmonary effort is normal.   Chest:      Chest wall: No tenderness.   Abdominal:      Tenderness: There is no abdominal tenderness.   Neurological:      Mental Status: She is alert and oriented to person, place, and time.   Psychiatric:         Behavior: Behavior normal.         Vascular: Distal DP/PT pulses palpable 1/4. CRT < 3 sec to tips of toes. Hair growth present LE, warm to touch LE  R foot: edema forefoot    Dermatologic: No open lesions, lacerations or wounds. Interdigital spaces clean, dry and intact.   R foot: serosanguinous fluid filled blister submet2 and plantar sulcus of 2nd IS. S/p wound debridement, deroofing. No deep wound/ulcer noted. Fibrotic wound base. Mild undermining, no sinus tract. No pus, no malodor.     Musculoskeletal: MMT 5/5 in DF/PF/Inv/Ev resistance with no reproduction of pain in any direction. Passive range of motion of ankle and pedal joints is painless. No calf tenderness LE, Compartments soft/compressible.    Neurological: Light touch, proprioception, and sharp/dull sensation are all intact. Protective threshold with the [place]ienstein monofilament is intact. Vibratory sensation intact.     9/2/20: Healed right plantar foot with fragile epithelium.     Pre callus trimming       Post callus trimming.             8/20/20            Assessment:       Encounter Diagnoses   Name Primary?    Type 2 diabetes mellitus with diabetic polyneuropathy, unspecified whether long term insulin use Yes    " Pre-ulcerative calluses          Plan:       Laure was seen today for diabetes mellitus and wound check.    Diagnoses and all orders for this visit:    Type 2 diabetes mellitus with diabetic polyneuropathy, unspecified whether long term insulin use    Pre-ulcerative calluses      I counseled the patient on her conditions, their implications and medical management.      - Shoe inspection. Diabetic Foot Education. Patient reminded of the importance of good nutrition and blood sugar control to help prevent podiatric complications of diabetes. Patient instructed on proper foot hygeine. We discussed wearing proper shoe gear, daily foot inspections, never walking without protective shoe gear, caution putting sharp instruments to feet     - Discussed DM foot care:  Wear comfortable, proper fitting shoes. Wash feet daily. Dry well. After drying, apply moisturizer to feet (no lotion to webspaces). Inspect feet daily for skin breaks, blisters, swelling, or redness. Wear cotton socks (preferably white)  Change socks every day. Do NOT walk barefoot. Do NOT use heating pads or warm/hot water soaks     With the patient's verbal consent a sterile #15 scalpel was used to trim the hyperkeratotic lesion described above. She tolerated the procedure well without complication.   Instructed on daily betadine application, offloading pad application. Cover with bandaid.   F/u 2 weeks.

## 2020-09-04 NOTE — TELEPHONE ENCOUNTER
Spoke with patient stated to her that  did approve of her coming to  some U- shape pads. Stated to patient that she will be able to pick it up from the , patient did verbally understand.
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
## 2020-09-18 NOTE — TELEPHONE ENCOUNTER
Pt now rescheduled  ----- Message from Betty Moody NP sent at 9/18/2020  9:23 AM CDT -----  Regarding: RE: Later time Appointment  Request  I can't double up today because I have to go to [facility] as soon as I am done. It's going to be tight already. Sorry.  ----- Message -----  From: Yasmin Gupta MA  Sent: 9/18/2020   9:02 AM CDT  To: Betty Moody NP  Subject: FW: Later time Appointment  Request              Would you be ok with seeing pt at 1? She is originally your 9:00am pt   ----- Message -----  From: Olivia Wolfe  Sent: 9/18/2020   7:49 AM CDT  To: [facility] Staff  Subject: Later time Appointment  Request                  Type   Later Time Appointment Request    Name of Caller:patient need to speak with nurse.  Patient has appointment for 9am this morning   Patient states need appointment for 1pm today along with EKG for today  When is the first available appointment?  Symptoms:  Best Call Back Number:[phone]  Additional Information:

## 2020-09-20 NOTE — PROGRESS NOTES
Subjective:      Patient ID: Laure Ross is a 49 y.o. female.    Chief Complaint: Diabetes Mellitus (Dr Mittal PCP) and Foot Ulcer      49 y.o. female presenting with left hallux healed blsiter. Known to Dr. Cormier at . Sees her every three months for DFC. Was not able to see her in her office due to her schedule. Denies pain. Some underlying numbness and tingling on b/l feet.     8/20/20 noticed blister on right foot. Noticed blister on Tuesday. Denies injury. Denies pain. Ambulating in open toe shoe. No previous treatments.     9/2/20: F/u right foot blister. Has been applying neosporin to area daily wrapping with bandage. No new issues.     9/17/20: F/u right foot blister. Reports pain improved. Applying offloading pads which help with pain per patient.       Hemoglobin A1C   Date Value Ref Range Status   07/03/2019 6.9 (H) 4.0 - 5.6 % Final     Comment:     ADA Screening Guidelines:  5.7-6.4%  Consistent with prediabetes  >or=6.5%  Consistent with diabetes  High levels of fetal hemoglobin interfere with the HbA1C  assay. Heterozygous hemoglobin variants (HbS, HgC, etc)do  not significantly interfere with this assay.   However, presence of multiple variants may affect accuracy.     10/01/2018 7.4 (H) 4.0 - 5.6 % Final     Comment:     ADA Screening Guidelines:  5.7-6.4%  Consistent with prediabetes  >or=6.5%  Consistent with diabetes  High levels of fetal hemoglobin interfere with the HbA1C  assay. Heterozygous hemoglobin variants (HbS, HgC, etc)do  not significantly interfere with this assay.   However, presence of multiple variants may affect accuracy.     03/28/2018 6.2 (H) 4.0 - 5.6 % Final     Comment:     According to ADA guidelines, hemoglobin A1c <7.0% represents  optimal control in non-pregnant diabetic patients. Different  metrics may apply to specific patient populations.   Standards of Medical Care in Diabetes-2016.  For the purpose of screening for the presence of diabetes:  <5.7%      Consistent with the absence of diabetes  5.7-6.4%  Consistent with increasing risk for diabetes   (prediabetes)  >or=6.5%  Consistent with diabetes  Currently, no consensus exists for use of hemoglobin A1c  for diagnosis of diabetes for children.  This Hemoglobin A1c assay has significant interference with fetal   hemoglobin   (HbF). The results are invalid for patients with abnormal amounts of   HbF,   including those with known Hereditary Persistence   of Fetal Hemoglobin. Heterozygous hemoglobin variants (HbAS, HbAC,   HbAD, HbAE, HbA2) do not significantly interfere with this assay;   however, presence of multiple variants in a sample may impact the %   interference.         Review of Systems   Constitution: Negative for chills, decreased appetite, fever and malaise/fatigue.   HENT: Negative for congestion, ear discharge and sore throat.    Eyes: Negative for discharge and pain.   Cardiovascular: Negative for chest pain, claudication and leg swelling.   Respiratory: Negative for cough and shortness of breath.    Skin: Positive for dry skin and suspicious lesions. Negative for color change, nail changes and rash.   Musculoskeletal: Negative for arthritis, joint pain, joint swelling and muscle weakness.   Gastrointestinal: Negative for bloating, abdominal pain, diarrhea, nausea and vomiting.   Genitourinary: Negative for flank pain and hematuria.   Neurological: Positive for numbness and paresthesias. Negative for headaches and weakness.   Psychiatric/Behavioral: Negative for altered mental status.             Past Medical History:   Diagnosis Date    Anticoagulant long-term use     Arthritis     Asthma     Asthma     Atrial fibrillation     Cardiomyopathy     Cardiomyopathy     CHF (congestive heart failure)     CHF (congestive heart failure)     Chronic combined systolic and diastolic heart failure 6/3/2016    COPD (chronic obstructive pulmonary disease) 3/28/2018    GERD (gastroesophageal reflux  disease)     H/O tubal ligation     Hypertension     Obesity     Renal disorder     Type 2 diabetes mellitus with hyperglycemia     Type 2 diabetes mellitus with polyneuropathy        Past Surgical History:   Procedure Laterality Date    CARDIAC DEFIBRILLATOR PLACEMENT      CARDIAC DEFIBRILLATOR PLACEMENT      CARDIAC DEFIBRILLATOR PLACEMENT      CATARACT EXTRACTION Left     CHOLECYSTECTOMY      COLONOSCOPY N/A 5/2/2016    Procedure: COLONOSCOPY;  Surgeon: Eugene Soto MD;  [facility]  Service: Endoscopy;  Laterality: N/A;    GALLBLADDER SURGERY      iabp  4/2016    TREATMENT OF CARDIAC ARRHYTHMIA N/A 6/12/2020    Procedure: CARDIOVERSION;  Surgeon: Navi Jolly MD;  Location: [facility] EP LAB;  Service: Cardiology;  Laterality: N/A;  AF, BRITTANEY, DCCV, MAC, DM, 3 Prep    TREATMENT OF CARDIAC ARRHYTHMIA N/A 8/7/2020    Procedure: Cardioversion or Defibrillation;  Surgeon: Navi Jolly MD;  Location: [facility] EP LAB;  Service: Cardiology;  Laterality: N/A;  AF, DCCV, MAC, DM, 3 Prep    TUBAL LIGATION         Family History   Problem Relation Age of Onset    Heart disease Mother     Heart disease Father        Social History     Socioeconomic History    Marital status:      Spouse name: Not on file    Number of children: Not on file    Years of education: Not on file    Highest education level: Not on file   Occupational History    Not on file   Social Needs    Financial resource strain: Not on file    Food insecurity     Worry: Not on file     Inability: Not on file    Transportation needs     Medical: Not on file     Non-medical: Not on file   Tobacco Use    Smoking status: Never Smoker    Smokeless tobacco: Never Used   Substance and Sexual Activity    Alcohol use: No    Drug use: No    Sexual activity: Yes     Partners: Male   Lifestyle    Physical activity     Days per week: Not on file     Minutes per session: Not on file    Stress: Not on file  "  Relationships    Social connections     Talks on phone: Not on file     Gets together: Not on file     Attends Muslim service: Not on file     Active member of club or organization: Not on file     Attends meetings of clubs or organizations: Not on file     Relationship status: Not on file   Other Topics Concern    Not on file   Social History Narrative    Not on file       Current Outpatient Medications   Medication Sig Dispense Refill    acetaminophen (TYLENOL) 650 MG TbSR TAKE 1 TABLET 3  TIMES DAILY for chest wall pain 30 tablet 0    allopurinoL (ZYLOPRIM) 100 MG tablet TAKE 2 TABLETS by mouth EVERY  tablet 3    amiodarone (PACERONE) 200 MG Tab Take 400 mg ( 2 tablets ) twice daily for 2 weeks , then 400 mg ( 2 tablets ) once daily for 2 weeks ,  then reduce to 200 mg  ( 1 tablet) once daily thereafter.    60 tablet 6    atorvastatin (LIPITOR) 20 MG tablet Take 1 tablet (20 mg total) by mouth once daily. 90 tablet 1    BD ULTRA-FINE ESSENCE PEN NEEDLE 32 gauge x 5/32" Ndle Use 5 times per day as directed 200 each 11    blood sugar diagnostic Strp Uses 4 times a day, true metrix meter. 150 each 11    cetirizine (ZYRTEC) 10 MG tablet Take 1 tablet (10 mg total) by mouth at bedtime for allergy relief 30 tablet 3    clotrimazole (LOTRIMIN) 1 % cream APPLY SPARINGLY TO AFFECTED AREA(S) in groin TWICE DAILY for one to two weeks 45 g 0    cycloSPORINE (RESTASIS) 0.05 % ophthalmic emulsion Apply 1 drop to eye.      diclofenac sodium (VOLTAREN) 1 % Gel APPLY 2 grams SPARINGLY TO Knees ONCE DAILY 100 g 3    digoxin (LANOXIN) 125 mcg tablet Take 1 tablet (125 mcg total) by mouth once daily. 90 tablet 3    dulaglutide (TRULICITY) 1.5 mg/0.5 mL pen injector INJECT 1.5 mg under the skin weekly 2 mL 11    dupilumab (DUPIXENT) 300 mg/2 mL Syrg Inject 2 mLs (300 mg total) into the skin every 14 (fourteen) days. 4 mL 11    EPINEPHrine (EPIPEN) 0.3 mg/0.3 mL AtIn INJECT 0.3ML IN THE MUSCLE AS DIRECTED FOR " ANAPHYLAXIS 0.6 mL 1    famotidine (PEPCID) 40 MG tablet TAKE 1 TABLET TWICE DAILY. 180 tablet 3    fluocinolone and shower cap 0.01 % Oil Apply to affected area of scalp once daily 118.28 mL 2    fluticasone propionate (FLONASE) 50 mcg/actuation nasal spray 2 sprays (100 mcg total) by Each Nostril route once daily. 15 g 0    fluticasone propionate (FLOVENT HFA) 110 mcg/actuation inhaler Inhale 2 puffs into the lungs 2 (two) times daily. Rinse mouth after use 12 g 3    furosemide (LASIX) 40 MG tablet Take 2 tablets (80 mg total) by mouth 2 (two) times daily. 360 tablet 3    hydrocortisone 2.5 % ointment Apply to affected area of fave twice daily as needed 28.35 g 2    insulin (BASAGLAR KWIKPEN U-100 INSULIN) glargine 100 units/mL (3mL) SubQ pen inject 34 under the skin in the morning and 34 units at night. 30 mL 6    insulin aspart U-100 (NOVOLOG) 100 unit/mL (3 mL) InPn pen Inject 22 units into the skin with meals plus scale 45 mL 6    insulin aspart U-100 (NOVOLOG) 100 unit/mL (3 mL) InPn pen Inject 16 units under the skin with meals plus scale 150-200+2, 201-250+4, 251-300+6, 301-350+8, >350+10, max daily 78 units. 30 mL 6    ketoconazole (NIZORAL) 2 % cream APPLY SPARINGLY TO AFFECTED AREA(S) ONCE DAILY 30 g 2    ketoconazole (NIZORAL) 2 % shampoo APPLY TO SKIN EVERY DAY FOR 5 MINUTES AND RINSE FOR ABOUT 1 TO 2 WEEKS 120 mL 1    lancets 30 gauge Misc use 4 times a day 100 each 6    levalbuterol (XOPENEX HFA) 45 mcg/actuation inhaler Inhale 1 to 2 puffs into the lungs every 4 to 6 hours as needed 15 g 6    levalbuterol (XOPENEX) 0.31 mg/3 mL nebulizer solution USE 1 UNIT DOSE IN NEBULIZER EVERY 4 HOURS AS NEEDED. 72 mL 6    linaCLOtide (LINZESS) 72 mcg Cap capsule Take one tablet by mouth daily as needed for constipation 30 capsule 2    lisinopriL (PRINIVIL,ZESTRIL) 5 MG tablet TAKE 1 TABLET BY MOUTH TWO TIMES A DAY 60 tablet 8    magnesium oxide (MAG-OX) 400 mg (241.3 mg magnesium) tablet Take 1  tablet (400 mg total) by mouth 2 (two) times daily. 180 tablet 3    meclizine (ANTIVERT) 25 mg tablet Take 1 tablet (25 mg total) by mouth 2 (two) times daily as needed for Dizziness. 10 tablet 0    methocarbamoL (ROBAXIN) 750 MG Tab TAKE 1 TABLET BY MOUTH 3 TIMES DAILY. 30 tablet 0    metOLazone (ZAROXOLYN) 2.5 MG tablet TAKE 1 TABLET DAILY AS DIRECTED. 60 tablet 0    mometasone-formoterol (DULERA) 200-5 mcg/actuation inhaler INHALE 2 PUFFS AT 12 HOUR INTERVALS (MORNING AND EVENING). 13 g 3    montelukast (SINGULAIR) 10 mg tablet TAKE ONE TABLET BY MOUTH DAILY. 30 tablet 6    ondansetron (ZOFRAN-ODT) 4 MG TbDL Take 1 tablet (4 mg total) by mouth every 6 (six) hours as needed (nausea). 15 tablet 0    polyethylene glycol (GLYCOLAX) 17 gram/dose powder MIX 1 CAPFUL IN 8 OUNCES OF LIQUID AND DRINK ONCE DAILY as needed for constipation 510 g 6    potassium chloride (MICRO-K) 10 MEQ CpSR Take 2 capsules (20 mEq total) by mouth once daily. 60 capsule 11    spironolactone (ALDACTONE) 25 MG tablet Take 1 tablet (25 mg total) by mouth once daily. 90 tablet 3    tiotropium (SPIRIVA) 18 mcg inhalation capsule INHALE 1 CAPSULE BY MOUTH EVERY DAY 30 capsule 6    traMADoL (ULTRAM) 50 mg tablet TAKE 1 TABLET EVERY 8 HOURS AS NEEDED. 12 tablet 0    triamcinolone acetonide 0.025% (KENALOG) 0.025 % cream APPLY SPARINGLY TO AFFECTED AREA(S) of face 2 TIMES DAILY. 15 g 1    warfarin (COUMADIN) 7.5 MG tablet Take 1 tablet by mouth every tuesday and take 1/2 tablet by mouth dailly on all other days - OR AS DIRECTED BY COUMADIN CLINIC 60 tablet 3    allopurinoL (ZYLOPRIM) 100 MG tablet TAKE 2 TABLETS EVERY DAY. 180 tablet 3    azelastine (ASTELIN) 137 mcg (0.1 %) nasal spray 2 sprays (274 mcg total) by Nasal route 2 (two) times daily. 30 mL 0    budesonide-formoterol 160-4.5 mcg (SYMBICORT) 160-4.5 mcg/actuation HFAA INHALE 2 PUFFS TWICE DAILY. RINSE MOUTH AFTER USE. 10.2 g 6    hydrocortisone 2.5 % ointment Apply to  "affected area on the face two times daily as neeeded. 28.35 g 1    levalbuterol (XOPENEX) 0.31 mg/3 mL nebulizer solution Take 3 mLs (0.31 mg total) by nebulization every 4 (four) hours as needed for Wheezing. Rescue 72 mL 3    pen needle, diabetic 32 gauge x 5/32" Ndle USE AS DIRECTED 5 TIMES DAILY 200 each 5     No current facility-administered medications for this visit.        Review of patient's allergies indicates:  No Known Allergies    Vitals:    09/17/20 0934   Weight: 103.1 kg (227 lb 4.7 oz)   Height: 5' 5" (1.651 m)   PainSc: 0-No pain       Objective:      Physical Exam  Constitutional:       General: She is not in acute distress.     Appearance: She is well-developed.   HENT:      Nose: Nose normal.   Eyes:      Conjunctiva/sclera: Conjunctivae normal.   Neck:      Musculoskeletal: Normal range of motion.   Pulmonary:      Effort: Pulmonary effort is normal.   Chest:      Chest wall: No tenderness.   Abdominal:      Tenderness: There is no abdominal tenderness.   Musculoskeletal:      Comments: Decreased stride, station of gait.  apropulsive toe off.  Increased angle and base of gait.      Patient has hammertoes of digits 2-5 bilateral partially reducible without symptom today.     Visible and palpable bunion without pain at dorsomedial 1st metatarsal head right and left.  Hallux abducted right and left partially reducible, tracks laterally without being track bound.  No ecchymosis, erythema, edema, or cardinal signs infection or signs of trauma same foot.        Neurological:      Mental Status: She is alert and oriented to person, place, and time.   Psychiatric:         Behavior: Behavior normal.         Vascular: Distal DP/PT pulses palpable 1/4. CRT < 3 sec to tips of toes. Hair growth present LE, warm to touch LE  R foot: edema forefoot    Dermatologic: No open lesions, lacerations or wounds. Interdigital spaces clean, dry and intact.   R foot: serosanguinous fluid filled blister submet2 and " plantar sulcus of 2nd IS. S/p wound debridement, deroofing. No deep wound/ulcer noted. Fibrotic wound base. Mild undermining, no sinus tract. No pus, no malodor.     Musculoskeletal: MMT 5/5 in DF/PF/Inv/Ev resistance with no reproduction of pain in any direction. Passive range of motion of ankle and pedal joints is painless. No calf tenderness LE, Compartments soft/compressible.    Neurological: Light touch, proprioception, and sharp/dull sensation are all intact. Protective threshold with the Moriah-Wienstein monofilament is intact. Vibratory sensation intact.       9/17/20:  Healed right plantar foot ulceration.           9/2/20: Healed right plantar foot with fragile epithelium.     Pre callus trimming       Post callus trimming.             8/20/20            Assessment:       Encounter Diagnoses   Name Primary?    Type 2 diabetes mellitus with diabetic polyneuropathy, unspecified whether long term insulin use Yes    Pre-ulcerative calluses     Hammer toes of both feet          Plan:       Laure was seen today for diabetes mellitus and foot ulcer.    Diagnoses and all orders for this visit:    Type 2 diabetes mellitus with diabetic polyneuropathy, unspecified whether long term insulin use  -     DIABETIC SHOES FOR HOME USE    Pre-ulcerative calluses  -     DIABETIC SHOES FOR HOME USE    Hammer toes of both feet  -     DIABETIC SHOES FOR HOME USE      I counseled the patient on her conditions, their implications and medical management.    With the patient's verbal consent a sterile #15 scalpel was used to trim the hyperkeratotic lesion described above. She tolerated the procedure well without complication.    Healed right plantar foot ulceration.    - Shoe inspection. Diabetic Foot Education. Patient reminded of the importance of good nutrition and blood sugar control to help prevent podiatric complications of diabetes. Patient instructed on proper foot hygeine. We discussed wearing proper shoe gear, daily  foot inspections, never walking without protective shoe gear, caution putting sharp instruments to feet     - Discussed DM foot care:  Wear comfortable, proper fitting shoes. Wash feet daily. Dry well. After drying, apply moisturizer to feet (no lotion to webspaces). Inspect feet daily for skin breaks, blisters, swelling, or redness. Wear cotton socks (preferably white)  Change socks every day. Do NOT walk barefoot. Do NOT use heating pads or warm/hot water soaks     Rx diabetic shoes with custom molded inserts to be worn at all times while ambulating. Prescription provided with list of local retailers.     F/u 9 weeks.

## 2020-09-22 PROBLEM — Z95.810 PRESENCE OF CARDIAC RESYNCHRONIZATION THERAPY DEFIBRILLATOR (CRT-D): Status: ACTIVE | Noted: 2020-01-01

## 2020-09-22 NOTE — PROGRESS NOTES
Ms. Ross is a patient of Dr. Jolly and was last seen in clinic 7/21/2020.      Subjective:   Patient ID:  Laure Ross is a 49 y.o. female who presents for follow-up of Cardiomyopathy  .     HPI:    Ms. Ross is a 49 y.o. female with NICM (EF 10-20%), CRT-D (2017), pAF, HTN, DM, HLD, CLARENCE, asthma here for follow up after cardioversion.    Background:    NICM 10-20%. R sided dual-chamber ICD -> biV upgrade 5/17.  pHTN  PAF, on amio since BRITTANEY/DCCV 4/16  HTN DM HL CLARENCE asthma    ADHF 2/16, requiring IABP. Had home  for a while; now off.   OHT noncandidate per Dr Jules. She has continued to have NYHA II-III sx, but much better since hospital d/c and better yet since biV upgrade 5/17.  While long term use of amiodarone is not idea in a patient her age, her history of CHF and her pre-CRT LBBB limit her options.  Cleared by GI to restart amio. LFTs normal at last check.  Plan DCCV and start amio. To consider restarting BB if % V pacing remains low in NSR. Of note, did have problem with BB due to asthma in past.  6/12/2020 Cardioversion was successfully performed with restoration of normal sinus rhythm. Amiodarone load started.  Clinic visit 7/21/2020: Unfortunately has been in AF since one week after procedure. Plan to repeat cardioversion now that patient has been loaded on amiodarone. BiV pacing down to 63%, EF down to 13%. Patient is fatigued. On coumadin. INRs have been in range. Plan for DCCV now that patient fully loaded on amiodarone.    Update (09/22/2020):    Pt is post DCCV on 8/7/2020.   8/25/2020: Recurrence of persistent atrial fibrillation is noted starting 8/23/2020. BiV pacing 75%.     Today she is reporting symptom improvement. Reduced WEBBER and increased activity tolerance. Denies CP, palps, LH, syncope. Last AF episode 8/25/2020. BiV pacing up to 83%    She is currently taking warfarin for stroke prophylaxis and denies significant bleeding episodes. She is currently being treated with amiodarone  "200mg daily for rhythm control.  Kidney function is stable, with a creatinine of 1.5 on 8/5/2020. LFTs and TSH WNL.    Device Interrogation (9/22/2020 - in clinic) reveals an intrinsic ASVP with stable lead and device function. No AF since 8/25/2020. She paces 0% in the RA and 83% in the BiV. Estimated battery longevity 6.5 years.     I have personally reviewed the patient's EKG today, which shows ASVP at 85bpm. MA interval is 130. QRS is 168. QTc is 533.    Recent Cardiac Tests:    BRITTANEY (8/7/2020):  · Mild left ventricular enlargement.  · Severely decreased left ventricular systolic function. The estimated ejection fraction is 15%.  · Normal right ventricular systolic function.  · Mild tricuspid regurgitation.  · Normal appearing left atrial appendage. No thrombus is present in the appendage. Normal appendage velocities.    Current Outpatient Medications   Medication Sig    acetaminophen (TYLENOL) 650 MG TbSR TAKE 1 TABLET 3  TIMES DAILY for chest wall pain    allopurinoL (ZYLOPRIM) 100 MG tablet TAKE 2 TABLETS by mouth EVERY DAY    allopurinoL (ZYLOPRIM) 100 MG tablet TAKE 2 TABLETS EVERY DAY.    amiodarone (PACERONE) 200 MG Tab Take 400 mg ( 2 tablets ) twice daily for 2 weeks , then 400 mg ( 2 tablets ) once daily for 2 weeks ,  then reduce to 200 mg  ( 1 tablet) once daily thereafter.       atorvastatin (LIPITOR) 20 MG tablet Take 1 tablet (20 mg total) by mouth once daily.    BD ULTRA-FINE ESSENCE PEN NEEDLE 32 gauge x 5/32" Ndle Use 5 times per day as directed    blood sugar diagnostic Strp Uses 4 times a day, true metrix meter.    budesonide-formoterol 160-4.5 mcg (SYMBICORT) 160-4.5 mcg/actuation HFAA INHALE 2 PUFFS TWICE DAILY. RINSE MOUTH AFTER USE.    cetirizine (ZYRTEC) 10 MG tablet Take 1 tablet (10 mg total) by mouth at bedtime for allergy relief    clotrimazole (LOTRIMIN) 1 % cream APPLY SPARINGLY TO AFFECTED AREA(S) in groin TWICE DAILY for one to two weeks    cycloSPORINE (RESTASIS) 0.05 % " ophthalmic emulsion Apply 1 drop to eye.    diclofenac sodium (VOLTAREN) 1 % Gel APPLY 2 grams SPARINGLY TO Knees ONCE DAILY    digoxin (LANOXIN) 125 mcg tablet Take 1 tablet (125 mcg total) by mouth once daily.    dulaglutide (TRULICITY) 1.5 mg/0.5 mL pen injector INJECT 1.5 mg under the skin weekly    dupilumab (DUPIXENT) 300 mg/2 mL Syrg Inject 2 mLs (300 mg total) into the skin every 14 (fourteen) days.    EPINEPHrine (EPIPEN) 0.3 mg/0.3 mL AtIn INJECT 0.3ML IN THE MUSCLE AS DIRECTED FOR ANAPHYLAXIS    famotidine (PEPCID) 40 MG tablet TAKE 1 TABLET TWICE DAILY.    fluocinolone and shower cap 0.01 % Oil Apply to affected area of scalp once daily    fluticasone propionate (FLONASE) 50 mcg/actuation nasal spray 2 sprays (100 mcg total) by Each Nostril route once daily.    fluticasone propionate (FLOVENT HFA) 110 mcg/actuation inhaler Inhale 2 puffs into the lungs 2 (two) times daily. Rinse mouth after use    furosemide (LASIX) 40 MG tablet Take 2 tablets (80 mg total) by mouth 2 (two) times daily.    hydrocortisone 2.5 % ointment Apply to affected area on the face two times daily as neeeded.    hydrocortisone 2.5 % ointment Apply to affected area of fave twice daily as needed    insulin (BASAGLAR KWIKPEN U-100 INSULIN) glargine 100 units/mL (3mL) SubQ pen inject 34 under the skin in the morning and 34 units at night.    insulin aspart U-100 (NOVOLOG) 100 unit/mL (3 mL) InPn pen Inject 22 units into the skin with meals plus scale    insulin aspart U-100 (NOVOLOG) 100 unit/mL (3 mL) InPn pen Inject 16 units under the skin with meals plus scale 150-200+2, 201-250+4, 251-300+6, 301-350+8, >350+10, max daily 78 units.    ketoconazole (NIZORAL) 2 % cream APPLY SPARINGLY TO AFFECTED AREA(S) ONCE DAILY    ketoconazole (NIZORAL) 2 % shampoo APPLY TO SKIN EVERY DAY FOR 5 MINUTES AND RINSE FOR ABOUT 1 TO 2 WEEKS    lancets 30 gauge Misc use 4 times a day    levalbuterol (XOPENEX HFA) 45 mcg/actuation  "inhaler Inhale 1 to 2 puffs into the lungs every 4 to 6 hours as needed    levalbuterol (XOPENEX) 0.31 mg/3 mL nebulizer solution Take 3 mLs (0.31 mg total) by nebulization every 4 (four) hours as needed for Wheezing. Rescue    levalbuterol (XOPENEX) 0.31 mg/3 mL nebulizer solution USE 1 UNIT DOSE IN NEBULIZER EVERY 4 HOURS AS NEEDED.    linaCLOtide (LINZESS) 72 mcg Cap capsule Take one tablet by mouth daily as needed for constipation    lisinopriL (PRINIVIL,ZESTRIL) 5 MG tablet TAKE 1 TABLET BY MOUTH TWO TIMES A DAY    magnesium oxide (MAG-OX) 400 mg (241.3 mg magnesium) tablet Take 1 tablet (400 mg total) by mouth 2 (two) times daily.    meclizine (ANTIVERT) 25 mg tablet Take 1 tablet (25 mg total) by mouth 2 (two) times daily as needed for Dizziness.    methocarbamoL (ROBAXIN) 750 MG Tab TAKE 1 TABLET BY MOUTH 3 TIMES DAILY.    metOLazone (ZAROXOLYN) 2.5 MG tablet TAKE 1 TABLET DAILY AS DIRECTED.    mometasone-formoterol (DULERA) 200-5 mcg/actuation inhaler INHALE 2 PUFFS AT 12 HOUR INTERVALS (MORNING AND EVENING).    montelukast (SINGULAIR) 10 mg tablet TAKE ONE TABLET BY MOUTH DAILY.    ondansetron (ZOFRAN-ODT) 4 MG TbDL Take 1 tablet (4 mg total) by mouth every 6 (six) hours as needed (nausea).    pen needle, diabetic 32 gauge x 5/32" Ndle USE AS DIRECTED 5 TIMES DAILY    polyethylene glycol (GLYCOLAX) 17 gram/dose powder MIX 1 CAPFUL IN 8 OUNCES OF LIQUID AND DRINK ONCE DAILY as needed for constipation    potassium chloride (MICRO-K) 10 MEQ CpSR Take 2 capsules (20 mEq total) by mouth once daily.    spironolactone (ALDACTONE) 25 MG tablet Take 1 tablet (25 mg total) by mouth once daily.    tiotropium (SPIRIVA) 18 mcg inhalation capsule INHALE 1 CAPSULE BY MOUTH EVERY DAY    traMADoL (ULTRAM) 50 mg tablet TAKE 1 TABLET EVERY 8 HOURS AS NEEDED.    triamcinolone acetonide 0.025% (KENALOG) 0.025 % cream APPLY SPARINGLY TO AFFECTED AREA(S) of face 2 TIMES DAILY.    warfarin (COUMADIN) 7.5 MG " "tablet Take 1 tablet by mouth every tuesday and take 1/2 tablet by mouth dailly on all other days - OR AS DIRECTED BY COUMADIN CLINIC    azelastine (ASTELIN) 137 mcg (0.1 %) nasal spray 2 sprays (274 mcg total) by Nasal route 2 (two) times daily.     No current facility-administered medications for this visit.        Review of Systems   Constitution: Negative for malaise/fatigue.   Cardiovascular: Negative for chest pain, dyspnea on exertion, irregular heartbeat, leg swelling and palpitations.   Respiratory: Negative for shortness of breath.    Hematologic/Lymphatic: Negative for bleeding problem.   Skin: Negative for rash.   Musculoskeletal: Negative for myalgias.   Gastrointestinal: Negative for hematemesis, hematochezia and nausea.   Genitourinary: Negative for hematuria.   Neurological: Negative for light-headedness.   Psychiatric/Behavioral: Negative for altered mental status.   Allergic/Immunologic: Negative for persistent infections.         Objective:          BP (!) 114/59   Pulse 80   Ht 5' 5" (1.651 m)   Wt 108 kg (238 lb 1.6 oz)   LMP 08/25/2019 (Approximate)   BMI 39.62 kg/m²     Physical Exam   Constitutional: She is oriented to person, place, and time. She appears well-developed and well-nourished.   HENT:   Head: Normocephalic.   Nose: Nose normal.   Eyes: Pupils are equal, round, and reactive to light.   Cardiovascular: Normal rate, regular rhythm, S1 normal and S2 normal.   No murmur heard.  Pulses:       Radial pulses are 2+ on the right side and 2+ on the left side.   Pulmonary/Chest: Breath sounds normal. No respiratory distress.   Device to LUCW.   Abdominal: Normal appearance.   Musculoskeletal: Normal range of motion.         General: No edema.   Neurological: She is alert and oriented to person, place, and time.   Skin: Skin is warm and dry. No erythema.   Psychiatric: She has a normal mood and affect. Her speech is normal and behavior is normal.   Nursing note and vitals " reviewed.    Lab Results   Component Value Date     08/05/2020    K 4.5 08/05/2020    MG 1.9 12/08/2019    BUN 23 (H) 08/05/2020    CREATININE 1.5 (H) 08/05/2020    ALT 14 05/25/2020    AST 16 05/25/2020    HGB 12.0 08/05/2020    HCT 41.2 08/05/2020    TSH 1.744 06/12/2020    LDLCALC 174.2 (H) 10/01/2018       Recent Labs   Lab 08/31/20  1107 09/08/20  1029 09/16/20  1100 09/22/20  1219   INR 1.6 H 2.1 H 2.2 H 2.9 H       Assessment:     1. Long term current use of amiodarone    2. Paroxysmal atrial fibrillation    3. NICM (nonischemic cardiomyopathy)    4. Chronic anticoagulation    5. Presence of cardiac resynchronization therapy defibrillator (CRT-D)      Plan:     In summary, Ms. Ross is a 49 y.o. female with NICM (EF 10-20%), CRT-D (2017), pAF, HTN, DM, HLD, CLARENCE, asthma here for follow up after cardioversion.  She is one month s/p cardioversion while loaded on amiodarone. She did have 2 days of breakthrough AF 8/23-25/2020 but otherwise has been in SR. Biventricular pacing has increased and she is feeling symptomatic improvement. We discussed options if she has further breakthrough - she is not interested in ablation and would prefer to continue amiodarone as long as possible. Will readdress in 3 mo.    Continue current medications.  Continue device checks.  RTC 3 mo with amio tests, sooner if needed.    *A copy of this note has been sent to Dr. Jolly*    Follow up in about 3 months (around 12/22/2020).    ------------------------------------------------------------------    Betty Moody, THANIA, NP-C  Cardiac Electrophysiology

## 2020-10-27 NOTE — TELEPHONE ENCOUNTER
"Pt calling upset states waiting outside main campus pharmacy since 630pm for coumadin refill per precautionary protocol. Reports pharmacy aware pt waiting was told would contact as soon as ready & was never called & pharmacy closed. Pt does not have nightly dose for tonight. Unable to reach pharmacy after several attempts. Spoke with Dr. Phan on call for Dr. Jules, notified of situation. Per Dr. Phan medication refill order placed to [facility] [phone] via escribe. Receipt confirmed. Pt notified of RX refill at [facility].     Reason for Disposition   [1] Request for URGENT new prescription or refill of "essential" medication (i.e., likelihood of harm to patient if not taken) AND [2] triager unable to fill per unit policy    Protocols used: MEDICATION QUESTION CALL-A-AH      "

## 2020-11-19 NOTE — TELEPHONE ENCOUNTER
Specialty Pharmacy - Refill Coordination    Specialty Medication Orders Linked to Encounter      Most Recent Value   Medication #1  dupilumab (DUPIXENT) 300 mg/2 mL Syrg (Order#[id number], Rx#[id number])          Refill Questions - Documented Responses      Most Recent Value   Relationship to patient of person spoken to?  Self   HIPAA/medical authority confirmed?  Yes   Any changes in contact preferences or allowed representatives?  No   Has the patient had any insurance changes?  No   Has the patient had any changes to specialty medication, dose, or instructions?  No   Has the patient started taking any new medications, herbals, or supplements?  No   Has the patient been diagnosed with any new medical conditions?  No   Does the patient have any new allergies to medications or foods?  No   Does the patient have any concerns about side effects?  No   Can the patient store medication/sharps container properly (at the correct temperature, away from children/pets, etc.)?  Yes   Can the patient call emergency services (911) in the event of an emergency?  Yes   Does the patient have any concerns or questions about taking or administering this medication as prescribed?  No   How many doses did the patient miss in the past 4 weeks or since the last fill?  0   How many doses does the patient have on hand?  0   How many days does the patient report on hand quantity will last?  9   Does the number of doses/days supply remaining match pharmacy expected amounts?  Yes   Does the patient feel that this medication is effective?  Yes   During the past 4 weeks, has patient missed any activities due to condition or medication?  No   During the past 4 weeks, did patient have any of the following urgent care visits?  None   Expected Copay ($)  0   Is the patient able to afford the medication copay?  Yes   Payment Method  zero copay   Days supply of Refill  28   Would patient like to speak to a pharmacist?  No   Do you want to trigger an  "intervention?  No   Do you want to trigger an additional referral task?  No          Current Outpatient Medications   Medication Sig    acetaminophen (TYLENOL) 650 MG TbSR TAKE 1 TABLET 3  TIMES DAILY for chest wall pain    allopurinoL (ZYLOPRIM) 100 MG tablet TAKE 2 TABLETS by mouth EVERY DAY    allopurinoL (ZYLOPRIM) 100 MG tablet TAKE 2 TABLETS EVERY DAY.    amiodarone (PACERONE) 200 MG Tab Take 400 mg ( 2 tablets ) twice daily for 2 weeks , then 400 mg ( 2 tablets ) once daily for 2 weeks ,  then reduce to 200 mg  ( 1 tablet) once daily thereafter.       atorvastatin (LIPITOR) 20 MG tablet Take 1 tablet (20 mg total) by mouth once daily.    azelastine (ASTELIN) 137 mcg (0.1 %) nasal spray 2 sprays (274 mcg total) by Nasal route 2 (two) times daily.    BD ULTRA-FINE ESSENCE PEN NEEDLE 32 gauge x 5/32" Ndle Use 5 times per day as directed    blood sugar diagnostic Strp Uses 4 times a day, true metrix meter.    budesonide-formoterol 160-4.5 mcg (SYMBICORT) 160-4.5 mcg/actuation HFAA INHALE 2 PUFFS TWICE DAILY. RINSE MOUTH AFTER USE.    cetirizine (ZYRTEC) 10 MG tablet Take 1 tablet (10 mg total) by mouth at bedtime for allergy relief    clotrimazole (LOTRIMIN) 1 % cream APPLY SPARINGLY TO AFFECTED AREA(S) in groin TWICE DAILY for one to two weeks    cycloSPORINE (RESTASIS) 0.05 % ophthalmic emulsion Apply 1 drop to eye.    diclofenac sodium (VOLTAREN) 1 % Gel APPLY 2 grams SPARINGLY TO Knees ONCE DAILY    digoxin (LANOXIN) 125 mcg tablet Take 1 tablet (125 mcg total) by mouth once daily.    dulaglutide (TRULICITY) 1.5 mg/0.5 mL pen injector INJECT 1.5 mg under the skin weekly    dupilumab (DUPIXENT) 300 mg/2 mL Syrg Inject 2 mLs (300 mg total) into the skin every 14 (fourteen) days.    EPINEPHrine (EPIPEN) 0.3 mg/0.3 mL AtIn INJECT 0.3ML IN THE MUSCLE AS DIRECTED FOR ANAPHYLAXIS    EPINEPHrine (EPIPEN) 0.3 mg/0.3 mL AtIn INJECT 0.3ML IN THE MUSCLE AS DIRECTED FOR ANAPHYLAXIS    famotidine (PEPCID) " 40 MG tablet TAKE 1 TABLET TWICE DAILY.    fluocinolone and shower cap 0.01 % Oil Apply to affected area of scalp once daily    fluticasone propionate (FLONASE) 50 mcg/actuation nasal spray 2 sprays (100 mcg total) by Each Nostril route once daily.    fluticasone propionate (FLOVENT HFA) 110 mcg/actuation inhaler Inhale 2 puffs into the lungs 2 (two) times daily. Rinse mouth after use    furosemide (LASIX) 40 MG tablet Take 2 tablets (80 mg total) by mouth 2 (two) times daily.    hydrocortisone 2.5 % ointment Apply to affected area on the face two times daily as neeeded.    hydrocortisone 2.5 % ointment Apply to affected area of fave twice daily as needed    insulin (BASAGLAR KWIKPEN U-100 INSULIN) glargine 100 units/mL (3mL) SubQ pen inject 34 under the skin in the morning and 34 units at night.    insulin aspart U-100 (NOVOLOG) 100 unit/mL (3 mL) InPn pen Inject 22 units into the skin with meals plus scale    insulin aspart U-100 (NOVOLOG) 100 unit/mL (3 mL) InPn pen Inject 16 units under the skin with meals plus scale 150-200+2, 201-250+4, 251-300+6, 301-350+8, >350+10, max daily 78 units.    ketoconazole (NIZORAL) 2 % cream APPLY SPARINGLY TO AFFECTED AREA(S) ONCE DAILY    ketoconazole (NIZORAL) 2 % shampoo APPLY TO SKIN EVERY DAY FOR 5 MINUTES AND RINSE FOR ABOUT 1 TO 2 WEEKS    lancets 30 gauge Misc use 4 times a day    levalbuterol (XOPENEX HFA) 45 mcg/actuation inhaler Inhale 1 to 2 puffs into the lungs every 4 to 6 hours as needed    levalbuterol (XOPENEX) 0.31 mg/3 mL nebulizer solution Take 3 mLs (0.31 mg total) by nebulization every 4 (four) hours as needed for Wheezing. Rescue    levalbuterol (XOPENEX) 0.31 mg/3 mL nebulizer solution USE 1 UNIT DOSE IN NEBULIZER EVERY 4 HOURS AS NEEDED.    linaCLOtide (LINZESS) 72 mcg Cap capsule Take one tablet by mouth daily as needed for constipation    lisinopriL (PRINIVIL,ZESTRIL) 5 MG tablet TAKE 1 TABLET BY MOUTH TWO TIMES A DAY    magnesium  "oxide (MAG-OX) 400 mg (241.3 mg magnesium) tablet Take 1 tablet (400 mg total) by mouth 2 (two) times daily.    meclizine (ANTIVERT) 25 mg tablet Take 1 tablet (25 mg total) by mouth 2 (two) times daily as needed for Dizziness.    methocarbamoL (ROBAXIN) 750 MG Tab TAKE 1 TABLET BY MOUTH 3 TIMES DAILY.    metOLazone (ZAROXOLYN) 2.5 MG tablet TAKE 1 TABLET DAILY AS DIRECTED.    mometasone-formoterol (DULERA) 200-5 mcg/actuation inhaler INHALE 2 PUFFS AT 12 HOUR INTERVALS (MORNING AND EVENING).    montelukast (SINGULAIR) 10 mg tablet TAKE ONE TABLET BY MOUTH DAILY.    ondansetron (ZOFRAN-ODT) 4 MG TbDL Take 1 tablet (4 mg total) by mouth every 6 (six) hours as needed (nausea).    pen needle, diabetic 32 gauge x 5/32" Ndle USE AS DIRECTED 5 TIMES DAILY    polyethylene glycol (GLYCOLAX) 17 gram/dose powder MIX 1 CAPFUL IN 8 OUNCES OF LIQUID AND DRINK ONCE DAILY as needed for constipation    potassium chloride (MICRO-K) 10 MEQ CpSR Take 2 capsules (20 mEq total) by mouth once daily.    spironolactone (ALDACTONE) 25 MG tablet Take 1 tablet (25 mg total) by mouth once daily.    tiotropium (SPIRIVA) 18 mcg inhalation capsule INHALE 1 CAPSULE BY MOUTH EVERY DAY    traMADoL (ULTRAM) 50 mg tablet TAKE 1 TABLET EVERY 8 HOURS AS NEEDED.    triamcinolone acetonide 0.025% (KENALOG) 0.025 % cream APPLY SPARINGLY TO AFFECTED AREA(S) of face 2 TIMES DAILY.    warfarin (COUMADIN) 7.5 MG tablet Take 1 tablet by mouth every Tuesday and take 1/2 tablet by mouth daily on all other days - OR AS DIRECTED BY COUMADIN CLINIC   Last reviewed on 11/19/2020  2:23 PM by Shahnaz Moore, PharmD    Review of patient's allergies indicates:  No Known Allergies Last reviewed on  11/19/2020 2:23 PM by Shahnaz Moore      Tasks added this encounter   No tasks added.   Tasks due within next 3 months   11/9/2020 - Refill Call  1/9/2021 - Clinical - Follow Up Assesement (365 day)     Patient reported that medication now has to be sent to [facility] for " injection. She does not feel comfortable injecting at home and has been having medication administered at the facility. Confirmed delivery with [facility]man at [facility]. Delivering via [employer] on Friday 11/20/2020.     Jj Donovan, PharmD  [facility]  [address] [place] [id number]  Phone: [phone]  Fax: [phone]

## 2020-11-19 NOTE — PROGRESS NOTES
Subjective:      Patient ID: Laure Ross is a 50 y.o. female.    Chief Complaint: Diabetes Mellitus (Dr Mittal PCP), Callouses, and Foot Problem (black spot on right great toenail)      50 y.o. female presenting with left hallux healed blsiter. Known to Dr. Cormier at . Sees her every three months for DFC. Was not able to see her in her office due to her schedule. Denies pain. Some underlying numbness and tingling on b/l feet.     8/20/20 noticed blister on right foot. Noticed blister on Tuesday. Denies injury. Denies pain. Ambulating in open toe shoe. No previous treatments.     9/2/20: F/u right foot blister. Has been applying neosporin to area daily wrapping with bandage. No new issues.     9/17/20: F/u right foot blister. Reports pain improved. Applying offloading pads which help with pain per patient.       11/19/20: F/u right plantar foot callus. Denies pedal pain.       Hemoglobin A1C   Date Value Ref Range Status   07/03/2019 6.9 (H) 4.0 - 5.6 % Final     Comment:     ADA Screening Guidelines:  5.7-6.4%  Consistent with prediabetes  >or=6.5%  Consistent with diabetes  High levels of fetal hemoglobin interfere with the HbA1C  assay. Heterozygous hemoglobin variants (HbS, HgC, etc)do  not significantly interfere with this assay.   However, presence of multiple variants may affect accuracy.     10/01/2018 7.4 (H) 4.0 - 5.6 % Final     Comment:     ADA Screening Guidelines:  5.7-6.4%  Consistent with prediabetes  >or=6.5%  Consistent with diabetes  High levels of fetal hemoglobin interfere with the HbA1C  assay. Heterozygous hemoglobin variants (HbS, HgC, etc)do  not significantly interfere with this assay.   However, presence of multiple variants may affect accuracy.     03/28/2018 6.2 (H) 4.0 - 5.6 % Final     Comment:     According to ADA guidelines, hemoglobin A1c <7.0% represents  optimal control in non-pregnant diabetic patients. Different  metrics may apply to specific patient populations.    Standards of Medical Care in Diabetes-2016.  For the purpose of screening for the presence of diabetes:  <5.7%     Consistent with the absence of diabetes  5.7-6.4%  Consistent with increasing risk for diabetes   (prediabetes)  >or=6.5%  Consistent with diabetes  Currently, no consensus exists for use of hemoglobin A1c  for diagnosis of diabetes for children.  This Hemoglobin A1c assay has significant interference with fetal   hemoglobin   (HbF). The results are invalid for patients with abnormal amounts of   HbF,   including those with known Hereditary Persistence   of Fetal Hemoglobin. Heterozygous hemoglobin variants (HbAS, HbAC,   HbAD, HbAE, HbA2) do not significantly interfere with this assay;   however, presence of multiple variants in a sample may impact the %   interference.         Review of Systems   Constitution: Negative for chills, decreased appetite, fever and malaise/fatigue.   HENT: Negative for congestion, ear discharge and sore throat.    Eyes: Negative for discharge and pain.   Cardiovascular: Negative for chest pain, claudication and leg swelling.   Respiratory: Negative for cough and shortness of breath.    Skin: Positive for dry skin and suspicious lesions. Negative for color change, nail changes and rash.   Musculoskeletal: Negative for arthritis, joint pain, joint swelling and muscle weakness.   Gastrointestinal: Negative for bloating, abdominal pain, diarrhea, nausea and vomiting.   Genitourinary: Negative for flank pain and hematuria.   Neurological: Positive for numbness and paresthesias. Negative for headaches and weakness.   Psychiatric/Behavioral: Negative for altered mental status.             Past Medical History:   Diagnosis Date    Anticoagulant long-term use     Arthritis     Asthma     Asthma     Atrial fibrillation     Cardiomyopathy     Cardiomyopathy     CHF (congestive heart failure)     CHF (congestive heart failure)     Chronic combined systolic and diastolic  heart failure 6/3/2016    COPD (chronic obstructive pulmonary disease) 3/28/2018    GERD (gastroesophageal reflux disease)     H/O tubal ligation     Hypertension     Obesity     Renal disorder     Type 2 diabetes mellitus with hyperglycemia     Type 2 diabetes mellitus with polyneuropathy        Past Surgical History:   Procedure Laterality Date    CARDIAC DEFIBRILLATOR PLACEMENT      CARDIAC DEFIBRILLATOR PLACEMENT      CARDIAC DEFIBRILLATOR PLACEMENT      CATARACT EXTRACTION Left     CHOLECYSTECTOMY      COLONOSCOPY N/A 5/2/2016    Procedure: COLONOSCOPY;  Surgeon: Eugene Soto MD;  Location: [facility] ENDO ([facility] FLR);  Service: Endoscopy;  Laterality: N/A;    GALLBLADDER SURGERY      iabp  4/2016    TREATMENT OF CARDIAC ARRHYTHMIA N/A 6/12/2020    Procedure: CARDIOVERSION;  Surgeon: Navi Jolly MD;  Location: [facility] EP LAB;  Service: Cardiology;  Laterality: N/A;  AF, BRITTANEY, DCCV, MAC, DM, 3 Prep    TREATMENT OF CARDIAC ARRHYTHMIA N/A 8/7/2020    Procedure: Cardioversion or Defibrillation;  Surgeon: Navi Jolly MD;  Location: [facility] EP LAB;  Service: Cardiology;  Laterality: N/A;  AF, DCCV, MAC, DM, 3 Prep    TUBAL LIGATION         Family History   Problem Relation Age of Onset    Heart disease Mother     Heart disease Father        Social History     Socioeconomic History    Marital status:      Spouse name: Not on file    Number of children: Not on file    Years of education: Not on file    Highest education level: Not on file   Occupational History    Not on file   Social Needs    Financial resource strain: Not on file    Food insecurity     Worry: Not on file     Inability: Not on file    Transportation needs     Medical: Not on file     Non-medical: Not on file   Tobacco Use    Smoking status: Never Smoker    Smokeless tobacco: Never Used   Substance and Sexual Activity    Alcohol use: No    Drug use: No    Sexual activity: Yes     Partners: Male   Lifestyle     "Physical activity     Days per week: Not on file     Minutes per session: Not on file    Stress: Not on file   Relationships    Social connections     Talks on phone: Not on file     Gets together: Not on file     Attends Latter-day service: Not on file     Active member of club or organization: Not on file     Attends meetings of clubs or organizations: Not on file     Relationship status: Not on file   Other Topics Concern    Not on file   Social History Narrative    Not on file       Current Outpatient Medications   Medication Sig Dispense Refill    acetaminophen (TYLENOL) 650 MG TbSR TAKE 1 TABLET 3  TIMES DAILY for chest wall pain 30 tablet 0    allopurinoL (ZYLOPRIM) 100 MG tablet TAKE 2 TABLETS by mouth EVERY  tablet 3    allopurinoL (ZYLOPRIM) 100 MG tablet TAKE 2 TABLETS EVERY DAY. 180 tablet 3    amiodarone (PACERONE) 200 MG Tab Take 400 mg ( 2 tablets ) twice daily for 2 weeks , then 400 mg ( 2 tablets ) once daily for 2 weeks ,  then reduce to 200 mg  ( 1 tablet) once daily thereafter.    60 tablet 6    atorvastatin (LIPITOR) 20 MG tablet Take 1 tablet (20 mg total) by mouth once daily. 90 tablet 1    BD ULTRA-FINE ESSENCE PEN NEEDLE 32 gauge x 5/32" Ndle Use 5 times per day as directed 200 each 11    blood sugar diagnostic Strp Uses 4 times a day, true metrix meter. 150 each 11    budesonide-formoterol 160-4.5 mcg (SYMBICORT) 160-4.5 mcg/actuation HFAA INHALE 2 PUFFS TWICE DAILY. RINSE MOUTH AFTER USE. 10.2 g 6    cetirizine (ZYRTEC) 10 MG tablet Take 1 tablet (10 mg total) by mouth at bedtime for allergy relief 30 tablet 3    clotrimazole (LOTRIMIN) 1 % cream APPLY SPARINGLY TO AFFECTED AREA(S) in groin TWICE DAILY for one to two weeks 45 g 0    cycloSPORINE (RESTASIS) 0.05 % ophthalmic emulsion Apply 1 drop to eye.      diclofenac sodium (VOLTAREN) 1 % Gel APPLY 2 grams SPARINGLY TO Knees ONCE DAILY 100 g 3    digoxin (LANOXIN) 125 mcg tablet Take 1 tablet (125 mcg total) by mouth " once daily. 90 tablet 3    dulaglutide (TRULICITY) 1.5 mg/0.5 mL pen injector INJECT 1.5 mg under the skin weekly 2 mL 11    dupilumab (DUPIXENT) 300 mg/2 mL Syrg Inject 2 mLs (300 mg total) into the skin every 14 (fourteen) days. 4 mL 11    EPINEPHrine (EPIPEN) 0.3 mg/0.3 mL AtIn INJECT 0.3ML IN THE MUSCLE AS DIRECTED FOR ANAPHYLAXIS 0.6 mL 1    EPINEPHrine (EPIPEN) 0.3 mg/0.3 mL AtIn INJECT 0.3ML IN THE MUSCLE AS DIRECTED FOR ANAPHYLAXIS 1 each 1    famotidine (PEPCID) 40 MG tablet TAKE 1 TABLET TWICE DAILY. 180 tablet 3    fluocinolone and shower cap 0.01 % Oil Apply to affected area of scalp once daily 118.28 mL 2    fluticasone propionate (FLONASE) 50 mcg/actuation nasal spray 2 sprays (100 mcg total) by Each Nostril route once daily. 15 g 0    fluticasone propionate (FLOVENT HFA) 110 mcg/actuation inhaler Inhale 2 puffs into the lungs 2 (two) times daily. Rinse mouth after use 12 g 3    furosemide (LASIX) 40 MG tablet Take 2 tablets (80 mg total) by mouth 2 (two) times daily. 360 tablet 3    hydrocortisone 2.5 % ointment Apply to affected area on the face two times daily as neeeded. 28.35 g 1    hydrocortisone 2.5 % ointment Apply to affected area of fave twice daily as needed 28.35 g 2    insulin (BASAGLAR KWIKPEN U-100 INSULIN) glargine 100 units/mL (3mL) SubQ pen inject 34 under the skin in the morning and 34 units at night. 30 mL 6    insulin aspart U-100 (NOVOLOG) 100 unit/mL (3 mL) InPn pen Inject 22 units into the skin with meals plus scale 45 mL 6    insulin aspart U-100 (NOVOLOG) 100 unit/mL (3 mL) InPn pen Inject 16 units under the skin with meals plus scale 150-200+2, 201-250+4, 251-300+6, 301-350+8, >350+10, max daily 78 units. 30 mL 6    ketoconazole (NIZORAL) 2 % cream APPLY SPARINGLY TO AFFECTED AREA(S) ONCE DAILY 30 g 2    ketoconazole (NIZORAL) 2 % shampoo APPLY TO SKIN EVERY DAY FOR 5 MINUTES AND RINSE FOR ABOUT 1 TO 2 WEEKS 120 mL 1    lancets 30 gauge Misc use 4 times a day  "100 each 6    levalbuterol (XOPENEX HFA) 45 mcg/actuation inhaler Inhale 1 to 2 puffs into the lungs every 4 to 6 hours as needed 15 g 6    levalbuterol (XOPENEX) 0.31 mg/3 mL nebulizer solution Take 3 mLs (0.31 mg total) by nebulization every 4 (four) hours as needed for Wheezing. Rescue 72 mL 3    levalbuterol (XOPENEX) 0.31 mg/3 mL nebulizer solution USE 1 UNIT DOSE IN NEBULIZER EVERY 4 HOURS AS NEEDED. 72 mL 6    linaCLOtide (LINZESS) 72 mcg Cap capsule Take one tablet by mouth daily as needed for constipation 30 capsule 2    lisinopriL (PRINIVIL,ZESTRIL) 5 MG tablet TAKE 1 TABLET BY MOUTH TWO TIMES A DAY 60 tablet 8    magnesium oxide (MAG-OX) 400 mg (241.3 mg magnesium) tablet Take 1 tablet (400 mg total) by mouth 2 (two) times daily. 180 tablet 3    meclizine (ANTIVERT) 25 mg tablet Take 1 tablet (25 mg total) by mouth 2 (two) times daily as needed for Dizziness. 10 tablet 0    methocarbamoL (ROBAXIN) 750 MG Tab TAKE 1 TABLET BY MOUTH 3 TIMES DAILY. 30 tablet 0    metOLazone (ZAROXOLYN) 2.5 MG tablet TAKE 1 TABLET DAILY AS DIRECTED. 60 tablet 0    mometasone-formoterol (DULERA) 200-5 mcg/actuation inhaler INHALE 2 PUFFS AT 12 HOUR INTERVALS (MORNING AND EVENING). 13 g 3    montelukast (SINGULAIR) 10 mg tablet TAKE ONE TABLET BY MOUTH DAILY. 30 tablet 6    ondansetron (ZOFRAN-ODT) 4 MG TbDL Take 1 tablet (4 mg total) by mouth every 6 (six) hours as needed (nausea). 15 tablet 0    pen needle, diabetic 32 gauge x 5/32" Ndle USE AS DIRECTED 5 TIMES DAILY 200 each 5    polyethylene glycol (GLYCOLAX) 17 gram/dose powder MIX 1 CAPFUL IN 8 OUNCES OF LIQUID AND DRINK ONCE DAILY as needed for constipation 510 g 6    potassium chloride (MICRO-K) 10 MEQ CpSR Take 2 capsules (20 mEq total) by mouth once daily. 60 capsule 11    spironolactone (ALDACTONE) 25 MG tablet Take 1 tablet (25 mg total) by mouth once daily. 90 tablet 3    tiotropium (SPIRIVA) 18 mcg inhalation capsule INHALE 1 CAPSULE BY MOUTH " "EVERY DAY 30 capsule 6    traMADoL (ULTRAM) 50 mg tablet TAKE 1 TABLET EVERY 8 HOURS AS NEEDED. 12 tablet 0    triamcinolone acetonide 0.025% (KENALOG) 0.025 % cream APPLY SPARINGLY TO AFFECTED AREA(S) of face 2 TIMES DAILY. 15 g 1    warfarin (COUMADIN) 7.5 MG tablet Take 1 tablet by mouth every Tuesday and take 1/2 tablet by mouth daily on all other days - OR AS DIRECTED BY COUMADIN CLINIC 60 tablet 3    azelastine (ASTELIN) 137 mcg (0.1 %) nasal spray 2 sprays (274 mcg total) by Nasal route 2 (two) times daily. 30 mL 0     No current facility-administered medications for this visit.        Review of patient's allergies indicates:  No Known Allergies    Vitals:    11/19/20 1001   BP: 118/72   Weight: 108 kg (238 lb 1.6 oz)   Height: 5' 5" (1.651 m)   PainSc:   2       Objective:      Physical Exam  Constitutional:       General: She is not in acute distress.     Appearance: She is well-developed.   HENT:      Nose: Nose normal.   Eyes:      Conjunctiva/sclera: Conjunctivae normal.   Neck:      Musculoskeletal: Normal range of motion.   Pulmonary:      Effort: Pulmonary effort is normal.   Chest:      Chest wall: No tenderness.   Abdominal:      Tenderness: There is no abdominal tenderness.   Musculoskeletal:      Comments: Decreased stride, station of gait.  apropulsive toe off.  Increased angle and base of gait.      Patient has hammertoes of digits 2-5 bilateral partially reducible without symptom today.     Visible and palpable bunion without pain at dorsomedial 1st metatarsal head right and left.  Hallux abducted right and left partially reducible, tracks laterally without being track bound.  No ecchymosis, erythema, edema, or cardinal signs infection or signs of trauma same foot.        Neurological:      Mental Status: She is alert and oriented to person, place, and time.   Psychiatric:         Behavior: Behavior normal.         Vascular: Distal DP/PT pulses palpable 1/4. CRT < 3 sec to tips of toes. " Hair growth present LE, warm to touch LE  R foot: edema forefoot    Dermatologic: No open lesions, lacerations or wounds. Interdigital spaces clean, dry and intact.   R foot: serosanguinous fluid filled blister submet2 and plantar sulcus of 2nd IS. S/p wound debridement, deroofing. No deep wound/ulcer noted. Fibrotic wound base. Mild undermining, no sinus tract. No pus, no malodor.     Musculoskeletal: MMT 5/5 in DF/PF/Inv/Ev resistance with no reproduction of pain in any direction. Passive range of motion of ankle and pedal joints is painless. No calf tenderness LE, Compartments soft/compressible.    Neurological: Light touch, proprioception, and sharp/dull sensation are all intact. Protective threshold with the [place] monofilament is intact. Vibratory sensation intact.     Preulcerative lesion left plantar foot.     9/17/20:  Healed right plantar foot ulceration.           9/2/20: Healed right plantar foot with fragile epithelium.     Pre callus trimming       Post callus trimming.             8/20/20            Assessment:       Encounter Diagnoses   Name Primary?    Type 2 diabetes mellitus with diabetic polyneuropathy, unspecified whether long term insulin use Yes    Pre-ulcerative calluses     Hammer toes of both feet          Plan:       Laure was seen today for diabetes mellitus, callouses and foot problem.    Diagnoses and all orders for this visit:    Type 2 diabetes mellitus with diabetic polyneuropathy, unspecified whether long term insulin use  -     DIABETIC SHOES FOR HOME USE    Pre-ulcerative calluses  -     DIABETIC SHOES FOR HOME USE    Hammer toes of both feet  -     DIABETIC SHOES FOR HOME USE      I counseled the patient on her conditions, their implications and medical management.    With the patient's verbal consent a sterile #15 scalpel was used to trim the hyperkeratotic lesion described above. She tolerated the procedure well without complication.    Healed right plantar foot  ulceration.    - Shoe inspection. Diabetic Foot Education. Patient reminded of the importance of good nutrition and blood sugar control to help prevent podiatric complications of diabetes. Patient instructed on proper foot hygeine. We discussed wearing proper shoe gear, daily foot inspections, never walking without protective shoe gear, caution putting sharp instruments to feet     - Discussed DM foot care:  Wear comfortable, proper fitting shoes. Wash feet daily. Dry well. After drying, apply moisturizer to feet (no lotion to webspaces). Inspect feet daily for skin breaks, blisters, swelling, or redness. Wear cotton socks (preferably white)  Change socks every day. Do NOT walk barefoot. Do NOT use heating pads or warm/hot water soaks     Rx diabetic shoes with custom molded inserts to be worn at all times while ambulating. Prescription provided with list of local retailers.     F/u 9 weeks.

## 2020-11-30 NOTE — LETTER
[address], [address] [place] [address]  Phone: [phone]  Fax: [phone]          Return to Work/School    Patient: Laure Ross  YOB: 1970   Date: 11/30/2020     To Whom It May Concern:     Laure Ross was in contact with/seen in my office on 11/30/2020. COVID-19 is present in our communities across the state. There is limited testing for COVID at this time, so not all patients can be tested. In this situation, your employee meets the following criteria:     Laure Ross has met the criteria for COVID-19 testing and has a POSITIVE result. She can return to work once they are asymptomatic for 24 hours without the use of fever reducing medications AND at least ten days from the start of symptoms (or from the first positive result if they have no symptoms).      If you have any questions or concerns, or if I can be of further assistance, please do not hesitate to contact me.     Sincerely,    NURSE URGENT CARE, [facility]

## 2020-11-30 NOTE — PATIENT INSTRUCTIONS
Your test was POSITIVE for COVID-19 (coronavirus).       Please isolate yourself at home.  You may leave home and/or return to work once the following conditions are met:    If you were not hospitalized and are not severely immunocompromised*:   More than 10 days since symptoms first appeared AND   More than 24 hours fever free without medications AND   Symptoms have improved     If you were hospitalized OR are severely immunocompromised*:   More than 20 days since symptoms first appeared   More than 24 hours fever free without medications   Symptoms have improved    If you had no symptoms but tested positive:   More than 10 days since the date of the first positive test (20 days if severely immunocompromised).   If you develop symptoms, then use the guidelines above.     *Definition of severely immunocompromised:  - Current chemotherapy for cancer  - Untreated HIV with CD4 count less than 200  - Combined primary immunodeficiency disorder  - Prednisone more than 20 mg per day for more than 14 days  - Post-transplant patients    Additional instructions:   Separate yourself from other people and animals in your home.   Call ahead before visiting your doctor.   Wear a facemask when around others.   Cover your coughs and sneezes.   Wash your hands often with soap and water; hand  can be used, too.   Avoid sharing personal household items.   Wipe down surfaces used daily.   Monitor your symptoms. Seek prompt medical attention if your illness is worsening (e.g., difficulty breathing).    Before seeking care, call your healthcare provider.   If you have a medical emergency and need to call 911, notify the dispatch personnel that you have, or are being evaluated for COVID-19. If possible, put on a facemask before emergency medical services arrive.        Contact Tracing    As one of the next steps, you will receive a call or text from the [place] Department of [facility] ([facility]) COVID-19 Tracing Team.  See the contact information below so you know not to ignore the health departments call. It is important that you contact them back immediately so they can help.      Contact Tracer Number:  [phone]  Caller ID for most carriers: [place] Dept Health     What is contact tracing?  · Contact tracing is a process that helps identify everyone who has been in close contact with an infected person. Contact tracers let those people know they may have been exposed and guide them on next steps. Confidentiality is important for everyone; no one will be told who may have exposed them to the virus.  · Your involvement is important. The more we know about where and how this virus is spreading, the better chance we have at stopping it from spreading further.  What does exposure mean?  · Exposure means you have been within 6 feet for more than 15 minutes with a person who has or had COVID-19.  What kind of questions do the contact tracers ask?  · A contact tracer will confirm your basic contact information including name, address, phone number, and next of kin, as well as asking about any symptoms you may have had. Theyll also ask you how you think you may have gotten sick, such as places where you may have been exposed to the virus, and people you were with. Those names will never be shared with anyone outside of that call, and will only be used to help trace and stop the spread of the virus.   I have privacy concerns. How will the state use my information?  · Your privacy about your health is important. All calls are completed using call centers that use the appropriate health privacy protection measures (HIPAA compliance), meaning that your patient information is safe. No one will ever ask you any questions related to immigration status. Your health comes first.   Do I have to participate?  · You do not have to participate, but we strongly encourage you to. Contact tracing can help us catch and control new outbreaks as  theyre developing to keep your friends and family safe.   What if I dont hear from anyone?  · If you dont receive a call within 24 hours, you can call the number above right away to inquire about your status. That line is open from 8:00 am - 8:00 p.m., 7 days a week.  Contact tracing saves lives! Together, we have the power to beat this virus and keep our loved ones and neighbors safe.    For more information see CDC link below.      https://www.cdc.gov/coronavirus/2019-ncov/hcp/guidance-prevent-spread.html#precautions        Sources:  [facility], [place] [facility] and [facility]           Sincerely,     Norma Madison MA

## 2020-12-04 NOTE — PROGRESS NOTES
Patient called to report she tested positive for Covid on Monday   Reports taking Mucinex and Ambicor

## 2020-12-06 PROBLEM — U07.1 PNEUMONIA DUE TO COVID-19 VIRUS: Status: ACTIVE | Noted: 2020-01-01

## 2020-12-06 PROBLEM — J12.82 PNEUMONIA DUE TO COVID-19 VIRUS: Status: ACTIVE | Noted: 2020-01-01

## 2020-12-06 PROBLEM — N17.9 ACUTE KIDNEY INJURY SUPERIMPOSED ON CHRONIC KIDNEY DISEASE: Status: ACTIVE | Noted: 2017-03-17

## 2020-12-06 PROBLEM — N18.9 ACUTE KIDNEY INJURY SUPERIMPOSED ON CHRONIC KIDNEY DISEASE: Status: ACTIVE | Noted: 2017-03-17

## 2020-12-06 NOTE — ED NOTES
Patient identifiers for Laure Ross 50 y.o. female checked and correct.  Chief Complaint   Patient presents with    covid Positive     tested + last week, more sob     Past Medical History:   Diagnosis Date    Anticoagulant long-term use     Arthritis     Asthma     Asthma     Atrial fibrillation     Cardiomyopathy     Cardiomyopathy     CHF (congestive heart failure)     CHF (congestive heart failure)     Chronic combined systolic and diastolic heart failure 6/3/2016    COPD (chronic obstructive pulmonary disease) 3/28/2018    GERD (gastroesophageal reflux disease)     H/O tubal ligation     Hypertension     Obesity     Renal disorder     Type 2 diabetes mellitus with hyperglycemia     Type 2 diabetes mellitus with polyneuropathy      Allergies reported: Review of patient's allergies indicates:  No Known Allergies      LOC: Patient is awake, alert, and aware of environment with an appropriate affect. Patient is oriented x 4 and speaking appropriately.  APPEARANCE: Patient resting comfortably and in no acute distress. Patient is clean and well groomed, patient's clothing is properly fastened.  HEENT:   SKIN: The skin is warm and dry. Patient has normal skin turgor and moist mucus membranes.   MUSKULOSKELETAL: Patient is moving all extremities well, no obvious deformities noted. Pulses intact.   RESPIRATORY: Airway is open and patent. Respirations are spontaneous and non-labored with normal effort and rate.  CARDIAC: Patient has a normal rate and rhythm.  No peripheral edema noted.   ABDOMEN: No distention noted. Soft and non-tender upon palpation.  NEUROLOGICAL: pupils 3 mm, PERRL. Facial expression is symmetrical. Hand grasps are equal bilaterally. Normal sensation in all extremities when touched with finger.

## 2020-12-06 NOTE — ED NOTES
"Pt tested positive for Covid 11/30/20. Pt states "I'm just more short of breath. I have asthma and heart problems." She reports cough, denies headache. One known sick contact.   "

## 2020-12-06 NOTE — ED PROVIDER NOTES
Encounter Date: 12/6/2020       History     Chief Complaint   Patient presents with    covid Positive     tested + last week, more sob     50-year-old past medical history of asthma, AFib, cardiomyopathy, CHF, COPD, GERD, obesity, type 2 diabetes presenting with 1 week of worsening cough shortness of breath diarrhea.  Patient initially tested positive for COVID last week and since has been decompensated with worsening dyspnea on exertion. + fevers, chills, nausea, vomiting, headaches.        Review of patient's allergies indicates:  No Known Allergies  Past Medical History:   Diagnosis Date    Anticoagulant long-term use     Arthritis     Asthma     Asthma     Atrial fibrillation     Cardiomyopathy     Cardiomyopathy     CHF (congestive heart failure)     CHF (congestive heart failure)     Chronic combined systolic and diastolic heart failure 6/3/2016    COPD (chronic obstructive pulmonary disease) 3/28/2018    GERD (gastroesophageal reflux disease)     H/O tubal ligation     Hypertension     Obesity     Renal disorder     Type 2 diabetes mellitus with hyperglycemia     Type 2 diabetes mellitus with polyneuropathy      Past Surgical History:   Procedure Laterality Date    CARDIAC DEFIBRILLATOR PLACEMENT      CARDIAC DEFIBRILLATOR PLACEMENT      CARDIAC DEFIBRILLATOR PLACEMENT      CATARACT EXTRACTION Left     CHOLECYSTECTOMY      COLONOSCOPY N/A 5/2/2016    Procedure: COLONOSCOPY;  Surgeon: Eugene Soto MD;  Location: [facility] ENDO ([address] [place] [address]);  Service: Endoscopy;  Laterality: N/A;    GALLBLADDER SURGERY      iabp  4/2016    TREATMENT OF CARDIAC ARRHYTHMIA N/A 6/12/2020    Procedure: CARDIOVERSION;  Surgeon: Navi Jolly MD;  Location: [facility] EP LAB;  Service: Cardiology;  Laterality: N/A;  AF, BRITTANEY, DCCV, MAC, DM, 3 Prep    TREATMENT OF CARDIAC ARRHYTHMIA N/A 8/7/2020    Procedure: Cardioversion or Defibrillation;  Surgeon: Navi Jolly MD;  Location: [facility] EP LAB;  Service:  Cardiology;  Laterality: N/A;  AF, DCCV, MAC, DM, 3 Prep    TUBAL LIGATION       Family History   Problem Relation Age of Onset    Heart disease Mother     Heart disease Father      Social History     Tobacco Use    Smoking status: Never Smoker    Smokeless tobacco: Never Used   Substance Use Topics    Alcohol use: No    Drug use: No     Review of Systems   Constitutional: Positive for fever.   HENT: Negative for congestion.    Respiratory: Positive for cough and shortness of breath.    Cardiovascular: Negative for chest pain.   Gastrointestinal: Negative for abdominal pain and nausea.   Genitourinary: Negative for dysuria.   Musculoskeletal: Positive for arthralgias.   Skin: Negative for color change.   Neurological: Negative for dizziness.   Hematological: Negative.        Physical Exam     Initial Vitals [12/06/20 1649]   BP Pulse Resp Temp SpO2   117/60 93 (!) 24 99.3 °F (37.4 °C) (!) 86 %      MAP       --         Physical Exam    Constitutional: She appears well-developed and well-nourished.   Due to COVID-19 pandemic performed no touch exam.   HENT:   Head: Normocephalic and atraumatic.   Eyes: Conjunctivae, EOM and lids are normal. Pupils are equal, round, and reactive to light.   Neck: Trachea normal, normal range of motion and full passive range of motion without pain.   Pulmonary/Chest:   Patient, able to speak in full sentences while sitting down.  Appears mildly tachypneic   Abdominal: Normal appearance.   Neurological: She is alert and oriented to person, place, and time.   Skin: Skin is intact.   Psychiatric: She has a normal mood and affect. Her speech is normal and behavior is normal. Judgment and thought content normal.         ED Course   Procedures  Labs Reviewed   CBC W/ AUTO DIFFERENTIAL - Abnormal; Notable for the following components:       Result Value    Hemoglobin 11.5 (*)     MCH 26.9 (*)     MCHC 31.1 (*)     RDW 14.8 (*)     Lymph # 0.8 (*)     Gran % 82.4 (*)     Lymph % 12.0  (*)     All other components within normal limits   COMPREHENSIVE METABOLIC PANEL - Abnormal; Notable for the following components:    Sodium 130 (*)     BUN 22 (*)     Creatinine 1.5 (*)     Calcium 8.5 (*)     Albumin 3.0 (*)     eGFR if  46.2 (*)     eGFR if non  40.1 (*)     All other components within normal limits   C-REACTIVE PROTEIN - Abnormal; Notable for the following components:    .2 (*)     All other components within normal limits   FERRITIN - Abnormal; Notable for the following components:    Ferritin 782 (*)     All other components within normal limits   LACTATE DEHYDROGENASE - Abnormal; Notable for the following components:     (*)     All other components within normal limits   TROPONIN I - Abnormal; Notable for the following components:    Troponin I 0.070 (*)     All other components within normal limits   B-TYPE NATRIURETIC PEPTIDE - Abnormal; Notable for the following components:     (*)     All other components within normal limits   D DIMER, QUANTITATIVE - Abnormal; Notable for the following components:    D-Dimer 0.78 (*)     All other components within normal limits   PROTIME-INR - Abnormal; Notable for the following components:    Prothrombin Time 33.5 (*)     INR 3.2 (*)     All other components within normal limits    Narrative:     ADD ON TESTS PT INR AND ESR PER DR CHLOE SKINNER ORDER# [id number],   [id number]  12/06/2020   20:26    OSMOLALITY, SERUM - Abnormal; Notable for the following components:    Osmolality 273 (*)     All other components within normal limits    Narrative:     ADD ON TESTS PT INR AND ESR PER DR CHLOE SKINNER ORDER# [id number],   [id number]  12/06/2020   20:26  ADD ON TESTS MAGNESIUM, PHOSPHORUS AND OSMO SERUM PER DR CHLOE SKINNER ORDER# [id number], [id number], [id number]  12/06/2020  21:59     CK   LACTIC ACID, PLASMA   PROCALCITONIN   CREATININE, URINE, RANDOM    Narrative:     ADD ON URINE SODIUM PER DR CORONA  SUSANNE/ORDER# [id number] @ 20:54   12/6/2020  Specimen Source->Urine  add on test urine osmo per dr chloe ma order# [id number]  21:12    12/06/2020    UREA NITROGEN, URINE, RANDOM    Narrative:     ADD ON URINE SODIUM PER DR CHLOE MA/ORDER# [id number] @ 20:54   12/6/2020  Specimen Source->Urine  add on test urine osmo per dr chloe ma order# [id number]  21:12    12/06/2020    PROTIME-INR   SEDIMENTATION RATE   MAGNESIUM   PHOSPHORUS   OSMOLALITY, URINE RANDOM   SODIUM, URINE, RANDOM   SODIUM, URINE, RANDOM    Narrative:     ADD ON URINE SODIUM PER DR CHLOE MA/ORDER# [id number] @ 20:54   12/6/2020  Specimen Source->Urine  add on test urine osmo per dr chloe ma order# [id number]  21:12    12/06/2020    OSMOLALITY, SERUM   OSMOLALITY, URINE RANDOM    Narrative:     ADD ON URINE SODIUM PER DR CHLOE MA/ORDER# [id number] @ 20:54   12/6/2020  Specimen Source->Urine  add on test urine osmo per dr chloe ma order# [id number]  21:12    12/06/2020         ECG Results          EKG 12-lead (Final result)  Result time 12/07/20 13:07:36    Final result by Interface, Lab In [facility] (12/07/20 13:07:36)                 Narrative:    Test Reason : Z20.828,    Vent. Rate : 088 BPM     Atrial Rate : 088 BPM     P-R Int : 142 ms          QRS Dur : 146 ms      QT Int : 422 ms       P-R-T Axes : 049 263 075 degrees     QTc Int : 510 ms    Atrial-sensed ventricular-paced rhythm  Abnormal ECG  When compared with ECG of 22-SEP-2020 12:36,  Vent. rate has increased BY   3 BPM  Confirmed by ROSARIO HSIEH MD ([id number]) on 12/7/2020 1:07:26 PM    Referred By: [email or web address]   SELF           Confirmed By:ROSARIO HSIEH MD                            Imaging Results          X-Ray Chest AP Portable (Final result)  Result time 12/06/20 18:09:04    Final result by Momo Ricardo MD (12/06/20 18:09:04)                 Impression:      Cardiac device with findings suggesting worsening pulmonary edema/CHF pattern; however, multifocal  interstitial pneumonia from inflammatory or infectious process including atypical bacterial or viral etiology not excluded in the proper clinical setting.  No large consolidation.      Electronically signed by: Momo Ricardo MD  Date:    12/06/2020  Time:    18:09             Narrative:    EXAMINATION:  XR CHEST AP PORTABLE    CLINICAL HISTORY:  Suspected Covid-19 Virus Infection;    TECHNIQUE:  Single frontal view of the chest was performed.    COMPARISON:  Chest radiograph most recent 03/16/2020 and CT thorax 06/04/2018    FINDINGS:  Right upper chest multi lead cardiac device is stable.  Cardiomediastinal silhouette is midline and prominent similar to prior with slight increased prominence of the central pulmonary vasculature as well as interval increased bilateral diffuse nonspecific interstitial coarsening with some patchy ground-glass opacities.  Additionally, there are patchy small irregular opacities with a peripheral and mid to lower lung zone distribution.  The lungs are otherwise symmetrically well expanded without large consolidation, pleural effusion or pneumothorax.  No acute osseous process seen.                                 Medical Decision Making:   History:   Old Medical Records: I decided to obtain old medical records.  Initial Assessment:   50-year-old presenting with worsening cough and shortness of breath with diarrhea for 1 week.  /50 a area O2 sat 88% on room air.    .    Differential Diagnosis:   Ddx includes but is not limited to:   COVID-19 pneumonia, ACS, CHF, , pneumothorax, asthma , COPD. Considered PE however much lower likelihood at this time considering patient's history and presentation.    Clinical Tests:   Lab Tests: Ordered and Reviewed  Radiological Study: Ordered and Reviewed  Medical Tests: Reviewed and Ordered  ED Management:  Plan:  Obtain CBC, CMP, trop, dimer, chest x-ray, steroids and likely admission.              Attending Attestation:         Attending Critical  Care:   Critical Care Times:   Direct Patient Care (initial evaluation, reassessments, and time considering the case)................................................................20 minutes.   Additional History from reviewing old medical records or taking additional history from the family, EMS, PCP, etc.......................5 minutes.   Ordering, Reviewing, and Interpreting Diagnostic Studies...............................................................................................................8 minutes.   Documentation..................................................................................................................................................................................5 minutes.   ==============================================================  · Total Critical Care Time - exclusive of procedural time: 38 minutes.  ==============================================================  Critical Care Condition: life-threatening               ED Course as of Dec 07 2116   Sun Dec 06, 2020   1905 Noted elevated troponin of 0.07, elevated inflammatory markers and interstitial edema on lung x-ray likely secondary to COVID-19.  Patient stable on 2 L nasal cannula.  Given Decadron will admit for further hospital management.    [DC]      ED Course User Index  [DC] Perlita Sanders Jr., MD            Clinical Impression:       ICD-10-CM ICD-9-CM   1. Pneumonia due to COVID-19 virus  U07.1 480.8    J12.89    2. Suspected COVID-19 virus infection  Z20.828 V01.79   3. QT prolongation  R94.31 794.31                          ED Disposition Condition    Admit                             Perlita Sanders Jr., MD  12/07/20 2117

## 2020-12-07 NOTE — PLAN OF CARE
SW contacted pt via telephone to complete initial discharge planning assessment.  Pt unable to complete assessment at this time due to her oxygen.  Pt stated she was just placed on oxygen and will be able to talk later.  Pt gave SW permission to speak with daughter to obtain information.  SW telephoned daughter at [phone] but was unable to speak with her at this time.  SW will follow up.      Alice Denton LMSW  PRN-  [facility]  Ext. [phone]

## 2020-12-07 NOTE — ASSESSMENT & PLAN NOTE
Hyponatremia  Patient giving history of adhering to home BP meds and diuretics. Poor oral intake. On arrival to ED, BP on the lower side. Baseline Cr 1.2. On admission mild AL Cr 1.5.    - Continue home lasix 80 mg BID  - Hold home spironolactone and lisinopril  - Obtain urine electrolytes, osm and creatine  - Monitor intake/output and daily standing weights  - Avoid nephrotoxic agents such as NSAIDs, gadolinium and IV radiocontrast  - Renally dose meds to current GFR  - Maintain MAP > 65  - Her FeUrea was 28%. Etiology likely prerenal. Cr improved to 1.3 today from 1.5 yesterday.

## 2020-12-07 NOTE — H&P
[facility] Hospital Medicine  History & Physical    Patient Name: Laure Ross  MRN: [id number]  Admission Date: 12/6/2020  Attending Physician: Perlita Sanders Jr., MD   Primary Care Provider: Holly Mittal MD    Hospital Medicine Team: [facility] [place] HOSP MED 1 Rishi Rivera DO     Patient information was obtained from patient, past medical records and ER records.     Subjective:     Principal Problem:Pneumonia due to COVID-19 virus    Chief Complaint:   Chief Complaint   Patient presents with    covid Positive     tested + last week, more sob        HPI: Laure Ross is a 50 year old woman with HFrEF 2/2 NICM (EF 15%, CRT-D placed '17), pAF, HTN, IDDM2, HLD, CLARENCE, asthma, GERD and obesity who presents to [facility] for SOB and cough. 1 week ago she began having myalgias, fatigue, nausea, vomiting, headache, and mild dyspnea on exertion. At that time she got a COVID test and was positive on 11/30. Most of her symptoms progressively improved throughout the week but 2 days ago began having worsening SOB even at rest and 1 day ago began having a cough and fever. Denies orthopnea. She says the cough is productive but has been coughing it up and swallowing it so cannot comment on the character of the sputum. She has been taking tylenol at home. She has been adherent with all of her medications including her diuretics and insulin. She denies taking her metolazone recently as needed, however she admits that she has not weighed herself in weeks. She feels she is at her baseline weight. Her urine volume has been unchanged. She has been drinking water but her appetite for food has decreased in the past few days.        Past Medical History:   Diagnosis Date    Anticoagulant long-term use     Arthritis     Asthma     Asthma     Atrial fibrillation     Cardiomyopathy     Cardiomyopathy     CHF (congestive heart failure)     CHF (congestive heart failure)     Chronic combined systolic and diastolic heart  failure 6/3/2016    COPD (chronic obstructive pulmonary disease) 3/28/2018    GERD (gastroesophageal reflux disease)     H/O tubal ligation     Hypertension     Obesity     Renal disorder     Type 2 diabetes mellitus with hyperglycemia     Type 2 diabetes mellitus with polyneuropathy        Past Surgical History:   Procedure Laterality Date    CARDIAC DEFIBRILLATOR PLACEMENT      CARDIAC DEFIBRILLATOR PLACEMENT      CARDIAC DEFIBRILLATOR PLACEMENT      CATARACT EXTRACTION Left     CHOLECYSTECTOMY      COLONOSCOPY N/A 5/2/2016    Procedure: COLONOSCOPY;  Surgeon: Eugene Soto MD;  Location: [facility] ENDO (2ND FLR);  Service: Endoscopy;  Laterality: N/A;    GALLBLADDER SURGERY      iabp  4/2016    TREATMENT OF CARDIAC ARRHYTHMIA N/A 6/12/2020    Procedure: CARDIOVERSION;  Surgeon: Navi Jolly MD;  Location: [facility] EP LAB;  Service: Cardiology;  Laterality: N/A;  AF, BRITTANEY, DCCV, MAC, DM, 3 Prep    TREATMENT OF CARDIAC ARRHYTHMIA N/A 8/7/2020    Procedure: Cardioversion or Defibrillation;  Surgeon: Navi Jolly MD;  Location: [facility] EP LAB;  Service: Cardiology;  Laterality: N/A;  AF, DCCV, MAC, DM, 3 Prep    TUBAL LIGATION         Review of patient's allergies indicates:  No Known Allergies    No current facility-administered medications on file prior to encounter.      Current Outpatient Medications on File Prior to Encounter   Medication Sig    acetaminophen (TYLENOL) 650 MG TbSR TAKE 1 TABLET 3  TIMES DAILY for chest wall pain    allopurinoL (ZYLOPRIM) 100 MG tablet TAKE 2 TABLETS by mouth EVERY DAY    allopurinoL (ZYLOPRIM) 100 MG tablet TAKE 2 TABLETS EVERY DAY.    amiodarone (PACERONE) 200 MG Tab Take 400 mg ( 2 tablets ) twice daily for 2 weeks , then 400 mg ( 2 tablets ) once daily for 2 weeks ,  then reduce to 200 mg  ( 1 tablet) once daily thereafter.       atorvastatin (LIPITOR) 20 MG tablet Take 1 tablet (20 mg total) by mouth once daily.    azelastine (ASTELIN) 137 mcg (0.1 %)  "nasal spray 2 sprays (274 mcg total) by Nasal route 2 (two) times daily.    BD ULTRA-FINE ESSENCE PEN NEEDLE 32 gauge x 5/32" Ndle Use 5 times per day as directed    blood sugar diagnostic Strp Uses 4 times a day, true metrix meter.    budesonide-formoterol 160-4.5 mcg (SYMBICORT) 160-4.5 mcg/actuation HFAA INHALE 2 PUFFS TWICE DAILY. RINSE MOUTH AFTER USE.    cetirizine (ZYRTEC) 10 MG tablet Take 1 tablet (10 mg total) by mouth at bedtime for allergy relief    clotrimazole (LOTRIMIN) 1 % cream APPLY SPARINGLY TO AFFECTED AREA(S) in groin TWICE DAILY for one to two weeks    cycloSPORINE (RESTASIS) 0.05 % ophthalmic emulsion Apply 1 drop to eye.    diclofenac sodium (VOLTAREN) 1 % Gel APPLY 2 grams SPARINGLY TO Knees ONCE DAILY    digoxin (LANOXIN) 125 mcg tablet Take 1 tablet (125 mcg total) by mouth once daily.    dulaglutide (TRULICITY) 1.5 mg/0.5 mL pen injector INJECT 1.5 mg under the skin weekly    dupilumab (DUPIXENT) 300 mg/2 mL Syrg Inject 2 mLs (300 mg total) into the skin every 14 (fourteen) days.    EPINEPHrine (EPIPEN) 0.3 mg/0.3 mL AtIn INJECT 0.3ML IN THE MUSCLE AS DIRECTED FOR ANAPHYLAXIS    EPINEPHrine (EPIPEN) 0.3 mg/0.3 mL AtIn INJECT 0.3ML IN THE MUSCLE AS DIRECTED FOR ANAPHYLAXIS    famotidine (PEPCID) 40 MG tablet TAKE 1 TABLET TWICE DAILY.    fluocinolone and shower cap 0.01 % Oil Apply to affected area of scalp once daily    fluticasone propionate (FLONASE) 50 mcg/actuation nasal spray 2 sprays (100 mcg total) by Each Nostril route once daily.    fluticasone propionate (FLOVENT HFA) 110 mcg/actuation inhaler Inhale 2 puffs into the lungs 2 (two) times daily. Rinse mouth after use    furosemide (LASIX) 40 MG tablet Take 2 tablets (80 mg total) by mouth 2 (two) times daily.    hydrocortisone 2.5 % ointment Apply to affected area on the face two times daily as neeeded.    hydrocortisone 2.5 % ointment Apply to affected area of fave twice daily as needed    insulin (BASAGLAR " KWIKPEN U-100 INSULIN) glargine 100 units/mL (3mL) SubQ pen inject 34 under the skin in the morning and 34 units at night.    insulin aspart U-100 (NOVOLOG) 100 unit/mL (3 mL) InPn pen Inject 22 units into the skin with meals plus scale    insulin aspart U-100 (NOVOLOG) 100 unit/mL (3 mL) InPn pen Inject 16 units under the skin with meals plus scale 150-200+2, 201-250+4, 251-300+6, 301-350+8, >350+10, max daily 78 units.    ketoconazole (NIZORAL) 2 % cream APPLY SPARINGLY TO AFFECTED AREA(S) ONCE DAILY    ketoconazole (NIZORAL) 2 % shampoo APPLY TO SKIN EVERY DAY FOR 5 MINUTES AND RINSE FOR ABOUT 1 TO 2 WEEKS    lancets 30 gauge Misc use 4 times a day    levalbuterol (XOPENEX HFA) 45 mcg/actuation inhaler Inhale 1 to 2 puffs into the lungs every 4 to 6 hours as needed    levalbuterol (XOPENEX) 0.31 mg/3 mL nebulizer solution Take 3 mLs (0.31 mg total) by nebulization every 4 (four) hours as needed for Wheezing. Rescue    levalbuterol (XOPENEX) 0.31 mg/3 mL nebulizer solution USE 1 UNIT DOSE IN NEBULIZER EVERY 4 HOURS AS NEEDED.    linaCLOtide (LINZESS) 72 mcg Cap capsule Take one tablet by mouth daily as needed for constipation    lisinopriL (PRINIVIL,ZESTRIL) 5 MG tablet TAKE 1 TABLET BY MOUTH TWO TIMES A DAY    magnesium oxide (MAG-OX) 400 mg (241.3 mg magnesium) tablet Take 1 tablet (400 mg total) by mouth 2 (two) times daily.    meclizine (ANTIVERT) 25 mg tablet Take 1 tablet (25 mg total) by mouth 2 (two) times daily as needed for Dizziness.    methocarbamoL (ROBAXIN) 750 MG Tab TAKE 1 TABLET BY MOUTH 3 TIMES DAILY.    metOLazone (ZAROXOLYN) 2.5 MG tablet TAKE 1 TABLET DAILY AS DIRECTED.    mometasone-formoterol (DULERA) 200-5 mcg/actuation inhaler INHALE 2 PUFFS AT 12 HOUR INTERVALS (MORNING AND EVENING).    montelukast (SINGULAIR) 10 mg tablet TAKE ONE TABLET BY MOUTH DAILY.    ondansetron (ZOFRAN-ODT) 4 MG TbDL Take 1 tablet (4 mg total) by mouth every 6 (six) hours as needed (nausea).     "pen needle, diabetic 32 gauge x 5/32" Ndle USE AS DIRECTED 5 TIMES DAILY    polyethylene glycol (GLYCOLAX) 17 gram/dose powder MIX 1 CAPFUL IN 8 OUNCES OF LIQUID AND DRINK ONCE DAILY as needed for constipation    potassium chloride (MICRO-K) 10 MEQ CpSR Take 2 capsules (20 mEq total) by mouth once daily.    spironolactone (ALDACTONE) 25 MG tablet Take 1 tablet (25 mg total) by mouth once daily.    tiotropium (SPIRIVA) 18 mcg inhalation capsule INHALE 1 CAPSULE BY MOUTH EVERY DAY    traMADoL (ULTRAM) 50 mg tablet TAKE 1 TABLET EVERY 8 HOURS AS NEEDED.    triamcinolone acetonide 0.025% (KENALOG) 0.025 % cream APPLY SPARINGLY TO AFFECTED AREA(S) of face 2 TIMES DAILY.    warfarin (COUMADIN) 7.5 MG tablet Take 1 tablet by mouth every Tuesday and take 1/2 tablet by mouth daily on all other days - OR AS DIRECTED BY COUMADIN CLINIC     Family History     Problem Relation (Age of Onset)    Heart disease Mother, Father        Tobacco Use    Smoking status: Never Smoker    Smokeless tobacco: Never Used   Substance and Sexual Activity    Alcohol use: No    Drug use: No    Sexual activity: Yes     Partners: Male     Review of Systems   Constitutional: Positive for appetite change, fatigue and fever. Negative for chills.   HENT: Negative for congestion, ear pain, sinus pain and sore throat.    Eyes: Negative for visual disturbance.   Respiratory: Positive for cough and shortness of breath.    Cardiovascular: Negative for chest pain and leg swelling.   Gastrointestinal: Positive for nausea. Negative for abdominal pain, constipation, diarrhea and vomiting.   Genitourinary: Negative for decreased urine volume, difficulty urinating and dysuria.   Musculoskeletal: Negative for back pain and neck pain.   Skin: Negative for rash.   Neurological: Positive for headaches. Negative for dizziness and syncope.   Psychiatric/Behavioral: Negative for confusion and decreased concentration.     Objective:     Vital Signs (Most " Recent):  Temp: 98.2 °F (36.8 °C) (12/06/20 2028)  Pulse: 80 (12/06/20 2029)  Resp: 18 (12/06/20 1741)  BP: 103/63 (12/06/20 2029)  SpO2: 98 % (12/06/20 2029) Vital Signs (24h Range):  Temp:  [98.2 °F (36.8 °C)-99.3 °F (37.4 °C)] 98.2 °F (36.8 °C)  Pulse:  [73-93] 80  Resp:  [18-24] 18  SpO2:  [86 %-99 %] 98 %  BP: (103-122)/(57-66) 103/63     Weight: 109.8 kg (242 lb)  Body mass index is 41.54 kg/m².    Physical Exam  Vitals signs reviewed.   Constitutional:       General: She is not in acute distress.     Appearance: She is well-developed. She is obese. She is not diaphoretic.   HENT:      Head: Normocephalic and atraumatic.      Mouth/Throat:      Mouth: Mucous membranes are moist.   Eyes:      Extraocular Movements: Extraocular movements intact.      Conjunctiva/sclera: Conjunctivae normal.   Neck:      Musculoskeletal: Normal range of motion.      Trachea: No tracheal deviation.   Cardiovascular:      Rate and Rhythm: Normal rate and regular rhythm.      Heart sounds: Normal heart sounds. No murmur.   Pulmonary:      Effort: Pulmonary effort is normal. No respiratory distress.      Breath sounds: Rales (faint at bilateral bases) present. No wheezing.      Comments: On 2L NC  Abdominal:      General: Bowel sounds are normal. There is no distension.      Palpations: Abdomen is soft.      Tenderness: There is no abdominal tenderness.   Musculoskeletal:         General: No deformity.      Comments: Trace pretibial pitting edema bilaterally   Skin:     General: Skin is warm and dry.      Findings: No erythema or rash.   Neurological:      Mental Status: She is alert and oriented to person, place, and time.      Cranial Nerves: No cranial nerve deficit.      Sensory: No sensory deficit.      Motor: No abnormal muscle tone.             Significant Labs:   CBC:   Recent Labs   Lab 12/06/20  1735   WBC 6.68   HGB 11.5*   HCT 37.0        CMP:   Recent Labs   Lab 12/06/20  1735   *   K 4.3   CL 95   CO2 24    GLU 86   BUN 22*   CREATININE 1.5*   CALCIUM 8.5*   PROT 6.9   ALBUMIN 3.0*   BILITOT 0.7   ALKPHOS 73   AST 34   ALT 17   ANIONGAP 11   EGFRNONAA 40.1*     Lactic Acid:   Recent Labs   Lab 12/06/20  1735   LACTATE 1.6     Troponin:   Recent Labs   Lab 12/06/20  1735   TROPONINI 0.070*     All pertinent labs within the past 24 hours have been reviewed.    Significant Imaging: I have reviewed all pertinent imaging results/findings within the past 24 hours.    Assessment/Plan:     * Pneumonia due to COVID-19 virus  COVID-19 Virus Infection  Viral Pneumonia due to COVID-19  - COVID-19 testing: Collection Date: 8/5/2020 Collection Time:   9:28 AM   - Isolation: Airborne/Droplet. Surgical mask on patient. Notify Infection Control  - Diagnostics: Trend Q48hrs if stable, more frequently if patient decompensating       - CBC       - CMP       - Mg        - D-dimer       - Ferritin       - CRP       - CPK       - LDH       - BNP       - Troponin       - Procalcitonin       - ECG       - CXR  Lymphopenia, hyponatremia, hyperferritinemia, elevated troponin, elevated d-dimer, age, and medical comorbidities are significant predictors of poor clinical outcome    - Management: Per [facility] COVID Treatment Protocol    - Telemetry & Continuous Pulse Oximetry    - Nutrition:        - Multivitamin PO daily       - Boost supplement       - Vitamin D 1000IU daily if deficient       - Ascorbic acid 500mg PO bid    - Supportive Care:       - acetaminophen 650mg PO Q6hr PRN fever/headache       - loperamide PRN viral diarrhea       - IVF held due to severely decreased EF       - VTE PPx: home warfarin    - Antibiotics: starting due to worsening SOB and new cough after 1 week of viral symptoms, concerning for bacterial superinfection       - ceftriaxone 1g Q24h x 5 days       - doxycycline 100 mg BID x5 days (substituted for azithromycin given prolonged QTc)     - Remdesivir -- consider tomorrow if still hypoxic     - Dexamethasone 6 mg qd  x10 days    Acute Hypoxemic Respiratory Failure    - Order RT consult via Respiratory Communication for COVID Protocols    - Incentive Spirometer Q2 while awake, Flutter Valve Q4 while awake    - Continuous Pulse Oximetry, goal SpO2 92-96%    - Supplemental O2 via LFNC    - If wheezing, albuterol INH Q6h scheduled & PRN    - If deterioration, may warrant trial of NIPPV in neg pressure room or immediate ICU consult    Moderate persistent asthma without complication  - Continue home maintenance inhalers    Acute kidney injury superimposed on chronic kidney disease  Hyponatremia  Patient giving history of adhering to home BP meds and diuretics. Poor oral intake. On arrival to ED, BP on the lower side. Baseline Cr 1.2. On admission mild AL Cr 1.5.    - Continue home lasix 80 mg BID  - Hold home spironolactone and lisinopril  - Obtain urine electrolytes, osm and creatine  - Monitor intake/output and daily standing weights  - Avoid nephrotoxic agents such as NSAIDs, gadolinium and IV radiocontrast  - Renally dose meds to current GFR  - Maintain MAP > 65    GERD (gastroesophageal reflux disease)  - Continue home famotidine, renally dosed    Chronic combined systolic and diastolic congestive heart failure  Essential hypertension  Patient unsure of her dry weight, says it is 230-240 lbs. She does not weigh herself regularly, usually senses to take her PRN metolazone when she notices abdominal distention. On admission, no JVD, no orthopnea, minimal trace pitting edema (pt says her baseline), no abdominal distention. SBPs in 100-110s/MAPs 70s however extremities warm and pulses strong.     - Continue home lasix 80 mg BID  - Can add PRN metolazone if needed  - Hold home spironolactone and lisinopril 2/2 AL and soft BP    Paroxysmal atrial fibrillation  - Continue home digoxin and amiodarone  - Patient did not take warfarin on day of admission. INR supra-therapeutic at 3.2, so will not give dose overnight, and restart home  dose 12/7  - Daily INR  - Telemetry    Type 2 diabetes mellitus with diabetic polyneuropathy  Home regimen includes long-acting insulin glargine 34 units BID and short-acting insulin aspart 16 units with ISS.    - Diabetic diet  - POCT glucose qAC and qHS  - Patient's glucose far below goal on admission in 80s. Will decrease inpatient regimen significantly to detemir 10 units BID and aspart 3 units WM with LDISS, especially given AL.  - Titrate up insulin as needed      VTE Risk Mitigation (From admission, onward)         Ordered     warfarin (COUMADIN) split tablet 3.75 mg  Daily      12/06/20 1958     IP VTE HIGH RISK PATIENT  Once      12/06/20 1955     Place sequential compression device  Until discontinued      12/06/20 1955                   Rishi Rivera DO  Department of Hospital Medicine   [facility]

## 2020-12-07 NOTE — NURSING
MD called to inform that pt is stating she is on a CPAP machine at nightly at home.  MD stated he will put in the order

## 2020-12-07 NOTE — HOSPITAL COURSE
12/7: Pt still requiring high amount of oxygen. Desat on exertion when Sp02 dropped to 82%. Current SpO2 is 94% on 13 L/min oxygen.    12/8: Pt still requiring high amount of oxygen. Desat on exertion, Current SpO2 is 94% on 15 L/min oxygen.    12/11: Patient developed a headache described as severe 10/10 that improved with fioricet. Overnight headache returned. CTH was obtained which was negative. Patient reports doing better this morning. Has a headache similar to previous migraines, but is improved with fioricet.   12/10: Pt still requiring high amount of oxygen. Given CPAP. CXR is concerning for worsening pulmonary edema. Transferred to ICU.

## 2020-12-07 NOTE — PLAN OF CARE
Problem: Occupational Therapy Goal  Goal: Occupational Therapy Goal  Description: Goals to be met by: 12/21     Patient will increase functional independence with ADLs by performing:    UE Dressing with Supervision.  LE Dressing with Supervision.  Grooming while standing with Supervision.  Toileting from toilet with Supervision for hygiene and clothing management.   Supine to sit with Supervision in preparation for EOB/OOB functional activities.  Toilet transfer to toilet with Supervision.    Outcome: Ongoing, Progressing    OT evaluation completed and POC established.  Sarah Pierre OT  12/7/2020

## 2020-12-07 NOTE — ASSESSMENT & PLAN NOTE
Home regimen includes long-acting insulin glargine 34 units BID and short-acting insulin aspart 16 units with ISS.    - Diabetic diet  - POCT glucose qAC and qHS  - Patient's glucose far below goal on admission in 80s. Will decrease inpatient regimen significantly to detemir 10 units BID and aspart 3 units WM with LDISS, especially given AL.  - Titrate up insulin as needed

## 2020-12-07 NOTE — PT/OT/SLP EVAL
"Occupational Therapy   Co-Evaluation    Name: Laure Ross  MRN: [id number]  Admitting Diagnosis:  Pneumonia due to COVID-19 virus    * No surgery found *    Recommendations:     Discharge Recommendations: home health OT  Discharge Equipment Recommendations:  none  Barriers to discharge:  None    Assessment:     Laure Ross is a 50 y.o. female with a medical diagnosis of Pneumonia due to COVID-19 virus.  She presents with the following performance deficits affecting function: weakness, impaired endurance, impaired self care skills, impaired functional mobilty, gait instability, impaired balance, impaired cardiopulmonary response to activity. Patient is agreeable to participate and tolerates evaluation fairly well. Patient desaturates quickly with any functional mobility. Patient found on 13L O2 with HFNC at 84% with patient completing ADL tasks while sitting up in the chair upon therapy arrival. Patient educated on pursed lip breathing and SpO2 gradually recovered to 92%. O2 titrated up to 15L O2 during evaluation due to SpO2 decreasing to 80%. Following evaluation and PLB technique, patient recovered to 88% after about 5 minutes.    Rehab Prognosis: Good; patient would benefit from acute skilled OT services to address these deficits and reach maximum level of function.       Plan:     Patient to be seen 3 x/week to address the above listed problems via self-care/home management, therapeutic activities, therapeutic exercises  · Plan of Care Expires: 01/07/21  · Plan of Care Reviewed with: patient    Subjective     Chief Complaint: "I feel ok."  Patient/Family Comments/goals: To feel better and return home    Occupational Profile:  Living Environment: Patient lives with her  and 2 adult children in a single story home with with 0 steps to enter. Patient's bathroom has a tub/shower.  Previous level of function: Independent with ADLs, mostly independent with functional mobility but occasionally uses " rollator  Roles and Routines: Wife, mother. Patient drives and does not work. She enjoys playing on her tablet.  Equipment Used at Home:  grab bar, rollator  Assistance upon Discharge: Family    Pain/Comfort:  · Pain Rating 1: 0/10    Patients cultural, spiritual, Latter-day conflicts given the current situation: no    Objective:     Communicated with: RN prior to session.  Patient found up in chair with telemetry, pulse ox (continuous), peripheral IV, oxygen upon OT entry to room.    General Precautions: Standard, airborne, contact, droplet, fall   Orthopedic Precautions:N/A   Braces: N/A     Occupational Performance:    Bed Mobility:    · Did not observe    Functional Mobility/Transfers:  · Patient completed Sit <> Stand Transfer x2 trials (1 from bedside chair, 1 from bedside commode) with stand by assistance with no assistive device   · Patient completed Toilet Transfer onto the bedside commode using Step Transfer technique with stand by assistance with no AD  · Functional Mobility: ~4 steps during chair > bedside commode transfer and ~15' in the room with stand-by assistance and with no AD    Activities of Daily Living:  · Grooming: set-up assistance Patient participated in face wash with a washcloth while sitting up in the chair with Set-up Assistance.   · Upper Body Dressing: minimum assistance Patient donned hospital gown while sitting up in the chair with min assist for line management.  · Toileting: stand by assistance Patient completed toileting on the bedside commode with SBA for minoo-care and managing brief.    Cognitive/Visual Perceptual:  Cognitive/Psychosocial Skills:     -       Oriented to: Person, Place, Time and Situation   -       Follows Commands/attention:Follows multistep  commands    Physical Exam:  Upper Extremity Range of Motion:     -       Right Upper Extremity: WFL  -       Left Upper Extremity: WFL  Upper Extremity Strength:    -       Right Upper Extremity: WFL  -       Left Upper  Extremity: WFL   Strength:    -       Right Upper Extremity: WFL  -       Left Upper Extremity: WFL  Fine Motor Coordination:    -       Intact  Left hand thumb/finger opposition skills and Right hand thumb/finger opposition skills    AMPAC 6 Click ADL:  AMPAC Total Score: 21    Treatment & Education:  Role of OT/evaluation  Educated on importance of sitting up in the chair/out of bed activity with staff assistance while in acute setting to prevent debility  Educated on pursed lip breathing technique  Call button for assistance    Patient left up in chair with all lines intact, call button in reach and RN notified    GOALS:   Multidisciplinary Problems     Occupational Therapy Goals        Problem: Occupational Therapy Goal    Goal Priority Disciplines Outcome Interventions   Occupational Therapy Goal     OT, PT/OT Ongoing, Progressing    Description: Goals to be met by: 12/21     Patient will increase functional independence with ADLs by performing:    UE Dressing with Supervision.  LE Dressing with Supervision.  Grooming while standing with Supervision.  Toileting from toilet with Supervision for hygiene and clothing management.   Supine to sit with Supervision in preparation for EOB/OOB functional activities.  Toilet transfer to toilet with Supervision.                     History:     Past Medical History:   Diagnosis Date    Anticoagulant long-term use     Arthritis     Asthma     Asthma     Atrial fibrillation     Cardiomyopathy     Cardiomyopathy     CHF (congestive heart failure)     CHF (congestive heart failure)     Chronic combined systolic and diastolic heart failure 6/3/2016    COPD (chronic obstructive pulmonary disease) 3/28/2018    GERD (gastroesophageal reflux disease)     H/O tubal ligation     Hypertension     Obesity     Renal disorder     Type 2 diabetes mellitus with hyperglycemia     Type 2 diabetes mellitus with polyneuropathy        Past Surgical History:   Procedure  Laterality Date    CARDIAC DEFIBRILLATOR PLACEMENT      CARDIAC DEFIBRILLATOR PLACEMENT      CARDIAC DEFIBRILLATOR PLACEMENT      CATARACT EXTRACTION Left     CHOLECYSTECTOMY      COLONOSCOPY N/A 5/2/2016    Procedure: COLONOSCOPY;  Surgeon: Eugene Soto MD;  Location: [facility] ENDO ([address] [place] [address]);  Service: Endoscopy;  Laterality: N/A;    GALLBLADDER SURGERY      iabp  4/2016    TREATMENT OF CARDIAC ARRHYTHMIA N/A 6/12/2020    Procedure: CARDIOVERSION;  Surgeon: Navi Jolly MD;  Location: [facility] EP LAB;  Service: Cardiology;  Laterality: N/A;  AF, BRITTANEY, DCCV, MAC, DM, 3 Prep    TREATMENT OF CARDIAC ARRHYTHMIA N/A 8/7/2020    Procedure: Cardioversion or Defibrillation;  Surgeon: Navi Jolly MD;  Location: [facility] EP LAB;  Service: Cardiology;  Laterality: N/A;  AF, DCCV, MAC, DM, 3 Prep    TUBAL LIGATION         Time Tracking:     OT Date of Treatment: 12/07/20  OT Start Time: 1041  OT Stop Time: 1107  OT Total Time (min): 26 min    Billable Minutes:Evaluation 10 minutes  Self Care/Home Management 13 minutes    Sarah Pierre OT  12/7/2020

## 2020-12-07 NOTE — NURSING
Attempt x2 with IV insertion, unsuccessful.  Another nurse attempted IV insertion x2 unsuccessful.  Will inform MD and oncoming nurse

## 2020-12-07 NOTE — PLAN OF CARE
Problem: Physical Therapy Goal  Goal: Physical Therapy Goal  Description: Goals to be met by: 20     Patient will increase functional independence with mobility by performin. Supine to sit with Modified [place]  2. Sit to supine with Modified [place]  3. Sit to stand transfer with Modified [place]  4. Gait  x 50 feet with Modified [place] using LRAD, if needed.     2020 1356 by Sanna Tee PT  Outcome: Ongoing, Progressing    Initial evaluation completed. Patient tolerated assessment well. Established POC and goals. Patient would continue to benefit from skilled PT services in order to improve functional mobility independence.     Sanna Tee, PT, DPT  2020

## 2020-12-07 NOTE — ASSESSMENT & PLAN NOTE
Hyponatremia  Patient giving history of adhering to home BP meds and diuretics. Poor oral intake. On arrival to ED, BP on the lower side. Baseline Cr 1.2. On admission mild AL Cr 1.5.    - Continue home lasix 80 mg BID  - Hold home spironolactone and lisinopril  - Obtain urine electrolytes, osm and creatine  - Monitor intake/output and daily standing weights  - Avoid nephrotoxic agents such as NSAIDs, gadolinium and IV radiocontrast  - Renally dose meds to current GFR  - Maintain MAP > 65

## 2020-12-07 NOTE — ASSESSMENT & PLAN NOTE
COVID-19 Virus Infection  Viral Pneumonia due to COVID-19  - COVID-19 testing: Collection Date: 8/5/2020 Collection Time:   9:28 AM   - Isolation: Airborne/Droplet. Surgical mask on patient. Notify Infection Control  - Diagnostics: Trend Q48hrs if stable, more frequently if patient decompensating       - CBC       - CMP       - Mg        - D-dimer       - Ferritin       - CRP       - CPK       - LDH       - BNP       - Troponin       - Procalcitonin       - ECG       - CXR  Lymphopenia, hyponatremia, hyperferritinemia, elevated troponin, elevated d-dimer, age, and medical comorbidities are significant predictors of poor clinical outcome    - Management: Per [facility] COVID Treatment Protocol    - Telemetry & Continuous Pulse Oximetry    - Nutrition:        - Multivitamin PO daily       - Boost supplement       - Vitamin D 1000IU daily if deficient       - Ascorbic acid 500mg PO bid    - Supportive Care:       - acetaminophen 650mg PO Q6hr PRN fever/headache       - loperamide PRN viral diarrhea       - IVF held due to severely decreased EF       - VTE PPx: home warfarin    - Antibiotics: starting due to worsening SOB and new cough after 1 week of viral symptoms, concerning for bacterial superinfection       - ceftriaxone 1g Q24h x 5 days       - doxycycline 100 mg BID x5 days (substituted for azithromycin given prolonged QTc)     - Remdesivir -- consider tomorrow if still hypoxic     - Dexamethasone 6 mg qd x10 days    Acute Hypoxemic Respiratory Failure    - Order RT consult via Respiratory Communication for COVID Protocols    - Incentive Spirometer Q2 while awake, Flutter Valve Q4 while awake    - Continuous Pulse Oximetry, goal SpO2 92-96%    - Supplemental O2 via LFNC    - If wheezing, albuterol INH Q6h scheduled & PRN    - If deterioration, may warrant trial of NIPPV in neg pressure room or immediate ICU consult

## 2020-12-07 NOTE — PT/OT/SLP EVAL
Physical Therapy Evaluation  Co-Eval with OT    Patient Name:  Laure Ross   MRN:  [id number]    Co-tx completed with OT due to patients decreased activity tolerance in the presence of COVID-19.  Recommendations:     Discharge Recommendations:  home with home health, home health PT   Discharge Equipment Recommendations: none   Barriers to discharge: Increased O2 requirements     Assessment:     Laure Ross is a 50 y.o. female admitted with a medical diagnosis of Pneumonia due to COVID-19 virus.  She presents with the following impairments/functional limitations:  weakness, impaired endurance, impaired self care skills, impaired functional mobilty, gait instability, impaired balance, impaired cardiopulmonary response to activity . Patient tolerated session fairly well. She was primarily limited due to O2 saturations - though patient reports no SOB. Patient will benefit from PT services to address the mentioned deficits in order to promote an improve functional mobility status. Upon d/c, rec of HH in order for patient to progress towards an improved level of functional mobility independence.    Patient on 13L HFNC upon entry. O2 at 84% - allowed increased time to recovery to 92%. With mobility and following O2 to 80%, titrated up to 15L to allow for recovery, no reports of dizziness or SOB. Patient recovered to 88% after about 5min.     Rehab Prognosis: Good; patient would benefit from acute skilled PT services to address these deficits and reach maximum level of function.    Recent Surgery: * No surgery found *      Plan:     During this hospitalization, patient to be seen 3 x/week to address the identified rehab impairments via gait training, therapeutic activities, therapeutic exercises, neuromuscular re-education and progress toward the following goals:    · Plan of Care Expires:  01/06/21    History:     Past Medical History:   Diagnosis Date    Anticoagulant long-term use     Arthritis     Asthma      Asthma     Atrial fibrillation     Cardiomyopathy     Cardiomyopathy     CHF (congestive heart failure)     CHF (congestive heart failure)     Chronic combined systolic and diastolic heart failure 6/3/2016    COPD (chronic obstructive pulmonary disease) 3/28/2018    GERD (gastroesophageal reflux disease)     H/O tubal ligation     Hypertension     Obesity     Renal disorder     Type 2 diabetes mellitus with hyperglycemia     Type 2 diabetes mellitus with polyneuropathy        Past Surgical History:   Procedure Laterality Date    CARDIAC DEFIBRILLATOR PLACEMENT      CARDIAC DEFIBRILLATOR PLACEMENT      CARDIAC DEFIBRILLATOR PLACEMENT      CATARACT EXTRACTION Left     CHOLECYSTECTOMY      COLONOSCOPY N/A 5/2/2016    Procedure: COLONOSCOPY;  Surgeon: Eugene Soto MD;  Location: [facility] ENDO (2ND FLR);  Service: Endoscopy;  Laterality: N/A;    GALLBLADDER SURGERY      iabp  4/2016    TREATMENT OF CARDIAC ARRHYTHMIA N/A 6/12/2020    Procedure: CARDIOVERSION;  Surgeon: Navi Jolly MD;  Location: [facility] EP LAB;  Service: Cardiology;  Laterality: N/A;  AF, BRITTANEY, DCCV, MAC, DM, 3 Prep    TREATMENT OF CARDIAC ARRHYTHMIA N/A 8/7/2020    Procedure: Cardioversion or Defibrillation;  Surgeon: Navi Jolly MD;  Location: [facility] EP LAB;  Service: Cardiology;  Laterality: N/A;  AF, DCCV, MAC, DM, 3 Prep    TUBAL LIGATION         Subjective     Chief Complaint: none   Patient/Family Comments/goals:  To go home  Pain/Comfort:  · Pain Rating 1: 0/10  · Pain Rating Post-Intervention 1: 0/10    Patients cultural, spiritual, Taoist conflicts given the current situation: no    Living Environment:  Patient's living environment is as follows:  Home type Home    1 or 2 stories  [facility]   Number of ERICA/ rails 0 ERICA   AD used?/Owned?  Rollator, grab bars    Bathroom set-up  Tub/shower   Working? no   Driving? yes   Individuals living with patient:  Spouse and 2 adult children    Hobbies/Roles: Playing on her  tablet   Prior to admission, patients level of function was (I) for ADLs and mod(I)-(I) for mobility with rollator as needed.  Equipment used at home: rollator, grab bar.  DME owned (not currently used): none.  Upon discharge, patient will have assistance from spouse.    Objective:     Communicated with RN prior to session.  Patient found up in chair with telemetry, pulse ox (continuous), peripheral IV, oxygen  upon PT entry to room. See below for detailed functional assessment. Patient agreeable to participate in initial evaluation.    General Precautions: Standard, airborne, contact, droplet, fall   Orthopedic Precautions:N/A   Braces: N/A     Exams:  · Cognitive Exam:  Patient is oriented to Person, Place, Time and Situation  · Fine Motor Coordination:  Test:  Intact Impaired  Comments    Rapid ankle DF/PF  X     · Postural Exam:  Patient presented with the following abnormalities:    · -       Rounded shoulders  · -       Forward head  · Sensation:    LEFT LE: -       Intact  light/touch LE RIGHT LE:-       Intact  light/touch LE      ROM and Strength   Right Lower Extremity  Left Lower Extremity    Hip Flexion WFL WFL   Knee Extension  WFL WFL   Knee Flexion  WFL WFL   Ankle DF WFL WFL   Ankle PF  WFL WFL     Functional Mobility:  · Transfers:     · Sit to Stand:  stand by assistance with no AD  · Toilet Transfer: stand by assistance with  no AD  using  Step Transfer  · Gait: ~4 steps to BSC + 15ft with SBA and no AD  ·  mildly unsteady, no LOB  ·  wide ENDER   · patient O2 80% upon completion   · Balance: static standing - SBA; dynamic standing - SBA     Therapeutic Activities and Exercises:  -Patient educated on the role and goal of PT services during acute care LOS. Question and concerns acknowledged and answered as appropriate.   -Will continue to educated as needed.    -White board updated in patients room to reflect level of assistance needed with nursing. RNx1 - SBA     AM-PAC 6 CLICK MOBILITY  Total  Score:19     Patient left up in chair with all lines intact, call button in reach and RN notified.  White board updated in patient room to reflect level of function with nursing.     GOALS:   Multidisciplinary Problems     Physical Therapy Goals        Problem: Physical Therapy Goal    Goal Priority Disciplines Outcome Goal Variances Interventions   Physical Therapy Goal     PT, PT/OT Ongoing, Progressing     Description: Goals to be met by: 20     Patient will increase functional independence with mobility by performin. Supine to sit with Modified [place]  2. Sit to supine with Modified [place]  3. Sit to stand transfer with Modified [place]  4. Gait  x 50 feet with Modified [place] using LRAD, if needed.                      Time Tracking:     PT Received On: 20  PT Start Time: 1041     PT Stop Time: 1107  PT Total Time (min): 26 min     Billable Minutes: Evaluation 10 and Gait Training 16      Sanna Tee, PT  2020

## 2020-12-07 NOTE — ASSESSMENT & PLAN NOTE
Essential hypertension  Patient unsure of her dry weight, says it is 230-240 lbs. She does not weigh herself regularly, usually senses to take her PRN metolazone when she notices abdominal distention. On admission, no JVD, no orthopnea, minimal trace pitting edema (pt says her baseline), no abdominal distention. SBPs in 100-110s/MAPs 70s however extremities warm and pulses strong.     - Continue home lasix 80 mg BID  - Can add PRN metolazone if needed  - Hold home spironolactone and lisinopril 2/2 AL and soft BP

## 2020-12-07 NOTE — ASSESSMENT & PLAN NOTE
- Continue home digoxin and amiodarone  - Patient did not take warfarin on day of admission. INR supra-therapeutic at 3.2, so will not give dose overnight, and restart home dose 12/7  - Daily INR  - Telemetry

## 2020-12-07 NOTE — HPI
Laure Ross is a 50 year old woman with HFrEF 2/2 NICM (EF 15%, CRT-D placed '17), pAF, HTN, IDDM2, HLD, CLARENCE, asthma, GERD and obesity who presents to [facility] for SOB and cough. 1 week ago she began having myalgias, fatigue, nausea, vomiting, headache, and mild dyspnea on exertion. At that time she got a COVID test and was positive on 11/30. Most of her symptoms progressively improved throughout the week but 2 days ago began having worsening SOB even at rest and 1 day ago began having a cough and fever. Denies orthopnea. She says the cough is productive but has been coughing it up and swallowing it so cannot comment on the character of the sputum. She has been taking tylenol at home. She has been adherent with all of her medications including her diuretics and insulin. She denies taking her metolazone recently as needed, however she admits that she has not weighed herself in weeks. She feels she is at her baseline weight. Her urine volume has been unchanged. She has been drinking water but her appetite for food has decreased in the past few days.

## 2020-12-07 NOTE — SUBJECTIVE & OBJECTIVE
Past Medical History:   Diagnosis Date    Anticoagulant long-term use     Arthritis     Asthma     Asthma     Atrial fibrillation     Cardiomyopathy     Cardiomyopathy     CHF (congestive heart failure)     CHF (congestive heart failure)     Chronic combined systolic and diastolic heart failure 6/3/2016    COPD (chronic obstructive pulmonary disease) 3/28/2018    GERD (gastroesophageal reflux disease)     H/O tubal ligation     Hypertension     Obesity     Renal disorder     Type 2 diabetes mellitus with hyperglycemia     Type 2 diabetes mellitus with polyneuropathy        Past Surgical History:   Procedure Laterality Date    CARDIAC DEFIBRILLATOR PLACEMENT      CARDIAC DEFIBRILLATOR PLACEMENT      CARDIAC DEFIBRILLATOR PLACEMENT      CATARACT EXTRACTION Left     CHOLECYSTECTOMY      COLONOSCOPY N/A 5/2/2016    Procedure: COLONOSCOPY;  Surgeon: Eugene Soto MD;  Location: [facility] ([facility]);  Service: Endoscopy;  Laterality: N/A;    GALLBLADDER SURGERY      iabp  4/2016    TREATMENT OF CARDIAC ARRHYTHMIA N/A 6/12/2020    Procedure: CARDIOVERSION;  Surgeon: Navi Jolly MD;  Location: [facility];  Service: Cardiology;  Laterality: N/A;  AF, BRITTANEY, DCCV, MAC, DM, 3 Prep    TREATMENT OF CARDIAC ARRHYTHMIA N/A 8/7/2020    Procedure: Cardioversion or Defibrillation;  Surgeon: Navi Jolly MD;  Location: [facility];  Service: Cardiology;  Laterality: N/A;  AF, DCCV, MAC, DM, 3 Prep    TUBAL LIGATION         Review of patient's allergies indicates:  No Known Allergies    No current facility-administered medications on file prior to encounter.      Current Outpatient Medications on File Prior to Encounter   Medication Sig    acetaminophen (TYLENOL) 650 MG TbSR TAKE 1 TABLET 3  TIMES DAILY for chest wall pain    allopurinoL (ZYLOPRIM) 100 MG tablet TAKE 2 TABLETS by mouth EVERY DAY    allopurinoL (ZYLOPRIM) 100 MG tablet TAKE 2 TABLETS EVERY DAY.    amiodarone (PACERONE) 200 MG  "Tab Take 400 mg ( 2 tablets ) twice daily for 2 weeks , then 400 mg ( 2 tablets ) once daily for 2 weeks ,  then reduce to 200 mg  ( 1 tablet) once daily thereafter.       atorvastatin (LIPITOR) 20 MG tablet Take 1 tablet (20 mg total) by mouth once daily.    azelastine (ASTELIN) 137 mcg (0.1 %) nasal spray 2 sprays (274 mcg total) by Nasal route 2 (two) times daily.    BD ULTRA-FINE ESSENCE PEN NEEDLE 32 gauge x 5/32" Ndle Use 5 times per day as directed    blood sugar diagnostic Strp Uses 4 times a day, true metrix meter.    budesonide-formoterol 160-4.5 mcg (SYMBICORT) 160-4.5 mcg/actuation HFAA INHALE 2 PUFFS TWICE DAILY. RINSE MOUTH AFTER USE.    cetirizine (ZYRTEC) 10 MG tablet Take 1 tablet (10 mg total) by mouth at bedtime for allergy relief    clotrimazole (LOTRIMIN) 1 % cream APPLY SPARINGLY TO AFFECTED AREA(S) in groin TWICE DAILY for one to two weeks    cycloSPORINE (RESTASIS) 0.05 % ophthalmic emulsion Apply 1 drop to eye.    diclofenac sodium (VOLTAREN) 1 % Gel APPLY 2 grams SPARINGLY TO Knees ONCE DAILY    digoxin (LANOXIN) 125 mcg tablet Take 1 tablet (125 mcg total) by mouth once daily.    dulaglutide (TRULICITY) 1.5 mg/0.5 mL pen injector INJECT 1.5 mg under the skin weekly    dupilumab (DUPIXENT) 300 mg/2 mL Syrg Inject 2 mLs (300 mg total) into the skin every 14 (fourteen) days.    EPINEPHrine (EPIPEN) 0.3 mg/0.3 mL AtIn INJECT 0.3ML IN THE MUSCLE AS DIRECTED FOR ANAPHYLAXIS    EPINEPHrine (EPIPEN) 0.3 mg/0.3 mL AtIn INJECT 0.3ML IN THE MUSCLE AS DIRECTED FOR ANAPHYLAXIS    famotidine (PEPCID) 40 MG tablet TAKE 1 TABLET TWICE DAILY.    fluocinolone and shower cap 0.01 % Oil Apply to affected area of scalp once daily    fluticasone propionate (FLONASE) 50 mcg/actuation nasal spray 2 sprays (100 mcg total) by Each Nostril route once daily.    fluticasone propionate (FLOVENT HFA) 110 mcg/actuation inhaler Inhale 2 puffs into the lungs 2 (two) times daily. Rinse mouth after use    " furosemide (LASIX) 40 MG tablet Take 2 tablets (80 mg total) by mouth 2 (two) times daily.    hydrocortisone 2.5 % ointment Apply to affected area on the face two times daily as neeeded.    hydrocortisone 2.5 % ointment Apply to affected area of fave twice daily as needed    insulin (BASAGLAR KWIKPEN U-100 INSULIN) glargine 100 units/mL (3mL) SubQ pen inject 34 under the skin in the morning and 34 units at night.    insulin aspart U-100 (NOVOLOG) 100 unit/mL (3 mL) InPn pen Inject 22 units into the skin with meals plus scale    insulin aspart U-100 (NOVOLOG) 100 unit/mL (3 mL) InPn pen Inject 16 units under the skin with meals plus scale 150-200+2, 201-250+4, 251-300+6, 301-350+8, >350+10, max daily 78 units.    ketoconazole (NIZORAL) 2 % cream APPLY SPARINGLY TO AFFECTED AREA(S) ONCE DAILY    ketoconazole (NIZORAL) 2 % shampoo APPLY TO SKIN EVERY DAY FOR 5 MINUTES AND RINSE FOR ABOUT 1 TO 2 WEEKS    lancets 30 gauge Misc use 4 times a day    levalbuterol (XOPENEX HFA) 45 mcg/actuation inhaler Inhale 1 to 2 puffs into the lungs every 4 to 6 hours as needed    levalbuterol (XOPENEX) 0.31 mg/3 mL nebulizer solution Take 3 mLs (0.31 mg total) by nebulization every 4 (four) hours as needed for Wheezing. Rescue    levalbuterol (XOPENEX) 0.31 mg/3 mL nebulizer solution USE 1 UNIT DOSE IN NEBULIZER EVERY 4 HOURS AS NEEDED.    linaCLOtide (LINZESS) 72 mcg Cap capsule Take one tablet by mouth daily as needed for constipation    lisinopriL (PRINIVIL,ZESTRIL) 5 MG tablet TAKE 1 TABLET BY MOUTH TWO TIMES A DAY    magnesium oxide (MAG-OX) 400 mg (241.3 mg magnesium) tablet Take 1 tablet (400 mg total) by mouth 2 (two) times daily.    meclizine (ANTIVERT) 25 mg tablet Take 1 tablet (25 mg total) by mouth 2 (two) times daily as needed for Dizziness.    methocarbamoL (ROBAXIN) 750 MG Tab TAKE 1 TABLET BY MOUTH 3 TIMES DAILY.    metOLazone (ZAROXOLYN) 2.5 MG tablet TAKE 1 TABLET DAILY AS DIRECTED.     "mometasone-formoterol (DULERA) 200-5 mcg/actuation inhaler INHALE 2 PUFFS AT 12 HOUR INTERVALS (MORNING AND EVENING).    montelukast (SINGULAIR) 10 mg tablet TAKE ONE TABLET BY MOUTH DAILY.    ondansetron (ZOFRAN-ODT) 4 MG TbDL Take 1 tablet (4 mg total) by mouth every 6 (six) hours as needed (nausea).    pen needle, diabetic 32 gauge x 5/32" Ndle USE AS DIRECTED 5 TIMES DAILY    polyethylene glycol (GLYCOLAX) 17 gram/dose powder MIX 1 CAPFUL IN 8 OUNCES OF LIQUID AND DRINK ONCE DAILY as needed for constipation    potassium chloride (MICRO-K) 10 MEQ CpSR Take 2 capsules (20 mEq total) by mouth once daily.    spironolactone (ALDACTONE) 25 MG tablet Take 1 tablet (25 mg total) by mouth once daily.    tiotropium (SPIRIVA) 18 mcg inhalation capsule INHALE 1 CAPSULE BY MOUTH EVERY DAY    traMADoL (ULTRAM) 50 mg tablet TAKE 1 TABLET EVERY 8 HOURS AS NEEDED.    triamcinolone acetonide 0.025% (KENALOG) 0.025 % cream APPLY SPARINGLY TO AFFECTED AREA(S) of face 2 TIMES DAILY.    warfarin (COUMADIN) 7.5 MG tablet Take 1 tablet by mouth every Tuesday and take 1/2 tablet by mouth daily on all other days - OR AS DIRECTED BY COUMADIN CLINIC     Family History     Problem Relation (Age of Onset)    Heart disease Mother, Father        Tobacco Use    Smoking status: Never Smoker    Smokeless tobacco: Never Used   Substance and Sexual Activity    Alcohol use: No    Drug use: No    Sexual activity: Yes     Partners: Male     Review of Systems   Constitutional: Positive for appetite change. Negative for chills, fatigue and fever.   HENT: Negative for congestion, ear pain, sinus pain and sore throat.    Eyes: Negative for visual disturbance.   Respiratory: Positive for shortness of breath. Negative for cough.    Cardiovascular: Negative for chest pain and leg swelling.   Gastrointestinal: Negative for abdominal pain, constipation, diarrhea, nausea and vomiting.   Genitourinary: Negative for decreased urine volume, " difficulty urinating and dysuria.   Musculoskeletal: Negative for back pain and neck pain.   Skin: Negative for rash.   Neurological: Negative for dizziness, syncope and headaches.   Psychiatric/Behavioral: Negative for confusion and decreased concentration.     Objective:     Vital Signs (Most Recent):  Temp: 97.9 °F (36.6 °C) (12/07/20 1103)  Pulse: 81 (12/07/20 1330)  Resp: 19 (12/07/20 1330)  BP: 117/75 (12/07/20 1103)  SpO2: (!) 94 % (12/07/20 1330) Vital Signs (24h Range):  Temp:  [97.9 °F (36.6 °C)-99.3 °F (37.4 °C)] 97.9 °F (36.6 °C)  Pulse:  [69-93] 81  Resp:  [18-30] 19  SpO2:  [86 %-100 %] 94 %  BP: (103-128)/(57-80) 117/75     Weight: 109.7 kg (241 lb 13.5 oz)  Body mass index is 41.51 kg/m².    Physical Exam  Vitals signs reviewed.   Constitutional:       General: She is not in acute distress.     Appearance: She is well-developed. She is obese. She is not diaphoretic.   HENT:      Head: Normocephalic and atraumatic.      Mouth/Throat:      Mouth: Mucous membranes are moist.   Eyes:      Extraocular Movements: Extraocular movements intact.      Conjunctiva/sclera: Conjunctivae normal.   Neck:      Musculoskeletal: Normal range of motion.      Trachea: No tracheal deviation.   Cardiovascular:      Rate and Rhythm: Normal rate and regular rhythm.      Heart sounds: Normal heart sounds. No murmur.   Pulmonary:      Effort: Pulmonary effort is normal. No respiratory distress.      Breath sounds: No wheezing or rales (faint at bilateral bases).      Comments: Nasal canula. Decreased breath sound at bases.   Abdominal:      General: Bowel sounds are normal. There is no distension.      Palpations: Abdomen is soft.      Tenderness: There is no abdominal tenderness.   Musculoskeletal:         General: No deformity.      Comments: Trace pretibial pitting edema bilaterally   Skin:     General: Skin is warm and dry.      Findings: No erythema or rash.   Neurological:      Mental Status: She is alert and oriented  to person, place, and time.      Cranial Nerves: No cranial nerve deficit.      Sensory: No sensory deficit.      Motor: No abnormal muscle tone.             Significant Labs:   CBC:   Recent Labs   Lab 12/06/20 1735 12/07/20  0435   WBC 6.68 6.34   HGB 11.5* 12.0   HCT 37.0 39.5    295     CMP:   Recent Labs   Lab 12/06/20 1735 12/07/20  0435   * 132*   K 4.3 5.2*   CL 95 97   CO2 24 23   GLU 86 220*   BUN 22* 21*   CREATININE 1.5* 1.3   CALCIUM 8.5* 8.5*   PROT 6.9 7.0   ALBUMIN 3.0* 2.9*   BILITOT 0.7 0.5   ALKPHOS 73 73   AST 34 31   ALT 17 16   ANIONGAP 11 12   EGFRNONAA 40.1* 47.6*     Lactic Acid:   Recent Labs   Lab 12/06/20 1735   LACTATE 1.6     Troponin:   Recent Labs   Lab 12/06/20 1735 12/06/20  2214   TROPONINI 0.070* 0.067*     All pertinent labs within the past 24 hours have been reviewed.    Significant Imaging: I have reviewed all pertinent imaging results/findings within the past 24 hours.

## 2020-12-07 NOTE — PROGRESS NOTES
[facility] - ICU 14 [facility] Medicine  Progress Note    Patient Name: Laure Ross  MRN: [id number]  Patient Class: IP- Inpatient   Admission Date: 12/6/2020  Length of Stay: 1 days  Attending Physician: Stone Zelaya MD  Primary Care Provider: Holly Mittal MD        Subjective:     Principal Problem:Pneumonia due to COVID-19 virus        HPI:  Laure Ross is a 50 year old woman with HFrEF 2/2 NICM (EF 15%, CRT-D placed '17), pAF, HTN, IDDM2, HLD, CLARENCE, asthma, GERD and obesity who presents to [facility] for SOB and cough. 1 week ago she began having myalgias, fatigue, nausea, vomiting, headache, and mild dyspnea on exertion. At that time she got a COVID test and was positive on 11/30. Most of her symptoms progressively improved throughout the week but 2 days ago began having worsening SOB even at rest and 1 day ago began having a cough and fever. Denies orthopnea. She says the cough is productive but has been coughing it up and swallowing it so cannot comment on the character of the sputum. She has been taking tylenol at home. She has been adherent with all of her medications including her diuretics and insulin. She denies taking her metolazone recently as needed, however she admits that she has not weighed herself in weeks. She feels she is at her baseline weight. Her urine volume has been unchanged. She has been drinking water but her appetite for food has decreased in the past few days.     Overview/Hospital Course:  12/7: Pt still requiring high amount of oxygen. Desat on exertion when Sp02 dropped to 82%. Current SpO2 is 94% on 13 L/min oxygen.      Past Medical History:   Diagnosis Date    Anticoagulant long-term use     Arthritis     Asthma     Asthma     Atrial fibrillation     Cardiomyopathy     Cardiomyopathy     CHF (congestive heart failure)     CHF (congestive heart failure)     Chronic combined systolic and diastolic heart failure 6/3/2016    COPD (chronic obstructive  pulmonary disease) 3/28/2018    GERD (gastroesophageal reflux disease)     H/O tubal ligation     Hypertension     Obesity     Renal disorder     Type 2 diabetes mellitus with hyperglycemia     Type 2 diabetes mellitus with polyneuropathy        Past Surgical History:   Procedure Laterality Date    CARDIAC DEFIBRILLATOR PLACEMENT      CARDIAC DEFIBRILLATOR PLACEMENT      CARDIAC DEFIBRILLATOR PLACEMENT      CATARACT EXTRACTION Left     CHOLECYSTECTOMY      COLONOSCOPY N/A 5/2/2016    Procedure: COLONOSCOPY;  Surgeon: Eugene Soto MD;  Location: [facility] ENDO (2ND FLR);  Service: Endoscopy;  Laterality: N/A;    GALLBLADDER SURGERY      iabp  4/2016    TREATMENT OF CARDIAC ARRHYTHMIA N/A 6/12/2020    Procedure: CARDIOVERSION;  Surgeon: Navi Jolly MD;  Location: [facility] EP LAB;  Service: Cardiology;  Laterality: N/A;  AF, BRITTANEY, DCCV, MAC, DM, 3 Prep    TREATMENT OF CARDIAC ARRHYTHMIA N/A 8/7/2020    Procedure: Cardioversion or Defibrillation;  Surgeon: Navi Jolly MD;  Location: [facility] EP LAB;  Service: Cardiology;  Laterality: N/A;  AF, DCCV, MAC, DM, 3 Prep    TUBAL LIGATION         Review of patient's allergies indicates:  No Known Allergies    No current facility-administered medications on file prior to encounter.      Current Outpatient Medications on File Prior to Encounter   Medication Sig    acetaminophen (TYLENOL) 650 MG TbSR TAKE 1 TABLET 3  TIMES DAILY for chest wall pain    allopurinoL (ZYLOPRIM) 100 MG tablet TAKE 2 TABLETS by mouth EVERY DAY    allopurinoL (ZYLOPRIM) 100 MG tablet TAKE 2 TABLETS EVERY DAY.    amiodarone (PACERONE) 200 MG Tab Take 400 mg ( 2 tablets ) twice daily for 2 weeks , then 400 mg ( 2 tablets ) once daily for 2 weeks ,  then reduce to 200 mg  ( 1 tablet) once daily thereafter.       atorvastatin (LIPITOR) 20 MG tablet Take 1 tablet (20 mg total) by mouth once daily.    azelastine (ASTELIN) 137 mcg (0.1 %) nasal spray 2 sprays (274 mcg total) by Nasal  "route 2 (two) times daily.    BD ULTRA-FINE ESSENCE PEN NEEDLE 32 gauge x 5/32" Ndle Use 5 times per day as directed    blood sugar diagnostic Strp Uses 4 times a day, true metrix meter.    budesonide-formoterol 160-4.5 mcg (SYMBICORT) 160-4.5 mcg/actuation HFAA INHALE 2 PUFFS TWICE DAILY. RINSE MOUTH AFTER USE.    cetirizine (ZYRTEC) 10 MG tablet Take 1 tablet (10 mg total) by mouth at bedtime for allergy relief    clotrimazole (LOTRIMIN) 1 % cream APPLY SPARINGLY TO AFFECTED AREA(S) in groin TWICE DAILY for one to two weeks    cycloSPORINE (RESTASIS) 0.05 % ophthalmic emulsion Apply 1 drop to eye.    diclofenac sodium (VOLTAREN) 1 % Gel APPLY 2 grams SPARINGLY TO Knees ONCE DAILY    digoxin (LANOXIN) 125 mcg tablet Take 1 tablet (125 mcg total) by mouth once daily.    dulaglutide (TRULICITY) 1.5 mg/0.5 mL pen injector INJECT 1.5 mg under the skin weekly    dupilumab (DUPIXENT) 300 mg/2 mL Syrg Inject 2 mLs (300 mg total) into the skin every 14 (fourteen) days.    EPINEPHrine (EPIPEN) 0.3 mg/0.3 mL AtIn INJECT 0.3ML IN THE MUSCLE AS DIRECTED FOR ANAPHYLAXIS    EPINEPHrine (EPIPEN) 0.3 mg/0.3 mL AtIn INJECT 0.3ML IN THE MUSCLE AS DIRECTED FOR ANAPHYLAXIS    famotidine (PEPCID) 40 MG tablet TAKE 1 TABLET TWICE DAILY.    fluocinolone and shower cap 0.01 % Oil Apply to affected area of scalp once daily    fluticasone propionate (FLONASE) 50 mcg/actuation nasal spray 2 sprays (100 mcg total) by Each Nostril route once daily.    fluticasone propionate (FLOVENT HFA) 110 mcg/actuation inhaler Inhale 2 puffs into the lungs 2 (two) times daily. Rinse mouth after use    furosemide (LASIX) 40 MG tablet Take 2 tablets (80 mg total) by mouth 2 (two) times daily.    hydrocortisone 2.5 % ointment Apply to affected area on the face two times daily as neeeded.    hydrocortisone 2.5 % ointment Apply to affected area of fave twice daily as needed    insulin (BASAGLAR KWIKPEN U-100 INSULIN) glargine 100 units/mL (3mL) " "SubQ pen inject 34 under the skin in the morning and 34 units at night.    insulin aspart U-100 (NOVOLOG) 100 unit/mL (3 mL) InPn pen Inject 22 units into the skin with meals plus scale    insulin aspart U-100 (NOVOLOG) 100 unit/mL (3 mL) InPn pen Inject 16 units under the skin with meals plus scale 150-200+2, 201-250+4, 251-300+6, 301-350+8, >350+10, max daily 78 units.    ketoconazole (NIZORAL) 2 % cream APPLY SPARINGLY TO AFFECTED AREA(S) ONCE DAILY    ketoconazole (NIZORAL) 2 % shampoo APPLY TO SKIN EVERY DAY FOR 5 MINUTES AND RINSE FOR ABOUT 1 TO 2 WEEKS    lancets 30 gauge Misc use 4 times a day    levalbuterol (XOPENEX HFA) 45 mcg/actuation inhaler Inhale 1 to 2 puffs into the lungs every 4 to 6 hours as needed    levalbuterol (XOPENEX) 0.31 mg/3 mL nebulizer solution Take 3 mLs (0.31 mg total) by nebulization every 4 (four) hours as needed for Wheezing. Rescue    levalbuterol (XOPENEX) 0.31 mg/3 mL nebulizer solution USE 1 UNIT DOSE IN NEBULIZER EVERY 4 HOURS AS NEEDED.    linaCLOtide (LINZESS) 72 mcg Cap capsule Take one tablet by mouth daily as needed for constipation    lisinopriL (PRINIVIL,ZESTRIL) 5 MG tablet TAKE 1 TABLET BY MOUTH TWO TIMES A DAY    magnesium oxide (MAG-OX) 400 mg (241.3 mg magnesium) tablet Take 1 tablet (400 mg total) by mouth 2 (two) times daily.    meclizine (ANTIVERT) 25 mg tablet Take 1 tablet (25 mg total) by mouth 2 (two) times daily as needed for Dizziness.    methocarbamoL (ROBAXIN) 750 MG Tab TAKE 1 TABLET BY MOUTH 3 TIMES DAILY.    metOLazone (ZAROXOLYN) 2.5 MG tablet TAKE 1 TABLET DAILY AS DIRECTED.    mometasone-formoterol (DULERA) 200-5 mcg/actuation inhaler INHALE 2 PUFFS AT 12 HOUR INTERVALS (MORNING AND EVENING).    montelukast (SINGULAIR) 10 mg tablet TAKE ONE TABLET BY MOUTH DAILY.    ondansetron (ZOFRAN-ODT) 4 MG TbDL Take 1 tablet (4 mg total) by mouth every 6 (six) hours as needed (nausea).    pen needle, diabetic 32 gauge x 5/32" Ndle USE AS " DIRECTED 5 TIMES DAILY    polyethylene glycol (GLYCOLAX) 17 gram/dose powder MIX 1 CAPFUL IN 8 OUNCES OF LIQUID AND DRINK ONCE DAILY as needed for constipation    potassium chloride (MICRO-K) 10 MEQ CpSR Take 2 capsules (20 mEq total) by mouth once daily.    spironolactone (ALDACTONE) 25 MG tablet Take 1 tablet (25 mg total) by mouth once daily.    tiotropium (SPIRIVA) 18 mcg inhalation capsule INHALE 1 CAPSULE BY MOUTH EVERY DAY    traMADoL (ULTRAM) 50 mg tablet TAKE 1 TABLET EVERY 8 HOURS AS NEEDED.    triamcinolone acetonide 0.025% (KENALOG) 0.025 % cream APPLY SPARINGLY TO AFFECTED AREA(S) of face 2 TIMES DAILY.    warfarin (COUMADIN) 7.5 MG tablet Take 1 tablet by mouth every Tuesday and take 1/2 tablet by mouth daily on all other days - OR AS DIRECTED BY COUMADIN CLINIC     Family History     Problem Relation (Age of Onset)    Heart disease Mother, Father        Tobacco Use    Smoking status: Never Smoker    Smokeless tobacco: Never Used   Substance and Sexual Activity    Alcohol use: No    Drug use: No    Sexual activity: Yes     Partners: Male     Review of Systems   Constitutional: Positive for appetite change. Negative for chills, fatigue and fever.   HENT: Negative for congestion, ear pain, sinus pain and sore throat.    Eyes: Negative for visual disturbance.   Respiratory: Positive for shortness of breath. Negative for cough.    Cardiovascular: Negative for chest pain and leg swelling.   Gastrointestinal: Negative for abdominal pain, constipation, diarrhea, nausea and vomiting.   Genitourinary: Negative for decreased urine volume, difficulty urinating and dysuria.   Musculoskeletal: Negative for back pain and neck pain.   Skin: Negative for rash.   Neurological: Negative for dizziness, syncope and headaches.   Psychiatric/Behavioral: Negative for confusion and decreased concentration.     Objective:     Vital Signs (Most Recent):  Temp: 97.9 °F (36.6 °C) (12/07/20 1103)  Pulse: 81 (12/07/20  1330)  Resp: 19 (12/07/20 1330)  BP: 117/75 (12/07/20 1103)  SpO2: (!) 94 % (12/07/20 1330) Vital Signs (24h Range):  Temp:  [97.9 °F (36.6 °C)-99.3 °F (37.4 °C)] 97.9 °F (36.6 °C)  Pulse:  [69-93] 81  Resp:  [18-30] 19  SpO2:  [86 %-100 %] 94 %  BP: (103-128)/(57-80) 117/75     Weight: 109.7 kg (241 lb 13.5 oz)  Body mass index is 41.51 kg/m².    Physical Exam  Vitals signs reviewed.   Constitutional:       General: She is not in acute distress.     Appearance: She is well-developed. She is obese. She is not diaphoretic.   HENT:      Head: Normocephalic and atraumatic.      Mouth/Throat:      Mouth: Mucous membranes are moist.   Eyes:      Extraocular Movements: Extraocular movements intact.      Conjunctiva/sclera: Conjunctivae normal.   Neck:      Musculoskeletal: Normal range of motion.      Trachea: No tracheal deviation.   Cardiovascular:      Rate and Rhythm: Normal rate and regular rhythm.      Heart sounds: Normal heart sounds. No murmur.   Pulmonary:      Effort: Pulmonary effort is normal. No respiratory distress.      Breath sounds: No wheezing or rales (faint at bilateral bases).      Comments: Nasal canula. Decreased breath sound at bases.   Abdominal:      General: Bowel sounds are normal. There is no distension.      Palpations: Abdomen is soft.      Tenderness: There is no abdominal tenderness.   Musculoskeletal:         General: No deformity.      Comments: Trace pretibial pitting edema bilaterally   Skin:     General: Skin is warm and dry.      Findings: No erythema or rash.   Neurological:      Mental Status: She is alert and oriented to person, place, and time.      Cranial Nerves: No cranial nerve deficit.      Sensory: No sensory deficit.      Motor: No abnormal muscle tone.             Significant Labs:   CBC:   Recent Labs   Lab 12/06/20 1735 12/07/20  0435   WBC 6.68 6.34   HGB 11.5* 12.0   HCT 37.0 39.5    295     CMP:   Recent Labs   Lab 12/06/20 1735 12/07/20  0435   *  132*   K 4.3 5.2*   CL 95 97   CO2 24 23   GLU 86 220*   BUN 22* 21*   CREATININE 1.5* 1.3   CALCIUM 8.5* 8.5*   PROT 6.9 7.0   ALBUMIN 3.0* 2.9*   BILITOT 0.7 0.5   ALKPHOS 73 73   AST 34 31   ALT 17 16   ANIONGAP 11 12   EGFRNONAA 40.1* 47.6*     Lactic Acid:   Recent Labs   Lab 12/06/20  1735   LACTATE 1.6     Troponin:   Recent Labs   Lab 12/06/20  1735 12/06/20  2214   TROPONINI 0.070* 0.067*     All pertinent labs within the past 24 hours have been reviewed.    Significant Imaging: I have reviewed all pertinent imaging results/findings within the past 24 hours.      Assessment/Plan:      * Pneumonia due to COVID-19 virus  COVID-19 Virus Infection  Viral Pneumonia due to COVID-19  - COVID-19 testing: Collection Date: 8/5/2020 Collection Time:   9:28 AM   - Isolation: Airborne/Droplet. Surgical mask on patient. Notify Infection Control  - Diagnostics: Trend Q48hrs if stable, more frequently if patient decompensating       - CBC       - CMP       - Mg        - D-dimer       - Ferritin       - CRP       - CPK       - LDH       - BNP       - Troponin       - Procalcitonin       - ECG       - CXR  Lymphopenia, hyponatremia, hyperferritinemia, elevated troponin, elevated d-dimer, age, and medical comorbidities are significant predictors of poor clinical outcome    - Management: Per [facility] COVID Treatment Protocol    - Telemetry & Continuous Pulse Oximetry    - Nutrition:        - Multivitamin PO daily       - Boost supplement       - Vitamin D 1000IU daily if deficient       - Ascorbic acid 500mg PO bid    - Supportive Care:       - acetaminophen 650mg PO Q6hr PRN fever/headache       - loperamide PRN viral diarrhea       - IVF held due to severely decreased EF       - VTE PPx: home warfarin    - Antibiotics: starting due to worsening SOB and new cough after 1 week of viral symptoms, concerning for bacterial superinfection       - ceftriaxone 1g Q24h x 5 days       - doxycycline 100 mg BID x5 days (substituted for  azithromycin given prolonged QTc)     - Remdesivir -- consider tomorrow if still hypoxic     - Dexamethasone 6 mg qd x10 days    Acute Hypoxemic Respiratory Failure    - Order RT consult via Respiratory Communication for COVID Protocols    - Incentive Spirometer Q2 while awake, Flutter Valve Q4 while awake    - Continuous Pulse Oximetry, goal SpO2 92-96%    - Supplemental O2 via LFNC    - If wheezing, albuterol INH Q6h scheduled & PRN    - If deterioration, may warrant trial of NIPPV in neg pressure room or immediate ICU consult    Moderate persistent asthma without complication  - Continue home maintenance inhalers and montelukast 10 mg.     Acute kidney injury superimposed on chronic kidney disease  Hyponatremia  Patient giving history of adhering to home BP meds and diuretics. Poor oral intake. On arrival to ED, BP on the lower side. Baseline Cr 1.2. On admission mild AL Cr 1.5.    - Continue home lasix 80 mg BID  - Hold home spironolactone and lisinopril  - Obtain urine electrolytes, osm and creatine  - Monitor intake/output and daily standing weights  - Avoid nephrotoxic agents such as NSAIDs, gadolinium and IV radiocontrast  - Renally dose meds to current GFR  - Maintain MAP > 65  - Her FeUrea was 28%. Etiology likely prerenal. Cr improved to 1.3 today from 1.5 yesterday.     GERD (gastroesophageal reflux disease)  - Continue home famotidine, renally dosed    Chronic combined systolic and diastolic congestive heart failure  Essential hypertension  Patient unsure of her dry weight, says it is 230-240 lbs. She does not weigh herself regularly, usually senses to take her PRN metolazone when she notices abdominal distention. On admission, no JVD, no orthopnea, minimal trace pitting edema (pt says her baseline), no abdominal distention. SBPs in 100-110s/MAPs 70s however extremities warm and pulses strong.     - Continue home lasix 80 mg BID  - Can add PRN metolazone if needed  - Hold home spironolactone and  lisinopril 2/2 AL and soft BP    Paroxysmal atrial fibrillation  - Continue home digoxin and amiodarone  - Patient did not take warfarin on day of admission. INR supra-therapeutic at 3.2, so will not give dose overnight, and restart home dose 12/7  - Daily INR  - Telemetry    Type 2 diabetes mellitus with diabetic polyneuropathy  Home regimen includes long-acting insulin glargine 34 units BID and short-acting insulin aspart 16 units with ISS.    - Diabetic diet  - POCT glucose qAC and qHS  - Glucose was in the 200s today. Increased detemir to 13 units bid and aspart 4 tid.   - Titrate up insulin as needed      VTE Risk Mitigation (From admission, onward)         Ordered     warfarin (COUMADIN) split tablet 3.75 mg  Daily      12/06/20 1958     IP VTE HIGH RISK PATIENT  Once      12/06/20 1955     Place sequential compression device  Until discontinued      12/06/20 1955                Discharge Planning   APRIL: 12/11/2020     Code Status: Full Code   Is the patient medically ready for discharge?: No    Reason for patient still in hospital (select all that apply): Treatment                     Kelly Martinez MD  Department of Hospital Medicine   [facility] - ICU 14 [facility]

## 2020-12-07 NOTE — SUBJECTIVE & OBJECTIVE
Past Medical History:   Diagnosis Date    Anticoagulant long-term use     Arthritis     Asthma     Asthma     Atrial fibrillation     Cardiomyopathy     Cardiomyopathy     CHF (congestive heart failure)     CHF (congestive heart failure)     Chronic combined systolic and diastolic heart failure 6/3/2016    COPD (chronic obstructive pulmonary disease) 3/28/2018    GERD (gastroesophageal reflux disease)     H/O tubal ligation     Hypertension     Obesity     Renal disorder     Type 2 diabetes mellitus with hyperglycemia     Type 2 diabetes mellitus with polyneuropathy        Past Surgical History:   Procedure Laterality Date    CARDIAC DEFIBRILLATOR PLACEMENT      CARDIAC DEFIBRILLATOR PLACEMENT      CARDIAC DEFIBRILLATOR PLACEMENT      CATARACT EXTRACTION Left     CHOLECYSTECTOMY      COLONOSCOPY N/A 5/2/2016    Procedure: COLONOSCOPY;  Surgeon: Eugene Soto MD;  Location: [facility] ENDO ([facility] FLR);  Service: Endoscopy;  Laterality: N/A;    GALLBLADDER SURGERY      iabp  4/2016    TREATMENT OF CARDIAC ARRHYTHMIA N/A 6/12/2020    Procedure: CARDIOVERSION;  Surgeon: Navi Jolly MD;  Location: [facility] EP LAB;  Service: Cardiology;  Laterality: N/A;  AF, BRITTANEY, DCCV, MAC, DM, 3 Prep    TREATMENT OF CARDIAC ARRHYTHMIA N/A 8/7/2020    Procedure: Cardioversion or Defibrillation;  Surgeon: Navi Jolly MD;  Location: [facility] EP LAB;  Service: Cardiology;  Laterality: N/A;  AF, DCCV, MAC, DM, 3 Prep    TUBAL LIGATION         Review of patient's allergies indicates:  No Known Allergies    No current facility-administered medications on file prior to encounter.      Current Outpatient Medications on File Prior to Encounter   Medication Sig    acetaminophen (TYLENOL) 650 MG TbSR TAKE 1 TABLET 3  TIMES DAILY for chest wall pain    allopurinoL (ZYLOPRIM) 100 MG tablet TAKE 2 TABLETS by mouth EVERY DAY    allopurinoL (ZYLOPRIM) 100 MG tablet TAKE 2 TABLETS EVERY DAY.    amiodarone (PACERONE) 200 MG  "Tab Take 400 mg ( 2 tablets ) twice daily for 2 weeks , then 400 mg ( 2 tablets ) once daily for 2 weeks ,  then reduce to 200 mg  ( 1 tablet) once daily thereafter.       atorvastatin (LIPITOR) 20 MG tablet Take 1 tablet (20 mg total) by mouth once daily.    azelastine (ASTELIN) 137 mcg (0.1 %) nasal spray 2 sprays (274 mcg total) by Nasal route 2 (two) times daily.    BD ULTRA-FINE ESSENCE PEN NEEDLE 32 gauge x 5/32" Ndle Use 5 times per day as directed    blood sugar diagnostic Strp Uses 4 times a day, true metrix meter.    budesonide-formoterol 160-4.5 mcg (SYMBICORT) 160-4.5 mcg/actuation HFAA INHALE 2 PUFFS TWICE DAILY. RINSE MOUTH AFTER USE.    cetirizine (ZYRTEC) 10 MG tablet Take 1 tablet (10 mg total) by mouth at bedtime for allergy relief    clotrimazole (LOTRIMIN) 1 % cream APPLY SPARINGLY TO AFFECTED AREA(S) in groin TWICE DAILY for one to two weeks    cycloSPORINE (RESTASIS) 0.05 % ophthalmic emulsion Apply 1 drop to eye.    diclofenac sodium (VOLTAREN) 1 % Gel APPLY 2 grams SPARINGLY TO Knees ONCE DAILY    digoxin (LANOXIN) 125 mcg tablet Take 1 tablet (125 mcg total) by mouth once daily.    dulaglutide (TRULICITY) 1.5 mg/0.5 mL pen injector INJECT 1.5 mg under the skin weekly    dupilumab (DUPIXENT) 300 mg/2 mL Syrg Inject 2 mLs (300 mg total) into the skin every 14 (fourteen) days.    EPINEPHrine (EPIPEN) 0.3 mg/0.3 mL AtIn INJECT 0.3ML IN THE MUSCLE AS DIRECTED FOR ANAPHYLAXIS    EPINEPHrine (EPIPEN) 0.3 mg/0.3 mL AtIn INJECT 0.3ML IN THE MUSCLE AS DIRECTED FOR ANAPHYLAXIS    famotidine (PEPCID) 40 MG tablet TAKE 1 TABLET TWICE DAILY.    fluocinolone and shower cap 0.01 % Oil Apply to affected area of scalp once daily    fluticasone propionate (FLONASE) 50 mcg/actuation nasal spray 2 sprays (100 mcg total) by Each Nostril route once daily.    fluticasone propionate (FLOVENT HFA) 110 mcg/actuation inhaler Inhale 2 puffs into the lungs 2 (two) times daily. Rinse mouth after use    " furosemide (LASIX) 40 MG tablet Take 2 tablets (80 mg total) by mouth 2 (two) times daily.    hydrocortisone 2.5 % ointment Apply to affected area on the face two times daily as neeeded.    hydrocortisone 2.5 % ointment Apply to affected area of fave twice daily as needed    insulin (BASAGLAR KWIKPEN U-100 INSULIN) glargine 100 units/mL (3mL) SubQ pen inject 34 under the skin in the morning and 34 units at night.    insulin aspart U-100 (NOVOLOG) 100 unit/mL (3 mL) InPn pen Inject 22 units into the skin with meals plus scale    insulin aspart U-100 (NOVOLOG) 100 unit/mL (3 mL) InPn pen Inject 16 units under the skin with meals plus scale 150-200+2, 201-250+4, 251-300+6, 301-350+8, >350+10, max daily 78 units.    ketoconazole (NIZORAL) 2 % cream APPLY SPARINGLY TO AFFECTED AREA(S) ONCE DAILY    ketoconazole (NIZORAL) 2 % shampoo APPLY TO SKIN EVERY DAY FOR 5 MINUTES AND RINSE FOR ABOUT 1 TO 2 WEEKS    lancets 30 gauge Misc use 4 times a day    levalbuterol (XOPENEX HFA) 45 mcg/actuation inhaler Inhale 1 to 2 puffs into the lungs every 4 to 6 hours as needed    levalbuterol (XOPENEX) 0.31 mg/3 mL nebulizer solution Take 3 mLs (0.31 mg total) by nebulization every 4 (four) hours as needed for Wheezing. Rescue    levalbuterol (XOPENEX) 0.31 mg/3 mL nebulizer solution USE 1 UNIT DOSE IN NEBULIZER EVERY 4 HOURS AS NEEDED.    linaCLOtide (LINZESS) 72 mcg Cap capsule Take one tablet by mouth daily as needed for constipation    lisinopriL (PRINIVIL,ZESTRIL) 5 MG tablet TAKE 1 TABLET BY MOUTH TWO TIMES A DAY    magnesium oxide (MAG-OX) 400 mg (241.3 mg magnesium) tablet Take 1 tablet (400 mg total) by mouth 2 (two) times daily.    meclizine (ANTIVERT) 25 mg tablet Take 1 tablet (25 mg total) by mouth 2 (two) times daily as needed for Dizziness.    methocarbamoL (ROBAXIN) 750 MG Tab TAKE 1 TABLET BY MOUTH 3 TIMES DAILY.    metOLazone (ZAROXOLYN) 2.5 MG tablet TAKE 1 TABLET DAILY AS DIRECTED.     "mometasone-formoterol (DULERA) 200-5 mcg/actuation inhaler INHALE 2 PUFFS AT 12 HOUR INTERVALS (MORNING AND EVENING).    montelukast (SINGULAIR) 10 mg tablet TAKE ONE TABLET BY MOUTH DAILY.    ondansetron (ZOFRAN-ODT) 4 MG TbDL Take 1 tablet (4 mg total) by mouth every 6 (six) hours as needed (nausea).    pen needle, diabetic 32 gauge x 5/32" Ndle USE AS DIRECTED 5 TIMES DAILY    polyethylene glycol (GLYCOLAX) 17 gram/dose powder MIX 1 CAPFUL IN 8 OUNCES OF LIQUID AND DRINK ONCE DAILY as needed for constipation    potassium chloride (MICRO-K) 10 MEQ CpSR Take 2 capsules (20 mEq total) by mouth once daily.    spironolactone (ALDACTONE) 25 MG tablet Take 1 tablet (25 mg total) by mouth once daily.    tiotropium (SPIRIVA) 18 mcg inhalation capsule INHALE 1 CAPSULE BY MOUTH EVERY DAY    traMADoL (ULTRAM) 50 mg tablet TAKE 1 TABLET EVERY 8 HOURS AS NEEDED.    triamcinolone acetonide 0.025% (KENALOG) 0.025 % cream APPLY SPARINGLY TO AFFECTED AREA(S) of face 2 TIMES DAILY.    warfarin (COUMADIN) 7.5 MG tablet Take 1 tablet by mouth every Tuesday and take 1/2 tablet by mouth daily on all other days - OR AS DIRECTED BY COUMADIN CLINIC     Family History     Problem Relation (Age of Onset)    Heart disease Mother, Father        Tobacco Use    Smoking status: Never Smoker    Smokeless tobacco: Never Used   Substance and Sexual Activity    Alcohol use: No    Drug use: No    Sexual activity: Yes     Partners: Male     Review of Systems   Constitutional: Positive for appetite change, fatigue and fever. Negative for chills.   HENT: Negative for congestion, ear pain, sinus pain and sore throat.    Eyes: Negative for visual disturbance.   Respiratory: Positive for cough and shortness of breath.    Cardiovascular: Negative for chest pain and leg swelling.   Gastrointestinal: Positive for nausea. Negative for abdominal pain, constipation, diarrhea and vomiting.   Genitourinary: Negative for decreased urine volume, " difficulty urinating and dysuria.   Musculoskeletal: Negative for back pain and neck pain.   Skin: Negative for rash.   Neurological: Positive for headaches. Negative for dizziness and syncope.   Psychiatric/Behavioral: Negative for confusion and decreased concentration.     Objective:     Vital Signs (Most Recent):  Temp: 98.2 °F (36.8 °C) (12/06/20 2028)  Pulse: 80 (12/06/20 2029)  Resp: 18 (12/06/20 1741)  BP: 103/63 (12/06/20 2029)  SpO2: 98 % (12/06/20 2029) Vital Signs (24h Range):  Temp:  [98.2 °F (36.8 °C)-99.3 °F (37.4 °C)] 98.2 °F (36.8 °C)  Pulse:  [73-93] 80  Resp:  [18-24] 18  SpO2:  [86 %-99 %] 98 %  BP: (103-122)/(57-66) 103/63     Weight: 109.8 kg (242 lb)  Body mass index is 41.54 kg/m².    Physical Exam  Vitals signs reviewed.   Constitutional:       General: She is not in acute distress.     Appearance: She is well-developed. She is obese. She is not diaphoretic.   HENT:      Head: Normocephalic and atraumatic.      Mouth/Throat:      Mouth: Mucous membranes are moist.   Eyes:      Extraocular Movements: Extraocular movements intact.      Conjunctiva/sclera: Conjunctivae normal.   Neck:      Musculoskeletal: Normal range of motion.      Trachea: No tracheal deviation.   Cardiovascular:      Rate and Rhythm: Normal rate and regular rhythm.      Heart sounds: Normal heart sounds. No murmur.   Pulmonary:      Effort: Pulmonary effort is normal. No respiratory distress.      Breath sounds: Rales (faint at bilateral bases) present. No wheezing.      Comments: On 2L NC  Abdominal:      General: Bowel sounds are normal. There is no distension.      Palpations: Abdomen is soft.      Tenderness: There is no abdominal tenderness.   Musculoskeletal:         General: No deformity.      Comments: Trace pretibial pitting edema bilaterally   Skin:     General: Skin is warm and dry.      Findings: No erythema or rash.   Neurological:      Mental Status: She is alert and oriented to person, place, and time.       Cranial Nerves: No cranial nerve deficit.      Sensory: No sensory deficit.      Motor: No abnormal muscle tone.             Significant Labs:   CBC:   Recent Labs   Lab 12/06/20  1735   WBC 6.68   HGB 11.5*   HCT 37.0        CMP:   Recent Labs   Lab 12/06/20  1735   *   K 4.3   CL 95   CO2 24   GLU 86   BUN 22*   CREATININE 1.5*   CALCIUM 8.5*   PROT 6.9   ALBUMIN 3.0*   BILITOT 0.7   ALKPHOS 73   AST 34   ALT 17   ANIONGAP 11   EGFRNONAA 40.1*     Lactic Acid:   Recent Labs   Lab 12/06/20  1735   LACTATE 1.6     Troponin:   Recent Labs   Lab 12/06/20  1735   TROPONINI 0.070*     All pertinent labs within the past 24 hours have been reviewed.    Significant Imaging: I have reviewed all pertinent imaging results/findings within the past 24 hours.

## 2020-12-08 NOTE — PLAN OF CARE
12/08/20 1446   Post-Acute Status   Post-Acute Authorization Other   Other Status No Post-Acute Service Needs   Discharge Delays None known at this time   Discharge Plan   Discharge Plan A Home with family   Discharge Plan B Home with family     CM to follow for discharge planning needs.  PER MontalvoN RN  Case Management  [id number]

## 2020-12-08 NOTE — PLAN OF CARE
Discharge planning assessment completed 12/7/2020 by Alice GARCIA - refer to note 12/7/2020.    CM to follow for discharge planning needs.  JEANNA Montalvo RN  Case Management  [id number]        12/08/20 1447   Discharge Assessment   Assessment Type Discharge Planning Assessment

## 2020-12-08 NOTE — ASSESSMENT & PLAN NOTE
COVID-19 Virus Infection  Viral Pneumonia due to COVID-19  - COVID-19 testing: Collection Date: 8/5/2020 Collection Time:   9:28 AM   - Isolation: Airborne/Droplet. Surgical mask on patient. Notify Infection Control  - Diagnostics: Trend Q48hrs if stable, more frequently if patient decompensating       - CBC       - CMP       - Mg        - D-dimer       - Ferritin       - CRP       - CPK       - LDH       - BNP       - Troponin       - Procalcitonin       - ECG       - CXR  Lymphopenia, hyponatremia, hyperferritinemia, elevated troponin, elevated d-dimer, age, and medical comorbidities are significant predictors of poor clinical outcome    - Management: Per [facility] COVID Treatment Protocol    - Telemetry & Continuous Pulse Oximetry    - Nutrition:        - Multivitamin PO daily       - Boost supplement       - Vitamin D 1000IU daily if deficient       - Ascorbic acid 500mg PO bid    - Supportive Care:       - acetaminophen 650mg PO Q6hr PRN fever/headache       - loperamide PRN viral diarrhea       - IVF held due to severely decreased EF       - VTE PPx: home warfarin    - Antibiotics: starting due to worsening SOB and new cough after 1 week of viral symptoms, concerning for bacterial superinfection       - ceftriaxone 1g Q24h x 5 days       - doxycycline 100 mg BID x5 days (substituted for azithromycin given prolonged QTc)     - Remdesivir -- consider tomorrow if still hypoxic     - Dexamethasone 6 mg qd x10 days    Acute Hypoxemic Respiratory Failure    - Order RT consult via Respiratory Communication for COVID Protocols    - Incentive Spirometer Q2 while awake, Flutter Valve Q4 while awake    - Continuous Pulse Oximetry, goal SpO2 92-96%    - Supplemental O2 via LFNC    - If wheezing, albuterol INH Q6h scheduled & PRN    - If deterioration, may warrant trial of NIPPV in neg pressure room or immediate ICU consult   - Pt still requiring high amount of oxygen. Desat on exertion, Current SpO2 is 94% on 15 L/min  oxygen. Will continue to monitor.

## 2020-12-08 NOTE — SUBJECTIVE & OBJECTIVE
Past Medical History:   Diagnosis Date    Anticoagulant long-term use     Arthritis     Asthma     Asthma     Atrial fibrillation     Cardiomyopathy     Cardiomyopathy     CHF (congestive heart failure)     CHF (congestive heart failure)     Chronic combined systolic and diastolic heart failure 6/3/2016    COPD (chronic obstructive pulmonary disease) 3/28/2018    GERD (gastroesophageal reflux disease)     H/O tubal ligation     Hypertension     Obesity     Renal disorder     Type 2 diabetes mellitus with hyperglycemia     Type 2 diabetes mellitus with polyneuropathy        Past Surgical History:   Procedure Laterality Date    CARDIAC DEFIBRILLATOR PLACEMENT      CARDIAC DEFIBRILLATOR PLACEMENT      CARDIAC DEFIBRILLATOR PLACEMENT      CATARACT EXTRACTION Left     CHOLECYSTECTOMY      COLONOSCOPY N/A 5/2/2016    Procedure: COLONOSCOPY;  Surgeon: Eugene Soto MD;  Location: [facility] ENDO ([facility] FLR);  Service: Endoscopy;  Laterality: N/A;    GALLBLADDER SURGERY      iabp  4/2016    TREATMENT OF CARDIAC ARRHYTHMIA N/A 6/12/2020    Procedure: CARDIOVERSION;  Surgeon: Navi Jolly MD;  Location: [facility] EP LAB;  Service: Cardiology;  Laterality: N/A;  AF, BRITTANEY, DCCV, MAC, DM, 3 Prep    TREATMENT OF CARDIAC ARRHYTHMIA N/A 8/7/2020    Procedure: Cardioversion or Defibrillation;  Surgeon: Navi Jolly MD;  Location: [facility] EP LAB;  Service: Cardiology;  Laterality: N/A;  AF, DCCV, MAC, DM, 3 Prep    TUBAL LIGATION         Review of patient's allergies indicates:  No Known Allergies    No current facility-administered medications on file prior to encounter.      Current Outpatient Medications on File Prior to Encounter   Medication Sig    acetaminophen (TYLENOL) 650 MG TbSR TAKE 1 TABLET 3  TIMES DAILY for chest wall pain    allopurinoL (ZYLOPRIM) 100 MG tablet TAKE 2 TABLETS by mouth EVERY DAY    allopurinoL (ZYLOPRIM) 100 MG tablet TAKE 2 TABLETS EVERY DAY.    amiodarone (PACERONE) 200 MG  "Tab Take 400 mg ( 2 tablets ) twice daily for 2 weeks , then 400 mg ( 2 tablets ) once daily for 2 weeks ,  then reduce to 200 mg  ( 1 tablet) once daily thereafter.       atorvastatin (LIPITOR) 20 MG tablet Take 1 tablet (20 mg total) by mouth once daily.    azelastine (ASTELIN) 137 mcg (0.1 %) nasal spray 2 sprays (274 mcg total) by Nasal route 2 (two) times daily.    BD ULTRA-FINE ESSENCE PEN NEEDLE 32 gauge x 5/32" Ndle Use 5 times per day as directed    blood sugar diagnostic Strp Uses 4 times a day, true metrix meter.    budesonide-formoterol 160-4.5 mcg (SYMBICORT) 160-4.5 mcg/actuation HFAA INHALE 2 PUFFS TWICE DAILY. RINSE MOUTH AFTER USE.    cetirizine (ZYRTEC) 10 MG tablet Take 1 tablet (10 mg total) by mouth at bedtime for allergy relief    clotrimazole (LOTRIMIN) 1 % cream APPLY SPARINGLY TO AFFECTED AREA(S) in groin TWICE DAILY for one to two weeks    cycloSPORINE (RESTASIS) 0.05 % ophthalmic emulsion Apply 1 drop to eye.    diclofenac sodium (VOLTAREN) 1 % Gel APPLY 2 grams SPARINGLY TO Knees ONCE DAILY    digoxin (LANOXIN) 125 mcg tablet Take 1 tablet (125 mcg total) by mouth once daily.    dulaglutide (TRULICITY) 1.5 mg/0.5 mL pen injector INJECT 1.5 mg under the skin weekly    dupilumab (DUPIXENT) 300 mg/2 mL Syrg Inject 2 mLs (300 mg total) into the skin every 14 (fourteen) days.    EPINEPHrine (EPIPEN) 0.3 mg/0.3 mL AtIn INJECT 0.3ML IN THE MUSCLE AS DIRECTED FOR ANAPHYLAXIS    EPINEPHrine (EPIPEN) 0.3 mg/0.3 mL AtIn INJECT 0.3ML IN THE MUSCLE AS DIRECTED FOR ANAPHYLAXIS    famotidine (PEPCID) 40 MG tablet TAKE 1 TABLET TWICE DAILY.    fluocinolone and shower cap 0.01 % Oil Apply to affected area of scalp once daily    fluticasone propionate (FLONASE) 50 mcg/actuation nasal spray 2 sprays (100 mcg total) by Each Nostril route once daily.    fluticasone propionate (FLOVENT HFA) 110 mcg/actuation inhaler Inhale 2 puffs into the lungs 2 (two) times daily. Rinse mouth after use    " furosemide (LASIX) 40 MG tablet Take 2 tablets (80 mg total) by mouth 2 (two) times daily.    hydrocortisone 2.5 % ointment Apply to affected area on the face two times daily as neeeded.    hydrocortisone 2.5 % ointment Apply to affected area of fave twice daily as needed    insulin (BASAGLAR KWIKPEN U-100 INSULIN) glargine 100 units/mL (3mL) SubQ pen inject 34 under the skin in the morning and 34 units at night.    insulin aspart U-100 (NOVOLOG) 100 unit/mL (3 mL) InPn pen Inject 22 units into the skin with meals plus scale    insulin aspart U-100 (NOVOLOG) 100 unit/mL (3 mL) InPn pen Inject 16 units under the skin with meals plus scale 150-200+2, 201-250+4, 251-300+6, 301-350+8, >350+10, max daily 78 units.    ketoconazole (NIZORAL) 2 % cream APPLY SPARINGLY TO AFFECTED AREA(S) ONCE DAILY    ketoconazole (NIZORAL) 2 % shampoo APPLY TO SKIN EVERY DAY FOR 5 MINUTES AND RINSE FOR ABOUT 1 TO 2 WEEKS    lancets 30 gauge Misc use 4 times a day    levalbuterol (XOPENEX HFA) 45 mcg/actuation inhaler Inhale 1 to 2 puffs into the lungs every 4 to 6 hours as needed    levalbuterol (XOPENEX) 0.31 mg/3 mL nebulizer solution Take 3 mLs (0.31 mg total) by nebulization every 4 (four) hours as needed for Wheezing. Rescue    levalbuterol (XOPENEX) 0.31 mg/3 mL nebulizer solution USE 1 UNIT DOSE IN NEBULIZER EVERY 4 HOURS AS NEEDED.    linaCLOtide (LINZESS) 72 mcg Cap capsule Take one tablet by mouth daily as needed for constipation    lisinopriL (PRINIVIL,ZESTRIL) 5 MG tablet TAKE 1 TABLET BY MOUTH TWO TIMES A DAY    magnesium oxide (MAG-OX) 400 mg (241.3 mg magnesium) tablet Take 1 tablet (400 mg total) by mouth 2 (two) times daily.    meclizine (ANTIVERT) 25 mg tablet Take 1 tablet (25 mg total) by mouth 2 (two) times daily as needed for Dizziness.    methocarbamoL (ROBAXIN) 750 MG Tab TAKE 1 TABLET BY MOUTH 3 TIMES DAILY.    metOLazone (ZAROXOLYN) 2.5 MG tablet TAKE 1 TABLET DAILY AS DIRECTED.     "mometasone-formoterol (DULERA) 200-5 mcg/actuation inhaler INHALE 2 PUFFS AT 12 HOUR INTERVALS (MORNING AND EVENING).    montelukast (SINGULAIR) 10 mg tablet TAKE ONE TABLET BY MOUTH DAILY.    ondansetron (ZOFRAN-ODT) 4 MG TbDL Take 1 tablet (4 mg total) by mouth every 6 (six) hours as needed (nausea).    pen needle, diabetic 32 gauge x 5/32" Ndle USE AS DIRECTED 5 TIMES DAILY    polyethylene glycol (GLYCOLAX) 17 gram/dose powder MIX 1 CAPFUL IN 8 OUNCES OF LIQUID AND DRINK ONCE DAILY as needed for constipation    potassium chloride (MICRO-K) 10 MEQ CpSR Take 2 capsules (20 mEq total) by mouth once daily.    spironolactone (ALDACTONE) 25 MG tablet Take 1 tablet (25 mg total) by mouth once daily.    tiotropium (SPIRIVA) 18 mcg inhalation capsule INHALE 1 CAPSULE BY MOUTH EVERY DAY    traMADoL (ULTRAM) 50 mg tablet TAKE 1 TABLET EVERY 8 HOURS AS NEEDED.    triamcinolone acetonide 0.025% (KENALOG) 0.025 % cream APPLY SPARINGLY TO AFFECTED AREA(S) of face 2 TIMES DAILY.    warfarin (COUMADIN) 7.5 MG tablet Take 1 tablet by mouth every Tuesday and take 1/2 tablet by mouth daily on all other days - OR AS DIRECTED BY COUMADIN CLINIC     Family History     Problem Relation (Age of Onset)    Heart disease Mother, Father        Tobacco Use    Smoking status: Never Smoker    Smokeless tobacco: Never Used   Substance and Sexual Activity    Alcohol use: No    Drug use: No    Sexual activity: Yes     Partners: Male     Review of Systems   Constitutional: Positive for appetite change. Negative for chills, fatigue and fever.   HENT: Negative for congestion, ear pain, sinus pain and sore throat.    Eyes: Negative for visual disturbance.   Respiratory: Positive for shortness of breath. Negative for cough.    Cardiovascular: Negative for chest pain and leg swelling.   Gastrointestinal: Negative for abdominal pain, constipation, diarrhea, nausea and vomiting.   Genitourinary: Negative for decreased urine volume, " difficulty urinating and dysuria.   Musculoskeletal: Negative for back pain and neck pain.   Skin: Negative for rash.   Neurological: Negative for dizziness, syncope and headaches.   Psychiatric/Behavioral: Negative for confusion and decreased concentration.     Objective:     Vital Signs (Most Recent):  Temp: 97.7 °F (36.5 °C) (12/08/20 1200)  Pulse: 82 (12/08/20 0816)  Resp: (!) 30 (12/08/20 0816)  BP: 128/80 (12/08/20 0728)  SpO2: (!) 94 % (12/08/20 0816) Vital Signs (24h Range):  Temp:  [97.6 °F (36.4 °C)-98.2 °F (36.8 °C)] 97.7 °F (36.5 °C)  Pulse:  [75-96] 82  Resp:  [19-42] 30  SpO2:  [82 %-97 %] 94 %  BP: (105-135)/(63-80) 128/80     Weight: 109.7 kg (241 lb 13.5 oz)  Body mass index is 41.51 kg/m².    Physical Exam  Vitals signs reviewed.   Constitutional:       General: She is not in acute distress.     Appearance: She is well-developed. She is obese. She is not diaphoretic.   HENT:      Head: Normocephalic and atraumatic.      Mouth/Throat:      Mouth: Mucous membranes are moist.   Eyes:      Extraocular Movements: Extraocular movements intact.      Conjunctiva/sclera: Conjunctivae normal.   Neck:      Musculoskeletal: Normal range of motion.      Trachea: No tracheal deviation.   Cardiovascular:      Rate and Rhythm: Normal rate and regular rhythm.      Heart sounds: Normal heart sounds. No murmur.   Pulmonary:      Effort: Pulmonary effort is normal. No respiratory distress.      Breath sounds: No wheezing or rales (faint at bilateral bases).      Comments: Nasal canula. Decreased breath sound at bases.   Abdominal:      General: Bowel sounds are normal. There is no distension.      Palpations: Abdomen is soft.      Tenderness: There is no abdominal tenderness.   Musculoskeletal:         General: No deformity.      Comments: Trace pretibial pitting edema bilaterally   Skin:     General: Skin is warm and dry.      Findings: No erythema or rash.   Neurological:      Mental Status: She is alert and  oriented to person, place, and time.      Cranial Nerves: No cranial nerve deficit.      Sensory: No sensory deficit.      Motor: No abnormal muscle tone.             Significant Labs:   CBC:   Recent Labs   Lab 12/06/20  1735 12/07/20  0435 12/08/20  0452   WBC 6.68 6.34 13.38*   HGB 11.5* 12.0 12.3   HCT 37.0 39.5 39.4    295 360*     CMP:   Recent Labs   Lab 12/06/20  1735 12/07/20  0435 12/07/20  1353 12/08/20  0452   * 132* 132* 135*   K 4.3 5.2* 4.9 4.5   CL 95 97 97 99   CO2 24 23 24 24   GLU 86 220* 243* 197*   BUN 22* 21* 23* 26*   CREATININE 1.5* 1.3 1.4 1.2   CALCIUM 8.5* 8.5* 8.9 8.9   PROT 6.9 7.0  --  7.2   ALBUMIN 3.0* 2.9*  --  2.9*   BILITOT 0.7 0.5  --  0.4   ALKPHOS 73 73  --  79   AST 34 31  --  32   ALT 17 16  --  15   ANIONGAP 11 12 11 12   EGFRNONAA 40.1* 47.6* 43.5* 52.5*     Lactic Acid:   Recent Labs   Lab 12/06/20 1735   LACTATE 1.6     Troponin:   Recent Labs   Lab 12/06/20  1735 12/06/20  2214   TROPONINI 0.070* 0.067*     All pertinent labs within the past 24 hours have been reviewed.    Significant Imaging: I have reviewed all pertinent imaging results/findings within the past 24 hours.

## 2020-12-08 NOTE — PROGRESS NOTES
[facility] - ICU [facility] Medicine  Progress Note    Patient Name: Laure Ross  MRN: [id number]  Patient Class: IP- Inpatient   Admission Date: 12/6/2020  Length of Stay: 2 days  Attending Physician: Stone Zelaya MD  Primary Care Provider: Holly Mittal MD        Subjective:     Principal Problem:Pneumonia due to COVID-19 virus        HPI:  Laure Ross is a 50 year old woman with HFrEF 2/2 NICM (EF 15%, CRT-D placed '17), pAF, HTN, IDDM2, HLD, CLARENCE, asthma, GERD and obesity who presents to [facility] for SOB and cough. 1 week ago she began having myalgias, fatigue, nausea, vomiting, headache, and mild dyspnea on exertion. At that time she got a COVID test and was positive on 11/30. Most of her symptoms progressively improved throughout the week but 2 days ago began having worsening SOB even at rest and 1 day ago began having a cough and fever. Denies orthopnea. She says the cough is productive but has been coughing it up and swallowing it so cannot comment on the character of the sputum. She has been taking tylenol at home. She has been adherent with all of her medications including her diuretics and insulin. She denies taking her metolazone recently as needed, however she admits that she has not weighed herself in weeks. She feels she is at her baseline weight. Her urine volume has been unchanged. She has been drinking water but her appetite for food has decreased in the past few days.     Overview/Hospital Course:  12/7: Pt still requiring high amount of oxygen. Desat on exertion when Sp02 dropped to 82%. Current SpO2 is 94% on 13 L/min oxygen.    12/8: Pt still requiring high amount of oxygen. Desat on exertion, Current SpO2 is 94% on 15 L/min oxygen.      Past Medical History:   Diagnosis Date    Anticoagulant long-term use     Arthritis     Asthma     Asthma     Atrial fibrillation     Cardiomyopathy     Cardiomyopathy     CHF (congestive heart failure)     CHF (congestive  heart failure)     Chronic combined systolic and diastolic heart failure 6/3/2016    COPD (chronic obstructive pulmonary disease) 3/28/2018    GERD (gastroesophageal reflux disease)     H/O tubal ligation     Hypertension     Obesity     Renal disorder     Type 2 diabetes mellitus with hyperglycemia     Type 2 diabetes mellitus with polyneuropathy        Past Surgical History:   Procedure Laterality Date    CARDIAC DEFIBRILLATOR PLACEMENT      CARDIAC DEFIBRILLATOR PLACEMENT      CARDIAC DEFIBRILLATOR PLACEMENT      CATARACT EXTRACTION Left     CHOLECYSTECTOMY      COLONOSCOPY N/A 5/2/2016    Procedure: COLONOSCOPY;  Surgeon: Eugene Soto MD;  Location: [facility] ENDO ([facility] FLR);  Service: Endoscopy;  Laterality: N/A;    GALLBLADDER SURGERY      iabp  4/2016    TREATMENT OF CARDIAC ARRHYTHMIA N/A 6/12/2020    Procedure: CARDIOVERSION;  Surgeon: Navi Jolly MD;  Location: [facility] EP LAB;  Service: Cardiology;  Laterality: N/A;  AF, BRITTANEY, DCCV, MAC, DM, 3 Prep    TREATMENT OF CARDIAC ARRHYTHMIA N/A 8/7/2020    Procedure: Cardioversion or Defibrillation;  Surgeon: Navi Jolly MD;  Location: [facility] EP LAB;  Service: Cardiology;  Laterality: N/A;  AF, DCCV, MAC, DM, 3 Prep    TUBAL LIGATION         Review of patient's allergies indicates:  No Known Allergies    No current facility-administered medications on file prior to encounter.      Current Outpatient Medications on File Prior to Encounter   Medication Sig    acetaminophen (TYLENOL) 650 MG TbSR TAKE 1 TABLET 3  TIMES DAILY for chest wall pain    allopurinoL (ZYLOPRIM) 100 MG tablet TAKE 2 TABLETS by mouth EVERY DAY    allopurinoL (ZYLOPRIM) 100 MG tablet TAKE 2 TABLETS EVERY DAY.    amiodarone (PACERONE) 200 MG Tab Take 400 mg ( 2 tablets ) twice daily for 2 weeks , then 400 mg ( 2 tablets ) once daily for 2 weeks ,  then reduce to 200 mg  ( 1 tablet) once daily thereafter.       atorvastatin (LIPITOR) 20 MG tablet Take 1 tablet (20 mg  "total) by mouth once daily.    azelastine (ASTELIN) 137 mcg (0.1 %) nasal spray 2 sprays (274 mcg total) by Nasal route 2 (two) times daily.    BD ULTRA-FINE ESSENCE PEN NEEDLE 32 gauge x 5/32" Ndle Use 5 times per day as directed    blood sugar diagnostic Strp Uses 4 times a day, true metrix meter.    budesonide-formoterol 160-4.5 mcg (SYMBICORT) 160-4.5 mcg/actuation HFAA INHALE 2 PUFFS TWICE DAILY. RINSE MOUTH AFTER USE.    cetirizine (ZYRTEC) 10 MG tablet Take 1 tablet (10 mg total) by mouth at bedtime for allergy relief    clotrimazole (LOTRIMIN) 1 % cream APPLY SPARINGLY TO AFFECTED AREA(S) in groin TWICE DAILY for one to two weeks    cycloSPORINE (RESTASIS) 0.05 % ophthalmic emulsion Apply 1 drop to eye.    diclofenac sodium (VOLTAREN) 1 % Gel APPLY 2 grams SPARINGLY TO Knees ONCE DAILY    digoxin (LANOXIN) 125 mcg tablet Take 1 tablet (125 mcg total) by mouth once daily.    dulaglutide (TRULICITY) 1.5 mg/0.5 mL pen injector INJECT 1.5 mg under the skin weekly    dupilumab (DUPIXENT) 300 mg/2 mL Syrg Inject 2 mLs (300 mg total) into the skin every 14 (fourteen) days.    EPINEPHrine (EPIPEN) 0.3 mg/0.3 mL AtIn INJECT 0.3ML IN THE MUSCLE AS DIRECTED FOR ANAPHYLAXIS    EPINEPHrine (EPIPEN) 0.3 mg/0.3 mL AtIn INJECT 0.3ML IN THE MUSCLE AS DIRECTED FOR ANAPHYLAXIS    famotidine (PEPCID) 40 MG tablet TAKE 1 TABLET TWICE DAILY.    fluocinolone and shower cap 0.01 % Oil Apply to affected area of scalp once daily    fluticasone propionate (FLONASE) 50 mcg/actuation nasal spray 2 sprays (100 mcg total) by Each Nostril route once daily.    fluticasone propionate (FLOVENT HFA) 110 mcg/actuation inhaler Inhale 2 puffs into the lungs 2 (two) times daily. Rinse mouth after use    furosemide (LASIX) 40 MG tablet Take 2 tablets (80 mg total) by mouth 2 (two) times daily.    hydrocortisone 2.5 % ointment Apply to affected area on the face two times daily as neeeded.    hydrocortisone 2.5 % ointment Apply to " affected area of fave twice daily as needed    insulin (BASAGLAR KWIKPEN U-100 INSULIN) glargine 100 units/mL (3mL) SubQ pen inject 34 under the skin in the morning and 34 units at night.    insulin aspart U-100 (NOVOLOG) 100 unit/mL (3 mL) InPn pen Inject 22 units into the skin with meals plus scale    insulin aspart U-100 (NOVOLOG) 100 unit/mL (3 mL) InPn pen Inject 16 units under the skin with meals plus scale 150-200+2, 201-250+4, 251-300+6, 301-350+8, >350+10, max daily 78 units.    ketoconazole (NIZORAL) 2 % cream APPLY SPARINGLY TO AFFECTED AREA(S) ONCE DAILY    ketoconazole (NIZORAL) 2 % shampoo APPLY TO SKIN EVERY DAY FOR 5 MINUTES AND RINSE FOR ABOUT 1 TO 2 WEEKS    lancets 30 gauge Misc use 4 times a day    levalbuterol (XOPENEX HFA) 45 mcg/actuation inhaler Inhale 1 to 2 puffs into the lungs every 4 to 6 hours as needed    levalbuterol (XOPENEX) 0.31 mg/3 mL nebulizer solution Take 3 mLs (0.31 mg total) by nebulization every 4 (four) hours as needed for Wheezing. Rescue    levalbuterol (XOPENEX) 0.31 mg/3 mL nebulizer solution USE 1 UNIT DOSE IN NEBULIZER EVERY 4 HOURS AS NEEDED.    linaCLOtide (LINZESS) 72 mcg Cap capsule Take one tablet by mouth daily as needed for constipation    lisinopriL (PRINIVIL,ZESTRIL) 5 MG tablet TAKE 1 TABLET BY MOUTH TWO TIMES A DAY    magnesium oxide (MAG-OX) 400 mg (241.3 mg magnesium) tablet Take 1 tablet (400 mg total) by mouth 2 (two) times daily.    meclizine (ANTIVERT) 25 mg tablet Take 1 tablet (25 mg total) by mouth 2 (two) times daily as needed for Dizziness.    methocarbamoL (ROBAXIN) 750 MG Tab TAKE 1 TABLET BY MOUTH 3 TIMES DAILY.    metOLazone (ZAROXOLYN) 2.5 MG tablet TAKE 1 TABLET DAILY AS DIRECTED.    mometasone-formoterol (DULERA) 200-5 mcg/actuation inhaler INHALE 2 PUFFS AT 12 HOUR INTERVALS (MORNING AND EVENING).    montelukast (SINGULAIR) 10 mg tablet TAKE ONE TABLET BY MOUTH DAILY.    ondansetron (ZOFRAN-ODT) 4 MG TbDL Take 1 tablet  "(4 mg total) by mouth every 6 (six) hours as needed (nausea).    pen needle, diabetic 32 gauge x 5/32" Ndle USE AS DIRECTED 5 TIMES DAILY    polyethylene glycol (GLYCOLAX) 17 gram/dose powder MIX 1 CAPFUL IN 8 OUNCES OF LIQUID AND DRINK ONCE DAILY as needed for constipation    potassium chloride (MICRO-K) 10 MEQ CpSR Take 2 capsules (20 mEq total) by mouth once daily.    spironolactone (ALDACTONE) 25 MG tablet Take 1 tablet (25 mg total) by mouth once daily.    tiotropium (SPIRIVA) 18 mcg inhalation capsule INHALE 1 CAPSULE BY MOUTH EVERY DAY    traMADoL (ULTRAM) 50 mg tablet TAKE 1 TABLET EVERY 8 HOURS AS NEEDED.    triamcinolone acetonide 0.025% (KENALOG) 0.025 % cream APPLY SPARINGLY TO AFFECTED AREA(S) of face 2 TIMES DAILY.    warfarin (COUMADIN) 7.5 MG tablet Take 1 tablet by mouth every Tuesday and take 1/2 tablet by mouth daily on all other days - OR AS DIRECTED BY COUMADIN CLINIC     Family History     Problem Relation (Age of Onset)    Heart disease Mother, Father        Tobacco Use    Smoking status: Never Smoker    Smokeless tobacco: Never Used   Substance and Sexual Activity    Alcohol use: No    Drug use: No    Sexual activity: Yes     Partners: Male     Review of Systems   Constitutional: Positive for appetite change. Negative for chills, fatigue and fever.   HENT: Negative for congestion, ear pain, sinus pain and sore throat.    Eyes: Negative for visual disturbance.   Respiratory: Positive for shortness of breath. Negative for cough.    Cardiovascular: Negative for chest pain and leg swelling.   Gastrointestinal: Negative for abdominal pain, constipation, diarrhea, nausea and vomiting.   Genitourinary: Negative for decreased urine volume, difficulty urinating and dysuria.   Musculoskeletal: Negative for back pain and neck pain.   Skin: Negative for rash.   Neurological: Negative for dizziness, syncope and headaches.   Psychiatric/Behavioral: Negative for confusion and decreased " concentration.     Objective:     Vital Signs (Most Recent):  Temp: 97.7 °F (36.5 °C) (12/08/20 1200)  Pulse: 82 (12/08/20 0816)  Resp: (!) 30 (12/08/20 0816)  BP: 128/80 (12/08/20 0728)  SpO2: (!) 94 % (12/08/20 0816) Vital Signs (24h Range):  Temp:  [97.6 °F (36.4 °C)-98.2 °F (36.8 °C)] 97.7 °F (36.5 °C)  Pulse:  [75-96] 82  Resp:  [19-42] 30  SpO2:  [82 %-97 %] 94 %  BP: (105-135)/(63-80) 128/80     Weight: 109.7 kg (241 lb 13.5 oz)  Body mass index is 41.51 kg/m².    Physical Exam  Vitals signs reviewed.   Constitutional:       General: She is not in acute distress.     Appearance: She is well-developed. She is obese. She is not diaphoretic.   HENT:      Head: Normocephalic and atraumatic.      Mouth/Throat:      Mouth: Mucous membranes are moist.   Eyes:      Extraocular Movements: Extraocular movements intact.      Conjunctiva/sclera: Conjunctivae normal.   Neck:      Musculoskeletal: Normal range of motion.      Trachea: No tracheal deviation.   Cardiovascular:      Rate and Rhythm: Normal rate and regular rhythm.      Heart sounds: Normal heart sounds. No murmur.   Pulmonary:      Effort: Pulmonary effort is normal. No respiratory distress.      Breath sounds: No wheezing or rales (faint at bilateral bases).      Comments: Nasal canula. Decreased breath sound at bases.   Abdominal:      General: Bowel sounds are normal. There is no distension.      Palpations: Abdomen is soft.      Tenderness: There is no abdominal tenderness.   Musculoskeletal:         General: No deformity.      Comments: Trace pretibial pitting edema bilaterally   Skin:     General: Skin is warm and dry.      Findings: No erythema or rash.   Neurological:      Mental Status: She is alert and oriented to person, place, and time.      Cranial Nerves: No cranial nerve deficit.      Sensory: No sensory deficit.      Motor: No abnormal muscle tone.             Significant Labs:   CBC:   Recent Labs   Lab 12/06/20  1735 12/07/20  4855  12/08/20  0452   WBC 6.68 6.34 13.38*   HGB 11.5* 12.0 12.3   HCT 37.0 39.5 39.4    295 360*     CMP:   Recent Labs   Lab 12/06/20  1735 12/07/20  0435 12/07/20  1353 12/08/20  0452   * 132* 132* 135*   K 4.3 5.2* 4.9 4.5   CL 95 97 97 99   CO2 24 23 24 24   GLU 86 220* 243* 197*   BUN 22* 21* 23* 26*   CREATININE 1.5* 1.3 1.4 1.2   CALCIUM 8.5* 8.5* 8.9 8.9   PROT 6.9 7.0  --  7.2   ALBUMIN 3.0* 2.9*  --  2.9*   BILITOT 0.7 0.5  --  0.4   ALKPHOS 73 73  --  79   AST 34 31  --  32   ALT 17 16  --  15   ANIONGAP 11 12 11 12   EGFRNONAA 40.1* 47.6* 43.5* 52.5*     Lactic Acid:   Recent Labs   Lab 12/06/20  1735   LACTATE 1.6     Troponin:   Recent Labs   Lab 12/06/20  1735 12/06/20  2214   TROPONINI 0.070* 0.067*     All pertinent labs within the past 24 hours have been reviewed.    Significant Imaging: I have reviewed all pertinent imaging results/findings within the past 24 hours.      Assessment/Plan:      * Pneumonia due to COVID-19 virus  COVID-19 Virus Infection  Viral Pneumonia due to COVID-19  - COVID-19 testing: Collection Date: 8/5/2020 Collection Time:   9:28 AM   - Isolation: Airborne/Droplet. Surgical mask on patient. Notify Infection Control  - Diagnostics: Trend Q48hrs if stable, more frequently if patient decompensating       - CBC       - CMP       - Mg        - D-dimer       - Ferritin       - CRP       - CPK       - LDH       - BNP       - Troponin       - Procalcitonin       - ECG       - CXR  Lymphopenia, hyponatremia, hyperferritinemia, elevated troponin, elevated d-dimer, age, and medical comorbidities are significant predictors of poor clinical outcome    - [facility]: [facility]    - Telemetry & Continuous Pulse Oximetry    - Nutrition:        - Multivitamin PO daily       - Boost supplement       - Vitamin D 1000IU daily if deficient       - Ascorbic acid 500mg PO bid    - Supportive Care:       - acetaminophen 650mg PO Q6hr PRN fever/headache       -  loperamide PRN viral diarrhea       - IVF held due to severely decreased EF       - VTE PPx: home warfarin    - Antibiotics: starting due to worsening SOB and new cough after 1 week of viral symptoms, concerning for bacterial superinfection       - ceftriaxone 1g Q24h x 5 days       - doxycycline 100 mg BID x5 days (substituted for azithromycin given prolonged QTc)     - Remdesivir -- consider tomorrow if still hypoxic     - Dexamethasone 6 mg qd x10 days    Acute Hypoxemic Respiratory Failure    - Order RT consult via Respiratory Communication for COVID Protocols    - Incentive Spirometer Q2 while awake, Flutter Valve Q4 while awake    - Continuous Pulse Oximetry, goal SpO2 92-96%    - Supplemental O2 via LFNC    - If wheezing, albuterol INH Q6h scheduled & PRN    - If deterioration, may warrant trial of NIPPV in neg pressure room or immediate ICU consult   - Pt still requiring high amount of oxygen. Desat on exertion, Current SpO2 is 94% on 15 L/min oxygen. Will continue to monitor.       Moderate persistent asthma without complication  - Continue home maintenance inhalers and montelukast 10 mg.     Acute kidney injury superimposed on chronic kidney disease  Hyponatremia  Patient giving history of adhering to home BP meds and diuretics. Poor oral intake. On arrival to ED, BP on the lower side. Baseline Cr 1.2. On admission mild AL Cr 1.5.    - Continue home lasix 80 mg BID  - Hold home spironolactone and lisinopril  - Obtain urine electrolytes, osm and creatine  - Monitor intake/output and daily standing weights  - Avoid nephrotoxic agents such as NSAIDs, gadolinium and IV radiocontrast  - Renally dose meds to current GFR  - Maintain MAP > 65  - Her FeUrea was 28%. Etiology likely prerenal. Cr improved to 1.3 today from 1.5 yesterday.     GERD (gastroesophageal reflux disease)  - Continue home famotidine, renally dosed    Chronic combined systolic and diastolic congestive heart failure  Essential  hypertension  Patient unsure of her dry weight, says it is 230-240 lbs. She does not weigh herself regularly, usually senses to take her PRN metolazone when she notices abdominal distention. On admission, no JVD, no orthopnea, minimal trace pitting edema (pt says her baseline), no abdominal distention. SBPs in 100-110s/MAPs 70s however extremities warm and pulses strong.     - Continue home lasix 80 mg BID  - Can add PRN metolazone if needed  - Hold home spironolactone and lisinopril 2/2 AL and soft BP    Paroxysmal atrial fibrillation  - Continue home digoxin and amiodarone  - Patient did not take warfarin on day of admission. INR supra-therapeutic at 3.2, so will not give dose overnight, and restart home dose 12/7  - Daily INR  - Telemetry    Type 2 diabetes mellitus with diabetic polyneuropathy  Home regimen includes long-acting insulin glargine 34 units BID and short-acting insulin aspart 16 units with ISS.    - Diabetic diet  - POCT glucose qAC and qHS  - Glucose was in the 200s today. Increased detemir to 20 units bid and aspart 6 tid.   - Titrate up insulin as needed      VTE Risk Mitigation (From admission, onward)         Ordered     IP VTE HIGH RISK PATIENT  Once      12/06/20 1955     Place sequential compression device  Until discontinued      12/06/20 1955                Discharge Planning   APRIL: 12/11/2020     Code Status: Full Code   Is the patient medically ready for discharge?: No    Reason for patient still in hospital (select all that apply): Treatment  Discharge Plan A: Home                  Kelyl Martinez MD  Department of Hospital Medicine   [facility] - ICU 14 [facility]

## 2020-12-08 NOTE — PLAN OF CARE
SW assigned to case today 12/8/20. SW will assist team with DC needs. JOSE in communication with CM.    Rocio Madison LMSW   - Case Management

## 2020-12-08 NOTE — PLAN OF CARE
Problem: Fall Injury Risk  Goal: Absence of Fall and Fall-Related Injury  Outcome: Ongoing, Progressing     Problem: Adult Inpatient Plan of Care  Goal: Plan of Care Review  Outcome: Ongoing, Progressing  Goal: Patient-Specific Goal (Individualization)  Outcome: Ongoing, Progressing  Goal: Absence of Hospital-Acquired Illness or Injury  Outcome: Ongoing, Progressing  Goal: Optimal Comfort and Wellbeing  Outcome: Ongoing, Progressing  Goal: Readiness for Transition of Care  Outcome: Ongoing, Progressing  Goal: Rounds/Family Conference  Outcome: Ongoing, Progressing     Problem: Bariatric Environmental Safety  Goal: Safety Maintained with Care  Outcome: Ongoing, Progressing     Problem: Diabetes Comorbidity  Goal: Blood Glucose Level Within Desired Range  Outcome: Ongoing, Progressing     Problem: Electrolyte Imbalance (Acute Kidney Injury/Impairment)  Goal: Serum Electrolyte Balance  Outcome: Ongoing, Progressing     Problem: Fluid Imbalance (Acute Kidney Injury/Impairment)  Goal: Optimal Fluid Balance  Outcome: Ongoing, Progressing     Problem: Hematologic Alteration (Acute Kidney Injury/Impairment)  Goal: Hemoglobin, Hematocrit and Platelets Within Normal Range  Outcome: Ongoing, Progressing     Problem: Oral Intake Inadequate (Acute Kidney Injury/Impairment)  Goal: Optimal Nutrition Intake  Outcome: Ongoing, Progressing     Problem: Renal Function Impairment (Acute Kidney Injury/Impairment)  Goal: Effective Renal Function  Outcome: Ongoing, Progressing     Problem: Skin Injury Risk Increased  Goal: Skin Health and Integrity  Outcome: Ongoing, Progressing       Patient aaox4, 02 @ 15L/min via high flow NC. Refused CPAP during the night, stated it was making her anxious and she felt like she could not breathe. Ambulated to bedside commode with x1 stand by assist after refusing purewick. Free from falls and injuries during shift. No concerns or requests voiced. Bed in low position with side rails up x2 and call  light within reach. Will continue plan of care.

## 2020-12-08 NOTE — PLAN OF CARE
Pt aox4. Ambulatory with assistance. Prior to shift, pt was on 2L O2 NC satting 90%. Pt ambulated to bathroom and desat to 80%. Placed on 15L high flow NC, O2 sat 92% at rest. 20G in RAC initiated. Saline locked. Pt reports mild anxiety d/t not being able to breathe as easily as normal. Will continue to monitor.     Problem: Fall Injury Risk  Goal: Absence of Fall and Fall-Related Injury  Outcome: Ongoing, Progressing     Problem: Adult Inpatient Plan of Care  Goal: Plan of Care Review  Outcome: Ongoing, Progressing  Goal: Patient-Specific Goal (Individualization)  Outcome: Ongoing, Progressing  Goal: Absence of Hospital-Acquired Illness or Injury  Outcome: Ongoing, Progressing  Goal: Optimal Comfort and Wellbeing  Outcome: Ongoing, Progressing  Goal: Readiness for Transition of Care  Outcome: Ongoing, Progressing  Goal: Rounds/Family Conference  Outcome: Ongoing, Progressing     Problem: Bariatric Environmental Safety  Goal: Safety Maintained with Care  Outcome: Ongoing, Progressing     Problem: Diabetes Comorbidity  Goal: Blood Glucose Level Within Desired Range  Outcome: Ongoing, Progressing     Problem: Electrolyte Imbalance (Acute Kidney Injury/Impairment)  Goal: Serum Electrolyte Balance  Outcome: Ongoing, Progressing     Problem: Fluid Imbalance (Acute Kidney Injury/Impairment)  Goal: Optimal Fluid Balance  Outcome: Ongoing, Progressing     Problem: Hematologic Alteration (Acute Kidney Injury/Impairment)  Goal: Hemoglobin, Hematocrit and Platelets Within Normal Range  Outcome: Ongoing, Progressing     Problem: Oral Intake Inadequate (Acute Kidney Injury/Impairment)  Goal: Optimal Nutrition Intake  Outcome: Ongoing, Progressing     Problem: Renal Function Impairment (Acute Kidney Injury/Impairment)  Goal: Effective Renal Function  Outcome: Ongoing, Progressing

## 2020-12-08 NOTE — ASSESSMENT & PLAN NOTE
Home regimen includes long-acting insulin glargine 34 units BID and short-acting insulin aspart 16 units with ISS.    - Diabetic diet  - POCT glucose qAC and qHS  - Glucose was in the 200s today. Increased detemir to 20 units bid and aspart 6 tid.   - Titrate up insulin as needed

## 2020-12-09 NOTE — RESPIRATORY THERAPY
Placed pt on 15L HFNC per md sats dropped 86%. I gave the pt 2 puffs of albuterol and sats continued to drop to 78%. Placed pt back on cpap of 5 with 80% o2 sat now 92%

## 2020-12-09 NOTE — ASSESSMENT & PLAN NOTE
Essential hypertension  Patient unsure of her dry weight, says it is 230-240 lbs. She does not weigh herself regularly, usually senses to take her PRN metolazone when she notices abdominal distention. On admission, no JVD, no orthopnea, minimal trace pitting edema (pt says her baseline), no abdominal distention. SBPs in 100-110s/MAPs 70s however extremities warm and pulses strong.     - Continue home lasix 80 mg BID  - Can add PRN metolazone if needed  - Hold home spironolactone and lisinopril 2/2 to soft BP. Restart when appropriate.

## 2020-12-09 NOTE — ASSESSMENT & PLAN NOTE
Hyponatremia  Patient giving history of adhering to home BP meds and diuretics. Poor oral intake. On arrival to ED, BP on the lower side. Baseline Cr 1.2. On admission mild AL Cr 1.5.    -RESOLVED

## 2020-12-09 NOTE — PLAN OF CARE
SW assigned to case today 12/9/20. SW will assist team with DC needs. JOSE in communication with CM.    Rocio Madison LMSW   - Case Management

## 2020-12-09 NOTE — PT/OT/SLP PROGRESS
Occupational Therapy      Patient Name:  Laure Ross   MRN:  [id number]    Patient not seen today secondary to Other (Comment)(Pt attempted this morning at 1030am however RN/RRT hold. Pt tachycardic and was on 100% CPAP.). Will follow-up as appropriate.    Maki Gracia OT  12/9/2020

## 2020-12-09 NOTE — NURSING
Patient placed on 15L/nc high flow. Sats now 95% and sustaining. No acute distress noted. Will continue monitor

## 2020-12-09 NOTE — SUBJECTIVE & OBJECTIVE
Interval History: Patient developed a headache described as severe 10/10 that improved with fioricet. Overnight headache returned. CTH was obtained which was negative. Patient reports doing better this morning. Has a headache similar to previous migraines, but is improved with fioricet.    Review of Systems   Constitutional: Negative for appetite change, chills, fatigue and fever.   HENT: Negative for congestion, ear pain, sinus pain and sore throat.    Eyes: Negative for visual disturbance.   Respiratory: Positive for shortness of breath. Negative for cough.    Cardiovascular: Negative for chest pain and leg swelling.   Gastrointestinal: Negative for abdominal pain, constipation, diarrhea, nausea and vomiting.   Genitourinary: Negative for decreased urine volume, difficulty urinating and dysuria.   Musculoskeletal: Negative for back pain and neck pain.   Skin: Negative for rash.   Neurological: Negative for dizziness, syncope and headaches.   Psychiatric/Behavioral: Negative for confusion and decreased concentration.     Objective:     Vital Signs (Most Recent):  Temp: 98.8 °F (37.1 °C) (12/08/20 2030)  Pulse: 83 (12/09/20 0903)  Resp: (!) 22 (12/09/20 1238)  BP: 116/67 (12/09/20 1030)  SpO2: (!) 89 % (12/09/20 0757) Vital Signs (24h Range):  Temp:  [97.9 °F (36.6 °C)-99.1 °F (37.3 °C)] 98.8 °F (37.1 °C)  Pulse:  [66-90] 83  Resp:  [20-36] 22  SpO2:  [89 %-97 %] 89 %  BP: (114-129)/(57-73) 116/67     Weight: 109.7 kg (241 lb 13.5 oz)  Body mass index is 41.51 kg/m².  No intake or output data in the 24 hours ending 12/09/20 1252   Physical Exam  Vitals signs reviewed.   Constitutional:       General: She is not in acute distress.     Appearance: She is well-developed. She is obese. She is not diaphoretic.   HENT:      Head: Normocephalic and atraumatic.      Mouth/Throat:      Mouth: Mucous membranes are moist.   Eyes:      Extraocular Movements: Extraocular movements intact.      Conjunctiva/sclera: Conjunctivae  normal.   Neck:      Musculoskeletal: Normal range of motion.      Trachea: No tracheal deviation.   Cardiovascular:      Rate and Rhythm: Normal rate and regular rhythm.      Heart sounds: Normal heart sounds. No murmur.   Pulmonary:      Effort: Pulmonary effort is normal. No respiratory distress.      Breath sounds: No wheezing or rales (faint at bilateral bases).      Comments: Nasal canula. Decreased breath sound at bases.   Abdominal:      General: Bowel sounds are normal. There is no distension.      Palpations: Abdomen is soft.      Tenderness: There is no abdominal tenderness.   Musculoskeletal:         General: No deformity.      Comments: Trace pretibial pitting edema bilaterally   Skin:     General: Skin is warm and dry.      Findings: No erythema or rash.   Neurological:      Mental Status: She is alert and oriented to person, place, and time. Mental status is at baseline.      Motor: No abnormal muscle tone.         Significant Labs:   BMP:   Recent Labs   Lab 12/09/20  0343   *      K 4.7      CO2 26   BUN 31*   CREATININE 1.1   CALCIUM 8.9   MG 2.2     CBC:   Recent Labs   Lab 12/08/20  0452 12/09/20  0343   WBC 13.38* 11.91   HGB 12.3 12.9   HCT 39.4 40.8   * 395*     CMP:   Recent Labs   Lab 12/07/20  1353 12/08/20  0452 12/09/20  0343   * 135* 138   K 4.9 4.5 4.7   CL 97 99 101   CO2 24 24 26   * 197* 180*   BUN 23* 26* 31*   CREATININE 1.4 1.2 1.1   CALCIUM 8.9 8.9 8.9   PROT  --  7.2 7.2   ALBUMIN  --  2.9* 2.8*   BILITOT  --  0.4 0.5   ALKPHOS  --  79 90   AST  --  32 33   ALT  --  15 16   ANIONGAP 11 12 11   EGFRNONAA 43.5* 52.5* 58.3*       Significant Imaging: I have reviewed all pertinent imaging results/findings within the past 24 hours.

## 2020-12-09 NOTE — PROGRESS NOTES
[facility] - ICU 14 [facility] Medicine  Progress Note    Patient Name: Laure Ross  MRN: [id number]  Patient Class: IP- Inpatient   Admission Date: 12/6/2020  Length of Stay: 3 days  Attending Physician: Stone Zelaya MD  Primary Care Provider: Holly Mittal MD        Subjective:     Principal Problem:Pneumonia due to COVID-19 virus        HPI:  Laure Ross is a 50 year old woman with HFrEF 2/2 NICM (EF 15%, CRT-D placed '17), pAF, HTN, IDDM2, HLD, CLARENCE, asthma, GERD and obesity who presents to [facility] for SOB and cough. 1 week ago she began having myalgias, fatigue, nausea, vomiting, headache, and mild dyspnea on exertion. At that time she got a COVID test and was positive on 11/30. Most of her symptoms progressively improved throughout the week but 2 days ago began having worsening SOB even at rest and 1 day ago began having a cough and fever. Denies orthopnea. She says the cough is productive but has been coughing it up and swallowing it so cannot comment on the character of the sputum. She has been taking tylenol at home. She has been adherent with all of her medications including her diuretics and insulin. She denies taking her metolazone recently as needed, however she admits that she has not weighed herself in weeks. She feels she is at her baseline weight. Her urine volume has been unchanged. She has been drinking water but her appetite for food has decreased in the past few days.     Overview/Hospital Course:  12/7: Pt still requiring high amount of oxygen. Desat on exertion when Sp02 dropped to 82%. Current SpO2 is 94% on 13 L/min oxygen.    12/8: Pt still requiring high amount of oxygen. Desat on exertion, Current SpO2 is 94% on 15 L/min oxygen.      Interval History: Patient developed a headache described as severe 10/10 that improved with fioricet. Overnight headache returned. CTH was obtained which was negative. Patient reports doing better this morning. Has a headache similar  to previous migraines, but is improved with fioricet.    Review of Systems   Constitutional: Negative for appetite change, chills, fatigue and fever.   HENT: Negative for congestion, ear pain, sinus pain and sore throat.    Eyes: Negative for visual disturbance.   Respiratory: Positive for shortness of breath. Negative for cough.    Cardiovascular: Negative for chest pain and leg swelling.   Gastrointestinal: Negative for abdominal pain, constipation, diarrhea, nausea and vomiting.   Genitourinary: Negative for decreased urine volume, difficulty urinating and dysuria.   Musculoskeletal: Negative for back pain and neck pain.   Skin: Negative for rash.   Neurological: Negative for dizziness, syncope and headaches.   Psychiatric/Behavioral: Negative for confusion and decreased concentration.     Objective:     Vital Signs (Most Recent):  Temp: 98.8 °F (37.1 °C) (12/08/20 2030)  Pulse: 83 (12/09/20 0903)  Resp: (!) 22 (12/09/20 1238)  BP: 116/67 (12/09/20 1030)  SpO2: (!) 89 % (12/09/20 0757) Vital Signs (24h Range):  Temp:  [97.9 °F (36.6 °C)-99.1 °F (37.3 °C)] 98.8 °F (37.1 °C)  Pulse:  [66-90] 83  Resp:  [20-36] 22  SpO2:  [89 %-97 %] 89 %  BP: (114-129)/(57-73) 116/67     Weight: 109.7 kg (241 lb 13.5 oz)  Body mass index is 41.51 kg/m².  No intake or output data in the 24 hours ending 12/09/20 1252   Physical Exam  Vitals signs reviewed.   Constitutional:       General: She is not in acute distress.     Appearance: She is well-developed. She is obese. She is not diaphoretic.   HENT:      Head: Normocephalic and atraumatic.      Mouth/Throat:      Mouth: Mucous membranes are moist.   Eyes:      Extraocular Movements: Extraocular movements intact.      Conjunctiva/sclera: Conjunctivae normal.   Neck:      Musculoskeletal: Normal range of motion.      Trachea: No tracheal deviation.   Cardiovascular:      Rate and Rhythm: Normal rate and regular rhythm.      Heart sounds: Normal heart sounds. No murmur.   Pulmonary:       Effort: Pulmonary effort is normal. No respiratory distress.      Breath sounds: No wheezing or rales (faint at bilateral bases).      Comments: Nasal canula. Decreased breath sound at bases.   Abdominal:      General: Bowel sounds are normal. There is no distension.      Palpations: Abdomen is soft.      Tenderness: There is no abdominal tenderness.   Musculoskeletal:         General: No deformity.      Comments: Trace pretibial pitting edema bilaterally   Skin:     General: Skin is warm and dry.      Findings: No erythema or rash.   Neurological:      Mental Status: She is alert and oriented to person, place, and time. Mental status is at baseline.      Motor: No abnormal muscle tone.         Significant Labs:   BMP:   Recent Labs   Lab 12/09/20  0343   *      K 4.7      CO2 26   BUN 31*   CREATININE 1.1   CALCIUM 8.9   MG 2.2     CBC:   Recent Labs   Lab 12/08/20  0452 12/09/20  0343   WBC 13.38* 11.91   HGB 12.3 12.9   HCT 39.4 40.8   * 395*     CMP:   Recent Labs   Lab 12/07/20  1353 12/08/20  0452 12/09/20  0343   * 135* 138   K 4.9 4.5 4.7   CL 97 99 101   CO2 24 24 26   * 197* 180*   BUN 23* 26* 31*   CREATININE 1.4 1.2 1.1   CALCIUM 8.9 8.9 8.9   PROT  --  7.2 7.2   ALBUMIN  --  2.9* 2.8*   BILITOT  --  0.4 0.5   ALKPHOS  --  79 90   AST  --  32 33   ALT  --  15 16   ANIONGAP 11 12 11   EGFRNONAA 43.5* 52.5* 58.3*       Significant Imaging: I have reviewed all pertinent imaging results/findings within the past 24 hours.      Assessment/Plan:      * Pneumonia due to COVID-19 virus  COVID-19 Virus Infection  Viral Pneumonia due to COVID-19  - COVID-19 testing: Collection Date: 8/5/2020 Collection Time:   9:28 AM   - Isolation: Airborne/Droplet. Surgical mask on patient. Notify Infection Control  - Diagnostics: Trend Q48hrs if stable, more frequently if patient decompensating       - CBC       - CMP       - Mg        - D-dimer       - Ferritin       - CRP       -  CPK       - LDH       - BNP       - Troponin       - Procalcitonin       - ECG       - CXR  Lymphopenia, hyponatremia, hyperferritinemia, elevated troponin, elevated d-dimer, age, and medical comorbidities are significant predictors of poor clinical outcome    - Management: Per [facility] COVID Treatment Protocol    - Telemetry & Continuous Pulse Oximetry    - Nutrition:        - Multivitamin PO daily       - Boost supplement       - Vitamin D 1000IU daily if deficient       - Ascorbic acid 500mg PO bid    - Supportive Care:       - acetaminophen 650mg PO Q6hr PRN fever/headache       - loperamide PRN viral diarrhea       - IVF held due to severely decreased EF       - VTE PPx: home warfarin    - Antibiotics: starting due to worsening SOB and new cough after 1 week of viral symptoms, concerning for bacterial superinfection       - ceftriaxone 1g Q24h x 5 days       - doxycycline 100 mg BID x5 days (substituted for azithromycin given prolonged QTc)     - On Remdesivir      - Dexamethasone 6 mg qd x10 days    Acute Hypoxemic Respiratory Failure    - Order RT consult via Respiratory Communication for COVID Protocols    - Incentive Spirometer Q2 while awake, Flutter Valve Q4 while awake    - Continuous Pulse Oximetry, goal SpO2 92-96%    - Supplemental O2 via LFNC    - If wheezing, albuterol INH Q6h scheduled & PRN    - If deterioration, may warrant trial of NIPPV in neg pressure room or immediate ICU consult   - Pt still requiring high amount of oxygen. Desat on exertion, Current SpO2 is 94% on 15 L/min oxygen. Will continue to monitor.       Moderate persistent asthma without complication  - Continue home maintenance inhalers and montelukast 10 mg.     Acute kidney injury superimposed on chronic kidney disease  Hyponatremia  Patient giving history of adhering to home BP meds and diuretics. Poor oral intake. On arrival to ED, BP on the lower side. Baseline Cr 1.2. On admission mild AL Cr 1.5.    -RESOLVED     GERD  (gastroesophageal reflux disease)  - Continue home famotidine, renally dosed    Chronic combined systolic and diastolic congestive heart failure  Essential hypertension  Patient unsure of her dry weight, says it is 230-240 lbs. She does not weigh herself regularly, usually senses to take her PRN metolazone when she notices abdominal distention. On admission, no JVD, no orthopnea, minimal trace pitting edema (pt says her baseline), no abdominal distention. SBPs in 100-110s/MAPs 70s however extremities warm and pulses strong.     - Continue home lasix 80 mg BID  - Can add PRN metolazone if needed  - Hold home spironolactone and lisinopril 2/2 to soft BP. Restart when appropriate.    Paroxysmal atrial fibrillation  - Continue home digoxin and amiodarone  - Continue warfarin. Dosing per pharmacy  - Daily INR. Maintain INR between 2-3  - Telemetry    Type 2 diabetes mellitus with diabetic polyneuropathy  Home regimen includes long-acting insulin glargine 34 units BID and short-acting insulin aspart 16 units with ISS.    - Diabetic diet  - POCT glucose qAC and qHS  - Glucose was in the 200s today. Increased detemir to 20 units bid and aspart 6 tid.   - Titrate up insulin as needed      VTE Risk Mitigation (From admission, onward)         Ordered     warfarin (COUMADIN) split tablet 3.75 mg  Daily      12/09/20 0922     IP VTE HIGH RISK PATIENT  Once      12/06/20 1955     Place sequential compression device  Until discontinued      12/06/20 1955                Discharge Planning   APRIL: 12/14/2020     Code Status: Full Code   Is the patient medically ready for discharge?: No    Reason for patient still in hospital (select all that apply): Treatment  Discharge Plan A: Home with family   Discharge Delays: None known at this time              Gisselle Costello MD  Department of Hospital Medicine   [facility] - ICU 14 [facility]

## 2020-12-09 NOTE — ASSESSMENT & PLAN NOTE
COVID-19 Virus Infection  Viral Pneumonia due to COVID-19  - COVID-19 testing: Collection Date: 8/5/2020 Collection Time:   9:28 AM   - Isolation: Airborne/Droplet. Surgical mask on patient. Notify Infection Control  - Diagnostics: Trend Q48hrs if stable, more frequently if patient decompensating       - CBC       - CMP       - Mg        - D-dimer       - Ferritin       - CRP       - CPK       - LDH       - BNP       - Troponin       - Procalcitonin       - ECG       - CXR  Lymphopenia, hyponatremia, hyperferritinemia, elevated troponin, elevated d-dimer, age, and medical comorbidities are significant predictors of poor clinical outcome    - Management: Per [facility] COVID Treatment Protocol    - Telemetry & Continuous Pulse Oximetry    - Nutrition:        - Multivitamin PO daily       - Boost supplement       - Vitamin D 1000IU daily if deficient       - Ascorbic acid 500mg PO bid    - Supportive Care:       - acetaminophen 650mg PO Q6hr PRN fever/headache       - loperamide PRN viral diarrhea       - IVF held due to severely decreased EF       - VTE PPx: home warfarin    - Antibiotics: starting due to worsening SOB and new cough after 1 week of viral symptoms, concerning for bacterial superinfection       - ceftriaxone 1g Q24h x 5 days       - doxycycline 100 mg BID x5 days (substituted for azithromycin given prolonged QTc)     - On Remdesivir      - Dexamethasone 6 mg qd x10 days    Acute Hypoxemic Respiratory Failure    - Order RT consult via Respiratory Communication for COVID Protocols    - Incentive Spirometer Q2 while awake, Flutter Valve Q4 while awake    - Continuous Pulse Oximetry, goal SpO2 92-96%    - Supplemental O2 via LFNC    - If wheezing, albuterol INH Q6h scheduled & PRN    - If deterioration, may warrant trial of NIPPV in neg pressure room or immediate ICU consult   - Pt still requiring high amount of oxygen. Desat on exertion, Current SpO2 is 94% on 15 L/min oxygen. Will continue to monitor.

## 2020-12-09 NOTE — NURSING
Patient arrived back for CT scan. Transfer back in bed from stretcher. Placed back on 10L /nc high flow. Sats noted dropping in upper 80s. Patient place on Non rebreather. Sat 95% will continue to monitor

## 2020-12-09 NOTE — PT/OT/SLP PROGRESS
Physical Therapy      Patient Name:  Laure Ross   MRN:  [id number]    Patient not seen today secondary to per RN/RT requesting patient hold this date due to increasing O2 needs and tachycardia. Patient on CPAP - 100%. PT to hold formal session this date. Will follow-up as scheduled.    Sanna Tee, PT   12/09/2020

## 2020-12-09 NOTE — ASSESSMENT & PLAN NOTE
- Continue home digoxin and amiodarone  - Continue warfarin. Dosing per pharmacy  - Daily INR. Maintain INR between 2-3  - Telemetry

## 2020-12-10 PROBLEM — R65.20 SEVERE SEPSIS: Status: ACTIVE | Noted: 2020-01-01

## 2020-12-10 PROBLEM — A41.9 SEVERE SEPSIS: Status: ACTIVE | Noted: 2020-01-01

## 2020-12-10 PROBLEM — J96.01 ACUTE HYPOXEMIC RESPIRATORY FAILURE: Status: ACTIVE | Noted: 2020-01-01

## 2020-12-10 NOTE — ASSESSMENT & PLAN NOTE
AL on CKD noted on admit, improving now. Baseline creatinine ~1.2  --monitor for now  --campa in place   --diurese as tolerated

## 2020-12-10 NOTE — ASSESSMENT & PLAN NOTE
--Continue home digoxin and amiodarone  --Hold warfarin, INR supra-therapeutic. Start heparin gtt when INR <2  --f/u EKG

## 2020-12-10 NOTE — PLAN OF CARE
Pt on Cpap with FIO2 of 80% sat's are 90-92% no tachypnea noted.  Medicine MD concerned with pt FIO2 of 80%. States this  is to much oxygen and patient should be considered for ICU transfer.  MD will come down and evaluate patient. Pt stable at this time

## 2020-12-10 NOTE — PROGRESS NOTES
Pharmacokinetic Initial Assessment: IV Vancomycin    Assessment/Plan:    Initiate intravenous vancomycin with loading dose of 1750 mg once followed by a maintenance dose of vancomycin 1000mg IV every 12 hours  Desired empiric serum trough concentration is 15 to 20 mcg/mL  Draw vancomycin trough level 30 min prior to fourth dose on 12/11 at approximately 2330  Pharmacy will continue to follow and monitor vancomycin.      Please contact pharmacy at extension [phone] with any questions regarding this assessment.     Thank you for the consult,   Yves Salazar       Patient brief summary:  Laure Ross is a 50 y.o. female initiated on antimicrobial therapy with IV Vancomycin for treatment of suspected lower respiratory infection    Drug Allergies:   Review of patient's allergies indicates:  No Known Allergies    Actual Body Weight:   109 kg    Renal Function:   Estimated Creatinine Clearance: 61.7 mL/min (based on SCr of 1.3 mg/dL).,     Dialysis Method (if applicable):  N/A    CBC (last 72 hours):  Recent Labs   Lab Result Units 12/07/20  1353 12/08/20  0452 12/09/20  0343 12/10/20  0400   WBC K/uL  --  13.38* 11.91 16.43*   Hemoglobin g/dL  --  12.3 12.9 13.9   Hemoglobin A1C % 6.5*  --   --   --    Hematocrit %  --  39.4 40.8 44.0   Platelets K/uL  --  360* 395* 281   Gran % %  --  87.7* 89.1* 87.7*   Lymph % %  --  5.1* 4.4* 4.6*   Mono % %  --  6.8 5.8 6.3   Eosinophil % %  --  0.0 0.0 0.0   Basophil % %  --  0.1 0.1 0.2   Differential Method   --  Automated Automated Automated       Metabolic Panel (last 72 hours):  Recent Labs   Lab Result Units 12/07/20  1353 12/08/20  0452 12/09/20  0343 12/10/20  0400   Sodium mmol/L 132* 135* 138 138   Potassium mmol/L 4.9 4.5 4.7 4.6   Chloride mmol/L 97 99 101 100   CO2 mmol/L 24 24 26 26   Glucose mg/dL 243* 197* 180* 218*   BUN mg/dL 23* 26* 31* 37*   Creatinine mg/dL 1.4 1.2 1.1 1.3   Albumin g/dL  --  2.9* 2.8* 2.7*   Total Bilirubin mg/dL  --  0.4 0.5 1.0   Alkaline  Phosphatase U/L  --  79 90 117   AST U/L  --  32 33 36   ALT U/L  --  15 16 15   Magnesium mg/dL  --  2.2 2.2 2.2   Phosphorus mg/dL  --  2.8 3.4 2.7       Drug levels (last 3 results):  No results for input(s): VANCOMYCINRA, VANCOMYCINPE, VANCOMYCINTR in the last 72 hours.    Microbiologic Results:  Microbiology Results (last 7 days)     Procedure Component Value Units Date/Time    Culture, Respiratory with Gram Stain [[id number]]     Order Status: No result Specimen: Respiratory from Endotracheal Aspirate     Blood culture [[id number]]     Order Status: Sent Specimen: Blood     Blood culture [[id number]]     Order Status: Sent Specimen: Blood

## 2020-12-10 NOTE — SUBJECTIVE & OBJECTIVE
Past Medical History:   Diagnosis Date    Anticoagulant long-term use     Arthritis     Asthma     Asthma     Atrial fibrillation     Cardiomyopathy     Cardiomyopathy     CHF (congestive heart failure)     CHF (congestive heart failure)     Chronic combined systolic and diastolic heart failure 6/3/2016    COPD (chronic obstructive pulmonary disease) 3/28/2018    GERD (gastroesophageal reflux disease)     H/O tubal ligation     Hypertension     Obesity     Renal disorder     Type 2 diabetes mellitus with hyperglycemia     Type 2 diabetes mellitus with polyneuropathy        Past Surgical History:   Procedure Laterality Date    CARDIAC DEFIBRILLATOR PLACEMENT      CARDIAC DEFIBRILLATOR PLACEMENT      CARDIAC DEFIBRILLATOR PLACEMENT      CATARACT EXTRACTION Left     CHOLECYSTECTOMY      COLONOSCOPY N/A 5/2/2016    Procedure: COLONOSCOPY;  Surgeon: Eugene Soto MD;  Location: [facility] ENDO ([facility] FLR);  Service: Endoscopy;  Laterality: N/A;    GALLBLADDER SURGERY      iabp  4/2016    TREATMENT OF CARDIAC ARRHYTHMIA N/A 6/12/2020    Procedure: CARDIOVERSION;  Surgeon: Navi Jolly MD;  Location: [facility] EP LAB;  Service: Cardiology;  Laterality: N/A;  AF, BRITTANEY, DCCV, MAC, DM, 3 Prep    TREATMENT OF CARDIAC ARRHYTHMIA N/A 8/7/2020    Procedure: Cardioversion or Defibrillation;  Surgeon: Navi Jolly MD;  Location: [facility] EP LAB;  Service: Cardiology;  Laterality: N/A;  AF, DCCV, MAC, DM, 3 Prep    TUBAL LIGATION         Review of patient's allergies indicates:  No Known Allergies    Family History     Problem Relation (Age of Onset)    Heart disease Mother, Father        Tobacco Use    Smoking status: Never Smoker    Smokeless tobacco: Never Used   Substance and Sexual Activity    Alcohol use: No    Drug use: No    Sexual activity: Yes     Partners: Male      Review of Systems   Constitutional: Negative for appetite change, chills, fatigue and fever.   HENT: Negative for congestion and  sinus pain.    Eyes: Negative for visual disturbance.   Respiratory: Positive for shortness of breath. Negative for cough.    Cardiovascular: Negative for chest pain and leg swelling.   Gastrointestinal: Negative for abdominal pain, constipation, diarrhea, nausea and vomiting.   Genitourinary: Negative for decreased urine volume, difficulty urinating and dysuria.   Musculoskeletal: Negative for back pain and neck pain.   Skin: Negative for rash.   Neurological: Negative for dizziness, weakness and headaches.   Psychiatric/Behavioral: Negative for confusion and decreased concentration.     Objective:     Vital Signs (Most Recent):  Temp: 97.7 °F (36.5 °C) (12/10/20 1500)  Pulse: 90 (12/10/20 1600)  Resp: (!) 25 (12/10/20 1024)  BP: (!) 145/90 (12/10/20 1600)  SpO2: (!) 94 % (12/10/20 1600) Vital Signs (24h Range):  Temp:  [96 °F (35.6 °C)-98.6 °F (37 °C)] 97.7 °F (36.5 °C)  Pulse:  [86-99] 90  Resp:  [7-34] 25  SpO2:  [77 %-98 %] 94 %  BP: (142-156)/(72-95) 145/90   Weight: 108.9 kg (240 lb)  Body mass index is 41.2 kg/m².      Intake/Output Summary (Last 24 hours) at 12/10/2020 1646  Last data filed at 12/10/2020 1300  Gross per 24 hour   Intake 700 ml   Output --   Net 700 ml       Physical Exam  Vitals signs reviewed.   Constitutional:       General: She is not in acute distress.     Appearance: She is well-developed. She is obese. She is not diaphoretic.   HENT:      Head: Normocephalic and atraumatic.      Mouth/Throat:      Mouth: Mucous membranes are dry.      Pharynx: Oropharynx is clear.   Eyes:      Extraocular Movements: Extraocular movements intact.      Conjunctiva/sclera: Conjunctivae normal.   Neck:      Musculoskeletal: Normal range of motion.      Trachea: No tracheal deviation.   Cardiovascular:      Rate and Rhythm: Normal rate and regular rhythm.      Pulses: Normal pulses.      Heart sounds: Normal heart sounds. No murmur.   Pulmonary:      Effort: Pulmonary effort is normal. No tachypnea or  respiratory distress.      Breath sounds: No wheezing, rhonchi or rales.      Comments: Alternating CPAP and comfort flow.   Abdominal:      General: Bowel sounds are normal. There is no distension.      Palpations: Abdomen is soft.      Tenderness: There is no abdominal tenderness.   Musculoskeletal:         General: No deformity.      Right lower leg: No edema.      Left lower leg: No edema.   Skin:     General: Skin is warm and dry.      Capillary Refill: Capillary refill takes less than 2 seconds.      Findings: No erythema or rash.   Neurological:      Mental Status: She is alert and oriented to person, place, and time. Mental status is at baseline.      Motor: No abnormal muscle tone.      Comments: AAO, follows all commands          Vents:  Oxygen Concentration (%): 60 (12/10/20 1600)  Lines/Drains/Airways     Peripheral Intravenous Line                 Peripheral IV - Single Lumen 12/10/20 0500 22 G Right Hand less than 1 day         Peripheral IV - Single Lumen 12/10/20 1100 Anterior;Left;Proximal Forearm less than 1 day              Significant Labs:    CBC/Anemia Profile:  Recent Labs   Lab 12/09/20  0343 12/10/20  0400   WBC 11.91 16.43*   HGB 12.9 13.9   HCT 40.8 44.0   * 281   MCV 86 86   RDW 14.9* 15.1*        Chemistries:  Recent Labs   Lab 12/09/20  0343 12/10/20  0400    138   K 4.7 4.6    100   CO2 26 26   BUN 31* 37*   CREATININE 1.1 1.3   CALCIUM 8.9 9.2   ALBUMIN 2.8* 2.7*   PROT 7.2 7.2   BILITOT 0.5 1.0   ALKPHOS 90 117   ALT 16 15   AST 33 36   MG 2.2 2.2   PHOS 3.4 2.7       All pertinent labs within the past 24 hours have been reviewed.    Significant Imaging: I have reviewed all pertinent imaging results/findings within the past 24 hours.

## 2020-12-10 NOTE — CARE UPDATE
"RAPID RESPONSE NURSE VIRTUAL PROACTIVE ROUNDING NOTE     Time of Visit: 0750    Admit Date: 2020  LOS: 4  Code Status: Full Code   Date of Visit: 12/10/2020  : 1970  Age: 50 y.o.  Sex: female  Race: Black or   Bed: 74138/52047 A:   MRN: [id number]  Was the patient discharged from an ICU this admission? no   Was the patient discharged from a PACU within last 24 hours?  no  Did the patient receive conscious sedation/general anesthesia in last 24 hours?  no  Was the patient in the ED within the past 24 hours?  no  Was the patient started on NIPPV within the past 24 hours?  yes  Attending Physician: Rj Pritchard MD  Primary Service: [facility] MED 1    ASSESSMENT     Notified by Epic patient alert.  Reason for alert: High oxygen requirements     Diagnosis: Pneumonia due to COVID-19 virus    Abnormal Vital Signs: BP (!) 156/90 (BP Location: Left arm, Patient Position: Lying)   Pulse 86   Temp 97.8 °F (36.6 °C) (Axillary)   Resp (!) 26   Ht 5' 4" (1.626 m)   Wt 109 kg (240 lb 4.8 oz)   LMP 2019 (Approximate)   SpO2 (!) 92%   BMI 41.25 kg/m²      Clinical Issues: Respiratory    Patient  has a past medical history of Anticoagulant long-term use, Arthritis, Asthma, Asthma, Atrial fibrillation, Cardiomyopathy, Cardiomyopathy, CHF (congestive heart failure), CHF (congestive heart failure), Chronic combined systolic and diastolic heart failure, COPD (chronic obstructive pulmonary disease), GERD (gastroesophageal reflux disease), H/O tubal ligation, Hypertension, Obesity, Renal disorder, Type 2 diabetes mellitus with hyperglycemia, and Type 2 diabetes mellitus with polyneuropathy.      Patient lying comfortably in bed, on CPAP 8 80%, RR 28-35, SpO2 92%. Patient slightly tachypneic, but respirations even and non labored. Patient seen virtually through eICU, assessment limited to visual only.     INTERVENTIONS/ RECOMMENDATIONS     Continue to monitor respiratory status  Wean oxygen as " tolerated  ABG and CXR if condition worsens  Consult critical care if condition worsens      PHYSICIAN ESCALATION     Yes/No  no    Orders received and case discussed with NA.    Disposition: Remain in room 42081.    FOLLOW-UP     Call back the Rapid Response Nurse, Andreea Davila RN at [phone] for additional questions or concerns.

## 2020-12-10 NOTE — PLAN OF CARE
Pt remains on Cpap at this time .  O2 sats drops immediately when Cpap is taken off Sat's drops to 80%. Pt remains in Afib with control rate 80-90

## 2020-12-10 NOTE — NURSING
Patient on 15L HFNC. Patient to bedside commode, sats dropped to 80%. Returned back to bed and tried cough and deep breathing. Patient sats remains at 83% on 15L HFNC. Placed patient on non rebreather and sats remain at 83%. Patient also had 4 beat run of vtach seen on bedside monitor. Patient remains calm, without increased work of breathing. Denies chest pain.  Charge nurse notified. RRT notified and at bedside. MD notified.

## 2020-12-10 NOTE — NURSING
With patient's permission, gave update on patient's status and plan of care to her daughter on the phone.

## 2020-12-10 NOTE — HOSPITAL COURSE
Initially admitted to Hospital Medicine for COVID pneumonia on 12/8. Required HFNC between 10-15L. Diurese well, net negative 5L. Started on dexamethasone, remdesivir, ceftriaxone, and doxycycline. Overnight on 12/9, oxygen requirements started to increase. Unable to tolerate coming off of continuous CPAP and escalated to ICU. Remains on CPAP qHS with CF during the day.Patient completed antibiotics for 7 days, as well as 5 day course of remdesivir and 10 days of dexamethasone. Endocrinology consulted due to hyperglycemia, most likely due to dexamethasone along with baseline T2DM. She was started on insulin infusion along with aspart. Will continue to monitor.  Started on heparin gtt for AC. Patient c/o congestion with CF  for which was kept on CPAP yesterday, CF to eat. WBC continues to be up trending, procal wnl, BC ngtd. CXR with worsening pulmonary edema and pneumonia. Patient has been presenting with low BP today, MAP around 60s this morning. Will start Vanc and Zosyn. Patient will need central line for better IV access. Discontinued home lisinopril. Consulted heart transplant team. 12/19 BiPAP 100% overnight. Lasix given for worsening respiratory failure and antibiotics for worsening leukocytosis. Patient intubated evening of 12/20 for worsening respiratory failure. Following intubation, patient went into PEA arrest. ACLS performed for 7 mins before obtaining ROSC. Following arrest, central and arterial lines were placed. Unfortunately during the following hours, patient's condition continued to deteriorate. As of evening 12-20/early am 12-21, patient requiring max dose 3 vasopressors. Increasing difficulty in oxygenating/ventilating despite maximum support w/ ventilator. Now in renal failure/anuric and developing shock liver. Patient's daughter notified of multi-organ failure and presented to bedside to visit.

## 2020-12-10 NOTE — RESPIRATORY THERAPY
Patient was satting 82% upon entering the room on 15 L Bubble high flow nasal cannula. Was then placed on AIRVO high flow nasal cannula on the highest settings 60 L - 100%. Patient oxygen saturations fluctuate between 77 - 96% with a good pulse ox waveform and appears resting. Patient placed back on CPAP 12 cm H2O on 60% FiO2 because of fluctuation of sats. Patient states no shortness of breath. Was told from the primary nurse that she wants to try self-proning as it may benefit for her. Primary team and critical care team is aware.

## 2020-12-10 NOTE — PT/OT/SLP PROGRESS
Physical Therapy      Patient Name:  Laure Ross   MRN:  [id number]    Patient not seen today secondary to (respiratory status; plan for transfer to ICU). PT orders discontinued at this time. Will require new PT orders when pt medically appropriate for PT re-evaluation and treatment. Full d/c summary to follow.    Kira Siegel, PT, DPT   12/10/2020  [phone]

## 2020-12-10 NOTE — PT/OT/SLP DISCHARGE
Occupational Therapy Discharge Summary    Laure Ross  MRN: [id number]   Principal Problem: Pneumonia due to COVID-19 virus      Patient Discharged from acute Occupational Therapy on 12/10/2020  .  Please refer to prior OT note dated 12/7/2020 for functional status.    Assessment:      Patient appropriate for care in another setting.    Objective:     GOALS:   Multidisciplinary Problems     Occupational Therapy Goals        Problem: Occupational Therapy Goal    Goal Priority Disciplines Outcome Interventions   Occupational Therapy Goal     OT, PT/OT Ongoing, Progressing    Description: Goals to be met by: 12/21     Patient will increase functional independence with ADLs by performing:    UE Dressing with Supervision.  LE Dressing with Supervision.  Grooming while standing with Supervision.  Toileting from toilet with Supervision for hygiene and clothing management.   Supine to sit with Supervision in preparation for EOB/OOB functional activities.  Toilet transfer to toilet with Supervision.                 Pt seen for OT eval only prior to t/f to ICU. Thus, goals not yet achieved.     Reasons for Discontinuation of Therapy Services  Transfer to alternate level of care.      Plan:     Patient Discharged to: Pt transferring to ICU due to declining respiratory status. OT to d/c orders at this time and will await new orders prior to continued therapy     TELLY Parada  12/10/2020

## 2020-12-10 NOTE — ASSESSMENT & PLAN NOTE
Home regimen includes long-acting insulin glargine 34 units BID and short-acting insulin aspart 16 units with SSI. HgbA1c 6.5  --Detemir 23u BID and aspart 7u TID with SSI.    --Titrate up as needed

## 2020-12-10 NOTE — HPI
Ms. Laure Ross is a 50 year old woman with a PMHx of HFrEF 2/2 NICM (EF 15%, CRT-D placed '17), pAF, HTN, IDDM2, HLD, CLARENCE, asthma, GERD and obesity who presents to [facility] for SOB and cough. 1 week ago she began having myalgias, fatigue, nausea, vomiting, headache, and mild dyspnea on exertion. At that time she got a COVID test and was positive on 11/30. Most of her symptoms progressively improved throughout the week but 2 days ago began having worsening SOB even at rest and 1 day ago began having a cough and fever. Denies orthopnea. She says the cough is productive but has been coughing it up and swallowing it so cannot comment on the character of the sputum. She has been taking tylenol at home. She has been adherent with all of her medications including her diuretics and insulin. She denies taking her metolazone recently as needed, however she admits that she has not weighed herself in weeks. She feels she is at her baseline weight. Her urine volume has been unchanged. She has been drinking water but her appetite for food has decreased in the past few days.

## 2020-12-10 NOTE — H&P
[facility] - ICU 16   Critical Care Medicine  History & Physical    Patient Name: Laure Ross  MRN: [id number]  Admission Date: 12/6/2020  Hospital Length of Stay: 4 days  Code Status: Full Code  Attending Physician: Olivia Phillips MD   Primary Care Provider: Holly Mittal MD   Principal Problem: Acute hypoxemic respiratory failure    Subjective:     HPI:  Ms. Laure Ross is a 50 year old woman with a PMHx of HFrEF 2/2 NICM (EF 15%, CRT-D placed '17), pAF, HTN, IDDM2, HLD, CLARENCE, asthma, GERD and obesity who presents to [facility] for SOB and cough. 1 week ago she began having myalgias, fatigue, nausea, vomiting, headache, and mild dyspnea on exertion. At that time she got a COVID test and was positive on 11/30. Most of her symptoms progressively improved throughout the week but 2 days ago began having worsening SOB even at rest and 1 day ago began having a cough and fever. Denies orthopnea. She says the cough is productive but has been coughing it up and swallowing it so cannot comment on the character of the sputum. She has been taking tylenol at home. She has been adherent with all of her medications including her diuretics and insulin. She denies taking her metolazone recently as needed, however she admits that she has not weighed herself in weeks. She feels she is at her baseline weight. Her urine volume has been unchanged. She has been drinking water but her appetite for food has decreased in the past few days.     Hospital/ICU Course:  Initially admitted to Hospital Medicine for COVID pneumonia on 12/8. Required HFNC between 10-15L. Diurese well, net negative 5L. Started on dexamethasone, remdesivir, ceftriaxone, and doxycycline. Overnight on 12/9, oxygen requirements started to increase. Unable to tolerate coming off of continuous CPAP and escalated to ICU.      Past Medical History:   Diagnosis Date    Anticoagulant long-term use     Arthritis     Asthma     Asthma     Atrial  fibrillation     Cardiomyopathy     Cardiomyopathy     CHF (congestive heart failure)     CHF (congestive heart failure)     Chronic combined systolic and diastolic heart failure 6/3/2016    COPD (chronic obstructive pulmonary disease) 3/28/2018    GERD (gastroesophageal reflux disease)     H/O tubal ligation     Hypertension     Obesity     Renal disorder     Type 2 diabetes mellitus with hyperglycemia     Type 2 diabetes mellitus with polyneuropathy        Past Surgical History:   Procedure Laterality Date    CARDIAC DEFIBRILLATOR PLACEMENT      CARDIAC DEFIBRILLATOR PLACEMENT      CARDIAC DEFIBRILLATOR PLACEMENT      CATARACT EXTRACTION Left     CHOLECYSTECTOMY      COLONOSCOPY N/A 5/2/2016    Procedure: COLONOSCOPY;  Surgeon: Eugene Soto MD;  Location: [facility] ENDO (2ND FLR);  Service: Endoscopy;  Laterality: N/A;    GALLBLADDER SURGERY      iabp  4/2016    TREATMENT OF CARDIAC ARRHYTHMIA N/A 6/12/2020    Procedure: CARDIOVERSION;  Surgeon: Navi Jolly MD;  Location: [facility] EP LAB;  Service: Cardiology;  Laterality: N/A;  AF, BRITTANEY, DCCV, MAC, DM, 3 Prep    TREATMENT OF CARDIAC ARRHYTHMIA N/A 8/7/2020    Procedure: Cardioversion or Defibrillation;  Surgeon: Navi Jolly MD;  Location: [facility] EP LAB;  Service: Cardiology;  Laterality: N/A;  AF, DCCV, MAC, DM, 3 Prep    TUBAL LIGATION         Review of patient's allergies indicates:  No Known Allergies    Family History     Problem Relation (Age of Onset)    Heart disease Mother, Father        Tobacco Use    Smoking status: Never Smoker    Smokeless tobacco: Never Used   Substance and Sexual Activity    Alcohol use: No    Drug use: No    Sexual activity: Yes     Partners: Male      Review of Systems   Constitutional: Negative for appetite change, chills, fatigue and fever.   HENT: Negative for congestion and sinus pain.    Eyes: Negative for visual disturbance.   Respiratory: Positive for shortness of breath. Negative for cough.     Cardiovascular: Negative for chest pain and leg swelling.   Gastrointestinal: Negative for abdominal pain, constipation, diarrhea, nausea and vomiting.   Genitourinary: Negative for decreased urine volume, difficulty urinating and dysuria.   Musculoskeletal: Negative for back pain and neck pain.   Skin: Negative for rash.   Neurological: Negative for dizziness, weakness and headaches.   Psychiatric/Behavioral: Negative for confusion and decreased concentration.     Objective:     Vital Signs (Most Recent):  Temp: 97.7 °F (36.5 °C) (12/10/20 1500)  Pulse: 90 (12/10/20 1600)  Resp: (!) 25 (12/10/20 1024)  BP: (!) 145/90 (12/10/20 1600)  SpO2: (!) 94 % (12/10/20 1600) Vital Signs (24h Range):  Temp:  [96 °F (35.6 °C)-98.6 °F (37 °C)] 97.7 °F (36.5 °C)  Pulse:  [86-99] 90  Resp:  [7-34] 25  SpO2:  [77 %-98 %] 94 %  BP: (142-156)/(72-95) 145/90   Weight: 108.9 kg (240 lb)  Body mass index is 41.2 kg/m².      Intake/Output Summary (Last 24 hours) at 12/10/2020 1646  Last data filed at 12/10/2020 1300  Gross per 24 hour   Intake 700 ml   Output --   Net 700 ml       Physical Exam  Vitals signs reviewed.   Constitutional:       General: She is not in acute distress.     Appearance: She is well-developed. She is obese. She is not diaphoretic.   HENT:      Head: Normocephalic and atraumatic.      Mouth/Throat:      Mouth: Mucous membranes are dry.      Pharynx: Oropharynx is clear.   Eyes:      Extraocular Movements: Extraocular movements intact.      Conjunctiva/sclera: Conjunctivae normal.   Neck:      Musculoskeletal: Normal range of motion.      Trachea: No tracheal deviation.   Cardiovascular:      Rate and Rhythm: Normal rate and regular rhythm.      Pulses: Normal pulses.      Heart sounds: Normal heart sounds. No murmur.   Pulmonary:      Effort: Pulmonary effort is normal. No tachypnea or respiratory distress.      Breath sounds: No wheezing, rhonchi or rales.      Comments: Alternating CPAP and comfort flow.    Abdominal:      General: Bowel sounds are normal. There is no distension.      Palpations: Abdomen is soft.      Tenderness: There is no abdominal tenderness.   Musculoskeletal:         General: No deformity.      Right lower leg: No edema.      Left lower leg: No edema.   Skin:     General: Skin is warm and dry.      Capillary Refill: Capillary refill takes less than 2 seconds.      Findings: No erythema or rash.   Neurological:      Mental Status: She is alert and oriented to person, place, and time. Mental status is at baseline.      Motor: No abnormal muscle tone.      Comments: AAO, follows all commands          Vents:  Oxygen Concentration (%): 60 (12/10/20 1600)  Lines/Drains/Airways     Peripheral Intravenous Line                 Peripheral IV - Single Lumen 12/10/20 0500 22 G Right Hand less than 1 day         Peripheral IV - Single Lumen 12/10/20 1100 Anterior;Left;Proximal Forearm less than 1 day              Significant Labs:    CBC/Anemia Profile:  Recent Labs   Lab 12/09/20  0343 12/10/20  0400   WBC 11.91 16.43*   HGB 12.9 13.9   HCT 40.8 44.0   * 281   MCV 86 86   RDW 14.9* 15.1*        Chemistries:  Recent Labs   Lab 12/09/20  0343 12/10/20  0400    138   K 4.7 4.6    100   CO2 26 26   BUN 31* 37*   CREATININE 1.1 1.3   CALCIUM 8.9 9.2   ALBUMIN 2.8* 2.7*   PROT 7.2 7.2   BILITOT 0.5 1.0   ALKPHOS 90 117   ALT 16 15   AST 33 36   MG 2.2 2.2   PHOS 3.4 2.7       All pertinent labs within the past 24 hours have been reviewed.    Significant Imaging: I have reviewed all pertinent imaging results/findings within the past 24 hours.    Assessment/Plan:     Pulmonary  * Acute hypoxemic respiratory failure  COVID positive on 11/30. Admitted to hospital 12/6, upgraded to ICU 12/10 for increasing oxygen requirements. 's up from 200's on admit. Procalcitonin increased from 0.18 to 0.29.    --f/u blood and sputum cultures  --Received ceftriaxone + doxy initially; escalated to  cefepime + vanc   --Known NICM with EF 15%; Continue aggressive diureses  --Continue alternating between CPAP and Comfort Flow; titrate for SpO2 >90%  --Continue special isolation and aspiration precautions ordered  --Continue dexamethasone X 10 days and remdesivir X 5 days    --Start heparin infusion for hypercoagulability when INR <2    --Scheduled Duonebs     Moderate persistent asthma without complication  - Continue home maintenance inhalers and montelukast 10 mg    Cardiac/Vascular  Chronic combined systolic and diastolic congestive heart failure  Known NICM (EF 10-20%), CRT-D (2017) in place  --Repeat TTE stable  --Continue diuresis     Paroxysmal atrial fibrillation  --Continue home digoxin and amiodarone  --Hold warfarin, INR supra-therapeutic. Start heparin gtt when INR <2  --f/u EKG    Renal/  Acute kidney injury superimposed on chronic kidney disease  AL on CKD noted on admit, improving now. Baseline creatinine ~1.2  --monitor for now  --campa in place   --diurese as tolerated     Endocrine  Type 2 diabetes mellitus with diabetic polyneuropathy  Home regimen includes long-acting insulin glargine 34 units BID and short-acting insulin aspart 16 units with SSI. HgbA1c 6.5  --Detemir 23u BID and aspart 7u TID with SSI.    --Titrate up as needed    Other  Pneumonia due to COVID-19 virus  See respiratory failure     Palliative care encounter  Palliative care consulted, appreciate assistance  --GOC discussions initiated. Intubation discussed at length. Ms. Ross would like to speak with her daughter before making a decision on changing code status; will need follow up     CLARENCE (obstructive sleep apnea)  --CPAP nightly    Plan discussed with Dr. Phillips.  Daughter, Don, updated via phone.      Critical Care Time: 70 minutes  Critical secondary to Patient has a condition that poses threat to life and bodily function: Acute hypoxic respiratory failure     Critical care was time spent personally by me on the  following activities: development of treatment plan with patient or surrogate and bedside caregivers, discussions with consultants, evaluation of patient's response to treatment, examination of patient, ordering and performing treatments and interventions, ordering and review of laboratory studies, ordering and review of radiographic studies, pulse oximetry, re-evaluation of patient's condition. This critical care time did not overlap with that of any other provider or involve time for any procedures.     Jasmyn Lewis NP  Critical Care Medicine  [facility] - ICU 16 WT

## 2020-12-10 NOTE — PT/OT/SLP DISCHARGE
Physical Therapy Discharge Summary    Name: Laure Ross  MRN: [id number]   Principal Problem: Pneumonia due to COVID-19 virus     Patient Discharged from acute Physical Therapy on 12/10/2020.  Please refer to prior PT noted date on 2020 for functional status.     Assessment:     Patient was discharged unexpectedly.  Information required to complete an accurate discharge summary is unknown.  Refer to therapy initial evaluation and last progress note for initial and most recent functional status and goal achievement.  Recommendations made may be found in medical record.   Pt transferred to ICU 2* respiratory status.     Objective:     GOALS:   Multidisciplinary Problems     Physical Therapy Goals        Problem: Physical Therapy Goal    Goal Priority Disciplines Outcome Goal Variances Interventions   Physical Therapy Goal     PT, PT/OT Ongoing, Progressing     Description: Goals to be met by: 20     Patient will increase functional independence with mobility by performin. Supine to sit with Modified [place]  2. Sit to supine with Modified [place]  3. Sit to stand transfer with Modified [place]  4. Gait  x 50 feet with Modified [place] using LRAD, if needed.                      Reasons for Discontinuation of Therapy Services  Transfer to alternate level of care. and Patient is unable to continue work toward goals because of medical or psychosocial complications.      Plan:     Patient Discharged to: ICU. PT orders discontinued at this time. Will require new PT orders when pt medically appropriate for PT re-evaluation and treatment.    Kira Siegel, PT  12/10/2020

## 2020-12-10 NOTE — ASSESSMENT & PLAN NOTE
COVID positive on 11/30. Admitted to hospital 12/6, upgraded to ICU 12/10 for increasing oxygen requirements. 's up from 200's on admit. Procalcitonin increased from 0.18 to 0.29.    --f/u blood and sputum cultures  --Received ceftriaxone + doxy initially; escalated to cefepime + vanc   --Known NICM with EF 15%; Continue aggressive diureses  --Continue alternating between CPAP and Comfort Flow; titrate for SpO2 >90%  --Continue special isolation and aspiration precautions ordered  --Continue dexamethasone X 10 days and remdesivir X 5 days    --Start heparin infusion for hypercoagulability when INR <2    --Scheduled Duonebs

## 2020-12-11 PROBLEM — U07.1 ACUTE HYPOXEMIC RESPIRATORY FAILURE DUE TO COVID-19: Status: ACTIVE | Noted: 2020-01-01

## 2020-12-11 NOTE — CONSULTS
[facility] - ICU 16 WT  Palliative Medicine  Consult Note    Patient Name: Laure Ross  MRN: [id number]  Admission Date: 12/6/2020  Hospital Length of Stay: 5 days  Code Status: Full Code   Attending Provider: Olivia Phillips MD  Consulting Provider: Chino Mccollum MD  Primary Care Physician: Holly Mittal MD  Principal Problem:Acute hypoxemic respiratory failure due to COVID-19    Patient information was obtained from patient, relative(s), past medical records and ER records.      Inpatient consult to Palliative Care  Consult performed by: Chino Mccollum MD  Consult ordered by: Jasmyn Lewis NP        Assessment/Plan:     * Acute hypoxemic respiratory failure due to COVID-19  BIPAP dependent currently    Pneumonia due to COVID-19 virus  Optimizing treatment and lung protective measures in setting of COVID    AICD (automatic cardioverter/defibrillator) present  Would need to be addressed in event of code status change    Acute kidney injury superimposed on chronic kidney disease  Has been on short trial of RRT in hospitalizations prior    Chronic combined systolic and diastolic congestive heart failure  Repeat EF 10-15%; MVHD and dysfunction    Palliative care encounter  COVID-19 Data  -HD # 5 ;Admit date: 12.6  -Mechanical ventilation: no ; BIPAP  -Pressors: no  -Renal replacement therapy: no   -Health status prior to COVID: fair  -Functional status prior to COVID: fair  -Prior experiences with critical illness: yes    Decision-making:   -Pt does have decision-making capacity  -Pt has verbally appointed a HCPOA: daughter   Ashley Erazo Daughter     [phone]   Marital status: yes  -Children: 3 children    Advance directives:  -The patient has not previously engaged in advance care planning  -Pt has no scanned advance directives in Epic    Support system:  -Spiritual: very much; deeply Caodaism  -Family: to current  for 20+ years; three children; lives in multigenerational  home    Estimated prognosis:   -Time and potential for recovery: If progresses to need for mechanical ventilation would be concerned for poor prognosis; discussed with pt and daughter    Prior GOC discussions: no    GOC Discussion:  -Discussion with with patient and daughter to introduce the role of palliative medicine and supportive care in the ICU in the setting of a serious diagnosis of COVID-19. The pateint and family were able to demonstrate an excellent understanding of the diagnosis, current care plan, hope for imoprovement and concern for worsening of the pt's condition.    Experience with critical illness: previous ICU admissions for cardigenic shock; has been on inotropes and RRT in hospital in the past; d/c home    Understanding of illness: no exposure to serious illness with COVID; evolving understanding    Present for discussion: daughter    Goals of care: Given her history and their maggie in God, they would want a trial of all invasive supportive therapies with the goal of being able to return home.  It was discussed the high mortality rates we see in pts with her comorbids and concern it would only be prolonging the dying period.      The pt's daughter was able to verbalize that the children would support decision she makes whether they liked it or not, including if she chose to avoid intubation.  The pt acknowledge the support but would want a SHORT trail of life proloning care if felt it would help by her team.     The pt also gave the daughter permission to also let her die peacefully without prolongation of her dying process IF after a short trial it was felt she could not survive without prolonged reliance on interventions.     Pt and family feel well supported in current care setting.     Summary/Recommendation:  -Most important goals at this time are curative/life-prolongation (regardless of treatment burdens)  -Code status: FULL  -Palliative care to provide emotional support and assist with  communication regarding disease expectations and trajectory, and with medical decision-making, at the appropriate time  -Most appropriate disposition: continue with the current LOC for now  -Family desires open and honest prognostication to help base decision making    50 minutes spent in above ACP discussions    Thank you for the opportunity to care for this patient and family.     Please call with questions.     Chino Mccollum MD  Palliative Medicine          Thank you for your consult. I will follow-up with patient. Please contact us if you have any additional questions.    Subjective:     HPI:   HPI:  Ms. Laure Ross is a 50 year old woman with a PMHx of HFrEF 2/2 NICM (EF 15%, CRT-D placed '17), pAF, HTN, IDDM2, HLD, CLARENCE, asthma, GERD and obesity who presents to [facility] for SOB and cough. 1 week ago she began having myalgias, fatigue, nausea, vomiting, headache, and mild dyspnea on exertion. At that time she got a COVID test and was positive on 11/30. Most of her symptoms progressively improved throughout the week but 2 days ago began having worsening SOB even at rest and 1 day ago began having a cough and fever. Denies orthopnea. She says the cough is productive but has been coughing it up and swallowing it so cannot comment on the character of the sputum. She has been taking tylenol at home. She has been adherent with all of her medications including her diuretics and insulin. She denies taking her metolazone recently as needed, however she admits that she has not weighed herself in weeks. She feels she is at her baseline weight. Her urine volume has been unchanged. She has been drinking water but her appetite for food has decreased in the past few days.      Hospital/ICU Course:  Initially admitted to Hospital Medicine for COVID pneumonia on 12/8. Required HFNC between 10-15L. Diurese well, net negative 5L. Started on dexamethasone, remdesivir, ceftriaxone, and doxycycline. Overnight on 12/9, oxygen requirements  started to increase. Unable to tolerate coming off of continuous CPAP and escalated to ICU.     In this setting, palliative medicine was consulted to help with medical decision making and aid in the formation of goals of care.       Hospital Course:  No notes on file    Interval History: discussed with primary team and pt/family at bedside    Past Medical History:   Diagnosis Date    Anticoagulant long-term use     Arthritis     Asthma     Asthma     Atrial fibrillation     Cardiomyopathy     Cardiomyopathy     CHF (congestive heart failure)     CHF (congestive heart failure)     Chronic combined systolic and diastolic heart failure 6/3/2016    COPD (chronic obstructive pulmonary disease) 3/28/2018    GERD (gastroesophageal reflux disease)     H/O tubal ligation     Hypertension     Obesity     Renal disorder     Type 2 diabetes mellitus with hyperglycemia     Type 2 diabetes mellitus with polyneuropathy        Past Surgical History:   Procedure Laterality Date    CARDIAC DEFIBRILLATOR PLACEMENT      CARDIAC DEFIBRILLATOR PLACEMENT      CARDIAC DEFIBRILLATOR PLACEMENT      CATARACT EXTRACTION Left     CHOLECYSTECTOMY      COLONOSCOPY N/A 5/2/2016    Procedure: COLONOSCOPY;  Surgeon: Eugene Soto MD;  Location: [facility] ENDO ([address] [place] [address]);  Service: Endoscopy;  Laterality: N/A;    GALLBLADDER SURGERY      iabp  4/2016    TREATMENT OF CARDIAC ARRHYTHMIA N/A 6/12/2020    Procedure: CARDIOVERSION;  Surgeon: Navi Jolly MD;  Location: [facility] EP LAB;  Service: Cardiology;  Laterality: N/A;  AF, BRITTANEY, DCCV, MAC, DM, 3 Prep    TREATMENT OF CARDIAC ARRHYTHMIA N/A 8/7/2020    Procedure: Cardioversion or Defibrillation;  Surgeon: Navi Jolly MD;  Location: [facility] EP LAB;  Service: Cardiology;  Laterality: N/A;  AF, DCCV, MAC, DM, 3 Prep    TUBAL LIGATION         Review of patient's allergies indicates:  No Known Allergies    Medications:  Continuous Infusions:  Scheduled Meds:    albuterol-ipratropium  3 mL Nebulization Q6H WAKE    amiodarone  200 mg Oral Daily    ascorbic acid (vitamin C)  500 mg Oral BID    atorvastatin  20 mg Oral Daily    ceFEPime (MAXIPIME) IVPB  2 g Intravenous Q8H    dexAMETHasone  6 mg Oral Daily    digoxin  125 mcg Oral Daily    famotidine  20 mg Oral BID    fluticasone furoate-vilanteroL  1 puff Inhalation Daily    fluticasone propionate  2 spray Each Nostril Daily    furosemide (LASIX) IV  80 mg Intravenous TID    insulin aspart U-100  7 Units Subcutaneous TIDWM    insulin detemir U-100  23 Units Subcutaneous BID    montelukast  10 mg Oral Daily    mupirocin   Nasal BID    remdesivir infusion  100 mg Intravenous Q24H    [START ON 12/11/2020] vancomycin (VANCOCIN) IVPB  1,000 mg Intravenous Q12H    vitamin D  1,000 Units Oral Daily    zinc sulfate  220 mg Oral Daily     PRN Meds:albuterol, benzonatate, butalbital-acetaminophen-caffeine -40 mg, dextrose 50%, dextrose 50%, glucagon (human recombinant), glucose, glucose, insulin aspart U-100, loperamide, melatonin, sodium chloride 0.9%    Family History     Problem Relation (Age of Onset)    Heart disease Mother, Father        Tobacco Use    Smoking status: Never Smoker    Smokeless tobacco: Never Used   Substance and Sexual Activity    Alcohol use: No    Drug use: No    Sexual activity: Yes     Partners: Male       Review of Systems   Constitutional: Positive for activity change and fatigue.   HENT: Positive for congestion.    Eyes: Negative.    Respiratory: Positive for cough and shortness of breath.    Cardiovascular: Negative.    Gastrointestinal: Negative.    Endocrine: Negative.    Genitourinary: Negative.    Musculoskeletal: Positive for myalgias.   Skin: Negative.    Allergic/Immunologic: Negative.    Neurological: Negative.    Hematological: Negative.    Psychiatric/Behavioral: Negative.    All other systems reviewed and are negative.    Objective:     Vital Signs (Most  Recent):  Temp: 98.3 °F (36.8 °C) (12/10/20 1905)  Pulse: 90 (12/10/20 2205)  Resp: (!) 24 (12/10/20 2205)  BP: (!) 130/92 (12/10/20 2205)  SpO2: 95 % (12/10/20 2205) Vital Signs (24h Range):  Temp:  [96 °F (35.6 °C)-98.6 °F (37 °C)] 98.3 °F (36.8 °C)  Pulse:  [85-99] 90  Resp:  [17-34] 24  SpO2:  [77 %-98 %] 95 %  BP: (130-156)/(78-95) 130/92     Weight: 108.9 kg (240 lb)  Body mass index is 41.2 kg/m².    Physical Exam  Vitals signs and nursing note reviewed.   Constitutional:       Appearance: Normal appearance. She is obese.   HENT:      Head: Normocephalic and atraumatic.      Right Ear: External ear normal.      Left Ear: External ear normal.      Nose:      Comments: Limited due to BIPAP       Mouth/Throat:      Mouth: Mucous membranes are dry.   Eyes:      Pupils: Pupils are equal, round, and reactive to light.   Neck:      Musculoskeletal: Neck supple.      Comments: No JVD  Cardiovascular:      Rate and Rhythm: Normal rate and regular rhythm.   Pulmonary:      Effort: Respiratory distress present.      Breath sounds: Rales present.      Comments: Mild increase in WOB on BIPAP  Abdominal:      General: Abdomen is flat. Bowel sounds are normal.      Palpations: Abdomen is soft.   Musculoskeletal:         General: No tenderness or signs of injury.      Right lower leg: Edema present.      Left lower leg: Edema present.   Skin:     General: Skin is warm.      Capillary Refill: Capillary refill takes less than 2 seconds.   Neurological:      General: No focal deficit present.      Mental Status: She is alert and oriented to person, place, and time.   Psychiatric:         Mood and Affect: Mood normal.         Behavior: Behavior normal.         Thought Content: Thought content normal.         Review of Symptoms    Symptom Assessment (ESAS 0-10 Scale)  Pain:  4  Dyspnea:  0  Anxiety:  0  Nausea:  0  Depression:  0  Anorexia:  0  Fatigue:  2  Insomnia:  0  Restlessness:  0  Agitation:  0     CAM / Delirium:   Negative  Constipation:  Negative  Diarrhea:  Negative          Palliative Performance Scale: 90% prior to admit.    Living Arrangements:  Lives with family    Psychosocial/Cultural: Mother of three children; 2 sons and one daughter; \  From [place]  Has remained active despite chronic cardiac condition    Spiritual:  F - Hamida and Belief:  Yarsani  I - Importance:  VERY  C - Community:  Yes  A - Address in Care:  Would be open to  visits      Advance Care Planning   Advance Directives:   Living Will: No    LaPOST: No    Do Not Resuscitate Status: No    Medical Power of : No        Oral Declaration: Yes  Witnesses:  Dr. Mccollum   Agent's Name:  Ashley Erazo Daughter     [phone]     Decision Making:  Patient answered questions         Significant Labs:   CBC:   Recent Labs   Lab 12/09/20  0343 12/10/20  0400   WBC 11.91 16.43*   HGB 12.9 13.9   HCT 40.8 44.0   * 281     CMP:   Recent Labs   Lab 12/09/20  0343 12/10/20  0400    138   K 4.7 4.6    100   CO2 26 26   * 218*   BUN 31* 37*   CREATININE 1.1 1.3   CALCIUM 8.9 9.2   PROT 7.2 7.2   ALBUMIN 2.8* 2.7*   BILITOT 0.5 1.0   ALKPHOS 90 117   AST 33 36   ALT 16 15   ANIONGAP 11 12   EGFRNONAA 58.3* 47.6*     CBC:   Recent Labs   Lab 12/10/20  0400   WBC 16.43*   HGB 13.9   HCT 44.0   MCV 86        BMP:  Recent Labs   Lab 12/10/20  0400   *      K 4.6      CO2 26   BUN 37*   CREATININE 1.3   CALCIUM 9.2   MG 2.2     LFT:  Lab Results   Component Value Date    AST 36 12/10/2020    ALKPHOS 117 12/10/2020    BILITOT 1.0 12/10/2020     Albumin:   Albumin   Date Value Ref Range Status   12/10/2020 2.7 (L) 3.5 - 5.2 g/dL Final     Protein:   Total Protein   Date Value Ref Range Status   12/10/2020 7.2 6.0 - 8.4 g/dL Final     Lactic acid:   Lab Results   Component Value Date    LACTATE 1.6 12/06/2020    LACTATE 1.4 07/03/2019       Significant Imaging: CXR: I have reviewed all pertinent  results/findings within the past 24 hours:  as below  Echo: I have reviewed all pertinent results/findings within the past 24 hours:  as below   BRITTANEY 12.10 EF 15%    BRITTANEY (6/12/2020):  · BRITTANEY to evaluate for LA/EDUARDO thrombus prior to DCCV.  · Normal appearing left atrial appendage. No thrombus is present in the appendage. Abnormal appendage velocities.  · No thrombus is present in the left atrium.  · Severely decreased left ventricular systolic function. The estimated ejection fraction is 15%. Global hypokinetic wall motion.  · Mildly to moderately reduced right ventricular systolic function.  · This study was abbreviated due to high risk for decompensation.      Chino Mccollum MD  Palliative Medicine  [facility] - ICU 16 WT

## 2020-12-11 NOTE — SUBJECTIVE & OBJECTIVE
Interval History/Significant Events: No acute events overnight. Remained on CPAP 12 at 70% with oxygen saturations >90%. No complaints this morning.     Review of Systems   Unable to perform ROS: Other (Limited due to continuous NIPPV)   Constitutional: Negative for chills and fever.   Respiratory: Positive for cough and shortness of breath.    Cardiovascular: Negative for chest pain.   Gastrointestinal: Negative for abdominal pain and nausea.   Psychiatric/Behavioral: Negative for agitation and confusion.     Objective:     Vital Signs (Most Recent):  Temp: 97.8 °F (36.6 °C) (12/11/20 0800)  Pulse: 89 (12/11/20 1000)  Resp: (!) 24 (12/11/20 1000)  BP: 118/88 (12/11/20 1000)  SpO2: (!) 90 % (12/11/20 1000) Vital Signs (24h Range):  Temp:  [97.7 °F (36.5 °C)-98.6 °F (37 °C)] 97.8 °F (36.6 °C)  Pulse:  [72-96] 89  Resp:  [22-29] 24  SpO2:  [89 %-97 %] 90 %  BP: (110-155)/(72-95) 118/88   Weight: 107 kg (235 lb 14.3 oz)  Body mass index is 40.49 kg/m².      Intake/Output Summary (Last 24 hours) at 12/11/2020 1022  Last data filed at 12/11/2020 1000  Gross per 24 hour   Intake 1580 ml   Output 2805 ml   Net -1225 ml       Physical Exam  Vitals signs reviewed.   Constitutional:       Appearance: She is well-developed. She is obese.      Interventions: Face mask in place.   HENT:      Head: Normocephalic and atraumatic.   Eyes:      Pupils: Pupils are equal, round, and reactive to light.   Neck:      Musculoskeletal: Neck supple.      Thyroid: No thyromegaly.      Trachea: No tracheal deviation.   Cardiovascular:      Rate and Rhythm: Normal rate and regular rhythm.      Heart sounds: Heart sounds are distant. No murmur. No friction rub. No gallop.       Comments: Paced  Pulmonary:      Effort: Pulmonary effort is normal. No tachypnea or accessory muscle usage.      Breath sounds: Normal breath sounds. No decreased breath sounds, wheezing, rhonchi or rales.   Chest:      Comments: Pacemaker scar located in right upper  chest.   Abdominal:      General: Bowel sounds are normal.      Palpations: Abdomen is soft.      Tenderness: There is no abdominal tenderness.   Genitourinary:     Comments: Puga in place.   Musculoskeletal: Normal range of motion.   Skin:     General: Skin is warm and dry.   Neurological:      Mental Status: She is alert and oriented to person, place, and time.      Sensory: No sensory deficit.   Psychiatric:         Mood and Affect: Mood normal.         Behavior: Behavior is cooperative.         Vents:  Oxygen Concentration (%): 70 (12/11/20 1000)  Lines/Drains/Airways     Drain                 Urethral Catheter 12/10/20 1500 less than 1 day          Peripheral Intravenous Line                 Peripheral IV - Single Lumen 12/10/20 1100 Anterior;Left;Proximal Forearm less than 1 day              Significant Labs:    CBC/Anemia Profile:  Recent Labs   Lab 12/10/20  0400 12/11/20  0320   WBC 16.43* 15.07*   HGB 13.9 13.2   HCT 44.0 41.3    193   MCV 86 84   RDW 15.1* 15.0*        Chemistries:  Recent Labs   Lab 12/10/20  0400 12/11/20  0320    134*   K 4.6 4.5    100   CO2 26 20*   BUN 37* 44*   CREATININE 1.3 1.3   CALCIUM 9.2 8.6*   ALBUMIN 2.7* 2.5*   PROT 7.2 6.6   BILITOT 1.0 1.2*   ALKPHOS 117 110   ALT 15 14   AST 36 24   MG 2.2 2.0   PHOS 2.7 2.7       All pertinent labs within the past 24 hours have been reviewed.    Significant Imaging:  I have reviewed and interpreted all pertinent imaging results/findings within the past 24 hours.

## 2020-12-11 NOTE — ASSESSMENT & PLAN NOTE
COVID positive on 11/30. Admitted to hospital 12/6, upgraded to ICU 12/10 for increasing oxygen requirements. 's up from 200's on admit. Procalcitonin increased from 0.18 to 0.29.    --f/u blood and sputum cultures  --Received ceftriaxone + doxy initially; escalated to cefepime + vanc   --Known NICM with EF 15%; Continue aggressive diureses  --Continue alternating between CPAP and Comfort Flow; titrate for SpO2 >90%  --Continue dexamethasone X 10 days and remdesivir X 5 days    --Start heparin infusion for hypercoagulability when INR <2    --Scheduled Duonebs

## 2020-12-11 NOTE — CONSULTS
Single lumen 18G x 10CM midline placed right cephalic vein. Max dwell date 1/9/20, Lot# [id number].  Needle advanced into the vessel under real time ultrasound guidance.  Image recorded and saved.

## 2020-12-11 NOTE — ASSESSMENT & PLAN NOTE
Palliative care consulted, appreciate assistance  --GOC discussions initiated. Intubation discussed at length. Ms. Ross would like to speak with her daughter before making a decision on changing code status; will need follow up

## 2020-12-11 NOTE — HPI
HPI:  Ms. Laure Ross is a 50 year old woman with a PMHx of HFrEF 2/2 NICM (EF 15%, CRT-D placed '17), pAF, HTN, IDDM2, HLD, CLARENCE, asthma, GERD and obesity who presents to [facility] for SOB and cough. 1 week ago she began having myalgias, fatigue, nausea, vomiting, headache, and mild dyspnea on exertion. At that time she got a COVID test and was positive on 11/30. Most of her symptoms progressively improved throughout the week but 2 days ago began having worsening SOB even at rest and 1 day ago began having a cough and fever. Denies orthopnea. She says the cough is productive but has been coughing it up and swallowing it so cannot comment on the character of the sputum. She has been taking tylenol at home. She has been adherent with all of her medications including her diuretics and insulin. She denies taking her metolazone recently as needed, however she admits that she has not weighed herself in weeks. She feels she is at her baseline weight. Her urine volume has been unchanged. She has been drinking water but her appetite for food has decreased in the past few days.      Hospital/ICU Course:  Initially admitted to Hospital Medicine for COVID pneumonia on 12/8. Required HFNC between 10-15L. Diurese well, net negative 5L. Started on dexamethasone, remdesivir, ceftriaxone, and doxycycline. Overnight on 12/9, oxygen requirements started to increase. Unable to tolerate coming off of continuous CPAP and escalated to ICU.     In this setting, palliative medicine was consulted to help with medical decision making and aid in the formation of goals of care.

## 2020-12-11 NOTE — ASSESSMENT & PLAN NOTE
Essential hypertension  Patient unsure of her dry weight, says it is 230-240 lbs. She does not weigh herself regularly, usually senses to take her PRN metolazone when she notices abdominal distention. On admission, no JVD, no orthopnea, minimal trace pitting edema (pt says her baseline), no abdominal distention. SBPs in 100-110s/MAPs 70s however extremities warm and pulses strong.     - Was placed on home lasix 80 mg oral BID. CXR was concerning for worsening pulmonary edema today (12/10). Converted lasix 40 bid from po to IV. Pt transferred to ICU for worsening oxygen requirements.   - Can add PRN metolazone if needed  - Hold home spironolactone and lisinopril 2/2 to soft BP. Restart when appropriate.

## 2020-12-11 NOTE — ASSESSMENT & PLAN NOTE
COVID-19 Virus Infection  Viral Pneumonia due to COVID-19  - COVID-19 testing: Collection Date: 8/5/2020 Collection Time:   9:28 AM   - Isolation: Airborne/Droplet. Surgical mask on patient. Notify Infection Control  - Diagnostics: Trend Q48hrs if stable, more frequently if patient decompensating       - CBC       - CMP       - Mg        - D-dimer       - Ferritin       - CRP       - CPK       - LDH       - BNP       - Troponin       - Procalcitonin       - ECG       - CXR  Lymphopenia, hyponatremia, hyperferritinemia, elevated troponin, elevated d-dimer, age, and medical comorbidities are significant predictors of poor clinical outcome    - Management: Per [facility] COVID Treatment Protocol    - Telemetry & Continuous Pulse Oximetry    - Nutrition:        - Multivitamin PO daily       - Boost supplement       - Vitamin D 1000IU daily if deficient       - Ascorbic acid 500mg PO bid    - Supportive Care:       - acetaminophen 650mg PO Q6hr PRN fever/headache       - loperamide PRN viral diarrhea       - IVF held due to severely decreased EF       - VTE PPx: home warfarin    - Antibiotics: starting due to worsening SOB and new cough after 1 week of viral symptoms, concerning for bacterial superinfection       - ceftriaxone 1g Q24h x 5 days       - doxycycline 100 mg BID x5 days (substituted for azithromycin given prolonged QTc)     - On Remdesivir      - Dexamethasone 6 mg qd x10 days    Acute Hypoxemic Respiratory Failure    - Order RT consult via Respiratory Communication for COVID Protocols    - Incentive Spirometer Q2 while awake, Flutter Valve Q4 while awake    - Continuous Pulse Oximetry, goal SpO2 92-96%    - Supplemental O2 via LFNC    - If wheezing, albuterol INH Q6h scheduled & PRN    - If deterioration, may warrant trial of NIPPV in neg pressure room or immediate ICU consult   - Pt still requiring high amount of oxygen. Desat on exertion. Given CPAP yesterday. Transferred to ICU today.

## 2020-12-11 NOTE — SUBJECTIVE & OBJECTIVE
Interval History: discussed with primary team and pt/family at bedside    Past Medical History:   Diagnosis Date    Anticoagulant long-term use     Arthritis     Asthma     Asthma     Atrial fibrillation     Cardiomyopathy     Cardiomyopathy     CHF (congestive heart failure)     CHF (congestive heart failure)     Chronic combined systolic and diastolic heart failure 6/3/2016    COPD (chronic obstructive pulmonary disease) 3/28/2018    GERD (gastroesophageal reflux disease)     H/O tubal ligation     Hypertension     Obesity     Renal disorder     Type 2 diabetes mellitus with hyperglycemia     Type 2 diabetes mellitus with polyneuropathy        Past Surgical History:   Procedure Laterality Date    CARDIAC DEFIBRILLATOR PLACEMENT      CARDIAC DEFIBRILLATOR PLACEMENT      CARDIAC DEFIBRILLATOR PLACEMENT      CATARACT EXTRACTION Left     CHOLECYSTECTOMY      COLONOSCOPY N/A 5/2/2016    Procedure: COLONOSCOPY;  Surgeon: Eugene Soto MD;  Location: [facility] ENDO ([address] [place] [address]);  Service: Endoscopy;  Laterality: N/A;    GALLBLADDER SURGERY      iabp  4/2016    TREATMENT OF CARDIAC ARRHYTHMIA N/A 6/12/2020    Procedure: CARDIOVERSION;  Surgeon: Navi Jolly MD;  Location: [facility] EP LAB;  Service: Cardiology;  Laterality: N/A;  AF, BRITTANEY, DCCV, MAC, DM, 3 Prep    TREATMENT OF CARDIAC ARRHYTHMIA N/A 8/7/2020    Procedure: Cardioversion or Defibrillation;  Surgeon: Navi Jolly MD;  Location: [facility] EP LAB;  Service: Cardiology;  Laterality: N/A;  AF, DCCV, MAC, DM, 3 Prep    TUBAL LIGATION         Review of patient's allergies indicates:  No Known Allergies    Medications:  Continuous Infusions:  Scheduled Meds:   albuterol-ipratropium  3 mL Nebulization Q6H WAKE    amiodarone  200 mg Oral Daily    ascorbic acid (vitamin C)  500 mg Oral BID    atorvastatin  20 mg Oral Daily    ceFEPime (MAXIPIME) IVPB  2 g Intravenous Q8H    dexAMETHasone  6 mg Oral Daily    digoxin  125 mcg Oral Daily     famotidine  20 mg Oral BID    fluticasone furoate-vilanteroL  1 puff Inhalation Daily    fluticasone propionate  2 spray Each Nostril Daily    furosemide (LASIX) IV  80 mg Intravenous TID    insulin aspart U-100  7 Units Subcutaneous TIDWM    insulin detemir U-100  23 Units Subcutaneous BID    montelukast  10 mg Oral Daily    mupirocin   Nasal BID    remdesivir infusion  100 mg Intravenous Q24H    [START ON 12/11/2020] vancomycin (VANCOCIN) IVPB  1,000 mg Intravenous Q12H    vitamin D  1,000 Units Oral Daily    zinc sulfate  220 mg Oral Daily     PRN Meds:albuterol, benzonatate, butalbital-acetaminophen-caffeine -40 mg, dextrose 50%, dextrose 50%, glucagon (human recombinant), glucose, glucose, insulin aspart U-100, loperamide, melatonin, sodium chloride 0.9%    Family History     Problem Relation (Age of Onset)    Heart disease Mother, Father        Tobacco Use    Smoking status: Never Smoker    Smokeless tobacco: Never Used   Substance and Sexual Activity    Alcohol use: No    Drug use: No    Sexual activity: Yes     Partners: Male       Review of Systems   Constitutional: Positive for activity change and fatigue.   HENT: Positive for congestion.    Eyes: Negative.    Respiratory: Positive for cough and shortness of breath.    Cardiovascular: Negative.    Gastrointestinal: Negative.    Endocrine: Negative.    Genitourinary: Negative.    Musculoskeletal: Positive for myalgias.   Skin: Negative.    Allergic/Immunologic: Negative.    Neurological: Negative.    Hematological: Negative.    Psychiatric/Behavioral: Negative.    All other systems reviewed and are negative.    Objective:     Vital Signs (Most Recent):  Temp: 98.3 °F (36.8 °C) (12/10/20 1905)  Pulse: 90 (12/10/20 2205)  Resp: (!) 24 (12/10/20 2205)  BP: (!) 130/92 (12/10/20 2205)  SpO2: 95 % (12/10/20 2205) Vital Signs (24h Range):  Temp:  [96 °F (35.6 °C)-98.6 °F (37 °C)] 98.3 °F (36.8 °C)  Pulse:  [85-99] 90  Resp:  [17-34] 24  SpO2:   [77 %-98 %] 95 %  BP: (130-156)/(78-95) 130/92     Weight: 108.9 kg (240 lb)  Body mass index is 41.2 kg/m².    Physical Exam  Vitals signs and nursing note reviewed.   Constitutional:       Appearance: Normal appearance. She is obese.   HENT:      Head: Normocephalic and atraumatic.      Right Ear: External ear normal.      Left Ear: External ear normal.      Nose:      Comments: Limited due to BIPAP       Mouth/Throat:      Mouth: Mucous membranes are dry.   Eyes:      Pupils: Pupils are equal, round, and reactive to light.   Neck:      Musculoskeletal: Neck supple.      Comments: No JVD  Cardiovascular:      Rate and Rhythm: Normal rate and regular rhythm.   Pulmonary:      Effort: Respiratory distress present.      Breath sounds: Rales present.      Comments: Mild increase in WOB on BIPAP  Abdominal:      General: Abdomen is flat. Bowel sounds are normal.      Palpations: Abdomen is soft.   Musculoskeletal:         General: No tenderness or signs of injury.      Right lower leg: Edema present.      Left lower leg: Edema present.   Skin:     General: Skin is warm.      Capillary Refill: Capillary refill takes less than 2 seconds.   Neurological:      General: No focal deficit present.      Mental Status: She is alert and oriented to person, place, and time.   Psychiatric:         Mood and Affect: Mood normal.         Behavior: Behavior normal.         Thought Content: Thought content normal.         Review of Symptoms    Symptom Assessment (ESAS 0-10 Scale)  Pain:  4  Dyspnea:  0  Anxiety:  0  Nausea:  0  Depression:  0  Anorexia:  0  Fatigue:  2  Insomnia:  0  Restlessness:  0  Agitation:  0     CAM / Delirium:  Negative  Constipation:  Negative  Diarrhea:  Negative          Palliative Performance Scale: 90% prior to admit.    Living Arrangements:  Lives with family    Psychosocial/Cultural: Mother of three children; 2 sons and one daughter; \  From [place]  Has remained active despite chronic  cardiac condition    Spiritual:  F - Hamida and Belief:  Rastafari  I - Importance:  VERY  C - Community:  Yes  A - Address in Care:  Would be open to  visits      Advance Care Planning   Advance Directives:   Living Will: No    LaPOST: No    Do Not Resuscitate Status: No    Medical Power of : No        Oral Declaration: Yes  Witnesses:  Dr. Mccollum   Agent's Name:  Ashley Erazo Daughter     [phone]     Decision Making:  Patient answered questions         Significant Labs:   CBC:   Recent Labs   Lab 12/09/20  0343 12/10/20  0400   WBC 11.91 16.43*   HGB 12.9 13.9   HCT 40.8 44.0   * 281     CMP:   Recent Labs   Lab 12/09/20  0343 12/10/20  0400    138   K 4.7 4.6    100   CO2 26 26   * 218*   BUN 31* 37*   CREATININE 1.1 1.3   CALCIUM 8.9 9.2   PROT 7.2 7.2   ALBUMIN 2.8* 2.7*   BILITOT 0.5 1.0   ALKPHOS 90 117   AST 33 36   ALT 16 15   ANIONGAP 11 12   EGFRNONAA 58.3* 47.6*     CBC:   Recent Labs   Lab 12/10/20  0400   WBC 16.43*   HGB 13.9   HCT 44.0   MCV 86        BMP:  Recent Labs   Lab 12/10/20  0400   *      K 4.6      CO2 26   BUN 37*   CREATININE 1.3   CALCIUM 9.2   MG 2.2     LFT:  Lab Results   Component Value Date    AST 36 12/10/2020    ALKPHOS 117 12/10/2020    BILITOT 1.0 12/10/2020     Albumin:   Albumin   Date Value Ref Range Status   12/10/2020 2.7 (L) 3.5 - 5.2 g/dL Final     Protein:   Total Protein   Date Value Ref Range Status   12/10/2020 7.2 6.0 - 8.4 g/dL Final     Lactic acid:   Lab Results   Component Value Date    LACTATE 1.6 12/06/2020    LACTATE 1.4 07/03/2019       Significant Imaging: CXR: I have reviewed all pertinent results/findings within the past 24 hours:  as below  Echo: I have reviewed all pertinent results/findings within the past 24 hours:  as below   BRITTANEY 12.10 EF 15%    BRITTANEY (6/12/2020):  · BRITTANEY to evaluate for LA/EDUARDO thrombus prior to DCCV.  · Normal appearing left atrial appendage. No thrombus is  present in the appendage. Abnormal appendage velocities.  · No thrombus is present in the left atrium.  · Severely decreased left ventricular systolic function. The estimated ejection fraction is 15%. Global hypokinetic wall motion.  · Mildly to moderately reduced right ventricular systolic function.  · This study was abbreviated due to high risk for decompensation.

## 2020-12-11 NOTE — PROGRESS NOTES
[facility] - ICU 16 [facility] Medicine  Progress Note    Patient Name: Laure Ross  MRN: [id number]  Patient Class: IP- Inpatient   Admission Date: 12/6/2020  Length of Stay: 4 days  Attending Physician: Olivia Phillips MD  Primary Care Provider: Holly Mittal MD        Subjective:     Principal Problem:Acute hypoxemic respiratory failure        HPI:  Laure Ross is a 50 year old woman with HFrEF 2/2 NICM (EF 15%, CRT-D placed '17), pAF, HTN, IDDM2, HLD, CLARENCE, asthma, GERD and obesity who [place] [facility]. 1 week ago she began having myalgias, fatigue, nausea, vomiting, headache, and mild dyspnea on exertion. At that time she got a COVID test and was positive on 11/30. Most of her symptoms progressively improved throughout the week but 2 days ago began having worsening SOB even at rest and 1 day ago began having a cough and fever. Denies orthopnea. She says the cough is productive but has been coughing it up and swallowing it so cannot comment on the character of the sputum. She has been taking tylenol at home. She has been adherent with all of her medications including her diuretics and insulin. She denies taking her metolazone recently as needed, however she admits that she has not weighed herself in weeks. She feels she is at her baseline weight. Her urine volume has been unchanged. She has been drinking water but her appetite for food has decreased in the past few days.     Overview/Hospital Course:  12/7: Pt still requiring high amount of oxygen. Desat on exertion when Sp02 dropped to 82%. Current SpO2 is 94% on 13 L/min oxygen.    12/8: Pt still requiring high amount of oxygen. Desat on exertion, Current SpO2 is 94% on 15 L/min oxygen.    12/11: Patient developed a headache described as severe 10/10 that improved with fioricet. Overnight headache returned. CTH was obtained which was negative. Patient reports doing better this morning. Has a headache similar to previous  migraines, but is improved with fioricet.   12/10: Pt still requiring high amount of oxygen. Given CPAP. CXR is concerning for worsening pulmonary edema. Transferred to ICU.       Review of Systems   Constitutional: Negative for appetite change, chills, fatigue and fever.   HENT: Negative for congestion, ear pain, sinus pain and sore throat.    Eyes: Negative for visual disturbance.   Respiratory: Positive for shortness of breath. Negative for cough.    Cardiovascular: Negative for chest pain and leg swelling.   Gastrointestinal: Negative for abdominal pain, constipation, diarrhea, nausea and vomiting.   Genitourinary: Negative for decreased urine volume, difficulty urinating and dysuria.   Musculoskeletal: Negative for back pain and neck pain.   Skin: Negative for rash.   Neurological: Negative for dizziness, syncope and headaches.   Psychiatric/Behavioral: Negative for confusion and decreased concentration.     Objective:     Vital Signs (Most Recent):  Temp: 97.7 °F (36.5 °C) (12/10/20 1500)  Pulse: 88 (12/10/20 1800)  Resp: (!) 25 (12/10/20 1024)  BP: (!) 155/83 (12/10/20 1800)  SpO2: 97 % (12/10/20 1800) Vital Signs (24h Range):  Temp:  [96 °F (35.6 °C)-98.6 °F (37 °C)] 97.7 °F (36.5 °C)  Pulse:  [85-99] 88  Resp:  [7-34] 25  SpO2:  [77 %-98 %] 97 %  BP: (142-156)/(72-95) 155/83     Weight: 108.9 kg (240 lb)  Body mass index is 41.2 kg/m².    Intake/Output Summary (Last 24 hours) at 12/10/2020 1853  Last data filed at 12/10/2020 1800  Gross per 24 hour   Intake 1450 ml   Output 350 ml   Net 1100 ml      Physical Exam  Vitals signs reviewed.   Constitutional:       General: She is not in acute distress.     Appearance: She is well-developed. She is obese. She is not diaphoretic.   HENT:      Head: Normocephalic and atraumatic.      Mouth/Throat:      Mouth: Mucous membranes are moist.   Eyes:      Extraocular Movements: Extraocular movements intact.      Conjunctiva/sclera: Conjunctivae normal.   Neck:       Musculoskeletal: Normal range of motion.      Trachea: No tracheal deviation.   Cardiovascular:      Rate and Rhythm: Normal rate and regular rhythm.      Heart sounds: Normal heart sounds. No murmur.   Pulmonary:      Effort: Pulmonary effort is normal. No respiratory distress.      Breath sounds: No wheezing or rales (faint at bilateral bases).      Comments: Nasal canula. Decreased breath sound at bases.   Abdominal:      General: Bowel sounds are normal. There is no distension.      Palpations: Abdomen is soft.      Tenderness: There is no abdominal tenderness.   Musculoskeletal:         General: No deformity.      Comments: Trace pretibial pitting edema bilaterally   Skin:     General: Skin is warm and dry.      Findings: No erythema or rash.   Neurological:      Mental Status: She is alert and oriented to person, place, and time. Mental status is at baseline.      Motor: No abnormal muscle tone.         Significant Labs:   BMP:   Recent Labs   Lab 12/10/20  0400   *      K 4.6      CO2 26   BUN 37*   CREATININE 1.3   CALCIUM 9.2   MG 2.2     CBC:   Recent Labs   Lab 12/09/20  0343 12/10/20  0400   WBC 11.91 16.43*   HGB 12.9 13.9   HCT 40.8 44.0   * 281     CMP:   Recent Labs   Lab 12/09/20  0343 12/10/20  0400    138   K 4.7 4.6    100   CO2 26 26   * 218*   BUN 31* 37*   CREATININE 1.1 1.3   CALCIUM 8.9 9.2   PROT 7.2 7.2   ALBUMIN 2.8* 2.7*   BILITOT 0.5 1.0   ALKPHOS 90 117   AST 33 36   ALT 16 15   ANIONGAP 11 12   EGFRNONAA 58.3* 47.6*       Significant Imaging: I have reviewed all pertinent imaging results/findings within the past 24 hours.      Assessment/Plan:      * Acute hypoxemic respiratory failure  See COVID-19 and heart failure management.         Severe sepsis  Pt has RR 26 and /90. She is having increasing oxygen requirements.  Was placed on CPAP yesterday.  Transfer to ICU today.  See COVID-19 and heart failure management.       Pneumonia due  to COVID-19 virus  COVID-19 Virus Infection  Viral Pneumonia due to COVID-19  - COVID-19 testing: Collection Date: 8/5/2020 Collection Time:   9:28 AM   - Isolation: Airborne/Droplet. Surgical mask on patient. Notify Infection Control  - Diagnostics: Trend Q48hrs if stable, more frequently if patient decompensating       - CBC       - CMP       - Mg        - D-dimer       - Ferritin       - CRP       - CPK       - LDH       - BNP       - Troponin       - Procalcitonin       - ECG       - CXR  Lymphopenia, hyponatremia, hyperferritinemia, elevated troponin, elevated d-dimer, age, and medical comorbidities are significant predictors of poor clinical outcome    - Management: Per [facility] COVID Treatment Protocol    - Telemetry & Continuous Pulse Oximetry    - Nutrition:        - Multivitamin PO daily       - Boost supplement       - Vitamin D 1000IU daily if deficient       - Ascorbic acid 500mg PO bid    - Supportive Care:       - acetaminophen 650mg PO Q6hr PRN fever/headache       - loperamide PRN viral diarrhea       - IVF held due to severely decreased EF       - VTE PPx: home warfarin    - Antibiotics: starting due to worsening SOB and new cough after 1 week of viral symptoms, concerning for bacterial superinfection       - ceftriaxone 1g Q24h x 5 days       - doxycycline 100 mg BID x5 days (substituted for azithromycin given prolonged QTc)     - On Remdesivir      - Dexamethasone 6 mg qd x10 days    Acute Hypoxemic Respiratory Failure    - Order RT consult via Respiratory Communication for COVID Protocols    - Incentive Spirometer Q2 while awake, Flutter Valve Q4 while awake    - Continuous Pulse Oximetry, goal SpO2 92-96%    - Supplemental O2 via LFNC    - If wheezing, albuterol INH Q6h scheduled & PRN    - If deterioration, may warrant trial of NIPPV in neg pressure room or immediate ICU consult   - Pt still requiring high amount of oxygen. Desat on exertion. Given CPAP yesterday. Transferred to ICU today.      Moderate persistent asthma without complication  - Continue home maintenance inhalers and montelukast 10 mg.     Acute kidney injury superimposed on chronic kidney disease  Hyponatremia  Patient giving history of adhering to home BP meds and diuretics. Poor oral intake. On arrival to ED, BP on the lower side. Baseline Cr 1.2. On admission mild AL Cr 1.5.    -RESOLVED     GERD (gastroesophageal reflux disease)  - Continue home famotidine, renally dosed    Chronic combined systolic and diastolic congestive heart failure  Essential hypertension  Patient unsure of her dry weight, says it is 230-240 lbs. She does not weigh herself regularly, usually senses to take her PRN metolazone when she notices abdominal distention. On admission, no JVD, no orthopnea, minimal trace pitting edema (pt says her baseline), no abdominal distention. SBPs in 100-110s/MAPs 70s however extremities warm and pulses strong.     - Was placed on home lasix 80 mg oral BID. CXR was concerning for worsening pulmonary edema today (12/10). Converted lasix 40 bid from po to IV. Pt transferred to ICU for worsening oxygen requirements.   - Can add PRN metolazone if needed  - Hold home spironolactone and lisinopril 2/2 to soft BP. Restart when appropriate.    Paroxysmal atrial fibrillation  - Continue home digoxin and amiodarone  - Continue warfarin. Dosing per pharmacy  - Daily INR. Maintain INR between 2-3  - Telemetry    Type 2 diabetes mellitus with diabetic polyneuropathy  Home regimen includes long-acting insulin glargine 34 units BID and short-acting insulin aspart 16 units with ISS.    - Diabetic diet  - POCT glucose qAC and qHS  - Glucose was in the 200s today. Increased detemir to 23 units bid and aspart 7 tid.   - Titrate up insulin as needed      VTE Risk Mitigation (From admission, onward)         Ordered     IP VTE HIGH RISK PATIENT  Once      12/06/20 1955     Place sequential compression device  Until discontinued      12/06/20 1955                 Discharge Planning   APRIL: 12/14/2020     Code Status: Full Code   Is the patient medically ready for discharge?: No    Reason for patient still in hospital (select all that apply): Patient unstable  Discharge Plan A: Home with family   Discharge Delays: None known at this time              Kelly Martinez MD  Department of Hospital Medicine   [facility] - ICU 16 [facility]

## 2020-12-11 NOTE — ASSESSMENT & PLAN NOTE
COVID-19 Data  -HD # 5 ;Admit date: 12.6  -Mechanical ventilation: no ; BIPAP  -Pressors: no  -Renal replacement therapy: no   -Health status prior to COVID: fair  -Functional status prior to COVID: fair  -Prior experiences with critical illness: yes    Decision-making:   -Pt does have decision-making capacity  -Pt has verbally appointed a HCPOA: daughter   Ashley Erazo Daughter     [phone]   Marital status: yes  -Children: 3 children    Advance directives:  -The patient has not previously engaged in advance care planning  -Pt has no scanned advance directives in Epic    Support system:  -Spiritual: very much; deeply Buddhism  -Family: to current  for 20+ years; three children; lives in multigenerational home    Estimated prognosis:   -Time and potential for recovery: If progresses to need for mechanical ventilation would be concerned for poor prognosis; discussed with pt and daughter    Prior GOC discussions: no    GOC Discussion:  -Discussion with with patient and daughter to introduce the role of palliative medicine and supportive care in the ICU in the setting of a serious diagnosis of COVID-19. The pateint and family were able to demonstrate an excellent understanding of the diagnosis, current care plan, hope for imoprovement and concern for worsening of the pt's condition.    Experience with critical illness: previous ICU admissions for cardigenic shock; has been on inotropes and RRT in hospital in the past; d/c home    Understanding of illness: no exposure to serious illness with COVID; evolving understanding    Present for discussion: daughter    Goals of care: Given her history and their maggie in God, they would want a trial of all invasive supportive therapies with the goal of being able to return home.  It was discussed the high mortality rates we see in pts with her comorbids and concern it would only be prolonging the dying period.      The pt's daughter was able to verbalize that  the children would support decision she makes whether they liked it or not, including if she chose to avoid intubation.  The pt acknowledge the support but would want a SHORT trail of life proloning care if felt it would help by her team.     The pt also gave the daughter permission to also let her die peacefully without prolongation of her dying process IF after a short trial it was felt she could not survive without prolonged reliance on interventions.     Pt and family feel well supported in current care setting.     Summary/Recommendation:  -Most important goals at this time are curative/life-prolongation (regardless of treatment burdens)  -Code status: FULL  -Palliative care to provide emotional support and assist with communication regarding disease expectations and trajectory, and with medical decision-making, at the appropriate time  -Most appropriate disposition: continue with the current LOC for now  -Family desires open and honest prognostication to help base decision making    50 minutes spent in above ACP discussions    Thank you for the opportunity to care for this patient and family.     Please call with questions.     Chino Mccollum MD  Palliative Medicine

## 2020-12-11 NOTE — PROGRESS NOTES
[facility] - ICU 16   Critical Care Medicine  Progress Note    Patient Name: Laure Ross  MRN: [id number]  Admission Date: 12/6/2020  Hospital Length of Stay: 5 days  Code Status: Full Code  Attending Provider: Olivia Phillips MD  Primary Care Provider: Holly Mittal MD   Principal Problem: Acute hypoxemic respiratory failure due to COVID-19    Subjective:     HPI:  Ms. Laure Ross is a 50 year old woman with a PMHx of HFrEF 2/2 NICM (EF 15%, CRT-D placed '17), pAF, HTN, IDDM2, HLD, CLARENCE, asthma, GERD and obesity who presents to [facility] for SOB and cough. 1 week ago she began having myalgias, fatigue, nausea, vomiting, headache, and mild dyspnea on exertion. At that time she got a COVID test and was positive on 11/30. Most of her symptoms progressively improved throughout the week but 2 days ago began having worsening SOB even at rest and 1 day ago began having a cough and fever. Denies orthopnea. She says the cough is productive but has been coughing it up and swallowing it so cannot comment on the character of the sputum. She has been taking tylenol at home. She has been adherent with all of her medications including her diuretics and insulin. She denies taking her metolazone recently as needed, however she admits that she has not weighed herself in weeks. She feels she is at her baseline weight. Her urine volume has been unchanged. She has been drinking water but her appetite for food has decreased in the past few days.     Hospital/ICU Course:  Initially admitted to Hospital Medicine for COVID pneumonia on 12/8. Required HFNC between 10-15L. Diurese well, net negative 5L. Started on dexamethasone, remdesivir, ceftriaxone, and doxycycline. Overnight on 12/9, oxygen requirements started to increase. Unable to tolerate coming off of continuous CPAP and escalated to ICU.     Interval History/Significant Events: No acute events overnight. Remained on CPAP 12 at 70% with oxygen saturations >90%. No  complaints this morning.     Review of Systems   Unable to perform ROS: Other (Limited due to continuous NIPPV)   Constitutional: Negative for chills and fever.   Respiratory: Positive for cough and shortness of breath.    Cardiovascular: Negative for chest pain.   Gastrointestinal: Negative for abdominal pain and nausea.   Psychiatric/Behavioral: Negative for agitation and confusion.     Objective:     Vital Signs (Most Recent):  Temp: 97.8 °F (36.6 °C) (12/11/20 0800)  Pulse: 89 (12/11/20 1000)  Resp: (!) 24 (12/11/20 1000)  BP: 118/88 (12/11/20 1000)  SpO2: (!) 90 % (12/11/20 1000) Vital Signs (24h Range):  Temp:  [97.7 °F (36.5 °C)-98.6 °F (37 °C)] 97.8 °F (36.6 °C)  Pulse:  [72-96] 89  Resp:  [22-29] 24  SpO2:  [89 %-97 %] 90 %  BP: (110-155)/(72-95) 118/88   Weight: 107 kg (235 lb 14.3 oz)  Body mass index is 40.49 kg/m².      Intake/Output Summary (Last 24 hours) at 12/11/2020 1022  Last data filed at 12/11/2020 1000  Gross per 24 hour   Intake 1580 ml   Output 2805 ml   Net -1225 ml       Physical Exam  Vitals signs reviewed.   Constitutional:       Appearance: She is well-developed. She is obese.      Interventions: Face mask in place.   HENT:      Head: Normocephalic and atraumatic.   Eyes:      Pupils: Pupils are equal, round, and reactive to light.   Neck:      Musculoskeletal: Neck supple.      Thyroid: No thyromegaly.      Trachea: No tracheal deviation.   Cardiovascular:      Rate and Rhythm: Normal rate and regular rhythm.      Heart sounds: Heart sounds are distant. No murmur. No friction rub. No gallop.       Comments: Paced  Pulmonary:      Effort: Pulmonary effort is normal. No tachypnea or accessory muscle usage.      Breath sounds: Normal breath sounds. No decreased breath sounds, wheezing, rhonchi or rales.   Chest:      Comments: Pacemaker scar located in right upper chest.   Abdominal:      General: Bowel sounds are normal.      Palpations: Abdomen is soft.      Tenderness: There is no  abdominal tenderness.   Genitourinary:     Comments: Puga in place.   Musculoskeletal: Normal range of motion.   Skin:     General: Skin is warm and dry.   Neurological:      Mental Status: She is alert and oriented to person, place, and time.      Sensory: No sensory deficit.   Psychiatric:         Mood and Affect: Mood normal.         Behavior: Behavior is cooperative.         Vents:  Oxygen Concentration (%): 70 (12/11/20 1000)  Lines/Drains/Airways     Drain                 Urethral Catheter 12/10/20 1500 less than 1 day          Peripheral Intravenous Line                 Peripheral IV - Single Lumen 12/10/20 1100 Anterior;Left;Proximal Forearm less than 1 day              Significant Labs:    CBC/Anemia Profile:  Recent Labs   Lab 12/10/20  0400 12/11/20  0320   WBC 16.43* 15.07*   HGB 13.9 13.2   HCT 44.0 41.3    193   MCV 86 84   RDW 15.1* 15.0*        Chemistries:  Recent Labs   Lab 12/10/20  0400 12/11/20  0320    134*   K 4.6 4.5    100   CO2 26 20*   BUN 37* 44*   CREATININE 1.3 1.3   CALCIUM 9.2 8.6*   ALBUMIN 2.7* 2.5*   PROT 7.2 6.6   BILITOT 1.0 1.2*   ALKPHOS 117 110   ALT 15 14   AST 36 24   MG 2.2 2.0   PHOS 2.7 2.7       All pertinent labs within the past 24 hours have been reviewed.    Significant Imaging:  I have reviewed and interpreted all pertinent imaging results/findings within the past 24 hours.      ABG  No results for input(s): PH, PO2, PCO2, HCO3, BE in the last 168 hours.  Assessment/Plan:     Pulmonary  Moderate persistent asthma without complication  - Continue home maintenance inhalers and montelukast 10 mg    Cardiac/Vascular  Chronic combined systolic and diastolic congestive heart failure  Known NICM (EF 10-20%), CRT-D (2017) in place  --Repeat TTE stable  --Continue diuresis     Paroxysmal atrial fibrillation  --Continue home digoxin and amiodarone  --Hold warfarin, INR supra-therapeutic. Start heparin gtt when INR <2  --f/u EKG    Renal/  Acute kidney  injury superimposed on chronic kidney disease  AL on CKD noted on admit, improving now. Baseline creatinine ~1.2  --monitor for now  --campa in place   --diurese as tolerated     Endocrine  Type 2 diabetes mellitus with diabetic polyneuropathy  Home regimen includes long-acting insulin glargine 34 units BID and short-acting insulin aspart 16 units with SSI. HgbA1c 6.5  --Detemir 23u BID and aspart 7u TID with SSI.    --Titrate up as needed    Other  * Acute hypoxemic respiratory failure due to COVID-19  COVID positive on 11/30. Admitted to hospital 12/6, upgraded to ICU 12/10 for increasing oxygen requirements. 's up from 200's on admit. Procalcitonin increased from 0.18 to 0.29.    --f/u blood and sputum cultures  --Received ceftriaxone + doxy initially; escalated to cefepime + vanc   --Known NICM with EF 15%; Continue aggressive diureses  --Continue alternating between CPAP and Comfort Flow; titrate for SpO2 >90%  --Continue dexamethasone X 10 days and remdesivir X 5 days    --Start heparin infusion for hypercoagulability when INR <2    --Scheduled Duonebs     Pneumonia due to COVID-19 virus  See respiratory failure     Palliative care encounter  Palliative care consulted, appreciate assistance  --GOC discussions initiated. Intubation discussed at length. Ms. Ross would like to speak with her daughter before making a decision on changing code status; will need follow up     CLARENCE (obstructive sleep apnea)  --CPAP nightly       Critical Care Daily Checklist:    A: Awake: RASS Goal/Actual Goal:    Actual: Paige Agitation Sedation Scale (RASS): Alert and calm   B: Spontaneous Breathing Trial Performed?     C: SAT & SBT Coordinated?  N/A                      D: Delirium: CAM-ICU Overall CAM-ICU: Negative   E: Early Mobility Performed? Yes   F: Feeding Goal:    Status:     Current Diet Order   Procedures    Diet full liquid      AS: Analgesia/Sedation None   T: Thromboembolic Prophylaxis Holding due to  supratherapeutic INR   H: HOB > 300 Yes   U: Stress Ulcer Prophylaxis (if needed) N/A   G: Glucose Control Elevated; titrate up as needed   B: Bowel Function Stool Occurrence: 0   I: Indwelling Catheter (Lines & Puga) Necessity Puga and PIV   D: De-escalation of Antimicrobials/Pharmacotherapies Cefepime and vancomycin    Plan for the day/ETD Supportive care    Code Status:  Family/Goals of Care: Full Code  Ongoing      Discussed with Dr. Phillips.     Critical Care Time: 40 minutes  Critical secondary to Patient has a condition that poses threat to life and bodily function: acute hypoxemic respiratory failure on continuous NIPPV at high risk of acute decompensation.       Critical care was time spent personally by me on the following activities: development of treatment plan with patient or surrogate and bedside caregivers, discussions with consultants, evaluation of patient's response to treatment, examination of patient, ordering and performing treatments and interventions, ordering and review of laboratory studies, ordering and review of radiographic studies, pulse oximetry, re-evaluation of patient's condition. This critical care time did not overlap with that of any other provider or involve time for any procedures.     Lizzy Bhakta PA-C  Critical Care Medicine  [facility] - ICU 16 WT

## 2020-12-11 NOTE — PLAN OF CARE
CMICU DAILY GOALS       A: Awake    RASS: Goal -    Actual -     Restraint necessity:    B: Breath   SBT: Not intubated   C: Coordinate A & B, analgesics/sedatives   Pain: managed    SAT: Not intubated  D: Delirium   CAM-ICU:    E: Early(intubated/ Progressive (non-intubated) Mobility   MOVE Screen: Pass   Activity: Activity Management: rolling - L1  FAS: Feeding/Nutrition   Diet order: Diet/Nutrition Received: ice chips, full liquid,   Fluid restriction:    T: Thrombus   DVT prophylaxis: VTE Required Core Measure: Pharmacological prophylaxis initiated/maintained  H: HOB Elevation   Head of Bed (HOB): HOB at 30-45 degrees  U: Ulcer Prophylaxis   GI: yes  G: Glucose control   managed    S: Skin   Bundle compliance: yes   Bathing/Skin Care: bath, chlorhexidine, bath, complete, foot care Date: 12/11/20  B: Bowel Function   no issues   I: Indwelling Catheters   Puga necessity:      Urethral Catheter 12/10/20 1500-Reason for Continuing Urinary Catheterization: Critically ill in ICU and requiring hourly monitoring of intake/output   CVC necessity: Yes   IPAD offered: Not appropriate  D: De-escalation Antibx   Yes  Plan for the day   Continue monitoring oxygenation.   Family/Goals of care/Code Status   Code Status: Full Code     No acute events throughout day, VS and assessment per flow sheet, patient progressing towards goals as tolerated, plan of care reviewed with Laure Ross and family, all concerns addressed, will continue to monitor.

## 2020-12-11 NOTE — SUBJECTIVE & OBJECTIVE
Review of Systems   Constitutional: Negative for appetite change, chills, fatigue and fever.   HENT: Negative for congestion, ear pain, sinus pain and sore throat.    Eyes: Negative for visual disturbance.   Respiratory: Positive for shortness of breath. Negative for cough.    Cardiovascular: Negative for chest pain and leg swelling.   Gastrointestinal: Negative for abdominal pain, constipation, diarrhea, nausea and vomiting.   Genitourinary: Negative for decreased urine volume, difficulty urinating and dysuria.   Musculoskeletal: Negative for back pain and neck pain.   Skin: Negative for rash.   Neurological: Negative for dizziness, syncope and headaches.   Psychiatric/Behavioral: Negative for confusion and decreased concentration.     Objective:     Vital Signs (Most Recent):  Temp: 97.7 °F (36.5 °C) (12/10/20 1500)  Pulse: 88 (12/10/20 1800)  Resp: (!) 25 (12/10/20 1024)  BP: (!) 155/83 (12/10/20 1800)  SpO2: 97 % (12/10/20 1800) Vital Signs (24h Range):  Temp:  [96 °F (35.6 °C)-98.6 °F (37 °C)] 97.7 °F (36.5 °C)  Pulse:  [85-99] 88  Resp:  [7-34] 25  SpO2:  [77 %-98 %] 97 %  BP: (142-156)/(72-95) 155/83     Weight: 108.9 kg (240 lb)  Body mass index is 41.2 kg/m².    Intake/Output Summary (Last 24 hours) at 12/10/2020 1853  Last data filed at 12/10/2020 1800  Gross per 24 hour   Intake 1450 ml   Output 350 ml   Net 1100 ml      Physical Exam  Vitals signs reviewed.   Constitutional:       General: She is not in acute distress.     Appearance: She is well-developed. She is obese. She is not diaphoretic.   HENT:      Head: Normocephalic and atraumatic.      Mouth/Throat:      Mouth: Mucous membranes are moist.   Eyes:      Extraocular Movements: Extraocular movements intact.      Conjunctiva/sclera: Conjunctivae normal.   Neck:      Musculoskeletal: Normal range of motion.      Trachea: No tracheal deviation.   Cardiovascular:      Rate and Rhythm: Normal rate and regular rhythm.      Heart sounds: Normal heart  sounds. No murmur.   Pulmonary:      Effort: Pulmonary effort is normal. No respiratory distress.      Breath sounds: No wheezing or rales (faint at bilateral bases).      Comments: Nasal canula. Decreased breath sound at bases.   Abdominal:      General: Bowel sounds are normal. There is no distension.      Palpations: Abdomen is soft.      Tenderness: There is no abdominal tenderness.   Musculoskeletal:         General: No deformity.      Comments: Trace pretibial pitting edema bilaterally   Skin:     General: Skin is warm and dry.      Findings: No erythema or rash.   Neurological:      Mental Status: She is alert and oriented to person, place, and time. Mental status is at baseline.      Motor: No abnormal muscle tone.         Significant Labs:   BMP:   Recent Labs   Lab 12/10/20  0400   *      K 4.6      CO2 26   BUN 37*   CREATININE 1.3   CALCIUM 9.2   MG 2.2     CBC:   Recent Labs   Lab 12/09/20  0343 12/10/20  0400   WBC 11.91 16.43*   HGB 12.9 13.9   HCT 40.8 44.0   * 281     CMP:   Recent Labs   Lab 12/09/20  0343 12/10/20  0400    138   K 4.7 4.6    100   CO2 26 26   * 218*   BUN 31* 37*   CREATININE 1.1 1.3   CALCIUM 8.9 9.2   PROT 7.2 7.2   ALBUMIN 2.8* 2.7*   BILITOT 0.5 1.0   ALKPHOS 90 117   AST 33 36   ALT 16 15   ANIONGAP 11 12   EGFRNONAA 58.3* 47.6*       Significant Imaging: I have reviewed all pertinent imaging results/findings within the past 24 hours.

## 2020-12-12 NOTE — PROGRESS NOTES
Pharmacokinetic Assessment Follow Up: IV Vancomycin    Vancomycin Regimen Assessment and Plan:  - Vancomycin trough level (12-hour level) resulted at 18 mcg/mL, which is considered therapeutic (goal: 15-20 mcg/mL)  - Serum creatinine stable at 1.3 mg/dL  - Continue vancomycin 1000 mg IV every 12 hours  - Next vancomycin level ordered for 12/13 at 1100 or sooner if change in renal function or clinical status     Drug levels (last 3 results):  Recent Labs   Lab Result Units 12/11/20  2335   Vancomycin-Trough ug/mL 18.0       Pharmacy will continue to follow and monitor vancomycin.    Please contact pharmacy at extension [phone] for questions regarding this assessment.    Thank you for the consult,   Jacklyn Powers       Patient brief summary:  Laure Ross is a 50 y.o. female initiated on antimicrobial therapy with IV Vancomycin for treatment of lower respiratory infection      Drug Allergies:   Review of patient's allergies indicates:  No Known Allergies    Actual Body Weight:   107 kg    Renal Function:   Estimated Creatinine Clearance: 61.1 mL/min (based on SCr of 1.3 mg/dL).    Dialysis Method (if applicable):  N/A

## 2020-12-12 NOTE — ASSESSMENT & PLAN NOTE
AL on CKD noted on admit, improving now. Baseline creatinine ~1.2  - Cr stable today  - BUN rising with diuresis. CTM  - Puga in place   - Renally dose meds and avoid nephrotoxics

## 2020-12-12 NOTE — PT/OT/SLP RE-EVAL
Occupational Therapy   Re-evaluation    Name: Laure Ross  MRN: [id number]  Admitting Diagnosis:  Acute hypoxemic respiratory failure due to COVID-19      Recommendations:     Discharge Recommendations: home health OT  Discharge Equipment Recommendations:  none  Barriers to discharge:  None    Assessment:     Laure Ross is a 50 y.o. female with a medical diagnosis of Acute hypoxemic respiratory failure due to COVID-19.  She presents on continuous BIPAP, but stable O2 requirements and respiratory status per RN. Pt tolerated EOB well with SpO2 93 - 94% and RR WDL.  Performance deficits affecting function are weakness, impaired functional mobilty, impaired cardiopulmonary response to activity, gait instability, impaired endurance, impaired balance, impaired self care skills.      Rehab Prognosis:  Good; patient would benefit from acute skilled OT services to address these deficits and reach maximum level of function.       Plan:     Patient to be seen 3 x/week to address the above listed problems via self-care/home management, therapeutic activities, therapeutic exercises  · Plan of Care Expires: 01/11/21  · Plan of Care Reviewed with: patient, daughter    Subjective     Chief Complaint: denies  Patient/Family stated goals: to get better and go home  Communicated with: RN prior to session.  Pain/Comfort:  · Pain Rating 1: 0/10  · Pain Rating Post-Intervention 1: 0/10    Objective:     Communicated with: RN prior to session.  Patient found supine with: blood pressure cuff, pulse ox (continuous), telemetry, campa catheter, peripheral IV, CPAP upon OT entry to room.    General Precautions: Standard, airborne, contact, droplet, fall   Orthopedic Precautions:N/A   Braces:       Occupational Performance:    Bed Mobility:    · Patient completed Supine to Sit with stand by assistance  · Patient completed Sit to Supine with contact guard assistance    Functional Mobility/Transfers:  · Patient completed Sit <> Stand  Transfer with contact guard assistance  with  no assistive device   · Functional Mobility: CGA x 4 sidesteps along EOB    Activities of Daily Living:  · Grooming: contact guard assistance seated EOB  · Lower Body Dressing: maximal assistance      Cognitive/Visual Perceptual:  Oriented to: Person, Place, Time and Situation  Follows Commands/attention: Follows multistep  commands  Communication: clear/fluent  Memory:  No Deficits noted  Safety awareness/insight to disability: intact  Coping skills/emotional control: Appropriate to situation    Physical Exam:  Postural examination/scapula alignment:    -       No postural abnormalities identified  Sensation:    -       Intact  Upper Extremity Range of Motion:     -       Right Upper Extremity: WFL  -       Left Upper Extremity: WFL  Upper Extremity Strength:    -       Right Upper Extremity: WFL  -       Left Upper Extremity: WFL   Strength:    -       Right Upper Extremity: WFL  -       Left Upper Extremity: WFL  Fine Motor Coordination:    -       Intact  Gross motor coordination:   WFL    AMPAC 6 Click:  AMPAC Total Score: 13    Treatment & Education:  Education:  Pt ed on OT POC  Pt with SpO2 92% at rest improving to 93-94% seated EOB  Pt completed gross UE/LE AROM ex's     Patient left HOB elevated with all lines intact, call button in reach, RN notified and dtr present    GOALS:   Multidisciplinary Problems     Occupational Therapy Goals        Problem: Occupational Therapy Goal    Goal Priority Disciplines Outcome Interventions   Occupational Therapy Goal     OT, PT/OT Ongoing, Progressing    Description: Goals to be met by: 12/26/20    Patient will increase functional independence with ADLs by performing:    UE Dressing with SBA  LE Dressing with SBA  Grooming while standing with SBA.  Toileting from toilet with SBA for hygiene and clothing management.   Toilet transfer to toilet with SBA.                     History:     Past Medical History:   Diagnosis  Date    Anticoagulant long-term use     Arthritis     Asthma     Asthma     Atrial fibrillation     Cardiomyopathy     Cardiomyopathy     CHF (congestive heart failure)     CHF (congestive heart failure)     Chronic combined systolic and diastolic heart failure 6/3/2016    COPD (chronic obstructive pulmonary disease) 3/28/2018    GERD (gastroesophageal reflux disease)     H/O tubal ligation     Hypertension     Obesity     Renal disorder     Type 2 diabetes mellitus with hyperglycemia     Type 2 diabetes mellitus with polyneuropathy        Past Surgical History:   Procedure Laterality Date    CARDIAC DEFIBRILLATOR PLACEMENT      CARDIAC DEFIBRILLATOR PLACEMENT      CARDIAC DEFIBRILLATOR PLACEMENT      CATARACT EXTRACTION Left     CHOLECYSTECTOMY      COLONOSCOPY N/A 5/2/2016    Procedure: COLONOSCOPY;  Surgeon: Eugene Soto MD;  [facility]  Service: Endoscopy;  Laterality: N/A;    GALLBLADDER SURGERY      iabp  4/2016    TREATMENT OF CARDIAC ARRHYTHMIA N/A 6/12/2020    Procedure: CARDIOVERSION;  Surgeon: Navi Jolly MD;  Location: [facility] EP LAB;  Service: Cardiology;  Laterality: N/A;  AF, BRITTANEY, DCCV, MAC, DM, 3 Prep    TREATMENT OF CARDIAC ARRHYTHMIA N/A 8/7/2020    Procedure: Cardioversion or Defibrillation;  Surgeon: Navi Jolly MD;  Location: [facility] EP LAB;  Service: Cardiology;  Laterality: N/A;  AF, DCCV, MAC, DM, 3 Prep    TUBAL LIGATION         Time Tracking:     OT Date of Treatment: 12/12/20  OT Start Time: 1118  OT Stop Time: 1137  OT Total Time (min): 19 min    Billable Minutes:Re-eval 9  Therapeutic Exercise 10    TELLY Magana  12/12/2020

## 2020-12-12 NOTE — PLAN OF CARE
Problem: Occupational Therapy Goal  Goal: Occupational Therapy Goal  Description: Goals to be met by: 12/26/20    Patient will increase functional independence with ADLs by performing:    UE Dressing with SBA  LE Dressing with SBA  Grooming while standing with SBA.  Toileting from toilet with SBA for hygiene and clothing management.   Toilet transfer to toilet with SBA.    Outcome: Ongoing, Progressing   OT re-eval completed, and above goals established. TELLY Magana  12/12/2020

## 2020-12-12 NOTE — ASSESSMENT & PLAN NOTE
- Continue home digoxin and amiodarone  - Hold warfarin, INR supra-therapeutic. Start heparin gtt when INR <2  - f/u EKG

## 2020-12-12 NOTE — PLAN OF CARE
CMICU DAILY GOALS       A: Awake    RASS: Goal -    Actual -     Restraint necessity:    B: Breath   SBT: Not intubated   C: Coordinate A & B, analgesics/sedatives   Pain: managed    SAT: Not intubated  D: Delirium   CAM-ICU: Overall CAM-ICU: Negative  E: Early(intubated/ Progressive (non-intubated) Mobility   MOVE Screen: Fail   Activity: Activity Management: arm raise - L1, leg kicks - L2, rolling - L1  FAS: Feeding/Nutrition   Diet order: Diet/Nutrition Received: full liquid, ice chips,   Fluid restriction:    T: Thrombus   DVT prophylaxis: VTE Required Core Measure: Pharmacological prophylaxis initiated/maintained  H: HOB Elevation   Head of Bed (HOB): HOB at 30-45 degrees  U: Ulcer Prophylaxis   GI: yes  G: Glucose control   managed    S: Skin   Bundle compliance: yes   Bathing/Skin Care: bath, chlorhexidine, linen changed, incontinence care Date: 12/11/2020 day shift at 18:00  B: Bowel Function   no issues   I: Indwelling Catheters   Puga necessity:      Urethral Catheter 12/10/20 1500-Reason for Continuing Urinary Catheterization: Critically ill in ICU and requiring hourly monitoring of intake/output   CVC necessity: Yes   IPAD offered: Not appropriate  D: De-escalation Antibx   Yes  Plan for the day   Goals of care discussion with family.   Family/Goals of care/Code Status   Code Status: Full Code     No acute events throughout day, VS and assessment per flow sheet, patient progressing towards goals as tolerated, plan of care reviewed with Laure Ross and family, all concerns addressed, will continue to monitor.

## 2020-12-12 NOTE — PROGRESS NOTES
[facility] - ICU 16   Critical Care Medicine  Progress Note    Patient Name: Laure Ross  MRN: [id number]  Admission Date: 12/6/2020  Hospital Length of Stay: 6 days  Code Status: Full Code  Attending Provider: Chaz Taylor MD  Primary Care Provider: Holly Mittal MD   Principal Problem: Acute hypoxemic respiratory failure due to COVID-19    Subjective:     HPI:  Ms. Laure Ross is a 50 year old woman with a PMHx of HFrEF 2/2 NICM (EF 15%, CRT-D placed '17), pAF, HTN, IDDM2, HLD, CLARENCE, asthma, GERD and obesity who presents to [facility] for SOB and cough. 1 week ago she began having myalgias, fatigue, nausea, vomiting, headache, and mild dyspnea on exertion. At that time she got a COVID test and was positive on 11/30. Most of her symptoms progressively improved throughout the week but 2 days ago began having worsening SOB even at rest and 1 day ago began having a cough and fever. Denies orthopnea. She says the cough is productive but has been coughing it up and swallowing it so cannot comment on the character of the sputum. She has been taking tylenol at home. She has been adherent with all of her medications including her diuretics and insulin. She denies taking her metolazone recently as needed, however she admits that she has not weighed herself in weeks. She feels she is at her baseline weight. Her urine volume has been unchanged. She has been drinking water but her appetite for food has decreased in the past few days.     Hospital/ICU Course:  Initially admitted to Hospital Medicine for COVID pneumonia on 12/8. Required HFNC between 10-15L. Diurese well, net negative 5L. Started on dexamethasone, remdesivir, ceftriaxone, and doxycycline. Overnight on 12/9, oxygen requirements started to increase. Unable to tolerate coming off of continuous CPAP and escalated to ICU. Remains on CPAP.    Interval History/Significant Events: No acute events overnight. Remained on CPAP 12 at 50%. Good  UOP    Review of Systems   Unable to perform ROS: Other (Limited due to continuous NIPPV)   Constitutional: Negative for chills and fever.   Respiratory: Positive for cough and shortness of breath.    Cardiovascular: Negative for chest pain.   Gastrointestinal: Negative for abdominal pain and nausea.   Psychiatric/Behavioral: Negative for agitation and confusion.     Objective:     Vital Signs (Most Recent):  Temp: 98.5 °F (36.9 °C) (12/12/20 0305)  Pulse: 77 (12/12/20 0750)  Resp: (!) 23 (12/12/20 0750)  BP: 117/68 (12/12/20 0605)  SpO2: (!) 94 % (12/12/20 0750) Vital Signs (24h Range):  Temp:  [98 °F (36.7 °C)-98.5 °F (36.9 °C)] 98.5 °F (36.9 °C)  Pulse:  [] 77  Resp:  [11-27] 23  SpO2:  [80 %-99 %] 94 %  BP: (106-135)/(65-90) 117/68   Weight: 107.2 kg (236 lb 5.3 oz)  Body mass index is 40.57 kg/m².      Intake/Output Summary (Last 24 hours) at 12/12/2020 0845  Last data filed at 12/12/2020 0605  Gross per 24 hour   Intake 1205 ml   Output 2465 ml   Net -1260 ml       Physical Exam  Vitals signs reviewed.   Constitutional:       Appearance: She is well-developed. She is obese.      Interventions: Face mask in place.   HENT:      Head: Normocephalic and atraumatic.   Eyes:      Pupils: Pupils are equal, round, and reactive to light.   Neck:      Musculoskeletal: Neck supple.      Thyroid: No thyromegaly.      Trachea: No tracheal deviation.   Cardiovascular:      Rate and Rhythm: Normal rate and regular rhythm.      Heart sounds: Heart sounds are distant. No murmur. No friction rub. No gallop.       Comments: Paced  Pulmonary:      Effort: Pulmonary effort is normal. No tachypnea or accessory muscle usage.      Breath sounds: Normal breath sounds. No decreased breath sounds, wheezing, rhonchi or rales.   Chest:      Comments: Pacemaker scar located in right upper chest.   Abdominal:      General: Bowel sounds are normal.      Palpations: Abdomen is soft.      Tenderness: There is no abdominal tenderness.    Genitourinary:     Comments: Puga in place.   Musculoskeletal: Normal range of motion.   Skin:     General: Skin is warm and dry.   Neurological:      Mental Status: She is alert and oriented to person, place, and time.      Sensory: No sensory deficit.   Psychiatric:         Mood and Affect: Mood normal.         Behavior: Behavior is cooperative.         Vents:  Oxygen Concentration (%): 50 (12/12/20 0750)  Lines/Drains/Airways     Drain                 Urethral Catheter 12/10/20 1500 1 day          Peripheral Intravenous Line                 Peripheral IV - Single Lumen 12/10/20 1100 Anterior;Left;Proximal Forearm 1 day         Midline Catheter Insertion/Assessment  - Single Lumen 12/11/20 1524 Right cephalic vein (lateral side of arm) 18g x 10cm less than 1 day              Significant Labs:    CBC/Anemia Profile:  Recent Labs   Lab 12/11/20  0320 12/12/20  0310   WBC 15.07* 16.87*   HGB 13.2 12.6   HCT 41.3 39.8    228   MCV 84 83   RDW 15.0* 15.4*        Chemistries:  Recent Labs   Lab 12/11/20  0320 12/12/20  0310   * 138   K 4.5 4.5    101   CO2 20* 23   BUN 44* 54*   CREATININE 1.3 1.3   CALCIUM 8.6* 8.9   ALBUMIN 2.5* 2.5*   PROT 6.6 6.6   BILITOT 1.2* 0.8   ALKPHOS 110 103   ALT 14 13   AST 24 15   MG 2.0 2.2   PHOS 2.7 3.4       All pertinent labs within the past 24 hours have been reviewed.    Significant Imaging:  I have reviewed and interpreted all pertinent imaging results/findings within the past 24 hours.      ABG  No results for input(s): PH, PO2, PCO2, HCO3, BE in the last 168 hours.  Assessment/Plan:     Pulmonary  Moderate persistent asthma without complication  - Continue home maintenance inhalers and montelukast 10 mg    Cardiac/Vascular  Chronic combined systolic and diastolic congestive heart failure  Known NICM (EF 15%), CRT-D (2017) in place  - Repeat TTE stable  - Continue diuresis with 80mg IV lasix TID    Paroxysmal atrial fibrillation  - Continue home digoxin and  amiodarone  - Hold warfarin, INR supra-therapeutic. Start heparin gtt when INR <2  - f/u EKG    Renal/  Acute kidney injury superimposed on chronic kidney disease  AL on CKD noted on admit, improving now. Baseline creatinine ~1.2  - Cr stable today  - BUN rising with diuresis. CTM  - Puga in place   - Renally dose meds and avoid nephrotoxics    Endocrine  Type 2 diabetes mellitus with diabetic polyneuropathy  Home regimen includes long-acting insulin glargine 34 units BID and short-acting insulin aspart 16 units with SSI. HgbA1c 6.5.    BG not controlled with >300 all day. 43 units of aspart in the last 24 hours (21 scheduled and 22 SSI)    - Detemir 23u BID and aspart 7u TID with SSI.   - Increase Detemir to 30 units BID and aspart to 10 units TID  - Cont SSI    Other  * Acute hypoxemic respiratory failure due to COVID-19  COVID positive on 11/30. Admitted to hospital 12/6, upgraded to ICU 12/10 for increasing oxygen requirements. 's up from 200's on admit. Procalcitonin increased from 0.18 to 0.29.    - f/u blood cultures, NGTD  - Received ceftriaxone + doxy initially; escalated to cefepime + vanc with worsening oxygenation, increased leukocytosis and slight increase in procal  - Known NICM with EF 15%; Continue aggressive diureses  - Continue alternating between CPAP and Comfort Flow; titrate for SpO2 >90%  - Continue dexamethasone X 10 days and remdesivir X 5 days    - Start heparin infusion for hypercoagulability when INR <2    - Scheduled Duonebs     Pneumonia due to COVID-19 virus  See respiratory failure     Palliative care encounter  Palliative care consulted, appreciate assistance  --GOC discussions initiated. Intubation discussed at length. Ms. Ross would like to speak with her daughter before making a decision on changing code status; will need follow up     CLARENCE (obstructive sleep apnea)  - CPAP nightly when she is weaned down      Start a bowel regimen    Critical Care Time: 50  minutes  Critical secondary to Patient has a condition that poses threat to life and bodily function: Severe Respiratory Distress      Critical care was time spent personally by me on the following activities: development of treatment plan with patient or surrogate and bedside caregivers, discussions with consultants, evaluation of patient's response to treatment, examination of patient, ordering and performing treatments and interventions, ordering and review of laboratory studies, ordering and review of radiographic studies, pulse oximetry, re-evaluation of patient's condition. This critical care time did not overlap with that of any other provider or involve time for any procedures.     Chaz Taylor MD  Critical Care Medicine  [facility] - ICU 16 [facility]

## 2020-12-12 NOTE — ASSESSMENT & PLAN NOTE
Home regimen includes long-acting insulin glargine 34 units BID and short-acting insulin aspart 16 units with SSI. HgbA1c 6.5.    BG not controlled with >300 all day. 43 units of aspart in the last 24 hours (21 scheduled and 22 SSI)    - Detemir 23u BID and aspart 7u TID with SSI.   - Increase Detemir to 30 units BID and aspart to 10 units TID  - Cont SSI

## 2020-12-12 NOTE — PLAN OF CARE
Problem: Gas Exchange Impaired  Goal: Optimal Gas Exchange  Outcome: Ongoing, Progressing    Neuro: Aox4. GONZALEZ and follows commands. Appropriate.   CV: Paced. 90-100s. -130s. Afebrile.   Resp: HFNC 50-60 L, % or CPAP 12/50%. Sats >90%. Lungs diminished throughout. Nonproductive cough.   GI: Consistent carb diet with mechanical soft. Thin liquids ok. Takes pills all at once.   : Puga. Diuresing.   Skin: Intact.     Plan: Titrate O2 and advance mobility as tolerated.       CRISTIAN ALICEA RN

## 2020-12-12 NOTE — ASSESSMENT & PLAN NOTE
Known NICM (EF 15%), CRT-D (2017) in place  - Repeat TTE stable  - Continue diuresis with 80mg IV lasix TID

## 2020-12-12 NOTE — SUBJECTIVE & OBJECTIVE
Interval History/Significant Events: No acute events overnight. Remained on CPAP 12 at 50%. Good UOP    Review of Systems   Unable to perform ROS: Other (Limited due to continuous NIPPV)   Constitutional: Negative for chills and fever.   Respiratory: Positive for cough and shortness of breath.    Cardiovascular: Negative for chest pain.   Gastrointestinal: Negative for abdominal pain and nausea.   Psychiatric/Behavioral: Negative for agitation and confusion.     Objective:     Vital Signs (Most Recent):  Temp: 98.5 °F (36.9 °C) (12/12/20 0305)  Pulse: 77 (12/12/20 0750)  Resp: (!) 23 (12/12/20 0750)  BP: 117/68 (12/12/20 0605)  SpO2: (!) 94 % (12/12/20 0750) Vital Signs (24h Range):  Temp:  [98 °F (36.7 °C)-98.5 °F (36.9 °C)] 98.5 °F (36.9 °C)  Pulse:  [] 77  Resp:  [11-27] 23  SpO2:  [80 %-99 %] 94 %  BP: (106-135)/(65-90) 117/68   Weight: 107.2 kg (236 lb 5.3 oz)  Body mass index is 40.57 kg/m².      Intake/Output Summary (Last 24 hours) at 12/12/2020 0845  Last data filed at 12/12/2020 0605  Gross per 24 hour   Intake 1205 ml   Output 2465 ml   Net -1260 ml       Physical Exam  Vitals signs reviewed.   Constitutional:       Appearance: She is well-developed. She is obese.      Interventions: Face mask in place.   HENT:      Head: Normocephalic and atraumatic.   Eyes:      Pupils: Pupils are equal, round, and reactive to light.   Neck:      Musculoskeletal: Neck supple.      Thyroid: No thyromegaly.      Trachea: No tracheal deviation.   Cardiovascular:      Rate and Rhythm: Normal rate and regular rhythm.      Heart sounds: Heart sounds are distant. No murmur. No friction rub. No gallop.       Comments: Paced  Pulmonary:      Effort: Pulmonary effort is normal. No tachypnea or accessory muscle usage.      Breath sounds: Normal breath sounds. No decreased breath sounds, wheezing, rhonchi or rales.   Chest:      Comments: Pacemaker scar located in right upper chest.   Abdominal:      General: Bowel sounds are  normal.      Palpations: Abdomen is soft.      Tenderness: There is no abdominal tenderness.   Genitourinary:     Comments: Puga in place.   Musculoskeletal: Normal range of motion.   Skin:     General: Skin is warm and dry.   Neurological:      Mental Status: She is alert and oriented to person, place, and time.      Sensory: No sensory deficit.   Psychiatric:         Mood and Affect: Mood normal.         Behavior: Behavior is cooperative.         Vents:  Oxygen Concentration (%): 50 (12/12/20 0750)  Lines/Drains/Airways     Drain                 Urethral Catheter 12/10/20 1500 1 day          Peripheral Intravenous Line                 Peripheral IV - Single Lumen 12/10/20 1100 Anterior;Left;Proximal Forearm 1 day         Midline Catheter Insertion/Assessment  - Single Lumen 12/11/20 1524 Right cephalic vein (lateral side of arm) 18g x 10cm less than 1 day              Significant Labs:    CBC/Anemia Profile:  Recent Labs   Lab 12/11/20  0320 12/12/20  0310   WBC 15.07* 16.87*   HGB 13.2 12.6   HCT 41.3 39.8    228   MCV 84 83   RDW 15.0* 15.4*        Chemistries:  Recent Labs   Lab 12/11/20  0320 12/12/20  0310   * 138   K 4.5 4.5    101   CO2 20* 23   BUN 44* 54*   CREATININE 1.3 1.3   CALCIUM 8.6* 8.9   ALBUMIN 2.5* 2.5*   PROT 6.6 6.6   BILITOT 1.2* 0.8   ALKPHOS 110 103   ALT 14 13   AST 24 15   MG 2.0 2.2   PHOS 2.7 3.4       All pertinent labs within the past 24 hours have been reviewed.    Significant Imaging:  I have reviewed and interpreted all pertinent imaging results/findings within the past 24 hours.

## 2020-12-12 NOTE — ASSESSMENT & PLAN NOTE
COVID positive on 11/30. Admitted to hospital 12/6, upgraded to ICU 12/10 for increasing oxygen requirements. 's up from 200's on admit. Procalcitonin increased from 0.18 to 0.29.    - f/u blood cultures, NGTD  - Received ceftriaxone + doxy initially; escalated to cefepime + vanc with worsening oxygenation, increased leukocytosis and slight increase in procal  - Known NICM with EF 15%; Continue aggressive diureses  - Continue alternating between CPAP and Comfort Flow; titrate for SpO2 >90%  - Continue dexamethasone X 10 days and remdesivir X 5 days    - Start heparin infusion for hypercoagulability when INR <2    - Scheduled Dusandra

## 2020-12-13 NOTE — PLAN OF CARE
Problem: Physical Therapy Goal  Goal: Physical Therapy Goal  Description: Goals to be met by: 20     Patient will increase functional independence with mobility by performin. Supine to sit with Modified [place]  2. Sit to supine with Modified [place]  3. Sit to stand transfer with Modified [place]  4. Gait  x 50 feet with Modified [place] using LRAD, if needed.     Outcome: Ongoing, Progressing     Pt re-evaluated and appropriate goals established.     Elena Gutierrez, PT, DPT  2020  [phone]

## 2020-12-13 NOTE — SUBJECTIVE & OBJECTIVE
Interval History/Significant Events: Cont to have good UOP. Cr increasing. Oxygenation stable.    Review of Systems   Unable to perform ROS: Other (Limited due to continuous NIPPV)   Constitutional: Negative for chills and fever.   Respiratory: Positive for cough and shortness of breath.    Cardiovascular: Negative for chest pain.   Gastrointestinal: Negative for abdominal pain and nausea.   Psychiatric/Behavioral: Negative for agitation and confusion.     Objective:     Vital Signs (Most Recent):  Temp: 98 °F (36.7 °C) (12/12/20 2305)  Pulse: 79 (12/13/20 0804)  Resp: 18 (12/13/20 0804)  BP: 130/84 (12/13/20 0700)  SpO2: 97 % (12/13/20 0804) Vital Signs (24h Range):  Temp:  [97.5 °F (36.4 °C)-98 °F (36.7 °C)] 98 °F (36.7 °C)  Pulse:  [74-94] 79  Resp:  [9-24] 18  SpO2:  [88 %-100 %] 97 %  BP: (112-150)/(68-94) 130/84   Weight: 117 kg (257 lb 15 oz)  Body mass index is 44.27 kg/m².      Intake/Output Summary (Last 24 hours) at 12/13/2020 0912  Last data filed at 12/13/2020 0500  Gross per 24 hour   Intake 1945 ml   Output 2535 ml   Net -590 ml       Physical Exam  Vitals signs reviewed.   Constitutional:       Appearance: She is well-developed. She is obese.      Interventions: Face mask in place.   HENT:      Head: Normocephalic and atraumatic.   Eyes:      Pupils: Pupils are equal, round, and reactive to light.   Neck:      Musculoskeletal: Neck supple.      Thyroid: No thyromegaly.      Trachea: No tracheal deviation.   Cardiovascular:      Rate and Rhythm: Normal rate and regular rhythm.      Heart sounds: Heart sounds are distant. No murmur. No friction rub. No gallop.       Comments: Paced  Pulmonary:      Effort: Pulmonary effort is normal. No tachypnea or accessory muscle usage.      Breath sounds: Normal breath sounds. No decreased breath sounds, wheezing, rhonchi or rales.   Chest:      Comments: Pacemaker scar located in right upper chest.   Abdominal:      General: Bowel sounds are normal.       Palpations: Abdomen is soft.      Tenderness: There is no abdominal tenderness.   Genitourinary:     Comments: Puga in place.   Musculoskeletal: Normal range of motion.   Skin:     General: Skin is warm and dry.   Neurological:      Mental Status: She is alert and oriented to person, place, and time.      Sensory: No sensory deficit.   Psychiatric:         Mood and Affect: Mood normal.         Behavior: Behavior is cooperative.         Vents:  Oxygen Concentration (%): 50 (12/13/20 0804)  Lines/Drains/Airways     Drain                 Urethral Catheter 12/10/20 1500 2 days          Peripheral Intravenous Line                 Peripheral IV - Single Lumen 12/10/20 1100 Anterior;Left;Proximal Forearm 2 days         Midline Catheter Insertion/Assessment  - Single Lumen 12/11/20 1524 Right cephalic vein (lateral side of arm) 18g x 10cm 1 day              Significant Labs:    CBC/Anemia Profile:  Recent Labs   Lab 12/12/20  0310 12/13/20  0305   WBC 16.87* 14.70*   HGB 12.6 12.6   HCT 39.8 39.7    220   MCV 83 84   RDW 15.4* 15.3*        Chemistries:  Recent Labs   Lab 12/12/20  0310 12/13/20  0305    135*   K 4.5 4.4    99   CO2 23 24   BUN 54* 67*   CREATININE 1.3 1.5*   CALCIUM 8.9 8.6*   ALBUMIN 2.5* 2.5*   PROT 6.6 6.8   BILITOT 0.8 0.7   ALKPHOS 103 99   ALT 13 14   AST 15 15   MG 2.2 2.3   PHOS 3.4 3.5       All pertinent labs within the past 24 hours have been reviewed.    Significant Imaging:  I have reviewed and interpreted all pertinent imaging results/findings within the past 24 hours.

## 2020-12-13 NOTE — ASSESSMENT & PLAN NOTE
Home regimen includes long-acting insulin glargine 34 units BID and short-acting insulin aspart 16 units with SSI. HgbA1c 6.5.    BG not controlled with >300 all day. 43 units of aspart in the last 24 hours (21 scheduled and 22 SSI)    - 12/12 increase Detemir to 30 units BID and aspart to 10 units TID  - BG remains above 300  - With worsening renal function and poor PO intake, will only increase TID aspart to 12  - Cont Detemir 30 units BID  - Cont SSI

## 2020-12-13 NOTE — ASSESSMENT & PLAN NOTE
AL on CKD noted on admit, improving now. Baseline creatinine ~1.2  - Cr increased to 1.5 with increase in BUN  - Hold diuresis today  - D/c Vanc  - Puga in place   - Renally dose meds and avoid nephrotoxics

## 2020-12-13 NOTE — ASSESSMENT & PLAN NOTE
Known NICM (EF 15%), CRT-D (2017) in place  - Repeat TTE stable  - Negative 700cc in last 24hrs  - Will hold diuresis today given bump in Cr  - Resume when renal function stabilizes

## 2020-12-13 NOTE — PROGRESS NOTES
Therapy with vancomycin complete and/or consult discontinued by provider.  Pharmacy will sign off, please re-consult as needed.    Yves Salazar, RebekahD

## 2020-12-13 NOTE — PROGRESS NOTES
[facility] - ICU 16   Critical Care Medicine  Progress Note    Patient Name: Laure Ross  MRN: [id number]  Admission Date: 12/6/2020  Hospital Length of Stay: 7 days  Code Status: Full Code  Attending Provider: Chaz Taylor MD  Primary Care Provider: Holly Mittal MD   Principal Problem: Acute hypoxemic respiratory failure due to COVID-19    Subjective:     HPI:  Ms. Laure Ross is a 50 year old woman with a PMHx of HFrEF 2/2 NICM (EF 15%, CRT-D placed '17), pAF, HTN, IDDM2, HLD, CLARENCE, asthma, GERD and obesity who presents to [facility] for SOB and cough. 1 week ago she began having myalgias, fatigue, nausea, vomiting, headache, and mild dyspnea on exertion. At that time she got a COVID test and was positive on 11/30. Most of her symptoms progressively improved throughout the week but 2 days ago began having worsening SOB even at rest and 1 day ago began having a cough and fever. Denies orthopnea. She says the cough is productive but has been coughing it up and swallowing it so cannot comment on the character of the sputum. She has been taking tylenol at home. She has been adherent with all of her medications including her diuretics and insulin. She denies taking her metolazone recently as needed, however she admits that she has not weighed herself in weeks. She feels she is at her baseline weight. Her urine volume has been unchanged. She has been drinking water but her appetite for food has decreased in the past few days.     Hospital/ICU Course:  Initially admitted to Hospital Medicine for COVID pneumonia on 12/8. Required HFNC between 10-15L. Diurese well, net negative 5L. Started on dexamethasone, remdesivir, ceftriaxone, and doxycycline. Overnight on 12/9, oxygen requirements started to increase. Unable to tolerate coming off of continuous CPAP and escalated to ICU. Remains on CPAP.    Interval History/Significant Events: Cont to have good UOP. Cr increasing. Oxygenation stable.    Review  of Systems   Unable to perform ROS: Other (Limited due to continuous NIPPV)   Constitutional: Negative for chills and fever.   Respiratory: Positive for cough and shortness of breath.    Cardiovascular: Negative for chest pain.   Gastrointestinal: Negative for abdominal pain and nausea.   Psychiatric/Behavioral: Negative for agitation and confusion.     Objective:     Vital Signs (Most Recent):  Temp: 98 °F (36.7 °C) (12/12/20 2305)  Pulse: 79 (12/13/20 0804)  Resp: 18 (12/13/20 0804)  BP: 130/84 (12/13/20 0700)  SpO2: 97 % (12/13/20 0804) Vital Signs (24h Range):  Temp:  [97.5 °F (36.4 °C)-98 °F (36.7 °C)] 98 °F (36.7 °C)  Pulse:  [74-94] 79  Resp:  [9-24] 18  SpO2:  [88 %-100 %] 97 %  BP: (112-150)/(68-94) 130/84   Weight: 117 kg (257 lb 15 oz)  Body mass index is 44.27 kg/m².      Intake/Output Summary (Last 24 hours) at 12/13/2020 0912  Last data filed at 12/13/2020 0500  Gross per 24 hour   Intake 1945 ml   Output 2535 ml   Net -590 ml       Physical Exam  Vitals signs reviewed.   Constitutional:       Appearance: She is well-developed. She is obese.      Interventions: Face mask in place.   HENT:      Head: Normocephalic and atraumatic.   Eyes:      Pupils: Pupils are equal, round, and reactive to light.   Neck:      Musculoskeletal: Neck supple.      Thyroid: No thyromegaly.      Trachea: No tracheal deviation.   Cardiovascular:      Rate and Rhythm: Normal rate and regular rhythm.      Heart sounds: Heart sounds are distant. No murmur. No friction rub. No gallop.       Comments: Paced  Pulmonary:      Effort: Pulmonary effort is normal. No tachypnea or accessory muscle usage.      Breath sounds: Normal breath sounds. No decreased breath sounds, wheezing, rhonchi or rales.   Chest:      Comments: Pacemaker scar located in right upper chest.   Abdominal:      General: Bowel sounds are normal.      Palpations: Abdomen is soft.      Tenderness: There is no abdominal tenderness.   Genitourinary:     Comments:  Puga in place.   Musculoskeletal: Normal range of motion.   Skin:     General: Skin is warm and dry.   Neurological:      Mental Status: She is alert and oriented to person, place, and time.      Sensory: No sensory deficit.   Psychiatric:         Mood and Affect: Mood normal.         Behavior: Behavior is cooperative.         Vents:  Oxygen Concentration (%): 50 (12/13/20 0804)  Lines/Drains/Airways     Drain                 Urethral Catheter 12/10/20 1500 2 days          Peripheral Intravenous Line                 Peripheral IV - Single Lumen 12/10/20 1100 Anterior;Left;Proximal Forearm 2 days         Midline Catheter Insertion/Assessment  - Single Lumen 12/11/20 1524 Right cephalic vein (lateral side of arm) 18g x 10cm 1 day              Significant Labs:    CBC/Anemia Profile:  Recent Labs   Lab 12/12/20  0310 12/13/20  0305   WBC 16.87* 14.70*   HGB 12.6 12.6   HCT 39.8 39.7    220   MCV 83 84   RDW 15.4* 15.3*        Chemistries:  Recent Labs   Lab 12/12/20  0310 12/13/20  0305    135*   K 4.5 4.4    99   CO2 23 24   BUN 54* 67*   CREATININE 1.3 1.5*   CALCIUM 8.9 8.6*   ALBUMIN 2.5* 2.5*   PROT 6.6 6.8   BILITOT 0.8 0.7   ALKPHOS 103 99   ALT 13 14   AST 15 15   MG 2.2 2.3   PHOS 3.4 3.5       All pertinent labs within the past 24 hours have been reviewed.    Significant Imaging:  I have reviewed and interpreted all pertinent imaging results/findings within the past 24 hours.      ABG  No results for input(s): PH, PO2, PCO2, HCO3, BE in the last 168 hours.  Assessment/Plan:     Pulmonary  Moderate persistent asthma without complication  - Continue home maintenance inhalers and montelukast 10 mg    Cardiac/Vascular  Chronic combined systolic and diastolic congestive heart failure  Known NICM (EF 15%), CRT-D (2017) in place  - Repeat TTE stable  - Negative 700cc in last 24hrs  - Will hold diuresis today given bump in Cr  - Resume when renal function stabilizes    Paroxysmal atrial  fibrillation  - Continue home digoxin and amiodarone  - Hold warfarin, INR supra-therapeutic. Start heparin gtt when INR <2      Renal/  Acute kidney injury superimposed on chronic kidney disease  AL on CKD noted on admit, improving now. Baseline creatinine ~1.2  - Cr increased to 1.5 with increase in BUN  - Hold diuresis today  - D/c Vanc  - Puga in place   - Renally dose meds and avoid nephrotoxics    Endocrine  Type 2 diabetes mellitus with diabetic polyneuropathy  Home regimen includes long-acting insulin glargine 34 units BID and short-acting insulin aspart 16 units with SSI. HgbA1c 6.5.    BG not controlled with >300 all day. 43 units of aspart in the last 24 hours (21 scheduled and 22 SSI)    - 12/12 increase Detemir to 30 units BID and aspart to 10 units TID  - BG remains above 300  - With worsening renal function and poor PO intake, will only increase TID aspart to 12  - Cont Detemir 30 units BID  - Cont SSI    Other  * Acute hypoxemic respiratory failure due to COVID-19  COVID positive on 11/30. Admitted to hospital 12/6, upgraded to ICU 12/10 for increasing oxygen requirements. 's up from 200's on admit. Procalcitonin increased from 0.18 to 0.29.    - f/u blood cultures, NGTD  - Received ceftriaxone + doxy initially; escalated to cefepime + vanc with worsening oxygenation, increased leukocytosis and slight increase in procal  - S/p 7 days of likely appropriate CAP coverage  - Will d/c vanc and cefepime today  - Known NICM with EF 15%; Holding diuresis today  - Continue alternating between CPAP and Comfort Flow; titrate for SpO2 >90%  - Continue dexamethasone X 10 days and remdesivir X 5 days    - Start heparin infusion for hypercoagulability when INR <2    - Scheduled Duonebs     Pneumonia due to COVID-19 virus  See respiratory failure     Palliative care encounter  Palliative care consulted, appreciate assistance  --GOC discussions initiated. Intubation discussed at length. Ms. Ross would  like to speak with her daughter before making a decision on changing code status; will need follow up     CLARENCE (obstructive sleep apnea)  - CPAP nightly when she is weaned down        Critical Care Time: 35 minutes  Critical secondary to Patient has a condition that poses threat to life and bodily function: Severe Respiratory Distress      Critical care was time spent personally by me on the following activities: development of treatment plan with patient or surrogate and bedside caregivers, discussions with consultants, evaluation of patient's response to treatment, examination of patient, ordering and performing treatments and interventions, ordering and review of laboratory studies, ordering and review of radiographic studies, pulse oximetry, re-evaluation of patient's condition. This critical care time did not overlap with that of any other provider or involve time for any procedures.     Chaz Taylor MD  Critical Care Medicine  [facility] - ICU 16 [facility]

## 2020-12-13 NOTE — ASSESSMENT & PLAN NOTE
COVID positive on 11/30. Admitted to hospital 12/6, upgraded to ICU 12/10 for increasing oxygen requirements. 's up from 200's on admit. Procalcitonin increased from 0.18 to 0.29.    - f/u blood cultures, NGTD  - Received ceftriaxone + doxy initially; escalated to cefepime + vanc with worsening oxygenation, increased leukocytosis and slight increase in procal  - S/p 7 days of likely appropriate CAP coverage  - Will d/c vanc and cefepime today  - Known NICM with EF 15%; Holding diuresis today  - Continue alternating between CPAP and Comfort Flow; titrate for SpO2 >90%  - Continue dexamethasone X 10 days and remdesivir X 5 days    - Start heparin infusion for hypercoagulability when INR <2    - Scheduled Duonebs

## 2020-12-13 NOTE — PT/OT/SLP RE-EVAL
"Physical Therapy Re-evaluation and Treatment     Patient Name:  Laure Ross   MRN:  [id number]    Recommendations:     Discharge Recommendations:  home health PT   Discharge Equipment Recommendations: none   Barriers to discharge: none    Assessment:     Laure Ross is a 50 y.o. female admitted with a medical diagnosis of Acute hypoxemic respiratory failure due to COVID-19.  She presents with the following impairments/functional limitations:  weakness, impaired endurance, impaired self care skills, impaired functional mobilty, gait instability, impaired balance, impaired cardiopulmonary response to activity. Pt tolerated activity with mobility primarily limited by pt reports of SOB while sitting EOB. Pt SpO2 95-97% throughout on CPAP. Pt declining further mobility, requesting inhaler. Pt eventually agreed to stand and take steps to adjust position in bed, c/o dizziness. Pt would continue to benefit from acute skilled therapy intervention to address deficits and progress toward prior level of function.       Rehab Prognosis:  good; patient would benefit from acute skilled PT services to address these deficits and reach maximum level of function.      Recent Surgery: * No surgery found *      Plan:     During this hospitalization, patient to be seen 3 x/week to address the above listed problems via gait training, therapeutic activities, therapeutic exercises, neuromuscular re-education  · Plan of Care Expires:  01/13/21   Plan of Care Reviewed with: patient, daughter    Subjective     Communicated with RN prior to session.  Patient found HOB elevated with blood pressure cuff, pulse ox (continuous), telemetry, campa catheter, peripheral IV, CPAP upon PT entry to room, agreeable to evaluation.      Chief Complaint: Pt c/o abdominal discomfort and "pulling" sensation at campa catheter insertion    Patient comments/goals: to get better and return home   Pain/Comfort:  · Pain Rating 1: 0/10  · Pain Rating " Post-Intervention 1: 0/10    Patients cultural, spiritual, Buddhism conflicts given the current situation: no      Objective:     Patient found with: blood pressure cuff, pulse ox (continuous), telemetry, campa catheter, peripheral IV, CPAP     General Precautions: Standard, airborne, contact, droplet, fall   Orthopedic Precautions:N/A   Braces: N/A     Exams:  · Cognitive Exam:  Patient is AAOx4, followed all commands, communicates clearly and fluently  · Gross Motor Coordination:  WFL  · RLE ROM: WFL  · RLE Strength: WFL  · LLE ROM: WFL  · LLE Strength: WFL    Functional Mobility:  · Bed Mobility:     · Supine to Sit: stand by assistance  · Sit to Supine: stand by assistance  · Transfers:     · Sit to Stand:2x from EOB with contact guard assistance with no AD  · Gait: Pt ambulated 3 lateral steps to the L toward HOB with no AD and contact guard assistance.     AM-PAC 6 CLICK MOBILITY  Total Score:18       Therapeutic Activities and Exercises:   Pt assisted onto bed pan 2x 2/2 reports of abdominal cramping. Pt educated on benefits of mobility to assist with GI motility. Pt agreeable to attempt EOB mobility.   Pt sat EOB with supervision for static sitting balance, reported SOB, requesting inhaler. Pt SpO2 95-97% throughout session, reported anxiety related to feeling SOB.   Pt educated on role of PT/POC. Pt verbalized understanding.   Pt encouraged to only perform OOB mobility with assistance from nursing/therapy. Pt agreeable.   Pt encouraged to perform bed level exercise and sit EOB with nursing supervision. Pt agreeable.     Patient left HOB elevated with all lines intact, call button in reach and RN notified.    GOALS:   Multidisciplinary Problems     Physical Therapy Goals        Problem: Physical Therapy Goal    Goal Priority Disciplines Outcome Goal Variances Interventions   Physical Therapy Goal     PT, PT/OT Ongoing, Progressing     Description: Goals to be met by: 12/27/20     Patient will increase  functional independence with mobility by performin. Supine to sit with Modified [place]  2. Sit to supine with Modified [place]  3. Sit to stand transfer with Modified [place]  4. Gait  x 50 feet with Modified [place] using LRAD, if needed.                      History:     Past Medical History:   Diagnosis Date    Anticoagulant long-term use     Arthritis     Asthma     Asthma     Atrial fibrillation     Cardiomyopathy     Cardiomyopathy     CHF (congestive heart failure)     CHF (congestive heart failure)     Chronic combined systolic and diastolic heart failure 6/3/2016    COPD (chronic obstructive pulmonary disease) 3/28/2018    GERD (gastroesophageal reflux disease)     H/O tubal ligation     Hypertension     Obesity     Renal disorder     Type 2 diabetes mellitus with hyperglycemia     Type 2 diabetes mellitus with polyneuropathy        Past Surgical History:   Procedure Laterality Date    CARDIAC DEFIBRILLATOR PLACEMENT      CARDIAC DEFIBRILLATOR PLACEMENT      CARDIAC DEFIBRILLATOR PLACEMENT      CATARACT EXTRACTION Left     CHOLECYSTECTOMY      COLONOSCOPY N/A 2016    Procedure: COLONOSCOPY;  Surgeon: Eugene Soto MD;  Location: [facility] ENDO ([address] [place] [address]);  Service: Endoscopy;  Laterality: N/A;    GALLBLADDER SURGERY      iabp  2016    TREATMENT OF CARDIAC ARRHYTHMIA N/A 2020    Procedure: CARDIOVERSION;  Surgeon: Navi Jolly MD;  Location: [facility] EP LAB;  Service: Cardiology;  Laterality: N/A;  AF, BRITTANEY, DCCV, MAC, DM, 3 Prep    TREATMENT OF CARDIAC ARRHYTHMIA N/A 2020    Procedure: Cardioversion or Defibrillation;  Surgeon: Navi Jolly MD;  Location: [facility] EP LAB;  Service: Cardiology;  Laterality: N/A;  AF, DCCV, MAC, DM, 3 Prep    TUBAL LIGATION         Time Tracking:     PT Received On: 20  PT Start Time: 1320     PT Stop Time: 1350  PT Total Time (min): 30 min     Billable Minutes: Re-eval 7 mins  and Therapeutic Activity  23 mins      Elena Gutierrez, PT  12/13/2020

## 2020-12-13 NOTE — ASSESSMENT & PLAN NOTE
- Continue home digoxin and amiodarone  - Hold warfarin, INR supra-therapeutic. Start heparin gtt when INR <2

## 2020-12-14 PROBLEM — T38.0X5S ADVERSE EFFECT OF ADRENAL CORTICAL STEROIDS, SEQUELA: Status: ACTIVE | Noted: 2020-01-01

## 2020-12-14 NOTE — ASSESSMENT & PLAN NOTE
BG goal 140 - 180     - Continue Levemir 32 units BID.   - Increase Novolog to 20 units TIDWM.   - Continue Moderate Dose SQ Insulin Correction Scale.  - BG Monitoring AC/HS     5:48 PM  BG remains above goal on current SQ insulin regimen.   - Start  transition IV insulin infusion with stepdown parameters. Initial rate starting at 3 units/hr. Equivalent to home basal insulin.   - Increase Novolog to 24 untis TIDWM. Basal/Prandial physiologic matching.  Expect significant prandial BG excursions with high dose steroid therapy.   - Moderate Dose SQ Insulin Correction Scale.  - BG Monitoring AC/HS/0200     ** Please call Endocrine for any BG related issues **  ** Please notify Endocrine for any change and/or advance in diet**    Discharge Planning:   TBD. Please notify endocrinology prior to discharge.     Lab Results   Component Value Date    HGBA1C 6.5 (H) 12/07/2020

## 2020-12-14 NOTE — ASSESSMENT & PLAN NOTE
Known NICM (EF 15%), CRT-D (2017) in place  - Repeat TTE stable  - Will hold diuresis today given bump in Cr.. Fluid balance remains negative overall.   - Resume when renal function stabilizes

## 2020-12-14 NOTE — CONSULTS
[facility] - ICU 16 [facility]  Endocrinology  Diabetes Consult Note    Consult Requested by: Aurelio Walden MD   Reason for admit: Acute hypoxemic respiratory failure due to COVID-19    HISTORY OF PRESENT ILLNESS:  Reason for Consult: Management of T2DM, Hyperglycemia     Surgical Procedure and Date: N/A    Diabetes diagnosis year:   > 20 years    Home Diabetes Medications:  (per Dr. Mittal with [facility] in [place]).   Trulicity 1.5 mg weekly.   Basaglar 34 units BID.  Novolog 22 units TIDWM    How often checking glucose at home? 1-3 x day   BG readings on regimen: 130's  Hypoglycemia on the regimen?  No  Missed doses on regimen?  No    Diabetes Complications include:     Hyperglycemia and Diabetic nephropathy      Complicating diabetes co morbidities:   CHF, CLARENCE and Glucocorticoid use , HLD, HTN      HPI:   Patient is a 50 y.o. female with a diagnosis of HFrEF 2/2 NICM (EF 15%, CRT-D placed '17), pAF, HTN, IDDM2, HLD, CLARENCE, asthma, GERD and obesity who presented to [facility] for SOB and cough. At that time she got a COVID test and was positive on . Initially admitted to Hospital Medicine for COVID pneumonia on . Required HFNC between 10-15L. Diurese well, net negative 5L. Started on dexamethasone, remdesivir, ceftriaxone, and doxycycline. Overnight on , oxygen requirements started to increase. Unable to tolerate coming off of continuous CPAP and escalated to ICU. Endocrinology consulted 20 for glycemic control.     Lab Results   Component Value Date    HGBA1C 6.5 (H) 2020       Interval HPI:   Overnight events:  BG is above goal at time of consult. I spoke with patient and daughter via telephone, and was able to obtain HPI information in regards to diabetes. Patient was alert and did not sound to be in any acute distress at time of consult interview.   Diet consistent carbohydrate [facility]; Isolation Tray - Styrofoam       Eatin% - 100%  Nausea: No  Hypoglycemia and  intervention: No  Fever: No  TPN and/or TF: No  If yes, type of TF/TPN and rate: None    PMH, PSH, FH, SH updated and reviewed       Review of Systems   Constitutional: Negative for weight changes.  Eyes: Negative for visual disturbance.  Respiratory: Positive for cough. Positive for SOB.   Cardiovascular: Negative for chest pain.  Gastrointestinal: Negative for nausea.  Endocrine: Negative for polyuria, polydipsia.  Musculoskeletal: Negative for back pain.  Skin: Negative for rash.  Neurological: Negative for syncope.  Psychiatric/Behavioral: Negative for depression.    Current Medications and/or Treatments Impacting Glycemic Control  Immunotherapy:    Immunosuppressants     None        Steroids:   Hormones (From admission, onward)    Start     Stop Route Frequency Ordered    12/07/20 0900  dexAMETHasone tablet 6 mg      12/16 0859 Oral Daily 12/06/20 2003 12/06/20 2100  melatonin tablet 6 mg      -- Oral Nightly PRN 12/06/20 2001        Pressors:    Autonomic Drugs (From admission, onward)    None        Hyperglycemia/Diabetes Medications:   Antihyperglycemics (From admission, onward)    Start     Stop Route Frequency Ordered    12/14/20 1535  insulin aspart U-100 pen 20 Units      -- SubQ 3 times daily with meals 12/14/20 1535    12/13/20 2100  insulin detemir U-100 pen 32 Units      -- SubQ 2 times daily 12/13/20 1028    12/07/20 1358  insulin aspart U-100 pen 1-10 Units      -- SubQ Before meals & nightly PRN 12/07/20 1333             PHYSICAL EXAMINATION:  Vitals:    12/14/20 1500   BP: 112/66   Pulse: 99   Resp: 18   Temp:      Body mass index is 44.27 kg/m².    Physical Exam   Deferred secondary to COVID-19 pandemic precautions.        Labs Reviewed and Include   Recent Labs   Lab 12/14/20  0521   *   CALCIUM 8.8   ALBUMIN 2.6*   PROT 6.8      K 5.1   CO2 25      BUN 68*   CREATININE 1.2   ALKPHOS 97   ALT 14   AST 17   BILITOT 0.8     Lab Results   Component Value Date    WBC 15.37 (H)  12/14/2020    HGB 12.7 12/14/2020    HCT 40.0 12/14/2020    MCV 84 12/14/2020     12/14/2020     No results for input(s): TSH, FREET4 in the last 168 hours.  Lab Results   Component Value Date    HGBA1C 6.5 (H) 12/07/2020       Nutritional status:   Body mass index is 44.27 kg/m².  Lab Results   Component Value Date    ALBUMIN 2.6 (L) 12/14/2020    ALBUMIN 2.5 (L) 12/13/2020    ALBUMIN 2.5 (L) 12/12/2020     Lab Results   Component Value Date    PREALBUMIN 20 05/09/2016    PREALBUMIN 16 (L) 05/06/2016    PREALBUMIN 9 (L) 05/04/2016       Estimated Creatinine Clearance: 69.7 mL/min (based on SCr of 1.2 mg/dL).    Accu-Checks  Recent Labs     12/12/20  1428 12/12/20  1615 12/12/20  2033 12/13/20  0918 12/13/20  1114 12/13/20  1623 12/13/20  2030 12/14/20  0911 12/14/20  1350 12/14/20  1735   POCTGLUCOSE 377* 389* 361* 290* 336* 342* 366* 266* 236* 326*        ASSESSMENT and PLAN    * Acute hypoxemic respiratory failure due to COVID-19  Managed per primary team  Avoid hypoglycemia        Type 2 diabetes mellitus with diabetic polyneuropathy  BG goal 140 - 180     - Continue Levemir 32 units BID.   - Increase Novolog to 20 units TIDWM.   - Continue Moderate Dose SQ Insulin Correction Scale.  - BG Monitoring AC/HS     5:48 PM  BG remains above goal on current SQ insulin regimen.   - Start  transition IV insulin infusion with stepdown parameters. Initial rate starting at 3 units/hr. Equivalent to home basal insulin.   - Increase Novolog to 24 untis TIDWM. Basal/Prandial physiologic matching.  Expect significant prandial BG excursions with high dose steroid therapy.   - Moderate Dose SQ Insulin Correction Scale.  - BG Monitoring AC/HS/0200     ** Please call Endocrine for any BG related issues **  ** Please notify Endocrine for any change and/or advance in diet**    Discharge Planning:   TBD. Please notify endocrinology prior to discharge.     Lab Results   Component Value Date    HGBA1C 6.5 (H) 12/07/2020          Adverse effect of adrenal cortical steroids, sequela  On steroid therapy per transplant team; may elevate BG readings        CLARENCE (obstructive sleep apnea)  May affect BG readings if uncontrolled            Plan discussed with patient, family, and RN at bedside.     Dash Das NP  Endocrinology  [facility] - ICU 16 [facility]

## 2020-12-14 NOTE — ASSESSMENT & PLAN NOTE
AL on CKD noted on admit, improving now. Baseline creatinine ~1.2  - Cr increased to 1.5 with increase in BUN 12/13-- Improving today.   - Hold diuresis today  - D/c Kath  - Puga in place   - Renally dose meds and avoid nephrotoxics

## 2020-12-14 NOTE — HPI
Reason for Consult: Management of T2DM, Hyperglycemia     Surgical Procedure and Date: N/A    Diabetes diagnosis year:   > 20 years    Home Diabetes Medications:  (per Dr. Mittal with [facility] in [place]).   Trulicity 1.5 mg weekly.   Basaglar 34 units BID.  Novolog 22 units TIDWM    How often checking glucose at home? 1-3 x day   BG readings on regimen: 130's  Hypoglycemia on the regimen?  No  Missed doses on regimen?  No    Diabetes Complications include:     Hyperglycemia and Diabetic nephropathy      Complicating diabetes co morbidities:   CHF, CLARENCE and Glucocorticoid use , HLD, HTN      HPI:   Patient is a 50 y.o. female with a diagnosis of HFrEF 2/2 NICM (EF 15%, CRT-D placed '17), pAF, HTN, IDDM2, HLD, CLARENCE, asthma, GERD and obesity who presented to [facility] for SOB and cough. At that time she got a COVID test and was positive on 11/30. Initially admitted to Hospital Medicine for COVID pneumonia on 12/8. Required HFNC between 10-15L. Diurese well, net negative 5L. Started on dexamethasone, remdesivir, ceftriaxone, and doxycycline. Overnight on 12/9, oxygen requirements started to increase. Unable to tolerate coming off of continuous CPAP and escalated to ICU. Endocrinology consulted 12/14/20 for glycemic control.     Lab Results   Component Value Date    HGBA1C 6.5 (H) 12/07/2020

## 2020-12-14 NOTE — SUBJECTIVE & OBJECTIVE
Interval HPI:   Overnight events:  BG is above goal at time of consult. I spoke with patient and daughter via telephone, and was able to obtain HPI information in regards to diabetes. Patient was alert and did not sound to be in any acute distress at time of consult interview.   Diet consistent carbohydrate [facility]; Isolation Tray - Styrofoam       Eatin% - 100%  Nausea: No  Hypoglycemia and intervention: No  Fever: No  TPN and/or TF: No  If yes, type of TF/TPN and rate: None    PMH, PSH, FH, SH updated and reviewed       Review of Systems   Constitutional: Negative for weight changes.  Eyes: Negative for visual disturbance.  Respiratory: Positive for cough. Positive for SOB.   Cardiovascular: Negative for chest pain.  Gastrointestinal: Negative for nausea.  Endocrine: Negative for polyuria, polydipsia.  Musculoskeletal: Negative for back pain.  Skin: Negative for rash.  Neurological: Negative for syncope.  Psychiatric/Behavioral: Negative for depression.    Current Medications and/or Treatments Impacting Glycemic Control  Immunotherapy:    Immunosuppressants     None        Steroids:   Hormones (From admission, onward)    Start     Stop Route Frequency Ordered    20 0900  dexAMETHasone tablet 6 mg       0859 Oral Daily 20 2100  melatonin tablet 6 mg      -- Oral Nightly PRN 20        Pressors:    Autonomic Drugs (From admission, onward)    None        Hyperglycemia/Diabetes Medications:   Antihyperglycemics (From admission, onward)    Start     Stop Route Frequency Ordered    20 1535  insulin aspart U-100 pen 20 Units      -- SubQ 3 times daily with meals 20 1535    20 2100  insulin detemir U-100 pen 32 Units      -- SubQ 2 times daily 20 1028    20 1358  insulin aspart U-100 pen 1-10 Units      -- SubQ Before meals & nightly PRN 20 1333             PHYSICAL EXAMINATION:  Vitals:    20 1500   BP: 112/66   Pulse:  99   Resp: 18   Temp:      Body mass index is 44.27 kg/m².    Physical Exam   Deferred secondary to COVID-19 pandemic precautions.

## 2020-12-14 NOTE — ASSESSMENT & PLAN NOTE
COVID positive on 11/30. Admitted to hospital 12/6, upgraded to ICU 12/10 for increasing oxygen requirements. 's up from 200's on admit. Procalcitonin increased from 0.18 to 0.29.     - f/u blood cultures, NGTD  - Received ceftriaxone + doxy initially; escalated to cefepime + vanc with worsening oxygenation, increased leukocytosis and slight increase in procal  - S/p 7 days of likely appropriate CAP coverage  - d/c vanc and cefepime 12/13  - Known NICM with EF 15%; Holding diuresis today as above   - Continue alternating between CPAP and Comfort Flow; titrate for SpO2 >90%  - Continue dexamethasone X 10 days and remdesivir X 5 days   - Start heparin infusion for hypercoagulability when INR <2    - Scheduled Duonebs

## 2020-12-14 NOTE — SUBJECTIVE & OBJECTIVE
Interval History/Significant Events: Stable supplemental O2 requirements. Feels about the same this AM. Listening to [employer].       Review of Systems   Unable to perform ROS: Other (Limited due to continuous NIPPV)   Constitutional: Negative for chills and fever.   Respiratory: Positive for cough and shortness of breath.    Cardiovascular: Negative for chest pain.   Gastrointestinal: Negative for abdominal pain and nausea.   Psychiatric/Behavioral: Negative for agitation and confusion.     Objective:     Vital Signs (Most Recent):  Temp: 97.5 °F (36.4 °C) (12/14/20 1200)  Pulse: 99 (12/14/20 1500)  Resp: 18 (12/14/20 1500)  BP: 112/66 (12/14/20 1500)  SpO2: (!) 91 % (12/14/20 1500) Vital Signs (24h Range):  Temp:  [97 °F (36.1 °C)-98.1 °F (36.7 °C)] 97.5 °F (36.4 °C)  Pulse:  [67-99] 99  Resp:  [11-29] 18  SpO2:  [87 %-100 %] 91 %  BP: (104-141)/(62-90) 112/66   Weight: 117 kg (257 lb 15 oz)  Body mass index is 44.27 kg/m².      Intake/Output Summary (Last 24 hours) at 12/14/2020 1508  Last data filed at 12/14/2020 1400  Gross per 24 hour   Intake 1320 ml   Output 2050 ml   Net -730 ml     Commutative fluid balance-   Negative 9 Liters.     Physical Exam  Vitals signs reviewed.   Constitutional:       Appearance: She is well-developed. She is obese.      Interventions: Face mask in place.   HENT:      Head: Normocephalic and atraumatic.   Eyes:      Pupils: Pupils are equal, round, and reactive to light.   Neck:      Musculoskeletal: Neck supple.      Thyroid: No thyromegaly.      Trachea: No tracheal deviation.   Cardiovascular:      Rate and Rhythm: Normal rate and regular rhythm.      Heart sounds: Heart sounds are distant. No murmur. No friction rub. No gallop.       Comments: Paced  Pulmonary:      Effort: Pulmonary effort is normal. No tachypnea or accessory muscle usage.      Breath sounds: Normal breath sounds. No decreased breath sounds, wheezing, rhonchi or rales.   Chest:      Comments: Pacemaker scar  located in right upper chest.   Abdominal:      General: Bowel sounds are normal.      Palpations: Abdomen is soft.      Tenderness: There is no abdominal tenderness.   Genitourinary:     Comments: Puga in place.   Musculoskeletal: Normal range of motion.   Skin:     General: Skin is warm and dry.   Neurological:      Mental Status: She is alert and oriented to person, place, and time.      Sensory: No sensory deficit.   Psychiatric:         Mood and Affect: Mood normal.         Behavior: Behavior is cooperative.         Vents:  Oxygen Concentration (%): 40 (12/14/20 1325)  Lines/Drains/Airways     Drain                 Urethral Catheter 12/10/20 1500 4 days          Peripheral Intravenous Line                 Peripheral IV - Single Lumen 12/10/20 1100 Anterior;Left;Proximal Forearm 4 days         Midline Catheter Insertion/Assessment  - Single Lumen 12/11/20 1524 Right cephalic vein (lateral side of arm) 18g x 10cm 2 days              Significant Labs:    CBC/Anemia Profile:  Recent Labs   Lab 12/13/20  0305 12/14/20  0521   WBC 14.70* 15.37*   HGB 12.6 12.7   HCT 39.7 40.0    240   MCV 84 84   RDW 15.3* 15.3*        Chemistries:  Recent Labs   Lab 12/13/20  0305 12/14/20  0521   * 137   K 4.4 5.1   CL 99 101   CO2 24 25   BUN 67* 68*   CREATININE 1.5* 1.2   CALCIUM 8.6* 8.8   ALBUMIN 2.5* 2.6*   PROT 6.8 6.8   BILITOT 0.7 0.8   ALKPHOS 99 97   ALT 14 14   AST 15 17   MG 2.3 2.6   PHOS 3.5 3.8       All pertinent labs within the past 24 hours have been reviewed.    Significant Imaging:  I have reviewed and interpreted all pertinent imaging results/findings within the past 24 hours.     LE Doppler-    No evidence of deep venous thrombosis in either lower extremity.    No additional chest imaging in last 24 hours.     ECHO:     · The left ventricle is severely enlarged with severely decreased systolic function. The estimated ejection fraction is 15%.  · Normal right ventricular size with mildly reduced  right ventricular systolic function.  · Diastolic dysfunction.  · Mild tricuspid regurgitation.  · Trivial pericardial effusion.  · The estimated PA systolic pressure is 56 mmHg.  · Normal central venous pressure (3 mmHg).

## 2020-12-14 NOTE — PROGRESS NOTES
[facility] - ICU 16   Pulmonology  Progress Note    Patient Name: Laure Ross  MRN: [id number]  Admission Date: 12/6/2020  Hospital Length of Stay: 8 days  Code Status: Full Code  Attending Provider: Aurelio Walden MD  Primary Care Provider: Holly Mittal MD   Principal Problem: Acute hypoxemic respiratory failure due to COVID-19    Subjective:     Interval History/Significant Events: Stable supplemental O2 requirements. Feels about the same this AM. Listening to [employer].       Review of Systems   Unable to perform ROS: Other (Limited due to continuous NIPPV)   Constitutional: Negative for chills and fever.   Respiratory: Positive for cough and shortness of breath.    Cardiovascular: Negative for chest pain.   Gastrointestinal: Negative for abdominal pain and nausea.   Psychiatric/Behavioral: Negative for agitation and confusion.     Objective:     Vital Signs (Most Recent):  Temp: 97.5 °F (36.4 °C) (12/14/20 1200)  Pulse: 99 (12/14/20 1500)  Resp: 18 (12/14/20 1500)  BP: 112/66 (12/14/20 1500)  SpO2: (!) 91 % (12/14/20 1500) Vital Signs (24h Range):  Temp:  [97 °F (36.1 °C)-98.1 °F (36.7 °C)] 97.5 °F (36.4 °C)  Pulse:  [67-99] 99  Resp:  [11-29] 18  SpO2:  [87 %-100 %] 91 %  BP: (104-141)/(62-90) 112/66   Weight: 117 kg (257 lb 15 oz)  Body mass index is 44.27 kg/m².      Intake/Output Summary (Last 24 hours) at 12/14/2020 1508  Last data filed at 12/14/2020 1400  Gross per 24 hour   Intake 1320 ml   Output 2050 ml   Net -730 ml     Commutative fluid balance-   Negative 9 Liters.     Physical Exam  Vitals signs reviewed.   Constitutional:       Appearance: She is well-developed. She is obese.      Interventions: Face mask in place.   HENT:      Head: Normocephalic and atraumatic.   Eyes:      Pupils: Pupils are equal, round, and reactive to light.   Neck:      Musculoskeletal: Neck supple.      Thyroid: No thyromegaly.      Trachea: No tracheal deviation.   Cardiovascular:      Rate and  Rhythm: Normal rate and regular rhythm.      Heart sounds: Heart sounds are distant. No murmur. No friction rub. No gallop.       Comments: Paced  Pulmonary:      Effort: Pulmonary effort is normal. No tachypnea or accessory muscle usage.      Breath sounds: Normal breath sounds. No decreased breath sounds, wheezing, rhonchi or rales.   Chest:      Comments: Pacemaker scar located in right upper chest.   Abdominal:      General: Bowel sounds are normal.      Palpations: Abdomen is soft.      Tenderness: There is no abdominal tenderness.   Genitourinary:     Comments: Puga in place.   Musculoskeletal: Normal range of motion.   Skin:     General: Skin is warm and dry.   Neurological:      Mental Status: She is alert and oriented to person, place, and time.      Sensory: No sensory deficit.   Psychiatric:         Mood and Affect: Mood normal.         Behavior: Behavior is cooperative.         Vents:  Oxygen Concentration (%): 40 (12/14/20 1325)  Lines/Drains/Airways     Drain                 Urethral Catheter 12/10/20 1500 4 days          Peripheral Intravenous Line                 Peripheral IV - Single Lumen 12/10/20 1100 Anterior;Left;Proximal Forearm 4 days         Midline Catheter Insertion/Assessment  - Single Lumen 12/11/20 1524 Right cephalic vein (lateral side of arm) 18g x 10cm 2 days              Significant Labs:    CBC/Anemia Profile:  Recent Labs   Lab 12/13/20  0305 12/14/20  0521   WBC 14.70* 15.37*   HGB 12.6 12.7   HCT 39.7 40.0    240   MCV 84 84   RDW 15.3* 15.3*        Chemistries:  Recent Labs   Lab 12/13/20  0305 12/14/20  0521   * 137   K 4.4 5.1   CL 99 101   CO2 24 25   BUN 67* 68*   CREATININE 1.5* 1.2   CALCIUM 8.6* 8.8   ALBUMIN 2.5* 2.6*   PROT 6.8 6.8   BILITOT 0.7 0.8   ALKPHOS 99 97   ALT 14 14   AST 15 17   MG 2.3 2.6   PHOS 3.5 3.8       All pertinent labs within the past 24 hours have been reviewed.    Significant Imaging:  I have reviewed and interpreted all pertinent  imaging results/findings within the past 24 hours.     LE Doppler-    No evidence of deep venous thrombosis in either lower extremity.    No additional chest imaging in last 24 hours.     ECHO:     · The left ventricle is severely enlarged with severely decreased systolic function. The estimated ejection fraction is 15%.  · Normal right ventricular size with mildly reduced right ventricular systolic function.  · Diastolic dysfunction.  · Mild tricuspid regurgitation.  · Trivial pericardial effusion.  · The estimated PA systolic pressure is 56 mmHg.  · Normal central venous pressure (3 mmHg).           Assessment/Plan:     * Acute hypoxemic respiratory failure due to COVID-19  COVID positive on 11/30. Admitted to hospital 12/6, upgraded to ICU 12/10 for increasing oxygen requirements. 's up from 200's on admit. Procalcitonin increased from 0.18 to 0.29.     - f/u blood cultures, NGTD  - Received ceftriaxone + doxy initially; escalated to cefepime + vanc with worsening oxygenation, increased leukocytosis and slight increase in procal  - S/p 7 days of likely appropriate CAP coverage  - d/c vanc and cefepime 12/13  - Known NICM with EF 15%; Holding diuresis today as above   - Continue alternating between CPAP and Comfort Flow; titrate for SpO2 >90%  - Continue dexamethasone X 10 days and remdesivir X 5 days   - Start heparin infusion for hypercoagulability when INR <2    - Scheduled Duonebs     Pneumonia due to COVID-19 virus  See respiratory failure     Moderate persistent asthma without complication  No evidence of acute exacerbation. Continue home maintenance therapy + prn bronchodilators.     Acute kidney injury superimposed on chronic kidney disease  AL on CKD noted on admit, improving now. Baseline creatinine ~1.2  - Cr increased to 1.5 with increase in BUN 12/13-- Improving today.   - Hold diuresis today  - D/c Vanc  - Puga in place   - Renally dose meds and avoid nephrotoxics    Chronic combined  systolic and diastolic congestive heart failure  Known NICM (EF 15%), CRT-D (2017) in place  - Repeat TTE stable  - Will hold diuresis today given bump in Cr.. Fluid balance remains negative overall.   - Resume when renal function stabilizes    Paroxysmal atrial fibrillation  - Continue home digoxin and amiodarone  - Hold warfarin, INR supra-therapeutic. Start heparin gtt when INR <2      CLARENCE (obstructive sleep apnea)  CPAP QHS.     Type 2 diabetes mellitus with diabetic polyneuropathy  Glucose remains uncontrolled despite basal + SSI.   Endocrine consult- input appreciated.   Goal -180           Critical Care Time: 45 minutes  Critical care was time spent personally by me on the following activities: evaluating this patient's organ dysfunction, development of treatment plan, discussing treatment plan with patient or surrogate and bedside caregivers, discussions with consultants, evaluation of patient's response to treatment, examination of patient, ordering and performing treatments and interventions, ordering and review of laboratory studies, ordering and review of radiographic studies, re-evaluation of patient's condition. This critical care time did not overlap with that of any other provider or involve time for any procedures.      Aurelio Walden MD  Pulmonology/Critical Care Medicine   [facility] - ICU [facility]

## 2020-12-14 NOTE — ASSESSMENT & PLAN NOTE
Glucose remains uncontrolled despite basal + SSI.   Endocrine consult- input appreciated.   Goal -180

## 2020-12-15 PROBLEM — T38.0X5A ADRENAL CORTICAL STEROIDS CAUSING ADVERSE EFFECT IN THERAPEUTIC USE: Status: ACTIVE | Noted: 2020-01-01

## 2020-12-15 NOTE — ASSESSMENT & PLAN NOTE
Home regimen includes long-acting insulin glargine 34 units BID and short-acting insulin aspart 16 units with SSI. HgbA1c 6.5.    BG not controlled with >300 all day. 43 units of aspart in the last 24 hours (21 scheduled and 22 SSI)  BG not well controlled due to dexamethasone. Detemir had been increased to 30 U BID and aspart 12U TID on 12/12,   Endocrinology consulted yesterday.     Plan:   -- Endocrinology consulted yesterday and started insulin infusion 3U/hr, along with Aspart 12-24 U (12 if 25% of meal, 24 if 50% of meal) Along with sliding scale.   -- BG currently 140   -- Dexamethasone day 10/10 today. Will wean down insulin.   - Cont SSI

## 2020-12-15 NOTE — ASSESSMENT & PLAN NOTE
INR today 2.3, D-dimer >33    Plan:   - Continue home digoxin and amiodarone  - Hold warfarin, INR supra-therapeutic. Start heparin gtt when INR <2

## 2020-12-15 NOTE — SUBJECTIVE & OBJECTIVE
Interval History/Significant Events: No acute events overnight. Patient tolerated CPAP. Was started on insulin infusion for hyperglycemia, dex day 10/10 today. Will wean down insulin, endocrinology consulted.     Review of Systems   Constitutional: Negative for appetite change, chills and fever.   HENT: Negative for congestion, sore throat, trouble swallowing and voice change.    Eyes: Negative.    Respiratory: Positive for shortness of breath. Negative for cough.    Cardiovascular: Negative for chest pain and leg swelling.   Gastrointestinal: Positive for constipation. Negative for abdominal distention, abdominal pain, nausea and vomiting.   Endocrine: Negative.    Genitourinary: Negative.    Musculoskeletal: Negative for back pain and gait problem.   Skin: Negative.    Neurological: Negative for weakness and headaches.   Psychiatric/Behavioral: Negative.      Objective:     Vital Signs (Most Recent):  Temp: 98 °F (36.7 °C) (12/15/20 0800)  Pulse: 85 (12/15/20 0923)  Resp: (!) 21 (12/15/20 0923)  BP: 117/76 (12/15/20 0800)  SpO2: (!) 91 % (12/15/20 0923) Vital Signs (24h Range):  Temp:  [97.5 °F (36.4 °C)-98.4 °F (36.9 °C)] 98 °F (36.7 °C)  Pulse:  [70-99] 85  Resp:  [13-44] 21  SpO2:  [90 %-100 %] 91 %  BP: (104-148)/(62-87) 117/76   Weight: 117 kg (257 lb 15 oz)  Body mass index is 44.27 kg/m².      Intake/Output Summary (Last 24 hours) at 12/15/2020 0946  Last data filed at 12/15/2020 0800  Gross per 24 hour   Intake 1341.5 ml   Output 1750 ml   Net -408.5 ml       Physical Exam  Vitals signs and nursing note reviewed.   Constitutional:       Appearance: Normal appearance. She is obese.   HENT:      Head: Normocephalic and atraumatic.      Nose: Nose normal.      Comments: CPAP in place   Eyes:      Extraocular Movements: Extraocular movements intact.   Neck:      Musculoskeletal: Normal range of motion.   Cardiovascular:      Rate and Rhythm: Normal rate and regular rhythm.   Pulmonary:      Breath sounds:  Rhonchi (Right side ) present.   Abdominal:      General: Bowel sounds are normal. There is no distension.      Palpations: Abdomen is soft.      Tenderness: There is no abdominal tenderness.   Musculoskeletal: Normal range of motion.         General: No swelling.   Skin:     General: Skin is warm.      Capillary Refill: Capillary refill takes less than 2 seconds.   Neurological:      General: No focal deficit present.      Mental Status: She is alert and oriented to person, place, and time. Mental status is at baseline.         Vents:  Oxygen Concentration (%): 70 (12/15/20 0923)  Lines/Drains/Airways     Drain                 Urethral Catheter 12/10/20 1500 4 days          Peripheral Intravenous Line                 Peripheral IV - Single Lumen 12/10/20 1100 Anterior;Left;Proximal Forearm 4 days         Midline Catheter Insertion/Assessment  - Single Lumen 12/11/20 1524 Right cephalic vein (lateral side of arm) 18g x 10cm 3 days              Significant Labs:    CBC/Anemia Profile:  Recent Labs   Lab 12/14/20  0521 12/15/20  0529   WBC 15.37* 17.60*   HGB 12.7 12.1   HCT 40.0 38.1    265   MCV 84 84   RDW 15.3* 15.4*        Chemistries:  Recent Labs   Lab 12/14/20  0521 12/15/20  0529    137   K 5.1 4.8    103   CO2 25 26   BUN 68* 59*   CREATININE 1.2 1.1   CALCIUM 8.8 8.7   ALBUMIN 2.6* 2.5*   PROT 6.8 6.5   BILITOT 0.8 0.8   ALKPHOS 97 97   ALT 14 18   AST 17 21   MG 2.6 2.6   PHOS 3.8 3.3       CMP:   Recent Labs   Lab 12/14/20  0521 12/15/20  0529    137   K 5.1 4.8    103   CO2 25 26   * 143*   BUN 68* 59*   CREATININE 1.2 1.1   CALCIUM 8.8 8.7   PROT 6.8 6.5   ALBUMIN 2.6* 2.5*   BILITOT 0.8 0.8   ALKPHOS 97 97   AST 17 21   ALT 14 18   ANIONGAP 11 8   EGFRNONAA 52.5* 58.3*     All pertinent labs within the past 24 hours have been reviewed.    Significant Imaging:  I have reviewed and interpreted all pertinent imaging results/findings within the past 24 hours.

## 2020-12-15 NOTE — PT/OT/SLP PROGRESS
Physical Therapy      Patient Name:  Laure Ross   MRN:  [id number]    Patient not seen today secondary to (Hold per RN 2* pt on max comfort flow O2 settings with spO2 in mid-80s while resting in supine). Pt motivated to progress independence with mobility and expressed disappointment that she is unable to participate in therapy this date. Discussed supine UE/LE therex to be completed (I) daily. Pt verbalized and demonstrated understanding. Will follow-up at next scheduled session as able.    Kira Siegel, PT, DPT   12/15/2020  [phone]

## 2020-12-15 NOTE — ASSESSMENT & PLAN NOTE
COVID positive on 11/30. Admitted to hospital 12/6, upgraded to ICU 12/10 for increasing oxygen requirements. 's up from 200's on admit. Procalcitonin increased from 0.18 to 0.29.  Inflammatory markers trending down. Patient alternating between CPAP qHS and CF on daytime.   CF 70% on 55L sat 91%    Plan:   -- Continue alternating from CF and CPAP qHS  - S/p 7 days of likely appropriate CAP coverage  - Known NICM with EF 15%; will restart home lasix and lisinopril  - Continue dexamethasone day 10/10 today  - Completed remdesivir X 5 days    - Start heparin infusion for hypercoagulability when INR <2    - Scheduled Duonebs

## 2020-12-15 NOTE — PLAN OF CARE
Patient medically not stable for discharge.  PT/OT Recs:  home health.    CM to follow for discharge planning needs.  JEANNA Montalvo RN  Case Management  [id number]      12/15/20 1113   Discharge Reassessment   Assessment Type Discharge Planning Reassessment   Do you have any problems affording any of your prescribed medications? TBD   Discharge Plan A Home with family   Discharge Plan B Home with family;Home Health   DME Needed Upon Discharge  other (see comments)  (TBD)   Anticipated Discharge Disposition Home-Health   Can the patient/caregiver answer the patient profile reliably? Yes, cognitively intact   Describe the patient's ability to walk at the present time. Major restrictions/daily assistance from another person   Number of comorbid conditions (as recorded on the chart) Four   Post-Acute Status   Post-Acute Authorization Home Health   Home Health Status Awaiting Internal Medical Clearance   Discharge Delays None known at this time

## 2020-12-15 NOTE — PROGRESS NOTES
[facility] - ICU 16   Critical Care Medicine  Progress Note    Patient Name: Laure Ross  MRN: [id number]  Admission Date: 12/6/2020  Hospital Length of Stay: 9 days  Code Status: Full Code  Attending Provider: Asael Stapleton MD  Primary Care Provider: Holly Mittal MD   Principal Problem: Acute hypoxemic respiratory failure due to COVID-19    Subjective:     HPI:  Ms. Laure Ross is a 50 year old woman with a PMHx of HFrEF 2/2 NICM (EF 15%, CRT-D placed '17), pAF, HTN, IDDM2, HLD, CLARENCE, asthma, GERD and obesity who [place] [facility] [place]. 1 week ago she began having myalgias, fatigue, nausea, vomiting, headache, and mild dyspnea on exertion. At that time she got a COVID test and was positive on 11/30. Most of her symptoms progressively improved throughout the week but 2 days ago began having worsening SOB even at rest and 1 day ago began having a cough and fever. Denies orthopnea. She says the cough is productive but has been coughing it up and swallowing it so cannot comment on the character of the sputum. She has been taking tylenol at home. She has been adherent with all of her medications including her diuretics and insulin. She denies taking her metolazone recently as needed, however she admits that she has not weighed herself in weeks. She feels she is at her baseline weight. Her urine volume has been unchanged. She has been drinking water but her appetite for food has decreased in the past few days.     Hospital/ICU Course:  Initially admitted to Hospital Medicine for COVID pneumonia on 12/8. Required HFNC between 10-15L. Diurese well, net negative 5L. Started on dexamethasone, remdesivir, ceftriaxone, and doxycycline. Overnight on 12/9, oxygen requirements started to increase. Unable to tolerate coming off of continuous CPAP and escalated to ICU. Remains on CPAP.Patient completed antibiotics for 7 days, as well as 5 day course of remdesivir. Dexamethasone will be  completed today. Endocrinology was consulted yesterday due to hyperglycemia, most likely due to dexamethasone along with baseline T2DM. She was started on insulin infusion along with aspart. Will continue to monitor.     Interval History/Significant Events: No acute events overnight. Patient tolerated CPAP. Was started on insulin infusion for hyperglycemia, dex day 10/10 today. Will wean down insulin, endocrinology consulted.     Review of Systems   Constitutional: Negative for appetite change, chills and fever.   HENT: Negative for congestion, sore throat, trouble swallowing and voice change.    Eyes: Negative.    Respiratory: Positive for shortness of breath. Negative for cough.    Cardiovascular: Negative for chest pain and leg swelling.   Gastrointestinal: Positive for constipation. Negative for abdominal distention, abdominal pain, nausea and vomiting.   Endocrine: Negative.    Genitourinary: Negative.    Musculoskeletal: Negative for back pain and gait problem.   Skin: Negative.    Neurological: Negative for weakness and headaches.   Psychiatric/Behavioral: Negative.      Objective:     Vital Signs (Most Recent):  Temp: 98 °F (36.7 °C) (12/15/20 0800)  Pulse: 85 (12/15/20 0923)  Resp: (!) 21 (12/15/20 0923)  BP: 117/76 (12/15/20 0800)  SpO2: (!) 91 % (12/15/20 0923) Vital Signs (24h Range):  Temp:  [97.5 °F (36.4 °C)-98.4 °F (36.9 °C)] 98 °F (36.7 °C)  Pulse:  [70-99] 85  Resp:  [13-44] 21  SpO2:  [90 %-100 %] 91 %  BP: (104-148)/(62-87) 117/76   Weight: 117 kg (257 lb 15 oz)  Body mass index is 44.27 kg/m².      Intake/Output Summary (Last 24 hours) at 12/15/2020 0946  Last data filed at 12/15/2020 0800  Gross per 24 hour   Intake 1341.5 ml   Output 1750 ml   Net -408.5 ml       Physical Exam  Vitals signs and nursing note reviewed.   Constitutional:       Appearance: Normal appearance. She is obese.   HENT:      Head: Normocephalic and atraumatic.      Nose: Nose normal.      Comments: CPAP in place   Eyes:       Extraocular Movements: Extraocular movements intact.   Neck:      Musculoskeletal: Normal range of motion.   Cardiovascular:      Rate and Rhythm: Normal rate and regular rhythm.   Pulmonary:      Breath sounds: Rhonchi (Right side ) present.   Abdominal:      General: Bowel sounds are normal. There is no distension.      Palpations: Abdomen is soft.      Tenderness: There is no abdominal tenderness.   Musculoskeletal: Normal range of motion.         General: No swelling.   Skin:     General: Skin is warm.      Capillary Refill: Capillary refill takes less than 2 seconds.   Neurological:      General: No focal deficit present.      Mental Status: She is alert and oriented to person, place, and time. Mental status is at baseline.         Vents:  Oxygen Concentration (%): 70 (12/15/20 0923)  Lines/Drains/Airways     Drain                 Urethral Catheter 12/10/20 1500 4 days          Peripheral Intravenous Line                 Peripheral IV - Single Lumen 12/10/20 1100 Anterior;Left;Proximal Forearm 4 days         Midline Catheter Insertion/Assessment  - Single Lumen 12/11/20 1524 Right cephalic vein (lateral side of arm) 18g x 10cm 3 days              Significant Labs:    CBC/Anemia Profile:  Recent Labs   Lab 12/14/20  0521 12/15/20  0529   WBC 15.37* 17.60*   HGB 12.7 12.1   HCT 40.0 38.1    265   MCV 84 84   RDW 15.3* 15.4*        Chemistries:  Recent Labs   Lab 12/14/20  0521 12/15/20  0529    137   K 5.1 4.8    103   CO2 25 26   BUN 68* 59*   CREATININE 1.2 1.1   CALCIUM 8.8 8.7   ALBUMIN 2.6* 2.5*   PROT 6.8 6.5   BILITOT 0.8 0.8   ALKPHOS 97 97   ALT 14 18   AST 17 21   MG 2.6 2.6   PHOS 3.8 3.3       CMP:   Recent Labs   Lab 12/14/20  0521 12/15/20  0529    137   K 5.1 4.8    103   CO2 25 26   * 143*   BUN 68* 59*   CREATININE 1.2 1.1   CALCIUM 8.8 8.7   PROT 6.8 6.5   ALBUMIN 2.6* 2.5*   BILITOT 0.8 0.8   ALKPHOS 97 97   AST 17 21   ALT 14 18   ANIONGAP 11 8    EGFRNONAA 52.5* 58.3*     All pertinent labs within the past 24 hours have been reviewed.    Significant Imaging:  I have reviewed and interpreted all pertinent imaging results/findings within the past 24 hours.      ABG  No results for input(s): PH, PO2, PCO2, HCO3, BE in the last 168 hours.  Assessment/Plan:     Pulmonary  Moderate persistent asthma without complication  - Continue home maintenance inhalers and montelukast 10 mg    Cardiac/Vascular  Chronic combined systolic and diastolic congestive heart failure  Known NICM (EF 15%), CRT-D (2017) in place  - Repeat TTE stable  - Negative 1000cc       Plan:  -- Renal function stable  -- Restart home lasix and lisinopril      Paroxysmal atrial fibrillation  INR today 2.3, D-dimer >33    Plan:   - Continue home digoxin and amiodarone  - Hold warfarin, INR supra-therapeutic. Start heparin gtt when INR <2      Renal/  Acute kidney injury superimposed on chronic kidney disease  AL on CKD noted on admit, improving now. Baseline creatinine ~1.2  - Cr down to 1.1, UO 1900 yesterday     Plan:   - D/C campa and try pure wick today   - Strict I/O  - Renally dose meds and avoid nephrotoxics    Endocrine  Type 2 diabetes mellitus with diabetic polyneuropathy  Home regimen includes long-acting insulin glargine 34 units BID and short-acting insulin aspart 16 units with SSI. HgbA1c 6.5.    BG not controlled with >300 all day. 43 units of aspart in the last 24 hours (21 scheduled and 22 SSI)  BG not well controlled due to dexamethasone. Detemir had been increased to 30 U BID and aspart 12U TID on 12/12,   Endocrinology consulted yesterday.     Plan:   -- Endocrinology consulted yesterday and started insulin infusion 3U/hr, along with Aspart 12-24 U (12 if 25% of meal, 24 if 50% of meal) Along with sliding scale.   -- BG currently 140   -- Dexamethasone day 10/10 today. Will wean down insulin.   - Cont SSI    GI  GERD (gastroesophageal reflux disease)  - famotidine 20mg  BID    Other  * Acute hypoxemic respiratory failure due to COVID-19  COVID positive on 11/30. Admitted to hospital 12/6, upgraded to ICU 12/10 for increasing oxygen requirements. 's up from 200's on admit. Procalcitonin increased from 0.18 to 0.29.  Inflammatory markers trending down. Patient alternating between CPAP qHS and CF on daytime.   CF 70% on 55L sat 91%    Plan:   -- Continue alternating from CF and CPAP qHS  - S/p 7 days of likely appropriate CAP coverage  - Known NICM with EF 15%; will restart home lasix and lisinopril  - Continue dexamethasone day 10/10 today  - Completed remdesivir X 5 days    - Start heparin infusion for hypercoagulability when INR <2    - Scheduled Duonebs     Pneumonia due to COVID-19 virus  See respiratory failure     Palliative care encounter  Palliative care consulted, appreciate assistance  --GOC discussions initiated. Intubation discussed at length. Ms. Ross would like to speak with her daughter before making a decision on changing code status; will need follow up     CLARENCE (obstructive sleep apnea)    - CPAP (expiratory pressure 12) qHS       Critical Care Daily Checklist:    A: Awake: RASS Goal/Actual Goal:    Actual: Paige Agitation Sedation Scale (RASS): Alert and calm   B: Spontaneous Breathing Trial Performed?     C: SAT & SBT Coordinated?  n/a                      D: Delirium: CAM-ICU Overall CAM-ICU: Negative   E: Early Mobility Performed? Yes   F: Feeding Goal:    Status:     Current Diet Order   Procedures    Diet consistent carbohydrate [facility]; Isolation Tray - Styrofoam     Salt, pepper and butter with each tray please     Order Specific Question:   Indicate patient location for additional diet options:     Answer:   [facility]     Order Specific Question:   Tray type:     Answer:   Isolation Tray - Styrofoam      AS: Analgesia/Sedation n/a   T: Thromboembolic Prophylaxis holding   H: HOB > 300 Yes   U: Stress Ulcer Prophylaxis (if needed)  n/a   G: Glucose Control Insulin infusion    B: Bowel Function Stool Occurrence: 0   I: Indwelling Catheter (Lines & Puga) Necessity Peripheral IV   D: De-escalation of Antimicrobials/Pharmacotherapies n/a    Plan for the day/ETD Wean oxygen     Code Status:  Family/Goals of Care: Full Code         Critical secondary to Patient has a condition that poses threat to life and bodily function: Severe Respiratory Distress      Critical care was time spent personally by me on the following activities: development of treatment plan with patient or surrogate and bedside caregivers, discussions with consultants, evaluation of patient's response to treatment, examination of patient, ordering and performing treatments and interventions, ordering and review of laboratory studies, ordering and review of radiographic studies, pulse oximetry, re-evaluation of patient's condition. This critical care time did not overlap with that of any other provider or involve time for any procedures.     Judith Bernard MD  Critical Care Medicine  [facility] - ICU [facility]

## 2020-12-15 NOTE — PT/OT/SLP PROGRESS
"Occupational Therapy      Patient Name:  Laure Ross   MRN:  [id number]    Patient not seen today secondary to Other (Nursing hold due to patient with spO2 in mid 80s at rest in supine with max comfort flow setting; discussed POC with patient and educated to complete therex as tolerated, will follow tomorrow). Patient stating -- "please come back tomorrow!" re: motivated to participate.     Kristen Kapoor, OT  12/15/2020  "

## 2020-12-15 NOTE — ASSESSMENT & PLAN NOTE
AL on CKD noted on admit, improving now. Baseline creatinine ~1.2  - Cr down to 1.1, UO 1900 yesterday     Plan:   - D/C lokesh and try pure wick today   - Strict I/O  - Renally dose meds and avoid nephrotoxics

## 2020-12-15 NOTE — ASSESSMENT & PLAN NOTE
Known NICM (EF 15%), CRT-D (2017) in place  - Repeat TTE stable  - Negative 1000cc       Plan:  -- Renal function stable  -- Restart home lasix and lisinopril

## 2020-12-16 NOTE — PROGRESS NOTES
"[facility] ICU 16 [facility]  Endocrinology  Progress Note    Admit Date: 2020     Reason for Consult: Management of T2DM, Hyperglycemia     Surgical Procedure and Date: N/A    Diabetes diagnosis year:   > 20 years    Home Diabetes Medications:  (per Dr. Mittal with [facility] in [place]).   Trulicity 1.5 mg weekly.   Basaglar 34 units BID.  Novolog 22 units TIDWM    How often checking glucose at home? 1-3 x day   BG readings on regimen: 130's  Hypoglycemia on the regimen?  No  Missed doses on regimen?  No    Diabetes Complications include:     Hyperglycemia and Diabetic nephropathy      Complicating diabetes co morbidities:   CHF, CLARENCE and Glucocorticoid use , HLD, HTN      HPI:   Patient is a 50 y.o. female with a diagnosis of HFrEF 2/2 NICM (EF 15%, CRT-D placed '17), pAF, HTN, IDDM2, HLD, CLARENCE, asthma, GERD and obesity who presented to [facility] [place] and cough. At that time she got a COVID test and was positive on . Initially admitted to Hospital Medicine for COVID pneumonia on . Required HFNC between 10-15L. Diurese well, net negative 5L. Started on dexamethasone, remdesivir, ceftriaxone, and doxycycline. Overnight on , oxygen requirements started to increase. Unable to tolerate coming off of continuous CPAP and escalated to ICU. Endocrinology consulted 20 for glycemic control.     Lab Results   Component Value Date    HGBA1C 6.5 (H) 2020       Interval HPI:   Overnight events: Remains in RICU. BG at or slightly above goal on IV insulin infusion at 3 u/hr with scheduled insulin and prn correction scale. Dexamethasone 6 mg daily.   Eatin%  Nausea: No  Hypoglycemia and intervention: No  Fever: No  TPN and/or TF: No      /72   Pulse 87   Temp 97.8 °F (36.6 °C)   Resp (!) 21   Ht 5' 4" (1.626 m)   Wt 117 kg (257 lb 15 oz)   LMP 2019 (Approximate)   SpO2 (!) 94%   BMI 44.27 kg/m²     Labs Reviewed and Include    Recent Labs   Lab 12/15/20  0529   * "   CALCIUM 8.7   ALBUMIN 2.5*   PROT 6.5      K 4.8   CO2 26      BUN 59*   CREATININE 1.1   ALKPHOS 97   ALT 18   AST 21   BILITOT 0.8     Lab Results   Component Value Date    WBC 17.60 (H) 12/15/2020    HGB 12.1 12/15/2020    HCT 38.1 12/15/2020    MCV 84 12/15/2020     12/15/2020     No results for input(s): TSH, FREET4 in the last 168 hours.  Lab Results   Component Value Date    HGBA1C 6.5 (H) 12/07/2020       Nutritional status:   Body mass index is 44.27 kg/m².  Lab Results   Component Value Date    ALBUMIN 2.5 (L) 12/15/2020    ALBUMIN 2.6 (L) 12/14/2020    ALBUMIN 2.5 (L) 12/13/2020     Lab Results   Component Value Date    PREALBUMIN 20 05/09/2016    PREALBUMIN 16 (L) 05/06/2016    PREALBUMIN 9 (L) 05/04/2016       Estimated Creatinine Clearance: 76 mL/min (based on SCr of 1.1 mg/dL).    Accu-Checks  Recent Labs     12/13/20  2030 12/14/20  0911 12/14/20  1350 12/14/20  1735 12/14/20  2209 12/15/20  0209 12/15/20  0525 12/15/20  0849 12/15/20  1236 12/15/20  1746   POCTGLUCOSE 366* 266* 236* 326* 259* 141* 149* 134* 220* 224*       Current Medications and/or Treatments Impacting Glycemic Control  Immunotherapy:    Immunosuppressants     None        Steroids:   Hormones (From admission, onward)    Start     Stop Route Frequency Ordered    12/06/20 2100  melatonin tablet 6 mg      -- Oral Nightly PRN 12/06/20 2001        Pressors:    Autonomic Drugs (From admission, onward)    None        Hyperglycemia/Diabetes Medications:   Antihyperglycemics (From admission, onward)    Start     Stop Route Frequency Ordered    12/15/20 0715  insulin aspart U-100 pen 12-24 Units      -- SubQ 3 times daily with meals 12/14/20 1747    12/14/20 1900  insulin regular in 0.9 % NaCl 100 unit/100 mL (1 unit/mL) infusion     Question:  Enter initial dose (Units/hr):  Answer:  3    -- IV Continuous 12/14/20 1747 12/14/20 1846  insulin aspart U-100 pen 0-10 Units      -- SubQ As needed (PRN) 12/14/20 1747           ASSESSMENT and PLAN    * Acute hypoxemic respiratory failure due to COVID-19  Managed per primary team  Avoid hypoglycemia        Type 2 diabetes mellitus with diabetic polyneuropathy  BG goal 140 - 180     -continue IV insulin infusion at 3 u/hr    - Continue Moderate Dose SQ Insulin Correction Scale.  - BG Monitoring AC/HS   Continue novolog 12-24 units with meals.     ** Please call Endocrine for any BG related issues **  ** Please notify Endocrine for any change and/or advance in diet**    Discharge Planning:   TBD. Please notify endocrinology prior to discharge.     Lab Results   Component Value Date    HGBA1C 6.5 (H) 12/07/2020         Adrenal cortical steroids causing adverse effect in therapeutic use  On steroid therapy per transplant team; may elevate BG readings        CLARENCE (obstructive sleep apnea)  May affect BG readings if uncontrolled            Elena Bowers NP  Endocrinology  [facility] ICU [facility]

## 2020-12-16 NOTE — SUBJECTIVE & OBJECTIVE
"Interval HPI:   Overnight events: Remains in RICU. NAEON. BG at or slightly above goal on IV insulin infusion at 3 u/hr with scheduled insulin and prn correction scale.   Eatin% Diet consistent carbohydrate [facility]; Isolation Tray - Styrofoam  Nausea: No  Hypoglycemia and intervention: No  Fever: No  TPN and/or TF: No    /67   Pulse 84   Temp 97.9 °F (36.6 °C) (Axillary)   Resp (!) 27   Ht 5' 4" (1.626 m)   Wt 117 kg (257 lb 15 oz)   LMP 2019 (Approximate)   SpO2 (!) 88%   BMI 44.27 kg/m²     Labs Reviewed and Include    Recent Labs   Lab 20  0541   *   CALCIUM 8.5*   ALBUMIN 2.5*   PROT 6.3   *   K 5.2*   CO2 25      BUN 57*   CREATININE 1.1   ALKPHOS 91   ALT 24   AST 22   BILITOT 0.9     Lab Results   Component Value Date    WBC 20.74 (H) 2020    HGB 12.2 2020    HCT 39.3 2020    MCV 86 2020     2020     No results for input(s): TSH, FREET4 in the last 168 hours.  Lab Results   Component Value Date    HGBA1C 6.5 (H) 2020       Nutritional status:   Body mass index is 44.27 kg/m².  Lab Results   Component Value Date    ALBUMIN 2.5 (L) 2020    ALBUMIN 2.5 (L) 12/15/2020    ALBUMIN 2.6 (L) 2020     Lab Results   Component Value Date    PREALBUMIN 20 2016    PREALBUMIN 16 (L) 2016    PREALBUMIN 9 (L) 2016       Estimated Creatinine Clearance: 76 mL/min (based on SCr of 1.1 mg/dL).    Accu-Checks  Recent Labs     20  1735 20  2209 12/15/20  0209 12/15/20  0525 12/15/20  0849 12/15/20  1236 12/15/20  1746 12/15/20  2103 20  0156 20  0540   POCTGLUCOSE 326* 259* 141* 149* 134* 220* 224* 199* 151* 154*       Current Medications and/or Treatments Impacting Glycemic Control  Immunotherapy:    Immunosuppressants     None        Steroids:   Hormones (From admission, onward)    Start     Stop Route Frequency Ordered    20  melatonin tablet 6 mg      -- Oral " Nightly PRN 12/06/20 2001        Pressors:    Autonomic Drugs (From admission, onward)    None        Hyperglycemia/Diabetes Medications:   Antihyperglycemics (From admission, onward)    Start     Stop Route Frequency Ordered    12/15/20 0715  insulin aspart U-100 pen 12-24 Units      -- SubQ 3 times daily with meals 12/14/20 1747 12/14/20 1900  insulin regular in 0.9 % NaCl 100 unit/100 mL (1 unit/mL) infusion     Question:  Enter initial dose (Units/hr):  Answer:  3    -- IV Continuous 12/14/20 1747 12/14/20 1846  insulin aspart U-100 pen 0-10 Units      -- SubQ As needed (PRN) 12/14/20 1747

## 2020-12-16 NOTE — SUBJECTIVE & OBJECTIVE
Interval History/Significant Events: No acute events overnight. Patient refers that CF increases her congestion, will continue CPAP today. WBC trending up, will follow up new BC, UA and procalcitonin. Started heparin gtt today after INR 1.3.     Review of Systems   Constitutional: Positive for fatigue. Negative for appetite change, chills and fever.   HENT: Positive for congestion. Negative for sore throat, trouble swallowing and voice change.    Eyes: Negative.    Respiratory: Positive for shortness of breath. Negative for cough.    Cardiovascular: Negative for chest pain and leg swelling.   Gastrointestinal: Positive for constipation. Negative for abdominal distention, abdominal pain, nausea and vomiting.   Endocrine: Negative.    Genitourinary: Negative.    Musculoskeletal: Negative for back pain and gait problem.   Skin: Negative.    Neurological: Negative for weakness and headaches.   Psychiatric/Behavioral: Negative.      Objective:     Vital Signs (Most Recent):  Temp: 97.9 °F (36.6 °C) (12/16/20 0400)  Pulse: 91 (12/16/20 1706)  Resp: (!) 7 (12/16/20 1706)  BP: 115/69 (12/16/20 1706)  SpO2: (!) 90 % (12/16/20 1706) Vital Signs (24h Range):  Temp:  [97.8 °F (36.6 °C)-98 °F (36.7 °C)] 97.9 °F (36.6 °C)  Pulse:  [69-92] 91  Resp:  [7-38] 7  SpO2:  [82 %-96 %] 90 %  BP: (102-124)/(59-71) 115/69   Weight: 117 kg (257 lb 15 oz)  Body mass index is 44.27 kg/m².      Intake/Output Summary (Last 24 hours) at 12/16/2020 1749  Last data filed at 12/16/2020 0600  Gross per 24 hour   Intake 584 ml   Output 1250 ml   Net -666 ml       Physical Exam  Constitutional:       Appearance: She is obese.   HENT:      Head: Normocephalic and atraumatic.      Nose: Nose normal.   Eyes:      Extraocular Movements: Extraocular movements intact.      Pupils: Pupils are equal, round, and reactive to light.   Neck:      Musculoskeletal: Normal range of motion.   Cardiovascular:      Rate and Rhythm: Normal rate and regular rhythm.    Pulmonary:      Breath sounds: Rales present.   Abdominal:      General: Bowel sounds are normal. There is distension.      Palpations: Abdomen is soft.      Tenderness: There is no abdominal tenderness.   Musculoskeletal: Normal range of motion.         General: No swelling.   Skin:     General: Skin is warm.      Capillary Refill: Capillary refill takes less than 2 seconds.   Neurological:      General: No focal deficit present.      Mental Status: She is alert and oriented to person, place, and time. Mental status is at baseline.         Vents:  Oxygen Concentration (%): 100 (12/16/20 1341)  Lines/Drains/Airways     Peripheral Intravenous Line                 Peripheral IV - Single Lumen 12/10/20 1100 Anterior;Left;Proximal Forearm 6 days         Midline Catheter Insertion/Assessment  - Single Lumen 12/11/20 1524 Right cephalic vein (lateral side of arm) 18g x 10cm 5 days              Significant Labs:    CBC/Anemia Profile:  Recent Labs   Lab 12/15/20  0529 12/16/20  0541   WBC 17.60* 20.74*   HGB 12.1 12.2   HCT 38.1 39.3    203   MCV 84 86   RDW 15.4* 15.3*   FERRITIN  --  825*        Chemistries:  Recent Labs   Lab 12/15/20  0529 12/16/20  0541 12/16/20  1147    135* 137   K 4.8 5.2* 4.8    102 102   CO2 26 25 25   BUN 59* 57* 63*   CREATININE 1.1 1.1 1.4   CALCIUM 8.7 8.5* 8.9   ALBUMIN 2.5* 2.5*  --    PROT 6.5 6.3  --    BILITOT 0.8 0.9  --    ALKPHOS 97 91  --    ALT 18 24  --    AST 21 22  --    MG 2.6 2.4  --    PHOS 3.3 3.7  --        CMP:   Recent Labs   Lab 12/15/20  0529 12/16/20  0541 12/16/20  1147    135* 137   K 4.8 5.2* 4.8    102 102   CO2 26 25 25   * 153* 197*   BUN 59* 57* 63*   CREATININE 1.1 1.1 1.4   CALCIUM 8.7 8.5* 8.9   PROT 6.5 6.3  --    ALBUMIN 2.5* 2.5*  --    BILITOT 0.8 0.9  --    ALKPHOS 97 91  --    AST 21 22  --    ALT 18 24  --    ANIONGAP 8 8 10   EGFRNONAA 58.3* 58.3* 43.5*     Cardiac Markers: No results for input(s): CKMB,  TROPONINT, MYOGLOBIN in the last 48 hours.  Coagulation:   Recent Labs   Lab 12/16/20  0541   INR 1.5*     All pertinent labs within the past 24 hours have been reviewed.    Significant Imaging:  I have reviewed and interpreted all pertinent imaging results/findings within the past 24 hours.

## 2020-12-16 NOTE — PROGRESS NOTES
"[facility] - ICU 16 WT  Endocrinology  Progress Note    Admit Date: 2020     Reason for Consult: Management of T2DM, Hyperglycemia     Surgical Procedure and Date: N/A    Diabetes diagnosis year:   > 20 years    Home Diabetes Medications:  (per Dr. Mittal with [facility] in [place]).   Trulicity 1.5 mg weekly.   Basaglar 34 units BID.  Novolog 22 units TIDWM    How often checking glucose at home? 1-3 x day   BG readings on regimen: 130's  Hypoglycemia on the regimen?  No  Missed doses on regimen?  No    Diabetes Complications include:     Hyperglycemia and Diabetic nephropathy      Complicating diabetes co morbidities:   CHF, CLARENCE and Glucocorticoid use , HLD, HTN      HPI:   Patient is a 50 y.o. female with a diagnosis of HFrEF 2/2 NICM (EF 15%, CRT-D placed '17), pAF, HTN, IDDM2, HLD, CLARENCE, asthma, GERD and obesity who [place] [facility] [place]. At that time she got a COVID test and was positive on . Initially admitted to Hospital Medicine for COVID pneumonia on . Required HFNC between 10-15L. Diurese well, net negative 5L. Started on dexamethasone, remdesivir, ceftriaxone, and doxycycline. Overnight on , oxygen requirements started to increase. Unable to tolerate coming off of continuous CPAP and escalated to ICU. Endocrinology consulted 20 for glycemic control.     Lab Results   Component Value Date    HGBA1C 6.5 (H) 2020       Interval HPI:   Overnight events: Remains in RICU. NAEON. BG at or slightly above goal on IV insulin infusion at 3 u/hr with scheduled insulin and prn correction scale.   Eatin% Diet consistent carbohydrate [facility]; Isolation Tray - Styrofoam  Nausea: No  Hypoglycemia and intervention: No  Fever: No  TPN and/or TF: No    /67   Pulse 84   Temp 97.9 °F (36.6 °C) (Axillary)   Resp (!) 27   Ht 5' 4" (1.626 m)   Wt 117 kg (257 lb 15 oz)   LMP 2019 (Approximate)   SpO2 (!) 88%   BMI 44.27 kg/m²     Labs " Reviewed and Include    Recent Labs   Lab 12/16/20  0541   *   CALCIUM 8.5*   ALBUMIN 2.5*   PROT 6.3   *   K 5.2*   CO2 25      BUN 57*   CREATININE 1.1   ALKPHOS 91   ALT 24   AST 22   BILITOT 0.9     Lab Results   Component Value Date    WBC 20.74 (H) 12/16/2020    HGB 12.2 12/16/2020    HCT 39.3 12/16/2020    MCV 86 12/16/2020     12/16/2020     No results for input(s): TSH, FREET4 in the last 168 hours.  Lab Results   Component Value Date    HGBA1C 6.5 (H) 12/07/2020       Nutritional status:   Body mass index is 44.27 kg/m².  Lab Results   Component Value Date    ALBUMIN 2.5 (L) 12/16/2020    ALBUMIN 2.5 (L) 12/15/2020    ALBUMIN 2.6 (L) 12/14/2020     Lab Results   Component Value Date    PREALBUMIN 20 05/09/2016    PREALBUMIN 16 (L) 05/06/2016    PREALBUMIN 9 (L) 05/04/2016       Estimated Creatinine Clearance: 76 mL/min (based on SCr of 1.1 mg/dL).    Accu-Checks  Recent Labs     12/14/20  1735 12/14/20  2209 12/15/20  0209 12/15/20  0525 12/15/20  0849 12/15/20  1236 12/15/20  1746 12/15/20  2103 12/16/20  0156 12/16/20  0540   POCTGLUCOSE 326* 259* 141* 149* 134* 220* 224* 199* 151* 154*       Current Medications and/or Treatments Impacting Glycemic Control  Immunotherapy:    Immunosuppressants     None        Steroids:   Hormones (From admission, onward)    Start     Stop Route Frequency Ordered    12/06/20 2100  melatonin tablet 6 mg      -- Oral Nightly PRN 12/06/20 2001        Pressors:    Autonomic Drugs (From admission, onward)    None        Hyperglycemia/Diabetes Medications:   Antihyperglycemics (From admission, onward)    Start     Stop Route Frequency Ordered    12/15/20 0715  insulin aspart U-100 pen 12-24 Units      -- SubQ 3 times daily with meals 12/14/20 1747    12/14/20 1900  insulin regular in 0.9 % NaCl 100 unit/100 mL (1 unit/mL) infusion     Question:  Enter initial dose (Units/hr):  Answer:  3    -- IV Continuous 12/14/20 1747    12/14/20 1846  insulin  aspart U-100 pen 0-10 Units      -- SubQ As needed (PRN) 12/14/20 8615          ASSESSMENT and PLAN    * Acute hypoxemic respiratory failure due to COVID-19  Managed per primary team  Avoid hypoglycemia        Type 2 diabetes mellitus with diabetic polyneuropathy  BG goal 140 - 180     -continue IV insulin infusion at 3 u/hr    - Continue Moderate Dose SQ Insulin Correction Scale.  - BG Monitoring AC/HS   Continue novolog 12-24 units with meals.     ** Please call Endocrine for any BG related issues **  ** Please notify Endocrine for any change and/or advance in diet**    Discharge Planning:   TBD. Please notify endocrinology prior to discharge.     Lab Results   Component Value Date    HGBA1C 6.5 (H) 12/07/2020         Adrenal cortical steroids causing adverse effect in therapeutic use  On steroid therapy per transplant team; may elevate BG readings        CLARENCE (obstructive sleep apnea)  May affect BG readings if uncontrolled          Plan of care reviewed with patient via phone due to covid restrictions.   Elena Bowers NP  Endocrinology  [facility] - ICU 16 WT

## 2020-12-16 NOTE — ASSESSMENT & PLAN NOTE
INR today 1.5, D-dimer >33    Plan:   - Continue home digoxin and amiodarone  - Will start heparin gtt as VTE PPx

## 2020-12-16 NOTE — PLAN OF CARE
Problem: Physical Therapy Goal  Goal: Physical Therapy Goal  Description: Goals to be met by: 20     Patient will increase functional independence with mobility by performin. Supine to sit with Modified [place]  2. Sit to supine with Modified [place]  3. Sit to stand transfer with Modified [place]  4. Gait  x 50 feet with Modified [place] using LRAD, if needed.     Outcome: Ongoing, Progressing     Patient tolerated treatment well. Established POC and goals reviewed and remain appropriate. Plan is to continue to improve patient's functional mobility capabilities.      Sanna Tee, PT, DPT  2020

## 2020-12-16 NOTE — SUBJECTIVE & OBJECTIVE
"Interval HPI:   Overnight events: Remains in RICU. BG at or slightly above goal on IV insulin infusion at 3 u/hr with scheduled insulin and prn correction scale. Dexamethasone 6 mg daily.   Eatin%  Nausea: No  Hypoglycemia and intervention: No  Fever: No  TPN and/or TF: No      /72   Pulse 87   Temp 97.8 °F (36.6 °C)   Resp (!) 21   Ht 5' 4" (1.626 m)   Wt 117 kg (257 lb 15 oz)   LMP 2019 (Approximate)   SpO2 (!) 94%   BMI 44.27 kg/m²     Labs Reviewed and Include    Recent Labs   Lab 12/15/20  0529   *   CALCIUM 8.7   ALBUMIN 2.5*   PROT 6.5      K 4.8   CO2 26      BUN 59*   CREATININE 1.1   ALKPHOS 97   ALT 18   AST 21   BILITOT 0.8     Lab Results   Component Value Date    WBC 17.60 (H) 12/15/2020    HGB 12.1 12/15/2020    HCT 38.1 12/15/2020    MCV 84 12/15/2020     12/15/2020     No results for input(s): TSH, FREET4 in the last 168 hours.  Lab Results   Component Value Date    HGBA1C 6.5 (H) 2020       Nutritional status:   Body mass index is 44.27 kg/m².  Lab Results   Component Value Date    ALBUMIN 2.5 (L) 12/15/2020    ALBUMIN 2.6 (L) 2020    ALBUMIN 2.5 (L) 2020     Lab Results   Component Value Date    PREALBUMIN 20 2016    PREALBUMIN 16 (L) 2016    PREALBUMIN 9 (L) 2016       Estimated Creatinine Clearance: 76 mL/min (based on SCr of 1.1 mg/dL).    Accu-Checks  Recent Labs     20  2030 20  0911 20  1350 20  1735 20  2209 12/15/20  0209 12/15/20  0525 12/15/20  0849 12/15/20  1236 12/15/20  1746   POCTGLUCOSE 366* 266* 236* 326* 259* 141* 149* 134* 220* 224*       Current Medications and/or Treatments Impacting Glycemic Control  Immunotherapy:    Immunosuppressants     None        Steroids:   Hormones (From admission, onward)    Start     Stop Route Frequency Ordered    20  melatonin tablet 6 mg      -- Oral Nightly PRN 20        Pressors:    Autonomic Drugs (From " admission, onward)    None        Hyperglycemia/Diabetes Medications:   Antihyperglycemics (From admission, onward)    Start     Stop Route Frequency Ordered    12/15/20 0715  insulin aspart U-100 pen 12-24 Units      -- SubQ 3 times daily with meals 12/14/20 1747 12/14/20 1900  insulin regular in 0.9 % NaCl 100 unit/100 mL (1 unit/mL) infusion     Question:  Enter initial dose (Units/hr):  Answer:  3    -- IV Continuous 12/14/20 1747 12/14/20 1846  insulin aspart U-100 pen 0-10 Units      -- SubQ As needed (PRN) 12/14/20 1747

## 2020-12-16 NOTE — SUBJECTIVE & OBJECTIVE
Interval History: discussed with primary team and pt at bedside    Pt in good spirits, eating a full meal, and without conversational dyspnea.      Remains on alternating HFNC and BIPAP    Appears stronger than last visit    Past Medical History:   Diagnosis Date    Anticoagulant long-term use     Arthritis     Asthma     Asthma     Atrial fibrillation     Cardiomyopathy     Cardiomyopathy     CHF (congestive heart failure)     CHF (congestive heart failure)     Chronic combined systolic and diastolic heart failure 6/3/2016    COPD (chronic obstructive pulmonary disease) 3/28/2018    GERD (gastroesophageal reflux disease)     H/O tubal ligation     Hypertension     Obesity     Renal disorder     Type 2 diabetes mellitus with hyperglycemia     Type 2 diabetes mellitus with polyneuropathy        Past Surgical History:   Procedure Laterality Date    CARDIAC DEFIBRILLATOR PLACEMENT      CARDIAC DEFIBRILLATOR PLACEMENT      CARDIAC DEFIBRILLATOR PLACEMENT      CATARACT EXTRACTION Left     CHOLECYSTECTOMY      COLONOSCOPY N/A 5/2/2016    Procedure: COLONOSCOPY;  Surgeon: Eugene Soto MD;  Location: [facility] ENDO ([address] [place] [address]);  Service: Endoscopy;  Laterality: N/A;    GALLBLADDER SURGERY      iabp  4/2016    TREATMENT OF CARDIAC ARRHYTHMIA N/A 6/12/2020    Procedure: CARDIOVERSION;  Surgeon: Navi Jolly MD;  Location: [facility] EP LAB;  Service: Cardiology;  Laterality: N/A;  AF, BRITTANEY, DCCV, MAC, DM, 3 Prep    TREATMENT OF CARDIAC ARRHYTHMIA N/A 8/7/2020    Procedure: Cardioversion or Defibrillation;  Surgeon: Navi Jolly MD;  Location: [facility] EP LAB;  Service: Cardiology;  Laterality: N/A;  AF, DCCV, MAC, DM, 3 Prep    TUBAL LIGATION         Review of patient's allergies indicates:  No Known Allergies    Medications:  Continuous Infusions:   insulin regular 1 units/mL infusion orderable (TRANSFER) 3 Units/hr (12/14/20 1930)     Scheduled Meds:   albuterol-ipratropium  3 mL Nebulization Q6H  WAKE    amiodarone  200 mg Oral Daily    ascorbic acid (vitamin C)  500 mg Oral BID    atorvastatin  20 mg Oral Daily    digoxin  125 mcg Oral Daily    famotidine  20 mg Oral BID    fluticasone furoate-vilanteroL  1 puff Inhalation Daily    fluticasone propionate  2 spray Each Nostril Daily    furosemide  80 mg Oral BID    insulin aspart U-100  12-24 Units Subcutaneous TIDWM    lisinopriL  5 mg Oral BID    montelukast  10 mg Oral Daily    polyethylene glycol  17 g Oral BID    senna  8.6 mg Oral Daily    vitamin D  1,000 Units Oral Daily    zinc sulfate  220 mg Oral Daily     PRN Meds:albuterol, benzonatate, butalbital-acetaminophen-caffeine -40 mg, dextrose 50%, dextrose 50%, glucagon (human recombinant), glucose, glucose, insulin aspart U-100, loperamide, melatonin, sodium chloride 0.9%    Family History     Problem Relation (Age of Onset)    Heart disease Mother, Father        Tobacco Use    Smoking status: Never Smoker    Smokeless tobacco: Never Used   Substance and Sexual Activity    Alcohol use: No    Drug use: No    Sexual activity: Yes     Partners: Male       Review of Systems   Constitutional: Positive for activity change, chills and fatigue.   HENT: Positive for congestion.    Eyes: Negative.    Respiratory: Positive for cough and shortness of breath.    Cardiovascular: Negative.    Gastrointestinal: Negative.    Endocrine: Negative.    Genitourinary: Negative.    Musculoskeletal: Positive for myalgias.   Skin: Negative.    Allergic/Immunologic: Negative.    Neurological: Negative.    Hematological: Negative.    Psychiatric/Behavioral: Negative.    All other systems reviewed and are negative.    Objective:     Vital Signs (Most Recent):  Temp: 97.8 °F (36.6 °C) (12/15/20 2000)  Pulse: 79 (12/15/20 2230)  Resp: (!) 25 (12/15/20 2230)  BP: 121/66 (12/15/20 2200)  SpO2: 95 % (12/15/20 2230) Vital Signs (24h Range):  Temp:  [97.8 °F (36.6 °C)-98.4 °F (36.9 °C)] 97.8 °F (36.6  °C)  Pulse:  [70-93] 79  Resp:  [9-44] 25  SpO2:  [82 %-97 %] 95 %  BP: (107-139)/(59-82) 121/66     Weight: 117 kg (257 lb 15 oz)  Body mass index is 44.27 kg/m².    Physical Exam  Vitals signs and nursing note reviewed.   Constitutional:       Appearance: Normal appearance. She is obese.   HENT:      Head: Normocephalic and atraumatic.      Right Ear: External ear normal.      Left Ear: External ear normal.      Nose:      Comments: Limited due to BIPAP       Mouth/Throat:      Mouth: Mucous membranes are dry.   Eyes:      Pupils: Pupils are equal, round, and reactive to light.   Neck:      Musculoskeletal: Neck supple.      Comments: No JVD  Cardiovascular:      Rate and Rhythm: Normal rate and regular rhythm.   Pulmonary:      Breath sounds: Rales present.      Comments: Mild increase in WOB on BIPAP  Abdominal:      General: Abdomen is flat. Bowel sounds are normal.      Palpations: Abdomen is soft.   Musculoskeletal:         General: No tenderness or signs of injury.      Right lower leg: Edema present.      Left lower leg: Edema present.   Skin:     General: Skin is warm.      Capillary Refill: Capillary refill takes less than 2 seconds.   Neurological:      General: No focal deficit present.      Mental Status: She is alert and oriented to person, place, and time.   Psychiatric:         Mood and Affect: Mood normal.         Behavior: Behavior normal.         Thought Content: Thought content normal.         Review of Symptoms    Symptom Assessment (ESAS 0-10 Scale)  Pain:  4  Dyspnea:  0  Anxiety:  0  Nausea:  0  Depression:  0  Anorexia:  0  Fatigue:  2  Insomnia:  0  Restlessness:  0  Agitation:  0     CAM / Delirium:  Negative  Constipation:  Negative  Diarrhea:  Negative          Palliative Performance Scale: 90% prior to admit.    Living Arrangements:  Lives with family    Psychosocial/Cultural: Mother of three children; 2 sons and one daughter; \  From [place]  Has remained active despite  chronic cardiac condition    Spiritual:  F - [place] and Belief:  Judaism  I - Importance:  VERY  C - Community:  Yes  A - Address in Care:  Would be open to  visits      Advance Care Planning   Advance Directives:   Living Will: No    LaPOST: No    Do Not Resuscitate Status: No    Medical Power of : No        Oral Declaration: Yes  Witnesses:  Dr. Mccollum   Agent's Name:  Ashley Erazo Daughter     [phone]     Decision Making:  Patient answered questions         Significant Labs:   CBC:   Recent Labs   Lab 12/14/20  0521 12/15/20  0529   WBC 15.37* 17.60*   HGB 12.7 12.1   HCT 40.0 38.1    265     CMP:   Recent Labs   Lab 12/14/20  0521 12/15/20  0529    137   K 5.1 4.8    103   CO2 25 26   * 143*   BUN 68* 59*   CREATININE 1.2 1.1   CALCIUM 8.8 8.7   PROT 6.8 6.5   ALBUMIN 2.6* 2.5*   BILITOT 0.8 0.8   ALKPHOS 97 97   AST 17 21   ALT 14 18   ANIONGAP 11 8   EGFRNONAA 52.5* 58.3*     CBC:   Recent Labs   Lab 12/15/20  0529   WBC 17.60*   HGB 12.1   HCT 38.1   MCV 84        BMP:  Recent Labs   Lab 12/15/20  0529   *      K 4.8      CO2 26   BUN 59*   CREATININE 1.1   CALCIUM 8.7   MG 2.6     LFT:  Lab Results   Component Value Date    AST 21 12/15/2020    ALKPHOS 97 12/15/2020    BILITOT 0.8 12/15/2020     Albumin:   Albumin   Date Value Ref Range Status   12/15/2020 2.5 (L) 3.5 - 5.2 g/dL Final     Protein:   Total Protein   Date Value Ref Range Status   12/15/2020 6.5 6.0 - 8.4 g/dL Final     Lactic acid:   Lab Results   Component Value Date    LACTATE 1.6 12/06/2020    LACTATE 1.4 07/03/2019       Significant Imaging: CXR: I have reviewed all pertinent results/findings within the past 24 hours:  as below  Echo: I have reviewed all pertinent results/findings within the past 24 hours:  as below   BRITTANEY 12.10 EF 15%    BRITTANEY (6/12/2020):  · BRITTANEY to evaluate for LA/EDUARDO thrombus prior to DCCV.  · Normal appearing left atrial appendage. No thrombus is  present in the appendage. Abnormal appendage velocities.  · No thrombus is present in the left atrium.  · Severely decreased left ventricular systolic function. The estimated ejection fraction is 15%. Global hypokinetic wall motion.  · Mildly to moderately reduced right ventricular systolic function.  · This study was abbreviated due to high risk for decompensation.

## 2020-12-16 NOTE — ASSESSMENT & PLAN NOTE
Known NICM (EF 15%), CRT-D (2017) in place  - Repeat TTE stable  - Negative 1000cc       Plan:  -- Renal function stable  -- Continue home lasix and lisinopril

## 2020-12-16 NOTE — ASSESSMENT & PLAN NOTE
AL on CKD noted on admit, improving now. Baseline creatinine ~1.2  - Cr down to 1.1, UO 1900 yesterday     Plan:   - Place new campa, as patient does not tolerate pure wick   - Strict I/O  - Renally dose meds and avoid nephrotoxics

## 2020-12-16 NOTE — ASSESSMENT & PLAN NOTE
BG goal 140 - 180     -continue IV insulin infusion at 3 u/hr    - Continue Moderate Dose SQ Insulin Correction Scale.  - BG Monitoring AC/HS   Continue novolog 12-24 units with meals.     ** Please call Endocrine for any BG related issues **  ** Please notify Endocrine for any change and/or advance in diet**    Discharge Planning:   TBD. Please notify endocrinology prior to discharge.     Lab Results   Component Value Date    HGBA1C 6.5 (H) 12/07/2020

## 2020-12-16 NOTE — PROGRESS NOTES
"[facility] - ICU 16 WT  Adult Nutrition  Progress Note    SUMMARY       Recommendations    1. Continue Diabetic Diet as tolerated.     2. Consider ordering ONS Boost Glucose Control for additional calories and protein.     3. RD to follow.     Goals: Pt to meet >50% of EEN/EPN by RD follow up.  Nutrition Goal Status: new  Communication of RD Recs: other (POC)    Reason for Assessment    Reason For Assessment: length of stay  Diagnosis: (acute hypoxemic respiratory failure due to COVID-19)  Relevant Medical History: HFrEF 2/2 NICM (EF 15%, CRT-D placed '17), pAF, HTN, IDDM2, HLD, CLARENCE, asthma, GERD and obesity   Interdisciplinary Rounds: did not attend  General Information Comments: Pt COVID-19+, RD working remotely. Pt is on consistent carb diet, eating about 25% of meals per chart review. Pt is alternating between HFNC and CPAP. Pts weight has been stable, noted weight from 12/13 shows a 21lb increase, possibly from fluid changes. Pts intake PTA is unknown. Due to recent hospital wide restrictions to limit the transfer of (COVID-19), we are not performing any physical exams at this time on patients with +COVID-19. All S/S will be observational; NFPE to be performed at a future date.    Nutrition Discharge Planning: continue diabetic diet to meet nutrition needs.    Nutrition Risk Screen    Nutrition Risk Screen: no indicators present    Nutrition/Diet History    Spiritual, Cultural Beliefs, Mandaeism Practices, Values that Affect Care: no    Anthropometrics    Temp: 97.9 °F (36.6 °C)  Height Method: Stated  Height: 5' 4" (162.6 cm)  Height (inches): 64 in  Weight Method: Bed Scale  Weight: 117 kg (257 lb 15 oz)  Weight (lb): 257.94 lb  Ideal Body Weight (IBW), Female: 120 lb  % Ideal Body Weight, Female (lb): 200 %  BMI (Calculated): 44.3       Lab/Procedures/Meds    Pertinent Labs Reviewed: reviewed  Pertinent Labs Comments: Na 135, K 5.2, BUN 57, Gluc 153, Ca 8.5  Pertinent Medications Reviewed: " reviewed  Pertinent Medications Comments: Vit C, insulin, senna, Vit D, Zinc    Physical Findings/Assessment         Estimated/Assessed Needs    Weight Used For Calorie Calculations: 117 kg (257 lb 15 oz)  Energy Calorie Requirements (kcal): 2212 kcal/day  Energy Need Method: Bowman-St Jeor(x1.25)  Protein Requirements:  gm/day  Weight Used For Protein Calculations: 117 kg (257 lb 15 oz)(0.8-1 gm/kg)        RDA Method (mL): 2212  CHO Requirement: 50%       Nutrition Prescription Ordered    Current Diet Order: Consistent Carbohydrate    Evaluation of Received Nutrient/Fluid Intake    I/O: -10L since admit  Comments: LBM 12/5     % Intake of Estimated Energy Needs: 0 - 25 %  % Meal Intake: 0 - 25 %    Nutrition Risk    Level of Risk/Frequency of Follow-up: (2x/week)     Assessment and Plan    Nutrition Problem  Inadequate energy intake    Related to (etiology):   Decreased ability to consume sufficient energy    Signs and Symptoms (as evidenced by):   Difficulty breathing, Pt eating <25% of meals    Interventions/Recommendations (treatment strategy):  Collaboration of care with other providers    Nutrition Diagnosis Status:   new      Monitor and Evaluation    Food and Nutrient Intake: energy intake  Food and Nutrient Adminstration: diet order  Anthropometric Measurements: weight change, weight  Biochemical Data, Medical Tests and Procedures: electrolyte and renal panel, gastrointestinal profile, glucose/endocrine profile, inflammatory profile, lipid profile  Nutrition-Focused Physical Findings: overall appearance       Nutrition Follow-Up    RD Follow-up?: Yes

## 2020-12-16 NOTE — ASSESSMENT & PLAN NOTE
COVID positive on 11/30. Admitted to hospital 12/6, upgraded to ICU 12/10 for increasing oxygen requirements. 's up from 200's on admit. Procalcitonin increased from 0.18 to 0.29.  Inflammatory markers trending down. Patient alternating between CPAP qHS and CF on daytime.       Plan:   -- Continue alternating from CF and CPAP qHS  - S/p 7 days of likely appropriate CAP coverage  - Known NICM with EF 15%; will restart home lasix and lisinopril  - Completed dexamethasone x 10 days  - Completed remdesivir X 5 days    - Start heparin infusion for hypercoagulability today    - Scheduled Duonebs   -- Repeat VBG today as patient more sleepy than yesterday and requiring CPAP all day

## 2020-12-16 NOTE — PT/OT/SLP PROGRESS
Physical Therapy Treatment  Co-Tx with OT    Patient Name:  Laure Ross   MRN:  [id number]    Co-tx completed with OT due to patients decreased activity tolerance in the presence of COVID-19.   Recommendations:     Discharge Recommendations:  home health PT, home with home health   Discharge Equipment Recommendations: (TBD pending progress)   Barriers to discharge: Increased O2 requirements     Assessment:     Laure Ross is a 50 y.o. female admitted with a medical diagnosis of Acute hypoxemic respiratory failure due to COVID-19.  She presents with the following impairments/functional limitations:  weakness, impaired endurance, impaired self care skills, impaired functional mobilty, gait instability, impaired balance, impaired cardiopulmonary response to activity. Patient tolerated session fairly well. She was primarily limited due to increased O2 demands, though remained constant with mobility. Patient is eager to improve. Patient would continue to benefit from skilled PT services while in the hospital. At this time, upon d/c rec of HH in order for patient to progress towards an improved level of functional mobility independence.     Patient on Comfort Flow throughout session with RN increasing FiO2 to 100% prior to mobility. At rest - O2 between 82-84%, while EOB, O2 remained between 82-84%, with no reports of SOB or dizziness. Upon patient assisted back to supine O2 as low as 76%, but recovered quickly to ~85% and was 87% upon completion. RN notified.     Rehab Prognosis: Good; patient would benefit from acute skilled PT services to address these deficits and reach maximum level of function.    Recent Surgery: * No surgery found *      Plan:     During this hospitalization, patient to be seen 3 x/week to address the identified rehab impairments via gait training, therapeutic activities, neuromuscular re-education, therapeutic exercises and progress toward the following goals:    · Plan of Care  Expires:  01/13/21    Subjective     Chief Complaint: fatigue and weakness   Patient/Family Comments/goals: to feel better   Pain/Comfort:  · Pain Rating 1: 0/10  · Pain Rating Post-Intervention 1: 0/10      Objective:     Communicated with RN prior to session.  Patient found HOB elevated with telemetry, pulse ox (continuous), blood pressure cuff, peripheral IV, oxygen upon PT entry to room.     General Precautions: Standard, fall, airborne, contact, droplet   Orthopedic Precautions:N/A   Braces: N/A     Functional Mobility:  · Bed Mobility:     · Rolling Left:  stand by assistance  · Scooting: stand by assistance  · Supine to Sit: stand by assistance  · Sit to Supine: stand by assistance  · Balance: sitting EOB (6 min) with SBA  ·  slouched and FFP posture with minimal correction following vc's  ·  patient noticably fatigued while EOB  ·  decreased trunk extensor activation     Transfers and gait deferred this date due to O2 requirements     AM-PAC 6 CLICK MOBILITY  Turning over in bed (including adjusting bedclothes, sheets and blankets)?: 4  Sitting down on and standing up from a chair with arms (e.g., wheelchair, bedside commode, etc.): 3  Moving from lying on back to sitting on the side of the bed?: 4  Moving to and from a bed to a chair (including a wheelchair)?: 3  Need to walk in hospital room?: 3  Climbing 3-5 steps with a railing?: 2  Basic Mobility Total Score: 19       Therapeutic Activities and Exercises:  -Patient educated on the continued role and goal of PT  -Questions and concerns answered within the the PT scope of practice.   -White board updated in patient room to reflect level of assistance needed with nursing.     Patient left HOB elevated with all lines intact, call button in reach and RN notified..    GOALS:   Multidisciplinary Problems     Physical Therapy Goals        Problem: Physical Therapy Goal    Goal Priority Disciplines Outcome Goal Variances Interventions   Physical Therapy Goal      PT, PT/OT Ongoing, Progressing     Description: Goals to be met by: 20     Patient will increase functional independence with mobility by performin. Supine to sit with Modified [place]  2. Sit to supine with Modified [place]  3. Sit to stand transfer with Modified [place]  4. Gait  x 50 feet with Modified [place] using LRAD, if needed.                      Time Tracking:     PT Received On: 20  PT Start Time: 931     PT Stop Time: 954  PT Total Time (min): 23 min     Billable Minutes: Therapeutic Activity 10 and Neuromuscular Re-education 13    Treatment Type: Treatment  PT/PTA: PT     PTA Visit Number: 0     Sanna Tee, PT  2020

## 2020-12-16 NOTE — ASSESSMENT & PLAN NOTE
Home regimen includes long-acting insulin glargine 34 units BID and short-acting insulin aspart 16 units with SSI. HgbA1c 6.5.    BG not controlled with >300 all day. 43 units of aspart in the last 24 hours (21 scheduled and 22 SSI)  BG not well controlled due to dexamethasone. Detemir had been increased to 30 U BID and aspart 12U TID on 12/12,   Endocrinology consulted yesterday.     Plan:   -- Endocrinology consulted yesterday and started insulin infusion 3U/hr, along with Aspart 12-24 U (12 if 25% of meal, 24 if 50% of meal) Along with sliding scale.   -- BG goal 140-160   -- Dexamethasone completed.

## 2020-12-16 NOTE — ASSESSMENT & PLAN NOTE
COVID-19 Data  -HD # 5 ;Admit date: 12.6  -Mechanical ventilation: no ; BIPAP  -Pressors: no  -Renal replacement therapy: no   -Health status prior to COVID: fair  -Functional status prior to COVID: fair  -Prior experiences with critical illness: yes    Decision-making:   -Pt does have decision-making capacity  -Pt has verbally appointed a HCPOA: daughter   Ashley Erazo Daughter     [phone]   Marital status: yes  -Children: 3 children    Advance directives:  -The patient has not previously engaged in advance care planning  -Pt has no scanned advance directives in Epic    Support system:  -Spiritual: very much; deeply Yazidism  -Family: to current  for 20+ years; three children; lives in multigenerational home    Estimated prognosis:   -Time and potential for recovery: If progresses to need for mechanical ventilation would be concerned for poor prognosis; discussed with pt and daughter    Prior GOC discussions: no    GOC Discussion:  12.14 Pt conitnues to improve, spending time doing bed exercises, talking with family and grandchildren on Facetime.  Family well supported and all questions answered.  Family understands seriousness of current illness, acknowledges both the hope for improvement but also the worry for sudden decompensation and the need for higher levels of care.  Pt believes she will leave the ICU.  Support given.  Symptoms well controlled currently    12.11-Discussion with with patient and daughter to introduce the role of palliative medicine and supportive care in the ICU in the setting of a serious diagnosis of COVID-19. The pateint and family were able to demonstrate an excellent understanding of the diagnosis, current care plan, hope for imoprovement and concern for worsening of the pt's condition.    Experience with critical illness: previous ICU admissions for cardigenic shock; has been on inotropes and RRT in hospital in the past; d/c home    Understanding of illness: no  exposure to serious illness with COVID; evolving understanding    Present for discussion: daughter    Goals of care: Given her history and their maggie in God, they would want a trial of all invasive supportive therapies with the goal of being able to return home.  It was discussed the high mortality rates we see in pts with her comorbids and concern it would only be prolonging the dying period.      The pt's daughter was able to verbalize that the children would support decision she makes whether they liked it or not, including if she chose to avoid intubation.  The pt acknowledge the support but would want a SHORT trail of life proloning care if felt it would help by her team.     The pt also gave the daughter permission to also let her die peacefully without prolongation of her dying process IF after a short trial it was felt she could not survive without prolonged reliance on interventions.     Pt and family feel well supported in current care setting.     Summary/Recommendation:  -Most important goals at this time are curative/life-prolongation (regardless of treatment burdens)  -Code status: FULL  -Palliative care to provide emotional support and assist with communication regarding disease expectations and trajectory, and with medical decision-making, at the appropriate time  -Most appropriate disposition: continue with the current LOC for now  -Family desires open and honest prognostication to help base decision making    50 minutes spent in above ACP discussions    Thank you for the opportunity to care for this patient and family.     Please call with questions.     Chino Mccollum MD  Palliative Medicine

## 2020-12-16 NOTE — PROGRESS NOTES
[facility] - ICU 16   Palliative Medicine  Progress Note    Patient Name: Laure Ross  MRN: [id number]  Admission Date: 12/6/2020  Hospital Length of Stay: 9 days  Code Status: Full Code   Attending Provider: Asael Stapleton MD  Consulting Provider: Chino Mccollum MD  Primary Care Physician: Holly Mittal MD  Principal Problem:Acute hypoxemic respiratory failure due to COVID-19    Patient information was obtained from patient.      Assessment/Plan:     Palliative care encounter  COVID-19 Data  -HD # 5 ;Admit date: 12.6  -Mechanical ventilation: no ; BIPAP  -Pressors: no  -Renal replacement therapy: no   -Health status prior to COVID: fair  -Functional status prior to COVID: fair  -Prior experiences with critical illness: yes    Decision-making:   -Pt does have decision-making capacity  -Pt has verbally appointed a HCPOA: daughter   Ashley Erazo Daughter     [phone]   Marital status: yes  -Children: 3 children    Advance directives:  -The patient has not previously engaged in advance care planning  -Pt has no scanned advance directives in Epic    Support system:  -Spiritual: very much; deeply [employer]  -Family: to current  for 20+ years; three children; lives in multigenerational home    Estimated prognosis:   -Time and potential for recovery: If progresses to need for mechanical ventilation would be concerned for poor prognosis; discussed with pt and daughter    Prior GOC discussions: no    GOC Discussion:  12.14 Pt conitnues to improve, spending time doing bed exercises, talking with family and grandchildren on Facetime.  Family well supported and all questions answered.  Family understands seriousness of current illness, acknowledges both the hope for improvement but also the worry for sudden decompensation and the need for higher levels of care.  Pt believes she will leave the ICU.  Support given.  Symptoms well controlled currently    12.11-Discussion with with  patient and daughter to introduce the role of palliative medicine and supportive care in the ICU in the setting of a serious diagnosis of COVID-19. The pateint and family were able to demonstrate an excellent understanding of the diagnosis, current care plan, hope for imoprovement and concern for worsening of the pt's condition.    Experience with critical illness: previous ICU admissions for cardigenic shock; has been on inotropes and RRT in hospital in the past; d/c home    Understanding of illness: no exposure to serious illness with COVID; evolving understanding    Present for discussion: daughter    Goals of care: Given her history and their maggie in God, they would want a trial of all invasive supportive therapies with the goal of being able to return home.  It was discussed the high mortality rates we see in pts with her comorbids and concern it would only be prolonging the dying period.      The pt's daughter was able to verbalize that the children would support decision she makes whether they liked it or not, including if she chose to avoid intubation.  The pt acknowledge the support but would want a SHORT trail of life proloning care if felt it would help by her team.     The pt also gave the daughter permission to also let her die peacefully without prolongation of her dying process IF after a short trial it was felt she could not survive without prolonged reliance on interventions.     Pt and family feel well supported in current care setting.     Summary/Recommendation:  -Most important goals at this time are curative/life-prolongation (regardless of treatment burdens)  -Code status: FULL  -Palliative care to provide emotional support and assist with communication regarding disease expectations and trajectory, and with medical decision-making, at the appropriate time  -Most appropriate disposition: continue with the current LOC for now  -Family desires open and honest prognostication to help base  decision making    50 minutes spent in above ACP discussions    Thank you for the opportunity to care for this patient and family.     Please call with questions.     Chino Mccollum MD  Palliative Medicine          I will follow-up with patient. Please contact us if you have any additional questions.    Subjective:     Chief Complaint:   Chief Complaint   Patient presents with    covid Positive     tested + last week, more sob       HPI:   HPI:  Ms. Laure Ross is a 50 year old woman with a PMHx of HFrEF 2/2 NICM (EF 15%, CRT-D placed '17), pAF, HTN, IDDM2, HLD, CLARENCE, asthma, GERD and obesity who presents to [facility] for SOB and cough. 1 week ago she began having myalgias, fatigue, nausea, vomiting, headache, and mild dyspnea on exertion. At that time she got a COVID test and was positive on 11/30. Most of her symptoms progressively improved throughout the week but 2 days ago began having worsening SOB even at rest and 1 day ago began having a cough and fever. Denies orthopnea. She says the cough is productive but has been coughing it up and swallowing it so cannot comment on the character of the sputum. She has been taking tylenol at home. She has been adherent with all of her medications including her diuretics and insulin. She denies taking her metolazone recently as needed, however she admits that she has not weighed herself in weeks. She feels she is at her baseline weight. Her urine volume has been unchanged. She has been drinking water but her appetite for food has decreased in the past few days.      Hospital/ICU Course:  Initially admitted to Hospital Medicine for COVID pneumonia on 12/8. Required HFNC between 10-15L. Diurese well, net negative 5L. Started on dexamethasone, remdesivir, ceftriaxone, and doxycycline. Overnight on 12/9, oxygen requirements started to increase. Unable to tolerate coming off of continuous CPAP and escalated to ICU.     In this setting, palliative medicine was consulted to help with  medical decision making and aid in the formation of goals of care.       Hospital Course:  No notes on file    Interval History: discussed with primary team and pt at bedside    Pt in good spirits, eating a full meal, and without conversational dyspnea.      Remains on alternating HFNC and BIPAP    Appears stronger than last visit    Past Medical History:   Diagnosis Date    Anticoagulant long-term use     Arthritis     Asthma     Asthma     Atrial fibrillation     Cardiomyopathy     Cardiomyopathy     CHF (congestive heart failure)     CHF (congestive heart failure)     Chronic combined systolic and diastolic heart failure 6/3/2016    COPD (chronic obstructive pulmonary disease) 3/28/2018    GERD (gastroesophageal reflux disease)     H/O tubal ligation     Hypertension     Obesity     Renal disorder     Type 2 diabetes mellitus with hyperglycemia     Type 2 diabetes mellitus with polyneuropathy        Past Surgical History:   Procedure Laterality Date    CARDIAC DEFIBRILLATOR PLACEMENT      CARDIAC DEFIBRILLATOR PLACEMENT      CARDIAC DEFIBRILLATOR PLACEMENT      CATARACT EXTRACTION Left     CHOLECYSTECTOMY      COLONOSCOPY N/A 5/2/2016    Procedure: COLONOSCOPY;  Surgeon: Eugene Soto MD;  Location: [facility] ENDO ([address] [place] [address]);  Service: Endoscopy;  Laterality: N/A;    GALLBLADDER SURGERY      iabp  4/2016    TREATMENT OF CARDIAC ARRHYTHMIA N/A 6/12/2020    Procedure: CARDIOVERSION;  Surgeon: Navi Jolly MD;  Location: [facility] EP LAB;  Service: Cardiology;  Laterality: N/A;  AF, BRITTANEY, DCCV, MAC, DM, 3 Prep    TREATMENT OF CARDIAC ARRHYTHMIA N/A 8/7/2020    Procedure: Cardioversion or Defibrillation;  Surgeon: Navi Jolly MD;  Location: [facility] EP LAB;  Service: Cardiology;  Laterality: N/A;  AF, DCCV, MAC, DM, 3 Prep    TUBAL LIGATION         Review of patient's allergies indicates:  No Known Allergies    Medications:  Continuous Infusions:   insulin regular 1 units/mL infusion  orderable (TRANSFER) 3 Units/hr (12/14/20 1930)     Scheduled Meds:   albuterol-ipratropium  3 mL Nebulization Q6H WAKE    amiodarone  200 mg Oral Daily    ascorbic acid (vitamin C)  500 mg Oral BID    atorvastatin  20 mg Oral Daily    digoxin  125 mcg Oral Daily    famotidine  20 mg Oral BID    fluticasone furoate-vilanteroL  1 puff Inhalation Daily    fluticasone propionate  2 spray Each Nostril Daily    furosemide  80 mg Oral BID    insulin aspart U-100  12-24 Units Subcutaneous TIDWM    lisinopriL  5 mg Oral BID    montelukast  10 mg Oral Daily    polyethylene glycol  17 g Oral BID    senna  8.6 mg Oral Daily    vitamin D  1,000 Units Oral Daily    zinc sulfate  220 mg Oral Daily     PRN Meds:albuterol, benzonatate, butalbital-acetaminophen-caffeine -40 mg, dextrose 50%, dextrose 50%, glucagon (human recombinant), glucose, glucose, insulin aspart U-100, loperamide, melatonin, sodium chloride 0.9%    Family History     Problem Relation (Age of Onset)    Heart disease Mother, Father        Tobacco Use    Smoking status: Never Smoker    Smokeless tobacco: Never Used   Substance and Sexual Activity    Alcohol use: No    Drug use: No    Sexual activity: Yes     Partners: Male       Review of Systems   Constitutional: Positive for activity change, chills and fatigue.   HENT: Positive for congestion.    Eyes: Negative.    Respiratory: Positive for cough and shortness of breath.    Cardiovascular: Negative.    Gastrointestinal: Negative.    Endocrine: Negative.    Genitourinary: Negative.    Musculoskeletal: Positive for myalgias.   Skin: Negative.    Allergic/Immunologic: Negative.    Neurological: Negative.    Hematological: Negative.    Psychiatric/Behavioral: Negative.    All other systems reviewed and are negative.    Objective:     Vital Signs (Most Recent):  Temp: 97.8 °F (36.6 °C) (12/15/20 2000)  Pulse: 79 (12/15/20 2230)  Resp: (!) 25 (12/15/20 2230)  BP: 121/66 (12/15/20  2200)  SpO2: 95 % (12/15/20 2230) Vital Signs (24h Range):  Temp:  [97.8 °F (36.6 °C)-98.4 °F (36.9 °C)] 97.8 °F (36.6 °C)  Pulse:  [70-93] 79  Resp:  [9-44] 25  SpO2:  [82 %-97 %] 95 %  BP: (107-139)/(59-82) 121/66     Weight: 117 kg (257 lb 15 oz)  Body mass index is 44.27 kg/m².    Physical Exam  Vitals signs and nursing note reviewed.   Constitutional:       Appearance: Normal appearance. She is obese.   HENT:      Head: Normocephalic and atraumatic.      Right Ear: External ear normal.      Left Ear: External ear normal.      Nose:      Comments: Limited due to BIPAP       Mouth/Throat:      Mouth: Mucous membranes are dry.   Eyes:      Pupils: Pupils are equal, round, and reactive to light.   Neck:      Musculoskeletal: Neck supple.      Comments: No JVD  Cardiovascular:      Rate and Rhythm: Normal rate and regular rhythm.   Pulmonary:      Breath sounds: Rales present.      Comments: Mild increase in WOB on BIPAP  Abdominal:      General: Abdomen is flat. Bowel sounds are normal.      Palpations: Abdomen is soft.   Musculoskeletal:         General: No tenderness or signs of injury.      Right lower leg: Edema present.      Left lower leg: Edema present.   Skin:     General: Skin is warm.      Capillary Refill: Capillary refill takes less than 2 seconds.   Neurological:      General: No focal deficit present.      Mental Status: She is alert and oriented to person, place, and time.   Psychiatric:         Mood and Affect: Mood normal.         Behavior: Behavior normal.         Thought Content: Thought content normal.         Review of Symptoms    Symptom Assessment (ESAS 0-10 Scale)  Pain:  4  Dyspnea:  0  Anxiety:  0  Nausea:  0  Depression:  0  Anorexia:  0  Fatigue:  2  Insomnia:  0  Restlessness:  0  Agitation:  0     CAM / Delirium:  Negative  Constipation:  Negative  Diarrhea:  Negative          Palliative Performance Scale: 90% prior to admit.    Living Arrangements:  Lives with  family    Psychosocial/Cultural: Mother of three children; 2 sons and one daughter; \  From [place]  Has remained active despite chronic cardiac condition    Spiritual:  F - [place] and Belief:  Gnosticist  I - Importance:  VERY  C - Community:  Yes  A - Address in Care:  Would be open to  visits      Advance Care Planning   Advance Directives:   Living Will: No    [place]POST: No    Do Not Resuscitate Status: No    Medical Power of : No        Oral Declaration: Yes  Witnesses:  Dr. Mccollum   Agent's Name:  Ashley Erazo Daughter     [phone]     Decision Making:  Patient answered questions         Significant Labs:   CBC:   Recent Labs   Lab 12/14/20  0521 12/15/20  0529   WBC 15.37* 17.60*   HGB 12.7 12.1   HCT 40.0 38.1    265     CMP:   Recent Labs   Lab 12/14/20  0521 12/15/20  0529    137   K 5.1 4.8    103   CO2 25 26   * 143*   BUN 68* 59*   CREATININE 1.2 1.1   CALCIUM 8.8 8.7   PROT 6.8 6.5   ALBUMIN 2.6* 2.5*   BILITOT 0.8 0.8   ALKPHOS 97 97   AST 17 21   ALT 14 18   ANIONGAP 11 8   EGFRNONAA 52.5* 58.3*     CBC:   Recent Labs   Lab 12/15/20  0529   WBC 17.60*   HGB 12.1   HCT 38.1   MCV 84        BMP:  Recent Labs   Lab 12/15/20  0529   *      K 4.8      CO2 26   BUN 59*   CREATININE 1.1   CALCIUM 8.7   MG 2.6     LFT:  Lab Results   Component Value Date    AST 21 12/15/2020    ALKPHOS 97 12/15/2020    BILITOT 0.8 12/15/2020     Albumin:   Albumin   Date Value Ref Range Status   12/15/2020 2.5 (L) 3.5 - 5.2 g/dL Final     Protein:   Total Protein   Date Value Ref Range Status   12/15/2020 6.5 6.0 - 8.4 g/dL Final     Lactic acid:   Lab Results   Component Value Date    LACTATE 1.6 12/06/2020    LACTATE 1.4 07/03/2019       Significant Imaging: CXR: I have reviewed all pertinent results/findings within the past 24 hours:  as below  Echo: I have reviewed all pertinent results/findings within the past 24 hours:  as below   BRITTANEY 12.10  EF 15%    BRITTANEY (6/12/2020):  · BRITTANEY to evaluate for LA/EDUARDO thrombus prior to DCCV.  · Normal appearing left atrial appendage. No thrombus is present in the appendage. Abnormal appendage velocities.  · No thrombus is present in the left atrium.  · Severely decreased left ventricular systolic function. The estimated ejection fraction is 15%. Global hypokinetic wall motion.  · Mildly to moderately reduced right ventricular systolic function.  · This study was abbreviated due to high risk for decompensation.      > 50% of 45 min visit spent in chart review, face to face discussion of goals of care,  symptom assessment, coordination of care and emotional support.    Chino Mccollum MD  Palliative Medicine  [facility] - ICU 16 [facility]

## 2020-12-17 NOTE — HPI
Ms. Ross is a 50 AAF with cardiac history of NICM (EF 10%, NYHA II-III sx, declined for transplant) s/p CRT-D (2017, 0% in the RA and 83% in the BiV on 9/22/20 interrogation),pAF (s/p BRITTANEY/DCCV 4/16/20 with recurrence last episode 8/25/20  now on Amiodarone)  HTN, HLD, and PMH of DM, obstructive sleep apnea hospitalized on 12/8 for COVID pneumonia with hypoxemic RF s/p remdesevir, DEX with course notable for worsening respiratory status now on BiPAP. Primary team consulted for recs regarding concern for shock and possible need for pressors in setting of known advanced heart failure.

## 2020-12-17 NOTE — ASSESSMENT & PLAN NOTE
Known NICM (EF 15%), CRT-D (2017) in place  - Repeat TTE stable  - Patient with up trending WBC and increased O2 requirements, along with intervals of hypotension this morning.   Repeat CXR with worsening pulmonary edema and pneumonia.   Patient of Dr. Jules      Plan:  -- Consulted heart transplant team, appreciate recommendations.  -- Holding lisinopril in setting of hypotension   -- Repeat BNP to assess volume status   -- Continuing lasix 80mg BID   -- Renal function stable   -- Will Start BiPAP   -- As per Cardiology:   >> -if concern for hypotension/shock in future, would further differentiate etiology by following:    -PA catheter insertion, hemodynamic monitoring with CVP q8hr, [id number]  -if more c/w septic shock, would recommend hold lasix and gentle fluid resuscitation for goal CVP 9-12   1) if further HD support needed, would recommend levophed .02 m/k/g with uptitration for goal MAP >65   2)if second agent needed would recommend vasopression   3)would hold off dobutamine as can worsen hypotension  -if more c/w with cardiogenic shock, please notify Advanced Heart Failure

## 2020-12-17 NOTE — ASSESSMENT & PLAN NOTE
Home regimen includes long-acting insulin glargine 34 units BID and short-acting insulin aspart 16 units with SSI. HgbA1c 6.5.    BG not controlled with >300 all day. 43 units of aspart in the last 24 hours (21 scheduled and 22 SSI)  BG not well controlled due to dexamethasone. Detemir had been increased to 30 U BID and aspart 12U TID on 12/12,   Endocrinology consulted yesterday.     Plan:   -- Endocrinology consulted, patient currently on insulin infusion 1U/hr, along with Aspart 10U   -- BG goal 140-160   -- Dexamethasone completed.

## 2020-12-17 NOTE — ASSESSMENT & PLAN NOTE
COVID positive on 11/30. Admitted to hospital 12/6, upgraded to ICU 12/10 for increasing oxygen requirements. 's up from 200's on admit.   Inflammatory markers trending down. Patient alternating between CPAP qHS and CF on daytime.   -- WBC up trending with worsening CXR and episodes of hypotension.       Plan:   -- Starting BiPAP 16/12, 80% sat 99%  -- CPAP at night   -- Starting Vanc and Zozyn for pneumonia   -- F/U BC: ngtd   -- S/p 7 days of likely appropriate CAP coverage  -- Completed dexamethasone x 10 days  -- Completed remdesivir X 5 days    -- Continue heparin infusion for hypercoagulability today    -- Scheduled Duonebs

## 2020-12-17 NOTE — CONSULTS
"20G 1-3/4" placed to LAC using US guidance.  Team should still consider placing central access as this PIV may not last very long since it is in AC.  This patient is extremely difficult to locate and access a vein at this time.  "

## 2020-12-17 NOTE — PROGRESS NOTES
Pharmacokinetic Initial Assessment: IV Vancomycin    Assessment/Plan:  - Vancomycin 2250 mg IV x1 loading dose, then 2 g IV q24h.  - Trough on 12/19 @1300 prior to 3rd total dose, goal 10-20 ug/mL.    Pharmacy will continue to follow and monitor vancomycin.      Please contact pharmacy at extension [phone] with any questions regarding this assessment.     Thank you for the consult,   Jas Martínez     Patient brief summary:  Laure Ross is a 50 y.o. female initiated on antimicrobial therapy with IV Vancomycin for treatment of suspected lower respiratory infection    Drug Allergies:   Review of patient's allergies indicates:  No Known Allergies    Actual Body Weight:   117 kg    Renal Function:   Estimated Creatinine Clearance: 69.7 mL/min (based on SCr of 1.2 mg/dL).,     Dialysis Method (if applicable):  N/A

## 2020-12-17 NOTE — SUBJECTIVE & OBJECTIVE
"Interval HPI:   Overnight events: Remains in RICU. NAEON. CPAP today per notes. Heparin infusion. Dexamethasone completed. BG trending down overnight;insulin infusion suspended per orders. BG slightly below goal this AM.   Eatin%  Nausea: No  Hypoglycemia and intervention: No  Fever: No  TPN and/or TF: No    BP (!) 93/54 (BP Location: Right arm, Patient Position: Lying)   Pulse 93   Temp 98.7 °F (37.1 °C) (Axillary)   Resp (!) 28   Ht 5' 4" (1.626 m)   Wt 117 kg (257 lb 15 oz)   LMP 2019 (Approximate)   SpO2 (!) 91%   BMI 44.27 kg/m²     Labs Reviewed and Include    Recent Labs   Lab 20  0345   GLU 62*   CALCIUM 8.4*   ALBUMIN 2.3*   PROT 6.2      K 4.9   CO2 25      BUN 64*   CREATININE 1.2   ALKPHOS 91   ALT 21   AST 23   BILITOT 1.2*     Lab Results   Component Value Date    WBC 23.94 (H) 2020    HGB 10.8 (L) 2020    HCT 34.1 (L) 2020    MCV 84 2020     2020     No results for input(s): TSH, FREET4 in the last 168 hours.  Lab Results   Component Value Date    HGBA1C 6.5 (H) 2020       Nutritional status:   Body mass index is 44.27 kg/m².  Lab Results   Component Value Date    ALBUMIN 2.3 (L) 2020    ALBUMIN 2.5 (L) 2020    ALBUMIN 2.5 (L) 12/15/2020     Lab Results   Component Value Date    PREALBUMIN 20 2016    PREALBUMIN 16 (L) 2016    PREALBUMIN 9 (L) 2016       Estimated Creatinine Clearance: 69.7 mL/min (based on SCr of 1.2 mg/dL).    Accu-Checks  Recent Labs     12/15/20  1746 12/15/20  2103 20  0156 20  0540 20  0851 20  1145 20  1808 20  2107 20  0341 20  0603   POCTGLUCOSE 224* 199* 151* 154* 145* 197* 218* 195* 74 99       Current Medications and/or Treatments Impacting Glycemic Control  Immunotherapy:    Immunosuppressants     None        Steroids:   Hormones (From admission, onward)    Start     Stop Route Frequency Ordered    20 2100  " melatonin tablet 6 mg      -- Oral Nightly PRN 12/06/20 2001        Pressors:    Autonomic Drugs (From admission, onward)    None        Hyperglycemia/Diabetes Medications:   Antihyperglycemics (From admission, onward)    None

## 2020-12-17 NOTE — PROGRESS NOTES
"[facility] - ICU 16 WT  Endocrinology  Progress Note    Admit Date: 2020     Reason for Consult: Management of T2DM, Hyperglycemia     Surgical Procedure and Date: N/A    Diabetes diagnosis year:   > 20 years    Home Diabetes Medications:  (per Dr. Mittal with [facility] in [place]).   Trulicity 1.5 mg weekly.   Basaglar 34 units BID.  Novolog 22 units TIDWM    How often checking glucose at home? 1-3 x day   BG readings on regimen: 130's  Hypoglycemia on the regimen?  No  Missed doses on regimen?  No    Diabetes Complications include:     Hyperglycemia and Diabetic nephropathy      Complicating diabetes co morbidities:   CHF, CLARENCE and Glucocorticoid use , HLD, HTN      HPI:   Patient is a 50 y.o. female with a diagnosis of HFrEF 2/2 NICM (EF 15%, CRT-D placed '17), pAF, HTN, IDDM2, HLD, CLARENCE, asthma, GERD and obesity who presented to [facility] for SOB and cough. At that time she got a COVID test and was positive on . Initially admitted to Hospital Medicine for COVID pneumonia on . Required HFNC between 10-15L. Diurese well, net negative 5L. Started on dexamethasone, remdesivir, ceftriaxone, and doxycycline. Overnight on , oxygen requirements started to increase. Unable to tolerate coming off of continuous CPAP and escalated to ICU. Endocrinology consulted 20 for glycemic control.     Lab Results   Component Value Date    HGBA1C 6.5 (H) 2020       Interval HPI:   Overnight events: Remains in RICU. NAEON. CPAP today per notes. Heparin infusion. Dexamethasone completed. BG trending down overnight;insulin infusion suspended per orders. BG slightly below goal this AM.   Eatin%  Nausea: No  Hypoglycemia and intervention: No  Fever: No  TPN and/or TF: No    BP (!) 93/54 (BP Location: Right arm, Patient Position: Lying)   Pulse 93   Temp 98.7 °F (37.1 °C) (Axillary)   Resp (!) 28   Ht 5' 4" (1.626 m)   Wt 117 kg (257 lb 15 oz)   LMP 2019 (Approximate)   SpO2 (!) 91%  "  BMI 44.27 kg/m²     Labs Reviewed and Include    Recent Labs   Lab 12/17/20  0345   GLU 62*   CALCIUM 8.4*   ALBUMIN 2.3*   PROT 6.2      K 4.9   CO2 25      BUN 64*   CREATININE 1.2   ALKPHOS 91   ALT 21   AST 23   BILITOT 1.2*     Lab Results   Component Value Date    WBC 23.94 (H) 12/17/2020    HGB 10.8 (L) 12/17/2020    HCT 34.1 (L) 12/17/2020    MCV 84 12/17/2020     12/17/2020     No results for input(s): TSH, FREET4 in the last 168 hours.  Lab Results   Component Value Date    HGBA1C 6.5 (H) 12/07/2020       Nutritional status:   Body mass index is 44.27 kg/m².  Lab Results   Component Value Date    ALBUMIN 2.3 (L) 12/17/2020    ALBUMIN 2.5 (L) 12/16/2020    ALBUMIN 2.5 (L) 12/15/2020     Lab Results   Component Value Date    PREALBUMIN 20 05/09/2016    PREALBUMIN 16 (L) 05/06/2016    PREALBUMIN 9 (L) 05/04/2016       Estimated Creatinine Clearance: 69.7 mL/min (based on SCr of 1.2 mg/dL).    Accu-Checks  Recent Labs     12/15/20  1746 12/15/20  2103 12/16/20  0156 12/16/20  0540 12/16/20  0851 12/16/20  1145 12/16/20  1808 12/16/20  2107 12/17/20  0341 12/17/20  0603   POCTGLUCOSE 224* 199* 151* 154* 145* 197* 218* 195* 74 99       Current Medications and/or Treatments Impacting Glycemic Control  Immunotherapy:    Immunosuppressants     None        Steroids:   Hormones (From admission, onward)    Start     Stop Route Frequency Ordered    12/06/20 2100  melatonin tablet 6 mg      -- Oral Nightly PRN 12/06/20 2001        Pressors:    Autonomic Drugs (From admission, onward)    None        Hyperglycemia/Diabetes Medications:   Antihyperglycemics (From admission, onward)    None          ASSESSMENT and PLAN    * Acute hypoxemic respiratory failure due to COVID-19  Managed per primary team  Avoid hypoglycemia        Type 2 diabetes mellitus with diabetic polyneuropathy  BG goal 140 - 180     resume IV insulin infusion at 1 u/hr    - Continue Moderate Dose SQ Insulin Correction Scale.  - BG  Monitoring AC/HS   Decrease to  novolog 10 units with meals.     ** Please call Endocrine for any BG related issues **  ** Please notify Endocrine for any change and/or advance in diet**    Discharge Planning:   TBD. Please notify endocrinology prior to discharge.     Lab Results   Component Value Date    HGBA1C 6.5 (H) 12/07/2020         Adrenal cortical steroids causing adverse effect in therapeutic use  On steroid therapy per transplant team; may elevate BG readings        CLARENCE (obstructive sleep apnea)  May affect BG readings if uncontrolled        unable to reach patient by phone. Plan of care reviewed with RN via secure chat due to covid restrictions.     Elena Bowers NP  Endocrinology  [facility] - ICU [facility]

## 2020-12-17 NOTE — PROGRESS NOTES
[facility] - ICU 16   Critical Care Medicine  Progress Note    Patient Name: Laure Ross  MRN: [id number]  Admission Date: 12/6/2020  Hospital Length of Stay: 10 days  Code Status: Full Code  Attending Provider: Aseal Stapleton MD  Primary Care Provider: Holly Mittal MD   Principal Problem: Acute hypoxemic respiratory failure due to COVID-19    Subjective:     HPI:  Ms. Laure Ross is a 50 year old woman with a PMHx of HFrEF 2/2 NICM (EF 15%, CRT-D placed '17), pAF, HTN, IDDM2, HLD, CLARENCE, asthma, GERD and obesity who presents to [facility] for SOB and cough. 1 week ago she began having myalgias, fatigue, nausea, vomiting, headache, and mild dyspnea on exertion. At that time she got a COVID test and was positive on 11/30. Most of her symptoms progressively improved throughout the week but 2 days ago began having worsening SOB even at rest and 1 day ago began having a cough and fever. Denies orthopnea. She says the cough is productive but has been coughing it up and swallowing it so cannot comment on the character of the sputum. She has been taking tylenol at home. She has been adherent with all of her medications including her diuretics and insulin. She denies taking her metolazone recently as needed, however she admits that she has not weighed herself in weeks. She feels she is at her baseline weight. Her urine volume has been unchanged. She has been drinking water but her appetite for food has decreased in the past few days.     Hospital/ICU Course:  Initially admitted to Hospital Medicine for COVID pneumonia on 12/8. Required HFNC between 10-15L. Diurese well, net negative 5L. Started on dexamethasone, remdesivir, ceftriaxone, and doxycycline. Overnight on 12/9, oxygen requirements started to increase. Unable to tolerate coming off of continuous CPAP and escalated to ICU. Remains on CPAP qHS with CF during the day.Patient completed antibiotics for 7 days, as well as 5 day course of remdesivir  and 10 days of dexamethasone. Endocrinology consulted due to hyperglycemia, most likely due to dexamethasone along with baseline T2DM. She was started on insulin infusion along with aspart. Will continue to monitor. Patient c/o congestion with CF yesterday for which was kept on CPAP today, CF to eat. WBC has been going up, will order new set of BC, procal and UA. Started heparin gtt today as VTE ppx.     Interval History/Significant Events: No acute events overnight. Patient refers that CF increases her congestion, will continue CPAP today. WBC trending up, will follow up new BC, UA and procalcitonin. Started heparin gtt today after INR 1.3.     Review of Systems   Constitutional: Positive for fatigue. Negative for appetite change, chills and fever.   HENT: Positive for congestion. Negative for sore throat, trouble swallowing and voice change.    Eyes: Negative.    Respiratory: Positive for shortness of breath. Negative for cough.    Cardiovascular: Negative for chest pain and leg swelling.   Gastrointestinal: Positive for constipation. Negative for abdominal distention, abdominal pain, nausea and vomiting.   Endocrine: Negative.    Genitourinary: Negative.    Musculoskeletal: Negative for back pain and gait problem.   Skin: Negative.    Neurological: Negative for weakness and headaches.   Psychiatric/Behavioral: Negative.      Objective:     Vital Signs (Most Recent):  Temp: 97.9 °F (36.6 °C) (12/16/20 0400)  Pulse: 91 (12/16/20 1706)  Resp: (!) 7 (12/16/20 1706)  BP: 115/69 (12/16/20 1706)  SpO2: (!) 90 % (12/16/20 1706) Vital Signs (24h Range):  Temp:  [97.8 °F (36.6 °C)-98 °F (36.7 °C)] 97.9 °F (36.6 °C)  Pulse:  [69-92] 91  Resp:  [7-38] 7  SpO2:  [82 %-96 %] 90 %  BP: (102-124)/(59-71) 115/69   Weight: 117 kg (257 lb 15 oz)  Body mass index is 44.27 kg/m².      Intake/Output Summary (Last 24 hours) at 12/16/2020 1749  Last data filed at 12/16/2020 0600  Gross per 24 hour   Intake 584 ml   Output 1250 ml   Net  -666 ml       Physical Exam  Constitutional:       Appearance: She is obese.   HENT:      Head: Normocephalic and atraumatic.      Nose: Nose normal.   Eyes:      Extraocular Movements: Extraocular movements intact.      Pupils: Pupils are equal, round, and reactive to light.   Neck:      Musculoskeletal: Normal range of motion.   Cardiovascular:      Rate and Rhythm: Normal rate and regular rhythm.   Pulmonary:      Breath sounds: Rales present.   Abdominal:      General: Bowel sounds are normal. There is distension.      Palpations: Abdomen is soft.      Tenderness: There is no abdominal tenderness.   Musculoskeletal: Normal range of motion.         General: No swelling.   Skin:     General: Skin is warm.      Capillary Refill: Capillary refill takes less than 2 seconds.   Neurological:      General: No focal deficit present.      Mental Status: She is alert and oriented to person, place, and time. Mental status is at baseline.         Vents:  Oxygen Concentration (%): 100 (12/16/20 1341)  Lines/Drains/Airways     Peripheral Intravenous Line                 Peripheral IV - Single Lumen 12/10/20 1100 Anterior;Left;Proximal Forearm 6 days         Midline Catheter Insertion/Assessment  - Single Lumen 12/11/20 1524 Right cephalic vein (lateral side of arm) 18g x 10cm 5 days              Significant Labs:    CBC/Anemia Profile:  Recent Labs   Lab 12/15/20  0529 12/16/20  0541   WBC 17.60* 20.74*   HGB 12.1 12.2   HCT 38.1 39.3    203   MCV 84 86   RDW 15.4* 15.3*   FERRITIN  --  825*        Chemistries:  Recent Labs   Lab 12/15/20  0529 12/16/20  0541 12/16/20  1147    135* 137   K 4.8 5.2* 4.8    102 102   CO2 26 25 25   BUN 59* 57* 63*   CREATININE 1.1 1.1 1.4   CALCIUM 8.7 8.5* 8.9   ALBUMIN 2.5* 2.5*  --    PROT 6.5 6.3  --    BILITOT 0.8 0.9  --    ALKPHOS 97 91  --    ALT 18 24  --    AST 21 22  --    MG 2.6 2.4  --    PHOS 3.3 3.7  --        CMP:   Recent Labs   Lab 12/15/20  0529  12/16/20  0541 12/16/20  1147    135* 137   K 4.8 5.2* 4.8    102 102   CO2 26 25 25   * 153* 197*   BUN 59* 57* 63*   CREATININE 1.1 1.1 1.4   CALCIUM 8.7 8.5* 8.9   PROT 6.5 6.3  --    ALBUMIN 2.5* 2.5*  --    BILITOT 0.8 0.9  --    ALKPHOS 97 91  --    AST 21 22  --    ALT 18 24  --    ANIONGAP 8 8 10   EGFRNONAA 58.3* 58.3* 43.5*     Cardiac Markers: No results for input(s): CKMB, TROPONINT, MYOGLOBIN in the last 48 hours.  Coagulation:   Recent Labs   Lab 12/16/20  0541   INR 1.5*     All pertinent labs within the past 24 hours have been reviewed.    Significant Imaging:  I have reviewed and interpreted all pertinent imaging results/findings within the past 24 hours.      ABG  No results for input(s): PH, PO2, PCO2, HCO3, BE in the last 168 hours.  Assessment/Plan:     Pulmonary  Moderate persistent asthma without complication  - Continue home maintenance inhalers and montelukast 10 mg    Cardiac/Vascular  Chronic combined systolic and diastolic congestive heart failure  Known NICM (EF 15%), CRT-D (2017) in place  - Repeat TTE stable  - Negative 1000cc       Plan:  -- Renal function stable  -- Continue home lasix and lisinopril      Paroxysmal atrial fibrillation  INR today 1.5, D-dimer >33    Plan:   - Continue home digoxin and amiodarone  - Will start heparin gtt as VTE PPx      Renal/  Acute kidney injury superimposed on chronic kidney disease  AL on CKD noted on admit, improving now. Baseline creatinine ~1.2  - Cr down to 1.1, UO 1900 yesterday     Plan:   - Place new [place], as patient does not tolerate pure wick   - Strict I/O  - Renally dose meds and avoid nephrotoxics    Endocrine  Type 2 diabetes mellitus with diabetic polyneuropathy  Home regimen includes long-acting insulin glargine 34 units BID and short-acting insulin aspart 16 units with SSI. HgbA1c 6.5.    BG not controlled with >300 all day. 43 units of aspart in the last 24 hours (21 scheduled and 22 SSI)  BG not well  controlled due to dexamethasone. Detemir had been increased to 30 U BID and aspart 12U TID on 12/12,   Endocrinology consulted yesterday.     Plan:   -- Endocrinology consulted yesterday and started insulin infusion 3U/hr, along with Aspart 12-24 U (12 if 25% of meal, 24 if 50% of meal) Along with sliding scale.   -- BG goal 140-160   -- Dexamethasone completed.    GI  GERD (gastroesophageal reflux disease)  - famotidine 20mg BID    Other  * Acute hypoxemic respiratory failure due to COVID-19  COVID positive on 11/30. Admitted to hospital 12/6, upgraded to ICU 12/10 for increasing oxygen requirements. 's up from 200's on admit. Procalcitonin increased from 0.18 to 0.29.  Inflammatory markers trending down. Patient alternating between CPAP qHS and CF on daytime.       Plan:   -- Continue alternating from CF and CPAP qHS  - S/p 7 days of likely appropriate CAP coverage  - Known NICM with EF 15%; will restart home lasix and lisinopril  - Completed dexamethasone x 10 days  - Completed remdesivir X 5 days    - Start heparin infusion for hypercoagulability today    - Scheduled Duonebs   -- Repeat VBG today as patient more sleepy than yesterday and requiring CPAP all day    Pneumonia due to COVID-19 virus  See respiratory failure     Palliative care encounter  Palliative care consulted, appreciate assistance  --GOC discussions initiated. Intubation discussed at length. Ms. Ross would like to speak with her daughter before making a decision on changing code status; will need follow up     CLARENCE (obstructive sleep apnea)    - CPAP (expiratory pressure 12) qHS       Critical Care Daily Checklist:    A: Awake: RASS Goal/Actual Goal:    Actual: Paige Agitation Sedation Scale (RASS): Alert and calm   B: Spontaneous Breathing Trial Performed?     C: SAT & SBT Coordinated?  n/a                      D: Delirium: CAM-ICU Overall CAM-ICU: Negative   E: Early Mobility Performed? Yes   F: Feeding Goal: Goals: Pt to meet >50%  of EEN/EPN by RD follow up.  Status: Nutrition Goal Status: new   Current Diet Order   Procedures    Diet consistent carbohydrate [facility]; Isolation Tray - Styrofoam     Salt, pepper and butter with each tray please     Order Specific Question:   Indicate patient location for additional diet options:     Answer:   [facility]     Order Specific Question:   Tray type:     Answer:   Isolation Tray - Styrofoam      AS: Analgesia/Sedation n/a   T: Thromboembolic Prophylaxis Heparin gtt    H: HOB > 300 Yes   U: Stress Ulcer Prophylaxis (if needed) n/a   G: Glucose Control Insulin infusion    B: Bowel Function Stool Occurrence: 0   I: Indwelling Catheter (Lines & Puga) Necessity Midline and peripheral IV    D: De-escalation of Antimicrobials/Pharmacotherapies Completed     Plan for the day/ETD Infectious workup     Code Status:  Family/Goals of Care: Full Code         Critical secondary to Patient has a condition that poses threat to life and bodily function: Severe Respiratory Distress      Critical care was time spent personally by me on the following activities: development of treatment plan with patient or surrogate and bedside caregivers, discussions with consultants, evaluation of patient's response to treatment, examination of patient, ordering and performing treatments and interventions, ordering and review of laboratory studies, ordering and review of radiographic studies, pulse oximetry, re-evaluation of patient's condition. This critical care time did not overlap with that of any other provider or involve time for any procedures.     Judith Bernard MD  Critical Care Medicine  [facility] - ICU [facility]

## 2020-12-17 NOTE — SUBJECTIVE & OBJECTIVE
Past Medical History:   Diagnosis Date    Anticoagulant long-term use     Arthritis     Asthma     Asthma     Atrial fibrillation     Cardiomyopathy     Cardiomyopathy     CHF (congestive heart failure)     CHF (congestive heart failure)     Chronic combined systolic and diastolic heart failure 6/3/2016    COPD (chronic obstructive pulmonary disease) 3/28/2018    GERD (gastroesophageal reflux disease)     H/O tubal ligation     Hypertension     Obesity     Renal disorder     Type 2 diabetes mellitus with hyperglycemia     Type 2 diabetes mellitus with polyneuropathy        Past Surgical History:   Procedure Laterality Date    CARDIAC DEFIBRILLATOR PLACEMENT      CARDIAC DEFIBRILLATOR PLACEMENT      CARDIAC DEFIBRILLATOR PLACEMENT      CATARACT EXTRACTION Left     CHOLECYSTECTOMY      COLONOSCOPY N/A 5/2/2016    Procedure: COLONOSCOPY;  Surgeon: Eugene Soto MD;  Location: [facility] ENDO ([facility] FLR);  Service: Endoscopy;  Laterality: N/A;    GALLBLADDER SURGERY      iabp  4/2016    TREATMENT OF CARDIAC ARRHYTHMIA N/A 6/12/2020    Procedure: CARDIOVERSION;  Surgeon: Navi Jolly MD;  Location: [facility] EP LAB;  Service: Cardiology;  Laterality: N/A;  AF, BRITTANEY, DCCV, MAC, DM, 3 Prep    TREATMENT OF CARDIAC ARRHYTHMIA N/A 8/7/2020    Procedure: Cardioversion or Defibrillation;  Surgeon: Navi Jolly MD;  Location: [facility] EP LAB;  Service: Cardiology;  Laterality: N/A;  AF, DCCV, MAC, DM, 3 Prep    TUBAL LIGATION         Review of patient's allergies indicates:  No Known Allergies    Current Facility-Administered Medications   Medication    albuterol inhaler 2 puff    albuterol-ipratropium 2.5 mg-0.5 mg/3 mL nebulizer solution 3 mL    amiodarone tablet 200 mg    ascorbic acid (vitamin C) tablet 500 mg    atorvastatin tablet 20 mg    benzonatate capsule 100 mg    bisacodyL suppository 10 mg    butalbital-acetaminophen-caffeine -40 mg per tablet 1 tablet    dextrose 50% injection  12.5 g    dextrose 50% injection 25 g    digoxin tablet 125 mcg    famotidine tablet 20 mg    fluticasone furoate-vilanteroL 100-25 mcg/dose diskus inhaler 1 puff    fluticasone propionate 50 mcg/actuation nasal spray 100 mcg    furosemide tablet 80 mg    glucagon (human recombinant) injection 1 mg    glucose chewable tablet 16 g    glucose chewable tablet 24 g    heparin 25,000 units in dextrose 5% (100 units/ml) IV bolus from bag - ADDITIONAL PRN BOLUS - 30 units/kg    heparin 25,000 units in dextrose 5% (100 units/ml) IV bolus from bag - ADDITIONAL PRN BOLUS - 60 units/kg    heparin 25,000 units in dextrose 5% 250 mL (100 units/mL) infusion LOW INTENSITY nomogram Anti- Xa    insulin aspart U-100 pen 0-10 Units    insulin aspart U-100 pen 10 Units    insulin regular in 0.9 % NaCl 100 unit/100 mL (1 unit/mL) infusion    loperamide capsule 4 mg    melatonin tablet 6 mg    montelukast tablet 10 mg    piperacillin-tazobactam 4.5 g in sodium chloride 0.9% 100 mL IVPB (ready to mix system)    polyethylene glycol packet 17 g    senna-docusate 8.6-50 mg per tablet 2 tablet    sodium chloride 0.9% flush 10 mL    vancomycin (VANCOCIN) 2,250 mg in dextrose 5 % 500 mL IVPB    [START ON 12/18/2020] vancomycin 2 g in dextrose 5 % 500 mL IVPB    vitamin D 1000 units tablet 1,000 Units    zinc sulfate capsule 220 mg     Family History     Problem Relation (Age of Onset)    Heart disease Mother, Father        Tobacco Use    Smoking status: Never Smoker    Smokeless tobacco: Never Used   Substance and Sexual Activity    Alcohol use: No    Drug use: No    Sexual activity: Yes     Partners: Male     Review of Systems   Respiratory: Positive for shortness of breath and wheezing. Negative for chest tightness.    Cardiovascular: Negative for chest pain, palpitations and leg swelling.     Objective:     Vital Signs (Most Recent):  Temp: 99.2 °F (37.3 °C) (12/17/20 1605)  Pulse: 98 (12/17/20 1658)  Resp: (!)  30 (12/17/20 1658)  BP: 109/63 (12/17/20 1605)  SpO2: 98 % (12/17/20 1658) Vital Signs (24h Range):  Temp:  [97.5 °F (36.4 °C)-100.7 °F (38.2 °C)] 99.2 °F (37.3 °C)  Pulse:  [] 98  Resp:  [15-44] 30  SpO2:  [87 %-100 %] 98 %  BP: ()/(50-76) 109/63     No data found.  Body mass index is 44.27 kg/m².      Intake/Output Summary (Last 24 hours) at 12/17/2020 1711  Last data filed at 12/17/2020 1605  Gross per 24 hour   Intake 1027.54 ml   Output 2075 ml   Net -1047.46 ml       Physical Exam  Constitutional: No distress, obese  HEENT: Sclera anicteric, PERRLA, EOMI  Neck:unable to estimate JVD  CV: difficult to auscultate heart sounds, tele asense v paced,   Pulm+inspiratory wheezes on bipap, tachypnic   GI: Abdomen soft, non-tender, good bowel sounds  Extremities: Both extremities intact and grossly normal, skin is cool, no edema noted  Skin: No ecchymosis, erythema, or ulcers  Psych: AOx3, appropriate affect  Neuro: CNII-XII intact, no focal deficits      Significant Labs:  CBC:  Recent Labs   Lab 12/15/20  0529 12/16/20  0541 12/17/20  0345   WBC 17.60* 20.74* 23.94*   RBC 4.56 4.59 4.05   HGB 12.1 12.2 10.8*   HCT 38.1 39.3 34.1*    203 226   MCV 84 86 84   MCH 26.5* 26.6* 26.7*   MCHC 31.8* 31.0* 31.7*     BNP:  No results for input(s): BNP in the last 168 hours.    Invalid input(s): BNPTRIAGELBLO  CMP:  Recent Labs   Lab 12/15/20  0529 12/16/20  0541 12/16/20  1147 12/17/20  0345   * 153* 197* 62*   CALCIUM 8.7 8.5* 8.9 8.4*   ALBUMIN 2.5* 2.5*  --  2.3*   PROT 6.5 6.3  --  6.2    135* 137 136   K 4.8 5.2* 4.8 4.9   CO2 26 25 25 25    102 102 102   BUN 59* 57* 63* 64*   CREATININE 1.1 1.1 1.4 1.2   ALKPHOS 97 91  --  91   ALT 18 24  --  21   AST 21 22  --  23   BILITOT 0.8 0.9  --  1.2*      Coagulation:   Recent Labs   Lab 12/15/20  0529 12/16/20  0541 12/17/20  0345   INR 2.4* 1.5* 1.4*     LDH:  No results for input(s): LDH in the last 72 hours.  Microbiology:  Microbiology  Results (last 7 days)     Procedure Component Value Units Date/Time    Blood culture [[id number]]     Order Status: No result Specimen: Blood     Blood culture [[id number]]     Order Status: No result Specimen: Blood     Blood culture [[id number]] Collected: 12/16/20 2015    Order Status: Completed Specimen: Blood Updated: 12/17/20 0345     Blood Culture, Routine No Growth to date    Blood culture [[id number]] Collected: 12/16/20 2000    Order Status: Completed Specimen: Blood Updated: 12/17/20 0345     Blood Culture, Routine No Growth to date    Blood culture [[id number]] Collected: 12/10/20 1118    Order Status: Completed Specimen: Blood Updated: 12/15/20 1222     Blood Culture, Routine No growth after 5 days.    Narrative:      Blood cultures from 2 different sites. 4 bottles total.  Please draw before starting antibiotics.  Collection has been rescheduled by CBE at 12/10/2020 10:55 Reason:   due at 10:35  Collection has been rescheduled by CBE at 12/10/2020 10:55 Reason:   due at 10:35    Blood culture [[id number]] Collected: 12/10/20 1104    Order Status: Completed Specimen: Blood Updated: 12/15/20 1222     Blood Culture, Routine No growth after 5 days.    Narrative:      Blood cultures x 2 different sites. 4 bottles total. Please  draw cultures before administering antibiotics.  Collection has been rescheduled by CBE at 12/10/2020 10:55 Reason:   due at 10:35  Collection has been rescheduled by CBE at 12/10/2020 10:55 Reason:   due at 10:35        Imaging  CXR: dense bilateral airspace opacities with RML/LL consolidation /atelactasis, bilateral small pleural effusions, cardiomegaly noted. CRT-D (right sided)     TTE 12/10  15% (LVIDd 6.61)  RVSP 56, mildly reduced RV systolic function (RVIDd 3.46 no reported TAPSE or RV S)    EKG  AS Biv Paced

## 2020-12-17 NOTE — NURSING
RN x 2 attempted to get blood cultures and labs. Unsuccessful attempts. Critical care team notified that labs had not been drawn, and phlebotomy had been notified to attempt to get labs. Order for consult for midline given. PICC team was notified and stated that they would come tomorrow (12/17) to give another IV. Phlebotomy re-contacted and stated that they would be up at 1930 to collect all labs pending. Critical care team aware.

## 2020-12-17 NOTE — PROGRESS NOTES
Midline team at bedside at 1530 to place line at this time. Kristen COURTNEY with midline unable to place access via ultrasound guidance.     DAVIN Vines with CC called and notified. Verbally ordered to place PICC line for antibiotic administration. PICC team called to advise on PICC line at this time. Patient had blood cultures drawn on 12/16 and earliest PICC placement will be Saturday 12/19 (48 hours after culture draw). DAVIN Vines notified of this information. Awaiting critical care team to come to bedside to place central access line. ROZ Collins notified of this information.

## 2020-12-17 NOTE — PROGRESS NOTES
Pt seen with pulm/crit care and nursing.  Update provided to family and discussed options for care. Daughter at the bedside    Pt able to demonstrate high level of understanding and verbalize her desire for continued trial of invasive and life prolonging measures as indicated.    Family supportive at bedside.     Patient and family hopeful for recovery, though realistic.  Pt has asked that her family not hold on based on their wants if it becomes clear she will not recover.  Family acknowledges the pt request.    Please see prior notes for details on GOC discussions.     17 minutes in discussion on ACP    Chino Mccollum MD  Palliative Medicine

## 2020-12-17 NOTE — H&P
[facility] - ICU 16 WT  Heart Transplant  H&P    Patient Name: Laure Ross  MRN: [id number]  Admission Date: 12/6/2020  Attending Physician: Asael Stapleton MD  Primary Care Provider: Holly Mittal MD  Principal Problem:Acute hypoxemic respiratory failure due to COVID-19    Subjective:     History of Present Illness:  RFC: pressor rec's    HTS (Krim)  EP (Jolly)    Ms. Ross is a 50 AAF with cardiac history of NICM (EF 10%, NYHA II-III sx, declined for transplant) s/p CRT-D (2017, 0% in the RA and 83% in the BiV on 9/22/20 interrogation),pAF (s/p BRITTANEY/DCCV 4/16/20 with recurrence last episode 8/25/20  now on Amiodarone)  HTN, HLD, and PMH of DM, obstructive sleep apnea hospitalized on 12/8 for COVID pneumonia with hypoxemic RF s/p remdesevir, DEX with course notable for worsening respiratory status now on BiPAP and soft MAPs (60s) and fever spike today. Currently MAP 75, physical exam with appropriate mentation with out anasarca + wheezes. Labwork with out signs of end organ hypoperfusion (cr at baseline 1.2 LFTs wnl) and notable uptrending wbc 25k, sv02 81 with current urine output 150cc/hr. Cxr today notable for worsening bilateral infiltrates and RML/RLL consolidation and escalation of respiratory requirements to continuous NIPPV today at 2pm. Primary team consulted for recs regarding concern for shock and possible need for pressors in setting of known advanced heart failure.     Home meds:   Allopurinol 100 qdaily - (not ordered)   Amiodarone  400  Atorv 20  Dig .125   Lasix 80 bid  Lisinopril 5/5- (not ordered)  Spironolactone 25- (not ordered)  Coumadin 7.5-  (not ordered)      Imaging  CXR: dense bilateral airspace opacities with RML/LL consolidation /atelactasis, bilateral small pleural effusions, cardiomegaly noted. CRT-D (right sided)     TTE 12/10  15% (LVIDd 6.61)  RVSP 56, mildly reduced RV systolic function (RVIDd 3.46 no reported TAPSE or RV S)    EKG  AS Biv  Paced          Past Medical History:   Diagnosis Date    Anticoagulant long-term use     Arthritis     Asthma     Asthma     Atrial fibrillation     Cardiomyopathy     Cardiomyopathy     CHF (congestive heart failure)     CHF (congestive heart failure)     Chronic combined systolic and diastolic heart failure 6/3/2016    COPD (chronic obstructive pulmonary disease) 3/28/2018    GERD (gastroesophageal reflux disease)     H/O tubal ligation     Hypertension     Obesity     Renal disorder     Type 2 diabetes mellitus with hyperglycemia     Type 2 diabetes mellitus with polyneuropathy        Past Surgical History:   Procedure Laterality Date    CARDIAC DEFIBRILLATOR PLACEMENT      CARDIAC DEFIBRILLATOR PLACEMENT      CARDIAC DEFIBRILLATOR PLACEMENT      CATARACT EXTRACTION Left     CHOLECYSTECTOMY      COLONOSCOPY N/A 5/2/2016    Procedure: COLONOSCOPY;  Surgeon: Eugene Soto MD;  Location: [facility] ENDO (2ND FLR);  Service: Endoscopy;  Laterality: N/A;    GALLBLADDER SURGERY      iabp  4/2016    TREATMENT OF CARDIAC ARRHYTHMIA N/A 6/12/2020    Procedure: CARDIOVERSION;  Surgeon: Navi Jolly MD;  Location: [facility] EP LAB;  Service: Cardiology;  Laterality: N/A;  AF, BRITTANEY, DCCV, MAC, DM, 3 Prep    TREATMENT OF CARDIAC ARRHYTHMIA N/A 8/7/2020    Procedure: Cardioversion or Defibrillation;  Surgeon: Navi Jolly MD;  Location: [facility] EP LAB;  Service: Cardiology;  Laterality: N/A;  AF, DCCV, MAC, DM, 3 Prep    TUBAL LIGATION         Review of patient's allergies indicates:  No Known Allergies    Current Facility-Administered Medications   Medication    albuterol inhaler 2 puff    albuterol-ipratropium 2.5 mg-0.5 mg/3 mL nebulizer solution 3 mL    amiodarone tablet 200 mg    ascorbic acid (vitamin C) tablet 500 mg    atorvastatin tablet 20 mg    benzonatate capsule 100 mg    bisacodyL suppository 10 mg    butalbital-acetaminophen-caffeine -40 mg per tablet 1 tablet    dextrose  50% injection 12.5 g    dextrose 50% injection 25 g    digoxin tablet 125 mcg    famotidine tablet 20 mg    fluticasone furoate-vilanteroL 100-25 mcg/dose diskus inhaler 1 puff    fluticasone propionate 50 mcg/actuation nasal spray 100 mcg    furosemide tablet 80 mg    glucagon (human recombinant) injection 1 mg    glucose chewable tablet 16 g    glucose chewable tablet 24 g    heparin 25,000 units in dextrose 5% (100 units/ml) IV bolus from bag - ADDITIONAL PRN BOLUS - 30 units/kg    heparin 25,000 units in dextrose 5% (100 units/ml) IV bolus from bag - ADDITIONAL PRN BOLUS - 60 units/kg    heparin 25,000 units in dextrose 5% 250 mL (100 units/mL) infusion LOW INTENSITY nomogram Anti- Xa    insulin aspart U-100 pen 0-10 Units    insulin aspart U-100 pen 10 Units    insulin regular in 0.9 % NaCl 100 unit/100 mL (1 unit/mL) infusion    loperamide capsule 4 mg    melatonin tablet 6 mg    montelukast tablet 10 mg    piperacillin-tazobactam 4.5 g in sodium chloride 0.9% 100 mL IVPB (ready to mix system)    polyethylene glycol packet 17 g    senna-docusate 8.6-50 mg per tablet 2 tablet    sodium chloride 0.9% flush 10 mL    vancomycin (VANCOCIN) 2,250 mg in dextrose 5 % 500 mL IVPB    [START ON 12/18/2020] vancomycin 2 g in dextrose 5 % 500 mL IVPB    vitamin D 1000 units tablet 1,000 Units    zinc sulfate capsule 220 mg     Family History     Problem Relation (Age of Onset)    Heart disease Mother, Father        Tobacco Use    Smoking status: Never Smoker    Smokeless tobacco: Never Used   Substance and Sexual Activity    Alcohol use: No    Drug use: No    Sexual activity: Yes     Partners: Male     Review of Systems   Respiratory: Positive for shortness of breath and wheezing. Negative for chest tightness.    Cardiovascular: Negative for chest pain, palpitations and leg swelling.     Objective:     Vital Signs (Most Recent):  Temp: 99.2 °F (37.3 °C) (12/17/20 1605)  Pulse: 98 (12/17/20  1658)  Resp: (!) 30 (12/17/20 1658)  BP: 109/63 (12/17/20 1605)  SpO2: 98 % (12/17/20 1658) Vital Signs (24h Range):  Temp:  [97.5 °F (36.4 °C)-100.7 °F (38.2 °C)] 99.2 °F (37.3 °C)  Pulse:  [] 98  Resp:  [15-44] 30  SpO2:  [87 %-100 %] 98 %  BP: ()/(50-76) 109/63     No data found.  Body mass index is 44.27 kg/m².      Intake/Output Summary (Last 24 hours) at 12/17/2020 1711  Last data filed at 12/17/2020 1605  Gross per 24 hour   Intake 1027.54 ml   Output 2075 ml   Net -1047.46 ml       Physical Exam  Constitutional: No distress, obese  HEENT: Sclera anicteric, PERRLA, EOMI  Neck:unable to estimate JVD  CV: difficult to auscultate heart sounds, tele asense v paced,   Pulm+inspiratory wheezes on bipap, tachypnic   GI: Abdomen soft, non-tender, good bowel sounds  Extremities: Both extremities intact and grossly normal, skin is cool, no edema noted  Skin: No ecchymosis, erythema, or ulcers  Psych: AOx3, appropriate affect  Neuro: CNII-XII intact, no focal deficits      Significant Labs:  CBC:  Recent Labs   Lab 12/15/20  0529 12/16/20  0541 12/17/20  0345   WBC 17.60* 20.74* 23.94*   RBC 4.56 4.59 4.05   HGB 12.1 12.2 10.8*   HCT 38.1 39.3 34.1*    203 226   MCV 84 86 84   MCH 26.5* 26.6* 26.7*   MCHC 31.8* 31.0* 31.7*     BNP:  No results for input(s): BNP in the last 168 hours.    Invalid input(s): BNPTRIAGELBLO  CMP:  Recent Labs   Lab 12/15/20  0529 12/16/20  0541 12/16/20  1147 12/17/20  0345   * 153* 197* 62*   CALCIUM 8.7 8.5* 8.9 8.4*   ALBUMIN 2.5* 2.5*  --  2.3*   PROT 6.5 6.3  --  6.2    135* 137 136   K 4.8 5.2* 4.8 4.9   CO2 26 25 25 25    102 102 102   BUN 59* 57* 63* 64*   CREATININE 1.1 1.1 1.4 1.2   ALKPHOS 97 91  --  91   ALT 18 24  --  21   AST 21 22  --  23   BILITOT 0.8 0.9  --  1.2*      Coagulation:   Recent Labs   Lab 12/15/20  0529 12/16/20  0541 12/17/20  0345   INR 2.4* 1.5* 1.4*     LDH:  No results for input(s): LDH in the last 72  hours.  Microbiology:  Microbiology Results (last 7 days)     Procedure Component Value Units Date/Time    Blood culture [[id number]]     Order Status: No result Specimen: Blood     Blood culture [[id number]]     Order Status: No result Specimen: Blood     Blood culture [[id number]] Collected: 12/16/20 2015    Order Status: Completed Specimen: Blood Updated: 12/17/20 0345     Blood Culture, Routine No Growth to date    Blood culture [[id number]] Collected: 12/16/20 2000    Order Status: Completed Specimen: Blood Updated: 12/17/20 0345     Blood Culture, Routine No Growth to date    Blood culture [[id number]] Collected: 12/10/20 1118    Order Status: Completed Specimen: Blood Updated: 12/15/20 1222     Blood Culture, Routine No growth after 5 days.    Narrative:      Blood cultures from 2 different sites. 4 bottles total.  Please draw before starting antibiotics.  Collection has been rescheduled by CBE at 12/10/2020 10:55 Reason:   due at 10:35  Collection has been rescheduled by CBE at 12/10/2020 10:55 Reason:   due at 10:35    Blood culture [[id number]] Collected: 12/10/20 1104    Order Status: Completed Specimen: Blood Updated: 12/15/20 1222     Blood Culture, Routine No growth after 5 days.    Narrative:      Blood cultures x 2 different sites. 4 bottles total. Please  draw cultures before administering antibiotics.  Collection has been rescheduled by CBE at 12/10/2020 10:55 Reason:   due at 10:35  Collection has been rescheduled by CBE at 12/10/2020 10:55 Reason:   due at 10:35        Imaging  CXR: dense bilateral airspace opacities with RML/LL consolidation /atelactasis, bilateral small pleural effusions, cardiomegaly noted. CRT-D (right sided)     TTE 12/10  15% (LVIDd 6.61)  RVSP 56, mildly reduced RV systolic function (RVIDd 3.46 no reported TAPSE or RV S)    EKG  AS Biv Paced          Assessment/Plan:     Chronic combined systolic and diastolic congestive heart failure    #NICM (EF 10%, NYHA II-III sx, declined  for transplant)   -s/p CRT-D (2017, 0% in the RA and 83% in the BiV on 9/22/20 interrogation)  -agree with current dose oral lasix 80/80 (home dose)  -if unclear volume status, can repeat BNP and limited tte   -currently with out signs of end organ hypoperfusion/shock   -if concern for hypotension/shock in future, would further differentiate etiology by following:   -PA catheter insertion, hemodynamic monitoring with CVP q8hr, [id number]  -based on above can differentiate etiology of shock  -if more c/w septic shock, would recommend hold lasix and gentle fluid resuscitation for goal CVP 9-12  -if further HD support needed, would recommend levophed .02 m/k/g with uptitration for goal MAP >65  -if second agent needed would recommend vasopression  -would hold off dobutamine as can worsen hypotension  -if more c/w with cardiogenic shock, please notify Advanced Heart Failure      #pAF   -s/p BRITTANEY/DCCV 4/16/20 with recurrence last episode 8/25/20  -rythmn control strategy, agree with resuming home amiodarone  -daily LFTs  -declined ablation in past       #HTN  -agree with holding home lisinopril    #HLD  -continue to trend LFTs  -would hold statin if >3X upper limit with symptoms or 5X upper limit asymptomatic    #obstructive sleep apnea  -agree with NIPPV at night if transitioned to HFNC        Raquel Macias MD  Heart Transplant  [facility] - ICU 16 WT

## 2020-12-17 NOTE — PROGRESS NOTES
[facility] - ICU 16   Critical Care Medicine  Progress Note    Patient Name: Laure Ross  MRN: [id number]  Admission Date: 12/6/2020  Hospital Length of Stay: 11 days  Code Status: Full Code  Attending Provider: Asael Stapleton MD  Primary Care Provider: Holly Mittal MD   Principal Problem: Acute hypoxemic respiratory failure due to COVID-19    Subjective:     HPI:  Ms. Laure Ross is a 50 year old woman with a PMHx of HFrEF 2/2 NICM (EF 15%, CRT-D placed '17), pAF, HTN, IDDM2, HLD, CLARENCE, asthma, GERD and obesity who [place] [facility] [place]. 1 week ago she began having myalgias, fatigue, nausea, vomiting, headache, and mild dyspnea on exertion. At that time she got a COVID test and was positive on 11/30. Most of her symptoms progressively improved throughout the week but 2 days ago began having worsening SOB even at rest and 1 day ago began having a cough and fever. Denies orthopnea. She says the cough is productive but has been coughing it up and swallowing it so cannot comment on the character of the sputum. She has been taking tylenol at home. She has been adherent with all of her medications including her diuretics and insulin. She denies taking her metolazone recently as needed, however she admits that she has not weighed herself in weeks. She feels she is at her baseline weight. Her urine volume has been unchanged. She has been drinking water but her appetite for food has decreased in the past few days.     Hospital/ICU Course:  Initially admitted to Hospital Medicine for COVID pneumonia on 12/8. Required HFNC between 10-15L. Diurese well, net negative 5L. Started on dexamethasone, remdesivir, ceftriaxone, and doxycycline. Overnight on 12/9, oxygen requirements started to increase. Unable to tolerate coming off of continuous CPAP and escalated to ICU. Remains on CPAP qHS with CF during the day.Patient completed antibiotics for 7 days, as well as 5 day course of remdesivir  and 10 days of dexamethasone. Endocrinology consulted due to hyperglycemia, most likely due to dexamethasone along with baseline T2DM. She was started on insulin infusion along with aspart. Will continue to monitor.  Started on heparin gtt for AC. Patient c/o congestion with CF  for which was kept on CPAP yesterday, CF to eat. WBC continues to be up trending, procal wnl, BC ngtd. CXR with worsening pulmonary edema and pneumonia. Patient has been presenting with low BP today, MAP around 60s this morning. Will start Vanc and Zosyn. Patient will need central line for better IV access. Discontinued home lisinopril. Consulted heart transplant team.     Interval History/Significant Events: No acute events overnight. WBC up trending, patient with low BP today and tachypneic. Changed CF to BiPAP and started VANC and Zosyn. Cardiology consulted in case patient requires pressors in the future and history.    Review of Systems   Constitutional: Positive for fatigue. Negative for appetite change, chills and fever.   HENT: Negative for congestion, sore throat, trouble swallowing and voice change.    Eyes: Negative.    Respiratory: Positive for cough and shortness of breath.    Cardiovascular: Negative for chest pain and leg swelling.   Gastrointestinal: Positive for constipation. Negative for abdominal distention, abdominal pain, nausea and vomiting.   Endocrine: Negative.    Genitourinary: Negative.    Musculoskeletal: Negative for back pain and gait problem.   Skin: Negative.    Neurological: Negative for weakness and headaches.   Psychiatric/Behavioral: Negative.      Objective:     Vital Signs (Most Recent):  Temp: 99.2 °F (37.3 °C) (12/17/20 1605)  Pulse: 98 (12/17/20 1658)  Resp: (!) 30 (12/17/20 1658)  BP: 109/63 (12/17/20 1605)  SpO2: 98 % (12/17/20 1658) Vital Signs (24h Range):  Temp:  [97.5 °F (36.4 °C)-100.7 °F (38.2 °C)] 99.2 °F (37.3 °C)  Pulse:  [] 98  Resp:  [15-44] 30  SpO2:  [87 %-100 %] 98 %  BP:  ()/(50-76) 109/63   Weight: 117 kg (257 lb 15 oz)  Body mass index is 44.27 kg/m².      Intake/Output Summary (Last 24 hours) at 12/17/2020 1727  Last data filed at 12/17/2020 1605  Gross per 24 hour   Intake 1027.54 ml   Output 2075 ml   Net -1047.46 ml       Physical Exam  Constitutional:       Appearance: Normal appearance. She is obese.   HENT:      Head: Normocephalic and atraumatic.      Nose: Nose normal.      Mouth/Throat:      Mouth: Mucous membranes are moist.   Eyes:      Extraocular Movements: Extraocular movements intact.      Pupils: Pupils are equal, round, and reactive to light.   Neck:      Musculoskeletal: Normal range of motion.   Cardiovascular:      Rate and Rhythm: Normal rate and regular rhythm.   Pulmonary:      Breath sounds: Wheezing (bilateral ) and rales (bilateral at base of lungs ) present.   Abdominal:      General: Bowel sounds are normal. There is no distension.      Palpations: Abdomen is soft.      Tenderness: There is no abdominal tenderness.   Musculoskeletal: Normal range of motion.         General: No swelling.   Skin:     General: Skin is warm.      Capillary Refill: Capillary refill takes less than 2 seconds.   Neurological:      General: No focal deficit present.      Mental Status: She is alert and oriented to person, place, and time. Mental status is at baseline.         Vents:  Oxygen Concentration (%): 70 (12/17/20 1605)  Lines/Drains/Airways     Drain                 Urethral Catheter 12/16/20 1600 16 Fr. 1 day          Peripheral Intravenous Line                 Midline Catheter Insertion/Assessment  - Single Lumen 12/11/20 1524 Right cephalic vein (lateral side of arm) 18g x 10cm 6 days         Peripheral IV - Single Lumen 12/17/20 1710 20 G;1 3/4 in Left Antecubital less than 1 day              Significant Labs:    CBC/Anemia Profile:  Recent Labs   Lab 12/16/20  0541 12/17/20  0345   WBC 20.74* 23.94*   HGB 12.2 10.8*   HCT 39.3 34.1*    226   MCV 86 84    RDW 15.3* 15.3*   FERRITIN 825*  --         Chemistries:  Recent Labs   Lab 12/16/20  0541 12/16/20  1147 12/17/20  0345   * 137 136   K 5.2* 4.8 4.9    102 102   CO2 25 25 25   BUN 57* 63* 64*   CREATININE 1.1 1.4 1.2   CALCIUM 8.5* 8.9 8.4*   ALBUMIN 2.5*  --  2.3*   PROT 6.3  --  6.2   BILITOT 0.9  --  1.2*   ALKPHOS 91  --  91   ALT 24  --  21   AST 22  --  23   MG 2.4  --  2.3   PHOS 3.7  --  3.7       CMP:   Recent Labs   Lab 12/16/20  0541 12/16/20  1147 12/17/20  0345   * 137 136   K 5.2* 4.8 4.9    102 102   CO2 25 25 25   * 197* 62*   BUN 57* 63* 64*   CREATININE 1.1 1.4 1.2   CALCIUM 8.5* 8.9 8.4*   PROT 6.3  --  6.2   ALBUMIN 2.5*  --  2.3*   BILITOT 0.9  --  1.2*   ALKPHOS 91  --  91   AST 22  --  23   ALT 24  --  21   ANIONGAP 8 10 9   EGFRNONAA 58.3* 43.5* 52.5*     Cardiac Markers: No results for input(s): CKMB, TROPONINT, MYOGLOBIN in the last 48 hours.  All pertinent labs within the past 24 hours have been reviewed.    Significant Imaging:  I have reviewed and interpreted all pertinent imaging results/findings within the past 24 hours.      ABG  Recent Labs   Lab 12/16/20 2117   PH 7.423   PO2 44   PCO2 36.6   HCO3 23.9*   BE -1     Assessment/Plan:     Pulmonary  Moderate persistent asthma without complication  - Continue home maintenance inhalers and montelukast 10 mg    Cardiac/Vascular  Chronic combined systolic and diastolic congestive heart failure  Known NICM (EF 15%), CRT-D (2017) in place  - Repeat TTE stable  - Patient with up trending WBC and increased O2 requirements, along with intervals of hypotension this morning.   Repeat CXR with worsening pulmonary edema and pneumonia.   Patient of Dr. Jules      Plan:  -- Consulted heart transplant team, appreciate recommendations.  -- Holding lisinopril in setting of hypotension   -- Repeat BNP to assess volume status   -- Continuing lasix 80mg BID   -- Renal function stable   -- Will Start BiPAP   -- As per  Cardiology:   >> -if concern for hypotension/shock in future, would further differentiate etiology by following:    -PA catheter insertion, hemodynamic monitoring with CVP q8hr, [id number]  -if more c/w septic shock, would recommend hold lasix and gentle fluid resuscitation for goal CVP 9-12   1) if further HD support needed, would recommend levophed .02 m/k/g with uptitration for goal MAP >65   2)if second agent needed would recommend vasopression   3)would hold off dobutamine as can worsen hypotension  -if more c/w with cardiogenic shock, please notify Advanced Heart Failure            Paroxysmal atrial fibrillation  INR 1.5, D-dimer >33    Plan:   - Continue home digoxin and amiodarone  - Continue heparin gtt as VTE PPx      Renal/  Acute kidney injury superimposed on chronic kidney disease  AL on CKD noted on admit, improving now. Baseline creatinine ~1.2  - Cr down to 1.1, UO 1900 yesterday     Plan:   - Place new campa, as patient does not tolerate pure wick   - Strict I/O  - Renally dose meds and avoid nephrotoxics    Endocrine  Type 2 diabetes mellitus with diabetic polyneuropathy  Home regimen includes long-acting insulin glargine 34 units BID and short-acting insulin aspart 16 units with SSI. HgbA1c 6.5.    BG not controlled with >300 all day. 43 units of aspart in the last 24 hours (21 scheduled and 22 SSI)  BG not well controlled due to dexamethasone. Detemir had been increased to 30 U BID and aspart 12U TID on 12/12,   Endocrinology consulted yesterday.     Plan:   -- Endocrinology consulted, patient currently on insulin infusion 1U/hr, along with Aspart 10U   -- BG goal 140-160   -- Dexamethasone completed.    GI  GERD (gastroesophageal reflux disease)  - famotidine 20mg BID    Other  * Acute hypoxemic respiratory failure due to COVID-19  COVID positive on 11/30. Admitted to hospital 12/6, upgraded to ICU 12/10 for increasing oxygen requirements. 's up from 200's on admit.   Inflammatory  markers trending down. Patient alternating between CPAP qHS and CF on daytime.   -- WBC up trending with worsening CXR and episodes of hypotension.       Plan:   -- Starting BiPAP 16/12, 80% sat 99%  -- CPAP at night   -- Starting Vanc and Zozyn for pneumonia   -- F/U BC: ngtd   -- S/p 7 days of likely appropriate CAP coverage  -- Completed dexamethasone x 10 days  -- Completed remdesivir X 5 days    -- Continue heparin infusion for hypercoagulability today    -- Scheduled Duonebs       Pneumonia due to COVID-19 virus  See respiratory failure     Palliative care encounter  Palliative care consulted, appreciate assistance  --GOC discussions initiated. Intubation re- discussed today along with palliative. She is aware of the situation and as per now will like to continue treatment and measure to prolong life.       CLARENCE (obstructive sleep apnea)    - CPAP (expiratory pressure 12) qHS       Critical Care Daily Checklist:    A: Awake: RASS Goal/Actual Goal:    Actual: Paige Agitation Sedation Scale (RASS): Alert and calm   B: Spontaneous Breathing Trial Performed?     C: SAT & SBT Coordinated?  n/a                      D: Delirium: CAM-ICU Overall CAM-ICU: Negative   E: Early Mobility Performed? No   F: Feeding Goal: Goals: Pt to meet >50% of EEN/EPN by RD follow up.  Status: Nutrition Goal Status: new   Current Diet Order   Procedures    Diet diabetic [facility]; 2000 Calorie; Isolation Tray - Styrofoam     Don't send food, just send 2 glucose control boosts     Order Specific Question:   Indicate patient location for additional diet options:     Answer:   [facility]     Order Specific Question:   Total calories:     Answer:   2000 Calorie     Order Specific Question:   Tray type:     Answer:   Isolation Tray - Styrofoam      AS: Analgesia/Sedation n/a   T: Thromboembolic Prophylaxis Heparin gtt   H: HOB > 300 Yes   U: Stress Ulcer Prophylaxis (if needed) n/a   G: Glucose Control Insulin infusion    B:  Bowel Function Stool Occurrence: 1   I: Indwelling Catheter (Lines & Puga) Necessity Midline and IV    D: De-escalation of Antimicrobials/Pharmacotherapies STARTED VANC AND ZOSYN     Plan for the day/ETD Starting BiPAP and antibiotics, card consulted     Code Status:  Family/Goals of Care: Full Code         Critical secondary to Patient has a condition that poses threat to life and bodily function: Severe Respiratory Distress, HFrEF      Critical care was time spent personally by me on the following activities: development of treatment plan with patient or surrogate and bedside caregivers, discussions with consultants, evaluation of patient's response to treatment, examination of patient, ordering and performing treatments and interventions, ordering and review of laboratory studies, ordering and review of radiographic studies, pulse oximetry, re-evaluation of patient's condition. This critical care time did not overlap with that of any other provider or involve time for any procedures.     Judith Bernard MD  Critical Care Medicine  [facility] - ICU 16 WT

## 2020-12-17 NOTE — ASSESSMENT & PLAN NOTE
BG goal 140 - 180     resume IV insulin infusion at 1 u/hr    - Continue Moderate Dose SQ Insulin Correction Scale.  - BG Monitoring AC/HS   Decrease to  novolog 10 units with meals.     ** Please call Endocrine for any BG related issues **  ** Please notify Endocrine for any change and/or advance in diet**    Discharge Planning:   TBD. Please notify endocrinology prior to discharge.     Lab Results   Component Value Date    HGBA1C 6.5 (H) 12/07/2020

## 2020-12-17 NOTE — ASSESSMENT & PLAN NOTE
INR 1.5, D-dimer >33    Plan:   - Continue home digoxin and amiodarone  - Continue heparin gtt as VTE PPx

## 2020-12-17 NOTE — ASSESSMENT & PLAN NOTE
#NICM (EF 10%, NYHA II-III sx, declined for transplant)   -s/p CRT-D (2017, 0% in the RA and 83% in the BiV on 9/22/20 interrogation)  -currently with out signs of end organ hypoperfusion/shock   -agree with current home dose of lasix  -cxr findings likely c/w with ARDS/COVID PNA   -can consider BNP, limited TTE for further volume status eval if concerned  -if concern for hypotension/shock in future, would further differentiate etiology by following:   -RIJ central line insertion, hemodynamic monitoring with CVP q8hr, [id number], and BNP and repeat limited tte   -based on above can differentiate etiology of shock  -if more c/w septic shock, would recommend hold lasix and gentle fluid resuscitation for goal CVP 9-12  -if further HD support needed, would recommend levophed .02 m/k/g with uptitration for goal MAP >65  -if second agent needed would recommend vasopression  -would hold off dobutamine as can worsen hypotension  -if more c/w with cardiogenic shock, please notify ADHF      #pAF   -s/p BRITTANEY/DCCV 4/16/20 with recurrence last episode 8/25/20  -rythmn control strategy, agree with resuming home amiodarone  -daily LFTs  -declined ablation in past       #HTN  -agree with holding home lisinopril    #HLD  -continue to trend LFTs  -would hold statin if >3X upper limit with symptoms or 5X upper limit asymptomatic    #obstructive sleep apnea  -agree with NIPPV at night if transitioned to HFNC

## 2020-12-17 NOTE — SUBJECTIVE & OBJECTIVE
Interval History/Significant Events: No acute events overnight. WBC up trending, patient with low BP today and tachypneic. Changed CF to BiPAP and started VANC and Zosyn. Cardiology consulted in case patient requires pressors in the future and history.    Review of Systems   Constitutional: Positive for fatigue. Negative for appetite change, chills and fever.   HENT: Negative for congestion, sore throat, trouble swallowing and voice change.    Eyes: Negative.    Respiratory: Positive for cough and shortness of breath.    Cardiovascular: Negative for chest pain and leg swelling.   Gastrointestinal: Positive for constipation. Negative for abdominal distention, abdominal pain, nausea and vomiting.   Endocrine: Negative.    Genitourinary: Negative.    Musculoskeletal: Negative for back pain and gait problem.   Skin: Negative.    Neurological: Negative for weakness and headaches.   Psychiatric/Behavioral: Negative.      Objective:     Vital Signs (Most Recent):  Temp: 99.2 °F (37.3 °C) (12/17/20 1605)  Pulse: 98 (12/17/20 1658)  Resp: (!) 30 (12/17/20 1658)  BP: 109/63 (12/17/20 1605)  SpO2: 98 % (12/17/20 1658) Vital Signs (24h Range):  Temp:  [97.5 °F (36.4 °C)-100.7 °F (38.2 °C)] 99.2 °F (37.3 °C)  Pulse:  [] 98  Resp:  [15-44] 30  SpO2:  [87 %-100 %] 98 %  BP: ()/(50-76) 109/63   Weight: 117 kg (257 lb 15 oz)  Body mass index is 44.27 kg/m².      Intake/Output Summary (Last 24 hours) at 12/17/2020 1727  Last data filed at 12/17/2020 1605  Gross per 24 hour   Intake 1027.54 ml   Output 2075 ml   Net -1047.46 ml       Physical Exam  Constitutional:       Appearance: Normal appearance. She is obese.   HENT:      Head: Normocephalic and atraumatic.      Nose: Nose normal.      Mouth/Throat:      Mouth: Mucous membranes are moist.   Eyes:      Extraocular Movements: Extraocular movements intact.      Pupils: Pupils are equal, round, and reactive to light.   Neck:      Musculoskeletal: Normal range of motion.    Cardiovascular:      Rate and Rhythm: Normal rate and regular rhythm.   Pulmonary:      Breath sounds: Wheezing (bilateral ) and rales (bilateral at base of lungs ) present.   Abdominal:      General: Bowel sounds are normal. There is no distension.      Palpations: Abdomen is soft.      Tenderness: There is no abdominal tenderness.   Musculoskeletal: Normal range of motion.         General: No swelling.   Skin:     General: Skin is warm.      Capillary Refill: Capillary refill takes less than 2 seconds.   Neurological:      General: No focal deficit present.      Mental Status: She is alert and oriented to person, place, and time. Mental status is at baseline.         Vents:  Oxygen Concentration (%): 70 (12/17/20 1605)  Lines/Drains/Airways     Drain                 Urethral Catheter 12/16/20 1600 16 Fr. 1 day          Peripheral Intravenous Line                 Midline Catheter Insertion/Assessment  - Single Lumen 12/11/20 1524 Right cephalic vein (lateral side of arm) 18g x 10cm 6 days         Peripheral IV - Single Lumen 12/17/20 1710 20 G;1 3/4 in Left Antecubital less than 1 day              Significant Labs:    CBC/Anemia Profile:  Recent Labs   Lab 12/16/20  0541 12/17/20  0345   WBC 20.74* 23.94*   HGB 12.2 10.8*   HCT 39.3 34.1*    226   MCV 86 84   RDW 15.3* 15.3*   FERRITIN 825*  --         Chemistries:  Recent Labs   Lab 12/16/20  0541 12/16/20  1147 12/17/20  0345   * 137 136   K 5.2* 4.8 4.9    102 102   CO2 25 25 25   BUN 57* 63* 64*   CREATININE 1.1 1.4 1.2   CALCIUM 8.5* 8.9 8.4*   ALBUMIN 2.5*  --  2.3*   PROT 6.3  --  6.2   BILITOT 0.9  --  1.2*   ALKPHOS 91  --  91   ALT 24  --  21   AST 22  --  23   MG 2.4  --  2.3   PHOS 3.7  --  3.7       CMP:   Recent Labs   Lab 12/16/20  0541 12/16/20  1147 12/17/20  0345   * 137 136   K 5.2* 4.8 4.9    102 102   CO2 25 25 25   * 197* 62*   BUN 57* 63* 64*   CREATININE 1.1 1.4 1.2   CALCIUM 8.5* 8.9 8.4*   PROT 6.3   --  6.2   ALBUMIN 2.5*  --  2.3*   BILITOT 0.9  --  1.2*   ALKPHOS 91  --  91   AST 22  --  23   ALT 24  --  21   ANIONGAP 8 10 9   EGFRNONAA 58.3* 43.5* 52.5*     Cardiac Markers: No results for input(s): CKMB, TROPONINT, MYOGLOBIN in the last 48 hours.  All pertinent labs within the past 24 hours have been reviewed.    Significant Imaging:  I have reviewed and interpreted all pertinent imaging results/findings within the past 24 hours.

## 2020-12-18 PROBLEM — R07.89 CHEST PAIN, MUSCULOSKELETAL: Status: ACTIVE | Noted: 2018-03-28

## 2020-12-18 NOTE — SUBJECTIVE & OBJECTIVE
Interval History/Significant Events: Patient developed fever yesterday. WBC is steady. Will continue antibiotics. Patient c/o heartburn and reproducible chest pain. Will trend troponin and start pain meds for MSK pain.     Review of Systems   Constitutional: Positive for fever. Negative for chills.   HENT: Negative for congestion, sore throat, trouble swallowing and voice change.    Eyes: Negative.    Respiratory: Positive for shortness of breath. Negative for cough.    Cardiovascular: Positive for chest pain. Negative for leg swelling.   Gastrointestinal: Positive for constipation. Negative for abdominal distention, abdominal pain, nausea and vomiting.   Endocrine: Negative.    Genitourinary: Negative.    Musculoskeletal: Negative for back pain and gait problem.   Skin: Negative.    Neurological: Negative for weakness and headaches.   Psychiatric/Behavioral: Negative.      Objective:     Vital Signs (Most Recent):  Temp: 98.2 °F (36.8 °C) (12/18/20 1200)  Pulse: 93 (12/18/20 1300)  Resp: (!) 32 (12/18/20 1300)  BP: 120/61 (12/18/20 1300)  SpO2: (!) 93 % (12/18/20 1300) Vital Signs (24h Range):  Temp:  [98.1 °F (36.7 °C)-100.7 °F (38.2 °C)] 98.2 °F (36.8 °C)  Pulse:  [] 93  Resp:  [17-48] 32  SpO2:  [90 %-100 %] 93 %  BP: ()/(54-90) 120/61   Weight: 117 kg (257 lb 15 oz)  Body mass index is 44.27 kg/m².      Intake/Output Summary (Last 24 hours) at 12/18/2020 1439  Last data filed at 12/18/2020 1300  Gross per 24 hour   Intake 1872.67 ml   Output 1575 ml   Net 297.67 ml       Physical Exam  Constitutional:       Appearance: Normal appearance.   HENT:      Head: Normocephalic and atraumatic.      Nose: Nose normal.      Mouth/Throat:      Mouth: Mucous membranes are moist.   Eyes:      Extraocular Movements: Extraocular movements intact.      Pupils: Pupils are equal, round, and reactive to light.   Neck:      Musculoskeletal: Normal range of motion.   Cardiovascular:      Rate and Rhythm: Normal rate and  regular rhythm.      Comments: Reproducible chest pain  Pulmonary:      Breath sounds: Rales (Bl base of lungs ) present.   Abdominal:      General: Bowel sounds are normal. There is distension.      Palpations: Abdomen is soft.      Tenderness: There is no abdominal tenderness.   Musculoskeletal: Normal range of motion.         General: No swelling.   Skin:     General: Skin is warm.      Capillary Refill: Capillary refill takes less than 2 seconds.   Neurological:      General: No focal deficit present.      Mental Status: She is alert and oriented to person, place, and time. Mental status is at baseline.         Vents:  Oxygen Concentration (%): 50 (12/18/20 1300)  Lines/Drains/Airways     Drain                 Urethral Catheter 12/16/20 1600 16 Fr. 1 day          Peripheral Intravenous Line                 Midline Catheter Insertion/Assessment  - Single Lumen 12/11/20 1524 Right cephalic vein (lateral side of arm) 18g x 10cm 6 days         Peripheral IV - Single Lumen 12/17/20 1710 20 G;1 3/4 in Left Antecubital less than 1 day              Significant Labs:    CBC/Anemia Profile:  Recent Labs   Lab 12/17/20  0345 12/18/20  0601   WBC 23.94* 23.99*   HGB 10.8* 10.8*   HCT 34.1* 34.6*    208   MCV 84 85   RDW 15.3* 15.2*        Chemistries:  Recent Labs   Lab 12/17/20  0345 12/18/20  0601    135*   K 4.9 4.6    101   CO2 25 26   BUN 64* 60*   CREATININE 1.2 1.5*   CALCIUM 8.4* 8.2*   ALBUMIN 2.3* 2.0*   PROT 6.2 6.3   BILITOT 1.2* 1.3*   ALKPHOS 91 95   ALT 21 22   AST 23 22   MG 2.3 2.2   PHOS 3.7 3.7       CMP:   Recent Labs   Lab 12/17/20  0345 12/18/20  0601    135*   K 4.9 4.6    101   CO2 25 26   GLU 62* 183*   BUN 64* 60*   CREATININE 1.2 1.5*   CALCIUM 8.4* 8.2*   PROT 6.2 6.3   ALBUMIN 2.3* 2.0*   BILITOT 1.2* 1.3*   ALKPHOS 91 95   AST 23 22   ALT 21 22   ANIONGAP 9 8   EGFRNONAA 52.5* 40.1*     All pertinent labs within the past 24 hours have been reviewed.    Significant  Imaging:  I have reviewed and interpreted all pertinent imaging results/findings within the past 24 hours.

## 2020-12-18 NOTE — PLAN OF CARE
Attempted to place patient on CF for meals and she didn't tolerate it and had to be placed back on BiPAP. Complaints of chest pain throughout shift, improving with PRN pain medicine. POC glucose decreasing, insulin decreased to 0.7units/hr.

## 2020-12-18 NOTE — PLAN OF CARE
RICU DAILY GOALS       A: Awake    RASS: Goal -  0  Actual -  0  B: Breath   SBT: Not intubated   C: Coordinate A & B, analgesics/sedatives   Pain: managed    SAT: Not intubated  D: Delirium   CAM-ICU: Overall CAM-ICU: Negative  E: Early(intubated/ Progressive (non-intubated) Mobility   MOVE Screen: Fail   Activity: Activity Management: arm raise - L1, heel slide - L1  FAS: Feeding/Nutrition   Diet order: Diet/Nutrition Received: consistent carb/diabetic diet,   Fluid restriction:    T: Thrombus   DVT prophylaxis: VTE Required Core Measure: Pharmacological prophylaxis initiated/maintained  H: HOB Elevation   Head of Bed (HOB): HOB at 30-45 degrees  U: Ulcer Prophylaxis   GI: yes  G: Glucose control   managed Glycemic Management: blood glucose monitoring  S: Skin   Bundle compliance: yes   Bathing/Skin Care: bath, complete, linen changed, incontinence care Date: 12/17/2020  B: Bowel Function   no issues   I: Indwelling Catheters   [place] necessity: [REMOVED]      Urethral Catheter 12/10/20 1500-Reason for Continuing Urinary Catheterization: Critically ill in ICU and requiring hourly monitoring of intake/output       Urethral Catheter 12/16/20 1600 16 Fr.-Reason for Continuing Urinary Catheterization: Critically ill in ICU and requiring hourly monitoring of intake/output  D: De-escalation Antibx   No, restarted antibiotics    Family/Goals of care/Code Status   Code Status: Full Code     No acute events throughout day, VS and assessment per flow sheet, patient progressing towards goals as tolerated, plan of care reviewed with Laure Ross and daughter, all concerns addressed, will continue to monitor.

## 2020-12-18 NOTE — PLAN OF CARE
12/18/20 0925   Post-Acute Status   Post-Acute Authorization Other   Other Status See Comments     SW following patient dc planning needs, Post acute needs to be determined. SW will continue to follow up.      Mirna Reed LMSW  [facility]   [id number]

## 2020-12-18 NOTE — NURSING
Spoke with MD Justo, inform MD patient's temperature was checked per her request at 0330, initial axillary temp 101, temperature was reassessed immediately after to ensure accuracy and 100.5 obtained. No PRN anti-pyretic medication ordered. Inquire for medication for fever. MD verbalize understanding and acknowledgement, see new orders.

## 2020-12-18 NOTE — ASSESSMENT & PLAN NOTE
INR 1.5, D-dimer 22.65    Plan:   - Continue home digoxin and amiodarone  - Continue heparin gtt as VTE PPx

## 2020-12-18 NOTE — ASSESSMENT & PLAN NOTE
Patient c/o heart burn and chest pain today. Pain reproducible to touch, she refers ache like sensation when she takes a breath and with coughing. Non radiating.     Plan:   -- Order troponin   -- Give nitro SL   -- Anti inflammatory medications PRN for MSK pain

## 2020-12-18 NOTE — PT/OT/SLP PROGRESS
Physical Therapy      Patient Name:  Laure Ross   MRN:  [id number]    Patient not seen today secondary to (Hold per RN 2* pt placed back on BiPAP this AM with spO2 in low 90s at rest). PT to bedside to educate on PT POC and supine therex to be completed (I) daily. Pt and daughter v/u. Will follow-up at next scheduled session as able.    Kira Siegel, PT, DPT   12/18/2020  [phone]

## 2020-12-18 NOTE — PROGRESS NOTES
"[facility] - ICU 16 WT  Endocrinology  Progress Note    Admit Date: 2020     Reason for Consult: Management of T2DM, Hyperglycemia     Surgical Procedure and Date: N/A    Diabetes diagnosis year:   > 20 years    Home Diabetes Medications:  (per Dr. Mittal with [facility] in [place]).   Trulicity 1.5 mg weekly.   Basaglar 34 units BID.  Novolog 22 units TIDWM    How often checking glucose at home? 1-3 x day   BG readings on regimen: 130's  Hypoglycemia on the regimen?  No  Missed doses on regimen?  No    Diabetes Complications include:     Hyperglycemia and Diabetic nephropathy      Complicating diabetes co morbidities:   CHF, CLARENCE and Glucocorticoid use , HLD, HTN      HPI:   Patient is a 50 y.o. female with a diagnosis of HFrEF 2/2 NICM (EF 15%, CRT-D placed '), pAF, HTN, IDDM2, HLD, CLARENCE, asthma, GERD and obesity who presented to [facility] for SOB and cough. At that time she got a COVID test and was positive on . Initially admitted to Hospital Medicine for COVID pneumonia on . Required HFNC between 10-15L. Diurese well, net negative 5L. Started on dexamethasone, remdesivir, ceftriaxone, and doxycycline. Overnight on , oxygen requirements started to increase. Unable to tolerate coming off of continuous CPAP and escalated to ICU. Endocrinology consulted 20 for glycemic control.     Lab Results   Component Value Date    HGBA1C 6.5 (H) 2020       Interval HPI:   Overnight events: Remains in RICU. NAEON. C/o chest pain this AM. BG stable on IV insulin infusion at 1 u/hr with scheduled insulin and prn correction scale. Creatinine 1.5.   Eatin%  Nausea: No  Hypoglycemia and intervention: No  Fever: No  TPN and/or TF: No      /61   Pulse 92   Temp 98.2 °F (36.8 °C) (Axillary)   Resp (!) 25   Ht 5' 4" (1.626 m)   Wt 117 kg (257 lb 15 oz)   LMP 2019 (Approximate)   SpO2 (!) 91%   BMI 44.27 kg/m²     Labs Reviewed and Include    Recent Labs   Lab " 12/18/20  0601   *   CALCIUM 8.2*   ALBUMIN 2.0*   PROT 6.3   *   K 4.6   CO2 26      BUN 60*   CREATININE 1.5*   ALKPHOS 95   ALT 22   AST 22   BILITOT 1.3*     Lab Results   Component Value Date    WBC 23.99 (H) 12/18/2020    HGB 10.8 (L) 12/18/2020    HCT 34.6 (L) 12/18/2020    MCV 85 12/18/2020     12/18/2020     No results for input(s): TSH, FREET4 in the last 168 hours.  Lab Results   Component Value Date    HGBA1C 6.5 (H) 12/07/2020       Nutritional status:   Body mass index is 44.27 kg/m².  Lab Results   Component Value Date    ALBUMIN 2.0 (L) 12/18/2020    ALBUMIN 2.3 (L) 12/17/2020    ALBUMIN 2.5 (L) 12/16/2020     Lab Results   Component Value Date    PREALBUMIN 20 05/09/2016    PREALBUMIN 16 (L) 05/06/2016    PREALBUMIN 9 (L) 05/04/2016       Estimated Creatinine Clearance: 55.8 mL/min (A) (based on SCr of 1.5 mg/dL (H)).    Accu-Checks  Recent Labs     12/17/20  0603 12/17/20  0927 12/17/20  1113 12/17/20  1513 12/17/20  1753 12/17/20 2016 12/18/20  0018 12/18/20  0606 12/18/20  0757 12/18/20  1127   POCTGLUCOSE 99 126* 210* 227* 208* 240* 301* 186* 183* 174*       Current Medications and/or Treatments Impacting Glycemic Control  Immunotherapy:    Immunosuppressants     None        Steroids:   Hormones (From admission, onward)    Start     Stop Route Frequency Ordered    12/06/20 2100  melatonin tablet 6 mg      -- Oral Nightly PRN 12/06/20 2001        Pressors:    Autonomic Drugs (From admission, onward)    None        Hyperglycemia/Diabetes Medications:   Antihyperglycemics (From admission, onward)    Start     Stop Route Frequency Ordered    12/18/20 1130  insulin aspart U-100 pen 12 Units      -- SubQ 3 times daily with meals 12/18/20 0841    12/17/20 1530  insulin regular in 0.9 % NaCl 100 unit/100 mL (1 unit/mL) infusion     Question:  Enter initial dose (Units/hr):  Answer:  1    -- IV Continuous 12/17/20 1429    12/17/20 1529  insulin aspart U-100 pen 0-10 Units       -- SubQ As needed (PRN) 12/17/20 142          ASSESSMENT and PLAN    * Acute hypoxemic respiratory failure due to COVID-19  Managed per primary team  Avoid hypoglycemia        Type 2 diabetes mellitus with diabetic polyneuropathy  BG goal 140 - 180     continue IV insulin infusion at 1 u/hr    - Continue Moderate Dose SQ Insulin Correction Scale.  - BG Monitoring AC/HS   continue novolog 10 units with meals.     ** Please call Endocrine for any BG related issues **  ** Please notify Endocrine for any change and/or advance in diet**    Discharge Planning:   TBD. Please notify endocrinology prior to discharge.     Lab Results   Component Value Date    HGBA1C 6.5 (H) 12/07/2020         Adrenal cortical steroids causing adverse effect in therapeutic use  On steroid therapy per transplant team; may elevate BG readings        CLARENCE (obstructive sleep apnea)  May affect BG readings if uncontrolled            Elena Bowers NP  Endocrinology  [facility] - ICU 16 WT

## 2020-12-18 NOTE — ASSESSMENT & PLAN NOTE
BG goal 140 - 180     continue IV insulin infusion at 1 u/hr    - Continue Moderate Dose SQ Insulin Correction Scale.  - BG Monitoring AC/HS   continue novolog 10 units with meals.     ** Please call Endocrine for any BG related issues **  ** Please notify Endocrine for any change and/or advance in diet**    Discharge Planning:   TBD. Please notify endocrinology prior to discharge.     Lab Results   Component Value Date    HGBA1C 6.5 (H) 12/07/2020

## 2020-12-18 NOTE — SUBJECTIVE & OBJECTIVE
"Interval HPI:   Overnight events: Remains in RICU. NAEON. C/o chest pain this AM. BG stable on IV insulin infusion at 1 u/hr with scheduled insulin and prn correction scale. Creatinine 1.5.   Eatin%  Nausea: No  Hypoglycemia and intervention: No  Fever: No  TPN and/or TF: No      /61   Pulse 92   Temp 98.2 °F (36.8 °C) (Axillary)   Resp (!) 25   Ht 5' 4" (1.626 m)   Wt 117 kg (257 lb 15 oz)   LMP 2019 (Approximate)   SpO2 (!) 91%   BMI 44.27 kg/m²     Labs Reviewed and Include    Recent Labs   Lab 20  0601   *   CALCIUM 8.2*   ALBUMIN 2.0*   PROT 6.3   *   K 4.6   CO2 26      BUN 60*   CREATININE 1.5*   ALKPHOS 95   ALT 22   AST 22   BILITOT 1.3*     Lab Results   Component Value Date    WBC 23.99 (H) 2020    HGB 10.8 (L) 2020    HCT 34.6 (L) 2020    MCV 85 2020     2020     No results for input(s): TSH, FREET4 in the last 168 hours.  Lab Results   Component Value Date    HGBA1C 6.5 (H) 2020       Nutritional status:   Body mass index is 44.27 kg/m².  Lab Results   Component Value Date    ALBUMIN 2.0 (L) 2020    ALBUMIN 2.3 (L) 2020    ALBUMIN 2.5 (L) 2020     Lab Results   Component Value Date    PREALBUMIN 20 2016    PREALBUMIN 16 (L) 2016    PREALBUMIN 9 (L) 2016       Estimated Creatinine Clearance: 55.8 mL/min (A) (based on SCr of 1.5 mg/dL (H)).    Accu-Checks  Recent Labs     20  0603 20  0927 20  1113 20  1513 20  1753 20  0018 20  0606 20  0757 20  1127   POCTGLUCOSE 99 126* 210* 227* 208* 240* 301* 186* 183* 174*       Current Medications and/or Treatments Impacting Glycemic Control  Immunotherapy:    Immunosuppressants     None        Steroids:   Hormones (From admission, onward)    Start     Stop Route Frequency Ordered    20  melatonin tablet 6 mg      -- Oral Nightly PRN 20    "     Pressors:    Autonomic Drugs (From admission, onward)    None        Hyperglycemia/Diabetes Medications:   Antihyperglycemics (From admission, onward)    Start     Stop Route Frequency Ordered    12/18/20 1130  insulin aspart U-100 pen 12 Units      -- SubQ 3 times daily with meals 12/18/20 0841    12/17/20 1530  insulin regular in 0.9 % NaCl 100 unit/100 mL (1 unit/mL) infusion     Question:  Enter initial dose (Units/hr):  Answer:  1    -- IV Continuous 12/17/20 1429    12/17/20 1529  insulin aspart U-100 pen 0-10 Units      -- SubQ As needed (PRN) 12/17/20 1422

## 2020-12-18 NOTE — ASSESSMENT & PLAN NOTE
AL on CKD noted on admit, improving now. Baseline creatinine ~1.2  - Bump in creatine today    Plan:   - Hold lasix   - Repeat RFT   - Strict I/O  - Renally dose meds and avoid nephrotoxics

## 2020-12-18 NOTE — ASSESSMENT & PLAN NOTE
COVID positive on 11/30. Admitted to hospital 12/6, upgraded to ICU 12/10 for increasing oxygen requirements. 's up from 200's on admit.   Inflammatory markers trending down. Patient alternating between CPAP qHS and CF on daytime.   -- WBC up trending with worsening CXR and episodes of hypotension yesterday. Patient with fever overnight. WBC has stabilized.       Plan:   -- Continue  BiPAP 16/12, 80% sat 99%\  -- Continue vanc and zosyn   -- F/U BC: ngtd   -- S/p 7 days of likely appropriate CAP coverage  -- Completed dexamethasone x 10 days  -- Completed remdesivir X 5 days    -- Continue heparin infusion for hypercoagulability today    -- Scheduled Duonebs

## 2020-12-18 NOTE — PROGRESS NOTES
[facility] - ICU 16   Critical Care Medicine  Progress Note    Patient Name: Laure Ross  MRN: [id number]  Admission Date: 12/6/2020  Hospital Length of Stay: 12 days  Code Status: Full Code  Attending Provider: Asael Stapleton MD  Primary Care Provider: Holly Mittal MD   Principal Problem: Acute hypoxemic respiratory failure due to COVID-19    Subjective:     HPI:  Ms. Laure Ross is a 50 year old woman with a PMHx of HFrEF 2/2 NICM (EF 15%, CRT-D placed '17), pAF, HTN, IDDM2, HLD, CLARENCE, asthma, GERD and obesity who presents to [facility] for SOB and cough. 1 week ago she began having myalgias, fatigue, nausea, vomiting, headache, and mild dyspnea on exertion. At that time she got a COVID test and was positive on 11/30. Most of her symptoms progressively improved throughout the week but 2 days ago began having worsening SOB even at rest and 1 day ago began having a cough and fever. Denies orthopnea. She says the cough is productive but has been coughing it up and swallowing it so cannot comment on the character of the sputum. She has been taking tylenol at home. She has been adherent with all of her medications including her diuretics and insulin. She denies taking her metolazone recently as needed, however she admits that she has not weighed herself in weeks. She feels she is at her baseline weight. Her urine volume has been unchanged. She has been drinking water but her appetite for food has decreased in the past few days.     Hospital/ICU Course:  Initially admitted to Hospital Medicine for COVID pneumonia on 12/8. Required HFNC between 10-15L. Diurese well, net negative 5L. Started on dexamethasone, remdesivir, ceftriaxone, and doxycycline. Overnight on 12/9, oxygen requirements started to increase. Unable to tolerate coming off of continuous CPAP and escalated to ICU. Remains on CPAP qHS with CF during the day.Patient completed antibiotics for 7 days, as well as 5 day course of remdesivir  and 10 days of dexamethasone. Endocrinology consulted due to hyperglycemia, most likely due to dexamethasone along with baseline T2DM. She was started on insulin infusion along with aspart. Will continue to monitor.  Started on heparin gtt for AC. Patient c/o congestion with CF  for which was kept on CPAP yesterday, CF to eat. WBC continues to be up trending, procal wnl, BC ngtd. CXR with worsening pulmonary edema and pneumonia. Patient had low BP MAP around 60s . Started Vanc and Zosyn. BP well controlled. WBC has stabilized.  Discontinued home lisinopril. Consulted heart transplant team. Will hold lasix. Patient c/o MSK type chest pain today secondary to coughing.     Interval History/Significant Events: Patient developed fever yesterday. WBC is steady. Will continue antibiotics. Patient c/o heartburn and reproducible chest pain. Will trend troponin and start pain meds for MSK pain.     Review of Systems   Constitutional: Positive for fever. Negative for chills.   HENT: Negative for congestion, sore throat, trouble swallowing and voice change.    Eyes: Negative.    Respiratory: Positive for shortness of breath. Negative for cough.    Cardiovascular: Positive for chest pain. Negative for leg swelling.   Gastrointestinal: Positive for constipation. Negative for abdominal distention, abdominal pain, nausea and vomiting.   Endocrine: Negative.    Genitourinary: Negative.    Musculoskeletal: Negative for back pain and gait problem.   Skin: Negative.    Neurological: Negative for weakness and headaches.   Psychiatric/Behavioral: Negative.      Objective:     Vital Signs (Most Recent):  Temp: 98.2 °F (36.8 °C) (12/18/20 1200)  Pulse: 93 (12/18/20 1300)  Resp: (!) 32 (12/18/20 1300)  BP: 120/61 (12/18/20 1300)  SpO2: (!) 93 % (12/18/20 1300) Vital Signs (24h Range):  Temp:  [98.1 °F (36.7 °C)-100.7 °F (38.2 °C)] 98.2 °F (36.8 °C)  Pulse:  [] 93  Resp:  [17-48] 32  SpO2:  [90 %-100 %] 93 %  BP: ()/(54-90)  120/61   Weight: 117 kg (257 lb 15 oz)  Body mass index is 44.27 kg/m².      Intake/Output Summary (Last 24 hours) at 12/18/2020 1439  Last data filed at 12/18/2020 1300  Gross per 24 hour   Intake 1872.67 ml   Output 1575 ml   Net 297.67 ml       Physical Exam  Constitutional:       Appearance: Normal appearance.   HENT:      Head: Normocephalic and atraumatic.      Nose: Nose normal.      Mouth/Throat:      Mouth: Mucous membranes are moist.   Eyes:      Extraocular Movements: Extraocular movements intact.      Pupils: Pupils are equal, round, and reactive to light.   Neck:      Musculoskeletal: Normal range of motion.   Cardiovascular:      Rate and Rhythm: Normal rate and regular rhythm.      Comments: Reproducible chest pain  Pulmonary:      Breath sounds: Rales (Bl base of lungs ) present.   Abdominal:      General: Bowel sounds are normal. There is distension.      Palpations: Abdomen is soft.      Tenderness: There is no abdominal tenderness.   Musculoskeletal: Normal range of motion.         General: No swelling.   Skin:     General: Skin is warm.      Capillary Refill: Capillary refill takes less than 2 seconds.   Neurological:      General: No focal deficit present.      Mental Status: She is alert and oriented to person, place, and time. Mental status is at baseline.         Vents:  Oxygen Concentration (%): 50 (12/18/20 1300)  Lines/Drains/Airways     Drain                 Urethral Catheter 12/16/20 1600 16 Fr. 1 day          Peripheral Intravenous Line                 Midline Catheter Insertion/Assessment  - Single Lumen 12/11/20 1524 Right cephalic vein (lateral side of arm) 18g x 10cm 6 days         Peripheral IV - Single Lumen 12/17/20 1710 20 G;1 3/4 in Left Antecubital less than 1 day              Significant Labs:    CBC/Anemia Profile:  Recent Labs   Lab 12/17/20  0345 12/18/20  0601   WBC 23.94* 23.99*   HGB 10.8* 10.8*   HCT 34.1* 34.6*    208   MCV 84 85   RDW 15.3* 15.2*         Chemistries:  Recent Labs   Lab 12/17/20  0345 12/18/20  0601    135*   K 4.9 4.6    101   CO2 25 26   BUN 64* 60*   CREATININE 1.2 1.5*   CALCIUM 8.4* 8.2*   ALBUMIN 2.3* 2.0*   PROT 6.2 6.3   BILITOT 1.2* 1.3*   ALKPHOS 91 95   ALT 21 22   AST 23 22   MG 2.3 2.2   PHOS 3.7 3.7       CMP:   Recent Labs   Lab 12/17/20  0345 12/18/20  0601    135*   K 4.9 4.6    101   CO2 25 26   GLU 62* 183*   BUN 64* 60*   CREATININE 1.2 1.5*   CALCIUM 8.4* 8.2*   PROT 6.2 6.3   ALBUMIN 2.3* 2.0*   BILITOT 1.2* 1.3*   ALKPHOS 91 95   AST 23 22   ALT 21 22   ANIONGAP 9 8   EGFRNONAA 52.5* 40.1*     All pertinent labs within the past 24 hours have been reviewed.    Significant Imaging:  I have reviewed and interpreted all pertinent imaging results/findings within the past 24 hours.      ABG  Recent Labs   Lab 12/16/20  2117   PH 7.423   PO2 44   PCO2 36.6   HCO3 23.9*   BE -1     Assessment/Plan:     Pulmonary  Moderate persistent asthma without complication  - Continue home maintenance inhalers and montelukast 10 mg    Cardiac/Vascular  Chronic combined systolic and diastolic congestive heart failure  Known NICM (EF 15%), CRT-D (2017) in place  - Repeat TTE stable  - Patient with up trending WBC and increased O2 requirements, along with intervals of hypotension this morning.   Repeat CXR with worsening pulmonary edema and pneumonia.   Patient of Dr. Jules       Plan:  -- Consulted heart transplant team, appreciate recommendations.  -- Holding lisinopril in setting of hypotension   -- Repeat BNP to assess volume status : 141  -- Hold lasix   -- Continue  BiPAP   -- As per Cardiology:   >> -if concern for hypotension/shock in future, would further differentiate etiology by following:    -PA catheter insertion, hemodynamic monitoring with CVP q8hr, [id number]  -if more c/w septic shock, would recommend hold lasix and gentle fluid resuscitation for goal CVP 9-12   1) if further HD support needed, would recommend  levophed .02 m/k/g with uptitration for goal MAP >65   2)if second agent needed would recommend vasopression   3)would hold off dobutamine as can worsen hypotension  -if more c/w with cardiogenic shock, please notify Advanced Heart Failure            Paroxysmal atrial fibrillation  INR 1.5, D-dimer 22.65    Plan:   - Continue home digoxin and amiodarone  - Continue heparin gtt as VTE PPx      Renal/  Acute kidney injury superimposed on chronic kidney disease  AL on CKD noted on admit, improving now. Baseline creatinine ~1.2  - Bump in creatine today    Plan:   - Hold lasix   - Repeat RFT   - Strict I/O  - Renally dose meds and avoid nephrotoxics    Endocrine  Type 2 diabetes mellitus with diabetic polyneuropathy  Home regimen includes long-acting insulin glargine 34 units BID and short-acting insulin aspart 16 units with SSI. HgbA1c 6.5.    BG not controlled with >300 all day. 43 units of aspart in the last 24 hours (21 scheduled and 22 SSI)  BG not well controlled due to dexamethasone. Detemir had been increased to 30 U BID and aspart 12U TID on 12/12,   Endocrinology consulted yesterday.     Plan:   -- Endocrinology consulted, patient currently on insulin infusion 1U/hr, along with Aspart 10U   -- BG goal 140-160   -- Dexamethasone completed.    GI  GERD (gastroesophageal reflux disease)  - famotidine 20mg BID  - GI cocktail     Other  * Acute hypoxemic respiratory failure due to COVID-19  COVID positive on 11/30. Admitted to hospital 12/6, upgraded to ICU 12/10 for increasing oxygen requirements. 's up from 200's on admit.   Inflammatory markers trending down. Patient alternating between CPAP qHS and CF on daytime.   -- WBC up trending with worsening CXR and episodes of hypotension yesterday. Patient with fever overnight. WBC has stabilized.       Plan:   -- Continue  BiPAP 16/12, 80% sat 99%\  -- Continue vanc and zosyn   -- F/U BC: ngtd   -- S/p 7 days of likely appropriate CAP coverage  -- Completed  dexamethasone x 10 days  -- Completed remdesivir X 5 days    -- Continue heparin infusion for hypercoagulability today    -- Scheduled Duonebs       Pneumonia due to COVID-19 virus  See respiratory failure     Chest pain, musculoskeletal  Patient c/o heart burn and chest pain today. Pain reproducible to touch, she refers ache like sensation when she takes a breath and with coughing. Non radiating.     Plan:   -- Order troponin   -- Give nitro SL   -- Anti inflammatory medications PRN for MSK pain     Palliative care encounter  Palliative care consulted, appreciate assistance  --GOC discussions initiated. Intubation re- discussed today along with palliative. She is aware of the situation and as per now will like to continue treatment and measure to prolong life.       CLARENCE (obstructive sleep apnea)  - Continue BiPAP        Critical Care Daily Checklist:    A: Awake: RASS Goal/Actual Goal:    Actual: Paige Agitation Sedation Scale (RASS): Alert and calm   B: Spontaneous Breathing Trial Performed?     C: SAT & SBT Coordinated?  n/a                      D: Delirium: CAM-ICU Overall CAM-ICU: Negative   E: Early Mobility Performed? No   F: Feeding Goal: Goals: Pt to meet >50% of EEN/EPN by RD follow up.  Status: Nutrition Goal Status: new   Current Diet Order   Procedures    Diet diabetic [facility]; 2000 Calorie; Isolation Tray - Styrofoam     Don't send food, just send 2 glucose control boosts     Order Specific Question:   Indicate patient location for additional diet options:     Answer:   [facility]     Order Specific Question:   Total calories:     Answer:   2000 Calorie     Order Specific Question:   Tray type:     Answer:   Isolation Tray - Styrofoam      AS: Analgesia/Sedation n/a   T: Thromboembolic Prophylaxis Heparin gtt    H: HOB > 300 Yes   U: Stress Ulcer Prophylaxis (if needed) n/a   G: Glucose Control Insulin infusion    B: Bowel Function Stool Occurrence: 1   I: Indwelling Catheter (Lines  & [place]) Necessity    D: De-escalation of Antimicrobials/Pharmacotherapies Continue vanc and zosyn     Plan for the day/ETD Wean oxygen     Code Status:  Family/Goals of Care: Full Code         Critical secondary to Patient has a condition that poses threat to life and bodily function: Severe Respiratory Distress      Critical care was time spent personally by me on the following activities: development of treatment plan with patient or surrogate and bedside caregivers, discussions with consultants, evaluation of patient's response to treatment, examination of patient, ordering and performing treatments and interventions, ordering and review of laboratory studies, ordering and review of radiographic studies, pulse oximetry, re-evaluation of patient's condition. This critical care time did not overlap with that of any other provider or involve time for any procedures.     Judith Bernard MD  Critical Care Medicine  [facility] - ICU 16 [facility]

## 2020-12-18 NOTE — PT/OT/SLP PROGRESS
Occupational Therapy      Patient Name:  Laure Ross   MRN:  [id number]    Patient not seen today secondary pt tachycardic and was on 100% CPAP. Oxygen source had just been increased by RT. Pt not appropriate for therapy at time of arrival. OT unable to return again later this date. OT to check status at later date.   TELLY Parada  12/18/2020

## 2020-12-18 NOTE — ASSESSMENT & PLAN NOTE
Known NICM (EF 15%), CRT-D (2017) in place  - Repeat TTE stable  - Patient with up trending WBC and increased O2 requirements, along with intervals of hypotension this morning.   Repeat CXR with worsening pulmonary edema and pneumonia.   Patient of Dr. Jules       Plan:  -- Consulted heart transplant team, appreciate recommendations.  -- Holding lisinopril in setting of hypotension   -- Repeat BNP to assess volume status : 141  -- Hold lasix   -- Continue  BiPAP   -- As per Cardiology:   >> -if concern for hypotension/shock in future, would further differentiate etiology by following:    -PA catheter insertion, hemodynamic monitoring with CVP q8hr, [id number]  -if more c/w septic shock, would recommend hold lasix and gentle fluid resuscitation for goal CVP 9-12   1) if further HD support needed, would recommend levophed .02 m/k/g with uptitration for goal MAP >65   2)if second agent needed would recommend vasopression   3)would hold off dobutamine as can worsen hypotension  -if more c/w with cardiogenic shock, please notify Advanced Heart Failure

## 2020-12-18 NOTE — ASSESSMENT & PLAN NOTE
Palliative care consulted, appreciate assistance  --GOC discussions initiated. Intubation re- discussed today along with palliative. She is aware of the situation and as per now will like to continue treatment and measure to prolong life.

## 2020-12-19 NOTE — ASSESSMENT & PLAN NOTE
AL on CKD noted on admit, improving now. Baseline creatinine ~1.2  - Creatinine above baseline    Plan:   - Continue lasix   - Trend CMP   - Strict I/O  - Renally dose meds and avoid nephrotoxics

## 2020-12-19 NOTE — PROGRESS NOTES
[facility] - ICU 16   Critical Care Medicine  Progress Note    Patient Name: Laure Ross  MRN: [id number]  Admission Date: 12/6/2020  Hospital Length of Stay: 13 days  Code Status: Full Code  Attending Provider: Asael Stapleton MD  Primary Care Provider: Holly Mittal MD   Principal Problem: Acute hypoxemic respiratory failure due to COVID-19    Subjective:     HPI:  Ms. Laure Ross is a 50 year old woman with a PMHx of HFrEF 2/2 NICM (EF 15%, CRT-D placed '17), pAF, HTN, IDDM2, HLD, CLARENCE, asthma, GERD and obesity who presents to [facility] for SOB and cough. 1 week ago she began having myalgias, fatigue, nausea, vomiting, headache, and mild dyspnea on exertion. At that time she got a COVID test and was positive on 11/30. Most of her symptoms progressively improved throughout the week but 2 days ago began having worsening SOB even at rest and 1 day ago began having a cough and fever. Denies orthopnea. She says the cough is productive but has been coughing it up and swallowing it so cannot comment on the character of the sputum. She has been taking tylenol at home. She has been adherent with all of her medications including her diuretics and insulin. She denies taking her metolazone recently as needed, however she admits that she has not weighed herself in weeks. She feels she is at her baseline weight. Her urine volume has been unchanged. She has been drinking water but her appetite for food has decreased in the past few days.     Hospital/ICU Course:  Initially admitted to Hospital Medicine for COVID pneumonia on 12/8. Required HFNC between 10-15L. Diurese well, net negative 5L. Started on dexamethasone, remdesivir, ceftriaxone, and doxycycline. Overnight on 12/9, oxygen requirements started to increase. Unable to tolerate coming off of continuous CPAP and escalated to ICU. Remains on CPAP qHS with CF during the day.Patient completed antibiotics for 7 days, as well as 5 day course of remdesivir  and 10 days of dexamethasone. Endocrinology consulted due to hyperglycemia, most likely due to dexamethasone along with baseline T2DM. She was started on insulin infusion along with aspart. Will continue to monitor.  Started on heparin gtt for AC. Patient c/o congestion with CF  for which was kept on CPAP yesterday, CF to eat. WBC continues to be up trending, procal wnl, BC ngtd. CXR with worsening pulmonary edema and pneumonia. Patient has been presenting with low BP today, MAP around 60s this morning. Will start Vanc and Zosyn. Patient will need central line for better IV access. Discontinued home lisinopril. Consulted heart transplant team. 12/19 BiPAP 100% overnight. Lasix given for worsening respiratory failure and antibiotics for worsening leukocytosis.    Interval History/Significant Events: Worsening oxygen requirements overnight. Now on 100% BiPAP. Lasix given with minimal increase in UOP    Review of Systems   Unable to perform ROS: Acuity of condition     Objective:     Vital Signs (Most Recent):  Temp: 97.9 °F (36.6 °C) (12/19/20 0300)  Pulse: 110 (12/19/20 0828)  Resp: (!) 40 (12/19/20 0828)  BP: (!) 147/78 (12/19/20 0600)  SpO2: (!) 91 % (12/19/20 0828) Vital Signs (24h Range):  Temp:  [97.9 °F (36.6 °C)-98.8 °F (37.1 °C)] 97.9 °F (36.6 °C)  Pulse:  [] 110  Resp:  [23-48] 40  SpO2:  [87 %-100 %] 91 %  BP: (114-150)/(57-90) 147/78   Weight: 117 kg (257 lb 15 oz)  Body mass index is 44.27 kg/m².      Intake/Output Summary (Last 24 hours) at 12/19/2020 0912  Last data filed at 12/19/2020 0609  Gross per 24 hour   Intake 1658.54 ml   Output 2245 ml   Net -586.46 ml       Physical Exam  Vitals signs and nursing note reviewed.   Constitutional:       Appearance: She is morbidly obese. She is ill-appearing.      Comments: BiPAP   HENT:      Head: Normocephalic.      Mouth/Throat:      Mouth: Mucous membranes are dry.   Eyes:      Pupils: Pupils are equal, round, and reactive to light.   Cardiovascular:       Rate and Rhythm: Regular rhythm. Tachycardia present.      Pulses: Normal pulses.      Comments: Paced beats noted  Pulmonary:      Effort: Tachypnea, accessory muscle usage and respiratory distress present.      Breath sounds: Normal air entry.   Abdominal:      General: Bowel sounds are normal. There is distension.      Palpations: Abdomen is soft.      Tenderness: There is no abdominal tenderness.   Genitourinary:     Comments: [place]  Musculoskeletal:         General: No swelling.   Skin:     General: Skin is warm and dry.   Neurological:      Mental Status: She is alert.      GCS: GCS eye subscore is 4. GCS verbal subscore is 5. GCS motor subscore is 6.   Psychiatric:         Mood and Affect: Mood normal.         Vents:  Oxygen Concentration (%): 100 (12/19/20 0828)  Lines/Drains/Airways     Drain                 Urethral Catheter 12/16/20 1600 16 Fr. 2 days          Peripheral Intravenous Line                 Midline Catheter Insertion/Assessment  - Single Lumen 12/11/20 1524 Right cephalic vein (lateral side of arm) 18g x 10cm 7 days         Peripheral IV - Single Lumen 12/17/20 1710 20 G;1 3/4 in Left Antecubital 1 day              Significant Labs:    CBC/Anemia Profile:  Recent Labs   Lab 12/18/20  0601 12/19/20  0409   WBC 23.99* 29.84*   HGB 10.8* 10.9*   HCT 34.6* 36.1*    272   MCV 85 87   RDW 15.2* 15.5*        Chemistries:  Recent Labs   Lab 12/18/20  0601 12/18/20  1603 12/19/20  0409   * 139 136   K 4.6 4.3 4.5    105 101   CO2 26 26 23   BUN 60* 61* 49*   CREATININE 1.5* 1.5* 1.5*   CALCIUM 8.2* 8.3* 8.4*   ALBUMIN 2.0* 2.0* 2.0*   PROT 6.3  --  6.9   BILITOT 1.3*  --  1.4*   ALKPHOS 95  --  135   ALT 22  --  18   AST 22  --  27   MG 2.2  --  2.3   PHOS 3.7 4.0 4.0       All pertinent labs within the past 24 hours have been reviewed.    Significant Imaging:  I have reviewed and interpreted all pertinent imaging results/findings within the past 24 hours.      ABG  Recent Labs    Lab 12/19/20  0927   PH 7.381   PO2 24*   PCO2 47.5*   HCO3 28.2*   BE 3     Assessment/Plan:     Pulmonary  Moderate persistent asthma without complication  - Continue home maintenance inhalers and montelukast 10 mg    Cardiac/Vascular  Chronic combined systolic and diastolic congestive heart failure  Known NICM (EF 15%), CRT-D (2017) in place  - Repeat TTE stable  - Patient with up trending WBC and increased O2 requirements, along with intervals of hypotension this morning.   Repeat CXR with worsening pulmonary edema and pneumonia.   Patient of Dr. Jules     Plan:  -- Consulted heart transplant team, appreciate recommendations.  -- Holding lisinopril in setting of hypotension   -- Repeat BNP to assess volume status    -- Diurese  -- Will start Dobutamine  -- Continue  BiPAP   -- Cardiology consulted      Paroxysmal atrial fibrillation  INR 1.5, D-dimer 22.65    Plan:   - Continue home digoxin and amiodarone  - Continue heparin gtt as VTE PPx      Renal/  Acute kidney injury superimposed on chronic kidney disease  AL on CKD noted on admit, improving now. Baseline creatinine ~1.2  - Creatinine above baseline    Plan:   - Continue lasix   - Trend CMP   - Strict I/O  - Renally dose meds and avoid nephrotoxics    Endocrine  Type 2 diabetes mellitus with diabetic polyneuropathy  Home regimen includes long-acting insulin glargine 34 units BID and short-acting insulin aspart 16 units with SSI. HgbA1c 6.5.  BG not controlled with >300 all day. 43 units of aspart in the last 24 hours (21 scheduled and 22 SSI)  BG not well controlled due to dexamethasone. Detemir had been increased to 30 U BID and aspart 12U TID on 12/12,   Endocrinology consulted      Plan:   -- Endocrinology consulted   -- BG goal 140-160   -- Dexamethasone completed.    GI  GERD (gastroesophageal reflux disease)  - famotidine 20mg BID  - GI cocktail prn    Other  * Acute hypoxemic respiratory failure due to COVID-19  COVID positive on 11/30. Admitted  to hospital 12/6, upgraded to ICU 12/10 for increasing oxygen requirements. 's up from 200's on admit.   -- Patient alternating between BiPAP qHS and CF on daytime.   -- WBC up trending with worsening CXR      Plan:   -- Continue  BiPAP    -- Continue vanc and zosyn   -- F/U BC: ngtd   -- S/p 7 days of likely appropriate CAP coverage  -- Diurese  -- Completed dexamethasone x 10 days  -- Completed remdesivir X 5 days    -- Continue heparin infusion for hypercoagulability today    -- Scheduled Duonebs   -- Check procal, lactate, and trop      Pneumonia due to COVID-19 virus  See respiratory failure     Chest pain, musculoskeletal  Patient c/o heart burn and chest pain. Pain reproducible to touch, she refers ache like sensation when she takes a breath and with coughing. Non radiating.     Plan:   -- Order troponin   -- Give nitro SL prn  -- Anti inflammatory medications PRN for MSK pain     Palliative care encounter  Palliative care consulted, appreciate assistance  --GOC discussions initiated. Intubation re- discussed today along with palliative. She is aware of the situation and as per now will like to continue treatment and measure to prolong life.       CLARENCE (obstructive sleep apnea)  - Continue BiPAP         Critical Care Time: 70minutes  Critical secondary to Patient has a condition that poses threat to life and bodily function: Severe Respiratory Distress      Critical care was time spent personally by me on the following activities: development of treatment plan with patient or surrogate and bedside caregivers, discussions with consultants, evaluation of patient's response to treatment, examination of patient, ordering and performing treatments and interventions, ordering and review of laboratory studies, ordering and review of radiographic studies, pulse oximetry, re-evaluation of patient's condition. This critical care time did not overlap with that of any other provider or involve time for any  procedures.     Fiona Winterbottom, APRN, CNS  Critical Care Medicine  [facility] - ICU 16 [facility]

## 2020-12-19 NOTE — ASSESSMENT & PLAN NOTE
COVID positive on 11/30. Admitted to hospital 12/6, upgraded to ICU 12/10 for increasing oxygen requirements. 's up from 200's on admit.   -- Patient alternating between BiPAP qHS and CF on daytime.   -- WBC up trending with worsening CXR      Plan:   -- Continue  BiPAP    -- Continue vanc and zosyn   -- F/U BC: ngtd   -- S/p 7 days of likely appropriate CAP coverage  -- Diurese  -- Completed dexamethasone x 10 days  -- Completed remdesivir X 5 days    -- Continue heparin infusion for hypercoagulability today    -- Scheduled Duonebs   -- Check procal, lactate, and trop

## 2020-12-19 NOTE — CONSULTS
[facility] - ICU 16 [facility]  Heart Transplant  Consult Note    Patient Name: Laure Ross  MRN: [id number]  Admission Date: 12/6/2020  Hospital Length of Stay: 13 days  Attending Physician: Asael Stapleton MD  Primary Care Provider: Holly Mittal MD   Principal Problem:Acute hypoxemic respiratory failure due to COVID-19    Consults  Subjective:     History of Present Illness:  Ms. Ross is a 50 AAF with cardiac history of NICM (EF 10%, NYHA II-III sx, declined for transplant) s/p CRT-D (2017, 0% in the RA and 83% in the BiV on 9/22/20 interrogation),pAF (s/p BRITTANEY/DCCV 4/16/20 with recurrence last episode 8/25/20  now on Amiodarone)  HTN, HLD, and PMH of DM, obstructive sleep apnea hospitalized on 12/8 for COVID pneumonia with hypoxemic RF s/p remdesevir, DEX with course notable for worsening respiratory status now on BiPAP. Primary team consulted for recs regarding concern for shock and possible need for pressors in setting of known advanced heart failure.       Past Medical History:   Diagnosis Date    Anticoagulant long-term use     Arthritis     Asthma     Asthma     Atrial fibrillation     Cardiomyopathy     Cardiomyopathy     CHF (congestive heart failure)     CHF (congestive heart failure)     Chronic combined systolic and diastolic heart failure 6/3/2016    COPD (chronic obstructive pulmonary disease) 3/28/2018    GERD (gastroesophageal reflux disease)     H/O tubal ligation     Hypertension     Obesity     Renal disorder     Type 2 diabetes mellitus with hyperglycemia     Type 2 diabetes mellitus with polyneuropathy      Past Surgical History:   Procedure Laterality Date    CARDIAC DEFIBRILLATOR PLACEMENT      CARDIAC DEFIBRILLATOR PLACEMENT      CARDIAC DEFIBRILLATOR PLACEMENT      CATARACT EXTRACTION Left     CHOLECYSTECTOMY      COLONOSCOPY N/A 5/2/2016    Procedure: COLONOSCOPY;  Surgeon: Eugene Soto MD;  [facility] ([address] [place] [address]);  Service: Endoscopy;   Laterality: N/A;    GALLBLADDER SURGERY      iabp  4/2016    TREATMENT OF CARDIAC ARRHYTHMIA N/A 6/12/2020    Procedure: CARDIOVERSION;  Surgeon: Navi Jolly MD;  Location: [facility] EP LAB;  Service: Cardiology;  Laterality: N/A;  AF, BRITTANEY, DCCV, MAC, DM, 3 Prep    TREATMENT OF CARDIAC ARRHYTHMIA N/A 8/7/2020    Procedure: Cardioversion or Defibrillation;  Surgeon: Navi Jolly MD;  Location: [facility] EP LAB;  Service: Cardiology;  Laterality: N/A;  AF, DCCV, MAC, DM, 3 Prep    TUBAL LIGATION       Review of patient's allergies indicates:  No Known Allergies    Current Facility-Administered Medications   Medication    (Magic mouthwash) 1:1:1 Benadryl 12.5mg/5ml liq, aluminum & magnesium hydroxide-simehticone (Maalox), lidocaine viscous 2%    acetaminophen tablet 650 mg    albuterol inhaler 2 puff    albuterol-ipratropium 2.5 mg-0.5 mg/3 mL nebulizer solution 3 mL    aluminum-magnesium hydroxide-simethicone 200-200-20 mg/5 mL suspension 30 mL    amiodarone tablet 200 mg    atorvastatin tablet 20 mg    benzonatate capsule 100 mg    bisacodyL suppository 10 mg    butalbital-acetaminophen-caffeine -40 mg per tablet 1 tablet    dextrose 50% injection 12.5 g    dextrose 50% injection 25 g    digoxin tablet 125 mcg    DOBUtamine 1000 mg in D5W 250 mL infusion    etomidate (AMIDATE) 2 mg/mL injection    famotidine tablet 20 mg    fluticasone furoate-vilanteroL 100-25 mcg/dose diskus inhaler 1 puff    fluticasone propionate 50 mcg/actuation nasal spray 100 mcg    furosemide (LASIX) 200 mg in dextrose 5 % 100 mL continuous infusion (conc: 2 mg/mL)    glucagon (human recombinant) injection 1 mg    glucose chewable tablet 16 g    glucose chewable tablet 24 g    heparin 25,000 units in dextrose 5% (100 units/ml) IV bolus from bag - ADDITIONAL PRN BOLUS - 30 units/kg    heparin 25,000 units in dextrose 5% (100 units/ml) IV bolus from bag - ADDITIONAL PRN BOLUS - 60 units/kg    heparin 25,000  units in dextrose 5% 250 mL (100 units/mL) infusion LOW INTENSITY nomogram Anti- Xa    HYDROcodone-acetaminophen 5-325 mg per tablet 1 tablet    insulin aspart U-100 pen 0-5 Units    insulin aspart U-100 pen 12 Units    insulin detemir U-100 pen 16 Units    linaCLOtide capsule 72 mcg    loperamide capsule 4 mg    melatonin tablet 6 mg    miconazole nitrate 2% ointment    montelukast tablet 10 mg    nitroGLYCERIN SL tablet 0.4 mg    piperacillin-tazobactam 4.5 g in sodium chloride 0.9% 100 mL IVPB (ready to mix system)    polyethylene glycol packet 17 g    propofoL (DIPRIVAN) 10 mg/mL infusion    rocuronium 10 mg/mL injection    senna-docusate 8.6-50 mg per tablet 2 tablet    sodium chloride 0.9% flush 10 mL    succinylcholine (ANECTINE) 20 mg/mL injection    vancomycin 2 g in dextrose 5 % 500 mL IVPB    vitamin D 1000 units tablet 1,000 Units    zinc sulfate capsule 220 mg     Family History     Problem Relation (Age of Onset)    Heart disease Mother, Father        Tobacco Use    Smoking status: Never Smoker    Smokeless tobacco: Never Used   Substance and Sexual Activity    Alcohol use: No    Drug use: No    Sexual activity: Yes     Partners: Male     Review of Systems   Reason unable to perform ROS: Acute respiratory failure, RR 40s on BiPAP 18/14 at 100%     Objective:     Vital Signs (Most Recent):  Temp: 99.2 °F (37.3 °C) (12/19/20 1100)  Pulse: (!) 120 (12/19/20 1417)  Resp: (!) 40 (12/19/20 1417)  BP: (!) 146/70 (12/19/20 1100)  SpO2: (!) 92 % (12/19/20 1417) Vital Signs (24h Range):  Temp:  [97.9 °F (36.6 °C)-99.2 °F (37.3 °C)] 99.2 °F (37.3 °C)  Pulse:  [] 120  Resp:  [25-45] 40  SpO2:  [87 %-100 %] 92 %  BP: (114-155)/(57-87) 146/70     No data found.  Body mass index is 44.27 kg/m².      Intake/Output Summary (Last 24 hours) at 12/19/2020 6589  Last data filed at 12/19/2020 0609  Gross per 24 hour   Intake 1277.32 ml   Output 1805 ml   Net -527.68 ml     Physical  Exam  Vitals signs and nursing note reviewed.   Constitutional:       General: She is in acute distress.      Appearance: She is obese. She is ill-appearing and toxic-appearing.   HENT:      Head: Normocephalic and atraumatic.      Nose:      Comments: BiPAP in place  Eyes:      Pupils: Pupils are equal, round, and reactive to light.   Neck:      Musculoskeletal: Normal range of motion.   Cardiovascular:      Rate and Rhythm: Normal rate.   Pulmonary:      Effort: Respiratory distress present.      Comments: BiPAP present, respiratory distress  Abdominal:      General: There is distension.      Palpations: Abdomen is soft.   Musculoskeletal:         General: No swelling.   Skin:     General: Skin is dry.      Comments: Cool extremities       Significant Labs:  CBC:  Recent Labs   Lab 12/17/20  0345 12/18/20  0601 12/19/20  0409   WBC 23.94* 23.99* 29.84*   RBC 4.05 4.06 4.17   HGB 10.8* 10.8* 10.9*   HCT 34.1* 34.6* 36.1*    208 272   MCV 84 85 87   MCH 26.7* 26.6* 26.1*   MCHC 31.7* 31.2* 30.2*     BNP:  Recent Labs   Lab 12/17/20  1738 12/19/20  0929   * 335*     CMP:  Recent Labs   Lab 12/17/20  0345 12/18/20  0601 12/18/20  1603 12/19/20  0409   GLU 62* 183* 125* 190*   CALCIUM 8.4* 8.2* 8.3* 8.4*   ALBUMIN 2.3* 2.0* 2.0* 2.0*   PROT 6.2 6.3  --  6.9    135* 139 136   K 4.9 4.6 4.3 4.5   CO2 25 26 26 23    101 105 101   BUN 64* 60* 61* 49*   CREATININE 1.2 1.5* 1.5* 1.5*   ALKPHOS 91 95  --  135   ALT 21 22  --  18   AST 23 22  --  27   BILITOT 1.2* 1.3*  --  1.4*      Coagulation:   Recent Labs   Lab 12/17/20  0345 12/18/20  0601 12/19/20  0409   INR 1.4* 1.3* 1.3*     Microbiology:  Microbiology Results (last 7 days)     Procedure Component Value Units Date/Time    Blood culture [[id number]] Collected: 12/19/20 1240    Order Status: Sent Specimen: Blood from Peripheral, Hand, Left Updated: 12/19/20 1313    Culture, Respiratory with Gram Stain [[id number]]     Order Status: No result  Specimen: Respiratory from Endotracheal Aspirate     Blood culture [[id number]]     Order Status: Sent Specimen: Blood     Blood culture [[id number]] Collected: 12/16/20 2000    Order Status: Completed Specimen: Blood Updated: 12/18/20 2212     Blood Culture, Routine No Growth to date      No Growth to date      No Growth to date    Blood culture [[id number]] Collected: 12/16/20 2015    Order Status: Completed Specimen: Blood Updated: 12/18/20 2212     Blood Culture, Routine No Growth to date      No Growth to date      No Growth to date    Blood culture [[id number]]     Order Status: Canceled Specimen: Blood     Blood culture [[id number]]     Order Status: Canceled Specimen: Blood     Blood culture [[id number]] Collected: 12/10/20 1118    Order Status: Completed Specimen: Blood Updated: 12/15/20 1222     Blood Culture, Routine No growth after 5 days.    Narrative:      Blood cultures from 2 different sites. 4 bottles total.  Please draw before starting antibiotics.  Collection has been rescheduled by CBE at 12/10/2020 10:55 Reason:   due at 10:35  Collection has been rescheduled by CBE at 12/10/2020 10:55 Reason:   due at 10:35    Blood culture [[id number]] Collected: 12/10/20 1104    Order Status: Completed Specimen: Blood Updated: 12/15/20 1222     Blood Culture, Routine No growth after 5 days.    Narrative:      Blood cultures x 2 different sites. 4 bottles total. Please  draw cultures before administering antibiotics.  Collection has been rescheduled by CBE at 12/10/2020 10:55 Reason:   due at 10:35  Collection has been rescheduled by CBE at 12/10/2020 10:55 Reason:   due at 10:35        I have reviewed all pertinent labs within the past 24 hours.    Diagnostic Results:  I have reviewed and interpreted all pertinent imaging results/findings within the past 24 hours.    Assessment/Plan:     * Acute hypoxemic respiratory failure due to COVID-19  - Patient with severe COVID-19 PNA  - Defer to primary service on  management  - Likely basis of patient's respiratory decompensation and less likely related to acute on chronic CHF  - Guarded prognosis     Chronic combined systolic and diastolic congestive heart failure  - niCMP (EF 10%, NYHA II-III sx, declined for transplant)   - s/p CRT-D (2017, 0% in the RA and 83% in the BiV on 9/22/20 interrogation)  - Ultimately believe patient's current decompensation with acute respiratory failure likely 2/2 to worsening COVID-19  - WBC increased to 29 from 6 on 12/6/20, ProCal 1.24, lactate 3  - Started on  7.5, given Lasix IV push 80 mg at 0200 and 120 mg at 1000 with plan for Lasix 5 gtt and added Diuril 500 mg at 1300  - Currently patient is hypertensive MAP 97, net negative 10.9L since admit  - CXR with increased bilateral airspace infiltrates/edema, likely 2/2 COVID. BNP only mildly elevated at 335  - Would need hemodynamic measurements to further proceed with Recs: Please obtain CVC or PICC to measure CVP  - Would need hemodynamic monitoring with CVP q8hr, [id number]  - Current indication for  uncertain, however please wean for Cardiac Index >2.2  - Extremely unfortunate, with guarded prognosis       Thank you for your consult.     Delonte Dumont MD  Heart Transplant  [facility] - ICU [facility]

## 2020-12-19 NOTE — SUBJECTIVE & OBJECTIVE
Interval History/Significant Events: Worsening oxygen requirements overnight. Now on 100% BiPAP. Lasix given with minimal increase in UOP    Review of Systems   Unable to perform ROS: Acuity of condition     Objective:     Vital Signs (Most Recent):  Temp: 97.9 °F (36.6 °C) (12/19/20 0300)  Pulse: 110 (12/19/20 0828)  Resp: (!) 40 (12/19/20 0828)  BP: (!) 147/78 (12/19/20 0600)  SpO2: (!) 91 % (12/19/20 0828) Vital Signs (24h Range):  Temp:  [97.9 °F (36.6 °C)-98.8 °F (37.1 °C)] 97.9 °F (36.6 °C)  Pulse:  [] 110  Resp:  [23-48] 40  SpO2:  [87 %-100 %] 91 %  BP: (114-150)/(57-90) 147/78   Weight: 117 kg (257 lb 15 oz)  Body mass index is 44.27 kg/m².      Intake/Output Summary (Last 24 hours) at 12/19/2020 0912  Last data filed at 12/19/2020 0609  Gross per 24 hour   Intake 1658.54 ml   Output 2245 ml   Net -586.46 ml       Physical Exam  Vitals signs and nursing note reviewed.   Constitutional:       Appearance: She is morbidly obese. She is ill-appearing.      Comments: BiPAP   HENT:      Head: Normocephalic.      Mouth/Throat:      Mouth: Mucous membranes are dry.   Eyes:      Pupils: Pupils are equal, round, and reactive to light.   Cardiovascular:      Rate and Rhythm: Regular rhythm. Tachycardia present.      Pulses: Normal pulses.      Comments: Paced beats noted  Pulmonary:      Effort: Tachypnea, accessory muscle usage and respiratory distress present.      Breath sounds: Normal air entry.   Abdominal:      General: Bowel sounds are normal. There is distension.      Palpations: Abdomen is soft.      Tenderness: There is no abdominal tenderness.   Genitourinary:     Comments: [place]  Musculoskeletal:         General: No swelling.   Skin:     General: Skin is warm and dry.   Neurological:      Mental Status: She is alert.      GCS: GCS eye subscore is 4. GCS verbal subscore is 5. GCS motor subscore is 6.   Psychiatric:         Mood and Affect: Mood normal.         Vents:  Oxygen Concentration (%): 100  (12/19/20 0828)  Lines/Drains/Airways     Drain                 Urethral Catheter 12/16/20 1600 16 Fr. 2 days          Peripheral Intravenous Line                 Midline Catheter Insertion/Assessment  - Single Lumen 12/11/20 1524 Right cephalic vein (lateral side of arm) 18g x 10cm 7 days         Peripheral IV - Single Lumen 12/17/20 1710 20 G;1 3/4 in Left Antecubital 1 day              Significant Labs:    CBC/Anemia Profile:  Recent Labs   Lab 12/18/20  0601 12/19/20  0409   WBC 23.99* 29.84*   HGB 10.8* 10.9*   HCT 34.6* 36.1*    272   MCV 85 87   RDW 15.2* 15.5*        Chemistries:  Recent Labs   Lab 12/18/20  0601 12/18/20  1603 12/19/20  0409   * 139 136   K 4.6 4.3 4.5    105 101   CO2 26 26 23   BUN 60* 61* 49*   CREATININE 1.5* 1.5* 1.5*   CALCIUM 8.2* 8.3* 8.4*   ALBUMIN 2.0* 2.0* 2.0*   PROT 6.3  --  6.9   BILITOT 1.3*  --  1.4*   ALKPHOS 95  --  135   ALT 22  --  18   AST 22  --  27   MG 2.2  --  2.3   PHOS 3.7 4.0 4.0       All pertinent labs within the past 24 hours have been reviewed.    Significant Imaging:  I have reviewed and interpreted all pertinent imaging results/findings within the past 24 hours.

## 2020-12-19 NOTE — NURSING
MD Arguelles at bedside to discuss trending oxygen requirements. Pt currently on BIPAP 18/14 100%. 12 lead EKG completed, new order for stat chest x-ray, and lasix 80 mg IVP now. Will continue to monitor.

## 2020-12-19 NOTE — ASSESSMENT & PLAN NOTE
Known NICM (EF 15%), CRT-D (2017) in place  - Repeat TTE stable  - Patient with up trending WBC and increased O2 requirements, along with intervals of hypotension this morning.   Repeat CXR with worsening pulmonary edema and pneumonia.   Patient of Dr. Jules     Plan:  -- Consulted heart transplant team, appreciate recommendations.  -- Holding lisinopril in setting of hypotension   -- Repeat BNP to assess volume status    -- Diurese  -- Will start Dobutamine  -- Continue  BiPAP   -- Cardiology consulted

## 2020-12-19 NOTE — PROGRESS NOTES
"[facility] - ICU 16 WT  Endocrinology  Progress Note    Admit Date: 12/6/2020     Reason for Consult: Management of T2DM, Hyperglycemia     Surgical Procedure and Date: N/A    Diabetes diagnosis year:   > 20 years    Home Diabetes Medications:  (per Dr. Mittal with [facility] in [place]).   Trulicity 1.5 mg weekly.   Basaglar 34 units BID.  Novolog 22 units TIDWM    How often checking glucose at home? 1-3 x day   BG readings on regimen: 130's  Hypoglycemia on the regimen?  No  Missed doses on regimen?  No    Diabetes Complications include:     Hyperglycemia and Diabetic nephropathy      Complicating diabetes co morbidities:   CHF, CLARENCE and Glucocorticoid use , HLD, HTN      HPI:   Patient is a 50 y.o. female with a diagnosis of HFrEF 2/2 NICM (EF 15%, CRT-D placed '17), pAF, HTN, IDDM2, HLD, CLARENCE, asthma, GERD and obesity who presented to [facility] for SOB and cough. At that time she got a COVID test and was positive on 11/30. Initially admitted to Hospital Medicine for COVID pneumonia on 12/8. Required HFNC between 10-15L. Diurese well, net negative 5L. Started on dexamethasone, remdesivir, ceftriaxone, and doxycycline. Overnight on 12/9, oxygen requirements started to increase. Unable to tolerate coming off of continuous CPAP and escalated to ICU. Endocrinology consulted 12/14/20 for glycemic control.     Lab Results   Component Value Date    HGBA1C 6.5 (H) 12/07/2020       Interval HPI:   Overnight events: Remains in RICU. BG at or slightly above goal on IV insulin infusion at 0.7 u/hr with scheduled insulin and prn correction scale. Creatinine 1.5.   Eating:   <25%  Diet diabetic [facility]; 2000 Calorie; Isolation Tray - Styrofoam  Nausea: No  Hypoglycemia and intervention: No  Fever: No  TPN and/or TF: No    BP (!) 146/70   Pulse (!) 118   Temp 99.2 °F (37.3 °C) (Axillary)   Resp (!) 42   Ht 5' 4" (1.626 m)   Wt 117 kg (257 lb 15 oz)   LMP 08/25/2019 (Approximate)   SpO2 (!) 90%   BMI " 44.27 kg/m²     Labs Reviewed and Include    Recent Labs   Lab 12/19/20  0409   *   CALCIUM 8.4*   ALBUMIN 2.0*   PROT 6.9      K 4.5   CO2 23      BUN 49*   CREATININE 1.5*   ALKPHOS 135   ALT 18   AST 27   BILITOT 1.4*     Lab Results   Component Value Date    WBC 29.84 (H) 12/19/2020    HGB 10.9 (L) 12/19/2020    HCT 36.1 (L) 12/19/2020    MCV 87 12/19/2020     12/19/2020     No results for input(s): TSH, FREET4 in the last 168 hours.  Lab Results   Component Value Date    HGBA1C 6.5 (H) 12/07/2020       Nutritional status:   Body mass index is 44.27 kg/m².  Lab Results   Component Value Date    ALBUMIN 2.0 (L) 12/19/2020    ALBUMIN 2.0 (L) 12/18/2020    ALBUMIN 2.0 (L) 12/18/2020     Lab Results   Component Value Date    PREALBUMIN 20 05/09/2016    PREALBUMIN 16 (L) 05/06/2016    PREALBUMIN 9 (L) 05/04/2016       Estimated Creatinine Clearance: 55.8 mL/min (A) (based on SCr of 1.5 mg/dL (H)).    Accu-Checks  Recent Labs     12/17/20 2016 12/18/20  0018 12/18/20  0606 12/18/20  0757 12/18/20  1127 12/18/20  1717 12/18/20  2144 12/19/20  0239 12/19/20  0941 12/19/20  1202   POCTGLUCOSE 240* 301* 186* 183* 174* 115* 165* 215* 138* 201*       Current Medications and/or Treatments Impacting Glycemic Control  Immunotherapy:    Immunosuppressants     None        Steroids:   Hormones (From admission, onward)    Start     Stop Route Frequency Ordered    12/06/20 2100  melatonin tablet 6 mg      -- Oral Nightly PRN 12/06/20 2001        Pressors:    Autonomic Drugs (From admission, onward)    Start     Stop Route Frequency Ordered    12/19/20 1056  succinylcholine (ANECTINE) 20 mg/mL injection     Note to Pharmacy: Created by cabinet override    12/19 2314   12/19/20 1056    12/19/20 1056  rocuronium 10 mg/mL injection     Note to Pharmacy: Created by cabinet override    12/19 8491   12/19/20 1056        Hyperglycemia/Diabetes Medications:   Antihyperglycemics (From admission, onward)    Start      Stop Route Frequency Ordered    12/19/20 0900  insulin detemir U-100 pen 16 Units      -- SubQ Daily 12/19/20 0754    12/19/20 0854  insulin aspart U-100 pen 0-5 Units      -- SubQ Before meals & nightly PRN 12/19/20 0754    12/19/20 0800  insulin aspart U-100 pen 12 Units      -- SubQ 3 times daily with meals 12/19/20 0754          ASSESSMENT and PLAN    * Acute hypoxemic respiratory failure due to COVID-19  Managed per primary team  Avoid hypoglycemia        Type 2 diabetes mellitus with diabetic polyneuropathy  BG goal 140 - 180     Discontinue IV insulin infusion. Start levemir 16 units daily.    Continue Moderate Dose SQ Insulin Correction Scale.  BG Monitoring AC/HS   continue novolog 10 units with meals.     Discharge Planning:   TBD. Please notify endocrinology prior to discharge.     Lab Results   Component Value Date    HGBA1C 6.5 (H) 12/07/2020         Adrenal cortical steroids causing adverse effect in therapeutic use  On steroid therapy per transplant team; may elevate BG readings        CLARENCE (obstructive sleep apnea)  May affect BG readings if uncontrolled            Elena Bowers NP  Endocrinology  [facility] - ICU 16 WT

## 2020-12-19 NOTE — CONSULTS
Double lumen PICC placed in left basilic vein of MARY, 39cm in length with 0cm exposed and 44cm arm circumference. Lot#[id number].    Line placed by: JAYNE Davila RN for hemodynamic monitoring

## 2020-12-19 NOTE — ASSESSMENT & PLAN NOTE
BG goal 140 - 180     Discontinue IV insulin infusion. Start levemir 16 units daily.    Continue Moderate Dose SQ Insulin Correction Scale.  BG Monitoring AC/HS   continue novolog 10 units with meals.     Discharge Planning:   TBD. Please notify endocrinology prior to discharge.     Lab Results   Component Value Date    HGBA1C 6.5 (H) 12/07/2020

## 2020-12-19 NOTE — PROCEDURES
"Laure Ross is a 50 y.o. female patient.    Temp: 99.2 °F (37.3 °C) (12/19/20 1100)  Pulse: (!) 120 (12/19/20 1500)  Resp: (!) 38 (12/19/20 1500)  BP: 113/62 (12/19/20 1500)  SpO2: (!) 94 % (12/19/20 1500)  Weight: 117 kg (257 lb 15 oz) (12/13/20 0300)  Height: 5' 4" (162.6 cm) (12/10/20 1245)    PICC  Date/Time: 12/19/2020 5:08 PM  Performed by: Marilou Davila RN  Assisting provider: Kenna Lowe LPN  Consent Done: Yes  Time out: Immediately prior to procedure a time out was called to verify the correct patient, procedure, equipment, support staff and site/side marked as required  Indications: med administration and vascular access  Anesthesia: local infiltration  Local anesthetic: lidocaine 1% without epinephrine  Anesthetic Total (mL): 3  Preparation: skin prepped with ChloraPrep  Skin prep agent dried: skin prep agent completely dried prior to procedure  Sterile barriers: all five maximum sterile barriers used - cap, mask, sterile gown, sterile gloves, and large sterile sheet  Hand hygiene: hand hygiene performed prior to central venous catheter insertion  Location details: left basilic  Catheter type: double lumen  Catheter size: 5 Fr  Catheter Length: 39cm    Ultrasound guidance: yes  Vessel Caliber: medium and patent, compressibility normal  Vascular Doppler: not done  Needle advanced into vessel with real time Ultrasound guidance.  Guidewire confirmed in vessel.  Image recorded and saved.  Sterile sheath used.  Number of attempts: 1  Post-procedure: blood return through all ports, chlorhexidine patch and sterile dressing applied  Technical procedures used: 3cg  Specimens: No  Implants: No  Assessment: placement verified by x-ray  Complications: none          Kenna Lowe  12/19/2020  "

## 2020-12-19 NOTE — ASSESSMENT & PLAN NOTE
Patient c/o heart burn and chest pain. Pain reproducible to touch, she refers ache like sensation when she takes a breath and with coughing. Non radiating.     Plan:   -- Order troponin   -- Give nitro SL prn  -- Anti inflammatory medications PRN for MSK pain

## 2020-12-19 NOTE — SUBJECTIVE & OBJECTIVE
Past Medical History:   Diagnosis Date    Anticoagulant long-term use     Arthritis     Asthma     Asthma     Atrial fibrillation     Cardiomyopathy     Cardiomyopathy     CHF (congestive heart failure)     CHF (congestive heart failure)     Chronic combined systolic and diastolic heart failure 6/3/2016    COPD (chronic obstructive pulmonary disease) 3/28/2018    GERD (gastroesophageal reflux disease)     H/O tubal ligation     Hypertension     Obesity     Renal disorder     Type 2 diabetes mellitus with hyperglycemia     Type 2 diabetes mellitus with polyneuropathy      Past Surgical History:   Procedure Laterality Date    CARDIAC DEFIBRILLATOR PLACEMENT      CARDIAC DEFIBRILLATOR PLACEMENT      CARDIAC DEFIBRILLATOR PLACEMENT      CATARACT EXTRACTION Left     CHOLECYSTECTOMY      COLONOSCOPY N/A 5/2/2016    Procedure: COLONOSCOPY;  Surgeon: Eugene Soto MD;  Location: [facility] ENDO ([facility] FLR);  Service: Endoscopy;  Laterality: N/A;    GALLBLADDER SURGERY      iabp  4/2016    TREATMENT OF CARDIAC ARRHYTHMIA N/A 6/12/2020    Procedure: CARDIOVERSION;  Surgeon: Navi Jolly MD;  Location: [facility] EP LAB;  Service: Cardiology;  Laterality: N/A;  AF, BRITTANEY, DCCV, MAC, DM, 3 Prep    TREATMENT OF CARDIAC ARRHYTHMIA N/A 8/7/2020    Procedure: Cardioversion or Defibrillation;  Surgeon: Navi Jolly MD;  Location: [facility] EP LAB;  Service: Cardiology;  Laterality: N/A;  AF, DCCV, MAC, DM, 3 Prep    TUBAL LIGATION       Review of patient's allergies indicates:  No Known Allergies    Current Facility-Administered Medications   Medication    (Magic mouthwash) 1:1:1 Benadryl 12.5mg/5ml liq, aluminum & magnesium hydroxide-simehticone (Maalox), lidocaine viscous 2%    acetaminophen tablet 650 mg    albuterol inhaler 2 puff    albuterol-ipratropium 2.5 mg-0.5 mg/3 mL nebulizer solution 3 mL    aluminum-magnesium hydroxide-simethicone 200-200-20 mg/5 mL suspension 30 mL    amiodarone tablet 200 mg     atorvastatin tablet 20 mg    benzonatate capsule 100 mg    bisacodyL suppository 10 mg    butalbital-acetaminophen-caffeine -40 mg per tablet 1 tablet    dextrose 50% injection 12.5 g    dextrose 50% injection 25 g    digoxin tablet 125 mcg    DOBUtamine 1000 mg in D5W 250 mL infusion    etomidate (AMIDATE) 2 mg/mL injection    famotidine tablet 20 mg    fluticasone furoate-vilanteroL 100-25 mcg/dose diskus inhaler 1 puff    fluticasone propionate 50 mcg/actuation nasal spray 100 mcg    furosemide (LASIX) 200 mg in dextrose 5 % 100 mL continuous infusion (conc: 2 mg/mL)    glucagon (human recombinant) injection 1 mg    glucose chewable tablet 16 g    glucose chewable tablet 24 g    heparin 25,000 units in dextrose 5% (100 units/ml) IV bolus from bag - ADDITIONAL PRN BOLUS - 30 units/kg    heparin 25,000 units in dextrose 5% (100 units/ml) IV bolus from bag - ADDITIONAL PRN BOLUS - 60 units/kg    heparin 25,000 units in dextrose 5% 250 mL (100 units/mL) infusion LOW INTENSITY nomogram Anti- Xa    HYDROcodone-acetaminophen 5-325 mg per tablet 1 tablet    insulin aspart U-100 pen 0-5 Units    insulin aspart U-100 pen 12 Units    insulin detemir U-100 pen 16 Units    linaCLOtide capsule 72 mcg    loperamide capsule 4 mg    melatonin tablet 6 mg    miconazole nitrate 2% ointment    montelukast tablet 10 mg    nitroGLYCERIN SL tablet 0.4 mg    piperacillin-tazobactam 4.5 g in sodium chloride 0.9% 100 mL IVPB (ready to mix system)    polyethylene glycol packet 17 g    propofoL (DIPRIVAN) 10 mg/mL infusion    rocuronium 10 mg/mL injection    senna-docusate 8.6-50 mg per tablet 2 tablet    sodium chloride 0.9% flush 10 mL    succinylcholine (ANECTINE) 20 mg/mL injection    vancomycin 2 g in dextrose 5 % 500 mL IVPB    vitamin D 1000 units tablet 1,000 Units    zinc sulfate capsule 220 mg     Family History     Problem Relation (Age of Onset)    Heart disease Mother, Father         Tobacco Use    Smoking status: Never Smoker    Smokeless tobacco: Never Used   Substance and Sexual Activity    Alcohol use: No    Drug use: No    Sexual activity: Yes     Partners: Male     Review of Systems   Reason unable to perform ROS: Acute respiratory failure, RR 40s on BiPAP 18/14 at 100%     Objective:     Vital Signs (Most Recent):  Temp: 99.2 °F (37.3 °C) (12/19/20 1100)  Pulse: (!) 120 (12/19/20 1417)  Resp: (!) 40 (12/19/20 1417)  BP: (!) 146/70 (12/19/20 1100)  SpO2: (!) 92 % (12/19/20 1417) Vital Signs (24h Range):  Temp:  [97.9 °F (36.6 °C)-99.2 °F (37.3 °C)] 99.2 °F (37.3 °C)  Pulse:  [] 120  Resp:  [25-45] 40  SpO2:  [87 %-100 %] 92 %  BP: (114-155)/(57-87) 146/70     No data found.  Body mass index is 44.27 kg/m².      Intake/Output Summary (Last 24 hours) at 12/19/2020 1429  Last data filed at 12/19/2020 0609  Gross per 24 hour   Intake 1277.32 ml   Output 1805 ml   Net -527.68 ml     Physical Exam  Vitals signs and nursing note reviewed.   Constitutional:       General: She is in acute distress.      Appearance: She is obese. She is ill-appearing and toxic-appearing.   HENT:      Head: Normocephalic and atraumatic.      Nose:      Comments: BiPAP in place  Eyes:      Pupils: Pupils are equal, round, and reactive to light.   Neck:      Musculoskeletal: Normal range of motion.   Cardiovascular:      Rate and Rhythm: Normal rate.   Pulmonary:      Effort: Respiratory distress present.      Comments: BiPAP present, respiratory distress  Abdominal:      General: There is distension.      Palpations: Abdomen is soft.   Musculoskeletal:         General: No swelling.   Skin:     General: Skin is dry.      Comments: Cool extremities       Significant Labs:  CBC:  Recent Labs   Lab 12/17/20  0345 12/18/20  0601 12/19/20  0409   WBC 23.94* 23.99* 29.84*   RBC 4.05 4.06 4.17   HGB 10.8* 10.8* 10.9*   HCT 34.1* 34.6* 36.1*    208 272   MCV 84 85 87   MCH 26.7* 26.6* 26.1*   MCHC 31.7*  31.2* 30.2*     BNP:  Recent Labs   Lab 12/17/20  1738 12/19/20  0929   * 335*     CMP:  Recent Labs   Lab 12/17/20  0345 12/18/20  0601 12/18/20  1603 12/19/20  0409   GLU 62* 183* 125* 190*   CALCIUM 8.4* 8.2* 8.3* 8.4*   ALBUMIN 2.3* 2.0* 2.0* 2.0*   PROT 6.2 6.3  --  6.9    135* 139 136   K 4.9 4.6 4.3 4.5   CO2 25 26 26 23    101 105 101   BUN 64* 60* 61* 49*   CREATININE 1.2 1.5* 1.5* 1.5*   ALKPHOS 91 95  --  135   ALT 21 22  --  18   AST 23 22  --  27   BILITOT 1.2* 1.3*  --  1.4*      Coagulation:   Recent Labs   Lab 12/17/20  0345 12/18/20  0601 12/19/20  0409   INR 1.4* 1.3* 1.3*     Microbiology:  Microbiology Results (last 7 days)     Procedure Component Value Units Date/Time    Blood culture [[id number]] Collected: 12/19/20 1240    Order Status: Sent Specimen: Blood from Peripheral, Hand, Left Updated: 12/19/20 1313    Culture, Respiratory with Gram Stain [[id number]]     Order Status: No result Specimen: Respiratory from Endotracheal Aspirate     Blood culture [[id number]]     Order Status: Sent Specimen: Blood     Blood culture [[id number]] Collected: 12/16/20 2000    Order Status: Completed Specimen: Blood Updated: 12/18/20 2212     Blood Culture, Routine No Growth to date      No Growth to date      No Growth to date    Blood culture [[id number]] Collected: 12/16/20 2015    Order Status: Completed Specimen: Blood Updated: 12/18/20 2212     Blood Culture, Routine No Growth to date      No Growth to date      No Growth to date    Blood culture [[id number]]     Order Status: Canceled Specimen: Blood     Blood culture [[id number]]     Order Status: Canceled Specimen: Blood     Blood culture [[id number]] Collected: 12/10/20 1118    Order Status: Completed Specimen: Blood Updated: 12/15/20 1222     Blood Culture, Routine No growth after 5 days.    Narrative:      Blood cultures from 2 different sites. 4 bottles total.  Please draw before starting antibiotics.  Collection has been rescheduled  by CBE at 12/10/2020 10:55 Reason:   due at 10:35  Collection has been rescheduled by CBE at 12/10/2020 10:55 Reason:   due at 10:35    Blood culture [[id number]] Collected: 12/10/20 1104    Order Status: Completed Specimen: Blood Updated: 12/15/20 1222     Blood Culture, Routine No growth after 5 days.    Narrative:      Blood cultures x 2 different sites. 4 bottles total. Please  draw cultures before administering antibiotics.  Collection has been rescheduled by CBE at 12/10/2020 10:55 Reason:   due at 10:35  Collection has been rescheduled by CBE at 12/10/2020 10:55 Reason:   due at 10:35        I have reviewed all pertinent labs within the past 24 hours.    Diagnostic Results:  I have reviewed and interpreted all pertinent imaging results/findings within the past 24 hours.

## 2020-12-19 NOTE — ASSESSMENT & PLAN NOTE
- Patient with severe COVID-19 PNA  - Defer to primary service on management  - Likely basis of patient's respiratory decompensation and less likely related to acute on chronic CHF  - Guarded prognosis

## 2020-12-19 NOTE — SUBJECTIVE & OBJECTIVE
"Interval HPI:   Overnight events: Remains in RICU. BG at or slightly above goal on IV insulin infusion at 0.7 u/hr with scheduled insulin and prn correction scale. Creatinine 1.5.   Eating:   <25%  Diet diabetic [facility]; 2000 Calorie; Isolation Tray - Styrofoam  Nausea: No  Hypoglycemia and intervention: No  Fever: No  TPN and/or TF: No    BP (!) 146/70   Pulse (!) 118   Temp 99.2 °F (37.3 °C) (Axillary)   Resp (!) 42   Ht 5' 4" (1.626 m)   Wt 117 kg (257 lb 15 oz)   LMP 08/25/2019 (Approximate)   SpO2 (!) 90%   BMI 44.27 kg/m²     Labs Reviewed and Include    Recent Labs   Lab 12/19/20  0409   *   CALCIUM 8.4*   ALBUMIN 2.0*   PROT 6.9      K 4.5   CO2 23      BUN 49*   CREATININE 1.5*   ALKPHOS 135   ALT 18   AST 27   BILITOT 1.4*     Lab Results   Component Value Date    WBC 29.84 (H) 12/19/2020    HGB 10.9 (L) 12/19/2020    HCT 36.1 (L) 12/19/2020    MCV 87 12/19/2020     12/19/2020     No results for input(s): TSH, FREET4 in the last 168 hours.  Lab Results   Component Value Date    HGBA1C 6.5 (H) 12/07/2020       Nutritional status:   Body mass index is 44.27 kg/m².  Lab Results   Component Value Date    ALBUMIN 2.0 (L) 12/19/2020    ALBUMIN 2.0 (L) 12/18/2020    ALBUMIN 2.0 (L) 12/18/2020     Lab Results   Component Value Date    PREALBUMIN 20 05/09/2016    PREALBUMIN 16 (L) 05/06/2016    PREALBUMIN 9 (L) 05/04/2016       Estimated Creatinine Clearance: 55.8 mL/min (A) (based on SCr of 1.5 mg/dL (H)).    Accu-Checks  Recent Labs     12/17/20 2016 12/18/20  0018 12/18/20  0606 12/18/20  0757 12/18/20  1127 12/18/20  1717 12/18/20  2144 12/19/20  0239 12/19/20  0941 12/19/20  1202   POCTGLUCOSE 240* 301* 186* 183* 174* 115* 165* 215* 138* 201*       Current Medications and/or Treatments Impacting Glycemic Control  Immunotherapy:    Immunosuppressants     None        Steroids:   Hormones (From admission, onward)    Start     Stop Route Frequency Ordered    12/06/20 " 2100  melatonin tablet 6 mg      -- Oral Nightly PRN 12/06/20 2001        Pressors:    Autonomic Drugs (From admission, onward)    Start     Stop Route Frequency Ordered    12/19/20 1056  succinylcholine (ANECTINE) 20 mg/mL injection     Note to Pharmacy: Created by cabinet override    12/19 2314 12/19/20 1056    12/19/20 1056  rocuronium 10 mg/mL injection     Note to Pharmacy: Created by cabinet override    12/19 2314 12/19/20 1056        Hyperglycemia/Diabetes Medications:   Antihyperglycemics (From admission, onward)    Start     Stop Route Frequency Ordered    12/19/20 0900  insulin detemir U-100 pen 16 Units      -- SubQ Daily 12/19/20 0754    12/19/20 0854  insulin aspart U-100 pen 0-5 Units      -- SubQ Before meals & nightly PRN 12/19/20 0754    12/19/20 0800  insulin aspart U-100 pen 12 Units      -- SubQ 3 times daily with meals 12/19/20 0754

## 2020-12-19 NOTE — NURSING
"Pt called nurse to ask for medicine for chest pain. Nitroglycerin sublingual offered but pt refused. Pt stated "I want the acetaminophen and hydrocodone." Nurse informed pt that relief of chest pain is better achieved with Nitroglycerin. Pt stated "It only hurts when I cough". Pt education provided on side effects of opioids specifically respiratory distress. Will continue to monitor.   "

## 2020-12-19 NOTE — ASSESSMENT & PLAN NOTE
Home regimen includes long-acting insulin glargine 34 units BID and short-acting insulin aspart 16 units with SSI. HgbA1c 6.5.  BG not controlled with >300 all day. 43 units of aspart in the last 24 hours (21 scheduled and 22 SSI)  BG not well controlled due to dexamethasone. Detemir had been increased to 30 U BID and aspart 12U TID on 12/12,   Endocrinology consulted      Plan:   -- Endocrinology consulted   -- BG goal 140-160   -- Dexamethasone completed.

## 2020-12-20 NOTE — EICU
Rounding (Video Assessment):  No    Intervention Initiated From:  Bedside    Monique Communicated with Bedside Nurse regarding:  Other    Nurse Notified:  Yes    Doctor Notified:  Yes    Comments: Called into patients room for an intubation.  On entering room pt on Bipap with sats in the 70- 80's.  Etomidate and Rocuronium were administered and the patient was successfully intubated with a 7.5 Ett.  Placement was confirmed at first by Co2 detector,Glidescope ,and Auscultation and later by CXR.  At this time the patients heart rate was in the 120s with SBP in the 80's.  At 17:07 it was noted that the patient was being paced at 60 and there was not pulse .  CPR was started at 17:07 and ROSC was obtained at 17:15:10.  With 3 round of epi administered.

## 2020-12-20 NOTE — PROGRESS NOTES
[facility] - ICU 16   Critical Care Medicine  Progress Note    Patient Name: Laure Ross  MRN: [id number]  Admission Date: 12/6/2020  Hospital Length of Stay: 14 days  Code Status: Full Code  Attending Provider: Asael Stapleton MD  Primary Care Provider: Holly Mittal MD   Principal Problem: Acute hypoxemic respiratory failure due to COVID-19    Subjective:     HPI:  Ms. Laure Ross is a 50 year old woman with a PMHx of HFrEF 2/2 NICM (EF 15%, CRT-D placed '17), pAF, HTN, IDDM2, HLD, CLARENCE, asthma, GERD and obesity who presents to [facility] for SOB and cough. 1 week ago she began having myalgias, fatigue, nausea, vomiting, headache, and mild dyspnea on exertion. At that time she got a COVID test and was positive on 11/30. Most of her symptoms progressively improved throughout the week but 2 days ago began having worsening SOB even at rest and 1 day ago began having a cough and fever. Denies orthopnea. She says the cough is productive but has been coughing it up and swallowing it so cannot comment on the character of the sputum. She has been taking tylenol at home. She has been adherent with all of her medications including her diuretics and insulin. She denies taking her metolazone recently as needed, however she admits that she has not weighed herself in weeks. She feels she is at her baseline weight. Her urine volume has been unchanged. She has been drinking water but her appetite for food has decreased in the past few days.     Hospital/ICU Course:  Initially admitted to Hospital Medicine for COVID pneumonia on 12/8. Required HFNC between 10-15L. Diurese well, net negative 5L. Started on dexamethasone, remdesivir, ceftriaxone, and doxycycline. Overnight on 12/9, oxygen requirements started to increase. Unable to tolerate coming off of continuous CPAP and escalated to ICU. Remains on CPAP qHS with CF during the day.Patient completed antibiotics for 7 days, as well as 5 day course of remdesivir  and 10 days of dexamethasone. Endocrinology consulted due to hyperglycemia, most likely due to dexamethasone along with baseline T2DM. She was started on insulin infusion along with aspart. Will continue to monitor.  Started on heparin gtt for AC. Patient c/o congestion with CF  for which was kept on CPAP yesterday, CF to eat. WBC continues to be up trending, procal wnl, BC ngtd. CXR with worsening pulmonary edema and pneumonia. Patient has been presenting with low BP today, MAP around 60s this morning. Will start Vanc and Zosyn. Patient will need central line for better IV access. Discontinued home lisinopril. Consulted heart transplant team. 12/19 BiPAP 100% overnight. Lasix given for worsening respiratory failure and antibiotics for worsening leukocytosis. Patient's condition worsening, with intubation watch    Interval History/Significant Events: Worsening oxygen requirements overnight. Now on 100% BiPAP. Diuresis with minimal UOP or improvement in condition    Review of Systems   Unable to perform ROS: Acuity of condition     Objective:     Vital Signs (Most Recent):  Temp: 97.4 °F (36.3 °C) (12/20/20 1600)  Pulse: (!) 36 (12/20/20 1600)  Resp: (!) 35 (12/20/20 1600)  BP: 124/77 (12/20/20 1600)  SpO2: (!) 88 % (12/20/20 1600) Vital Signs (24h Range):  Temp:  [97.4 °F (36.3 °C)-98 °F (36.7 °C)] 97.4 °F (36.3 °C)  Pulse:  [] 36  Resp:  [30-40] 35  SpO2:  [83 %-90 %] 88 %  BP: (102-154)/() 124/77   Weight: 117 kg (257 lb 15 oz)  Body mass index is 44.27 kg/m².      Intake/Output Summary (Last 24 hours) at 12/20/2020 1658  Last data filed at 12/20/2020 1600  Gross per 24 hour   Intake 2752.98 ml   Output 865 ml   Net 1887.98 ml       Physical Exam  Vitals signs and nursing note reviewed.   Constitutional:       Appearance: She is well-developed. She is morbidly obese. She is ill-appearing.      Comments: BiPAP   HENT:      Head: Normocephalic.      Mouth/Throat:      Mouth: Mucous membranes are dry.    Eyes:      Pupils: Pupils are equal, round, and reactive to light.   Cardiovascular:      Rate and Rhythm: Regular rhythm. Tachycardia present.      Pulses: Normal pulses.      Comments: Paced beats noted  Pulmonary:      Effort: Tachypnea, accessory muscle usage and respiratory distress present.      Breath sounds: Normal air entry.   Abdominal:      General: Bowel sounds are normal. There is distension.      Palpations: Abdomen is soft.      Tenderness: There is no abdominal tenderness.   Genitourinary:     Comments: Puga  Musculoskeletal:         General: No swelling.   Skin:     General: Skin is warm and dry.   Neurological:      Mental Status: She is alert.      GCS: GCS eye subscore is 3. GCS verbal subscore is 1. GCS motor subscore is 5.   Psychiatric:         Mood and Affect: Mood normal.         Vents:  Oxygen Concentration (%): 100 (12/20/20 1600)  Lines/Drains/Airways     Peripherally Inserted Central Catheter Line            PICC Double Lumen 12/19/20 1707 left basilic less than 1 day          Drain                 Urethral Catheter 12/16/20 1600 16 Fr. 4 days          Peripheral Intravenous Line                 Midline Catheter Insertion/Assessment  - Single Lumen 12/11/20 1524 Right cephalic vein (lateral side of arm) 18g x 10cm 9 days         Peripheral IV - Single Lumen 12/17/20 1710 20 G;1 3/4 in Left Antecubital 2 days              Significant Labs:    CBC/Anemia Profile:  Recent Labs   Lab 12/19/20  0409 12/20/20  0355   WBC 29.84* 30.13*   HGB 10.9* 10.4*   HCT 36.1* 36.3*    315   MCV 87 92   RDW 15.5* 15.7*        Chemistries:  Recent Labs   Lab 12/19/20  0409 12/19/20  1928 12/20/20  0355 12/20/20  1214    139 138 140   K 4.5 5.0 4.9 4.3    102 100 101   CO2 23 19* 22* 25   BUN 49* 56* 64* 66*   CREATININE 1.5* 1.9* 1.8* 1.9*   CALCIUM 8.4* 9.0 8.7 8.8   ALBUMIN 2.0*  --  1.8*  --    PROT 6.9  --  7.2  --    BILITOT 1.4*  --  1.2*  --    ALKPHOS 135  --  208*  --    ALT  18  --  16  --    AST 27  --  31  --    MG 2.3  --  2.4  --    PHOS 4.0  --  3.6  --        All pertinent labs within the past 24 hours have been reviewed.    Significant Imaging:  I have reviewed and interpreted all pertinent imaging results/findings within the past 24 hours.      ABG  Recent Labs   Lab 12/20/20  1000   PH 7.357   PO2 28*   PCO2 43.8   HCO3 24.6   BE -1     Assessment/Plan:     Pulmonary  Moderate persistent asthma without complication  - Continue home maintenance inhalers and montelukast 10 mg    Cardiac/Vascular  Chronic combined systolic and diastolic congestive heart failure  Known NICM (EF 15%), CRT-D (2017) in place (Patient of Dr. Jules)  - Repeat TTE  12/10 EF 15%  - Patient with up trending WBC and increased O2 requirements, along with intervals of hypotension   - Repeat CXR with worsening pulmonary edema and pneumonia.     Plan:  -- Consulted heart transplant team, appreciate recommendations.  -- Holding lisinopril in setting of hypotension   -- Trend BNP    -- Diurese  -- Dobutamine for HF  -- Continue  BiPAP   -- Cardiology consulted             Paroxysmal atrial fibrillation  INR 1.5, D-dimer 22.65    Plan:   - Continue home digoxin and amiodarone  - Continue heparin gtt as VTE PPx      Renal/  Acute kidney injury superimposed on chronic kidney disease  AL on CKD noted on admit, improving now. Baseline creatinine ~1.2  - Creatinine above baseline    Plan:   - Continue lasix   - Trend CMP   - Strict I/O  - Renally dose meds and avoid nephrotoxics    Endocrine  Diabetes mellitus type 2, uncontrolled  Labile glucose likely due to steroids  - Insulin infusion  - hourly glucose  - titrate per protocol    Type 2 diabetes mellitus with diabetic polyneuropathy  Home regimen includes long-acting insulin glargine 34 units BID and short-acting insulin aspart 16 units with SSI. HgbA1c 6.5.  BG not controlled with >300 all day. 43 units of aspart in the last 24 hours (21 scheduled and 22 SSI)  BG  not well controlled due to dexamethasone. Detemir had been increased to 30 U BID and aspart 12U TID on 12/12,   Endocrinology consulted      Plan:   -- Endocrinology consulted   -- BG goal 140-160   -- Dexamethasone completed.    GI  GERD (gastroesophageal reflux disease)  - famotidine 20mg BID  - GI cocktail prn    Other  * Acute hypoxemic respiratory failure due to COVID-19  COVID positive on 11/30. Admitted to hospital 12/6, upgraded to ICU 12/10 for increasing oxygen requirements. 's up from 200's on admit.   -- Patient alternating between BiPAP qHS and CF on daytime.   -- WBC up trending with worsening CXR      Plan:   -- Continue BiPAP    -- Continue vanc and zosyn   -- F/U BC: ngtd   -- S/p 7 days of CAP coverage  -- Diurese with lasix infusion  -- Completed dexamethasone x 10 days  -- Completed remdesivir X 5 days    -- Continue heparin infusion for hypercoagulability    -- Scheduled Duonebs   Morphine prn  -- Check procal, lactate, and trop      Pneumonia due to COVID-19 virus  See respiratory failure     Chest pain, musculoskeletal  Patient c/o heart burn and chest pain. Pain reproducible to touch, she refers ache like sensation when she takes a breath and with coughing. Non radiating.     Plan:   -- Order troponin   -- Give nitro SL prn  -- Anti inflammatory medications PRN for MSK pain     Goals of care, counseling/discussion  Palliative care consulted, appreciate assistance  --C discussions initiated.  - patient and family would like to proceed with intubation      CLARENCE (obstructive sleep apnea)  - Continue BiPAP       Critical Care Time: 60 minutes  Critical secondary to Patient has a condition that poses threat to life and bodily function: Severe Respiratory Distress      Critical care was time spent personally by me on the following activities: development of treatment plan with patient or surrogate and bedside caregivers, discussions with consultants, evaluation of patient's response to  treatment, examination of patient, ordering and performing treatments and interventions, ordering and review of laboratory studies, ordering and review of radiographic studies, pulse oximetry, re-evaluation of patient's condition. This critical care time did not overlap with that of any other provider or involve time for any procedures.     Fiona Winterbottom, APRN, CNS  Critical Care Medicine  [facility] - ICU 16 [facility]

## 2020-12-20 NOTE — SUBJECTIVE & OBJECTIVE
Interval History/Significant Events: Worsening oxygen requirements overnight. Now on 100% BiPAP. Diuresis with minimal UOP or improvement in condition    Review of Systems   Unable to perform ROS: Acuity of condition     Objective:     Vital Signs (Most Recent):  Temp: 97.4 °F (36.3 °C) (12/20/20 1600)  Pulse: (!) 36 (12/20/20 1600)  Resp: (!) 35 (12/20/20 1600)  BP: 124/77 (12/20/20 1600)  SpO2: (!) 88 % (12/20/20 1600) Vital Signs (24h Range):  Temp:  [97.4 °F (36.3 °C)-98 °F (36.7 °C)] 97.4 °F (36.3 °C)  Pulse:  [] 36  Resp:  [30-40] 35  SpO2:  [83 %-90 %] 88 %  BP: (102-154)/() 124/77   Weight: 117 kg (257 lb 15 oz)  Body mass index is 44.27 kg/m².      Intake/Output Summary (Last 24 hours) at 12/20/2020 1658  Last data filed at 12/20/2020 1600  Gross per 24 hour   Intake 2752.98 ml   Output 865 ml   Net 1887.98 ml       Physical Exam  Vitals signs and nursing note reviewed.   Constitutional:       Appearance: She is well-developed. She is morbidly obese. She is ill-appearing.      Comments: BiPAP   HENT:      Head: Normocephalic.      Mouth/Throat:      Mouth: Mucous membranes are dry.   Eyes:      Pupils: Pupils are equal, round, and reactive to light.   Cardiovascular:      Rate and Rhythm: Regular rhythm. Tachycardia present.      Pulses: Normal pulses.      Comments: Paced beats noted  Pulmonary:      Effort: Tachypnea, accessory muscle usage and respiratory distress present.      Breath sounds: Normal air entry.   Abdominal:      General: Bowel sounds are normal. There is distension.      Palpations: Abdomen is soft.      Tenderness: There is no abdominal tenderness.   Genitourinary:     Comments: Puga  Musculoskeletal:         General: No swelling.   Skin:     General: Skin is warm and dry.   Neurological:      Mental Status: She is alert.      GCS: GCS eye subscore is 3. GCS verbal subscore is 1. GCS motor subscore is 5.   Psychiatric:         Mood and Affect: Mood normal.          Vents:  Oxygen Concentration (%): 100 (12/20/20 1600)  Lines/Drains/Airways     Peripherally Inserted Central Catheter Line            PICC Double Lumen 12/19/20 1707 left basilic less than 1 day          Drain                 Urethral Catheter 12/16/20 1600 16 Fr. 4 days          Peripheral Intravenous Line                 Midline Catheter Insertion/Assessment  - Single Lumen 12/11/20 1524 Right cephalic vein (lateral side of arm) 18g x 10cm 9 days         Peripheral IV - Single Lumen 12/17/20 1710 20 G;1 3/4 in Left Antecubital 2 days              Significant Labs:    CBC/Anemia Profile:  Recent Labs   Lab 12/19/20  0409 12/20/20  0355   WBC 29.84* 30.13*   HGB 10.9* 10.4*   HCT 36.1* 36.3*    315   MCV 87 92   RDW 15.5* 15.7*        Chemistries:  Recent Labs   Lab 12/19/20  0409 12/19/20  1928 12/20/20  0355 12/20/20  1214    139 138 140   K 4.5 5.0 4.9 4.3    102 100 101   CO2 23 19* 22* 25   BUN 49* 56* 64* 66*   CREATININE 1.5* 1.9* 1.8* 1.9*   CALCIUM 8.4* 9.0 8.7 8.8   ALBUMIN 2.0*  --  1.8*  --    PROT 6.9  --  7.2  --    BILITOT 1.4*  --  1.2*  --    ALKPHOS 135  --  208*  --    ALT 18  --  16  --    AST 27  --  31  --    MG 2.3  --  2.4  --    PHOS 4.0  --  3.6  --        All pertinent labs within the past 24 hours have been reviewed.    Significant Imaging:  I have reviewed and interpreted all pertinent imaging results/findings within the past 24 hours.

## 2020-12-20 NOTE — ASSESSMENT & PLAN NOTE
Labile glucose likely due to steroids  - Insulin infusion  - hourly glucose  - titrate per protocol

## 2020-12-20 NOTE — ASSESSMENT & PLAN NOTE
Known NICM (EF 15%), CRT-D (2017) in place (Patient of Dr. Jules)  - Repeat TTE  12/10 EF 15%  - Patient with up trending WBC and increased O2 requirements, along with intervals of hypotension   - Repeat CXR with worsening pulmonary edema and pneumonia.     Plan:  -- Consulted heart transplant team, appreciate recommendations.  -- Holding lisinopril in setting of hypotension   -- Trend BNP    -- Diurese  -- Dobutamine for HF  -- Continue  BiPAP   -- Cardiology consulted

## 2020-12-20 NOTE — PROGRESS NOTES
Pharmacokinetic Assessment Follow Up: IV Vancomycin    Vancomycin serum concentration assessment(s)/plan:    - Vancomycin random level this AM resulted at 17 mcg/mL, which is considered subtherapeutic (goal 10-20 mcg/mL).  - Pt is still in AL with serum creatinine slightly improving.  - Administer vancomycin 1750 mg IV (15mg/kg) once today given the level is below the goal.  - Continue to pulse dose for now and re-evaluate therapy daily.  - Ordered next level for tomorrow (12/21) with AM labs.    Drug levels (last 3 results):  Recent Labs   Lab Result Units 12/19/20 1928 12/20/20  0355   Vancomycin, Random ug/mL  --  17.0   Vancomycin-Trough ug/mL 20.0  --        Pharmacy will continue to follow and monitor vancomycin.    Please contact pharmacy at extension [phone] for questions regarding this assessment.    Thank you for the consult,   Josefa Frank       Patient brief summary:  Laure Ross is a 50 y.o. female initiated on antimicrobial therapy with IV Vancomycin for treatment of lower respiratory infection      Drug Allergies:   Review of patient's allergies indicates:  No Known Allergies    Actual Body Weight:   117 kg    Renal Function:   Estimated Creatinine Clearance: 46.5 mL/min (A) (based on SCr of 1.8 mg/dL (H)).,     Dialysis Method (if applicable):  N/A    CBC (last 72 hours):  Recent Labs   Lab Result Units 12/18/20  0601 12/19/20 0409 12/20/20  0355   WBC K/uL 23.99* 29.84* 30.13*   Hemoglobin g/dL 10.8* 10.9* 10.4*   Hematocrit % 34.6* 36.1* 36.3*   Platelets K/uL 208 272 315   Gran % % 85.8* 88.5* 88.2*   Lymph % % 3.3* 2.1* 1.9*   Mono % % 7.9 6.8 8.2   Eosinophil % % 0.7 0.4 0.0   Basophil % % 0.2 0.2 0.1   Differential Method  Automated Automated Automated       Metabolic Panel (last 72 hours):  Recent Labs   Lab Result Units 12/18/20  0601 12/18/20  1327 12/18/20  1603 12/19/20  0409 12/19/20 1928 12/20/20  0355   Sodium mmol/L 135*  --  139 136 139 138   Sodium, Urine mmol/L  --  20  --    --   --   --    Potassium mmol/L 4.6  --  4.3 4.5 5.0 4.9   Chloride mmol/L 101  --  105 101 102 100   CO2 mmol/L 26  --  26 23 19* 22*   Glucose mg/dL 183*  --  125* 190* 301* 247*   BUN mg/dL 60*  --  61* 49* 56* 64*   Creatinine mg/dL 1.5*  --  1.5* 1.5* 1.9* 1.8*   Creatinine, Urine mg/dL  --  55.0  --   --   --   --    Albumin g/dL 2.0*  --  2.0* 2.0*  --  1.8*   Total Bilirubin mg/dL 1.3*  --   --  1.4*  --  1.2*   Alkaline Phosphatase U/L 95  --   --  135  --  208*   AST U/L 22  --   --  27  --  31   ALT U/L 22  --   --  18  --  16   Magnesium mg/dL 2.2  --   --  2.3  --  2.4   Phosphorus mg/dL 3.7  --  4.0 4.0  --  3.6       Vancomycin Administrations:  vancomycin given in the last 96 hours                   vancomycin 2 g in dextrose 5 % 500 mL IVPB (mg) 2,000 mg New Bag 12/18/20 2116    vancomycin (VANCOCIN) 2,250 mg in dextrose 5 % 500 mL IVPB (mg) 2,250 mg New Bag 12/17/20 2143                Microbiologic Results:  Microbiology Results (last 7 days)     Procedure Component Value Units Date/Time    Blood culture [[id number]] Collected: 12/16/20 2000    Order Status: Completed Specimen: Blood Updated: 12/19/20 2212     Blood Culture, Routine No Growth to date      No Growth to date      No Growth to date      No Growth to date    Blood culture [[id number]] Collected: 12/16/20 2015    Order Status: Completed Specimen: Blood Updated: 12/19/20 2212     Blood Culture, Routine No Growth to date      No Growth to date      No Growth to date      No Growth to date    Blood culture [[id number]] Collected: 12/19/20 1437    Order Status: Completed Specimen: Blood Updated: 12/19/20 2115     Blood Culture, Routine No Growth to date    Narrative:      Blood cultures x 2 different sites. 4 bottles total. Please  draw cultures before administering antibiotics.    Blood culture [[id number]] Collected: 12/19/20 1240    Order Status: Completed Specimen: Blood from Peripheral, Hand, Left Updated: 12/19/20 1945     Blood  Culture, Routine No Growth to date    Narrative:      Blood cultures from 2 different sites. 4 bottles total.  Please draw before starting antibiotics.    Culture, Respiratory with Gram Stain [[id number]]     Order Status: No result Specimen: Respiratory from Endotracheal Aspirate     Blood culture [[id number]]     Order Status: Canceled Specimen: Blood     Blood culture [[id number]]     Order Status: Canceled Specimen: Blood     Blood culture [[id number]] Collected: 12/10/20 1118    Order Status: Completed Specimen: Blood Updated: 12/15/20 1222     Blood Culture, Routine No growth after 5 days.    Narrative:      Blood cultures from 2 different sites. 4 bottles total.  Please draw before starting antibiotics.  Collection has been rescheduled by CBE at 12/10/2020 10:55 Reason:   due at 10:35  Collection has been rescheduled by CBE at 12/10/2020 10:55 Reason:   due at 10:35    Blood culture [[id number]] Collected: 12/10/20 1104    Order Status: Completed Specimen: Blood Updated: 12/15/20 1222     Blood Culture, Routine No growth after 5 days.    Narrative:      Blood cultures x 2 different sites. 4 bottles total. Please  draw cultures before administering antibiotics.  Collection has been rescheduled by CBE at 12/10/2020 10:55 Reason:   due at 10:35  Collection has been rescheduled by CBE at 12/10/2020 10:55 Reason:   due at 10:35

## 2020-12-20 NOTE — ASSESSMENT & PLAN NOTE
Palliative care consulted, appreciate assistance  --GOC discussions initiated.  - patient and family would like to proceed with intubation

## 2020-12-20 NOTE — ASSESSMENT & PLAN NOTE
COVID positive on 11/30. Admitted to hospital 12/6, upgraded to ICU 12/10 for increasing oxygen requirements. 's up from 200's on admit.   -- Patient alternating between BiPAP qHS and CF on daytime.   -- WBC up trending with worsening CXR      Plan:   -- Continue BiPAP    -- Continue vanc and zosyn   -- F/U BC: ngtd   -- S/p 7 days of CAP coverage  -- Diurese with lasix infusion  -- Completed dexamethasone x 10 days  -- Completed remdesivir X 5 days    -- Continue heparin infusion for hypercoagulability    -- Scheduled Duonebs   Morphine prn  -- Check procal, lactate, and trop

## 2020-12-20 NOTE — PROGRESS NOTES
Pharmacokinetic Assessment Follow Up: IV Vancomycin    Vancomycin Regimen Assessment/Plan:  - Vancomycin trough level (22-hour level) resulted at 20 mcg/mL; true trough is within therapeutic range (goal: 10-20 mcg/mL)  - AL - Serum creatinine increased from 1.5 mg/dL to 1.9 mg/dL   - Discontinue scheduled regimen of vancomycin and switch to vancomycin pulse dosing; hold vancomycin dose tonight  - Next vancomycin level ordered with 12/20 AM labs      Drug levels (last 3 results):  Recent Labs   Lab Result Units 12/19/20  1928   Vancomycin-Trough ug/mL 20.0       Pharmacy will continue to follow and monitor vancomycin.    Please contact pharmacy at extension [phone] for questions regarding this assessment.    Thank you for the consult,   Jacklyn Powers       Patient brief summary:  Laure Ross is a 50 y.o. female initiated on antimicrobial therapy with IV Vancomycin for treatment of lower respiratory infection      Drug Allergies:   Review of patient's allergies indicates:  No Known Allergies    Actual Body Weight:   117 kg    Renal Function:   Estimated Creatinine Clearance: 44 mL/min (A) (based on SCr of 1.9 mg/dL (H)).     Dialysis Method (if applicable):  N/A

## 2020-12-20 NOTE — CARE UPDATE
BG goal 140-180    Remains on covid unit. Bipap yesterday. Dobutamine, lasix and heparin infusions; some mixed in dextrose. BG trending up on MDI. Creatinine 1.8.     Plan:  Change to Levemir 16 units BID.   Increase to novolog 14 units with meals.   BG monitoring ac/hs and low dose correction scale.   Will consider resuming transition insulin infusion if BG does not improve.     Discharge planning:tbd     Endocrine to continue to follow    ** Please call Endocrine for any BG related issues **

## 2020-12-20 NOTE — CODE DOCUMENTATION
CODE SUMMARY  Critical Care Medicine    Admit Date: 12/6/2020  LOS: 14    CC: Acute hypoxemic respiratory failure due to COVID-19    Code Status: Full Code   Code Date: 12/20/2020    CODE Metrics:     Location of CODE: 83700/40124 A  Patient Age: 50 y.o.  MRN: [id number]  Attending Physician: Asael Stapleton MD  Primary Service: [facility] [place] CRITICAL CARE MEDICINE TEAM 2  Time of need for chest compressions or airway support: 17:07  Time CPR initiated: 17:07  Time to first epinephrine given: 17:08  Time to first defibrillation: NA  ROSC? (if yes provide time): 17:15  CODE Team Members: Dr Stapleton  [facility] Resident/Fellow: Dr Moira Solano  [facility] GABBI: Fiona Winterbottom  CODE Team Recorder: [facility]  ICU Nurse #1: Ricky Yeh  ICU Nurse #2: Holly Law    SUBJECTIVE:     Events preceding cardiopulmonary arrest: Patient had worsening respiratory failure over the past two days requiring increasing NIV. She was treated for COVID pneumonia and heart failure. Discussions were held with the family and the decision was made to intubate the patient. Immediately after the ETT was placed, the patient went into PEA    Objective:     Physical Exam:  Last Vitals:  Vitals:    12/20/20 1715   BP:    Pulse: (!) 121   Resp:    Temp:      GA: Comatose, unresponsive.  HEENT: No scleral icterus    Pulmonary: Apneic.     Cardiac: Pulseless. No chest deformities.   Abdominal: No visible abdominal lesions.    Neuro:  --GCS: E1 V1 M1       IV Access PTA::  PICC Double Lumen 12/19/20 1707 left basilic (Active)   Verification by X-ray Yes 12/19/20 2305   Site Assessment No drainage;No redness;No swelling;No warmth 12/20/20 0800   Line Securement Device Secured with sutureless device 12/20/20 0800   Dressing Type Biopatch in place 12/20/20 1600   Dressing Status Clean;Dry;Intact 12/20/20 1600   Dressing Intervention Integrity maintained 12/20/20 1600   Date on Dressing 12/19/20 12/20/20 0800   Dressing Due to be Changed 12/26/20 12/20/20 0800   Left Lumen  Patency/Care Blood return present;Flushed w/o difficulty;Infusing 12/20/20 1600   Right Lumen Patency/Care Blood return present;Flushed w/o difficulty;Infusing 12/20/20 1600   Current Insertion Depth (cm) 39 cm 12/19/20 1701   Current Exposed Catheter (cm) 0 cm 12/19/20 1701   Extremity Circumference (cm) 44 cm 12/19/20 1701   Line Necessity Review Hemodynamic instability 12/20/20 0800           Peripheral IV - Single Lumen 12/17/20 1710 20 G;1 3/4 in Left Antecubital (Active)   Site Assessment Clean;Dry;No swelling;No redness;Intact 12/20/20 1600   Line Status Infusing 12/20/20 1600   Dressing Status Clean;Dry;Intact 12/20/20 1600   Dressing Intervention Integrity maintained 12/20/20 1600   Dressing Change Due 12/21/20 12/20/20 1600   Site Change Due 12/21/20 12/20/20 0800   Reason Not Rotated Not due 12/20/20 1600            Midline Catheter Insertion/Assessment  - Single Lumen 12/11/20 1524 Right cephalic vein (lateral side of arm) 18g x 10cm (Active)   Site Assessment Dry;Clean;Intact;No redness;No swelling 12/20/20 1600   IV Device Securement catheter securement device 12/20/20 1600   Line Status Infusing 12/20/20 1600   Dressing Type Biopatch in place 12/20/20 1600   Dressing Status Clean;Dry;Intact 12/20/20 1600   Dressing Intervention Integrity maintained 12/20/20 1600   Dressing Change Due 12/23/20 12/20/20 1600   Site Change Due 01/09/21 12/20/20 0800   Reason Not Rotated Not due 12/20/20 0305       Labs:  ABG:   Recent Labs   Lab 12/20/20  1000   PH 7.357   PO2 28*   PCO2 43.8   HCO3 24.6   POCSATURATED 50*   BE -1     BMP:  Recent Labs   Lab 12/20/20  0355 12/20/20  1214    140   K 4.9 4.3    101   CO2 22* 25   BUN 64* 66*   CREATININE 1.8* 1.9*   * 232*   MG 2.4  --    PHOS 3.6  --      LFT:   Lab Results   Component Value Date    AST 31 12/20/2020    ALT 16 12/20/2020    ALKPHOS 208 (H) 12/20/2020    BILITOT 1.2 (H) 12/20/2020    ALBUMIN 1.8 (L) 12/20/2020    PROT 7.2 12/20/2020      CBC:   Lab Results   Component Value Date    WBC 30.13 (H) 12/20/2020    HGB 10.4 (L) 12/20/2020    HCT 36.3 (L) 12/20/2020    MCV 92 12/20/2020     12/20/2020       CODE Timeline: Time/Event         Intubation 17:06  CPR was started at 17:07   Epi- 17:08  Epi 17:11  Epi 17:14  ROSC was obtained at 17:15    Placed on Epi and Levophed infusions    Critical Care Time (uninterrupted) 45 mins  Fiona Winterbottom APRN, CNS  Critical Care Medicine  [phone]

## 2020-12-20 NOTE — PROGRESS NOTES
Pharmacist Renal Dose Adjustment Note    Laure Ross is a 50 y.o. female being treated with the medication famotidine    Patient Data:    Vital Signs (Most Recent):  Temp: 97.4 °F (36.3 °C) (12/20/20 1600)  Pulse: (!) 36 (12/20/20 1600)  Resp: (!) 35 (12/20/20 1600)  BP: 124/77 (12/20/20 1600)  SpO2: (!) 88 % (12/20/20 1600)   Vital Signs (72h Range):  Temp:  [97.4 °F (36.3 °C)-100.5 °F (38.1 °C)]   Pulse:  []   Resp:  [17-48]   BP: ()/()   SpO2:  [83 %-100 %]      Recent Labs   Lab 12/19/20  1928 12/20/20  0355 12/20/20  1214   CREATININE 1.9* 1.8* 1.9*     Serum creatinine: 1.9 mg/dL (H) 12/20/20 1214  Estimated creatinine clearance: 44 mL/min (A)    Famotidine 20 mg IV BID will be changed to famotidine 20 mg IV QD    Pharmacist's Name: Jacklyn Powers  Pharmacist's Extension: [phone]

## 2020-12-20 NOTE — PLAN OF CARE
Patient not medically stable for discharge,  Worsening oxygen requirements.  Now on 100% BiPAP. Lasix given with minimal increase in UOP    Remains on covid unit. Bipap,  Dobutamine, lasix and heparin infusions; some mixed in dextrose. BG trending up on MDI. Creatinine 1.8.      Plan:  Change to Levemir 16 units BID.   Increase to novolog 14 units with meals.   BG monitoring ac/hs and low dose correction scale.   Will consider resuming transition insulin infusion if BG does not improve.      CM to follow for discharge planning needs.  JEANNA Montalvo RN  Case Management  [id number]        12/20/20 1512   Discharge Reassessment   Assessment Type Discharge Planning Reassessment   Provided patient/caregiver education on the expected discharge date and the discharge plan Yes   Do you have any problems affording any of your prescribed medications? TBD   Discharge Plan A Skilled Nursing Facility   DME Needed Upon Discharge  other (see comments)  (TBD)   Anticipated Discharge Disposition SNF   Can the patient/caregiver answer the patient profile reliably? No, cognitively impaired   Describe the patient's ability to walk at the present time. Major restrictions/daily assistance from another person   Post-Acute Status   Post-Acute Authorization Placement   Post-Acute Placement Status Awaiting Internal Medical Clearance   Discharge Delays None known at this time

## 2020-12-21 NOTE — CARE UPDATE
Patient with continued clinical decline throughout the evening. Now on max dose 3 vasopressors. Increasing difficulty in oxygenating/ventilating despite maximum support w/ ventilator. Now in renal failure/anuric. Patient's daughter Don called and notified of severity of condition. Don presented to bedside to visit with family. Pt's son is not expected to visit this evening. At this time family is not ready to make patient no compressions and patient remains full code.     Anette Davila NP  Critical Care Medicine

## 2020-12-21 NOTE — PLAN OF CARE
CMICU DAILY GOALS     Patient's status continues to decline. Max doses of epi, levo, and vaso infusing with minimal response from patient. Neuro current status as charted. Patient still dependent on vasopressers, bicarb, insulin, and lasix gtt. Accu checks Q1H maintained. Daughter at bedside. VS and assessment per flow sheet, patient is not progressing towards goals, plan of care reviewed with Laure Ross and daughter, all concerns addressed, will continue to monitor.    A: Awake    RASS: Goal -    Actual - RASS (Paige Agitation-Sedation Scale): -5-->unarousable   Restraint necessity:    B: Breath   SBT: Not attempted   C: Coordinate A & B, analgesics/sedatives   Pain: managed    SAT: Not attempted  D: Delirium   CAM-ICU: Overall CAM-ICU: Positive  E: Early(intubated/ Progressive (non-intubated) Mobility   MOVE Screen: Fail   Activity: Activity Management: patient unable to perform activities  FAS: Feeding/Nutrition   Diet order: Diet/Nutrition Received: NPO,   Fluid restriction:    T: Thrombus   DVT prophylaxis: VTE Required Core Measure: Pharmacological prophylaxis initiated/maintained  H: HOB Elevation   Head of Bed (HOB): HOB at 30-45 degrees  U: Ulcer Prophylaxis   GI: yes  G: Glucose control   managed Glycemic Management: blood glucose monitoring  S: Skin   Bundle compliance: yes   Bathing/Skin Care: incontinence care Date: 12/20/2020 @22:00  B: Bowel Function   No audible bowel sounds  I: Indwelling Catheters   [place] necessity: [REMOVED]      Urethral Catheter 12/10/20 1500-Reason for Continuing Urinary Catheterization: Critically ill in ICU and requiring hourly monitoring of intake/output       Urethral Catheter 12/16/20 1600 16 Fr.-Reason for Continuing Urinary Catheterization: Critically ill in ICU and requiring hourly monitoring of intake/output   CVC necessity: Yes   IPAD offered: No  D: De-escalation Antibx   Yes  Plan for the day  Monitor BP with 3 vasopressers, labs, neuro function,  vasopresser control   Code Status: Full Code

## 2020-12-21 NOTE — ASSESSMENT & PLAN NOTE
COVID positive on 11/30. Admitted to hospital 12/6, upgraded to ICU 12/10 for increasing oxygen requirements. 's up from 200's on admit.   --emergently intubated 12/20 for worsening respiratory failure  -- Continue vanc and zosyn   -- F/U BC: ngtd   -- Diurese with lasix infusion  -- Completed dexamethasone x 10 days  -- Completed remdesivir X 5 days    -- Continue heparin infusion for hypercoagulability    -- Scheduled Duonebs

## 2020-12-21 NOTE — SUBJECTIVE & OBJECTIVE
Interval History/Significant Events: Coded yesterday evening with 7 mins down time. Decline in condition overnight. Now w/ MODS. Family aware of grave prognosis.     Review of Systems   Unable to perform ROS: Patient unresponsive     Objective:     Vital Signs (Most Recent):  Temp: (!) 93.6 °F (34.2 °C) (12/21/20 0501)  Pulse: 82 (12/21/20 0501)  Resp: (!) 35 (12/21/20 0501)  BP: (!) 68/45 (12/21/20 0401)  SpO2: (!) 61 % (12/21/20 0501) Vital Signs (24h Range):  Temp:  [93.6 °F (34.2 °C)-98 °F (36.7 °C)] 93.6 °F (34.2 °C)  Pulse:  [] 82  Resp:  [24-38] 35  SpO2:  [49 %-95 %] 61 %  BP: ()/(45-89) 68/45  Arterial Line BP: ()/(42-69) 58/42   Weight: 117 kg (257 lb 15 oz)  Body mass index is 44.27 kg/m².      Intake/Output Summary (Last 24 hours) at 12/21/2020 0612  Last data filed at 12/21/2020 0501  Gross per 24 hour   Intake 8464.82 ml   Output 435 ml   Net 8029.82 ml       Physical Exam  HENT:      Head: Normocephalic and atraumatic.      Right Ear: External ear normal.      Left Ear: External ear normal.      Nose: Nose normal.      Mouth/Throat:      Mouth: Mucous membranes are moist.      Pharynx: Oropharynx is clear.   Eyes:      Comments: Pupils 8 bilaterally, fixed/non-reactive. + scleral edema   Neck:      Musculoskeletal: Normal range of motion and neck supple.   Cardiovascular:      Comments: Paced rhythm, rate 60. Diminished pulses throughout  Pulmonary:      Comments: Coarse breath sounds bilaterally. Mechanically ventilated.   Abdominal:      Palpations: Abdomen is soft.      Comments: Absent bowel sounds   Musculoskeletal:      Comments: Diffuse edema throughout   Skin:     Capillary Refill: Capillary refill takes more than 3 seconds.   Neurological:      Mental Status: She is unresponsive.      GCS: GCS eye subscore is 1. GCS verbal subscore is 1. GCS motor subscore is 1.      Comments: No cough or gag reflex         Vents:  Vent Mode: A/C (12/21/20 0303)  Ventilator Initiated: Yes  (12/20/20 1726)  Set Rate: 35 BPM (12/21/20 0303)  Vt Set: 305 mL (12/21/20 0303)  PEEP/CPAP: 16 cmH20 (12/21/20 0303)  Oxygen Concentration (%): 100 (12/21/20 0501)  Peak Airway Pressure: 46 cmH2O (12/21/20 0303)  Total Ve: 11.2 mL (12/21/20 0303)  F/VT Ratio<105 (RSBI): 109.72 (12/21/20 0303)  Lines/Drains/Airways     Peripherally Inserted Central Catheter Line            PICC Double Lumen 12/19/20 1707 left basilic 1 day          Central Venous Catheter Line            Trialysis (Dialysis) Catheter 12/20/20 1800 right internal jugular less than 1 day          Drain                 Urethral Catheter 12/16/20 1600 16 Fr. 4 days          Airway                 Airway - Non-Surgical 12/20/20 1704 Endotracheal Tube less than 1 day          Arterial Line            Arterial Line 12/20/20 1739 Right Radial less than 1 day          Peripheral Intravenous Line                 Midline Catheter Insertion/Assessment  - Single Lumen 12/11/20 1524 Right cephalic vein (lateral side of arm) 18g x 10cm 9 days         Peripheral IV - Single Lumen 12/17/20 1710 20 G;1 3/4 in Left Antecubital 3 days              Significant Labs:    CBC/Anemia Profile:  Recent Labs   Lab 12/20/20  0355 12/20/20 1839 12/21/20 0309   WBC 30.13* 38.12* 28.92*   HGB 10.4* 10.4* 9.2*   HCT 36.3* 34.7* 32.5*    413* 326   MCV 92 89 93   RDW 15.7* 15.8* 16.2*        Chemistries:  Recent Labs   Lab 12/20/20  0355  12/20/20  1839 12/21/20  0013 12/21/20  0309      < > 136 139 140   K 4.9   < > 4.8 5.5* 5.1      < > 98 99 97   CO2 22*   < > 21* 14* 15*   BUN 64*   < > 73* 66* 62*   CREATININE 1.8*   < > 2.6* 2.9* 2.8*   CALCIUM 8.7   < > 8.7 7.8* 6.6*   ALBUMIN 1.8*  --  1.6*  --  1.1*   PROT 7.2  --  7.2  --  4.9*   BILITOT 1.2*  --  1.1*  --  1.6*   ALKPHOS 208*  --  210*  --  198*   ALT 16  --  41  --  2,246*   AST 31  --  63*  --  3,326*   MG 2.4  --  2.9*  --  2.7*   PHOS 3.6  --  7.4*  --  12.2*    < > = values in this interval not  displayed.       All pertinent labs within the past 24 hours have been reviewed.    Significant Imaging:  I have reviewed and interpreted all pertinent imaging results/findings within the past 24 hours.

## 2020-12-21 NOTE — ASSESSMENT & PLAN NOTE
Palliative care consulted, appreciate assistance  --GOC discussions ongoing  --patient actively dying; family aware  --patient remains full code

## 2020-12-21 NOTE — PROCEDURES
"Laure Ross is a 50 y.o. female patient.    Temp: 97.4 °F (36.3 °C) (12/20/20 1600)  Pulse: 97 (12/20/20 1726)  Resp: (!) 35 (12/20/20 1600)  BP: (!) 80/49 (12/20/20 1726)  SpO2: (!) 73 % (12/20/20 1726)  Weight: 117 kg (257 lb 15 oz) (12/13/20 0300)  Height: 5' 4" (162.6 cm) (12/10/20 1245)       Central Line    Date/Time: 12/20/2020 6:35 PM  Performed by: Moira Solano MD  Authorized by: Moira Solano MD     Location procedure was performed:  [facility] ICU 16WT  Consent Done ?:  Yes  Time out complete?: Verified correct patient, procedure, equipment, staff, and site/side    Indications:  Hemodialysis, vascular access and hemodynamic monitoring  Anesthesia:  General anesthesia  Preparation:  Skin prepped with ChloraPrep  Skin prep agent dried: Skin prep agent completely dried prior to procedure    Sterile barriers: All five maximal sterile barriers used - gloves, gown, cap, mask and large sterile sheet    Hand hygiene: Hand hygiene performed immediately prior to central venous catheter insertion    Location:  Right internal jugular  Catheter type:  Trialysis  Catheter Lenght (cm): 16cm.  Ultrasound guidance: Yes    Vessel Caliber:  Large   patent  Comprressibility:  Normal  Needle advanced into vessel with real time ultrasound guidance.    Guidewire confirmed in vessel.    Steril sheath on probe.    Sterile gel used.  Manometry: No    Number of attempts:  1  Securement:  Line sutured, chlorhexidine patch, sterile dressing applied and blood return through all ports  Complications: No    Estimated blood loss (mL):  5  XRay:  Placement verified by x-ray, no pneumothorax on x-ray and successful placement  Adverse Events:  None        Moira Solano  12/20/2020  "

## 2020-12-21 NOTE — NURSING
Prior to pt going into PEA, spoke with family at bedside, family agreed to DNR over compressions, Dr. Baugh called to bedside, pt went in to PEA,  notified, comfort/spiritual care provided to pt/family, all gtts/ventilator turned off, pt , will carry out postmortem care when applicable

## 2020-12-21 NOTE — ASSESSMENT & PLAN NOTE
BG goal 140 - 180       Continue IV insulin infusion protocol  Requires intensive BG monitoring while on protocol       Lab Results   Component Value Date    HGBA1C 6.5 (H) 12/07/2020

## 2020-12-21 NOTE — SUBJECTIVE & OBJECTIVE
"Interval HPI:   Overnight events: Remains in RICU. Intubated yesterday. BG at or above goal on IV insulin infusion rates 2-5.6 u/hr. Pressor support.   Eating:   NPO  Nausea: No  Hypoglycemia and intervention: No  Fever: No  TPN and/or TF: No      BP (!) 80/49   Pulse 97   Temp 97.4 °F (36.3 °C) (Axillary)   Resp (!) 35   Ht 5' 4" (1.626 m)   Wt 117 kg (257 lb 15 oz)   LMP 08/25/2019 (Approximate)   SpO2 (!) 73%   BMI 44.27 kg/m²     Labs Reviewed and Include    Recent Labs   Lab 12/20/20  0355 12/20/20  1214   * 232*   CALCIUM 8.7 8.8   ALBUMIN 1.8*  --    PROT 7.2  --     140   K 4.9 4.3   CO2 22* 25    101   BUN 64* 66*   CREATININE 1.8* 1.9*   ALKPHOS 208*  --    ALT 16  --    AST 31  --    BILITOT 1.2*  --      Lab Results   Component Value Date    WBC 30.13 (H) 12/20/2020    HGB 10.4 (L) 12/20/2020    HCT 36.3 (L) 12/20/2020    MCV 92 12/20/2020     12/20/2020     No results for input(s): TSH, FREET4 in the last 168 hours.  Lab Results   Component Value Date    HGBA1C 6.5 (H) 12/07/2020       Nutritional status:   Body mass index is 44.27 kg/m².  Lab Results   Component Value Date    ALBUMIN 1.8 (L) 12/20/2020    ALBUMIN 2.0 (L) 12/19/2020    ALBUMIN 2.0 (L) 12/18/2020     Lab Results   Component Value Date    PREALBUMIN 20 05/09/2016    PREALBUMIN 16 (L) 05/06/2016    PREALBUMIN 9 (L) 05/04/2016       Estimated Creatinine Clearance: 44 mL/min (A) (based on SCr of 1.9 mg/dL (H)).    Accu-Checks  Recent Labs     12/19/20  2156 12/20/20  0723 12/20/20  0915 12/20/20  0959 12/20/20  1108 12/20/20  1213 12/20/20  1303 12/20/20  1418 12/20/20  1510 12/20/20  1612   POCTGLUCOSE 335* 301* 255* 384* 271* 243* 279* 296* 286* 265*       Current Medications and/or Treatments Impacting Glycemic Control  Immunotherapy:    Immunosuppressants     None        Steroids:   Hormones (From admission, onward)    Start     Stop Route Frequency Ordered    12/20/20 0830  dexamethasone (DECADRON) 20 " mg in sodium chloride 0.9% 50 mL IVPB      -- IV Daily 12/20/20 0824        Pressors:    Autonomic Drugs (From admission, onward)    Start     Stop Route Frequency Ordered    12/20/20 1830  EPINEPHrine (ADRENALIN) 5 mg in sodium chloride 0.9% 250 mL infusion     Question Answer Comment   Begin at (in mcg/kg/min): 0.02    Titrate by: (in mcg/kg/min) 0.02    Titrate interval: (in minutes) 5    Titrate to maintain: (SBP or MAP or Cardiac Index) MAP    Greater than: (in mmHg) 65    Maximum dose: (in mcg/kg/min) 2        -- IV Continuous 12/20/20 1729    12/20/20 1700  norepinephrine 4 mg in dextrose 5% 250 mL infusion (premix) (titrating)     Question Answer Comment   Begin at (in mcg/kg/min): 0.02    Titrate by: (in mcg/kg/min) 0.02    Titrate interval: (in minutes) 5    Titrate to maintain: (MAP or SBP) MAP    Greater than: (in mmHg) 65    Maximum dose: (in mcg/kg/min) 3        -- IV Continuous 12/20/20 1655    12/20/20 1655  norepinephrine bitartrate-D5W 4 mg/250 mL (16 mcg/mL) infusion Soln     Note to Pharmacy: Created by GiveNextt override    12/21 0459   12/20/20 1655    12/19/20 1056  succinylcholine (ANECTINE) 20 mg/mL injection     Note to Pharmacy: Created by cabinet override    12/19 2314   12/19/20 1056    12/19/20 1056  rocuronium 10 mg/mL injection     Note to Pharmacy: Created by cabinet override    12/19 2314   12/19/20 1056        Hyperglycemia/Diabetes Medications:   Antihyperglycemics (From admission, onward)    Start     Stop Route Frequency Ordered    12/20/20 0930  insulin regular 1 Units/mL in sodium chloride 0.9% 100 mL infusion     Question Answer Comment   Insulin Rate Adjustment (DO NOT MODIFY ANSWER) [email or web address]    Enter initial dose from Infusion Protocol Chart (Units/hr): 3        -- IV Continuous 12/20/20 0827

## 2020-12-21 NOTE — ASSESSMENT & PLAN NOTE
Known NICM (EF 15%), CRT-D (2017) in place (Patient of Dr. Jules)  - Repeat TTE  12/10 EF 15%  - Patient with up trending WBC and increased O2 requirements, along with intervals of hypotension   - Repeat CXR with worsening pulmonary edema and pneumonia.     Plan:  -- Consulted heart transplant team, appreciate recommendations.  -- Holding lisinopril in setting of hypotension   -- Diuresing w/ lasix gtt  -- Dobutamine d/c'd due to hypotension

## 2020-12-21 NOTE — ASSESSMENT & PLAN NOTE
Home regimen includes long-acting insulin glargine 34 units BID and short-acting insulin aspart 16 units with SSI. HgbA1c 6.5.  BG remains poorly controlled  Endocrine following  On insulin gtt

## 2020-12-21 NOTE — ASSESSMENT & PLAN NOTE
--following intubation; suspect secondary to HFrEF, though hypoxemia is also a consideration  -- ~ 7 mins down time prior to rosc  --has subsequently developed profound shock and MODS  --further w/u deferred given grim prognosis

## 2020-12-21 NOTE — PROCEDURES
"Laure Ross is a 50 y.o. female patient.    Temp: 97.4 °F (36.3 °C) (20 1600)  Pulse: 97 (20 1726)  Resp: (!) 35 (20 1600)  BP: (!) 80/49 (20 1726)  SpO2: (!) 73 % (20 1726)  Weight: 117 kg (257 lb 15 oz) (20 0300)  Height: 5' 4" (162.6 cm) (12/10/20 1245)       Arterial Line    Date/Time: 2020 6:34 PM  Location procedure was performed: [facility] ICU 16WT  Performed by: Moira Solano MD  Authorized by: Moira Solano MD   Pre-op Diagnosis: cardiogenic shock  Consent Done: Yes  Consent: Written consent obtained.  Risks and benefits: risks, benefits and alternatives were discussed  Consent given by: child.  Patient understanding: patient states understanding of the procedure being performed  Patient consent: the patient's understanding of the procedure matches consent given  Procedure consent: procedure consent matches procedure scheduled  Relevant documents: relevant documents present and verified  Test results: test results available and properly labeled  Site marked: the operative site was marked  Imaging studies: imaging studies available  Required items: required blood products, implants, devices, and special equipment available  Patient identity confirmed:  and MRN  Time out: Immediately prior to procedure a "time out" was called to verify the correct patient, procedure, equipment, support staff and site/side marked as required.  Preparation: Patient was prepped and draped in the usual sterile fashion.  Indications: multiple ABGs, respiratory failure and hemodynamic monitoring  Location: right radial  Needle gauge: 20  Seldinger technique: Seldinger technique used  Number of attempts: 1          Moira Solano  2020  "

## 2020-12-21 NOTE — PROGRESS NOTES
"On arrival this am, pt on vent 100% fio2/16 peep, pupils 8mm dilated/fixed, no reflexes intact, paced @60 on monitor, SBP 30's on monitor, family at bedside, Dr. Baugh to bedside, spoke with family about pt condition and family would not like to withdraw care, daughter states " it is in God's hands when she will be taken", pt remains full code, code cart outside room, pt maxed on epi/levo/vaso gtt, Na bicarb infusing, insulin/heparin gtt, pt wiped down to make clean appearance, eye ointment applied, empathetic care given to family  "

## 2020-12-21 NOTE — PROCEDURES
"Laure Ross is a 50 y.o. female patient.    Temp: 97.4 °F (36.3 °C) (12/20/20 1600)  Pulse: 97 (12/20/20 1726)  Resp: (!) 35 (12/20/20 1600)  BP: (!) 80/49 (12/20/20 1726)  SpO2: (!) 73 % (12/20/20 1726)  Weight: 117 kg (257 lb 15 oz) (12/13/20 0300)  Height: 5' 4" (162.6 cm) (12/10/20 1245)       Intubation    Date/Time: 12/20/2020 6:26 PM  Location procedure was performed: [facility] ICU 16WT  Performed by: Moira Solano MD  Authorized by: Moira Solano MD   Assisting provider: Asael Stapleton MD  Pre-operative diagnosis: acute hypoxic respiratory failure  Post-operative diagnosis: same  Consent Done: Emergent Situation  Indications: respiratory failure and  hypoxemia  Intubation method: video-assisted  Preoxygenation: mask (bipap)  Sedatives: etomidate (20mg)  Paralytic: rocuronium (120mg)  Laryngoscope size: Glide 3  Tube size: 7.5 mm  Tube type: cuffed  Number of attempts: 1  Cricoid pressure: no  Cords visualized: yes  Post-procedure assessment: chest rise and CO2 detector  Breath sounds: rales/crackles and equal  Cuff inflated: yes  ETT to teeth (cm): initially 27 to the teeth, then retracted to 25 cm at teeth following cxr.  Tube secured with: ETT rincon  Chest x-ray interpreted by me.  Chest x-ray findings: endotracheal tube too low  Tube repositioned: tube repositioned successfully  Patient tolerance of procedure: coded following intubation despite pre-oxygenation with bipap mask and uptitration of vasopressors prior to inducution; initial rhythm PEA arrest with 8 minutes of CPR prior to ROSC, see code sheet for details.  Complications: Yes (cardiac arrest following intubation, see above)  Estimated blood loss (mL): 0          Moira Solano  12/20/2020  "

## 2020-12-21 NOTE — PROGRESS NOTES
[facility] - ICU 16   Critical Care Medicine  Progress Note    Patient Name: Laure Ross  MRN: [id number]  Admission Date: 12/6/2020  Hospital Length of Stay: 15 days  Code Status: Full Code  Attending Provider: Mike Baugh MD  Primary Care Provider: Holly Mittal MD   Principal Problem: Acute hypoxemic respiratory failure due to COVID-19    Subjective:     HPI:  Ms. Laure Ross is a 50 year old woman with a PMHx of HFrEF 2/2 NICM (EF 15%, CRT-D placed '17), pAF, HTN, IDDM2, HLD, CLARENCE, asthma, GERD and obesity who presents to [facility] for SOB and cough. 1 week ago she began having myalgias, fatigue, nausea, vomiting, headache, and mild dyspnea on exertion. At that time she got a COVID test and was positive on 11/30. Most of her symptoms progressively improved throughout the week but 2 days ago began having worsening SOB even at rest and 1 day ago began having a cough and fever. Denies orthopnea. She says the cough is productive but has been coughing it up and swallowing it so cannot comment on the character of the sputum. She has been taking tylenol at home. She has been adherent with all of her medications including her diuretics and insulin. She denies taking her metolazone recently as needed, however she admits that she has not weighed herself in weeks. She feels she is at her baseline weight. Her urine volume has been unchanged. She has been drinking water but her appetite for food has decreased in the past few days.     Hospital/ICU Course:  Initially admitted to Hospital Medicine for COVID pneumonia on 12/8. Required HFNC between 10-15L. Diurese well, net negative 5L. Started on dexamethasone, remdesivir, ceftriaxone, and doxycycline. Overnight on 12/9, oxygen requirements started to increase. Unable to tolerate coming off of continuous CPAP and escalated to ICU. Remains on CPAP qHS with CF during the day.Patient completed antibiotics for 7 days, as well as 5 day course of remdesivir  and 10 days of dexamethasone. Endocrinology consulted due to hyperglycemia, most likely due to dexamethasone along with baseline T2DM. She was started on insulin infusion along with aspart. Will continue to monitor.  Started on heparin gtt for AC. Patient c/o congestion with CF  for which was kept on CPAP yesterday, CF to eat. WBC continues to be up trending, procal wnl, BC ngtd. CXR with worsening pulmonary edema and pneumonia. Patient has been presenting with low BP today, MAP around 60s this morning. Will start Vanc and Zosyn. Patient will need central line for better IV access. Discontinued home lisinopril. Consulted heart transplant team. 12/19 BiPAP 100% overnight. Lasix given for worsening respiratory failure and antibiotics for worsening leukocytosis. Patient intubated evening of 12/20 for worsening respiratory failure. Following intubation, patient went into PEA arrest. ACLS performed for 7 mins before obtaining ROSC. Following arrest, central and arterial lines were placed. Unfortunately during the following hours, patient's condition continued to deteriorate. As of evening 12-20/early am 12-21, patient requiring max dose 3 vasopressors. Increasing difficulty in oxygenating/ventilating despite maximum support w/ ventilator. Now in renal failure/anuric and developing shock liver. Patient's daughter notified of multi-organ failure and presented to bedside to visit.     Interval History/Significant Events: Coded yesterday evening with 7 mins down time. Decline in condition overnight. Now w/ MODS. Family aware of grave prognosis.     Review of Systems   Unable to perform ROS: Patient unresponsive     Objective:     Vital Signs (Most Recent):  Temp: (!) 93.6 °F (34.2 °C) (12/21/20 0501)  Pulse: 82 (12/21/20 0501)  Resp: (!) 35 (12/21/20 0501)  BP: (!) 68/45 (12/21/20 0401)  SpO2: (!) 61 % (12/21/20 0501) Vital Signs (24h Range):  Temp:  [93.6 °F (34.2 °C)-98 °F (36.7 °C)] 93.6 °F (34.2 °C)  Pulse:  []  82  Resp:  [24-38] 35  SpO2:  [49 %-95 %] 61 %  BP: ()/(45-89) 68/45  Arterial Line BP: ()/(42-69) 58/42   Weight: 117 kg (257 lb 15 oz)  Body mass index is 44.27 kg/m².      Intake/Output Summary (Last 24 hours) at 12/21/2020 0612  Last data filed at 12/21/2020 0501  Gross per 24 hour   Intake 8464.82 ml   Output 435 ml   Net 8029.82 ml       Physical Exam  HENT:      Head: Normocephalic and atraumatic.      Right Ear: External ear normal.      Left Ear: External ear normal.      Nose: Nose normal.      Mouth/Throat:      Mouth: Mucous membranes are moist.      Pharynx: Oropharynx is clear.   Eyes:      Comments: Pupils 8 bilaterally, fixed/non-reactive. + scleral edema   Neck:      Musculoskeletal: Normal range of motion and neck supple.   Cardiovascular:      Comments: Paced rhythm, rate 60. Diminished pulses throughout  Pulmonary:      Comments: Coarse breath sounds bilaterally. Mechanically ventilated.   Abdominal:      Palpations: Abdomen is soft.      Comments: Absent bowel sounds   Musculoskeletal:      Comments: Diffuse edema throughout   Skin:     Capillary Refill: Capillary refill takes more than 3 seconds.   Neurological:      Mental Status: She is unresponsive.      GCS: GCS eye subscore is 1. GCS verbal subscore is 1. GCS motor subscore is 1.      Comments: No cough or gag reflex         Vents:  Vent Mode: A/C (12/21/20 0303)  Ventilator Initiated: Yes (12/20/20 1726)  Set Rate: 35 BPM (12/21/20 0303)  Vt Set: 305 mL (12/21/20 0303)  PEEP/CPAP: 16 cmH20 (12/21/20 0303)  Oxygen Concentration (%): 100 (12/21/20 0501)  Peak Airway Pressure: 46 cmH2O (12/21/20 0303)  Total Ve: 11.2 mL (12/21/20 0303)  F/VT Ratio<105 (RSBI): 109.72 (12/21/20 0303)  Lines/Drains/Airways     Peripherally Inserted Central Catheter Line            PICC Double Lumen 12/19/20 1707 left basilic 1 day          Central Venous Catheter Line            Trialysis (Dialysis) Catheter 12/20/20 1800 right internal jugular less  than 1 day          Drain                 Urethral Catheter 12/16/20 1600 16 Fr. 4 days          Airway                 Airway - Non-Surgical 12/20/20 1704 Endotracheal Tube less than 1 day          Arterial Line            Arterial Line 12/20/20 1739 Right Radial less than 1 day          Peripheral Intravenous Line                 Midline Catheter Insertion/Assessment  - Single Lumen 12/11/20 1524 Right cephalic vein (lateral side of arm) 18g x 10cm 9 days         Peripheral IV - Single Lumen 12/17/20 1710 20 G;1 3/4 in Left Antecubital 3 days              Significant Labs:    CBC/Anemia Profile:  Recent Labs   Lab 12/20/20  0355 12/20/20 1839 12/21/20  0309   WBC 30.13* 38.12* 28.92*   HGB 10.4* 10.4* 9.2*   HCT 36.3* 34.7* 32.5*    413* 326   MCV 92 89 93   RDW 15.7* 15.8* 16.2*        Chemistries:  Recent Labs   Lab 12/20/20  0355 12/20/20 1839 12/21/20  0013 12/21/20  0309      < > 136 139 140   K 4.9   < > 4.8 5.5* 5.1      < > 98 99 97   CO2 22*   < > 21* 14* 15*   BUN 64*   < > 73* 66* 62*   CREATININE 1.8*   < > 2.6* 2.9* 2.8*   CALCIUM 8.7   < > 8.7 7.8* 6.6*   ALBUMIN 1.8*  --  1.6*  --  1.1*   PROT 7.2  --  7.2  --  4.9*   BILITOT 1.2*  --  1.1*  --  1.6*   ALKPHOS 208*  --  210*  --  198*   ALT 16  --  41  --  2,246*   AST 31  --  63*  --  3,326*   MG 2.4  --  2.9*  --  2.7*   PHOS 3.6  --  7.4*  --  12.2*    < > = values in this interval not displayed.       All pertinent labs within the past 24 hours have been reviewed.    Significant Imaging:  I have reviewed and interpreted all pertinent imaging results/findings within the past 24 hours.      ABG  Recent Labs   Lab 12/21/20  0035   PH 6.956*   PO2 77*   PCO2 82.1*   HCO3 18.3*   BE -14     Assessment/Plan:     Pulmonary  Moderate persistent asthma without complication  - Continue home maintenance inhalers and montelukast 10 mg    Cardiac/Vascular  Cardiac arrest  --following intubation; suspect secondary to HFrEF, though  hypoxemia is also a consideration  -- ~ 7 mins down time prior to rosc  --has subsequently developed profound shock and MODS  --further w/u deferred given grim prognosis    Chronic combined systolic and diastolic congestive heart failure  Known NICM (EF 15%), CRT-D (2017) in place (Patient of Dr. Jules)  - Repeat TTE  12/10 EF 15%  - Patient with up trending WBC and increased O2 requirements, along with intervals of hypotension   - Repeat CXR with worsening pulmonary edema and pneumonia.     Plan:  -- Consulted heart transplant team, appreciate recommendations.  -- Holding lisinopril in setting of hypotension   -- Diuresing w/ lasix gtt  -- Dobutamine d/c'd due to hypotension               Paroxysmal atrial fibrillation  INR 1.5, D-dimer 22.65    Plan:   - Continue home digoxin; amiodarone d/c'd due to hypotension  - Continue heparin gtt as VTE PPx      Renal/  Acute kidney injury superimposed on chronic kidney disease  AL on CKD noted on admit, improving now. Baseline creatinine ~1.2  - Now anuric in setting of profound shock and mods  - Continue lasix   - Trend CMP   - Strict I/O  - Renally dose meds and avoid nephrotoxics    Endocrine  Diabetes mellitus type 2, uncontrolled  Labile glucose likely due to steroids  - Insulin infusion  - hourly glucose  - titrate per protocol    Type 2 diabetes mellitus with diabetic polyneuropathy  Home regimen includes long-acting insulin glargine 34 units BID and short-acting insulin aspart 16 units with SSI. HgbA1c 6.5.  BG remains poorly controlled  Endocrine following  On insulin gtt      Other  * Acute hypoxemic respiratory failure due to COVID-19  COVID positive on 11/30. Admitted to hospital 12/6, upgraded to ICU 12/10 for increasing oxygen requirements. 's up from 200's on admit.   --emergently intubated 12/20 for worsening respiratory failure  -- Continue vanc and zosyn   -- F/U BC: ngtd   -- Diurese with lasix infusion  -- Completed dexamethasone x 10 days  --  Completed remdesivir X 5 days    -- Continue heparin infusion for hypercoagulability    -- Scheduled Duonebs         Pneumonia due to COVID-19 virus  See respiratory failure       Goals of care, counseling/discussion  Palliative care consulted, appreciate assistance  --GOC discussions ongoing  --patient actively dying; family aware  --patient remains full code          Critical Care Daily Checklist:    A: Awake: RASS Goal/Actual Goal:    Actual: Paige Agitation Sedation Scale (RASS): Unarousable   B: Spontaneous Breathing Trial Performed?  not appropriate at this time   C: SAT & SBT Coordinated?  As above                      D: Delirium: CAM-ICU Overall CAM-ICU: Positive   E: Early Mobility Performed? Yes   F: Feeding Goal: Goals: Pt to meet >50% of EEN/EPN by RD follow up.  Status: Nutrition Goal Status: new   Current Diet Order   Procedures    Diet NPO Except for: Medication, Ice Chips, Sips with Medication     Order Specific Question:   Except for     Answer:   Medication     Order Specific Question:   Except for     Answer:   Ice Chips     Order Specific Question:   Except for     Answer:   Sips with Medication      AS: Analgesia/Sedation No sedation   T: Thromboembolic Prophylaxis Heparin gtt   H: HOB > 300 Yes   U: Stress Ulcer Prophylaxis (if needed)    G: Glucose Control Insulin gtt   B: Bowel Function Stool Occurrence: 1   I: Indwelling Catheter (Lines & Campa) Necessity Central line, art line, campa, piv, ett   D: De-escalation of Antimicrobials/Pharmacotherapies vanc & zosyn    Plan for the day/ETD Ongoing GOC    Code Status:  Family/Goals of Care: Full Code  Family vigiling at bedside     Patient to be evaluated by and further recommendations per Dr. Baugh.     Critical Care Time: 60 minutes  Critical secondary to Patient has a condition that poses threat to life and bodily function: septic and cardiogenic shock, multi organ failure      Critical care was time spent personally by me on the following  activities: development of treatment plan with patient or surrogate and bedside caregivers, discussions with consultants, evaluation of patient's response to treatment, examination of patient, ordering and performing treatments and interventions, ordering and review of laboratory studies, ordering and review of radiographic studies, pulse oximetry, re-evaluation of patient's condition. This critical care time did not overlap with that of any other provider or involve time for any procedures.     Anette Davila NP  Critical Care Medicine  [facility] - ICU 16 WT

## 2020-12-21 NOTE — PROGRESS NOTES
Upon shift change, patient was confirmed to be severely acidotic and requiring increased presser support. Anette Davila, DAVIN Kemp at bedside. Justo ordered 2 amps of bicarb IVP and increased concentrations of levo and epi. Giovanni ordered vasopressin infusion in conjunction of the other pressers. A repeat ABG was drawn per respiratory. After reviewing results, Giovanni ordered another 2 amps bicarb IVP and a continuous infusion. Patient remains in critical condition.

## 2020-12-21 NOTE — PLAN OF CARE
Patient  - TOD: 09:45      JEANNA Montalvo RN  Case Management  [id number]      20 1211   Final Note   Assessment Type Final Discharge Note   Anticipated Discharge Disposition   (Patient  - TOD: 09:45)   Post-Acute Status   Post-Acute Authorization Other  (Patient  - TOD: 09:45)

## 2020-12-21 NOTE — SIGNIFICANT EVENT
Critical Care Medicine  Death Note      Called to bedside by patient's nurse. Nursing supervisor notified. Family at bedside.  has been called and is also at bedside.    Patient is not responding to verbal or tactile stimuli. Patient does not have a papillary or corneal reflex. Her pupils are fixed and dilated. No heart or breath sounds on auscultation. No respirations. No palpable pulses.     Time of death: 0945 12/21/20     Cause of Death: VTach arrest    MD [email or web address]  [phone]

## 2020-12-21 NOTE — DISCHARGE SUMMARY
Death Note  Critical Care Medicine      Admit Date: 2020    Date of Death: 2020    Time of Death: 09:45    Attending Physician: Mike Baugh MD    Principal Diagnoses: Acute hypoxemic respiratory failure due to COVID-19    Preliminary Cause of Death: Acute hypoxemic respiratory failure due to COVID-19    Secondary Diagnoses:   Active Hospital Problems    Diagnosis  POA    *Acute hypoxemic respiratory failure due to COVID-19 [U07.1, J96.01]  Yes    Diabetes mellitus type 2, uncontrolled [E11.65]  Yes    Cardiac arrest [I46.9]  No    Adrenal cortical steroids causing adverse effect in therapeutic use [T38.0X5A]  Unknown    Chronic combined systolic and diastolic congestive heart failure [I50.42]  Yes    Goals of care, counseling/discussion [Z71.89]  Not Applicable    Type 2 diabetes mellitus with diabetic polyneuropathy [E11.42]  Yes     Chronic      Resolved Hospital Problems   No resolved problems to display.        Discharged Condition:     HPI:  Ms. Laure Ross is a 50 year old woman with a PMHx of HFrEF 2/2 NICM (EF 15%, CRT-D placed '17), pAF, HTN, IDDM2, HLD, CLARENCE, asthma, GERD and obesity who presents to [facility] for SOB and cough. 1 week ago she began having myalgias, fatigue, nausea, vomiting, headache, and mild dyspnea on exertion. At that time she got a COVID test and was positive on . Most of her symptoms progressively improved throughout the week but 2 days ago began having worsening SOB even at rest and 1 day ago began having a cough and fever. Denies orthopnea. She says the cough is productive but has been coughing it up and swallowing it so cannot comment on the character of the sputum. She has been taking tylenol at home. She has been adherent with all of her medications including her diuretics and insulin. She denies taking her metolazone recently as needed, however she admits that she has not weighed herself in weeks. She feels she is at her baseline weight. Her  urine volume has been unchanged. She has been drinking water but her appetite for food has decreased in the past few days.     Hospital/ICU Course:  Initially admitted to Hospital Medicine for COVID pneumonia on 12/8. Required HFNC between 10-15L. Diurese well, net negative 5L. Started on dexamethasone, remdesivir, ceftriaxone, and doxycycline. Overnight on 12/9, oxygen requirements started to increase. Unable to tolerate coming off of continuous CPAP and escalated to ICU. Remains on CPAP qHS with CF during the day.Patient completed antibiotics for 7 days, as well as 5 day course of remdesivir and 10 days of dexamethasone. Endocrinology consulted due to hyperglycemia, most likely due to dexamethasone along with baseline T2DM. She was started on insulin infusion along with aspart. Will continue to monitor.  Started on heparin gtt for AC. Patient c/o congestion with CF  for which was kept on CPAP yesterday, CF to eat. WBC continues to be up trending, procal wnl, BC ngtd. CXR with worsening pulmonary edema and pneumonia. Patient has been presenting with low BP today, MAP around 60s this morning. Will start Vanc and Zosyn. Patient will need central line for better IV access. Discontinued home lisinopril. Consulted heart transplant team. 12/19 BiPAP 100% overnight. Lasix given for worsening respiratory failure and antibiotics for worsening leukocytosis. Patient intubated evening of 12/20 for worsening respiratory failure. Following intubation, patient went into PEA arrest. ACLS performed for 7 mins before obtaining ROSC. Following arrest, central and arterial lines were placed. Unfortunately during the following hours, patient's condition continued to deteriorate. As of evening 12-20/early am 12-21, patient requiring max dose 3 vasopressors. Increasing difficulty in oxygenating/ventilating despite maximum support w/ ventilator. Now in renal failure/anuric and developing shock liver. Patient's daughter notified of  multi-organ failure and presented to bedside to visit. Patient with worsening shock despite 3 vasopressors and went into another cardiac arrest. Discussed poor prognosis with family who were accepting of DNR status and allowing patient to pass at this point. The patient was subsequently declared dead.  called. Condolences offered.       Lizzy Bhakta PA-C  Critical Care Medicine  12/21/2020   10:50 AM

## 2020-12-21 NOTE — PHYSICIAN QUERY
PT Name: Laure Ross  MR #: [id number]     Diagnosis Clarification      CDS/: Shahnaz Scales RN CCDS           Contact information:[email or web address]    This form is a permanent document in the medical record.     Query Date: December 21, 2020    Dear Provider,  By submitting this query, we are merely seeking further clarification of documentation.  Please utilize your independent clinical judgment when addressing the question(s) below.     The medical record contains the following:    Supporting Clinical Information Location in Medical Record   Severe sepsis  Pt has RR 26 and /90. She is having increasing oxygen requirements.  Was placed on CPAP yesterday.  Transfer to ICU today.  See COVID-19 and heart failure management.     Hypoxemic respiratory failure - completed remdesevir, completed DEX , HFNC during day, CPAP at night with CLARENCE   Systolic heart failure - decompensated clinically and on CXR; increase NIV support, cont furosemide, consult &heart transplant svc - may benefit from RHC vs empiric low dose norepinephrine (suspect component of sepsis)   Atrial fibrillation - amio / dig   Coagulopathy - heparin infusion   DM - insulin     Primary team consulted for recs regarding concern for shock and possible need for pressors in setting of known advanced heart failure.    Critical secondary to Patient has a condition that poses threat to life and bodily function: septic and cardiogenic shock, multi organ failure        12/6/2020 17:35 12/19/2020 13:12 12/20/2020 18:39 12/21/2020 03:09   Lactate, Aron 1.6 3.0 (H) 5.1 (HH) >12.0 (HH)      12/6/2020 17:35 12/10/2020 11:04 12/16/2020 20:32 12/19/2020 09:29 12/20/2020 18:39   Procalcitonin 0.18 0.29 (H) 0.10 1.24 (H) 5.28 (H)        12/6/2020 17:35 12/8/2020 04:52 12/9/2020 03:43 12/10/2020 04:00 12/11/2020 03:20 12/12/2020 03:10 12/13/2020 03:05 12/15/2020 05:29 12/16/2020 05:41 12/17/2020 03:45   WBC 6.68 13.38 (H) 11.91 16.43 (H) 15.07 (H)  16.87 (H) 14.70 (H) 17.60 (H) 20.74 (H) 23.94 (H)     Rocephin IV 12/6-12/10  Doxycycline PO 12/6-12/10  Vancomycin IV 12/10-  Zosyn IV 12/17-  Cefepime IV 12/6, 12/10-12/13 Hospital medicine note 12/10          Critical care note 12/17                Cardiology CN 12/19      Critical care 12/21        Lab 12/6-12/20                                  MAR     Please clarify if the ___Sepsis/Septic shock_________ diagnosis has been:    [x  ] Sepsis with septic shock 2/2 COVID-19 ruled in, pt also with cardiogenic shock   [  ] Sepsis with septic shock 2/2 COVID-19 ruled in   [  ] Sepsis 2/2 COVID-19 ruled in, but pt only with cardiogenic shock   [  ]   Sepsis ruled out, cardiogenic shock only   [  ] Other/Clarification of findings (please specify)_______________    [   ] Clinically undetermined       Present on admission (POA) status:   [   ] Yes (Y)                          [  ] Clinically Undetermined (W)  [   ] No (N)                            [   ] Documentation insufficient to determine if condition is POA (U)       Please document in your progress notes daily for the duration of treatment, until resolved, and include in your discharge summary.

## 2020-12-21 NOTE — NURSING
Post mortem care carried out, orderly notified to take patient down to [facility]

## 2020-12-24 LAB
BACTERIA BLD CULT: NORMAL
BACTERIA BLD CULT: NORMAL

## 2022-02-09 NOTE — NURSING
Patient discharged to home. IV removed,discharge instructions given. Patient verbalized his understanding.  Patient has taken all personal belongings. Patient escorted to car in a wheelchair by escort and spouse.  
Received pt. From ER,awake alert and oriented,admitted with Influenza type B. Skin warm and dry to touch color good. Placed on respiratory isolation. Breath sounds clear franklin. Oriented to room and call bell system. Voices no complaints.  at bedside.  
rarely

## 2023-01-09 NOTE — TELEPHONE ENCOUNTER
----- Message from Bouchra Blandon sent at 11/6/2017  2:28 PM CST -----  Contact: Self/ [phone]  Patient called in to speak with you regarding the Home Health. She said home health would not go to her home since they don't accept her insurance.     Please call and advise.   
Can you ask her which agency she has used in the past?
Dr. Hampton, please call pat. Regarding the HH order, thanks  
Telemetry

## 2023-04-20 NOTE — TELEPHONE ENCOUNTER
Called patient for f/u, the line is busy   Epidermal Autograft Text: The defect edges were debeveled with a #15 scalpel blade.  Given the location of the defect, shape of the defect and the proximity to free margins an epidermal autograft was deemed most appropriate.  Using a sterile surgical marker, the primary defect shape was transferred to the donor site. The epidermal graft was then harvested.  The skin graft was then placed in the primary defect and oriented appropriately.

## 2024-03-23 NOTE — PLAN OF CARE
Patient assisted to bathroom and voided 200 ml on toilet.  Bladder scan =394cc.  MD notified.  Advised not to perform catheterization  at this time due to prior difficultly with insertion.  Will attempt voiding again in a few hours.    SW spoke with pt and pt asked SW to complete discharge assessment with pts daughter Ashley [phone].  SW spoke with pt's daughter and completed assessment.  Pt lives with her , daughter, and son.  Pt's family will provide transportation home.  Pt goes to [facility].         12/07/20 1543   Discharge Assessment   Confirmed/corrected address and phone number on facesheet? Yes   Assessment information obtained from? Caregiver   Prior to hospitilization cognitive status: Alert/Oriented   Prior to hospitalization functional status: Independent   Current cognitive status: Alert/Oriented   Current Functional Status: Independent   Lives With spouse;child(sebastián), adult   Able to Return to Prior Arrangements yes   Is patient able to care for self after discharge? Yes   Who are your caregiver(s) and their phone number(s)? Ashley Erazo [phone]   Patient's perception of discharge disposition home or selfcare   Readmission Within the Last 30 Days no previous admission in last 30 days   Patient currently being followed by outpatient case management? No   Patient currently receives any other outside agency services? No   Equipment Currently Used at Home CPAP   Do you have any problems affording any of your prescribed medications? No   Is the patient taking medications as prescribed? yes   Does the patient have transportation home? Yes   Transportation Anticipated family or friend will provide   Does the patient receive services at the [facility]? Yes  ([facility])   Discharge Plan A Home   Discharge Plan B Home with family   DME Needed Upon Discharge  none   Patient/Family in Agreement with Plan yes     Holly Mittal MD      [id number] - [place], [place] - [address] AT [facility] & [address]  [address]  [place] [address]  Phone: [phone] Fax: [phone]    Payor: MEDICAID / Plan: [place] [facility] CONNECT / Product Type: Managed Medicaid /

## 2024-08-11 NOTE — ASSESSMENT & PLAN NOTE
Problem: Pain - Adult  Goal: Verbalizes/displays adequate comfort level or baseline comfort level  Outcome: Progressing  Flowsheets (Taken 8/11/2024 0753)  Verbalizes/displays adequate comfort level or baseline comfort level:   Administer analgesics based on type and severity of pain and evaluate response   Assess pain using appropriate pain scale     Problem: Safety - Adult  Goal: Free from fall injury  Outcome: Progressing     Problem: Discharge Planning  Goal: Discharge to home or other facility with appropriate resources  Outcome: Progressing     Problem: Skin  Goal: Decreased wound size/increased tissue granulation at next dressing change  Outcome: Progressing  Flowsheets (Taken 8/11/2024 0531 by Liz Parkinson, RN)  Decreased wound size/increased tissue granulation at next dressing change:   Promote sleep for wound healing   Protective dressings over bony prominences  Goal: Participates in plan/prevention/treatment measures  Outcome: Progressing  Flowsheets (Taken 8/11/2024 0753)  Participates in plan/prevention/treatment measures:   Elevate heels   Discuss with provider PT/OT consult  Goal: Prevent/manage excess moisture  Outcome: Progressing  Flowsheets (Taken 8/11/2024 0531 by Liz Parkinson, RN)  Prevent/manage excess moisture:   Cleanse incontinence/protect with barrier cream   Monitor for/manage infection if present   Follow provider orders for dressing changes   Moisturize dry skin  Goal: Prevent/minimize sheer/friction injuries  Outcome: Progressing  Flowsheets (Taken 8/11/2024 0531 by Liz Parkinson, RN)  Prevent/minimize sheer/friction injuries:   Complete micro-shifts as needed if patient unable. Adjust patient position to relieve pressure points, not a full turn   Use pull sheet   HOB 30 degrees or less   Turn/reposition every 2 hours/use positioning/transfer devices  Goal: Promote/optimize nutrition  Outcome: Progressing  Goal: Promote skin healing  Outcome: Progressing  Flowsheets (Taken  Palliative care consulted, appreciate assistance  --GOC discussions initiated. Intubation discussed at length. Ms. Ross would like to speak with her daughter before making a decision on changing code status; will need follow up    8/11/2024 0531 by Liz Parkinson RN)  Promote skin healing:   Assess skin/pad under line(s)/device(s)   Protective dressings over bony prominences   Turn/reposition every 2 hours/use positioning/transfer devices   Ensure correct size (line/device) and apply per  instructions   Rotate device position/do not position patient on device     Problem: Nutrition  Goal: Less than 5 days NPO/clear liquids  Outcome: Progressing  Goal: Oral intake greater than 50%  Outcome: Progressing  Goal: Oral intake greater 75%  Outcome: Progressing  Goal: Consume prescribed supplement  Outcome: Progressing  Goal: Adequate PO fluid intake  Outcome: Progressing  Goal: Nutrition support goals are met within 48 hrs  Outcome: Progressing  Goal: Nutrition support is meeting 75% of nutrient needs  Outcome: Progressing  Goal: Tube feed tolerance  Outcome: Progressing  Goal: BG  mg/dL  Outcome: Progressing  Goal: Lab values WNL  Outcome: Progressing  Goal: Electrolytes WNL  Outcome: Progressing  Goal: Promote healing  Outcome: Progressing  Goal: Maintain stable weight  Outcome: Progressing  Goal: Reduce weight from edema/fluid  Outcome: Progressing  Goal: Gradual weight gain  Outcome: Progressing  Goal: Improve ostomy output  Outcome: Progressing

## 2024-12-20 NOTE — ASSESSMENT & PLAN NOTE
-Denies any SI or HI.  Continue Wellbutrin 300 mg p.o. daily, continue Cymbalta 60 mg daily  -Discussed establishing with behavioral health.  She feels well-controlled at this time  -Follow-up in 6 months.          Pt has RR 26 and /90. She is having increasing oxygen requirements.  Was placed on CPAP yesterday.  Transfer to ICU today.  See COVID-19 and heart failure management.

## (undated) DEVICE — PAD DEFIB CADENCE ADULT R2